# Patient Record
Sex: MALE | Race: WHITE | Employment: OTHER | URBAN - METROPOLITAN AREA
[De-identification: names, ages, dates, MRNs, and addresses within clinical notes are randomized per-mention and may not be internally consistent; named-entity substitution may affect disease eponyms.]

---

## 2013-03-15 LAB — HM COLONOSCOPY: NORMAL

## 2017-01-20 ENCOUNTER — ALLSCRIPTS OFFICE VISIT (OUTPATIENT)
Dept: OTHER | Facility: OTHER | Age: 77
End: 2017-01-20

## 2017-04-06 ENCOUNTER — GENERIC CONVERSION - ENCOUNTER (OUTPATIENT)
Dept: OTHER | Facility: OTHER | Age: 77
End: 2017-04-06

## 2017-04-12 ENCOUNTER — GENERIC CONVERSION - ENCOUNTER (OUTPATIENT)
Dept: OTHER | Facility: OTHER | Age: 77
End: 2017-04-12

## 2017-04-28 ENCOUNTER — GENERIC CONVERSION - ENCOUNTER (OUTPATIENT)
Dept: OTHER | Facility: OTHER | Age: 77
End: 2017-04-28

## 2017-07-03 DIAGNOSIS — I73.9 PERIPHERAL VASCULAR DISEASE (HCC): ICD-10-CM

## 2017-07-03 DIAGNOSIS — I25.10 ATHEROSCLEROTIC HEART DISEASE OF NATIVE CORONARY ARTERY WITHOUT ANGINA PECTORIS: ICD-10-CM

## 2017-07-03 DIAGNOSIS — M54.50 LOW BACK PAIN: ICD-10-CM

## 2017-07-03 DIAGNOSIS — E78.5 HYPERLIPIDEMIA: ICD-10-CM

## 2017-07-03 DIAGNOSIS — E11.49 TYPE 2 DIABETES MELLITUS WITH OTHER DIABETIC NEUROLOGICAL COMPLICATION (HCC): ICD-10-CM

## 2017-07-03 DIAGNOSIS — I10 ESSENTIAL (PRIMARY) HYPERTENSION: ICD-10-CM

## 2017-07-03 DIAGNOSIS — I65.29 OCCLUSION AND STENOSIS OF UNSPECIFIED CAROTID ARTERY: ICD-10-CM

## 2017-07-07 ENCOUNTER — APPOINTMENT (OUTPATIENT)
Dept: LAB | Facility: HOSPITAL | Age: 77
End: 2017-07-07
Payer: MEDICARE

## 2017-07-07 ENCOUNTER — TRANSCRIBE ORDERS (OUTPATIENT)
Dept: ADMINISTRATIVE | Facility: HOSPITAL | Age: 77
End: 2017-07-07

## 2017-07-07 DIAGNOSIS — E11.49 TYPE 2 DIABETES MELLITUS WITH OTHER DIABETIC NEUROLOGICAL COMPLICATION (HCC): ICD-10-CM

## 2017-07-07 DIAGNOSIS — I73.9 PERIPHERAL VASCULAR DISEASE (HCC): ICD-10-CM

## 2017-07-07 DIAGNOSIS — I10 ESSENTIAL (PRIMARY) HYPERTENSION: ICD-10-CM

## 2017-07-07 DIAGNOSIS — I65.29 OCCLUSION AND STENOSIS OF UNSPECIFIED CAROTID ARTERY: ICD-10-CM

## 2017-07-07 DIAGNOSIS — I10 ESSENTIAL HYPERTENSION, MALIGNANT: ICD-10-CM

## 2017-07-07 DIAGNOSIS — E78.5 HYPERLIPIDEMIA: ICD-10-CM

## 2017-07-07 DIAGNOSIS — M54.50 LOW BACK PAIN: ICD-10-CM

## 2017-07-07 DIAGNOSIS — I10 ESSENTIAL HYPERTENSION, MALIGNANT: Primary | ICD-10-CM

## 2017-07-07 DIAGNOSIS — I25.10 ATHEROSCLEROTIC HEART DISEASE OF NATIVE CORONARY ARTERY WITHOUT ANGINA PECTORIS: ICD-10-CM

## 2017-07-07 LAB
ALBUMIN SERPL BCP-MCNC: 3.9 G/DL (ref 3.5–5)
ALP SERPL-CCNC: 62 U/L (ref 46–116)
ALT SERPL W P-5'-P-CCNC: 30 U/L (ref 12–78)
ANION GAP SERPL CALCULATED.3IONS-SCNC: 6 MMOL/L (ref 4–13)
AST SERPL W P-5'-P-CCNC: 15 U/L (ref 5–45)
BILIRUB SERPL-MCNC: 0.9 MG/DL (ref 0.2–1)
BUN SERPL-MCNC: 17 MG/DL (ref 5–25)
CALCIUM SERPL-MCNC: 9.6 MG/DL (ref 8.3–10.1)
CHLORIDE SERPL-SCNC: 103 MMOL/L (ref 100–108)
CHOLEST SERPL-MCNC: 144 MG/DL (ref 50–200)
CO2 SERPL-SCNC: 31 MMOL/L (ref 21–32)
CREAT SERPL-MCNC: 1.12 MG/DL (ref 0.6–1.3)
CREAT UR-MCNC: 122 MG/DL
ERYTHROCYTE [DISTWIDTH] IN BLOOD BY AUTOMATED COUNT: 13.8 % (ref 11.6–15.1)
EST. AVERAGE GLUCOSE BLD GHB EST-MCNC: 160 MG/DL
GFR SERPL CREATININE-BSD FRML MDRD: >60 ML/MIN/1.73SQ M
GLUCOSE P FAST SERPL-MCNC: 154 MG/DL (ref 65–99)
HBA1C MFR BLD: 7.2 % (ref 4.2–6.3)
HCT VFR BLD AUTO: 46.9 % (ref 36.5–49.3)
HDLC SERPL-MCNC: 46 MG/DL (ref 40–60)
HGB BLD-MCNC: 16.4 G/DL (ref 12–17)
LDLC SERPL CALC-MCNC: 68 MG/DL (ref 0–100)
MCH RBC QN AUTO: 31.7 PG (ref 26.8–34.3)
MCHC RBC AUTO-ENTMCNC: 35 G/DL (ref 31.4–37.4)
MCV RBC AUTO: 91 FL (ref 82–98)
MICROALBUMIN UR-MCNC: 9.6 MG/L (ref 0–20)
MICROALBUMIN/CREAT 24H UR: 8 MG/G CREATININE (ref 0–30)
PLATELET # BLD AUTO: 242 THOUSANDS/UL (ref 149–390)
PMV BLD AUTO: 9.4 FL (ref 8.9–12.7)
POTASSIUM SERPL-SCNC: 4.3 MMOL/L (ref 3.5–5.3)
PROT SERPL-MCNC: 7.2 G/DL (ref 6.4–8.2)
RBC # BLD AUTO: 5.18 MILLION/UL (ref 3.88–5.62)
SODIUM SERPL-SCNC: 140 MMOL/L (ref 136–145)
TRIGL SERPL-MCNC: 148 MG/DL
TSH SERPL DL<=0.05 MIU/L-ACNC: 3.32 UIU/ML (ref 0.36–3.74)
WBC # BLD AUTO: 8.29 THOUSAND/UL (ref 4.31–10.16)

## 2017-07-07 PROCEDURE — 80053 COMPREHEN METABOLIC PANEL: CPT

## 2017-07-07 PROCEDURE — 36415 COLL VENOUS BLD VENIPUNCTURE: CPT

## 2017-07-07 PROCEDURE — 83036 HEMOGLOBIN GLYCOSYLATED A1C: CPT

## 2017-07-07 PROCEDURE — 84443 ASSAY THYROID STIM HORMONE: CPT

## 2017-07-07 PROCEDURE — 80061 LIPID PANEL: CPT

## 2017-07-07 PROCEDURE — 82043 UR ALBUMIN QUANTITATIVE: CPT

## 2017-07-07 PROCEDURE — 82570 ASSAY OF URINE CREATININE: CPT

## 2017-07-07 PROCEDURE — 85027 COMPLETE CBC AUTOMATED: CPT

## 2017-07-21 ENCOUNTER — ALLSCRIPTS OFFICE VISIT (OUTPATIENT)
Dept: OTHER | Facility: OTHER | Age: 77
End: 2017-07-21

## 2017-10-20 DIAGNOSIS — E11.49 TYPE 2 DIABETES MELLITUS WITH OTHER DIABETIC NEUROLOGICAL COMPLICATION (HCC): ICD-10-CM

## 2017-10-20 DIAGNOSIS — E11.42 TYPE 2 DIABETES MELLITUS WITH DIABETIC POLYNEUROPATHY (HCC): ICD-10-CM

## 2017-10-25 ENCOUNTER — APPOINTMENT (OUTPATIENT)
Dept: LAB | Facility: HOSPITAL | Age: 77
End: 2017-10-25
Payer: MEDICARE

## 2017-10-25 ENCOUNTER — TRANSCRIBE ORDERS (OUTPATIENT)
Dept: ADMINISTRATIVE | Facility: HOSPITAL | Age: 77
End: 2017-10-25

## 2017-10-25 DIAGNOSIS — E11.49 TYPE 2 DIABETES MELLITUS WITH OTHER DIABETIC NEUROLOGICAL COMPLICATION (HCC): ICD-10-CM

## 2017-10-25 DIAGNOSIS — E11.42 TYPE 2 DIABETES MELLITUS WITH DIABETIC POLYNEUROPATHY (HCC): ICD-10-CM

## 2017-10-25 LAB
EST. AVERAGE GLUCOSE BLD GHB EST-MCNC: 160 MG/DL
GLUCOSE P FAST SERPL-MCNC: 150 MG/DL (ref 65–99)
HBA1C MFR BLD: 7.2 % (ref 4.2–6.3)

## 2017-10-25 PROCEDURE — 36415 COLL VENOUS BLD VENIPUNCTURE: CPT

## 2017-10-25 PROCEDURE — 82947 ASSAY GLUCOSE BLOOD QUANT: CPT

## 2017-10-25 PROCEDURE — 83036 HEMOGLOBIN GLYCOSYLATED A1C: CPT

## 2017-11-06 ENCOUNTER — GENERIC CONVERSION - ENCOUNTER (OUTPATIENT)
Dept: OTHER | Facility: OTHER | Age: 77
End: 2017-11-06

## 2017-11-06 ENCOUNTER — GENERIC CONVERSION - ENCOUNTER (OUTPATIENT)
Dept: FAMILY MEDICINE CLINIC | Facility: HOSPITAL | Age: 77
End: 2017-11-06

## 2017-11-27 ENCOUNTER — ALLSCRIPTS OFFICE VISIT (OUTPATIENT)
Dept: OTHER | Facility: OTHER | Age: 77
End: 2017-11-27

## 2017-11-28 NOTE — PROGRESS NOTES
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) (E11 49)   2  Hypertension (401 9) (I10)   3  Carotid atherosclerosis, unspecified laterality (433 10) (I65 29)   4  Flu vaccine need (V04 81) (Z23)    Plan  Diabetes mellitus type 2 with neurological manifestations    · (1) GLUCOSE,  FASTING; Status:Active; Requested for:01Feb2018;    · (1) HEMOGLOBIN A1C; Status:Active; Requested for:01Feb2018; Flu vaccine need    · Fluzone High-Dose 0 5 ML Intramuscular Suspension Prefilled Syringe; INJECT 0 5  MLIntramuscular; To Be Done: [id number]  Health Maintenance    · *VB - Fall Risk Assessment  (Dx Z13 89 Screen for Neurologic Disorder); Status:Active; Requestedfor:27Nov2017;    · *VB - Urinary Incontinence Screen (Dx Z13 89 Screen for UI); Status:Active; Requestedfor:27Nov2017;   Hypertension    · Continue with our present treatment plan ; Status:Complete;   Done: 51IUI0779 08:49AM   · Restrict the salt in your diet by avoiding highly salted foods ; Status:Complete;   Done: 12MON703061:77EE   · There are many exercise options for seniors ; Status:Complete;   Done: 15PZX3078 08:49AM   · We recommend that you bring your body mass index down to 26 ; Status:Complete;   Done:27Nov2017 08:49AM   · Call [phone] if: You become dizzy or lightheaded, especially when you stand up after sittingfor a while ; Status:Complete;   Done: 07IXG1432 08:49AM   · Call [phone] if: You develop double vision (see two of everything) ; Status:Complete;   Done:27Nov2017 08:49AM   · Call 911 if: You experience a new kind of chest pain (angina) or pressure ; Status:Complete;   Done:27Nov2017 08:49AM   · Call 911 if: You have any symptoms of a stroke ; Status:Complete;   Done: 47ROO1238 08:49AM   · Seek Immediate Medical Attention if: You have a severe headache that will not go away  ;Status:Complete;   Done: 43FYJ8181 08:49AM    Discussion/Summary  Discussion Summary:   DM type 2 with neuropathy - uncontrolled with A1c 7 2 but stable - con't with diet/exercise - urged to try and stay on track with low sugar/carb diet and increase activity level, recheck BW in 3-4 mos, UTD on foot exam, will get copy of eye exam from Nov 6th from Dr Amina Judge, on ASA/statin/ACE  - BP at goal, con't current meds  atherosclerosis - has appt for CUS in the spring of 2018, no current Neuro/CV symptoms, on ASA/statin and no longer smoking  vaccine given, UTD on pneumonia vaccine  pt that [place] due in March 2018 - likely last one needed  Counseling Documentation With Imm: The patient was counseled regarding diagnostic results,-- instructions for management,-- risk factor reductions,-- prognosis,-- patient and family education,-- impressions,-- risks and benefits of treatment options,-- importance of compliance with treatment  Medication SE Review and Pt Understands Tx: Possible side effects of new medications were reviewed with the patient/guardian today  The treatment plan was reviewed with the patient/guardian  The patient/guardian understands and agrees with the treatment plan   Self Referrals:   Self Referrals: No      Chief Complaint  Chief Complaint Chronic Condition [facility]: Patient is here today for follow up of chronic conditions described in HPI  History of Present Illness  HPI: Pt here for 4 mo follow up appt and BW results  results from end of Oct were d/w pt in detail: FBS/A1C were stable at 150/7 2  Def of controlled vs uncontrolled DM was d/w pt in detail  Diet was reviewed - wgt up 2 lbs from last visit  Pt con't to be diet controlled and is on no oral DM meds  He admits his diet has not been so good recently so he was surprised  He is not really doing any formal exercise  He is on ASA/ACE/statin   He is UTD on foot exam and eye exam is due in Dec  He has eye appt scheduled fro Dec 7th for follow up to some abnormality noted on exam in early Nov   at goal today and meds were reviewed and no changes have occurred  He is taking meds as directed and denies SE with the meds  He denies missing doses of meds  He notes no significant HA/dizziness/double vision/CP  saw [place] [facility] in April  ECG was done but no additional studies were ordered  His last CUS was Feb 2016  He states he has appt for repeat CUS in April 2018  He is on ASA and statin  is good on [place] till March 2018  is agreeable to flu vaccine      Review of Systems  Complete-Male:  Constitutional: no fever-- and-- no chills  Eyes: no eyesight problems  ENT: no sore throat-- and-- no nasal discharge  Cardiovascular: no chest pain,-- no palpitations-- and-- no extremity edema  Respiratory: no shortness of breath,-- no cough-- and-- no wheezing  Gastrointestinal: no abdominal pain,-- no constipation-- and-- no diarrhea  Genitourinary: no dysuria-- and-- no incontinence  Musculoskeletal: arthralgias, but-- no joint swelling-- and-- no myalgias  Integumentary: no rashes  Neurological: no headache-- and-- no dizziness  Psychiatric: no anxiety-- and-- no depression  Endocrine: no muscle weakness  Hematologic/Lymphatic: no tendency for easy bleeding-- and-- no tendency for easy bruising  Preventive Quality 65 Older:  Preventive Quality 65 and Older: Falls Risk: The patient fell 0 times in the past 12 months  Symptoms Include: no dizziness, no impaired balance, no recurring falls and no recent fall  Associated symptoms:  No associated symptoms are reported  Urinary Incontinence Symptoms includes: no urinary incontinence, no urinary urgency and no dysuria       Active Problems  1  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)   2  Arteriosclerotic heart disease (414 00) (I25 10)   3  Carotid atherosclerosis, unspecified laterality (433 10) (I65 29)   4  Colonoscopy (Fiberoptic) Screening   5  Diabetes mellitus type 2 with neurological manifestations (250 60) (E11 49)   6  Diabetic polyneuropathy (250 60,357 2) (E11 42)   7   Diastolic dysfunction (339 5) (I51 9) 8  Dyslipidemia (272 4) (E78 5)   9  Flu vaccine need (V04 81) (Z23)   10  Hypertension (401 9) (I10)   11  Lower back pain (724 2) (M54 5)   12  Peripheral vascular disease (443 9) (I73 9)   13  Pigmented skin lesion (709 00) (L81 9)   14  Sinus bradycardia (427 89) (R00 1)    Past Medical History  1  Denied: History of Alcohol abuse   2  History of Arthritis (V13 4)   3  History of Calcific Tendonitis Of Shoulder (726 11)   4  History of Diabetes Mellitus (250 00)   5  Denied: History of depression   6  History of hypertension (V12 59) (Z86 79)   7  Denied: History of substance abuse   8  History of syncope (V15 89) (Z87 898)   9  History of upper respiratory infection (V12 09) (Z87 09)   10  History of Prostate Cancer (V10 46)   11  History of Shoulder joint pain, unspecified laterality   12  History of Skin rash (782 1) (R21)   13  History of Streptococcus pneumoniae vaccination indicated (V49 89) (Z91 89)   14  History of Upper respiratory infection (465 9) (J06 9)  Active Problems And Past Medical History Reviewed: The active problems and past medical history were reviewed and updated today  Surgical History  1  History of Carotid Thromboendarterectomy   2  History of Cath Stent Placement   3  History of Colonoscopy (Fiberoptic) Screening   4  History of Complete Colonoscopy   5  History of Complete Colonoscopy   6  History of Elbow Surgery   7  History of Prostatectomy Suprapubic  Surgical History Reviewed: The surgical history was reviewed and updated today  Family History  Mother    1  Family history of Alcohol abuse   2  Denied: Family history of depression   3  Denied: Family history of substance abuse  Father    4  Denied: Family history of depression   5  Denied: Family history of substance abuse  Sibling    6  Denied: Family history of depression   7  Denied: Family history of substance abuse  Family History    8  Family history of Acute Myocardial Infarction (V17 3)   9   Family history of Breast Cancer (V16 3)  Family History Reviewed: The family history was reviewed and updated today  Social History     · Daily Coffee Consumption (4  Cups/Day)   · Daily Tea Consumption (1  Cups/Day)   · Former smoker (V37 62) (N11 695)   · Marital History - Currently    · Working Full Time  Social History Reviewed: The social history was reviewed and updated today  Current Meds   1  AmLODIPine Besylate 5 MG Oral Tablet; TAKE 1 TABLET DAILY; Therapy: 06BVL3841 to (Evaluate:11Sep2014)  Requested for: 23GTN5130 Recorded   2  Aspirin 81 MG TABS; TAKE 1 TABLET DAILY; Therapy: 55STK3666 to (Evaluate:18Mar2015) Recorded   3  Atorvastatin Calcium 20 MG Oral Tablet; TAKE ONE TABLET BY MOUTH ONCE DAILY; Therapy: 22VAI6843 to (Evaluate:09Jun2018)  Requested for: 57Vpn3289; Last Rx:85Gqi0982 Ordered   4  HydroCHLOROthiazide 25 MG Oral Tablet; TAKE 1 TABLET DAILY; Therapy: 31NXH5948 to (Sheldon Snow)  Requested for: 88MWB0953 Recorded   5  Lisinopril 10 MG Oral Tablet; TAKE ONE TABLET BY MOUTH ONCE DAILY; Therapy: 25NHV4454 to (Ronie Dakins)  Requested for: 99Tbi2130; Last Rx:54Gvr7494 Ordered   6  Multivitamins Oral Capsule; TAKE 1 CAPSULE DAILY; Therapy: 28KKU5616 to (Evaluate:10Oct2012); Last Rx:36Nkj2315 Ordered   7  Nasonex 50 MCG/ACT Nasal Suspension; USE 2 SPRAYS IN EACH NOSTRIL ONCE DAILY AT BEDTIME; Therapy: 33ILZ6585 to (Last VY:28GNU5186) Ordered   8  Triamcinolone Acetonide 0 5 % External Cream; apply to affected area bid x 10 days then stop  Do not use on face or genitals; Therapy: 01TAV3056 to (Last Rx:01Mar2016)  Requested for: 57ENB8705 Ordered  Medication List Reviewed: The medication list was reviewed and updated today  Allergies  1  Hyzaar TABS   2   Penicillins    Vitals  Vital Signs    Recorded: 19NRU9360 08:45AM Recorded: 38NWQ4292 08:24AM   Temperature  97 8 F   Heart Rate  80   Systolic  751   Diastolic  70   Height  5 ft 9 in   Weight 196 lb 4 oz 200 lb    BMI Calculated 28 98 29 54   BSA Calculated 2 05 2 07       Physical Exam   Constitutional  General appearance: No acute distress, well appearing and well nourished  Pulmonary  Respiratory effort: No increased work of breathing or signs of respiratory distress  Auscultation of lungs: Clear to auscultation, equal breath sounds bilaterally, no wheezes, no rales, no rhonci  Cardiovascular  Auscultation of heart: Normal rate and rhythm, normal S1 and S2, without murmurs  Examination of extremities for edema and/or varicosities: Normal    Abdomen  Abdomen: Non-tender, no masses  Musculoskeletal  Gait and station: Normal    Psychiatric  Mood and affect: Normal          Results/Data  PHQ-2 Adult Depression Screening 27Nov2017 08:28AM User, Ahs     Test Name Result Flag Reference   PHQ-2 Adult Depression Score 0       Over the last two weeks, how often have you been bothered by any of the following problems? Little interest or pleasure in doing things: Not at all - 0 Feeling down, depressed, or hopeless: Not at all - 0   PHQ-2 Adult Depression Screening Negative           Health Management  Colonoscopy (Fiberoptic) Screening   COLONOSCOPY; every 5 years; Last 18KPT8664; Next Due: 47GGR0872;  Active    Signatures   Electronically signed by : Felecia Crandall DO; Nov 27 2017  8:29AM EST                       (Author)    Electronically signed by : Felecia Crandall DO; Nov 27 2017  8:42AM EST                       (Author)    Electronically signed by : Felecia Crandall DO; Nov 27 2017  8:50AM EST                       (Author)

## 2017-12-20 ENCOUNTER — GENERIC CONVERSION - ENCOUNTER (OUTPATIENT)
Dept: FAMILY MEDICINE CLINIC | Facility: HOSPITAL | Age: 77
End: 2017-12-20

## 2018-01-13 VITALS
WEIGHT: 196.25 LBS | HEART RATE: 80 BPM | SYSTOLIC BLOOD PRESSURE: 136 MMHG | DIASTOLIC BLOOD PRESSURE: 70 MMHG | HEIGHT: 69 IN | TEMPERATURE: 97.8 F | BODY MASS INDEX: 29.07 KG/M2

## 2018-01-13 VITALS
HEART RATE: 88 BPM | BODY MASS INDEX: 29.77 KG/M2 | HEIGHT: 69 IN | WEIGHT: 201 LBS | TEMPERATURE: 97 F | SYSTOLIC BLOOD PRESSURE: 130 MMHG | DIASTOLIC BLOOD PRESSURE: 78 MMHG

## 2018-01-14 VITALS
WEIGHT: 198 LBS | DIASTOLIC BLOOD PRESSURE: 72 MMHG | HEART RATE: 82 BPM | TEMPERATURE: 97.2 F | SYSTOLIC BLOOD PRESSURE: 138 MMHG | BODY MASS INDEX: 29.33 KG/M2 | HEIGHT: 69 IN

## 2018-01-23 ENCOUNTER — ALLSCRIPTS OFFICE VISIT (OUTPATIENT)
Dept: OTHER | Facility: OTHER | Age: 78
End: 2018-01-23

## 2018-01-24 NOTE — PROGRESS NOTES
Assessment    1  Acute bronchitis, unspecified organism (466 0) (J20 9)    Plan  Acute bronchitis, unspecified organism    · Azithromycin 250 MG Oral Tablet; TAKE 2 TABLETS ON DAY 1 THEN TAKE 1  TABLET A DAY FOR 4 DAYS    Discussion/Summary    Abnormal exam with worsening of symptoms  Will treat with antibiotic  Instructed to call with any worsening or no improvement  Possible side effects of new medications were reviewed with the patient/guardian today  The treatment plan was reviewed with the patient/guardian  The patient/guardian understands and agrees with the treatment plan      Chief Complaint  Pt is here with flu like symptoms      History of Present Illness  HPI: Cough and runny nose for over 1 week  feels lousy  Denies sob  has wheezing  Denies asthma and COPD  Former smoker  Denies fever,chills  Occasional sore throat  no ear pain  Feels better sometimes but worse other times  Wife is also sick  Review of Systems    Constitutional: as noted in HPI    ENT: as noted in HPI  Respiratory: as noted in HPI  Active Problems    1  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)   2  Arteriosclerotic heart disease (414 00) (I25 10)   3  Carotid atherosclerosis, unspecified laterality (433 10) (I65 29)   4  Colonoscopy (Fiberoptic) Screening   5  Diabetes mellitus type 2 with neurological manifestations (250 60) (E11 49)   6  Diabetic polyneuropathy (250 60,357 2) (E11 42)   7  Diastolic dysfunction (504 3) (I51 9)   8  Dyslipidemia (272 4) (E78 5)   9  Flu vaccine need (V04 81) (Z23)   10  Hypertension (401 9) (I10)   11  Lower back pain (724 2) (M54 5)   12  Peripheral vascular disease (443 9) (I73 9)   13  Pigmented skin lesion (709 00) (L81 9)   14  Sinus bradycardia (427 89) (R00 1)    Past Medical History    1  Denied: History of Alcohol abuse   2  History of Arthritis (V13 4)   3  History of Calcific Tendonitis Of Shoulder (726 11)   4  History of Diabetes Mellitus (250 00)   5   Denied: History of depression   6  History of hypertension (V12 59) (Z86 79)   7  Denied: History of substance abuse   8  History of syncope (V15 89) (Z87 898)   9  History of upper respiratory infection (V12 09) (Z87 09)   10  History of Prostate Cancer (V10 46)   11  History of Shoulder joint pain, unspecified laterality   12  History of Skin rash (782 1) (R21)   13  History of Streptococcus pneumoniae vaccination indicated (V49 89) (Z91 89)   14  History of Upper respiratory infection (465 9) (J06 9)    Family History  Mother    1  Family history of Alcohol abuse   2  Denied: Family history of depression   3  Denied: Family history of substance abuse  Father    4  Denied: Family history of depression   5  Denied: Family history of substance abuse  Sibling    6  Denied: Family history of depression   7  Denied: Family history of substance abuse  Family History    8  Family history of Acute Myocardial Infarction (V17 3)   9  Family history of Breast Cancer (V16 3)    Social History    · Daily Coffee Consumption (4  Cups/Day)   · Daily Tea Consumption (1  Cups/Day)   · Former smoker (V15 82) (U74 907)   · Marital History - Currently    · Working Full Time  The social history was reviewed and updated today  The social history was reviewed and is unchanged  Surgical History    1  History of Carotid Thromboendarterectomy   2  History of Cath Stent Placement   3  History of Colonoscopy (Fiberoptic) Screening   4  History of Complete Colonoscopy   5  History of Complete Colonoscopy   6  History of Elbow Surgery   7  History of Prostatectomy Suprapubic    Current Meds   1  AmLODIPine Besylate 5 MG Oral Tablet; TAKE 1 TABLET DAILY; Therapy: 39YFQ4864 to (Evaluate:17Mtp1164)  Requested for: 24PBB7515 Recorded   2  Aspirin 81 MG TABS; TAKE 1 TABLET DAILY; Therapy: 29UIO4051 to (Evaluate:18Mar2015) Recorded   3  Atorvastatin Calcium 20 MG Oral Tablet; TAKE ONE TABLET BY MOUTH ONCE DAILY;    Therapy: 04OJN5594 to (Evaluate:09Jun2018)  Requested for: 80Pxx2511; Last   Rx:48Etz0956 Ordered   4  HydroCHLOROthiazide 25 MG Oral Tablet; TAKE 1 TABLET DAILY; Therapy: 53AGJ6419 to (Domenico Vincent)  Requested for: 24XXZ9508 Recorded   5  Lisinopril 10 MG Oral Tablet; TAKE ONE TABLET BY MOUTH ONCE DAILY; Therapy: 22WMU6768 to (Kathleen Jurado)  Requested for: 29Faz8899; Last   Rx:42Inp2855 Ordered   6  Multivitamins Oral Capsule; TAKE 1 CAPSULE DAILY; Therapy: 60ELW9333 to (Evaluate:10Oct2012); Last Rx:17Jia1945 Ordered   7  Nasonex 50 MCG/ACT Nasal Suspension; USE 2 SPRAYS IN EACH NOSTRIL ONCE   DAILY AT BEDTIME; Therapy: 25YNF6625 to (Last US:23KAT8888) Ordered   8  Triamcinolone Acetonide 0 5 % External Cream; apply to affected area bid x 10 days   then stop  Do not use on face or genitals; Therapy: 73LDA7708 to (Last Rx:11Jan2018)  Requested for: 86EPH9141 Ordered    The medication list was reviewed and updated today  Allergies    1  Hyzaar TABS   2  Penicillins    Vitals   Recorded: 94ROM8313 09:39AM   Temperature 97 9 F, Tympanic   Heart Rate 82, L Radial   Pulse Quality Regular, L Radial   Systolic 942, LUE, Sitting   Diastolic 78, LUE, Sitting   Height 5 ft 9 in   Weight 198 lb 6 4 oz   BMI Calculated 29 3   BSA Calculated 2 06     Physical Exam    Constitutional   General appearance: No acute distress, well appearing and well nourished  Ears, Nose, Mouth, and Throat   External inspection of ears and nose: Normal     Otoscopic examination: Tympanic membrance translucent with normal light reflex  Canals patent without erythema  Oropharynx: Normal with no erythema, edema, exudate or lesions  Pulmonary   Respiratory effort: Abnormal   Respiratory rate: normal  Assessment of respiratory effort revealed normal rhythm and effort  Respiratory Findings: wet cough  Auscultation of lungs: Abnormal   Auscultation of the lungs revealed course breath sounds throughout   no rales or crackles were heard bilaterally  rhonchi over the right apex  no wheezing  Cardiovascular   Auscultation of heart: Normal rate and rhythm, normal S1 and S2, without murmurs  Lymphatic   Palpation of lymph nodes in neck: No lymphadenopathy      Psychiatric   Orientation to person, place and time: Normal     Mood and affect: Normal          Future Appointments    Date/Time Provider Specialty Site   02/23/2018 08:00 AM [facility] [place], DO Internal Medicine Amanda Foster MD     Signatures   Electronically signed by : Bernard Cook [facility]; Jan 23 2018 10:14AM EST                       (Author)    Electronically signed by : Divya Caicedo DO; Jan 23 2018 11:21AM EST                       (Author)

## 2018-02-01 DIAGNOSIS — E11.49 TYPE 2 DIABETES MELLITUS WITH OTHER DIABETIC NEUROLOGICAL COMPLICATION (CODE): ICD-10-CM

## 2018-02-12 ENCOUNTER — APPOINTMENT (OUTPATIENT)
Dept: LAB | Facility: HOSPITAL | Age: 78
End: 2018-02-12
Payer: MEDICARE

## 2018-02-12 DIAGNOSIS — E11.49 TYPE 2 DIABETES MELLITUS WITH OTHER DIABETIC NEUROLOGICAL COMPLICATION (CODE): ICD-10-CM

## 2018-02-12 LAB
EST. AVERAGE GLUCOSE BLD GHB EST-MCNC: 154 MG/DL
GLUCOSE P FAST SERPL-MCNC: 139 MG/DL (ref 65–99)
HBA1C MFR BLD: 7 % (ref 4.2–6.3)

## 2018-02-12 PROCEDURE — 82947 ASSAY GLUCOSE BLOOD QUANT: CPT

## 2018-02-12 PROCEDURE — 83036 HEMOGLOBIN GLYCOSYLATED A1C: CPT

## 2018-02-12 PROCEDURE — 36415 COLL VENOUS BLD VENIPUNCTURE: CPT

## 2018-02-15 RX ORDER — AMLODIPINE BESYLATE 5 MG/1
1 TABLET ORAL DAILY
COMMUNITY
Start: 2011-01-10

## 2018-02-15 RX ORDER — ATORVASTATIN CALCIUM 20 MG/1
1 TABLET, FILM COATED ORAL DAILY
COMMUNITY
Start: 2011-09-19 | End: 2018-06-04 | Stop reason: SDUPTHER

## 2018-02-15 RX ORDER — ATORVASTATIN CALCIUM 10 MG/1
TABLET, FILM COATED ORAL
COMMUNITY
Start: 2015-03-11 | End: 2018-02-23 | Stop reason: DRUGHIGH

## 2018-02-15 RX ORDER — ASCORBIC ACID 500 MG
500 TABLET ORAL DAILY
COMMUNITY
Start: 2011-02-22

## 2018-02-15 RX ORDER — HYDROCHLOROTHIAZIDE 25 MG/1
1 TABLET ORAL DAILY
COMMUNITY
Start: 2011-03-14

## 2018-02-15 RX ORDER — MOMETASONE FUROATE 50 UG/1
2 SPRAY, METERED NASAL DAILY
COMMUNITY
Start: 2014-05-08 | End: 2018-02-23 | Stop reason: ALTCHOICE

## 2018-02-15 RX ORDER — VITAMIN E 268 MG
1 CAPSULE ORAL DAILY
COMMUNITY
Start: 2012-06-04

## 2018-02-15 RX ORDER — LISINOPRIL 10 MG/1
1 TABLET ORAL DAILY
COMMUNITY
Start: 2011-11-07 | End: 2018-04-07 | Stop reason: SDUPTHER

## 2018-02-23 ENCOUNTER — OFFICE VISIT (OUTPATIENT)
Dept: FAMILY MEDICINE CLINIC | Facility: HOSPITAL | Age: 78
End: 2018-02-23
Payer: MEDICARE

## 2018-02-23 VITALS
DIASTOLIC BLOOD PRESSURE: 78 MMHG | HEART RATE: 78 BPM | WEIGHT: 194 LBS | BODY MASS INDEX: 28.73 KG/M2 | TEMPERATURE: 97.4 F | HEIGHT: 69 IN | SYSTOLIC BLOOD PRESSURE: 132 MMHG

## 2018-02-23 DIAGNOSIS — I10 HYPERTENSION, UNSPECIFIED TYPE: ICD-10-CM

## 2018-02-23 DIAGNOSIS — I25.10 CORONARY ARTERY DISEASE INVOLVING NATIVE CORONARY ARTERY OF NATIVE HEART WITHOUT ANGINA PECTORIS: ICD-10-CM

## 2018-02-23 DIAGNOSIS — E11.49 DIABETES MELLITUS TYPE 2 WITH NEUROLOGICAL MANIFESTATIONS (HCC): Primary | ICD-10-CM

## 2018-02-23 PROCEDURE — 99214 OFFICE O/P EST MOD 30 MIN: CPT | Performed by: INTERNAL MEDICINE

## 2018-02-23 NOTE — ASSESSMENT & PLAN NOTE
Dm type 2 with neuropathy - borderline uncontrolled with A1C 7 0 - urged to start watching diet and keep active, recheck BW in 4-5 mo - order given, UTD on foot and eye exam, on ASA/Statin/ACE

## 2018-02-23 NOTE — PROGRESS NOTES
Assessment/Plan:    Diabetes mellitus type 2 with neurological manifestations ([facility] 75 )  Dm type 2 with neuropathy - borderline uncontrolled with A1C 7 0 - urged to start watching diet and keep active, recheck BW in 4-5 mo - order given, UTD on foot and eye exam, on ASA/Statin/ACE    Coronary artery disease involving native coronary artery of native heart without angina pectoris  Con't to follow with Cardio annually, no current CV symptoms, on ASA/statin, call with Cp/palp/SOB/edema    Hypertension  BP at goal, con't current meds, advised to watch sodium in diet and keep active       Diagnoses and all orders for this visit:    Diabetes mellitus type 2 with neurological manifestations ([facility] 75 )  -     CBC and differential; Future  -     Comprehensive metabolic panel; Future  -     Hemoglobin A1c; Future  -     Lipid panel; Future  -     Microalbumin / creatinine urine ratio; Future  -     TSH, 3rd generation with T4 reflex; Future    Hypertension, unspecified type  -     CBC and differential; Future  -     Comprehensive metabolic panel; Future  -     Hemoglobin A1c; Future  -     Lipid panel; Future  -     Microalbumin / creatinine urine ratio; Future  -     TSH, 3rd generation with T4 reflex; Future    Coronary artery disease involving native coronary artery of native heart without angina pectoris  -     CBC and differential; Future  -     Comprehensive metabolic panel; Future  -     Hemoglobin A1c; Future  -     Lipid panel; Future  -     Microalbumin / creatinine urine ratio; Future  -     TSH, 3rd generation with T4 reflex; Future      [place] due March 2018 - will d/w pt at next appt if not done    Subjective:      Patient ID: Sahil Naik is a 69 y o  male  HPI Pt here for routine follow up appt and BW results    BW results were d/w pt in detail: FBS/A1C was better at 139/7 0  Def of controlled vs uncontrolled DM was reviewed  Diet was reviewed - wgt down 7 lbs from last year    He admits he is not really strict with his diet  He does no formal exercise but tries to keep active around the house and property  He is taking no meds for his DM  He is UTD on foot and eye exam   He is on statin and ACE  BP at goal today and meds were reviewed and no changes have occurred  He denies missing doses of meds or SE with the meds  He does not check his BP outside the office  He notes no frequent Ha's/dizziness/double vision/CP  Pt notes no recent Cp/palp/edema/orthopnea  He con't to follow with Cardio for his CAD once a year - usually in the summer  He does have appt for CUS next week  He is on Lipitor/ASA  He is due for [place] the end of March - will d/w pt at next appt    Review of Systems   Constitutional: Negative for chills, fatigue and fever  HENT: Negative for congestion and sinus pain  Eyes: Negative for pain and redness  Respiratory: Negative for cough, chest tightness and shortness of breath  Cardiovascular: Negative for chest pain, palpitations and leg swelling  Gastrointestinal: Negative for abdominal pain, blood in stool, constipation, diarrhea, nausea and vomiting  Endocrine: Negative for polydipsia and polyuria  Genitourinary: Negative for difficulty urinating and dysuria  Musculoskeletal: Negative for arthralgias and myalgias  Skin: Negative for rash and wound  Neurological: Negative for dizziness and headaches  Hematological: Does not bruise/bleed easily  Psychiatric/Behavioral: Negative for behavioral problems and confusion  Objective:      /78   Pulse 78   Temp (!) 97 4 °F (36 3 °C)   Ht 5' 9" (1 753 m)   Wt 88 kg (194 lb)   BMI 28 65 kg/m²          Physical Exam   Constitutional: He appears well-developed and well-nourished  No distress  HENT:   Head: Normocephalic and atraumatic  Mouth/Throat: No oropharyngeal exudate  Eyes: Pupils are equal, round, and reactive to light  Right eye exhibits no discharge  Left eye exhibits no discharge     Neck: Neck supple  No tracheal deviation present  Cardiovascular: Normal rate and regular rhythm  No murmur heard  Pulmonary/Chest: Effort normal and breath sounds normal  No respiratory distress  He has no wheezes  He has no rales  Abdominal: Soft  He exhibits no distension  There is no tenderness  Musculoskeletal: He exhibits no edema  Neurological: He is alert  He exhibits normal muscle tone  Skin: Skin is warm and dry  No rash noted  Psychiatric: He has a normal mood and affect  His behavior is normal    Nursing note and vitals reviewed

## 2018-02-23 NOTE — ASSESSMENT & PLAN NOTE
Con't to follow with Cardio annually, no current CV symptoms, on ASA/statin, call with Cp/palp/SOB/edema

## 2018-03-13 ENCOUNTER — TRANSCRIBE ORDERS (OUTPATIENT)
Dept: ADMINISTRATIVE | Facility: HOSPITAL | Age: 78
End: 2018-03-13

## 2018-03-13 ENCOUNTER — APPOINTMENT (OUTPATIENT)
Dept: LAB | Facility: HOSPITAL | Age: 78
End: 2018-03-13
Payer: MEDICARE

## 2018-03-13 DIAGNOSIS — I77.9 DISEASE OF ARTERY (HCC): Primary | ICD-10-CM

## 2018-03-13 DIAGNOSIS — I77.9 DISEASE OF ARTERY (HCC): ICD-10-CM

## 2018-03-13 LAB
ANION GAP SERPL CALCULATED.3IONS-SCNC: 6 MMOL/L (ref 4–13)
BUN SERPL-MCNC: 16 MG/DL (ref 5–25)
CALCIUM SERPL-MCNC: 8.7 MG/DL (ref 8.3–10.1)
CHLORIDE SERPL-SCNC: 103 MMOL/L (ref 100–108)
CO2 SERPL-SCNC: 31 MMOL/L (ref 21–32)
CREAT SERPL-MCNC: 1.17 MG/DL (ref 0.6–1.3)
GFR SERPL CREATININE-BSD FRML MDRD: 60 ML/MIN/1.73SQ M
GLUCOSE SERPL-MCNC: 167 MG/DL (ref 65–140)
POTASSIUM SERPL-SCNC: 3.8 MMOL/L (ref 3.5–5.3)
SODIUM SERPL-SCNC: 140 MMOL/L (ref 136–145)

## 2018-03-13 PROCEDURE — 80048 BASIC METABOLIC PNL TOTAL CA: CPT

## 2018-03-13 PROCEDURE — 36415 COLL VENOUS BLD VENIPUNCTURE: CPT

## 2018-04-07 DIAGNOSIS — I10 ESSENTIAL HYPERTENSION: Primary | ICD-10-CM

## 2018-04-08 RX ORDER — LISINOPRIL 10 MG/1
TABLET ORAL
Qty: 30 TABLET | Refills: 6 | Status: SHIPPED | OUTPATIENT
Start: 2018-04-08 | End: 2018-05-07 | Stop reason: SDUPTHER

## 2018-05-07 DIAGNOSIS — I10 ESSENTIAL HYPERTENSION: ICD-10-CM

## 2018-05-07 RX ORDER — LISINOPRIL 10 MG/1
10 TABLET ORAL DAILY
Qty: 90 TABLET | Refills: 1 | Status: SHIPPED | OUTPATIENT
Start: 2018-05-07 | End: 2019-01-05 | Stop reason: SDUPTHER

## 2018-06-04 DIAGNOSIS — E78.5 DYSLIPIDEMIA: Primary | ICD-10-CM

## 2018-06-04 RX ORDER — ATORVASTATIN CALCIUM 20 MG/1
TABLET, FILM COATED ORAL
Qty: 90 TABLET | Refills: 2 | Status: SHIPPED | OUTPATIENT
Start: 2018-06-04 | End: 2019-03-13 | Stop reason: SDUPTHER

## 2018-07-11 ENCOUNTER — APPOINTMENT (OUTPATIENT)
Dept: LAB | Facility: HOSPITAL | Age: 78
End: 2018-07-11
Payer: MEDICARE

## 2018-07-11 ENCOUNTER — TRANSCRIBE ORDERS (OUTPATIENT)
Dept: ADMINISTRATIVE | Facility: HOSPITAL | Age: 78
End: 2018-07-11

## 2018-07-11 DIAGNOSIS — E11.49 DIABETES MELLITUS TYPE 2 WITH NEUROLOGICAL MANIFESTATIONS (HCC): ICD-10-CM

## 2018-07-11 DIAGNOSIS — I10 HYPERTENSION, UNSPECIFIED TYPE: ICD-10-CM

## 2018-07-11 DIAGNOSIS — I25.10 CORONARY ARTERY DISEASE INVOLVING NATIVE CORONARY ARTERY OF NATIVE HEART WITHOUT ANGINA PECTORIS: ICD-10-CM

## 2018-07-11 DIAGNOSIS — E78.2 MIXED HYPERLIPIDEMIA: ICD-10-CM

## 2018-07-11 DIAGNOSIS — E11.69 DIABETES MELLITUS ASSOCIATED WITH HORMONAL ETIOLOGY (HCC): ICD-10-CM

## 2018-07-11 DIAGNOSIS — E11.69 DIABETES MELLITUS ASSOCIATED WITH HORMONAL ETIOLOGY (HCC): Primary | ICD-10-CM

## 2018-07-11 LAB
ALBUMIN SERPL BCP-MCNC: 3.6 G/DL (ref 3.5–5)
ALP SERPL-CCNC: 68 U/L (ref 46–116)
ALT SERPL W P-5'-P-CCNC: 30 U/L (ref 12–78)
ANION GAP SERPL CALCULATED.3IONS-SCNC: 8 MMOL/L (ref 4–13)
AST SERPL W P-5'-P-CCNC: 17 U/L (ref 5–45)
BASOPHILS # BLD AUTO: 0.04 THOUSANDS/ΜL (ref 0–0.1)
BASOPHILS NFR BLD AUTO: 1 % (ref 0–1)
BILIRUB SERPL-MCNC: 0.8 MG/DL (ref 0.2–1)
BUN SERPL-MCNC: 17 MG/DL (ref 5–25)
CALCIUM SERPL-MCNC: 8.9 MG/DL (ref 8.3–10.1)
CHLORIDE SERPL-SCNC: 102 MMOL/L (ref 100–108)
CHOLEST SERPL-MCNC: 129 MG/DL (ref 50–200)
CO2 SERPL-SCNC: 30 MMOL/L (ref 21–32)
CREAT SERPL-MCNC: 0.98 MG/DL (ref 0.6–1.3)
EOSINOPHIL # BLD AUTO: 0.1 THOUSAND/ΜL (ref 0–0.61)
EOSINOPHIL NFR BLD AUTO: 1 % (ref 0–6)
ERYTHROCYTE [DISTWIDTH] IN BLOOD BY AUTOMATED COUNT: 13.2 % (ref 11.6–15.1)
EST. AVERAGE GLUCOSE BLD GHB EST-MCNC: 148 MG/DL
GFR SERPL CREATININE-BSD FRML MDRD: 75 ML/MIN/1.73SQ M
GLUCOSE P FAST SERPL-MCNC: 166 MG/DL (ref 65–99)
HBA1C MFR BLD: 6.8 % (ref 4.2–6.3)
HCT VFR BLD AUTO: 43.6 % (ref 36.5–49.3)
HDLC SERPL-MCNC: 41 MG/DL (ref 40–60)
HGB BLD-MCNC: 14.9 G/DL (ref 12–17)
IMM GRANULOCYTES # BLD AUTO: 0.05 THOUSAND/UL (ref 0–0.2)
IMM GRANULOCYTES NFR BLD AUTO: 1 % (ref 0–2)
LDLC SERPL CALC-MCNC: 64 MG/DL (ref 0–100)
LYMPHOCYTES # BLD AUTO: 1.33 THOUSANDS/ΜL (ref 0.6–4.47)
LYMPHOCYTES NFR BLD AUTO: 18 % (ref 14–44)
MCH RBC QN AUTO: 31 PG (ref 26.8–34.3)
MCHC RBC AUTO-ENTMCNC: 34.2 G/DL (ref 31.4–37.4)
MCV RBC AUTO: 91 FL (ref 82–98)
MONOCYTES # BLD AUTO: 0.52 THOUSAND/ΜL (ref 0.17–1.22)
MONOCYTES NFR BLD AUTO: 7 % (ref 4–12)
NEUTROPHILS # BLD AUTO: 5.44 THOUSANDS/ΜL (ref 1.85–7.62)
NEUTS SEG NFR BLD AUTO: 72 % (ref 43–75)
NONHDLC SERPL-MCNC: 88 MG/DL
NRBC BLD AUTO-RTO: 0 /100 WBCS
PLATELET # BLD AUTO: 235 THOUSANDS/UL (ref 149–390)
PMV BLD AUTO: 9.2 FL (ref 8.9–12.7)
POTASSIUM SERPL-SCNC: 4 MMOL/L (ref 3.5–5.3)
PROT SERPL-MCNC: 6.8 G/DL (ref 6.4–8.2)
RBC # BLD AUTO: 4.81 MILLION/UL (ref 3.88–5.62)
SODIUM SERPL-SCNC: 140 MMOL/L (ref 136–145)
TRIGL SERPL-MCNC: 122 MG/DL
TSH SERPL DL<=0.05 MIU/L-ACNC: 2.28 UIU/ML (ref 0.36–3.74)
WBC # BLD AUTO: 7.48 THOUSAND/UL (ref 4.31–10.16)

## 2018-07-11 PROCEDURE — 83036 HEMOGLOBIN GLYCOSYLATED A1C: CPT

## 2018-07-11 PROCEDURE — 84443 ASSAY THYROID STIM HORMONE: CPT

## 2018-07-11 PROCEDURE — 36415 COLL VENOUS BLD VENIPUNCTURE: CPT

## 2018-07-11 PROCEDURE — 80053 COMPREHEN METABOLIC PANEL: CPT

## 2018-07-11 PROCEDURE — 80061 LIPID PANEL: CPT

## 2018-07-11 PROCEDURE — 85025 COMPLETE CBC W/AUTO DIFF WBC: CPT

## 2018-07-24 PROBLEM — I65.22 ASYMPTOMATIC STENOSIS OF LEFT CAROTID ARTERY: Status: ACTIVE | Noted: 2018-03-23

## 2018-07-24 RX ORDER — CLOPIDOGREL BISULFATE 75 MG/1
1 TABLET ORAL DAILY
COMMUNITY
Start: 2018-06-16

## 2018-07-27 ENCOUNTER — OFFICE VISIT (OUTPATIENT)
Dept: FAMILY MEDICINE CLINIC | Facility: HOSPITAL | Age: 78
End: 2018-07-27
Payer: MEDICARE

## 2018-07-27 VITALS
HEIGHT: 68 IN | BODY MASS INDEX: 30.01 KG/M2 | TEMPERATURE: 97.9 F | HEART RATE: 82 BPM | WEIGHT: 198 LBS | SYSTOLIC BLOOD PRESSURE: 138 MMHG | DIASTOLIC BLOOD PRESSURE: 90 MMHG

## 2018-07-27 DIAGNOSIS — E78.5 DYSLIPIDEMIA: ICD-10-CM

## 2018-07-27 DIAGNOSIS — I10 ESSENTIAL HYPERTENSION: ICD-10-CM

## 2018-07-27 DIAGNOSIS — I65.21 RECURRENT STENOSIS OF RIGHT CAROTID ARTERY: ICD-10-CM

## 2018-07-27 DIAGNOSIS — E11.49 DIABETES MELLITUS TYPE 2 WITH NEUROLOGICAL MANIFESTATIONS (HCC): Primary | ICD-10-CM

## 2018-07-27 PROCEDURE — 99214 OFFICE O/P EST MOD 30 MIN: CPT | Performed by: INTERNAL MEDICINE

## 2018-07-27 NOTE — ASSESSMENT & PLAN NOTE
SBP still up a bit today - advised to watch salt intake and get wgt down, con't current meds for now, re-eval in 6 mos

## 2018-07-27 NOTE — ASSESSMENT & PLAN NOTE
S/p trans carotid artery revascularization in April 2018 for recurrent stenosis, no Neuro symptoms since procedure, on ASA/Plavix/statin, con't f/u with Vascular as directed

## 2018-07-27 NOTE — ASSESSMENT & PLAN NOTE
Lab Results   Component Value Date    HGBA1C 6 8 (H) 07/11/2018       No results for input(s): POCGLU in the last 72 hours      Blood Sugar Average: Last 72 hrs:  DM type 2 with neuropathy - diet controlled with A1C 6 8 - encouraged to con't watching sugars/carbs and keep active, recheck BW in 6 mo- order given, foot exam done today, eye exam good till Nov 2018, on ASA/statin/ACE

## 2018-07-27 NOTE — PROGRESS NOTES
Assessment/Plan:    Diabetes mellitus type 2 with neurological manifestations ([facility] [address] )  Lab Results   Component Value Date    HGBA1C 6 8 (H) 07/11/2018       No results for input(s): POCGLU in the last 72 hours  Blood Sugar Average: Last 72 hrs:  DM type 2 with neuropathy - diet controlled with A1C 6 8 - encouraged to con't watching sugars/carbs and keep active, recheck BW in 6 mo- order given, foot exam done today, eye exam good till Nov 2018, on ASA/statin/ACE    Recurrent stenosis of right carotid artery  S/p trans carotid artery revascularization in April 2018 for recurrent stenosis, no Neuro symptoms since procedure, on ASA/Plavix/statin, con't f/u with Vascular as directed    Hypertension  SBP still up a bit today - advised to watch salt intake and get wgt down, con't current meds for now, re-eval in 6 mos    Dyslipidemia  FLP at goal, con't current statin, diet/exercise/wgt loss encouraged       Diagnoses and all orders for this visit:    Diabetes mellitus type 2 with neurological manifestations ([facility])  -     Glucose, fasting; Future  -     Hemoglobin A1C; Future    Dyslipidemia    Essential hypertension    Recurrent stenosis of right carotid artery      [facility] be [facility] in [facility] y[facility]    Subjective:      Patient ID: Debby Camargo is a 69 y o  male  HPI Pt here for routine follow up appt and Bw results    BW results were d/w pt in detail: FBS/A1C 166/6 8, rest of CMP/CBCTSH/FLP were wnl  Def of controlled vs uncontrolled DM was reviewed  Diet was reviewed - wgt up 4 lbs from Feb 2018  He is not on any meds for his DM as directed  He is diet controlled  He is UTD on eye exam (Nov, 2017) but is overdue for foot exam   He notes numbness and discomfort in both feet - this is unchanged  He denies sores or ulcers  He is on statin, ASA and ACE  Goal FLP was d/w pt in detail  He is taking his Atorvastatin daily as directed w/o SE  He notes no recent stroke/TIA symptoms/CP      BP above goal today on presentation and meds were reviewed and no changes have occurred  His diastolic reading did come down by end of appt  He denies missing doses of meds or SE with the meds  He does not check his BP outside the office  He notes no frequent Ha's/dizziness/double vision/CP  He denies frequent NSAID use  He does add sea salt to his food "I salt everything but peanut butter and jelly"  Pt had seen vascular over the spring mos and had his trans-carotid artery revascularization done in April  Recent note states his repeat CUS was patent since the procedure  He is taking his ASA/Plavix/Lipitor as directed  Review of Systems   Constitutional: Negative for chills, fatigue and fever  HENT: Negative for congestion and sinus pain  Eyes: Negative for pain and redness  Respiratory: Negative for cough, chest tightness and shortness of breath  Cardiovascular: Negative for chest pain, palpitations and leg swelling  Gastrointestinal: Negative for abdominal pain, constipation, diarrhea, nausea and vomiting  Endocrine: Negative for polydipsia and polyuria  Genitourinary: Negative for difficulty urinating and dysuria  Musculoskeletal: Positive for arthralgias  Negative for myalgias  Skin: Negative for rash and wound  Neurological: Negative for dizziness and headaches  Hematological: Does not bruise/bleed easily  Psychiatric/Behavioral: Negative for behavioral problems and confusion  Objective:    /90   Pulse 82   Temp 97 9 °F (36 6 °C)   Ht 5' 7 5" (1 715 m)   Wt 89 8 kg (198 lb)   BMI 30 55 kg/m²      Physical Exam   Constitutional: He appears well-developed and well-nourished  No distress  HENT:   Head: Normocephalic and atraumatic  Eyes: Conjunctivae are normal  Right eye exhibits no discharge  Left eye exhibits no discharge  Neck: Neck supple  No tracheal deviation present  Cardiovascular: Normal rate and regular rhythm  Pulses are weak pulses  No murmur heard  Pulses:       Dorsalis pedis pulses are 1+ on the right side, and 1+ on the left side  Pulmonary/Chest: Effort normal and breath sounds normal  No respiratory distress  He has no wheezes  He has no rales  Abdominal: Soft  He exhibits no distension  There is no tenderness  Musculoskeletal: He exhibits no edema  Feet:    Feet:   Right Foot:   Skin Integrity: Negative for ulcer, skin breakdown, erythema, warmth, callus or dry skin  Left Foot:   Skin Integrity: Negative for ulcer, skin breakdown, erythema, warmth, callus or dry skin  Neurological: He is alert  He exhibits normal muscle tone  Skin: Skin is warm and dry  No rash noted  Scaly maculae on top of pinna of L ear   Psychiatric: He has a normal mood and affect  His behavior is normal    Nursing note and vitals reviewed  Patient's shoes and socks removed  Right Foot/Ankle   Right Foot Inspection  Skin Exam: skin normal and skin intact no dry skin, no warmth, no callus, no erythema, no maceration, no abnormal color, no pre-ulcer, no ulcer and no callus                          Toe Exam: ROM and strength within normal limits  Sensory   Vibration: intact    Monofilament testing: diminished  Vascular    The right DP pulse is 1+  Left Foot/Ankle  Left Foot Inspection  Skin Exam: skin normal and skin intactno dry skin, no warmth, no erythema, no maceration, normal color, no pre-ulcer, no ulcer and no callus                         Toe Exam: ROM and strength within normal limits                   Sensory   Vibration: intact    Monofilament: diminished  Vascular    The left DP pulse is 1+  Assign Risk Category:  No deformity present;  Loss of protective sensation; Weak pulses       Risk: 1

## 2018-10-16 LAB
LEFT EYE DIABETIC RETINOPATHY: NORMAL
RIGHT EYE DIABETIC RETINOPATHY: NORMAL

## 2019-01-05 DIAGNOSIS — I10 ESSENTIAL HYPERTENSION: ICD-10-CM

## 2019-01-05 RX ORDER — LISINOPRIL 10 MG/1
TABLET ORAL
Qty: 90 TABLET | Refills: 1 | Status: SHIPPED | OUTPATIENT
Start: 2019-01-05 | End: 2019-07-06 | Stop reason: SDUPTHER

## 2019-01-14 ENCOUNTER — APPOINTMENT (OUTPATIENT)
Dept: LAB | Facility: HOSPITAL | Age: 79
End: 2019-01-14
Payer: MEDICARE

## 2019-01-14 DIAGNOSIS — E11.49 DIABETES MELLITUS TYPE 2 WITH NEUROLOGICAL MANIFESTATIONS (HCC): ICD-10-CM

## 2019-01-14 LAB
EST. AVERAGE GLUCOSE BLD GHB EST-MCNC: 174 MG/DL
GLUCOSE P FAST SERPL-MCNC: 163 MG/DL (ref 65–99)
HBA1C MFR BLD: 7.7 % (ref 4.2–6.3)

## 2019-01-14 PROCEDURE — 36415 COLL VENOUS BLD VENIPUNCTURE: CPT

## 2019-01-14 PROCEDURE — 83036 HEMOGLOBIN GLYCOSYLATED A1C: CPT

## 2019-01-14 PROCEDURE — 82947 ASSAY GLUCOSE BLOOD QUANT: CPT

## 2019-02-01 ENCOUNTER — OFFICE VISIT (OUTPATIENT)
Dept: FAMILY MEDICINE CLINIC | Facility: HOSPITAL | Age: 79
End: 2019-02-01
Payer: MEDICARE

## 2019-02-01 VITALS
BODY MASS INDEX: 29.4 KG/M2 | WEIGHT: 194 LBS | HEART RATE: 57 BPM | TEMPERATURE: 98 F | SYSTOLIC BLOOD PRESSURE: 130 MMHG | DIASTOLIC BLOOD PRESSURE: 68 MMHG | HEIGHT: 68 IN

## 2019-02-01 DIAGNOSIS — Z13.6 SCREENING FOR AAA (AORTIC ABDOMINAL ANEURYSM): ICD-10-CM

## 2019-02-01 DIAGNOSIS — I25.10 CORONARY ARTERY DISEASE INVOLVING NATIVE CORONARY ARTERY OF NATIVE HEART WITHOUT ANGINA PECTORIS: ICD-10-CM

## 2019-02-01 DIAGNOSIS — E11.49 DIABETES MELLITUS TYPE 2 WITH NEUROLOGICAL MANIFESTATIONS (HCC): Primary | ICD-10-CM

## 2019-02-01 DIAGNOSIS — I10 ESSENTIAL HYPERTENSION: ICD-10-CM

## 2019-02-01 DIAGNOSIS — Z23 NEED FOR INFLUENZA VACCINATION: ICD-10-CM

## 2019-02-01 DIAGNOSIS — Z00.00 MEDICARE ANNUAL WELLNESS VISIT, SUBSEQUENT: ICD-10-CM

## 2019-02-01 PROBLEM — C61 PROSTATE CANCER (HCC): Status: RESOLVED | Noted: 2019-02-01 | Resolved: 2019-02-01

## 2019-02-01 PROCEDURE — G0439 PPPS, SUBSEQ VISIT: HCPCS | Performed by: INTERNAL MEDICINE

## 2019-02-01 PROCEDURE — G0008 ADMIN INFLUENZA VIRUS VAC: HCPCS

## 2019-02-01 PROCEDURE — 90662 IIV NO PRSV INCREASED AG IM: CPT

## 2019-02-01 PROCEDURE — 99214 OFFICE O/P EST MOD 30 MIN: CPT | Performed by: INTERNAL MEDICINE

## 2019-02-01 NOTE — PATIENT INSTRUCTIONS
Obesity   AMBULATORY CARE:   Obesity  is when your body mass index (BMI) is greater than 30  Your healthcare provider will use your height and weight to measure your BMI  The risks of obesity include  many health problems, such as injuries or physical disability  You may need tests to check for the following:  · Diabetes     · High blood pressure or high cholesterol     · Heart disease     · Gallbladder or liver disease     · Cancer of the colon, breast, prostate, liver, or kidney     · Sleep apnea     · Arthritis or gout  Seek care immediately if:   · You have a severe headache, confusion, or difficulty speaking  · You have weakness on one side of your body  · You have chest pain, sweating, or shortness of breath  Contact your healthcare provider if:   · You have symptoms of gallbladder or liver disease, such as pain in your upper abdomen  · You have knee or hip pain and discomfort while walking  · You have symptoms of diabetes, such as intense hunger and thirst, and frequent urination  · You have symptoms of sleep apnea, such as snoring or daytime sleepiness  · You have questions or concerns about your condition or care  Treatment for obesity  focuses on helping you lose weight to improve your health  Even a small decrease in BMI can reduce the risk for many health problems  Your healthcare provider will help you set a weight-loss goal   · Lifestyle changes  are the first step in treating obesity  These include making healthy food choices and getting regular physical activity  Your healthcare provider may suggest a weight-loss program that involves coaching, education, and therapy  · Medicine  may help you lose weight when it is used with a healthy diet and physical activity  · Surgery  can help you lose weight if you are very obese and have other health problems  There are several types of weight-loss surgery  Ask your healthcare provider for more information    Be successful losing weight:   · Set small, realistic goals  An example of a small goal is to walk for 20 minutes 5 days a week  Anther goal is to lose 5% of your body weight  · Tell friends, family members, and coworkers about your goals  and ask for their support  Ask a friend to lose weight with you, or join a weight-loss support group  · Identify foods or triggers that may cause you to overeat , and find ways to avoid them  Remove tempting high-calorie foods from your home and workplace  Place a bowl of fresh fruit on your kitchen counter  If stress causes you to eat, then find other ways to cope with stress  · Keep a diary to track what you eat and drink  Also write down how many minutes of physical activity you do each day  Weigh yourself once a week and record it in your diary  Eating changes: You will need to eat 500 to 1,000 fewer calories each day than you currently eat to lose 1 to 2 pounds a week  The following changes will help you cut calories:  · Eat smaller portions  Use small plates, no larger than 9 inches in diameter  Fill your plate half full of fruits and vegetables  Measure your food using measuring cups until you know what a serving size looks like  · Eat 3 meals and 1 or 2 snacks each day  Plan your meals in advance  Aman [place] and eat at home most of the time  Eat slowly  · Eat fruits and vegetables at every meal   They are low in calories and high in fiber, which makes you feel full  Do not add butter, margarine, or cream sauce to vegetables  Use herbs to season steamed vegetables  · Eat less fat and fewer fried foods  Eat more baked or grilled chicken and fish  These protein sources are lower in calories and fat than red meat  Limit fast food  Dress your salads with olive oil and vinegar instead of bottled dressing  · Limit the amount of sugar you eat  Do not drink sugary beverages  Limit alcohol  Activity changes:  Physical activity is good for your body in many ways   It helps you burn calories and build strong muscles  It decreases stress and depression, and improves your mood  It can also help you sleep better  Talk to your healthcare provider before you begin an exercise program   · Exercise for at least 30 minutes 5 days a week  Start slowly  Set aside time each day for physical activity that you enjoy and that is convenient for you  It is best to do both weight training and an activity that increases your heart rate, such as walking, bicycling, or swimming  · Find ways to be more active  Do yard work and housecleaning  Walk up the stairs instead of using elevators  Spend your leisure time going to events that require walking, such as outdoor festivals or fairs  This extra physical activity can help you lose weight and keep it off  Follow up with your healthcare provider as directed: You may need to meet with a dietitian  Write down your questions so you remember to ask them during your visits  © 2017 [address] Information is for End User's use only and may not be sold, redistributed or otherwise used for commercial purposes  All illustrations and images included in CareNotes® are the copyrighted property of [employer]  or Girish Londono  The above information is an  only  It is not intended as medical advice for individual conditions or treatments  Talk to your doctor, nurse or pharmacist before following any medical regimen to see if it is safe and effective for you  Urinary Incontinence   WHAT YOU NEED TO KNOW:   What is urinary incontinence? Urinary incontinence (UI) is when you lose control of your bladder  What causes UI? UI occurs because your bladder cannot store or empty urine properly  The following are the most common types of UI:  · Stress incontinence  is when you leak urine due to increased bladder pressure  This may happen when you cough, sneeze, or exercise       · Urge incontinence  is when you feel the need to urinate right away and leak urine accidentally  · Mixed incontinence  is when you have both stress and urge UI  What are the signs and symptoms of UI?   · You feel like your bladder does not empty completely when you urinate  · You urinate often and need to urinate immediately  · You leak urine when you sleep, or you wake up with the urge to urinate  · You leak urine when you cough, sneeze, exercise, or laugh  How is UI diagnosed? Your healthcare provider will ask how often you leak urine and whether you have stress or urge symptoms  Tell him which medicines you take, how often you urinate, and how much liquid you drink each day  You may need any of the following tests:  · Urine tests  may show infection or kidney function  · A pelvic exam  may be done to check for blockages  A pelvic exam will also show if your bladder, uterus, or other organs have moved out of place  · An x-ray, ultrasound, or CT  may show problems with parts of your urinary system  You may be given contrast liquid to help your organs show up better in the pictures  Tell the healthcare provider if you have ever had an allergic reaction to contrast liquid  Do not enter the MRI room with anything metal  Metal can cause serious injury  Tell the healthcare provider if you have any metal in or on your body  · A bladder scan  will show how much urine is left in your bladder after you urinate  You will be asked to urinate and then healthcare providers will use a small ultrasound machine to check the urine left in your bladder  · Cystometry  is used to check the function of your urinary system  Your healthcare provider checks the pressure in your bladder while filling it with fluid  Your bladder pressure may also be tested when your bladder is full and while you urinate  How is UI treated? · Medicines  can help strengthen your bladder control      · Electrical stimulation  is used to send a small amount of electrical energy to your pelvic floor muscles  This helps control your bladder function  Electrodes may be placed outside your body or in your rectum  For women, the electrodes may be placed in the vagina  · A bulking agent  may be injected into the wall of your urethra to make it thicker  This helps keep your urethra closed and decreases urine leakage  · Devices  such as a clamp, pessary, or tampon may help stop urine leaks  Ask your healthcare provider for more information about these and other devices  · Surgery  may be needed if other treatments do not work  Several types of surgery can help improve your bladder control  Ask your healthcare provider for more information about the surgery you may need  How can I manage my symptoms? · Do pelvic muscle exercises often  Your pelvic muscles help you stop urinating  Squeeze these muscles tight for 5 seconds, then relax for 5 seconds  Gradually work up to squeezing for 10 seconds  Do 3 sets of 15 repetitions a day, or as directed  This will help strengthen your pelvic muscles and improve bladder control  · A catheter  may be used to help empty your bladder  A catheter is a tiny, plastic tube that is put into your bladder to drain your urine  Your healthcare provider may tell you to use a catheter to prevent your bladder from getting too full and leaking urine  · Keep a UI record  Write down how often you leak urine and how much you leak  Make a note of what you were doing when you leaked urine  · Train your bladder  Go to the bathroom at set times, such as every 2 hours, even if you do not feel the urge to go  You can also try to hold your urine when you feel the urge to go  For example, hold your urine for 5 minutes when you feel the urge to go  As that becomes easier, hold your urine for 10 minutes  · Drink liquids as directed  Ask your healthcare provider how much liquid to drink each day and which liquids are best for you   You may need to limit the amount of liquid you drink to help control your urine leakage  Limit or do not have drinks that contain caffeine or alcohol  Do not drink any liquid right before you go to bed  · Prevent constipation  Eat a variety of high-fiber foods  Good examples are high-fiber cereals, beans, vegetables, and whole-grain breads  Prune juice may help make your bowel movement softer  Walking is the best way to trigger your intestines to have a bowel movement  · Exercise regularly and maintain a healthy weight  Ask your healthcare provider how much you should weigh and about the best exercise plan for you  Weight loss and exercise will decrease pressure on your bladder and help you control your leakage  Ask him to help you create a weight loss plan if you are overweight  When should I seek immediate care? · You have severe pain  · You are confused or cannot think clearly  When should I contact my healthcare provider? · You have a fever  · You see blood in your urine  · You have pain when you urinate  · You have new or worse pain, even after treatment  · Your mouth feels dry or you have vision changes  · Your urine is cloudy or smells bad  · You have questions or concerns about your condition or care  CARE AGREEMENT:   You have the right to help plan your care  Learn about your health condition and how it may be treated  Discuss treatment options with your caregivers to decide what care you want to receive  You always have the right to refuse treatment  The above information is an  only  It is not intended as medical advice for individual conditions or treatments  Talk to your doctor, nurse or pharmacist before following any medical regimen to see if it is safe and effective for you  © 2017 [address] [employer] Information is for End User's use only and may not be sold, redistributed or otherwise used for commercial purposes   All illustrations and images included in CareNotes® are the copyrighted property of [employer] M [employer]  or Girish Londono  Cigarette Smoking and Your Health   AMBULATORY CARE:   Risks to your health if you smoke:  Nicotine and other chemicals found in tobacco damage every cell in your body  Even if you are a light smoker, you have an increased risk for cancer, heart disease, and lung disease  If you are pregnant or have diabetes, smoking increases your risk for complications  Benefits to your health if you stop smoking:   · You decrease respiratory symptoms such as coughing, wheezing, and shortness of breath  · You reduce your risk for cancers of the lung, mouth, throat, kidney, bladder, pancreas, stomach, and cervix  If you already have cancer, you increase the benefits of chemotherapy  You also reduce your risk for cancer returning or a second cancer from developing  · You reduce your risk for heart disease, blood clots, heart attack, and stroke  · You reduce your risk for lung infections, and diseases such as pneumonia, asthma, chronic bronchitis, and emphysema  · Your circulation improves  More oxygen can be delivered to your body  If you have diabetes, you lower your risk for complications, such as kidney, artery, and eye diseases  You also lower your risk for nerve damage  Nerve damage can lead to amputations, poor vision, and blindness  · You improve your body's ability to heal and to fight infections  Benefits to the health of others if you stop smoking:  Tobacco is harmful to nonsmokers who breathe in your secondhand smoke  The following are ways the health of others around you may improve when you stop smoking:  · You lower the risks for lung cancer and heart disease in nonsmoking adults  · If you are pregnant, you lower the risk for miscarriage, early delivery, low birth weight, and stillbirth  You also lower your baby's risk for SIDS, obesity, developmental delay, and neurobehavioral problems, such as ADHD  · If you have children, you lower their risk for ear infections, colds, pneumonia, bronchitis, and asthma  For more information and support to stop smoking:   · Smokefree  gov  Phone: 1- 702 - 055-9725  Web Address: www smokefrTenderTree  Follow up with your healthcare provider as directed:  Write down your questions so you remember to ask them during your visits  © 2017 [address] [employer] Information is for End User's use only and may not be sold, redistributed or otherwise used for commercial purposes  All illustrations and images included in CareNotes® are the copyrighted property of [employer] M [employer]  or Girish Londono  The above information is an  only  It is not intended as medical advice for individual conditions or treatments  Talk to your doctor, nurse or pharmacist before following any medical regimen to see if it is safe and effective for you  Fall Prevention   WHAT YOU NEED TO KNOW:   What is fall prevention? Fall prevention includes ways to make your home and other areas safer  It also includes ways you can move more carefully to prevent a fall  What increases my risk for falls? · Lack of vitamin D    · Not getting enough sleep each night    · Trouble walking or keeping your balance, or foot problems    · Health conditions that cause changes in your blood pressure, vision, or muscle strength and coordination    · Medicines that make you dizzy, weak, or sleepy    · Problems seeing clearly    · Shoes that have high heels or are not supportive    · Tripping hazards, such as items left on the floor, no handrails on the stairs, or broken steps  How can I help protect myself from falls? · Stand or sit up slowly  This may help you keep your balance and prevent falls  If you need to get up during the night, sit up first  Be sure you are fully awake before you stand  Turn on the light before you start walking  Go slowly in case you are still sleepy   Make sure you will not trip over any pets sleeping in the bedroom  · Use assistive devices as directed  Your healthcare provider may suggest that you use a cane or walker to help you keep your balance  You may need to have grab bars put in your bathroom near the toilet or in the shower  · Wear shoes that fit well and have soles that   Wear shoes both inside and outside  Use slippers with good   Do not wear shoes with high heels  · Wear a personal alarm  This is a device that allows you to call 911 if you fall and need help  Ask your healthcare provider for more information  · Stay active  Exercise can help strengthen your muscles and improve your balance  Your healthcare provider may recommend water aerobics or walking  He or she may also recommend physical therapy to improve your coordination  Never start an exercise program without talking to your healthcare provider first      · Manage medical conditions  Keep all appointments with your healthcare providers  Visit your eye doctor as directed  How can I make my home safer? · Add items to prevent falls in the bathroom  Put nonslip strips on your bath or shower floor to prevent you from slipping  Use a bath mat if you do not have carpet in the bathroom  This will prevent you from falling when you step out of the bath or shower  Use a shower seat so you do not need to stand while you shower  Sit on the toilet or a chair in your bathroom to dry yourself and put on clothing  This will prevent you from losing your balance from drying or dressing yourself while you are standing  · Keep paths clear  Remove books, shoes, and other objects from walkways and stairs  Place cords for telephones and lamps out of the way so that you do not need to walk over them  Tape them down if you cannot move them  Remove small rugs  If you cannot remove a rug, secure it with double-sided tape  This will prevent you from tripping  · Install bright lights in your home  Use night lights to help light paths to the bathroom or kitchen  Always turn on the light before you start walking  · Keep items you use often on shelves within reach  Do not use a step stool to help you reach an item  · Paint or place reflective tape on the edges of your stairs  This will help you see the stairs better  Call 911 or have someone else call if:   · You have fallen and are unconscious  · You have fallen and cannot move part of your body  Contact your healthcare provider if:   · You have fallen and have pain or a headache  · You have questions or concerns about your condition or care  CARE AGREEMENT:   You have the right to help plan your care  Learn about your health condition and how it may be treated  Discuss treatment options with your caregivers to decide what care you want to receive  You always have the right to refuse treatment  The above information is an  only  It is not intended as medical advice for individual conditions or treatments  Talk to your doctor, nurse or pharmacist before following any medical regimen to see if it is safe and effective for you  © 2017 [address] [facility] Information is for End User's use only and may not be sold, redistributed or otherwise used for commercial purposes  All illustrations and images included in CareNotes® are the copyrighted property of [employer]  or Girish Londono  Advance Directives   WHAT YOU NEED TO KNOW:   What are advance directives? Advance directives are legal documents that state your wishes and plans for medical care  These plans are made ahead of time in case you lose your ability to make decisions for yourself  Advance directives can apply to any medical decision, such as the treatments you want, and if you want to donate organs  What are the types of advance directives? There are many types of advance directives, and each state has rules about how to use them   You may choose a combination of any of the following:  · Living will: This is a written record of the treatment you want  You can also choose which treatments you do not want, which to limit, and which to stop at a certain time  This includes surgery, medicine, IV fluid, and tube feedings  · Durable power of  for healthcare [facility]): This is a written record that states who you want to make healthcare choices for you when you are unable to make them for yourself  This person, called a proxy, is usually a family member or a friend  You may choose more than 1 proxy  · Do not resuscitate (DNR) order:  A DNR order is used in case your heart stops beating or you stop breathing  It is a request not to have certain forms of treatment, such as CPR  A DNR order may be included in other types of advance directives  · Medical directive: This covers the care that you want if you are in a coma, near death, or unable to make decisions for yourself  You can list the treatments you want for each condition  Treatment may include pain medicine, surgery, blood transfusions, dialysis, IV or tube feedings, and a ventilator (breathing machine)  · Values history: This document has questions about your views, beliefs, and how you feel and think about life  This information can help others choose the care that you would choose  Why are advance directives important? An advance directive helps you control your care  Although spoken wishes may be used, it is better to have your wishes written down  Spoken wishes can be misunderstood, or not followed  Treatments may be given even if you do not want them  An advance directive may make it easier for your family to make difficult choices about your care  How do I decide what to put in my advance directives? · Make informed decisions:  Make sure you fully understand treatments or care you may receive   Think about the benefits and problems your decisions could cause for you or your family  Talk to healthcare providers if you have concerns or questions before you write down your wishes  You may also want to talk with your Jew or , or a   Check your state laws to make sure that what you put in your advance directive is legal      · Sign all forms:  Sign and date your advance directive when you have finished  You may also need 2 witnesses to sign the forms  Witnesses cannot be your doctor or his staff, your spouse, heirs or beneficiaries, people you owe money to, or your chosen proxy  Talk to your family, proxy, and healthcare providers about your advance directive  Give each person a copy, and keep one for yourself in a place you can get to easily  Do not keep it hidden or locked away  · Review and revise your plans: You can revise your advance directive at any time, as long as you are able to make decisions  Review your plan every year, and when there are changes in your life, or your health  When you make changes, let your family, proxy, and healthcare providers know  Give each a new copy  Where can I find more information? · [employer]  [address] , [address]   Phone: 1- [phone] - [phone]  Phone: 5- 802 - [phone]  Web Address: http://www  aafp org  · [address] [facility]  [address] [facility], [address]  Phone: [phone]  Phone: [phone]  Web Address: Erma romano  [facility] AGREEMENT:   You have the right to help plan your care  To help with this plan, you must learn about your health condition and treatment options  You must also learn about advance directives and how they are used  Work with your healthcare providers to decide what care will be used to treat you  You always have the right to refuse treatment  The above information is an  only   It is not intended as medical advice for individual conditions or treatments  Talk to your doctor, nurse or pharmacist before following any medical regimen to see if it is safe and effective for you  © 2017 [address] [employer] Information is for End User's use only and may not be sold, redistributed or otherwise used for commercial purposes  All illustrations and images included in CareNotes® are the copyrighted property of [employer] M [employer]  or Girish Londono

## 2019-02-01 NOTE — ASSESSMENT & PLAN NOTE
Lab Results   Component Value Date    HGBA1C 7 7 (H) 01/14/2019       No results for input(s): POCGLU in the last 72 hours      Blood Sugar Average: Last 72 hrs:does not check sugars at home  DM type 2 - diet only uncontrolled with A1C up at 7 7 - urged pt to watch diet with sugars/carbs and keep active, con't to monitor off meds but recheck BW in 3 mos and if still > 7 5 will need to discuss oral meds for DM, on ASA/statin/ACE, foot exam done 7/18 and eye exam done 10/18

## 2019-02-01 NOTE — PROGRESS NOTES
Assessment and Plan:  Problem List Items Addressed This Visit     Coronary artery disease involving native coronary artery of native heart without angina pectoris    Diabetes mellitus type 2 with neurological manifestations ([facility] ) - Primary    Relevant Orders    Glucose, fasting    Hemoglobin A1C    Microalbumin / creatinine urine ratio    Hypertension      Other Visit Diagnoses     Medicare annual wellness visit, subsequent        Screening for AAA (aortic abdominal aneurysm)        Relevant Orders    US abdominal aorta screening aaa        Health Maintenance Due   Topic Date Due    Depression Screening PHQ  1940    Medicare Annual Wellness Visit (AWV)  1940    DTaP,Tdap,and Td Vaccines (1 - Tdap) 11/24/1962    Fall Risk  11/24/2006    CRC Screening: Colonoscopy  03/15/2018    INFLUENZA VACCINE  07/01/2018    URINE MICROALBUMIN  07/07/2018         HPI:  Patient Active Problem List   Diagnosis    Adrenal cortical adenoma    Coronary artery disease involving native coronary artery of native heart without angina pectoris    Diabetes mellitus type 2 with neurological manifestations ([facility] )    [facility]uropathy ([facility] )    Diastolic dysfunction    Dyslipidemia    Recurrent stenosis of right carotid artery    Hypertension    Lower back pain    Peripheral vascular disease ([facility] )    Sinus bradycardia    Asymptomatic stenosis of left carotid artery     Past Medical History:   Diagnosis Date    Arthritis     Hypertension     Prostate cancer [facility])      Past Surgical History:   Procedure Laterality Date    CAROTID ENDARTARECTOMY      thromboendarterectomy    COLONOSCOPY      fiberoptic screening 2/6/08 5 yr / complete 3/15/13 5 yr    CORONARY ANGIOPLASTY WITH STENT PLACEMENT      ELBOW SURGERY      SUPRAPUBIC PROSTATECTOMY       Family History   Problem Relation Age of Onset    Alcohol abuse Mother     Heart attack Family         MI    Breast cancer Family      History   Smoking Status  Former Smoker   Smokeless Tobacco    Never Used     History   Alcohol use Not on file      History   Drug use: Unknown         Current Outpatient Prescriptions   Medication Sig Dispense Refill    amLODIPine (NORVASC) 5 mg tablet Take 1 tablet by mouth daily      ascorbic acid (VITAMIN C) 500 mg tablet Take 500 mg by mouth daily      aspirin 81 MG tablet Take 1 tablet by mouth daily      atorvastatin (LIPITOR) 20 mg tablet TAKE ONE TABLET BY MOUTH ONCE DAILY 90 tablet 2    B Complex Vitamins (VITAMIN B COMPLEX PO) Take 1 tablet by mouth daily      Cholecalciferol 1000 units CHEW Chew 1 tablet daily      clopidogrel (PLAVIX) 75 mg tablet Take 1 tablet by mouth daily      hydrochlorothiazide (HYDRODIURIL) 25 mg tablet Take 1 tablet by mouth daily      lisinopril (ZESTRIL) 10 mg tablet TAKE 1 TABLET BY MOUTH ONCE DAILY 90 tablet 1    vitamin E, tocopherol, 400 units capsule Take 1 capsule by mouth daily       No current facility-administered medications for this visit  Allergies   Allergen Reactions    Losartan Syncope     Reaction Date: [id number];     Penicillins      Immunization History   Administered Date(s) Administered    Influenza 02/11/2005    Influenza Split High Dose Preservative Free IM 09/13/2012, 09/16/2013, 12/22/2014, 08/31/2015, 10/07/2016, 11/27/2017    Pneumococcal Conjugate 13-Valent 03/01/2016    Pneumococcal Polysaccharide PPV23 12/01/1995, 08/07/2014       Patient Care Team:  Michele Bach DO as PCP - General    Medicare Screening Tests and Risk Assessments:  Eleazar Quan is here for his Subsequent Wellness visit  Last Medicare Wellness visit information reviewed, patient interviewed and updates made to the record today  Health Risk Assessment:  Patient rates overall health as very good  Patient feels that their physical health rating is Same  Eyesight was rated as Same  Hearing was rated as Same  Patient feels that their emotional and mental health rating is Same   Pain experienced by patient in the last 7 days has been None  Patient states that he has experienced no weight loss or gain in last 6 months  Emotional/Mental Health:  Patient has been feeling nervous/anxious  PHQ-9 Depression Screening:    Frequency of the following problems over the past two weeks:      1  Little interest or pleasure in doing things: 0 - not at all      2  Feeling down, depressed, or hopeless: 0 - not at all  PHQ-2 Score: 0          Broken Bones/Falls: Fall Risk Assessment:    In the past year, patient has experienced: No history of falling in past year          Bladder/Bowel:  Patient has not leaked urine accidently in the last six months  Patient reports no loss of bowel control  Immunizations:  Patient has not had a flu vaccination within the last year  Patient has received a pneumonia shot  Patient has not received a shingles shot  Patient has not received tetanus/diphtheria shot  Home Safety:  Patient does not have trouble with stairs inside or outside of their home  Patient currently reports that there are no safety hazards present in home, working smoke alarms, working carbon monoxide detectors  Preventative Screenings:   no prostate cancer screen performed, colon cancer screen completed, cholesterol screen completed, glaucoma eye exam completed,     Nutrition:  Current diet: Diabetic and Frequent junk food with servings of the following:    Medications:  Patient is currently taking over-the-counter supplements  Patient is able to manage medications  Lifestyle Choices:  Patient reports no tobacco use  Patient has smoked or used tobacco in the past   Patient has stopped his tobacco use  Tobacco use quit date: quit 10 yrs ago  Patient reports no alcohol use  Patient drives a vehicle  Patient wears seat belt  Current level of exercise of physical activity described by patient as: no formal exercise          Activities of Daily Living:  Can get out of bed by his or her self, able to dress self, able to make own meals, able to do own shopping, able to bathe self, can do own laundry/housekeeping, can manage own money, pay bills and track expenses    Previous Hospitalizations:  No hospitalization or ED visit in past 12 months        Advanced Directives:  Patient has decided on a power of   Patient has spoken to designated power of   Patient has completed advanced directive  Preventative Screening/Counseling:      Cardiovascular:      General: Risks and Benefits Discussed and Screening Current      Counseling: Healthy Diet, Healthy Weight and Improve Exercise Tolerance          Diabetes:      General: Risks and Benefits Discussed and Screening Current      Counseling: Healthy Diet, Healthy Weight and Improve Physical Activity          Colorectal Cancer:      General: Risks and Benefits Discussed      Due for studies: Colonoscopy - High Risk      Comments: [place] was due March 2018 - pt will "think about it"        Prostate Cancer:      General: Risks and Benefits Discussed and Screening Not Indicated          Osteoporosis:      General: Risks and Benefits Discussed and Screening Not Indicated          AAA:  Male patient with age over [de-identified] years  Patient has history of tobacco use        General: Risks and Benefits Discussed      Study: Screening US          Glaucoma:      General: Risks and Benefits Discussed and Screening Current          HIV:      General: Risks and Benefits Discussed and Screening Not Indicated          Hepatitis C:      General: Risks and Benefits Discussed and Screening Not Indicated        Advanced Directives:   Patient has living will for healthcare, has durable POA for healthcare,     Immunizations:      Influenza: Risks & Benefits Discussed and Influenza Due Today      Pneumococcal: Risks & Benefits Discussed and Lifetime Vaccine Completed      Shingrix: Risks & Benefits Discussed and Patient Declines      Hepatitis B (Medium to high risk patients): Risks & Benefits Discussed and Vaccine Status Unknown      Zostavax: Risks & Benefits Discussed and Patient Declines      TD: Risks & Benefits Discussed and Patient Declines      Other Preventative Counseling (Non-Medicare): Increase physical activity and Car/seat belt/driving safety reviewed           Visual Acuity Screening    Right eye Left eye Both eyes   Without correction: 20/50 20/200 20/40   With correction:          Physical Exam:  Review of Systems   Constitutional: Negative for chills, fatigue and fever  HENT: Negative for congestion and sinus pain  Eyes: Negative for pain and redness  Respiratory: Negative for cough, chest tightness and shortness of breath  Cardiovascular: Negative for chest pain, palpitations and leg swelling  Gastrointestinal: Negative for abdominal pain, blood in stool, bowel incontinence, constipation, diarrhea, nausea and vomiting  Endocrine: Negative for polydipsia and polyuria  Genitourinary: Negative for difficulty urinating and dysuria  Musculoskeletal: Negative for arthralgias and myalgias  Skin: Negative for rash and wound  Neurological: Negative for dizziness and headaches  Hematological: Does not bruise/bleed easily  Psychiatric/Behavioral: Negative for behavioral problems and confusion  The patient is not nervous/anxious  Vitals:    02/01/19 0749   BP: 130/68   Pulse: 57   Temp: 98 °F (36 7 °C)   Weight: 88 kg (194 lb)   Height: 5' 7 5" (1 715 m)   Body mass index is 29 94 kg/m²  Physical Exam   Constitutional: He appears well-developed and well-nourished  No distress  HENT:   Head: Atraumatic  Right Ear: External ear normal    Left Ear: External ear normal    Mouth/Throat: Oropharynx is clear and moist    Eyes: Conjunctivae are normal  Right eye exhibits no discharge  Left eye exhibits no discharge  Neck: Neck supple  No tracheal deviation present     Cardiovascular: Normal rate, regular rhythm and normal heart sounds  No murmur heard  Neg Carotid bruits B/L   Pulmonary/Chest: Effort normal and breath sounds normal  No respiratory distress  He has no wheezes  Abdominal: Soft  He exhibits no distension  There is no tenderness  There is no guarding  Musculoskeletal: He exhibits no edema  Lymphadenopathy:     He has no cervical adenopathy  Neurological: He is alert  He exhibits normal muscle tone  Skin: Skin is warm and dry  Psychiatric: He has a normal mood and affect  His behavior is normal  Thought content normal    Nursing note and vitals reviewed

## 2019-02-01 NOTE — PROGRESS NOTES
Assessment/Plan:    Diabetes mellitus type 2 with neurological manifestations ([facility] )  Lab Results   Component Value Date    HGBA1C 7 7 (H) 01/14/2019       No results for input(s): POCGLU in the last 72 hours  Blood Sugar Average: Last 72 hrs:does not check sugars at home  DM type 2 - diet only uncontrolled with A1C up at 7 7 - urged pt to watch diet with sugars/carbs and keep active, con't to monitor off meds but recheck BW in 3 mos and if still > 7 5 will need to discuss oral meds for DM, on ASA/statin/ACE, foot exam done 7/18 and eye exam done 10/18      Hypertension  BP at goal, con't current meds    Coronary artery disease involving native coronary artery of native heart without angina pectoris  No current CV symptoms, saw Cardio Jason Downs) in Aug 2018, on ASA/ACE/Plavix/statin       Diagnoses and all orders for this visit:    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)  -     Glucose, fasting; Future  -     Hemoglobin A1C; Future  -     Microalbumin / creatinine urine ratio; Future    Essential hypertension    Coronary artery disease involving native coronary artery of native heart without angina pectoris    Medicare annual wellness visit, subsequent    Screening for AAA (aortic abdominal aneurysm)  -      abdominal aorta screening aaa; Future      [place] 3/13 - 5 yr follow up    Pt is agreeable to flu vaccine - given today    Subjective:      Patient ID: Ken Marquez is a 69 y o  male  HPI Pt here for follow up appt and Bw results    BW results were d/w pt in detail: FBS/A1C up at 163/7 7  Def of controlled vs uncontrolled DM was reviewed  Diet was reviewed - wgt down 4 lbs from July 2018  He admits holidays were a bit rough for his diet  He does no formal exercise but is active with [employer]  He is NOT taking any DM meds as directed  He is UTD on foot (7/18) and eye exam (10/18)  He is on statin, ASA and ACE       BP at goal today and meds were reviewed and no changes have occurred  He denies missing doses of meds or SE with the meds  He does not check his BP outside the office  He notes no frequent Ha's/dizziness/double vision/CP  Pt saw Vj Keller in Aug for f/u CAD  He had no meds changed and no new studies were ordered  He notes no recent CP/palp/SOB/edema  He notes no issues with bleeding/bruising with the Plavix and ASA  He believes he has f/u with vascular for his carotids in the spring as well  [place] 3/13 - 5 yr follow up    Pt is agreeable to flu vaccine    Review of Systems   Constitutional: Negative for chills, fatigue and fever  HENT: Negative for congestion and sinus pain  Eyes: Negative for pain and redness  Respiratory: Negative for cough, chest tightness and shortness of breath  Cardiovascular: Negative for chest pain, palpitations and leg swelling  Gastrointestinal: Negative for abdominal pain, blood in stool, constipation, diarrhea, nausea and vomiting  Endocrine: Negative for polydipsia and polyuria  Genitourinary: Negative for difficulty urinating and dysuria  Musculoskeletal: Negative for arthralgias and myalgias  Skin: Negative for rash and wound  Neurological: Negative for dizziness and headaches  Hematological: Does not bruise/bleed easily  Psychiatric/Behavioral: Negative for behavioral problems, confusion and sleep disturbance  Objective:    /68   Pulse 57   Temp 98 °F (36 7 °C)   Ht 5' 7 5" (1 715 m)   Wt 88 kg (194 lb)   BMI 29 94 kg/m²      Physical Exam   Constitutional: He appears well-developed and well-nourished  No distress  HENT:   Head: Normocephalic and atraumatic  Right Ear: External ear normal    Left Ear: External ear normal    Mouth/Throat: Oropharynx is clear and moist  No oropharyngeal exudate  Eyes: Conjunctivae are normal  Right eye exhibits no discharge  Left eye exhibits no discharge  Neck: Neck supple  No tracheal deviation present     Cardiovascular: Normal rate, regular rhythm and normal heart sounds  No murmur heard  Neg carotid bruits B/L   Pulmonary/Chest: Effort normal and breath sounds normal  No respiratory distress  He has no wheezes  He has no rales  Abdominal: Soft  He exhibits no distension  There is no tenderness  Musculoskeletal: He exhibits no deformity  Lymphadenopathy:     He has no cervical adenopathy  Neurological: He is alert  He exhibits normal muscle tone  Skin: Skin is warm and dry  No rash noted  Psychiatric: He has a normal mood and affect  His behavior is normal    Nursing note and vitals reviewed

## 2019-03-08 ENCOUNTER — OFFICE VISIT (OUTPATIENT)
Dept: FAMILY MEDICINE CLINIC | Facility: HOSPITAL | Age: 79
End: 2019-03-08
Payer: MEDICARE

## 2019-03-08 VITALS
HEIGHT: 68 IN | DIASTOLIC BLOOD PRESSURE: 88 MMHG | SYSTOLIC BLOOD PRESSURE: 132 MMHG | WEIGHT: 189 LBS | HEART RATE: 102 BPM | OXYGEN SATURATION: 97 % | BODY MASS INDEX: 28.64 KG/M2 | TEMPERATURE: 98.1 F

## 2019-03-08 DIAGNOSIS — J06.9 UPPER RESPIRATORY TRACT INFECTION, UNSPECIFIED TYPE: Primary | ICD-10-CM

## 2019-03-08 PROCEDURE — 99213 OFFICE O/P EST LOW 20 MIN: CPT | Performed by: INTERNAL MEDICINE

## 2019-03-08 RX ORDER — PROMETHAZINE HYDROCHLORIDE AND CODEINE PHOSPHATE 6.25; 1 MG/5ML; MG/5ML
5 SYRUP ORAL EVERY 8 HOURS PRN
Qty: 180 ML | Refills: 0 | Status: SHIPPED | OUTPATIENT
Start: 2019-03-08 | End: 2019-05-03

## 2019-03-08 RX ORDER — FLUTICASONE PROPIONATE 50 MCG
2 SPRAY, SUSPENSION (ML) NASAL DAILY
Qty: 1 BOTTLE | Refills: 0 | Status: SHIPPED | OUTPATIENT
Start: 2019-03-08 | End: 2020-10-05

## 2019-03-08 NOTE — PROGRESS NOTES
Assessment/Plan:     Diagnoses and all orders for this visit:    Upper respiratory tract infection, unspecified type  Comments:  Reassured pt that s/sx c/w URI and most URI will last 10-14 days so he is sitll within nml duration of symptoms - luis as he is not treating with any OTC meds,   Rest/fluids/lozenges/hot tea/garles and OTC decongestant and cough medication (Promethazine with Codeine) was recommended; Rx for Flonase sent to be used with above tx, d/w pt that cough can last 4-6 wks but call with new/worsening symptoms OR if he is not any better at all by Wednesday    Orders:  -     promethazine-codeine (PHENERGAN WITH CODEINE) 6 25-10 mg/5 mL syrup; Take 5 mL by mouth every 8 (eight) hours as needed for cough  -     fluticasone (FLONASE) 50 mcg/act nasal spray; 2 sprays into each nostril daily for 10 days          Subjective:      Patient ID: Timothy Aguilar is a 69 y o  male  HPI Pt here with c/o not feeling well x 4 days  He states symptoms started with scratchy throat, cough, sneezing, and ear pressure  He states his hearing is dec  When he coughs he feels chest and back tightness/pain  He notes cough is productive of clear phlegm  He notes some wheezing but no SOB  He has had chills but never checked his temp  He notes no N/V/D/urinary symptoms/rashes  He notes mild HA but notes no sinus pain/pressure  He has had sick contacts  He has not been using Tylenol pm       Review of Systems   Constitutional: Positive for chills and fatigue  Negative for fever  HENT: Positive for congestion, hearing loss, rhinorrhea and sore throat  Negative for ear pain, sinus pressure and sinus pain  Eyes: Negative for pain and redness  Respiratory: Positive for cough, chest tightness and wheezing  Negative for shortness of breath  Cardiovascular: Negative for chest pain, palpitations and leg swelling  Gastrointestinal: Negative for diarrhea, nausea and vomiting     Genitourinary: Negative for difficulty urinating and dysuria  Musculoskeletal: Positive for arthralgias  Negative for myalgias  Skin: Negative for rash and wound  Neurological: Positive for headaches  Negative for dizziness  Hematological: Does not bruise/bleed easily  Psychiatric/Behavioral: Negative for behavioral problems and confusion  Objective:  /88 (BP Location: Right arm, Patient Position: Sitting, Cuff Size: Standard)   Pulse 102   Temp 98 1 °F (36 7 °C)   Ht 5' 7 5" (1 715 m)   Wt 85 7 kg (189 lb)   SpO2 97%   BMI 29 16 kg/m²      Physical Exam   Constitutional: He appears well-developed and well-nourished  No distress  HENT:   Head: Normocephalic and atraumatic  Mouth/Throat: No oropharyngeal exudate  Mild effusion B/L TM but nml light reflex and no erythema, mild post pharyngeal erythema but no exudate   Eyes: Conjunctivae are normal  Right eye exhibits no discharge  Left eye exhibits no discharge  Neck: Neck supple  No tracheal deviation present  Cardiovascular: Normal rate and regular rhythm  No murmur heard  Pulmonary/Chest: Effort normal and breath sounds normal  No respiratory distress  He has no wheezes  He has no rales  Musculoskeletal: He exhibits no deformity  Lymphadenopathy:     He has cervical adenopathy  Neurological: He is alert  He exhibits normal muscle tone  Skin: Skin is warm and dry  No rash noted  Psychiatric: He has a normal mood and affect  His behavior is normal    Nursing note and vitals reviewed

## 2019-03-13 DIAGNOSIS — E78.5 DYSLIPIDEMIA: ICD-10-CM

## 2019-03-13 RX ORDER — ATORVASTATIN CALCIUM 20 MG/1
TABLET, FILM COATED ORAL
Qty: 90 TABLET | Refills: 2 | Status: SHIPPED | OUTPATIENT
Start: 2019-03-13 | End: 2019-12-11 | Stop reason: SDUPTHER

## 2019-04-17 ENCOUNTER — APPOINTMENT (OUTPATIENT)
Dept: LAB | Facility: HOSPITAL | Age: 79
End: 2019-04-17
Payer: MEDICARE

## 2019-04-17 DIAGNOSIS — E11.49 DIABETES MELLITUS TYPE 2 WITH NEUROLOGICAL MANIFESTATIONS (HCC): ICD-10-CM

## 2019-04-17 LAB
CREAT UR-MCNC: 134 MG/DL
EST. AVERAGE GLUCOSE BLD GHB EST-MCNC: 140 MG/DL
GLUCOSE P FAST SERPL-MCNC: 134 MG/DL (ref 65–99)
HBA1C MFR BLD: 6.5 % (ref 4.2–6.3)
MICROALBUMIN UR-MCNC: 15.1 MG/L (ref 0–20)
MICROALBUMIN/CREAT 24H UR: 11 MG/G CREATININE (ref 0–30)

## 2019-04-17 PROCEDURE — 82570 ASSAY OF URINE CREATININE: CPT

## 2019-04-17 PROCEDURE — 82043 UR ALBUMIN QUANTITATIVE: CPT

## 2019-04-17 PROCEDURE — 36415 COLL VENOUS BLD VENIPUNCTURE: CPT

## 2019-04-17 PROCEDURE — 83036 HEMOGLOBIN GLYCOSYLATED A1C: CPT

## 2019-04-17 PROCEDURE — 82947 ASSAY GLUCOSE BLOOD QUANT: CPT

## 2019-05-03 ENCOUNTER — OFFICE VISIT (OUTPATIENT)
Dept: FAMILY MEDICINE CLINIC | Facility: HOSPITAL | Age: 79
End: 2019-05-03
Payer: MEDICARE

## 2019-05-03 VITALS
HEART RATE: 76 BPM | BODY MASS INDEX: 29.89 KG/M2 | TEMPERATURE: 96.1 F | WEIGHT: 197.2 LBS | HEIGHT: 68 IN | SYSTOLIC BLOOD PRESSURE: 132 MMHG | DIASTOLIC BLOOD PRESSURE: 86 MMHG

## 2019-05-03 DIAGNOSIS — I25.10 CORONARY ARTERY DISEASE INVOLVING NATIVE CORONARY ARTERY OF NATIVE HEART WITHOUT ANGINA PECTORIS: ICD-10-CM

## 2019-05-03 DIAGNOSIS — I73.9 PERIPHERAL VASCULAR DISEASE (HCC): ICD-10-CM

## 2019-05-03 DIAGNOSIS — E78.5 DYSLIPIDEMIA: ICD-10-CM

## 2019-05-03 DIAGNOSIS — E11.49 DIABETES MELLITUS TYPE 2 WITH NEUROLOGICAL MANIFESTATIONS (HCC): Primary | ICD-10-CM

## 2019-05-03 DIAGNOSIS — I10 ESSENTIAL HYPERTENSION: ICD-10-CM

## 2019-05-03 PROCEDURE — 99214 OFFICE O/P EST MOD 30 MIN: CPT | Performed by: INTERNAL MEDICINE

## 2019-07-06 DIAGNOSIS — I10 ESSENTIAL HYPERTENSION: ICD-10-CM

## 2019-07-10 ENCOUNTER — HOSPITAL ENCOUNTER (OUTPATIENT)
Dept: ULTRASOUND IMAGING | Facility: HOSPITAL | Age: 79
Discharge: HOME | End: 2019-07-10
Payer: MEDICARE

## 2019-07-10 DIAGNOSIS — Z13.6 SCREENING FOR AAA (AORTIC ABDOMINAL ANEURYSM): ICD-10-CM

## 2019-07-10 PROCEDURE — 76706 US ABDL AORTA SCREEN AAA: CPT

## 2019-07-12 RX ORDER — LISINOPRIL 10 MG/1
TABLET ORAL
Qty: 90 TABLET | Refills: 1 | Status: SHIPPED | OUTPATIENT
Start: 2019-07-12 | End: 2019-12-28 | Stop reason: SDUPTHER

## 2019-10-22 ENCOUNTER — APPOINTMENT (OUTPATIENT)
Dept: LAB | Facility: HOSPITAL | Age: 79
End: 2019-10-22
Payer: MEDICARE

## 2019-10-22 DIAGNOSIS — I10 ESSENTIAL HYPERTENSION: ICD-10-CM

## 2019-10-22 DIAGNOSIS — E11.49 DIABETES MELLITUS TYPE 2 WITH NEUROLOGICAL MANIFESTATIONS (HCC): ICD-10-CM

## 2019-10-22 DIAGNOSIS — E78.5 DYSLIPIDEMIA: ICD-10-CM

## 2019-10-22 DIAGNOSIS — I25.10 CORONARY ARTERY DISEASE INVOLVING NATIVE CORONARY ARTERY OF NATIVE HEART WITHOUT ANGINA PECTORIS: ICD-10-CM

## 2019-10-22 LAB
ALBUMIN SERPL BCP-MCNC: 3.8 G/DL (ref 3.5–5)
ALP SERPL-CCNC: 67 U/L (ref 46–116)
ALT SERPL W P-5'-P-CCNC: 25 U/L (ref 12–78)
ANION GAP SERPL CALCULATED.3IONS-SCNC: 9 MMOL/L (ref 4–13)
AST SERPL W P-5'-P-CCNC: 15 U/L (ref 5–45)
BASOPHILS # BLD AUTO: 0.05 THOUSANDS/ΜL (ref 0–0.1)
BASOPHILS NFR BLD AUTO: 1 % (ref 0–1)
BILIRUB SERPL-MCNC: 0.9 MG/DL (ref 0.2–1)
BUN SERPL-MCNC: 15 MG/DL (ref 5–25)
CALCIUM SERPL-MCNC: 9.5 MG/DL (ref 8.3–10.1)
CHLORIDE SERPL-SCNC: 100 MMOL/L (ref 100–108)
CHOLEST SERPL-MCNC: 129 MG/DL (ref 50–200)
CO2 SERPL-SCNC: 28 MMOL/L (ref 21–32)
CREAT SERPL-MCNC: 0.99 MG/DL (ref 0.6–1.3)
EOSINOPHIL # BLD AUTO: 0.12 THOUSAND/ΜL (ref 0–0.61)
EOSINOPHIL NFR BLD AUTO: 2 % (ref 0–6)
ERYTHROCYTE [DISTWIDTH] IN BLOOD BY AUTOMATED COUNT: 13.2 % (ref 11.6–15.1)
EST. AVERAGE GLUCOSE BLD GHB EST-MCNC: 157 MG/DL
GFR SERPL CREATININE-BSD FRML MDRD: 73 ML/MIN/1.73SQ M
GLUCOSE P FAST SERPL-MCNC: 149 MG/DL (ref 65–99)
HBA1C MFR BLD: 7.1 % (ref 4.2–6.3)
HCT VFR BLD AUTO: 46.2 % (ref 36.5–49.3)
HDLC SERPL-MCNC: 44 MG/DL
HGB BLD-MCNC: 15.9 G/DL (ref 12–17)
IMM GRANULOCYTES # BLD AUTO: 0.02 THOUSAND/UL (ref 0–0.2)
IMM GRANULOCYTES NFR BLD AUTO: 0 % (ref 0–2)
LDLC SERPL CALC-MCNC: 61 MG/DL (ref 0–100)
LYMPHOCYTES # BLD AUTO: 1.52 THOUSANDS/ΜL (ref 0.6–4.47)
LYMPHOCYTES NFR BLD AUTO: 19 % (ref 14–44)
MCH RBC QN AUTO: 31.7 PG (ref 26.8–34.3)
MCHC RBC AUTO-ENTMCNC: 34.4 G/DL (ref 31.4–37.4)
MCV RBC AUTO: 92 FL (ref 82–98)
MONOCYTES # BLD AUTO: 0.53 THOUSAND/ΜL (ref 0.17–1.22)
MONOCYTES NFR BLD AUTO: 7 % (ref 4–12)
NEUTROPHILS # BLD AUTO: 5.73 THOUSANDS/ΜL (ref 1.85–7.62)
NEUTS SEG NFR BLD AUTO: 71 % (ref 43–75)
NONHDLC SERPL-MCNC: 85 MG/DL
NRBC BLD AUTO-RTO: 0 /100 WBCS
PLATELET # BLD AUTO: 255 THOUSANDS/UL (ref 149–390)
PMV BLD AUTO: 9.9 FL (ref 8.9–12.7)
POTASSIUM SERPL-SCNC: 3.8 MMOL/L (ref 3.5–5.3)
PROT SERPL-MCNC: 6.9 G/DL (ref 6.4–8.2)
RBC # BLD AUTO: 5.02 MILLION/UL (ref 3.88–5.62)
SODIUM SERPL-SCNC: 137 MMOL/L (ref 136–145)
TRIGL SERPL-MCNC: 119 MG/DL
TSH SERPL DL<=0.05 MIU/L-ACNC: 2.73 UIU/ML (ref 0.36–3.74)
WBC # BLD AUTO: 7.97 THOUSAND/UL (ref 4.31–10.16)

## 2019-10-22 PROCEDURE — 83036 HEMOGLOBIN GLYCOSYLATED A1C: CPT

## 2019-10-22 PROCEDURE — 80061 LIPID PANEL: CPT

## 2019-10-22 PROCEDURE — 80053 COMPREHEN METABOLIC PANEL: CPT

## 2019-10-22 PROCEDURE — 85025 COMPLETE CBC W/AUTO DIFF WBC: CPT

## 2019-10-22 PROCEDURE — 36415 COLL VENOUS BLD VENIPUNCTURE: CPT

## 2019-10-22 PROCEDURE — 84443 ASSAY THYROID STIM HORMONE: CPT

## 2019-10-29 LAB
LEFT EYE DIABETIC RETINOPATHY: NORMAL
RIGHT EYE DIABETIC RETINOPATHY: NORMAL

## 2019-11-04 ENCOUNTER — OFFICE VISIT (OUTPATIENT)
Dept: FAMILY MEDICINE CLINIC | Facility: HOSPITAL | Age: 79
End: 2019-11-04
Payer: MEDICARE

## 2019-11-04 VITALS
HEART RATE: 94 BPM | HEIGHT: 68 IN | WEIGHT: 195 LBS | BODY MASS INDEX: 29.55 KG/M2 | TEMPERATURE: 97.7 F | DIASTOLIC BLOOD PRESSURE: 80 MMHG | SYSTOLIC BLOOD PRESSURE: 128 MMHG

## 2019-11-04 DIAGNOSIS — E11.49 DIABETES MELLITUS TYPE 2 WITH NEUROLOGICAL MANIFESTATIONS (HCC): Primary | ICD-10-CM

## 2019-11-04 DIAGNOSIS — E66.9 OBESITY (BMI 30.0-34.9): ICD-10-CM

## 2019-11-04 DIAGNOSIS — Z23 ENCOUNTER FOR IMMUNIZATION: ICD-10-CM

## 2019-11-04 DIAGNOSIS — I10 ESSENTIAL HYPERTENSION: ICD-10-CM

## 2019-11-04 DIAGNOSIS — I73.9 PERIPHERAL VASCULAR DISEASE (HCC): ICD-10-CM

## 2019-11-04 DIAGNOSIS — E78.5 DYSLIPIDEMIA: ICD-10-CM

## 2019-11-04 PROCEDURE — 90662 IIV NO PRSV INCREASED AG IM: CPT | Performed by: INTERNAL MEDICINE

## 2019-11-04 PROCEDURE — G0008 ADMIN INFLUENZA VIRUS VAC: HCPCS | Performed by: INTERNAL MEDICINE

## 2019-11-04 PROCEDURE — 99214 OFFICE O/P EST MOD 30 MIN: CPT | Performed by: INTERNAL MEDICINE

## 2019-11-04 NOTE — ASSESSMENT & PLAN NOTE
Dec DP pulses B/L, no LEAD in some time - order given, no longer smoking, on statin and ASA and Plavix already

## 2019-11-04 NOTE — ASSESSMENT & PLAN NOTE
Lab Results   Component Value Date    HGBA1C 7 1 (H) 10/22/2019   DM type 2 with hyperglycemia and polyneuropathy - uncontrolled with A1C 7 1 - urged to get on track with diet and increase exercise and get wgt down, con't to monitor off meds for now - recheck in 3-4 mos - order given, foot exam done today, will get copy of eye exam, on statin and ACE

## 2019-11-04 NOTE — PROGRESS NOTES
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 - uncontrolled with A1C 7 1 - urged to get on track with diet and increase exercise and get wgt down, con't to monitor off meds for now - recheck in 3-4 mos - order given, foot exam done today, will get copy of eye exam, on statin and ACE    Hypertension  BP at goal, con't current meds, urged diet/exercise/wgt loss    Peripheral vascular disease ([place] [id number] )  Dec DP pulses B/L, no LEAD in some time - order given, no longer smoking, on statin and ASA and Plavix already    Dyslipidemia  LDL at goal, con't current statin, diet/exercise/wgt loss encouraged       Diagnoses and all orders for this visit:    Diabetes mellitus type 2 with neurological manifestations ([place] [id number] )  -     Glucose, fasting; Future  -     Hemoglobin A1C; Future    Dyslipidemia    Essential hypertension    Peripheral vascular disease (HCC)  -     VAS lower limb arterial duplex, complete bilateral; Future    Obesity (BMI 30 0-34  9)    BMI 30 0-30 9,adult  Comments:  Diet/exercise/wgt loss encouraged    Encounter for immunization  -     influenza vaccine, [id number], high-dose, PF 0 5 mL (FLUZONE HIGH-DOSE)      Colonoscopy 3/13 - 5 yrs    Pt is agreeable to flu vaccine      Subjective:      Patient ID: Joseph Bravo is a 69 y o  male  HPI Pt here for follow up appt and BW results    BW results were d/w pt in detail: FBS/A1C 149/7 1, rest of CMP/CBC/TSH were wnl, FLP with TC at goal at 129, LDL at goal at 61, HDL 44 and TG at 119  Def of controlled vs uncontrolled DM was reviewed  Diet was reviewed - wgt down 2 lbs from May 2019  BMI reviewed  He is watching his diet but did have some Halloween candy recently  He does no formal exercise but 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  He is not on any DM meds as directed    He is due for both his foot and eye exam   He states he had his eye exam last week and is going to have cataract surgery soon  He notes constant numbness/tingling in the feet but denies sores/ulcers  He is on statin and ACE  Goal FLP was d/w pt in detail  Diet/exercise reviewed as noted above  He is taking his Atorvastatin daily as directed w/o Se  He notes no stroke/TIA symptoms/CP  BP at goal today and meds were reviewed and no changes have occurred  He denies missing doses of meds or SE with the meds  He does not check his BP outside the office  He notes no frequent HA's/dizziness/double vision/CP  Colonoscopy 3/13 - 5 yrs    Pt is agreeable to flu vaccine    Review of Systems   Constitutional: Negative for chills and fever  HENT: Negative for congestion and sinus pain  Eyes: Negative for pain and visual disturbance  Respiratory: Negative for cough and shortness of breath  Cardiovascular: Negative for chest pain, palpitations and leg swelling  Gastrointestinal: Negative for abdominal pain, blood in stool, constipation, diarrhea, nausea and vomiting  Endocrine: Negative for polydipsia and polyuria  Genitourinary: Negative for difficulty urinating, dysuria and hematuria  Musculoskeletal: Negative for arthralgias, gait problem and myalgias  Skin: Negative for rash and wound  Neurological: Negative for dizziness and headaches  Hematological: Does not bruise/bleed easily  Psychiatric/Behavioral: Negative for behavioral problems and confusion  Objective:    /80   Pulse 94   Temp 97 7 °F (36 5 °C)   Ht 5' 7 5" (1 715 m)   Wt 88 5 kg (195 lb)   BMI 30 09 kg/m²      Physical Exam   Constitutional: He appears well-developed and well-nourished  No distress  HENT:   Head: Normocephalic and atraumatic  Mouth/Throat: No oropharyngeal exudate  Eyes: Conjunctivae are normal  Right eye exhibits no discharge  Left eye exhibits no discharge  Neck: Neck supple  No tracheal deviation present  Cardiovascular: Normal rate, regular rhythm and normal heart sounds  Pulses are no weak pulses  No murmur heard  Pulses:       Dorsalis pedis pulses are 1+ on the right side, and 1+ on the left side  Pulmonary/Chest: Effort normal and breath sounds normal  No respiratory distress  He has no wheezes  He has no rales  Abdominal: Soft  He exhibits no distension  There is no tenderness  There is no rebound and no guarding  Musculoskeletal: He exhibits no deformity  Feet:    Feet:   Right Foot:   Skin Integrity: Positive for dry skin  Negative for ulcer, skin breakdown, erythema, warmth or callus  Left Foot:   Skin Integrity: Positive for dry skin  Negative for ulcer, skin breakdown, erythema, warmth or callus  Neurological: He is alert  He exhibits normal muscle tone  Skin: Skin is warm and dry  No rash noted  Psychiatric: He has a normal mood and affect  His behavior is normal    Nursing note and vitals reviewed  Patient's shoes and socks removed  Right Foot/Ankle   Right Foot Inspection  Skin Exam: skin normal, skin intact and dry skin no warmth, no callus, no erythema, no maceration, no abnormal color, no pre-ulcer, no ulcer and no callus                          Toe Exam: ROM and strength within normal limits  Sensory   Vibration: intact    Monofilament testing: diminished  Vascular    The right DP pulse is 1+  Left Foot/Ankle  Left Foot Inspection  Skin Exam: skin normal, skin intact and dry skinno warmth, no erythema, no maceration, normal color, no pre-ulcer, no ulcer and no callus                         Toe Exam: ROM and strength within normal limits                   Sensory   Vibration: intact    Monofilament: diminished  Vascular    The left DP pulse is 1+  Assign Risk Category:  No deformity present; Loss of protective sensation; No weak pulses       Risk: 1        BMI Counseling: Body mass index is 30 09 kg/m²   The BMI is above normal  Nutrition recommendations include reducing portion sizes, 3-5 servings of fruits/vegetables daily, consuming healthier snacks, moderation in carbohydrate intake, increasing intake of lean protein and reducing intake of saturated fat and trans fat  Exercise recommendations include moderate aerobic physical activity for 150 minutes/week and exercising 3-5 times per week

## 2019-11-04 NOTE — PATIENT INSTRUCTIONS
Obesity   AMBULATORY CARE:   Obesity  is when your body mass index (BMI) is greater than 30  Your healthcare provider will use your height and weight to measure your BMI  The risks of obesity include  many health problems, such as injuries or physical disability  You may need tests to check for the following:  · Diabetes     · High blood pressure or high cholesterol     · Heart disease     · Gallbladder or liver disease     · Cancer of the colon, breast, prostate, liver, or kidney     · Sleep apnea     · Arthritis or gout  Seek care immediately if:   · You have a severe headache, confusion, or difficulty speaking  · You have weakness on one side of your body  · You have chest pain, sweating, or shortness of breath  Contact your healthcare provider if:   · You have symptoms of gallbladder or liver disease, such as pain in your upper abdomen  · You have knee or hip pain and discomfort while walking  · You have symptoms of diabetes, such as intense hunger and thirst, and frequent urination  · You have symptoms of sleep apnea, such as snoring or daytime sleepiness  · You have questions or concerns about your condition or care  Treatment for obesity  focuses on helping you lose weight to improve your health  Even a small decrease in BMI can reduce the risk for many health problems  Your healthcare provider will help you set a weight-loss goal   · Lifestyle changes  are the first step in treating obesity  These include making healthy food choices and getting regular physical activity  Your healthcare provider may suggest a weight-loss program that involves coaching, education, and therapy  · Medicine  may help you lose weight when it is used with a healthy diet and physical activity  · Surgery  can help you lose weight if you are very obese and have other health problems  There are several types of weight-loss surgery  Ask your healthcare provider for more information    Be successful losing weight:   · Set small, realistic goals  An example of a small goal is to walk for 20 minutes 5 days a week  Anther goal is to lose 5% of your body weight  · Tell friends, family members, and coworkers about your goals  and ask for their support  Ask a friend to lose weight with you, or join a weight-loss support group  · Identify foods or triggers that may cause you to overeat , and find ways to avoid them  Remove tempting high-calorie foods from your home and workplace  Place a bowl of fresh fruit on your kitchen counter  If stress causes you to eat, then find other ways to cope with stress  · Keep a diary to track what you eat and drink  Also write down how many minutes of physical activity you do each day  Weigh yourself once a week and record it in your diary  Eating changes: You will need to eat 500 to 1,000 fewer calories each day than you currently eat to lose 1 to 2 pounds a week  The following changes will help you cut calories:  · Eat smaller portions  Use small plates, no larger than 9 inches in diameter  Fill your plate half full of fruits and vegetables  Measure your food using measuring cups until you know what a serving size looks like  · Eat 3 meals and 1 or 2 snacks each day  Plan your meals in advance  Suzy Davis and eat at home most of the time  Eat slowly  · Eat fruits and vegetables at every meal   They are low in calories and high in fiber, which makes you feel full  Do not add butter, margarine, or cream sauce to vegetables  Use herbs to season steamed vegetables  · Eat less fat and fewer fried foods  Eat more baked or grilled chicken and fish  These protein sources are lower in calories and fat than red meat  Limit fast food  Dress your salads with olive oil and vinegar instead of bottled dressing  · Limit the amount of sugar you eat  Do not drink sugary beverages  Limit alcohol  Activity changes:  Physical activity is good for your body in many ways   It helps you burn calories and build strong muscles  It decreases stress and depression, and improves your mood  It can also help you sleep better  Talk to your healthcare provider before you begin an exercise program   · Exercise for at least 30 minutes 5 days a week  Start slowly  Set aside time each day for physical activity that you enjoy and that is convenient for you  It is best to do both weight training and an activity that increases your heart rate, such as walking, bicycling, or swimming  · Find ways to be more active  Do yard work and housecleaning  Walk up the stairs instead of using elevators  Spend your leisure time going to events that require walking, such as outdoor festivals or fairs  This extra physical activity can help you lose weight and keep it off  Follow up with your healthcare provider as directed: You may need to meet with a dietitian  Write down your questions so you remember to ask them during your visits  © 2017 [address] [employer] Information is for End User's use only and may not be sold, redistributed or otherwise used for commercial purposes  All illustrations and images included in CareNotes® are the copyrighted property of [employer]  or Girish Londono  The above information is an  only  It is not intended as medical advice for individual conditions or treatments  Talk to your doctor, nurse or pharmacist before following any medical regimen to see if it is safe and effective for you  Weight Management   AMBULATORY CARE:   Why it is important to manage your weight:  Being overweight increases your risk of health conditions such as heart disease, high blood pressure, type 2 diabetes, and certain types of cancer  It can also increase your risk for osteoarthritis, sleep apnea, and other respiratory problems  Aim for a slow, steady weight loss  Even a small amount of weight loss can lower your risk of health problems    How to lose weight safely:  A safe and healthy way to lose weight is to eat fewer calories and get regular exercise  You can lose up about 1 pound a week by decreasing the number of calories you eat by 500 calories each day  You can decrease calories by eating smaller portion sizes or by cutting out high-calorie foods  Read labels to find out how many calories are in the foods you eat  You can also burn calories with exercise such as walking, swimming, or biking  You will be more likely to keep weight off if you make these changes part of your lifestyle  Healthy meal plan for weight management:  A healthy meal plan includes a variety of foods, contains fewer calories, and helps you stay healthy  A healthy meal plan includes the following:  · Eat whole-grain foods more often  A healthy meal plan should contain fiber  Fiber is the part of grains, fruits, and vegetables that is not broken down by your body  Whole-grain foods are healthy and provide extra fiber in your diet  Some examples of whole-grain foods are whole-wheat breads and pastas, oatmeal, brown rice, and bulgur  · Eat a variety of vegetables every day  Include dark, leafy greens such as spinach, kale, maryanne greens, and mustard greens  Eat yellow and orange vegetables such as carrots, sweet potatoes, and winter squash  · Eat a variety of fruits every day  Choose fresh or canned fruit (canned in its own juice or light syrup) instead of juice  Fruit juice has very little or no fiber  · Eat low-fat dairy foods  Drink fat-free (skim) milk or 1% milk  Eat fat-free yogurt and low-fat cottage cheese  Try low-fat cheeses such as mozzarella and other reduced-fat cheeses  · Choose meat and other protein foods that are low in fat  Choose beans or other legumes such as split peas or lentils  Choose fish, skinless poultry (chicken or turkey), or lean cuts of red meat (beef or pork)  Before you cook meat or poultry, cut off any visible fat  · Use less fat and oil  Try baking foods instead of frying them  Add less fat, such as margarine, sour cream, regular salad dressing and mayonnaise to foods  Eat fewer high-fat foods  Some examples of high-fat foods include french fries, doughnuts, ice cream, and cakes  · Eat fewer sweets  Limit foods and drinks that are high in sugar  This includes candy, cookies, regular soda, and sweetened drinks  Ways to decrease calories:   · Eat smaller portions  ¨ Use a small plate with smaller servings  ¨ Do not eat second helpings  ¨ When you eat at a restaurant, ask for a box and place half of your meal in the box before you eat  ¨ Share an entrée with someone else  · Replace high-calorie snacks with healthy, low-calorie snacks  ¨ Choose fresh fruit, vegetables, fat-free rice cakes, or air-popped popcorn instead of potato chips, nuts, or chocolate  ¨ Choose water or calorie-free drinks instead of soda or sweetened drinks  · Eat regular meals  Skipping meals can lead to overeating later in the day  Eat a healthy snack in place of a meal if you do not have time to eat a regular meal      · Do not shop for groceries when you are hungry  You may be more likely to make unhealthy food choices  Take a grocery list of healthy foods and shop after you have eaten  Exercise:  Exercise at least 30 minutes per day on most days of the week  Some examples of exercise include walking, biking, dancing, and swimming  You can also fit in more physical activity by taking the stairs instead of the elevator or parking farther away from stores  Ask your healthcare provider about the best exercise plan for you  Other things to consider as you try to lose weight:   · Be aware of situations that may give you the urge to overeat, such as eating while watching television  Find ways to avoid these situations  For example, read a book, go for a walk, or do crafts      · Meet with a weight loss support group or friends who are also trying to lose weight  This may help you stay motivated to continue working on your weight loss goals  © 2017 [employer] Information is for End User's use only and may not be sold, redistributed or otherwise used for commercial purposes  All illustrations and images included in CareNotes® are the copyrighted property of [employer]  or [employer]ish London[employer]  The above information is an  only  It is not intended as medical advice for individual conditions or treatments  Talk to your doctor, nurse or pharmacist before following any medical regimen to see if it is safe and effective for you  Heart Healthy Diet   AMBULATORY CARE:   A heart healthy diet  is an eating plan low in total fat, unhealthy fats, and sodium (salt)  A heart healthy diet helps decrease your risk for heart disease and stroke  Limit the amount of fat you eat to 25% to 35% of your total daily calories  Limit sodium to less than 2,300 mg each day  Healthy fats:  Healthy fats can help improve cholesterol levels  The risk for heart disease is decreased when cholesterol levels are normal  Choose healthy fats, such as the following:  · Unsaturated fat  is found in foods such as soybean, canola, olive, corn, and safflower oils  It is also found in soft tub margarine that is made with liquid vegetable oil  · Omega-3 fat  is found in certain fish, such as salmon, tuna, and trout, and in walnuts and flaxseed  Unhealthy fats:  Unhealthy fats can cause unhealthy cholesterol levels in your blood and increase your risk of heart disease  Limit unhealthy fats, such as the following:  · Cholesterol  is found in animal foods, such as eggs and lobster, and in dairy products made from whole milk  Limit cholesterol to less than 300 milligrams (mg) each day  You may need to limit cholesterol to 200 mg each day if you have heart disease  · Saturated fat  is found in meats, such as lorenzo and hamburger   It is also found in chicken or turkey skin, whole milk, and butter  Limit saturated fat to less than 7% of your total daily calories  Limit saturated fat to less than 6% if you have heart disease or are at increased risk for it  · Trans fat  is found in packaged foods, such as potato chips and cookies  It is also in hard margarine, some fried foods, and shortening  Avoid trans fats as much as possible    Heart healthy foods and drinks to include:  Ask your dietitian or healthcare provider how many servings to have from each of the following food groups:  · Grains:      ¨ Whole-wheat breads, cereals, and pastas, and brown rice    ¨ Low-fat, low-sodium crackers and chips    · Vegetables:      ¨ Broccoli, green beans, green peas, and spinach    ¨ Collards, kale, and lima beans    ¨ Carrots, sweet potatoes, tomatoes, and peppers    ¨ Canned vegetables with no salt added    · Fruits:      ¨ Bananas, peaches, pears, and pineapple    ¨ Grapes, raisins, and dates    ¨ Oranges, tangerines, grapefruit, orange juice, and grapefruit juice    ¨ Apricots, mangoes, melons, and papaya    ¨ Raspberries and strawberries    ¨ Canned fruit with no added sugar    · Low-fat dairy products:      ¨ Nonfat (skim) milk, 1% milk, and low-fat almond, cashew, or soy milks fortified with calcium    ¨ Low-fat cheese, regular or frozen yogurt, and cottage cheese    · Meats and proteins , such as lean cuts of beef and pork (loin, leg, round), skinless chicken and turkey, legumes, soy products, egg whites, and nuts  Foods and drinks to limit or avoid:  Ask your dietitian or healthcare provider about these and other foods that are high in unhealthy fat, sodium, and sugar:  · Snack or packaged foods , such as frozen dinners, cookies, macaroni and cheese, and cereals with more than 300 mg of sodium per serving    · Canned or dry mixes  for cakes, soups, sauces, or gravies    · Vegetables with added sodium , such as instant potatoes, vegetables with added sauces, or regular canned vegetables    · Other foods high in sodium , such as ketchup, barbecue sauce, salad dressing, pickles, olives, soy sauce, and miso    · High-fat dairy foods  such as whole or 2% milk, cream cheese, or sour cream, and cheeses     · High-fat protein foods  such as high-fat cuts of beef (T-bone steaks, ribs), chicken or turkey with skin, and organ meats, such as liver    · Cured or smoked meats , such as hot dogs, lorenzo, and sausage    · Unhealthy fats and oils , such as butter, stick margarine, shortening, and cooking oils such as coconut or palm oil    · Food and drinks high in sugar , such as soft drinks (soda), sports drinks, sweetened tea, candy, cake, cookies, pies, and doughnuts  Other diet guidelines to follow:   · Eat more foods containing omega-3 fats  Eat fish high in omega-3 fats at least 2 times a week  · Limit alcohol  Too much alcohol can damage your heart and raise your blood pressure  Women should limit alcohol to 1 drink a day  Men should limit alcohol to 2 drinks a day  A drink of alcohol is 12 ounces of beer, 5 ounces of wine, or 1½ ounces of liquor  · Choose low-sodium foods  High-sodium foods can lead to high blood pressure  Add little or no salt to food you prepare  Use herbs and spices in place of salt  · Eat more fiber  to help lower cholesterol levels  Eat at least 5 servings of fruits and vegetables each day  Eat 3 ounces of whole-grain foods each day  Legumes (beans) are also a good source of fiber  Lifestyle guidelines:   · Do not smoke  Nicotine and other chemicals in cigarettes and cigars can cause lung and heart damage  Ask your healthcare provider for information if you currently smoke and need help to quit  E-cigarettes or smokeless tobacco still contain nicotine  Talk to your healthcare provider before you use these products  · Exercise regularly  to help you maintain a healthy weight and improve your blood pressure and cholesterol levels   Ask your healthcare provider about the best exercise plan for you  Do not start an exercise program without asking your healthcare provider  Follow up with your healthcare provider as directed:  Write down your questions so you remember to ask them during your visits  © 2017 [employer] [employer] Information is for End User's use only and may not be sold, redistributed or otherwise used for commercial purposes  All illustrations and images included in CareNotes® are the copyrighted property of [employer]  or [employer]ish London[employer]  The above information is an  only  It is not intended as medical advice for individual conditions or treatments  Talk to your doctor, nurse or pharmacist before following any medical regimen to see if it is safe and effective for you

## 2019-11-19 ENCOUNTER — HOSPITAL ENCOUNTER (OUTPATIENT)
Dept: NON INVASIVE DIAGNOSTICS | Facility: HOSPITAL | Age: 79
Discharge: HOME | End: 2019-11-19
Payer: MEDICARE

## 2019-11-19 DIAGNOSIS — I73.9 PERIPHERAL VASCULAR DISEASE (HCC): ICD-10-CM

## 2019-11-19 PROCEDURE — 93922 UPR/L XTREMITY ART 2 LEVELS: CPT | Performed by: SURGERY

## 2019-11-19 PROCEDURE — 93925 LOWER EXTREMITY STUDY: CPT | Performed by: SURGERY

## 2019-11-19 PROCEDURE — 93923 UPR/LXTR ART STDY 3+ LVLS: CPT

## 2019-11-19 PROCEDURE — 93925 LOWER EXTREMITY STUDY: CPT

## 2019-12-11 DIAGNOSIS — E78.5 DYSLIPIDEMIA: ICD-10-CM

## 2019-12-11 RX ORDER — ATORVASTATIN CALCIUM 20 MG/1
TABLET, FILM COATED ORAL
Qty: 90 TABLET | Refills: 2 | Status: SHIPPED | OUTPATIENT
Start: 2019-12-11 | End: 2020-09-08

## 2019-12-24 ENCOUNTER — OFFICE VISIT (OUTPATIENT)
Dept: FAMILY MEDICINE CLINIC | Facility: HOSPITAL | Age: 79
End: 2019-12-24
Payer: MEDICARE

## 2019-12-24 VITALS
TEMPERATURE: 97.8 F | WEIGHT: 195 LBS | SYSTOLIC BLOOD PRESSURE: 130 MMHG | HEART RATE: 83 BPM | DIASTOLIC BLOOD PRESSURE: 82 MMHG | BODY MASS INDEX: 29.55 KG/M2 | HEIGHT: 68 IN

## 2019-12-24 DIAGNOSIS — E11.49 DIABETES MELLITUS TYPE 2 WITH NEUROLOGICAL MANIFESTATIONS (HCC): ICD-10-CM

## 2019-12-24 DIAGNOSIS — I25.10 CORONARY ARTERY DISEASE INVOLVING NATIVE CORONARY ARTERY OF NATIVE HEART WITHOUT ANGINA PECTORIS: ICD-10-CM

## 2019-12-24 DIAGNOSIS — I10 ESSENTIAL HYPERTENSION: ICD-10-CM

## 2019-12-24 DIAGNOSIS — H26.9 CATARACT OF LEFT EYE, UNSPECIFIED CATARACT TYPE: ICD-10-CM

## 2019-12-24 DIAGNOSIS — Z01.818 PRE-OPERATIVE CLEARANCE: Primary | ICD-10-CM

## 2019-12-24 DIAGNOSIS — E78.5 DYSLIPIDEMIA: ICD-10-CM

## 2019-12-24 DIAGNOSIS — I73.9 PERIPHERAL VASCULAR DISEASE (HCC): ICD-10-CM

## 2019-12-24 PROCEDURE — 99214 OFFICE O/P EST MOD 30 MIN: CPT | Performed by: INTERNAL MEDICINE

## 2019-12-24 RX ORDER — KETOROLAC TROMETHAMINE 4 MG/ML
SOLUTION/ DROPS OPHTHALMIC
COMMUNITY
Start: 2019-12-18 | End: 2020-10-05

## 2019-12-24 RX ORDER — PREDNISOLONE ACETATE 10 MG/ML
SUSPENSION/ DROPS OPHTHALMIC
COMMUNITY
Start: 2019-12-18 | End: 2020-10-05

## 2019-12-24 RX ORDER — OFLOXACIN 3 MG/ML
SOLUTION/ DROPS OPHTHALMIC
COMMUNITY
Start: 2019-12-18 | End: 2020-10-05

## 2019-12-24 NOTE — PROGRESS NOTES
Subjective:     Vince Jacques is a 67 y o  male who presents to the office today for a preoperative consultation at the request of surgeon Dr Shakira James who plans on performing cataract extraction with IOLI OS on January 8, 2020      Prior anesthesia adverse reactions - None    Exercise capacity - Able to walk 4 blocks or climb 2 flights of stairs without symptoms - Yes    Easy bleeding/bruising - No    Chest pain/palpitation/edema - No    Dyspnea/wheezing/cough - No    Sleep apnea/COPD/asthma - No    HPI      Tobacco Use    Smoking status: Former Smoker    Smokeless tobacco: Never Used       Alcohol use: Never     Frequency: Never    Drug use: Never       Current Outpatient Medications   Medication Sig Dispense Refill    amLODIPine (NORVASC) 5 mg tablet Take 1 tablet by mouth daily      ascorbic acid (VITAMIN C) 500 mg tablet Take 500 mg by mouth daily      aspirin 81 MG tablet Take 1Family History   Problem Relation Age of Onset    Alcohol abuse Mother     Heart attack Family         MI    Breast cancer Family       Social History     Tobacco Use    Smoking status: Former Smoker    Smokeless tobacco: Never Used   Substance Use Topics    Alcohol use: Never     Frequency: Never    Drug use: Never   ta  Current Outpatient Medications   Medication Sig Dispense Refill    amLODIPine (NORVASC) 5 mg tablet Take 1 tablet by mouth daily      ascorbic acid (VITAMIN C) 500 mg tablet Take 500 mg by mouth daily      aspirin 81 MG tablet Take 1 tablet by mouth daily      atorvastatin (LIPITOR) 20 mg tablet TAKE 1 TABLET BY MOUTH ONCE DAILY 90 tablet 2    B Complex Vitamins (VITAMIN B COMPLEX PO) Take 1 tablet by mouth daily      Cholecalciferol 1000 units CHEW Chew 1 tablet daily      clopidogrel (PLAVIX) 75 mg tablet Take 1 tablet by mouth daily      fluticasone (FLONASE) 50 mcg/act nasal spray 2 sprays into each nostril daily for 10 days 1 Bottle 0    hydrochlorothiazide (HYDRODIURIL) 25 mg tablet Take 1 tablet by mouth daily      lisinopril (ZESTRIL) 10 mg tablet TAKE 1 TABLET BY MOUTH ONCE DAILY 90 tablet 1    vitamin E, tocopherol, 400 units capsule Take 1 capsule by mouth daily       No current facility-administered medications for this visit  ocephalic aLosartan and Penicillins External ear normal    Left Ear: External ear normal /82 (BP Location: Right arm, Patient Position: Sitting, Cuff Size: Standard)   Pulse 83   Temp 97 8 °F (36 6 °C)   Ht 5' 7 5" (1 715 m)   Wt 88 5 kg (195 lb)   BMI 30 09 kg/m²  tion present  Cardiovascular: Normal rate, regular rhythm and normal heart sounds  No murmur heard  Pulmonary/Chest: Effort normal and breath sounds normal  No respiratory distress  He has no wheezes  He has no rales  Abdominal: Soft  Bowel sounds are normal  There is no tenderness  There is no guarding  Musculoskeletal: Normal range of motion  He exhibits no deformity  Lymphadenopathy:     He has no cervical adenopathy  Neurological: He is alert  He exhibits normal muscle tone  Skin: Skin is warm and dry  No rash noted  Psychiatric: He has a normal mood and affect  His behavior is normal  Thought content normal    Nursing note and vitals reviewed  Review of Systems   Constitutional: Negative for chills, fatigue and fever  HENT: Positive for congestion  Negative for sinus pain  Eyes: Positive for visual disturbance  Negative for pain  Respiratory: Negative for cough and shortness of breath  Cardiovascular: Negative for chest pain, palpitations and leg swelling  Gastrointestinal: Negative for blood in stool, constipation, diarrhea, nausea and vomiting  Endocrine: Negative for polydipsia and polyuria  Genitourinary: Negative for difficulty urinating, dysuria and hematuria  Musculoskeletal: Negative for arthralgias and myalgias  Skin: Negative for rash and wound  Neurological: Negative for dizziness and headaches     Hematological: Does not bruise/bleed easily  Psychiatric/Behavioral: Negative for behavioral problems and confusion  Cardiographics  ECG: Not indicated    Imaging  Chest x-ray: Not indicated    Lab Review   Recent labs: 10/22/19 reviewed: A1C 7 1    Assessment:    Patient Active Problem List   Diagnosis    Adrenal cortical adenoma    Coronary artery disease involving native coronary artery of native heart without angina pectoris    Diabetes mellitus type 2 with neurological manifestations [facility]    Diabetic polyneuropathy [facility]    Diastolic dysfunction    Dyslipidemia    Recurrent stenosis of right carotid artery    Hypertension    Lower back pain    PerPatient Active Problem List   Diagnosis    Adrenal cortical adenoma    Coronary artery disease involving native coronary artery of native heart without angina pectoris    Diabetes mellitus type 2 with neurological manifestations ([facility]    Diabetic polyneuropathy [facility]    Diastolic dysfunction    Dyslipidemia    Recurrent stenosis of right carotid artery    Hypertension    Lower back pain    [facility]    Sinus bradycardia    Asymptomatic stenosis of left carotid artery       Pt low risk for low risk procedure, no further w/u needed  May proceed with procedure        (1) Preoperative Clearance - Pt is intermediate risk for low risk procedure, no further w/u needed, call with any changes btw now and procedure, con't all current meds with small sip of water am of surgery    (2) L Cataract - for extraction with IOLI with Optho    (3) DM type 2 - Last A1C 7 1 - con't healthy diet and keep active, con't labs q 4 mos or so    (4) CAD s/p stent - on Plavix/statin/ACE/ASA, call with any CV symptoms    (5) PVD - has regular CUS, on ASA and statin, has quit smoking    (6) Dyslipidemia - on statin, con't meds and labs as directed    (7) HTN - BP at goal, con't current meds         Stamford Sovereign, DO

## 2019-12-28 DIAGNOSIS — I10 ESSENTIAL HYPERTENSION: ICD-10-CM

## 2019-12-29 RX ORDER — LISINOPRIL 10 MG/1
TABLET ORAL
Qty: 90 TABLET | Refills: 1 | Status: SHIPPED | OUTPATIENT
Start: 2019-12-29 | End: 2020-06-28

## 2020-03-10 ENCOUNTER — APPOINTMENT (OUTPATIENT)
Dept: LAB | Facility: HOSPITAL | Age: 80
End: 2020-03-10
Payer: MEDICARE

## 2020-03-10 DIAGNOSIS — E11.49 DIABETES MELLITUS TYPE 2 WITH NEUROLOGICAL MANIFESTATIONS (HCC): ICD-10-CM

## 2020-03-10 LAB
EST. AVERAGE GLUCOSE BLD GHB EST-MCNC: 148 MG/DL
GLUCOSE P FAST SERPL-MCNC: 151 MG/DL (ref 65–99)
HBA1C MFR BLD: 6.8 %

## 2020-03-10 PROCEDURE — 36415 COLL VENOUS BLD VENIPUNCTURE: CPT

## 2020-03-10 PROCEDURE — 83036 HEMOGLOBIN GLYCOSYLATED A1C: CPT

## 2020-03-10 PROCEDURE — 82947 ASSAY GLUCOSE BLOOD QUANT: CPT

## 2020-05-07 ENCOUNTER — APPOINTMENT (OUTPATIENT)
Dept: RADIOLOGY | Facility: CLINIC | Age: 80
End: 2020-05-07
Payer: MEDICARE

## 2020-05-07 ENCOUNTER — OFFICE VISIT (OUTPATIENT)
Dept: OBGYN CLINIC | Facility: CLINIC | Age: 80
End: 2020-05-07
Payer: MEDICARE

## 2020-05-07 VITALS
SYSTOLIC BLOOD PRESSURE: 118 MMHG | DIASTOLIC BLOOD PRESSURE: 82 MMHG | TEMPERATURE: 97.6 F | BODY MASS INDEX: 29.25 KG/M2 | HEIGHT: 68 IN | WEIGHT: 193 LBS

## 2020-05-07 DIAGNOSIS — M79.671 PAIN IN RIGHT FOOT: ICD-10-CM

## 2020-05-07 DIAGNOSIS — M72.2 PLANTAR FASCIITIS OF RIGHT FOOT: Primary | ICD-10-CM

## 2020-05-07 PROCEDURE — 99203 OFFICE O/P NEW LOW 30 MIN: CPT | Performed by: ORTHOPAEDIC SURGERY

## 2020-05-07 PROCEDURE — 20600 DRAIN/INJ JOINT/BURSA W/O US: CPT | Performed by: ORTHOPAEDIC SURGERY

## 2020-05-07 PROCEDURE — 3044F HG A1C LEVEL LT 7.0%: CPT | Performed by: ORTHOPAEDIC SURGERY

## 2020-05-07 PROCEDURE — 73630 X-RAY EXAM OF FOOT: CPT

## 2020-05-07 RX ORDER — BETAMETHASONE SODIUM PHOSPHATE AND BETAMETHASONE ACETATE 3; 3 MG/ML; MG/ML
3 INJECTION, SUSPENSION INTRA-ARTICULAR; INTRALESIONAL; INTRAMUSCULAR; SOFT TISSUE
Status: COMPLETED | OUTPATIENT
Start: 2020-05-07 | End: 2020-05-07

## 2020-05-07 RX ORDER — LIDOCAINE HYDROCHLORIDE 10 MG/ML
2 INJECTION, SOLUTION INFILTRATION; PERINEURAL
Status: COMPLETED | OUTPATIENT
Start: 2020-05-07 | End: 2020-05-07

## 2020-05-07 RX ADMIN — LIDOCAINE HYDROCHLORIDE 2 ML: 10 INJECTION, SOLUTION INFILTRATION; PERINEURAL at 15:28

## 2020-05-07 RX ADMIN — BETAMETHASONE SODIUM PHOSPHATE AND BETAMETHASONE ACETATE 3 MG: 3; 3 INJECTION, SUSPENSION INTRA-ARTICULAR; INTRALESIONAL; INTRAMUSCULAR; SOFT TISSUE at 15:28

## 2020-05-20 ENCOUNTER — OFFICE VISIT (OUTPATIENT)
Dept: OBGYN CLINIC | Facility: CLINIC | Age: 80
End: 2020-05-20
Payer: MEDICARE

## 2020-05-20 VITALS
SYSTOLIC BLOOD PRESSURE: 126 MMHG | TEMPERATURE: 98.1 F | DIASTOLIC BLOOD PRESSURE: 82 MMHG | BODY MASS INDEX: 29.25 KG/M2 | HEIGHT: 68 IN | WEIGHT: 193 LBS

## 2020-05-20 DIAGNOSIS — M72.2 PLANTAR FASCIITIS OF RIGHT FOOT: Primary | ICD-10-CM

## 2020-05-20 PROCEDURE — 1036F TOBACCO NON-USER: CPT | Performed by: ORTHOPAEDIC SURGERY

## 2020-05-20 PROCEDURE — 3074F SYST BP LT 130 MM HG: CPT | Performed by: ORTHOPAEDIC SURGERY

## 2020-05-20 PROCEDURE — 4040F PNEUMOC VAC/ADMIN/RCVD: CPT | Performed by: ORTHOPAEDIC SURGERY

## 2020-05-20 PROCEDURE — 1160F RVW MEDS BY RX/DR IN RCRD: CPT | Performed by: ORTHOPAEDIC SURGERY

## 2020-05-20 PROCEDURE — 3079F DIAST BP 80-89 MM HG: CPT | Performed by: ORTHOPAEDIC SURGERY

## 2020-05-20 PROCEDURE — 2022F DILAT RTA XM EVC RTNOPTHY: CPT | Performed by: ORTHOPAEDIC SURGERY

## 2020-05-20 PROCEDURE — 99213 OFFICE O/P EST LOW 20 MIN: CPT | Performed by: ORTHOPAEDIC SURGERY

## 2020-05-20 PROCEDURE — 3044F HG A1C LEVEL LT 7.0%: CPT | Performed by: ORTHOPAEDIC SURGERY

## 2020-05-20 PROCEDURE — 3008F BODY MASS INDEX DOCD: CPT | Performed by: ORTHOPAEDIC SURGERY

## 2020-06-27 DIAGNOSIS — I10 ESSENTIAL HYPERTENSION: ICD-10-CM

## 2020-06-28 RX ORDER — LISINOPRIL 10 MG/1
TABLET ORAL
Qty: 90 TABLET | Refills: 0 | Status: SHIPPED | OUTPATIENT
Start: 2020-06-28 | End: 2020-09-28

## 2020-09-08 DIAGNOSIS — E78.5 DYSLIPIDEMIA: ICD-10-CM

## 2020-09-08 RX ORDER — ATORVASTATIN CALCIUM 20 MG/1
TABLET, FILM COATED ORAL
Qty: 90 TABLET | Refills: 0 | Status: SHIPPED | OUTPATIENT
Start: 2020-09-08 | End: 2020-12-08

## 2020-09-26 DIAGNOSIS — I10 ESSENTIAL HYPERTENSION: ICD-10-CM

## 2020-09-28 RX ORDER — LISINOPRIL 10 MG/1
TABLET ORAL
Qty: 90 TABLET | Refills: 0 | Status: SHIPPED | OUTPATIENT
Start: 2020-09-28 | End: 2020-12-27

## 2020-10-05 ENCOUNTER — OFFICE VISIT (OUTPATIENT)
Dept: FAMILY MEDICINE CLINIC | Facility: HOSPITAL | Age: 80
End: 2020-10-05
Payer: MEDICARE

## 2020-10-05 VITALS
DIASTOLIC BLOOD PRESSURE: 72 MMHG | WEIGHT: 194.4 LBS | HEART RATE: 100 BPM | SYSTOLIC BLOOD PRESSURE: 124 MMHG | TEMPERATURE: 96.7 F | BODY MASS INDEX: 29.46 KG/M2 | HEIGHT: 68 IN

## 2020-10-05 DIAGNOSIS — I73.9 PERIPHERAL VASCULAR DISEASE (HCC): ICD-10-CM

## 2020-10-05 DIAGNOSIS — I25.10 CORONARY ARTERY DISEASE INVOLVING NATIVE CORONARY ARTERY OF NATIVE HEART WITHOUT ANGINA PECTORIS: ICD-10-CM

## 2020-10-05 DIAGNOSIS — I10 ESSENTIAL HYPERTENSION: ICD-10-CM

## 2020-10-05 DIAGNOSIS — I65.22 ASYMPTOMATIC STENOSIS OF LEFT CAROTID ARTERY: ICD-10-CM

## 2020-10-05 DIAGNOSIS — E66.3 OVERWEIGHT: ICD-10-CM

## 2020-10-05 DIAGNOSIS — Z00.00 MEDICARE ANNUAL WELLNESS VISIT, SUBSEQUENT: ICD-10-CM

## 2020-10-05 DIAGNOSIS — E11.49 DIABETES MELLITUS TYPE 2 WITH NEUROLOGICAL MANIFESTATIONS (HCC): Primary | ICD-10-CM

## 2020-10-05 DIAGNOSIS — Z23 ENCOUNTER FOR IMMUNIZATION: ICD-10-CM

## 2020-10-05 PROCEDURE — G0439 PPPS, SUBSEQ VISIT: HCPCS | Performed by: INTERNAL MEDICINE

## 2020-10-05 PROCEDURE — 90662 IIV NO PRSV INCREASED AG IM: CPT | Performed by: INTERNAL MEDICINE

## 2020-10-05 PROCEDURE — 99214 OFFICE O/P EST MOD 30 MIN: CPT | Performed by: INTERNAL MEDICINE

## 2020-10-05 PROCEDURE — G0008 ADMIN INFLUENZA VIRUS VAC: HCPCS | Performed by: INTERNAL MEDICINE

## 2020-10-06 ENCOUNTER — TELEPHONE (OUTPATIENT)
Dept: ADMINISTRATIVE | Facility: OTHER | Age: 80
End: 2020-10-06

## 2020-10-07 ENCOUNTER — APPOINTMENT (OUTPATIENT)
Dept: LAB | Facility: HOSPITAL | Age: 80
End: 2020-10-07
Payer: MEDICARE

## 2020-10-07 DIAGNOSIS — E11.49 DIABETES MELLITUS TYPE 2 WITH NEUROLOGICAL MANIFESTATIONS (HCC): Primary | ICD-10-CM

## 2020-10-07 DIAGNOSIS — I10 ESSENTIAL HYPERTENSION: ICD-10-CM

## 2020-10-07 LAB
ALBUMIN SERPL BCP-MCNC: 4.2 G/DL (ref 3.5–5)
ALP SERPL-CCNC: 63 U/L (ref 46–116)
ALT SERPL W P-5'-P-CCNC: 23 U/L (ref 12–78)
ANION GAP SERPL CALCULATED.3IONS-SCNC: 5 MMOL/L (ref 4–13)
AST SERPL W P-5'-P-CCNC: 11 U/L (ref 5–45)
BASOPHILS # BLD AUTO: 0.05 THOUSANDS/ΜL (ref 0–0.1)
BASOPHILS NFR BLD AUTO: 1 % (ref 0–1)
BILIRUB SERPL-MCNC: 0.93 MG/DL (ref 0.2–1)
BUN SERPL-MCNC: 20 MG/DL (ref 5–25)
CALCIUM SERPL-MCNC: 9.4 MG/DL (ref 8.3–10.1)
CHLORIDE SERPL-SCNC: 106 MMOL/L (ref 100–108)
CHOLEST SERPL-MCNC: 125 MG/DL (ref 50–200)
CO2 SERPL-SCNC: 30 MMOL/L (ref 21–32)
CREAT SERPL-MCNC: 1.21 MG/DL (ref 0.6–1.3)
CREAT UR-MCNC: 291 MG/DL
EOSINOPHIL # BLD AUTO: 0.13 THOUSAND/ΜL (ref 0–0.61)
EOSINOPHIL NFR BLD AUTO: 2 % (ref 0–6)
ERYTHROCYTE [DISTWIDTH] IN BLOOD BY AUTOMATED COUNT: 13 % (ref 11.6–15.1)
EST. AVERAGE GLUCOSE BLD GHB EST-MCNC: 151 MG/DL
GFR SERPL CREATININE-BSD FRML MDRD: 57 ML/MIN/1.73SQ M
GLUCOSE P FAST SERPL-MCNC: 163 MG/DL (ref 65–99)
HBA1C MFR BLD: 6.9 %
HCT VFR BLD AUTO: 47.8 % (ref 36.5–49.3)
HDLC SERPL-MCNC: 43 MG/DL
HGB BLD-MCNC: 15.8 G/DL (ref 12–17)
IMM GRANULOCYTES # BLD AUTO: 0.02 THOUSAND/UL (ref 0–0.2)
IMM GRANULOCYTES NFR BLD AUTO: 0 % (ref 0–2)
LDLC SERPL CALC-MCNC: 60 MG/DL (ref 0–100)
LYMPHOCYTES # BLD AUTO: 1.64 THOUSANDS/ΜL (ref 0.6–4.47)
LYMPHOCYTES NFR BLD AUTO: 22 % (ref 14–44)
MCH RBC QN AUTO: 31.1 PG (ref 26.8–34.3)
MCHC RBC AUTO-ENTMCNC: 33.1 G/DL (ref 31.4–37.4)
MCV RBC AUTO: 94 FL (ref 82–98)
MICROALBUMIN UR-MCNC: 27.8 MG/L (ref 0–20)
MICROALBUMIN/CREAT 24H UR: 10 MG/G CREATININE (ref 0–30)
MONOCYTES # BLD AUTO: 0.47 THOUSAND/ΜL (ref 0.17–1.22)
MONOCYTES NFR BLD AUTO: 6 % (ref 4–12)
NEUTROPHILS # BLD AUTO: 5.14 THOUSANDS/ΜL (ref 1.85–7.62)
NEUTS SEG NFR BLD AUTO: 69 % (ref 43–75)
NONHDLC SERPL-MCNC: 82 MG/DL
NRBC BLD AUTO-RTO: 0 /100 WBCS
PLATELET # BLD AUTO: 233 THOUSANDS/UL (ref 149–390)
PMV BLD AUTO: 9.9 FL (ref 8.9–12.7)
POTASSIUM SERPL-SCNC: 3.9 MMOL/L (ref 3.5–5.3)
PROT SERPL-MCNC: 7.1 G/DL (ref 6.4–8.2)
RBC # BLD AUTO: 5.08 MILLION/UL (ref 3.88–5.62)
SODIUM SERPL-SCNC: 141 MMOL/L (ref 136–145)
TRIGL SERPL-MCNC: 109 MG/DL
TSH SERPL DL<=0.05 MIU/L-ACNC: 2.52 UIU/ML (ref 0.36–3.74)
WBC # BLD AUTO: 7.45 THOUSAND/UL (ref 4.31–10.16)

## 2020-10-07 PROCEDURE — 82043 UR ALBUMIN QUANTITATIVE: CPT | Performed by: INTERNAL MEDICINE

## 2020-10-07 PROCEDURE — 80053 COMPREHEN METABOLIC PANEL: CPT | Performed by: INTERNAL MEDICINE

## 2020-10-07 PROCEDURE — 84443 ASSAY THYROID STIM HORMONE: CPT | Performed by: INTERNAL MEDICINE

## 2020-10-07 PROCEDURE — 36415 COLL VENOUS BLD VENIPUNCTURE: CPT | Performed by: INTERNAL MEDICINE

## 2020-10-07 PROCEDURE — 80061 LIPID PANEL: CPT | Performed by: INTERNAL MEDICINE

## 2020-10-07 PROCEDURE — 82570 ASSAY OF URINE CREATININE: CPT | Performed by: INTERNAL MEDICINE

## 2020-10-07 PROCEDURE — 83036 HEMOGLOBIN GLYCOSYLATED A1C: CPT | Performed by: INTERNAL MEDICINE

## 2020-10-07 PROCEDURE — 85025 COMPLETE CBC W/AUTO DIFF WBC: CPT | Performed by: INTERNAL MEDICINE

## 2020-10-09 ENCOUNTER — HOSPITAL ENCOUNTER (EMERGENCY)
Facility: HOSPITAL | Age: 80
Discharge: HOME | End: 2020-10-09
Attending: EMERGENCY MEDICINE
Payer: MEDICARE

## 2020-10-09 ENCOUNTER — TELEPHONE (OUTPATIENT)
Dept: FAMILY MEDICINE CLINIC | Facility: HOSPITAL | Age: 80
End: 2020-10-09

## 2020-10-09 ENCOUNTER — APPOINTMENT (EMERGENCY)
Dept: PERIOP | Facility: HOSPITAL | Age: 80
End: 2020-10-09
Attending: INTERNAL MEDICINE
Payer: MEDICARE

## 2020-10-09 ENCOUNTER — ANESTHESIA (EMERGENCY)
Dept: PERIOP | Facility: HOSPITAL | Age: 80
End: 2020-10-09
Payer: MEDICARE

## 2020-10-09 ENCOUNTER — ANESTHESIA EVENT (EMERGENCY)
Dept: PERIOP | Facility: HOSPITAL | Age: 80
End: 2020-10-09
Payer: MEDICARE

## 2020-10-09 VITALS
SYSTOLIC BLOOD PRESSURE: 130 MMHG | BODY MASS INDEX: 28.79 KG/M2 | OXYGEN SATURATION: 98 % | HEIGHT: 68 IN | TEMPERATURE: 97.6 F | DIASTOLIC BLOOD PRESSURE: 61 MMHG | WEIGHT: 190 LBS | HEART RATE: 77 BPM | RESPIRATION RATE: 16 BRPM

## 2020-10-09 VITALS — HEART RATE: 80 BPM

## 2020-10-09 DIAGNOSIS — T18.128A FOOD IMPACTION OF ESOPHAGUS, INITIAL ENCOUNTER: Primary | ICD-10-CM

## 2020-10-09 PROCEDURE — 99284 EMERGENCY DEPT VISIT MOD MDM: CPT

## 2020-10-09 PROCEDURE — 1123F ACP DISCUSS/DSCN MKR DOCD: CPT | Performed by: PHYSICIAN ASSISTANT

## 2020-10-09 PROCEDURE — 96374 THER/PROPH/DIAG INJ IV PUSH: CPT

## 2020-10-09 PROCEDURE — 43247 EGD REMOVE FOREIGN BODY: CPT | Performed by: INTERNAL MEDICINE

## 2020-10-09 PROCEDURE — 99285 EMERGENCY DEPT VISIT HI MDM: CPT | Performed by: PHYSICIAN ASSISTANT

## 2020-10-09 PROCEDURE — 96361 HYDRATE IV INFUSION ADD-ON: CPT

## 2020-10-09 PROCEDURE — 99203 OFFICE O/P NEW LOW 30 MIN: CPT | Performed by: INTERNAL MEDICINE

## 2020-10-09 RX ORDER — PROPOFOL 10 MG/ML
INJECTION, EMULSION INTRAVENOUS AS NEEDED
Status: DISCONTINUED | OUTPATIENT
Start: 2020-10-09 | End: 2020-10-09

## 2020-10-09 RX ADMIN — SODIUM CHLORIDE: 0.9 INJECTION, SOLUTION INTRAVENOUS at 11:46

## 2020-10-09 RX ADMIN — PROPOFOL 50 MG: 10 INJECTION, EMULSION INTRAVENOUS at 12:03

## 2020-10-09 RX ADMIN — GLUCAGON HYDROCHLORIDE 1 MG: KIT at 10:01

## 2020-10-09 RX ADMIN — PROPOFOL 30 MG: 10 INJECTION, EMULSION INTRAVENOUS at 12:07

## 2020-10-09 RX ADMIN — PROPOFOL 20 MG: 10 INJECTION, EMULSION INTRAVENOUS at 12:11

## 2020-10-09 RX ADMIN — PROPOFOL 40 MG: 10 INJECTION, EMULSION INTRAVENOUS at 12:12

## 2020-10-09 RX ADMIN — PROPOFOL 100 MG: 10 INJECTION, EMULSION INTRAVENOUS at 12:01

## 2020-10-12 ENCOUNTER — TELEPHONE (OUTPATIENT)
Dept: GASTROENTEROLOGY | Facility: CLINIC | Age: 80
End: 2020-10-12

## 2020-11-23 ENCOUNTER — OFFICE VISIT (OUTPATIENT)
Dept: GASTROENTEROLOGY | Facility: CLINIC | Age: 80
End: 2020-11-23
Payer: MEDICARE

## 2020-11-23 VITALS
WEIGHT: 187 LBS | BODY MASS INDEX: 28.34 KG/M2 | SYSTOLIC BLOOD PRESSURE: 128 MMHG | DIASTOLIC BLOOD PRESSURE: 80 MMHG | HEART RATE: 80 BPM | HEIGHT: 68 IN

## 2020-11-23 DIAGNOSIS — R13.10 DYSPHAGIA, UNSPECIFIED TYPE: Primary | ICD-10-CM

## 2020-11-23 DIAGNOSIS — K22.2 SCHATZKI'S RING: ICD-10-CM

## 2020-11-23 DIAGNOSIS — Z79.02 ANTIPLATELET OR ANTITHROMBOTIC LONG-TERM USE: ICD-10-CM

## 2020-11-23 DIAGNOSIS — Z86.010 PERSONAL HISTORY OF COLONIC POLYPS: ICD-10-CM

## 2020-11-23 PROBLEM — Z86.0100 PERSONAL HISTORY OF COLONIC POLYPS: Status: ACTIVE | Noted: 2020-11-23

## 2020-11-23 PROCEDURE — 99203 OFFICE O/P NEW LOW 30 MIN: CPT | Performed by: NURSE PRACTITIONER

## 2020-11-24 ENCOUNTER — TELEPHONE (OUTPATIENT)
Dept: GASTROENTEROLOGY | Facility: CLINIC | Age: 80
End: 2020-11-24

## 2020-12-08 DIAGNOSIS — E78.5 DYSLIPIDEMIA: ICD-10-CM

## 2020-12-08 RX ORDER — ATORVASTATIN CALCIUM 20 MG/1
TABLET, FILM COATED ORAL
Qty: 90 TABLET | Refills: 0 | Status: SHIPPED | OUTPATIENT
Start: 2020-12-08 | End: 2021-03-08

## 2020-12-27 DIAGNOSIS — I10 ESSENTIAL HYPERTENSION: ICD-10-CM

## 2020-12-27 RX ORDER — LISINOPRIL 10 MG/1
TABLET ORAL
Qty: 90 TABLET | Refills: 0 | Status: SHIPPED | OUTPATIENT
Start: 2020-12-27 | End: 2021-03-27

## 2020-12-28 ENCOUNTER — TELEMEDICINE (OUTPATIENT)
Dept: GASTROENTEROLOGY | Facility: CLINIC | Age: 80
End: 2020-12-28

## 2020-12-28 VITALS — BODY MASS INDEX: 28.79 KG/M2 | HEIGHT: 68 IN | WEIGHT: 190 LBS

## 2020-12-28 DIAGNOSIS — R13.10 DYSPHAGIA, UNSPECIFIED TYPE: Primary | ICD-10-CM

## 2020-12-28 DIAGNOSIS — K22.2 SCHATZKI'S RING: ICD-10-CM

## 2021-01-04 ENCOUNTER — ANESTHESIA (OUTPATIENT)
Dept: GASTROENTEROLOGY | Facility: AMBULATORY SURGERY CENTER | Age: 81
End: 2021-01-04

## 2021-01-04 ENCOUNTER — ANESTHESIA EVENT (OUTPATIENT)
Dept: GASTROENTEROLOGY | Facility: AMBULATORY SURGERY CENTER | Age: 81
End: 2021-01-04

## 2021-01-04 ENCOUNTER — HOSPITAL ENCOUNTER (OUTPATIENT)
Dept: GASTROENTEROLOGY | Facility: AMBULATORY SURGERY CENTER | Age: 81
Discharge: HOME | End: 2021-01-04
Payer: MEDICARE

## 2021-01-04 VITALS
BODY MASS INDEX: 28.43 KG/M2 | DIASTOLIC BLOOD PRESSURE: 63 MMHG | SYSTOLIC BLOOD PRESSURE: 138 MMHG | RESPIRATION RATE: 20 BRPM | WEIGHT: 187 LBS | OXYGEN SATURATION: 97 % | HEART RATE: 80 BPM | TEMPERATURE: 98.5 F

## 2021-01-04 VITALS — HEART RATE: 92 BPM

## 2021-01-04 DIAGNOSIS — K22.2 SCHATZKI'S RING: ICD-10-CM

## 2021-01-04 DIAGNOSIS — R13.10 DYSPHAGIA, UNSPECIFIED TYPE: ICD-10-CM

## 2021-01-04 PROCEDURE — 43235 EGD DIAGNOSTIC BRUSH WASH: CPT | Performed by: INTERNAL MEDICINE

## 2021-01-04 PROCEDURE — 43450 DILATE ESOPHAGUS 1/MULT PASS: CPT | Performed by: INTERNAL MEDICINE

## 2021-01-04 RX ORDER — PROPOFOL 10 MG/ML
INJECTION, EMULSION INTRAVENOUS AS NEEDED
Status: DISCONTINUED | OUTPATIENT
Start: 2021-01-04 | End: 2021-01-04

## 2021-01-04 RX ORDER — SODIUM CHLORIDE 9 MG/ML
50 INJECTION, SOLUTION INTRAVENOUS CONTINUOUS
Status: DISCONTINUED | OUTPATIENT
Start: 2021-01-04 | End: 2021-01-08 | Stop reason: HOSPADM

## 2021-01-04 RX ADMIN — PROPOFOL 50 MG: 10 INJECTION, EMULSION INTRAVENOUS at 07:37

## 2021-01-04 RX ADMIN — PROPOFOL 100 MG: 10 INJECTION, EMULSION INTRAVENOUS at 07:34

## 2021-01-04 RX ADMIN — SODIUM CHLORIDE 50 ML/HR: 9 INJECTION, SOLUTION INTRAVENOUS at 07:11

## 2021-01-04 NOTE — DISCHARGE INSTRUCTIONS
Hiatal Hernia   WHAT YOU NEED TO KNOW:   What is a hiatal hernia? A hiatal hernia is a condition that causes part of your stomach to bulge through the hiatus (small opening) in your diaphragm  The part of the stomach may move up and down, or it may get trapped above the diaphragm  What increases my risk for a hiatal hernia? The exact cause of a hiatal hernia is not known  You may have been born with a large hiatus  The following may increase your risk of a hiatal hernia:  · Obesity    · Older age    · Medical conditions such as diverticulosis or esophagitis    · Previous surgery of the esophagus or stomach or trauma such as from a motor vehicle accident    What are the types of hiatal hernia? · Type I (sliding hiatal hernia): A portion of the stomach slides in and out of the hiatus  This type is the most common and usually causes gastroesophageal reflux disease (GERD)  GERD occurs when the esophageal sphincter does not close properly and causes acid reflux  The esophageal sphincter is the lower muscle of the esophagus  · Type II (paraesophageal hiatal hernia):  Type II hiatal hernia forms when a part of the stomach squeezes through the hiatus and lies next to the esophagus  · Type III (combined):  Type III hiatal hernia is a combination of a sliding and a paraesophageal hiatal hernia  · Type IV (complex paraesophageal hiatal hernia): The whole stomach, the small and large bowels, spleen, pancreas, or liver is pushed up into the chest     What are the signs and symptoms of a hiatal hernia? The most common symptom is heartburn  This usually occurs after meals and spreads to your neck, jaw, or shoulder   You may have no signs or symptoms, or you may have any of the following:  · Abdominal pain, especially in the area just above your navel    · Bitter or acid taste in your mouth    · Trouble swallowing    · Coughing or hoarseness    · Chest pain or shortness of breath that occurs after eating    · Frequent burping or hiccups    · Uncomfortable feeling of fullness after eating    How is a hiatal hernia diagnosed? · An upper GI series test  includes x-rays of your esophagus, stomach, and your small intestines  It is also called a barium swallow test  You will be given barium (a chalky liquid) to drink before the pictures are taken  This liquid helps your stomach and intestines show up better on the x-rays  An upper GI series can show if you have an ulcer, a blocked intestine, or other problems  · An endoscopy  uses a scope to see the inside of your digestive tract  A scope is a long, bendable tube with a light on the end of it  A camera may be hooked to the scope to take pictures  How is a hiatal hernia treated? Treatment depends on the type of hiatal hernia you have and on your symptoms  You may not need any treatment  You may need any of the following:  · Medicines  may be given to relieve heartburn symptoms  These medicines help to decrease or block stomach acid  You may also be given medicines that help to tighten the esophageal sphincter  · Surgery  may be done when medicines cannot control your symptoms, or other problems are present  Your healthcare provider may also suggest surgery depending on the type of hernia you have  Your healthcare provider can put your stomach back into its normal location  He may make the hiatus (hole) smaller and anchor your stomach in your abdomen  Fundoplication is a surgery that wraps the upper part of the stomach around the esophageal sphincter to strengthen it  How can I manage symptoms? The following nutrition and lifestyle changes may be recommended to relieve symptoms of heartburn  · Avoid foods that make your symptoms worse  These may include spicy foods, fruit juices, alcohol, caffeine, chocolate, and mint  · Eat several small meals during the day    Small meals give your stomach less food to digest     · Avoid lying down and bending forward after you eat  Do not eat meals 2 to 3 hours before bedtime  This decreases your risk for reflux  · Maintain a healthy weight  If you are overweight, weight loss may help relieve your symptoms  · Sleep with your head elevated  at least 6 inches  · Do not smoke  Smoking can increase your symptoms of heartburn  When should I seek immediate care? · You have severe abdominal pain  · You try to vomit but nothing comes out (retching)  · You have severe chest pain and sudden trouble breathing  · Your bowel movements are black or bloody  · Your vomit looks like coffee grounds or has blood in it  When should I contact my healthcare provider? · Your symptoms are getting worse  · You have nausea, and you are vomiting  · You are losing weight without trying  · You have questions or concerns about your condition or care  CARE AGREEMENT:   You have the right to help plan your care  Learn about your health condition and how it may be treated  Discuss treatment options with your healthcare providers to decide what care you want to receive  You always have the right to refuse treatment  The above information is an  only  It is not intended as medical advice for individual conditions or treatments  Talk to your doctor, nurse or pharmacist before following any medical regimen to see if it is safe and effective for you  © Copyright [address] Information is for End User's use only and may not be sold, redistributed or otherwise used for commercial purposes  All illustrations and images included in CareNotes® are the copyrighted property of A D A M , Inc  or [facility] Florencia Mendosa   Hiatal Hernia   WHAT YOU NEED TO KNOW:   What is a hiatal hernia? A hiatal hernia is a condition that causes part of your stomach to bulge through the hiatus (small opening) in your diaphragm   The part of the stomach may move up and down, or it may get trapped above the diaphragm  What increases my risk for a hiatal hernia? The exact cause of a hiatal hernia is not known  You may have been born with a large hiatus  The following may increase your risk of a hiatal hernia:  · Obesity    · Older age    · Medical conditions such as diverticulosis or esophagitis    · Previous surgery of the esophagus or stomach or trauma such as from a motor vehicle accident    What are the types of hiatal hernia? · Type I (sliding hiatal hernia): A portion of the stomach slides in and out of the hiatus  This type is the most common and usually causes gastroesophageal reflux disease (GERD)  GERD occurs when the esophageal sphincter does not close properly and causes acid reflux  The esophageal sphincter is the lower muscle of the esophagus  · Type II (paraesophageal hiatal hernia):  Type II hiatal hernia forms when a part of the stomach squeezes through the hiatus and lies next to the esophagus  · Type III (combined):  Type III hiatal hernia is a combination of a sliding and a paraesophageal hiatal hernia  · Type IV (complex paraesophageal hiatal hernia): The whole stomach, the small and large bowels, spleen, pancreas, or liver is pushed up into the chest     What are the signs and symptoms of a hiatal hernia? The most common symptom is heartburn  This usually occurs after meals and spreads to your neck, jaw, or shoulder  You may have no signs or symptoms, or you may have any of the following:  · Abdominal pain, especially in the area just above your navel    · Bitter or acid taste in your mouth    · Trouble swallowing    · Coughing or hoarseness    · Chest pain or shortness of breath that occurs after eating    · Frequent burping or hiccups    · Uncomfortable feeling of fullness after eating    How is a hiatal hernia diagnosed? · An upper GI series test  includes x-rays of your esophagus, stomach, and your small intestines   It is also called a barium swallow test  You will be given barium (a chalky liquid) to drink before the pictures are taken  This liquid helps your stomach and intestines show up better on the x-rays  An upper GI series can show if you have an ulcer, a blocked intestine, or other problems  · An endoscopy  uses a scope to see the inside of your digestive tract  A scope is a long, bendable tube with a light on the end of it  A camera may be hooked to the scope to take pictures  How is a hiatal hernia treated? Treatment depends on the type of hiatal hernia you have and on your symptoms  You may not need any treatment  You may need any of the following:  · Medicines  may be given to relieve heartburn symptoms  These medicines help to decrease or block stomach acid  You may also be given medicines that help to tighten the esophageal sphincter  · Surgery  may be done when medicines cannot control your symptoms, or other problems are present  Your healthcare provider may also suggest surgery depending on the type of hernia you have  Your healthcare provider can put your stomach back into its normal location  He may make the hiatus (hole) smaller and anchor your stomach in your abdomen  Fundoplication is a surgery that wraps the upper part of the stomach around the esophageal sphincter to strengthen it  How can I manage symptoms? The following nutrition and lifestyle changes may be recommended to relieve symptoms of heartburn  · Avoid foods that make your symptoms worse  These may include spicy foods, fruit juices, alcohol, caffeine, chocolate, and mint  · Eat several small meals during the day  Small meals give your stomach less food to digest     · Avoid lying down and bending forward after you eat  Do not eat meals 2 to 3 hours before bedtime  This decreases your risk for reflux  · Maintain a healthy weight  If you are overweight, weight loss may help relieve your symptoms  · Sleep with your head elevated  at least 6 inches       · Do not smoke   Smoking can increase your symptoms of heartburn  When should I seek immediate care? · You have severe abdominal pain  · You try to vomit but nothing comes out (retching)  · You have severe chest pain and sudden trouble breathing  · Your bowel movements are black or bloody  · Your vomit looks like coffee grounds or has blood in it  When should I contact my healthcare provider? · Your symptoms are getting worse  · You have nausea, and you are vomiting  · You are losing weight without trying  · You have questions or concerns about your condition or care  CARE AGREEMENT:   You have the right to help plan your care  Learn about your health condition and how it may be treated  Discuss treatment options with your healthcare providers to decide what care you want to receive  You always have the right to refuse treatment  The above information is an  only  It is not intended as medical advice for individual conditions or treatments  Talk to your doctor, nurse or pharmacist before following any medical regimen to see if it is safe and effective for you  © Copyright [address] Information is for End User's use only and may not be sold, redistributed or otherwise used for commercial purposes   All illustrations and images included in CareNotes® are the copyrighted property of [employer]  or [address] [place] [address] [facility]

## 2021-01-04 NOTE — H&P
History and Physical - [facility]  Kelley Portillo 79 y o  male MRN: [id number]    HPI: Kelley Portillo is a 79y o  year old male who presents for EGD with dilatation secondary to Schatzki's ring and meat impaction    REVIEW OF SYSTEMS: Per the HPI, and otherwise unremarkable      Historical Information   Past Medical History:   Diagnosis Date    Arthritis     [facility]ot[facility] [address] [facility]     Hyperlipidemia     Hypertension     Migraines     [facility]     Seasonal allergies      Past Surgical History:   Procedure Laterality Date    CAROTID ENDARTARECTOMY Right     thromboendarterectomy    CAROTID STENT Right     CATARACT EXTRACTION Left     COLONOSCOPY      fiberoptic screening 08 5 yr / complete 3/15/13 5 yr    CORONARY ANGIOPLASTY WITH STENT PLACEMENT      ELBOW SURGERY      SUPRAPUBIC PROSTATECTOMY  1999    UPPER GASTROINTESTINAL ENDOSCOPY       Social History   Social History     Substance and Sexual Activity   Alcohol Use Yes    Frequency: Monthly or less    Drinks per session: 1 or 2    Binge frequency: Never    Comment: rare glass of wine     Social History     Substance and Sexual Activity   Drug Use Never     Social History     Tobacco Use   Smoking Status Former Smoker    Packs/day: 3 00    Years: 10 00    Pack years: 30 00    Quit date:     Years since quittin 0   Smokeless Tobacco Never Used     Family History   Problem Relation Age of Onset    Alcohol abuse Mother     Heart attack Family         MI    Breast cancer Family     No Known Problems Father     No Known Problems Sister     Colon cancer Neg Hx     Colon polyps Neg Hx        Meds/Allergies       Current Outpatient Medications:     amLODIPine (NORVASC) 5 mg tablet    ascorbic acid (VITAMIN C) 500 mg tablet    aspirin 81 MG tablet    atorvastatin (LIPITOR) 20 mg tablet    B Complex Vitamins (VITAMIN B COMPLEX PO)    Cholecalciferol 1000 units CHEW   hydrochlorothiazide (HYDRODIURIL) 25 mg tablet    lisinopril (ZESTRIL) 10 mg tablet    vitamin E, tocopherol, 400 units capsule    clopidogrel (PLAVIX) 75 mg tablet    Current Facility-Administered Medications:     sodium chloride 0 9 % infusion, 50 mL/hr, Intravenous, Continuous, 50 mL/hr at 01/04/21 0711    Allergies   Allergen Reactions    Losartan Syncope     Reaction Date: [id number];     Penicillins Other (See Comments)     As a child so does not recall reaction       Objective     /77   Pulse 80   Temp 98 5 °F (36 9 °C) (Temporal)   Resp 20   Wt 84 8 kg (187 lb)   SpO2 97%   BMI 28 43 kg/m²     PHYSICAL EXAM    Gen: NAD AAOx3  CV: S1S2 RRR no m/r/g  CHEST: Clear b/l no c/r/w  ABD: soft, +BS NT/ND  EXT: no edema    ASSESSMENT/PLAN:  This is a 79y o  year old male here for EGD with dilatation secondary to Schatzki's ring and meat impaction, and he is stable and optimized for his procedure

## 2021-01-04 NOTE — ANESTHESIA PREPROCEDURE EVALUATION
Procedure:  EGD    Relevant Problems   CARDIO   (+) Coronary artery disease involving native coronary artery of native heart without angina pectoris   (+) Hypertension   (+) Sinus bradycardia      GI/HEPATIC   (+) Dysphagia      MUSCULOSKELETAL   (+) Lower back pain      NEURO/PSYCH   (+) Diabetic polyneuropathy (HCC)   (+) Personal history of colonic polyps        Physical Exam    Airway    Mallampati score: II         Dental   implants,     Cardiovascular  Rhythm: regular, Cardiovascular exam normal    Pulmonary  Pulmonary exam normal     Other Findings        Anesthesia Plan  ASA Score- 3     Anesthesia Type- IV sedation with anesthesia with ASA Monitors  Additional Monitors:   Airway Plan:           Plan Factors-    Chart reviewed  Induction- intravenous  Postoperative Plan-     Informed Consent- Anesthetic plan and risks discussed with patient

## 2021-01-16 LAB
LEFT EYE DIABETIC RETINOPATHY: NORMAL
RIGHT EYE DIABETIC RETINOPATHY: NORMAL
SEVERITY (EYE EXAM): NORMAL

## 2021-03-06 DIAGNOSIS — E78.5 DYSLIPIDEMIA: ICD-10-CM

## 2021-03-08 RX ORDER — ATORVASTATIN CALCIUM 20 MG/1
TABLET, FILM COATED ORAL
Qty: 90 TABLET | Refills: 1 | Status: SHIPPED | OUTPATIENT
Start: 2021-03-08 | End: 2021-09-07

## 2021-03-27 DIAGNOSIS — I10 ESSENTIAL HYPERTENSION: ICD-10-CM

## 2021-03-27 RX ORDER — LISINOPRIL 10 MG/1
TABLET ORAL
Qty: 90 TABLET | Refills: 0 | Status: SHIPPED | OUTPATIENT
Start: 2021-03-27 | End: 2021-06-20

## 2021-03-29 ENCOUNTER — APPOINTMENT (OUTPATIENT)
Dept: LAB | Facility: HOSPITAL | Age: 81
End: 2021-03-29
Payer: MEDICARE

## 2021-03-29 DIAGNOSIS — E11.49 DIABETES MELLITUS TYPE 2 WITH NEUROLOGICAL MANIFESTATIONS (HCC): ICD-10-CM

## 2021-03-29 DIAGNOSIS — I10 ESSENTIAL HYPERTENSION: ICD-10-CM

## 2021-03-29 LAB
ANION GAP SERPL CALCULATED.3IONS-SCNC: 6 MMOL/L (ref 4–13)
BUN SERPL-MCNC: 14 MG/DL (ref 5–25)
CALCIUM SERPL-MCNC: 9.3 MG/DL (ref 8.3–10.1)
CHLORIDE SERPL-SCNC: 104 MMOL/L (ref 100–108)
CO2 SERPL-SCNC: 29 MMOL/L (ref 21–32)
CREAT SERPL-MCNC: 1.12 MG/DL (ref 0.6–1.3)
EST. AVERAGE GLUCOSE BLD GHB EST-MCNC: 160 MG/DL
GFR SERPL CREATININE-BSD FRML MDRD: 62 ML/MIN/1.73SQ M
GLUCOSE P FAST SERPL-MCNC: 157 MG/DL (ref 65–99)
HBA1C MFR BLD: 7.2 %
POTASSIUM SERPL-SCNC: 3.7 MMOL/L (ref 3.5–5.3)
SODIUM SERPL-SCNC: 139 MMOL/L (ref 136–145)

## 2021-03-29 PROCEDURE — 36415 COLL VENOUS BLD VENIPUNCTURE: CPT

## 2021-03-29 PROCEDURE — 80048 BASIC METABOLIC PNL TOTAL CA: CPT

## 2021-03-29 PROCEDURE — 83036 HEMOGLOBIN GLYCOSYLATED A1C: CPT

## 2021-04-22 ENCOUNTER — TELEPHONE (OUTPATIENT)
Dept: FAMILY MEDICINE CLINIC | Facility: HOSPITAL | Age: 81
End: 2021-04-22

## 2021-04-22 DIAGNOSIS — B34.9 VIRAL INFECTION, UNSPECIFIED: Primary | ICD-10-CM

## 2021-04-22 DIAGNOSIS — B34.9 VIRAL INFECTION, UNSPECIFIED: ICD-10-CM

## 2021-04-22 PROCEDURE — U0003 INFECTIOUS AGENT DETECTION BY NUCLEIC ACID (DNA OR RNA); SEVERE ACUTE RESPIRATORY SYNDROME CORONAVIRUS 2 (SARS-COV-2) (CORONAVIRUS DISEASE [COVID-19]), AMPLIFIED PROBE TECHNIQUE, MAKING USE OF HIGH THROUGHPUT TECHNOLOGIES AS DESCRIBED BY CMS-2020-01-R: HCPCS | Performed by: INTERNAL MEDICINE

## 2021-04-22 PROCEDURE — U0005 INFEC AGEN DETEC AMPLI PROBE: HCPCS | Performed by: INTERNAL MEDICINE

## 2021-04-22 NOTE — TELEPHONE ENCOUNTER
Pt called back  Has had a cough for a while, said it started probably the week after [place], maybe 4/12, pt unsure of exact start date  Pt says he feels like he has a cold, he says he gets this every year  Pts family is requesting he get tested, he doesn't think he needs to be tested  Please advise

## 2021-04-22 NOTE — TELEPHONE ENCOUNTER
Order for COVID testing placed - be aware this may not be covered by insurance (usually is if symptoms are present) - can be approx $150 dollars, be aware that HE SHOULD QUARANTINE until results are back - usually in 2 days but may take up to 5 days, call with worse symptoms/fever/SOB

## 2021-04-23 LAB — SARS-COV-2 RNA RESP QL NAA+PROBE: NEGATIVE

## 2021-04-23 NOTE — RESULT ENCOUNTER NOTE
Please call Toya Truong and let him know that his COVID-19 swab was negative  I advised him to continue social distancing procedures

## 2021-04-26 ENCOUNTER — OFFICE VISIT (OUTPATIENT)
Dept: FAMILY MEDICINE CLINIC | Facility: HOSPITAL | Age: 81
End: 2021-04-26
Payer: MEDICARE

## 2021-04-26 VITALS
DIASTOLIC BLOOD PRESSURE: 76 MMHG | HEIGHT: 68 IN | WEIGHT: 193.2 LBS | HEART RATE: 96 BPM | BODY MASS INDEX: 29.28 KG/M2 | SYSTOLIC BLOOD PRESSURE: 124 MMHG | TEMPERATURE: 97.6 F

## 2021-04-26 DIAGNOSIS — E66.3 OVERWEIGHT (BMI 25.0-29.9): ICD-10-CM

## 2021-04-26 DIAGNOSIS — E11.42 DIABETIC POLYNEUROPATHY ASSOCIATED WITH TYPE 2 DIABETES MELLITUS (HCC): ICD-10-CM

## 2021-04-26 DIAGNOSIS — E11.49 DIABETES MELLITUS TYPE 2 WITH NEUROLOGICAL MANIFESTATIONS (HCC): Primary | ICD-10-CM

## 2021-04-26 DIAGNOSIS — I73.9 PERIPHERAL VASCULAR DISEASE (HCC): ICD-10-CM

## 2021-04-26 DIAGNOSIS — I10 ESSENTIAL HYPERTENSION: ICD-10-CM

## 2021-04-26 DIAGNOSIS — I74.3 EMBOLISM AND THROMBOSIS OF ARTERIES OF THE LOWER EXTREMITIES (HCC): ICD-10-CM

## 2021-04-26 PROCEDURE — 99214 OFFICE O/P EST MOD 30 MIN: CPT | Performed by: INTERNAL MEDICINE

## 2021-04-26 NOTE — ASSESSMENT & PLAN NOTE
Lab Results   Component Value Date    HGBA1C 7 2 (H) 03/29/2021   DM type 2 with hyperglycemia and polyneuropathy - uncontrolled with A1C jumping up to 7 2 - urged low sugar/carb diet/exercise/wgt loss, recheck BW in 3-4 mos - if A1C > 7 5 will need to start oral med, foot exam done today, eye exam done 10/19 and is good for 2 yrs, on ASA/statin/ACE

## 2021-04-26 NOTE — PROGRESS NOTES
Assessment/Plan:    Diabetes mellitus type 2 with neurological manifestations ([place] [address] )    Lab Results   Component Value Date    HGBA1C 7 2 (H) 03/29/2021   DM type 2 with hyperglycemia and polyneuropathy - uncontrolled with A1C jumping up to 7 2 - urged low sugar/carb diet/exercise/wgt loss, recheck BW in 3-4 mos - if A1C > 7 5 will need to start oral med, foot exam done today, eye exam done 10/19 and is good for 2 yrs, on ASA/statin/ACE    Diabetic polyneuropathy ([facility] )  Stable on exam, encouraged good BS control and checking feet regularly, call with sores/increase in neuropathy symptoms  Lab Results   Component Value Date    HGBA1C 7 2 (H) 03/29/2021       Hypertension  BP at goal by end of appt, con't current meds, diet/exercise/wgt loss encouraged    Peripheral vascular disease ([place] [address] )  Con't to defer f/u LEAD, dec pulse on exam but no sores/ulcers, call with any s/sx of ischemia or claudication, on statin and Plavix    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[place] [address] )  Con't to defer f/u LEAD, dec pulse on exam but no sores/ulcers, call with any s/sx of ischemia or claudication, on statin and Plavix       Diagnoses and all orders for this visit:    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)  -     Glucose, fasting; Future  -     Hemoglobin A1C; Future    Diabetic 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)    Essential hypertension    Peripheral vascular disease (HCC)    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HCC)    BMI 29 0-29 9,adult    Overweight (BMI 25 0-29  9)      Colonoscopy 3/13 - no further needed d/t age    COVID vaccine reviewed    Deferring lung CA screening as well        Subjective:      Patient ID: Junaid Garcia is a 79 y o  male  HPI  Pt here for follow up appt and BW results    BW results were d/w pt in detail: FBS/A1C 157/7 2, rest of BMP was wnl  Def of controlled vs uncontrolled DM was reviewed    Diet was reviewed - wgt down 1 lb from Oct 2020  He admits diet could be better but he is still very active  He does no formal exercise but is not sedentary  He is still not on any oral DM meds  He is UTD on eye exam (10/19 - 2 yrs) but is due for his foot exam  He notes stable numbness/tingling B/L but denies pain/sores/ulcers with his feet  He is on statin, ASA, and ACE  BP above goal today and meds were reviewed and no changes have occurred  He denies missing doses of meds or SE with the meds  He does not check his BP outside the office  He notes no frequent HA's/dizziness/double vision/CP  Pt had his EGD with GI for dysphagia in Jan  He notes no current issues with swallow/food getting stuck  He notes his Amlodipine still get stuck intermittently  He notes no abd pain/blood in stool/black stools/unintentional wgt loss  Con't to defer LEAD or further f/u with vascular as he does not want surgery  He feels he has no symptoms and does not need any repeat imaging at this time  Colonoscopy 3/13 - no further needed d/t age      Review of Systems   Constitutional: Negative for chills, fever and unexpected weight change  HENT: Negative for congestion and sinus pain  Eyes: Negative for pain and visual disturbance  Respiratory: Positive for cough  Negative for shortness of breath  Cardiovascular: Negative for chest pain, palpitations and leg swelling  Gastrointestinal: Negative for abdominal pain, blood in stool, constipation, diarrhea, nausea and vomiting  Endocrine: Negative for polydipsia and polyuria  Genitourinary: Negative for difficulty urinating and dysuria  Musculoskeletal: Negative for arthralgias and myalgias  Skin: Negative for rash and wound  Neurological: Negative for dizziness and headaches  Hematological: Does not bruise/bleed easily  Psychiatric/Behavioral: Negative for behavioral problems and confusion           Objective:    /76   Pulse 96   Temp 97 6 °F (36 4 °C) (Temporal)   Ht 5' 8" (1 727 m)   Wt 87 6 kg (193 lb 3 2 oz)   BMI 29 38 kg/m²      Physical Exam  Vitals signs and nursing note reviewed  Constitutional:       General: He is not in acute distress  Appearance: He is well-developed  He is not ill-appearing  HENT:      Head: Normocephalic and atraumatic  Right Ear: Tympanic membrane normal  There is no impacted cerumen  Left Ear: Tympanic membrane normal  There is no impacted cerumen  Eyes:      General:         Right eye: No discharge  Left eye: No discharge  Conjunctiva/sclera: Conjunctivae normal    Neck:      Musculoskeletal: Neck supple  Trachea: No tracheal deviation  Cardiovascular:      Rate and Rhythm: Normal rate and regular rhythm  Pulses: Pulses are weak  Dorsalis pedis pulses are 1+ on the right side and 1+ on the left side  Heart sounds: No murmur  Pulmonary:      Effort: Pulmonary effort is normal  No respiratory distress  Breath sounds: Normal breath sounds  No wheezing, rhonchi or rales  Abdominal:      General: There is no distension  Palpations: Abdomen is soft  Tenderness: There is no abdominal tenderness  There is no guarding or rebound  Musculoskeletal:         General: No deformity  Right lower leg: No edema  Left lower leg: No edema  Feet:    Feet:      Right foot:      Skin integrity: No ulcer, skin breakdown, erythema, warmth, callus or dry skin  Left foot:      Skin integrity: No ulcer, skin breakdown, erythema, warmth, callus or dry skin  Lymphadenopathy:      Cervical: No cervical adenopathy  Skin:     General: Skin is warm and dry  Coloration: Skin is not pale  Findings: No rash  Neurological:      General: No focal deficit present  Mental Status: He is alert  Mental status is at baseline  Motor: No abnormal muscle tone     Psychiatric:         Mood and Affect: Mood normal          Behavior: Behavior normal          Thought Content: Thought content normal          Judgment: Judgment normal        Patient's shoes and socks removed  Right Foot/Ankle   Right Foot Inspection  Skin Exam: skin normal and skin intact no dry skin, no warmth, no callus, no erythema, no maceration, no abnormal color, no pre-ulcer, no ulcer and no callus                          Toe Exam: ROM and strength within normal limits  Sensory   Vibration: intact    Monofilament testing: diminished  Vascular    The right DP pulse is 1+  Left Foot/Ankle  Left Foot Inspection  Skin Exam: skin normal and skin intactno dry skin, no warmth, no erythema, no maceration, normal color, no pre-ulcer, no ulcer and no callus                         Toe Exam: ROM and strength within normal limits                   Sensory   Vibration: intact    Monofilament: diminished  Vascular    The left DP pulse is 1+  Assign Risk Category:  No deformity present; Loss of protective sensation; Weak pulses       Risk: 2        BMI Counseling: Body mass index is 29 38 kg/m²  The BMI is above normal  Nutrition recommendations include reducing portion sizes, 3-5 servings of fruits/vegetables daily, consuming healthier snacks, moderation in carbohydrate intake, increasing intake of lean protein and reducing intake of saturated fat and trans fat  Exercise recommendations include exercising 3-5 times per week

## 2021-04-26 NOTE — ASSESSMENT & PLAN NOTE
Con't to defer f/u LEAD, dec pulse on exam but no sores/ulcers, call with any s/sx of ischemia or claudication, on statin and Plavix

## 2021-04-26 NOTE — ASSESSMENT & PLAN NOTE
Stable on exam, encouraged good BS control and checking feet regularly, call with sores/increase in neuropathy symptoms  Lab Results   Component Value Date    HGBA1C 7 2 (H) 03/29/2021

## 2021-04-26 NOTE — PATIENT INSTRUCTIONS
Weight Management   AMBULATORY CARE:   Why it is important to manage your weight:  Being overweight increases your risk of health conditions such as heart disease, high blood pressure, type 2 diabetes, and certain types of cancer  It can also increase your risk for osteoarthritis, sleep apnea, and other respiratory problems  Aim for a slow, steady weight loss  Even a small amount of weight loss can lower your risk of health problems  How to lose weight safely:  A safe and healthy way to lose weight is to eat fewer calories and get regular exercise  · You can lose up about 1 pound a week by decreasing the number of calories you eat by 500 calories each day  You can decrease calories by eating smaller portion sizes or by cutting out high-calorie foods  Read labels to find out how many calories are in the foods you eat  · You can also burn calories with exercise such as walking, swimming, or biking  You will be more likely to keep weight off if you make these changes part of your lifestyle  Exercise at least 30 minutes per day on most days of the week  You can also fit in more physical activity by taking the stairs instead of the elevator or parking farther away from stores  Ask your healthcare provider about the best exercise plan for you  Healthy meal plan for weight management:  A healthy meal plan includes a variety of foods, contains fewer calories, and helps you stay healthy  A healthy meal plan includes the following:     · Eat whole-grain foods more often  A healthy meal plan should contain fiber  Fiber is the part of grains, fruits, and vegetables that is not broken down by your body  Whole-grain foods are healthy and provide extra fiber in your diet  Some examples of whole-grain foods are whole-wheat breads and pastas, oatmeal, brown rice, and bulgur  · Eat a variety of vegetables every day  Include dark, leafy greens such as spinach, kale, maryanne greens, and mustard greens   Eat yellow and orange vegetables such as carrots, sweet potatoes, and winter squash  · Eat a variety of fruits every day  Choose fresh or canned fruit (canned in its own juice or light syrup) instead of juice  Fruit juice has very little or no fiber  · Eat low-fat dairy foods  Drink fat-free (skim) milk or 1% milk  Eat fat-free yogurt and low-fat cottage cheese  Try low-fat cheeses such as mozzarella and other reduced-fat cheeses  · Choose meat and other protein foods that are low in fat  Choose beans or other legumes such as split peas or lentils  Choose fish, skinless poultry (chicken or turkey), or lean cuts of red meat (beef or pork)  Before you cook meat or poultry, cut off any visible fat  · Use less fat and oil  Try baking foods instead of frying them  Add less fat, such as margarine, sour cream, regular salad dressing and mayonnaise to foods  Eat fewer high-fat foods  Some examples of high-fat foods include french fries, doughnuts, ice cream, and cakes  · Eat fewer sweets  Limit foods and drinks that are high in sugar  This includes candy, cookies, regular soda, and sweetened drinks  Ways to decrease calories:   · Eat smaller portions  ? Use a small plate with smaller servings  ? Do not eat second helpings  ? When you eat at a restaurant, ask for a box and place half of your meal in the box before you eat  ? Share an entrée with someone else  · Replace high-calorie snacks with healthy, low-calorie snacks  ? Choose fresh fruit, vegetables, fat-free rice cakes, or air-popped popcorn instead of potato chips, nuts, or chocolate  ? Choose water or calorie-free drinks instead of soda or sweetened drinks  · Do not shop for groceries when you are hungry  You may be more likely to make unhealthy food choices  Take a grocery list of healthy foods and shop after you have eaten  · Eat regular meals  Do not skip meals  Skipping meals can lead to overeating later in the day   This can make it harder for you to lose weight  Eat a healthy snack in place of a meal if you do not have time to eat a regular meal  Talk with a dietitian to help you create a meal plan and schedule that is right for you  Other things to consider as you try to lose weight:   · Be aware of situations that may give you the urge to overeat, such as eating while watching television  Find ways to avoid these situations  For example, read a book, go for a walk, or do crafts  · Meet with a weight loss support group or friends who are also trying to lose weight  This may help you stay motivated to continue working on your weight loss goals  © Copyright [address] Information is for End User's use only and may not be sold, redistributed or otherwise used for commercial purposes  All illustrations and images included in CareNotes® are the copyrighted property of [employer] Inc  or [facility] Florencia Mendosa   The above information is an  only  It is not intended as medical advice for individual conditions or treatments  Talk to your doctor, nurse or pharmacist before following any medical regimen to see if it is safe and effective for you  Heart Healthy Diet   AMBULATORY CARE:   A heart healthy diet  is an eating plan low in unhealthy fats and sodium (salt)  The plan is high in healthy fats and fiber  A heart healthy diet helps improve your cholesterol levels and lowers your risk for heart disease and stroke  A dietitian will teach you how to read and understand food labels  Heart healthy diet guidelines to follow:   · Choose foods that contain healthy fats  ? Unsaturated fats  include monounsaturated and polyunsaturated fats  Unsaturated fat is found in foods such as soybean, canola, olive, corn, and safflower oils  It is also found in soft tub margarine that is made with liquid vegetable oil  ? Omega-3 fat  is found in certain fish, such as salmon, tuna, and trout, and in walnuts and flaxseed  Eat fish high in omega-3 fats at least 2 times a week  · Get 20 to 30 grams of fiber each day  Fruits, vegetables, whole-grain foods, and legumes (cooked beans) are good sources of fiber  · Limit or do not have unhealthy fats  ? Cholesterol  is found in animal foods, such as eggs and lobster, and in dairy products made from whole milk  Limit cholesterol to less than 200 mg each day  ? Saturated fat  is found in meats, such as lorenzo and hamburger  It is also found in chicken or turkey skin, whole milk, and butter  Limit saturated fat to less than 7% of your total daily calories  ? Trans fat  is found in packaged foods, such as potato chips and cookies  It is also in hard margarine, some fried foods, and shortening  Do not eat foods that contain trans fats  · Limit sodium as directed  You may be told to limit sodium to 2,000 to 2,300 mg each day  Choose low-sodium or no-salt-added foods  Add little or no salt to food you prepare  Use herbs and spices in place of salt  Include the following in your heart healthy plan:  Ask your dietitian or healthcare provider how many servings to have from each of the following food groups:  · Grains:      ? Whole-wheat breads, cereals, and pastas, and brown rice    ? Low-fat, low-sodium crackers and chips    · Vegetables:      ? Broccoli, green beans, green peas, and spinach    ? Collards, kale, and lima beans    ? Carrots, sweet potatoes, tomatoes, and peppers    ? Canned vegetables with no salt added    · Fruits:      ? Bananas, peaches, pears, and pineapple    ? Grapes, raisins, and dates    ? Oranges, tangerines, grapefruit, orange juice, and grapefruit juice    ? Apricots, mangoes, melons, and papaya    ? Raspberries and strawberries    ? Canned fruit with no added sugar    · Low-fat dairy:      ? Nonfat (skim) milk, 1% milk, and low-fat almond, cashew, or soy milks fortified with calcium    ?  Low-fat cheese, regular or frozen yogurt, and cottage cheese    · Meats and proteins:      ? Lean cuts of beef and pork (loin, leg, round), skinless chicken and turkey    ? Legumes, soy products, egg whites, or nuts    Limit or do not include the following in your heart healthy plan:   · Unhealthy fats and oils:      ? Whole or 2% milk, cream cheese, sour cream, or cheese    ? High-fat cuts of beef (T-bone steaks, ribs), chicken or turkey with skin, and organ meats such as liver    ? Butter, stick margarine, shortening, and cooking oils such as coconut or palm oil    · Foods and liquids high in sodium:      ? Packaged foods, such as frozen dinners, cookies, macaroni and cheese, and cereals with more than 300 mg of sodium per serving    ? Vegetables with added sodium, such as instant potatoes, vegetables with added sauces, or regular canned vegetables    ? Cured or smoked meats, such as hot dogs, lorenzo, and sausage    ? High-sodium ketchup, barbecue sauce, salad dressing, pickles, olives, soy sauce, or miso    · Foods and liquids high in sugar:      ? Candy, cake, cookies, pies, or doughnuts    ? Soft drinks (soda), sports drinks, or sweetened tea    ? Canned or dry mixes for cakes, soups, sauces, or gravies    Other healthy heart guidelines:   · Do not smoke  Nicotine and other chemicals in cigarettes and cigars can cause lung and heart damage  Ask your healthcare provider for information if you currently smoke and need help to quit  E-cigarettes or smokeless tobacco still contain nicotine  Talk to your healthcare provider before you use these products  · Limit or do not drink alcohol as directed  Alcohol can damage your heart and raise your blood pressure  Your healthcare provider may give you specific daily and weekly limits  The general recommended limit is 1 drink a day for women 21 or older and for men 72 or older  Do not have more than 3 drinks in a day or 7 in a week  The recommended limit is 2 drinks a day for men 24to 59years of age   Do not have more than 4 drinks in a day or 14 in a week  A drink of alcohol is 12 ounces of beer, 5 ounces of wine, or 1½ ounces of liquor  · Exercise regularly  Exercise can help you maintain a healthy weight and improve your blood pressure and cholesterol levels  Regular exercise can also decrease your risk for heart problems  Ask your healthcare provider about the best exercise plan for you  Do not start an exercise program without asking your healthcare provider  Follow up with your doctor or cardiologist as directed:  Write down your questions so you remember to ask them during your visits  © Copyright [facility] [address] Information is for End User's use only and may not be sold, redistributed or otherwise used for commercial purposes  All illustrations and images included in CareNotes® are the copyrighted property of [employer]  or [facility] Florencia Mendosa   The above information is an  only  It is not intended as medical advice for individual conditions or treatments  Talk to your doctor, nurse or pharmacist before following any medical regimen to see if it is safe and effective for you

## 2021-05-08 ENCOUNTER — IMMUNIZATIONS (OUTPATIENT)
Dept: FAMILY MEDICINE CLINIC | Facility: HOSPITAL | Age: 81
End: 2021-05-08

## 2021-05-08 DIAGNOSIS — Z23 ENCOUNTER FOR IMMUNIZATION: Primary | ICD-10-CM

## 2021-05-08 PROCEDURE — 0001A SARS-COV-2 / COVID-19 MRNA VACCINE (PFIZER-BIONTECH) 30 MCG: CPT

## 2021-05-08 PROCEDURE — 91300 SARS-COV-2 / COVID-19 MRNA VACCINE (PFIZER-BIONTECH) 30 MCG: CPT

## 2021-05-30 ENCOUNTER — IMMUNIZATIONS (OUTPATIENT)
Dept: FAMILY MEDICINE CLINIC | Facility: HOSPITAL | Age: 81
End: 2021-05-30

## 2021-05-30 DIAGNOSIS — Z23 ENCOUNTER FOR IMMUNIZATION: Primary | ICD-10-CM

## 2021-05-30 PROCEDURE — 0002A SARS-COV-2 / COVID-19 MRNA VACCINE (PFIZER-BIONTECH) 30 MCG: CPT

## 2021-05-30 PROCEDURE — 91300 SARS-COV-2 / COVID-19 MRNA VACCINE (PFIZER-BIONTECH) 30 MCG: CPT

## 2021-07-21 ENCOUNTER — LAB (OUTPATIENT)
Dept: LAB | Facility: HOSPITAL | Age: 81
End: 2021-07-21
Payer: MEDICARE

## 2021-07-21 DIAGNOSIS — E11.49 DIABETES MELLITUS TYPE 2 WITH NEUROLOGICAL MANIFESTATIONS (HCC): ICD-10-CM

## 2021-07-21 LAB
EST. AVERAGE GLUCOSE BLD GHB EST-MCNC: 140 MG/DL
GLUCOSE P FAST SERPL-MCNC: 136 MG/DL (ref 65–99)
HBA1C MFR BLD: 6.5 %

## 2021-07-21 PROCEDURE — 83036 HEMOGLOBIN GLYCOSYLATED A1C: CPT

## 2021-07-21 PROCEDURE — 82947 ASSAY GLUCOSE BLOOD QUANT: CPT

## 2021-07-21 PROCEDURE — 36415 COLL VENOUS BLD VENIPUNCTURE: CPT

## 2021-07-26 ENCOUNTER — OFFICE VISIT (OUTPATIENT)
Dept: FAMILY MEDICINE CLINIC | Facility: HOSPITAL | Age: 81
End: 2021-07-26
Payer: MEDICARE

## 2021-07-26 VITALS
DIASTOLIC BLOOD PRESSURE: 82 MMHG | WEIGHT: 189.6 LBS | SYSTOLIC BLOOD PRESSURE: 120 MMHG | BODY MASS INDEX: 28.73 KG/M2 | HEIGHT: 68 IN | TEMPERATURE: 98.2 F | HEART RATE: 72 BPM

## 2021-07-26 DIAGNOSIS — I10 ESSENTIAL HYPERTENSION: ICD-10-CM

## 2021-07-26 DIAGNOSIS — I65.21 RECURRENT STENOSIS OF RIGHT CAROTID ARTERY: ICD-10-CM

## 2021-07-26 DIAGNOSIS — I25.10 CORONARY ARTERY DISEASE INVOLVING NATIVE CORONARY ARTERY OF NATIVE HEART WITHOUT ANGINA PECTORIS: ICD-10-CM

## 2021-07-26 DIAGNOSIS — E11.49 DIABETES MELLITUS TYPE 2 WITH NEUROLOGICAL MANIFESTATIONS (HCC): Primary | ICD-10-CM

## 2021-07-26 PROCEDURE — 99214 OFFICE O/P EST MOD 30 MIN: CPT | Performed by: INTERNAL MEDICINE

## 2021-07-26 PROCEDURE — 1124F ACP DISCUSS-NO DSCNMKR DOCD: CPT | Performed by: INTERNAL MEDICINE

## 2021-07-26 NOTE — ASSESSMENT & PLAN NOTE
Lab Results   Component Value Date    HGBA1C 6 5 (H) 07/21/2021   DM type 2 with neuropathy - diet controlled with A1C coming down to 6 5 - congratulated on improvement in BS and encouraged to con't diet/exercise, recheck BW in 6 mos - order given, UTD on foot exam (4/21) and eye exam (10/19), on ACE/statin/ASA Name band;

## 2021-07-26 NOTE — ASSESSMENT & PLAN NOTE
No current CV symptoms, just saw Cardio in June, con't meds and f/u as per Cardio - on ASA/Plavix/statin but no beta blocker

## 2021-07-26 NOTE — ASSESSMENT & PLAN NOTE
Pt states he just saw vascular and was told everything was good, on ASA and statin, call with CV/Neuro symptoms

## 2021-07-26 NOTE — PROGRESS NOTES
Assessment/Plan:    Diabetes mellitus type 2 with neurological manifestations ([facility] )    Lab Results   Component Value Date    HGBA1C 6 5 (H) 07/21/2021   DM type 2 with neuropathy - diet controlled with A1C coming down to 6 5 - congratulated on improvement in BS and encouraged to con't diet/exercise, recheck BW in 6 mos - order given, UTD on foot exam (4/21) and eye exam (10/19), on ACE/statin/ASA    Coronary artery disease involving native coronary artery of native heart without angina pectoris  No current CV symptoms, just saw Cardio in June, con't meds and f/u as per Cardio - on ASA/Plavix/statin but no beta blocker    Hypertension  BP at goal, con't current meds    Recurrent stenosis of right carotid artery  Pt states he just saw vascular and was told everything was good, on ASA and statin, call with CV/Neuro symptoms       Diagnoses and all orders for this visit:    Diabetes mellitus type 2 with neurological manifestations ([facility] )  -     CBC and differential; Future  -     Comprehensive metabolic panel; Future  -     Hemoglobin A1C; Future  -     Lipid panel; Future  -     TSH, 3rd generation with Free T4 reflex; Future  -     Microalbumin / creatinine urine ratio; Future    Coronary artery disease involving native coronary artery of native heart without angina pectoris  -     CBC and differential; Future  -     Comprehensive metabolic panel; Future  -     Hemoglobin A1C; Future  -     Lipid panel; Future  -     TSH, 3rd generation with Free T4 reflex; Future  -     Microalbumin / creatinine urine ratio; Future    Essential hypertension  -     CBC and differential; Future  -     Comprehensive metabolic panel; Future  -     Hemoglobin A1C; Future  -     Lipid panel; Future  -     TSH, 3rd generation with Free T4 reflex; Future  -     Microalbumin / creatinine urine ratio; Future    Recurrent stenosis of right carotid artery  -     CBC and differential; Future  -     Comprehensive metabolic panel;  Future  - Hemoglobin A1C; Future  -     Lipid panel; Future  -     TSH, 3rd generation with Free T4 reflex; Future  -     Microalbumin / creatinine urine ratio; Future      Colonoscopy 3/13 - no further screening needed d/t age    Has had both COVID vaccine's    Has finished pneumonia series    Shingrix reviewed    CT lung CA screening reviewed - pt deferring        Subjective:      Patient ID: Genevieve Khan is a 79 y o  male  HPI Pt here for follow up appt and BW results    BW results were d/w pt in detail: FBS/A1C 136/6 5 (157/7  2)  Def of controlled vs uncontrolled DM was reviewed  Diet was reviewed - wgt down 4 lbs  He is not taking any DM meds as directed  He is UTD on foot (4/21) and eye exam (10/19)  He is on statin, ASA, and ACE  Pt saw Cardio (Dr Army Murillo) in June for f/u CAD - OV note reviewed  He had no changes to his  meds made and no studies ordered  He notes no CP/palp/edema/orthopnea  He is on an ACE/ASA/Plavix/statin  He notes no bleeding with the ASA/Plavix but does bruise easily  BP at goal today and meds were reviewed and no changes have occurred  He denies missing doses of meds or SE with the meds  He does not check his BP outside the office  He notes no frequent Ha's/dizziness/double vision/CP  Colonoscopy 3/13 - no further screening needed d/t age    Has had both COVID vaccine's    Has finished pneumonia series    Shingrix reviewed    CT lung CA screening reviewed - pt deferring    Review of Systems   Constitutional: Negative for chills, fever and unexpected weight change  HENT: Negative for congestion and sinus pain  Eyes: Negative for pain and visual disturbance  Respiratory: Negative for cough and shortness of breath  Cardiovascular: Negative for chest pain, palpitations and leg swelling  Gastrointestinal: Negative for abdominal pain, blood in stool, constipation, diarrhea, nausea and vomiting  Endocrine: Negative for polydipsia and polyuria     Genitourinary: Negative for difficulty urinating, dysuria and hematuria  Musculoskeletal: Positive for arthralgias  Negative for joint swelling and myalgias  Skin: Negative for rash and wound  Neurological: Negative for dizziness and headaches  Hematological: Does not bruise/bleed easily  Psychiatric/Behavioral: Negative for behavioral problems and confusion  Objective:    /82   Pulse 72   Temp 98 2 °F (36 8 °C) (Temporal)   Ht 5' 8" (1 727 m)   Wt 86 kg (189 lb 9 6 oz)   BMI 28 83 kg/m²      Physical Exam  Vitals and nursing note reviewed  Constitutional:       General: He is not in acute distress  Appearance: He is well-developed  He is not ill-appearing  HENT:      Head: Normocephalic and atraumatic  Eyes:      General:         Right eye: No discharge  Left eye: No discharge  Conjunctiva/sclera: Conjunctivae normal    Neck:      Trachea: No tracheal deviation  Cardiovascular:      Rate and Rhythm: Normal rate and regular rhythm  Heart sounds: No murmur heard  Pulmonary:      Effort: Pulmonary effort is normal  No respiratory distress  Breath sounds: Normal breath sounds  No wheezing, rhonchi or rales  Abdominal:      General: There is no distension  Palpations: Abdomen is soft  Tenderness: There is no abdominal tenderness  There is no guarding or rebound  Musculoskeletal:         General: No deformity or signs of injury  Cervical back: Neck supple  Lymphadenopathy:      Cervical: No cervical adenopathy  Skin:     General: Skin is warm and dry  Coloration: Skin is not pale  Findings: No rash  Neurological:      General: No focal deficit present  Mental Status: He is alert  Mental status is at baseline  Motor: No abnormal muscle tone  Gait: Gait normal    Psychiatric:         Mood and Affect: Mood normal          Behavior: Behavior normal          Thought Content:  Thought content normal          Judgment: Judgment normal

## 2021-09-06 DIAGNOSIS — E78.5 DYSLIPIDEMIA: ICD-10-CM

## 2021-09-07 RX ORDER — ATORVASTATIN CALCIUM 20 MG/1
TABLET, FILM COATED ORAL
Qty: 90 TABLET | Refills: 0 | Status: SHIPPED | OUTPATIENT
Start: 2021-09-07 | End: 2021-12-04

## 2021-09-18 DIAGNOSIS — I10 ESSENTIAL HYPERTENSION: ICD-10-CM

## 2021-09-19 RX ORDER — LISINOPRIL 10 MG/1
TABLET ORAL
Qty: 90 TABLET | Refills: 0 | Status: SHIPPED | OUTPATIENT
Start: 2021-09-19 | End: 2021-12-18

## 2021-12-04 DIAGNOSIS — E78.5 DYSLIPIDEMIA: ICD-10-CM

## 2021-12-04 RX ORDER — ATORVASTATIN CALCIUM 20 MG/1
TABLET, FILM COATED ORAL
Qty: 90 TABLET | Refills: 0 | Status: SHIPPED | OUTPATIENT
Start: 2021-12-04 | End: 2022-03-07

## 2021-12-18 DIAGNOSIS — I10 ESSENTIAL HYPERTENSION: ICD-10-CM

## 2021-12-18 RX ORDER — LISINOPRIL 10 MG/1
TABLET ORAL
Qty: 90 TABLET | Refills: 0 | Status: SHIPPED | OUTPATIENT
Start: 2021-12-18 | End: 2022-03-20

## 2022-01-24 ENCOUNTER — LAB (OUTPATIENT)
Dept: LAB | Facility: HOSPITAL | Age: 82
End: 2022-01-24
Payer: MEDICARE

## 2022-01-24 DIAGNOSIS — I10 ESSENTIAL HYPERTENSION: ICD-10-CM

## 2022-01-24 DIAGNOSIS — E11.49 DIABETES MELLITUS TYPE 2 WITH NEUROLOGICAL MANIFESTATIONS (HCC): ICD-10-CM

## 2022-01-24 DIAGNOSIS — I25.10 CORONARY ARTERY DISEASE INVOLVING NATIVE CORONARY ARTERY OF NATIVE HEART WITHOUT ANGINA PECTORIS: ICD-10-CM

## 2022-01-24 DIAGNOSIS — I65.21 RECURRENT STENOSIS OF RIGHT CAROTID ARTERY: ICD-10-CM

## 2022-01-24 LAB
ALBUMIN SERPL BCP-MCNC: 3.9 G/DL (ref 3.5–5)
ALP SERPL-CCNC: 72 U/L (ref 46–116)
ALT SERPL W P-5'-P-CCNC: 29 U/L (ref 12–78)
ANION GAP SERPL CALCULATED.3IONS-SCNC: 7 MMOL/L (ref 4–13)
AST SERPL W P-5'-P-CCNC: 17 U/L (ref 5–45)
BASOPHILS # BLD AUTO: 0.05 THOUSANDS/ΜL (ref 0–0.1)
BASOPHILS NFR BLD AUTO: 1 % (ref 0–1)
BILIRUB SERPL-MCNC: 1.45 MG/DL (ref 0.2–1)
BUN SERPL-MCNC: 15 MG/DL (ref 5–25)
CALCIUM SERPL-MCNC: 9.4 MG/DL (ref 8.3–10.1)
CHLORIDE SERPL-SCNC: 101 MMOL/L (ref 100–108)
CHOLEST SERPL-MCNC: 152 MG/DL
CO2 SERPL-SCNC: 30 MMOL/L (ref 21–32)
CREAT SERPL-MCNC: 1.02 MG/DL (ref 0.6–1.3)
CREAT UR-MCNC: 230 MG/DL
EOSINOPHIL # BLD AUTO: 0.13 THOUSAND/ΜL (ref 0–0.61)
EOSINOPHIL NFR BLD AUTO: 2 % (ref 0–6)
ERYTHROCYTE [DISTWIDTH] IN BLOOD BY AUTOMATED COUNT: 13 % (ref 11.6–15.1)
EST. AVERAGE GLUCOSE BLD GHB EST-MCNC: 197 MG/DL
GFR SERPL CREATININE-BSD FRML MDRD: 69 ML/MIN/1.73SQ M
GLUCOSE P FAST SERPL-MCNC: 206 MG/DL (ref 65–99)
HBA1C MFR BLD: 8.5 %
HCT VFR BLD AUTO: 47 % (ref 36.5–49.3)
HDLC SERPL-MCNC: 44 MG/DL
HGB BLD-MCNC: 16 G/DL (ref 12–17)
IMM GRANULOCYTES # BLD AUTO: 0.03 THOUSAND/UL (ref 0–0.2)
IMM GRANULOCYTES NFR BLD AUTO: 1 % (ref 0–2)
LDLC SERPL CALC-MCNC: 89 MG/DL (ref 0–100)
LYMPHOCYTES # BLD AUTO: 1.71 THOUSANDS/ΜL (ref 0.6–4.47)
LYMPHOCYTES NFR BLD AUTO: 28 % (ref 14–44)
MCH RBC QN AUTO: 31.4 PG (ref 26.8–34.3)
MCHC RBC AUTO-ENTMCNC: 34 G/DL (ref 31.4–37.4)
MCV RBC AUTO: 92 FL (ref 82–98)
MICROALBUMIN UR-MCNC: 53.7 MG/L (ref 0–20)
MICROALBUMIN/CREAT 24H UR: 23 MG/G CREATININE (ref 0–30)
MONOCYTES # BLD AUTO: 0.45 THOUSAND/ΜL (ref 0.17–1.22)
MONOCYTES NFR BLD AUTO: 8 % (ref 4–12)
NEUTROPHILS # BLD AUTO: 3.67 THOUSANDS/ΜL (ref 1.85–7.62)
NEUTS SEG NFR BLD AUTO: 60 % (ref 43–75)
NONHDLC SERPL-MCNC: 108 MG/DL
NRBC BLD AUTO-RTO: 0 /100 WBCS
PLATELET # BLD AUTO: 247 THOUSANDS/UL (ref 149–390)
PMV BLD AUTO: 9.6 FL (ref 8.9–12.7)
POTASSIUM SERPL-SCNC: 3.3 MMOL/L (ref 3.5–5.3)
PROT SERPL-MCNC: 7.2 G/DL (ref 6.4–8.2)
RBC # BLD AUTO: 5.1 MILLION/UL (ref 3.88–5.62)
SODIUM SERPL-SCNC: 138 MMOL/L (ref 136–145)
TRIGL SERPL-MCNC: 97 MG/DL
TSH SERPL DL<=0.05 MIU/L-ACNC: 2.89 UIU/ML (ref 0.36–3.74)
WBC # BLD AUTO: 6.04 THOUSAND/UL (ref 4.31–10.16)

## 2022-01-24 PROCEDURE — 80061 LIPID PANEL: CPT

## 2022-01-24 PROCEDURE — 80053 COMPREHEN METABOLIC PANEL: CPT

## 2022-01-24 PROCEDURE — 84443 ASSAY THYROID STIM HORMONE: CPT

## 2022-01-24 PROCEDURE — 82043 UR ALBUMIN QUANTITATIVE: CPT

## 2022-01-24 PROCEDURE — 85025 COMPLETE CBC W/AUTO DIFF WBC: CPT

## 2022-01-24 PROCEDURE — 82570 ASSAY OF URINE CREATININE: CPT

## 2022-01-24 PROCEDURE — 83036 HEMOGLOBIN GLYCOSYLATED A1C: CPT

## 2022-01-24 PROCEDURE — 36415 COLL VENOUS BLD VENIPUNCTURE: CPT

## 2022-01-27 ENCOUNTER — OFFICE VISIT (OUTPATIENT)
Dept: FAMILY MEDICINE CLINIC | Facility: HOSPITAL | Age: 82
End: 2022-01-27
Payer: MEDICARE

## 2022-01-27 VITALS
DIASTOLIC BLOOD PRESSURE: 80 MMHG | SYSTOLIC BLOOD PRESSURE: 134 MMHG | TEMPERATURE: 97.5 F | BODY MASS INDEX: 29.19 KG/M2 | HEART RATE: 84 BPM | WEIGHT: 192.6 LBS | HEIGHT: 68 IN

## 2022-01-27 DIAGNOSIS — I74.3 EMBOLISM AND THROMBOSIS OF ARTERIES OF THE LOWER EXTREMITIES (HCC): ICD-10-CM

## 2022-01-27 DIAGNOSIS — Z00.00 MEDICARE ANNUAL WELLNESS VISIT, SUBSEQUENT: ICD-10-CM

## 2022-01-27 DIAGNOSIS — I10 PRIMARY HYPERTENSION: ICD-10-CM

## 2022-01-27 DIAGNOSIS — E66.3 OVERWEIGHT (BMI 25.0-29.9): ICD-10-CM

## 2022-01-27 DIAGNOSIS — E11.49 DIABETES MELLITUS TYPE 2 WITH NEUROLOGICAL MANIFESTATIONS (HCC): Primary | ICD-10-CM

## 2022-01-27 DIAGNOSIS — E11.42 DIABETIC POLYNEUROPATHY ASSOCIATED WITH TYPE 2 DIABETES MELLITUS (HCC): ICD-10-CM

## 2022-01-27 DIAGNOSIS — I65.22 ASYMPTOMATIC STENOSIS OF LEFT CAROTID ARTERY: ICD-10-CM

## 2022-01-27 DIAGNOSIS — Z23 ENCOUNTER FOR IMMUNIZATION: ICD-10-CM

## 2022-01-27 DIAGNOSIS — E87.6 HYPOKALEMIA: ICD-10-CM

## 2022-01-27 PROCEDURE — G0438 PPPS, INITIAL VISIT: HCPCS | Performed by: INTERNAL MEDICINE

## 2022-01-27 PROCEDURE — 99214 OFFICE O/P EST MOD 30 MIN: CPT | Performed by: INTERNAL MEDICINE

## 2022-01-27 PROCEDURE — 90662 IIV NO PRSV INCREASED AG IM: CPT

## 2022-01-27 PROCEDURE — G0008 ADMIN INFLUENZA VIRUS VAC: HCPCS

## 2022-01-27 RX ORDER — METFORMIN HYDROCHLORIDE 500 MG/1
500 TABLET, EXTENDED RELEASE ORAL DAILY
Qty: 90 TABLET | Refills: 1 | Status: SHIPPED | OUTPATIENT
Start: 2022-01-27 | End: 2022-07-23

## 2022-01-27 NOTE — PROGRESS NOTES
Assessment/Plan:    Diabetes mellitus type 2 with neurological manifestations ([facility] )    Lab Results   Component Value Date    HGBA1C 8 5 (H) 01/24/2022   DM type 2 with hyperglycemia/neuropathy/vasculopathy - uncontrolled with A1c shooting up to 8 5 - urged to get on track with diet and start exercise, start Metformin  mg 1 tab PO q day, SE reviewed, recheck BW in 3 mo - order given, foot exam done today, eye exam 1/22, on statin and ACE, will follow    Diabetic polyneuropathy ([facility] )  Foot exam done today, urged NOT to walk barefoot and to call w/any s/sx of infection or ulcers  Lab Results   Component Value Date    HGBA1C 8 5 (H) 01/24/2022       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[facility] )  Dec pulses and known PAD on LEAD - deferring further imaging, on statin and ASA/Plavix, call with new/worse symptoms/sores/ulcers    Asymptomatic stenosis of left carotid artery  Bruits present, deferring further imaging, on statin/ASA/Plavix, call with neuro symptoms    Hypertension  BP at goal by end of appt, con't current meds, diet/exercise/wgt loss encouraged       Diagnoses and all orders for this visit:    Diabetes mellitus type 2 with neurological manifestations ([facility] )  -     Hemoglobin A1C; Future  -     Basic metabolic panel; Future  -     metFORMIN (GLUCOPHAGE-XR) 500 mg 24 hr tablet; Take 1 tablet (500 mg total) by mouth daily    Diabetic polyneuropathy associated with type 2 diabetes mellitus ([facility] )    Hypokalemia  Comments:  Recheck BMP in 3 mos - order given  Orders:  -     Basic metabolic panel; Future    Primary hypertension    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HCC)    Asymptomatic stenosis of left carotid artery    Medicare annual wellness visit, subsequent    BMI 29 0-29 9,adult    Overweight (BMI 25 0-29  9)      Colonoscopy - no longer needed d/t age    COVID booster given at the pharmacy in Dec    Flu vaccine recommended    Shigrix reviewed - pt deferring        Subjective:      Patient ID: Shawna Marin is a [de-identified] y o  male  HPI Pt here for follow up appt/BW results and AWV    BW results were d/w pt in detail: FBS/A1C 206/8 5 , K 3 3, T Nic 1 45, rest of CMP/CBC/TSH/FLP was wnl, urine microalbumin:Cr ratio nml at 23  Def of controlled vs uncontrolled DM was reviewed  Diet was reviewed - wgt up 3 lbs from July 2021  He is is not on any oral DM meds/insulin as directed  He is UTD on DM foot exam (4/21) and eye exam (1/22)  He is on statin and ACE  Goal FLP was d/w pt in detail  Diet/exercise reviewed as noted above  He is taking his Atorvastatin daily as directed w/o SE  He notes no stroke/TIA symptoms/CP  BP a bit above goal today and meds were reviewed and no changes have occurred  He denies missing doses of meds or SE with the meds  He does not check his BP outside the office  He notes no frequent HA's/dizziness/double vision/CP  He saw eye doc and was told 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 degeneration of L eye  He notes unchanged numbness/tingling in the feet but denies pain/sores/ulcers  He has h/o prostate cancer from [address] [place] [address]  He has not seen Uro or had a PSA  He notes no urinary symptoms and defers any further f/u with Uro  He has PVD and last LEAD was in 2019  He is on Plavix and statin  He states "they told they weren't going to do anything with them so why follow it"  He feels the Plavix actually improved his LE symptoms  He notes no limitation with walking  Review of Systems   Constitutional: Negative for chills and fever  HENT: Positive for trouble swallowing  Negative for congestion and hearing loss  Eyes: Positive for visual disturbance  Negative for pain  Respiratory: Negative for cough, shortness of breath and wheezing  Cardiovascular: Negative for chest pain, palpitations and leg swelling     Gastrointestinal: Negative for abdominal pain, blood in stool, constipation, diarrhea, nausea and vomiting  Endocrine: Negative for polydipsia and polyuria  Genitourinary: Negative for difficulty urinating and dysuria  Musculoskeletal: Negative for arthralgias and myalgias  Skin: Negative for rash and wound  Neurological: Negative for dizziness and headaches  Hematological: Does not bruise/bleed easily  Psychiatric/Behavioral: Negative for behavioral problems, confusion and dysphoric mood  Objective:    /80   Pulse 84   Temp 97 5 °F (36 4 °C) (Tympanic)   Ht 5' 8" (1 727 m)   Wt 87 4 kg (192 lb 9 6 oz)   BMI 29 28 kg/m²      Physical Exam  Vitals and nursing note reviewed  Constitutional:       General: He is not in acute distress  Appearance: He is well-developed  He is not ill-appearing  HENT:      Head: Normocephalic and atraumatic  Right Ear: Tympanic membrane and external ear normal  There is no impacted cerumen  Left Ear: Tympanic membrane and external ear normal  There is no impacted cerumen  Eyes:      General:         Right eye: No discharge  Left eye: No discharge  Conjunctiva/sclera: Conjunctivae normal    Neck:      Vascular: Carotid bruit present  Comments: R carotid bruits  Cardiovascular:      Rate and Rhythm: Normal rate and regular rhythm  Pulses: Pulses are weak  Dorsalis pedis pulses are 0 on the right side and 1+ on the left side  Heart sounds: No murmur heard  Pulmonary:      Effort: Pulmonary effort is normal  No respiratory distress  Breath sounds: Normal breath sounds  No wheezing, rhonchi or rales  Abdominal:      General: There is no distension  Palpations: Abdomen is soft  Tenderness: There is no abdominal tenderness  There is no guarding or rebound  Musculoskeletal:         General: No deformity or signs of injury  Feet:    Feet:      Right foot:      Skin integrity: No ulcer, skin breakdown, erythema, warmth, callus or dry skin        Left foot:      Skin integrity: No ulcer, skin breakdown, erythema, warmth, callus or dry skin  Lymphadenopathy:      Cervical: No cervical adenopathy  Skin:     General: Skin is warm and dry  Coloration: Skin is not pale  Findings: No rash  Neurological:      General: No focal deficit present  Mental Status: He is alert  Mental status is at baseline  Gait: Gait normal    Psychiatric:         Mood and Affect: Mood normal          Behavior: Behavior normal          Thought Content: Thought content normal          Judgment: Judgment normal        Patient's shoes and socks removed  Right Foot/Ankle   Right Foot Inspection  Skin Exam: skin normal and skin intact  No dry skin, no warmth, no callus, no erythema, no maceration, no abnormal color, no pre-ulcer, no ulcer and no callus  Toe Exam: ROM and strength within normal limits  Sensory   Vibration: intact  Monofilament testing: diminished    Vascular  Capillary refills: < 3 seconds  The right DP pulse is 0  Left Foot/Ankle  Left Foot Inspection  Skin Exam: skin normal and skin intact  No dry skin, no warmth, no erythema, no maceration, normal color, no pre-ulcer, no ulcer and no callus  Toe Exam: ROM and strength within normal limits  Sensory   Vibration: diminished  Monofilament testing: diminished    Vascular  The left DP pulse is 1+       Assign Risk Category  No deformity present  Loss of protective sensation  Weak pulses  Risk: 2

## 2022-01-27 NOTE — ASSESSMENT & PLAN NOTE
Lab Results   Component Value Date    HGBA1C 8 5 (H) 01/24/2022   DM type 2 with hyperglycemia/neuropathy/vasculopathy - uncontrolled with A1c shooting up to 8 5 - urged to get on track with diet and start exercise, start Metformin  mg 1 tab PO q day, SE reviewed, recheck BW in 3 mo - order given, foot exam done today, eye exam 1/22, on statin and ACE, will follow

## 2022-01-27 NOTE — PATIENT INSTRUCTIONS
Medicare Preventive Visit Patient Instructions  Thank you for completing your Welcome to Medicare Visit or Medicare Annual Wellness Visit today  Your next wellness visit will be due in one year (1/28/2023)  The screening/preventive services that you may require over the next 5-10 years are detailed below  Some tests may not apply to you based off risk factors and/or age  Screening tests ordered at today's visit but not completed yet may show as past due  Also, please note that scanned in results may not display below  Preventive Screenings:  Service Recommendations Previous Testing/Comments   Colorectal Cancer Screening  · Colonoscopy    · Fecal Occult Blood Test (FOBT)/Fecal Immunochemical Test (FIT)  · Fecal DNA/Cologuard Test  · Flexible Sigmoidoscopy Age: 54-65 years old   Colonoscopy: every 10 years (May be performed more frequently if at higher risk)  OR  FOBT/FIT: every 1 year  OR  Cologuard: every 3 years  OR  Sigmoidoscopy: every 5 years  Screening may be recommended earlier than age 48 if at higher risk for colorectal cancer  Also, an individualized decision between you and your healthcare provider will decide whether screening between the ages of 74-80 would be appropriate   Colonoscopy: 03/15/2013  FOBT/FIT: Not on file  Cologuard: Not on file  Sigmoidoscopy: Not on file          Prostate Cancer Screening Individualized decision between patient and health care provider in men between ages of 53-78   Medicare will cover every 12 months beginning on the day after your 50th birthday PSA: No results in last 5 years     History Prostate Cancer  Screening Not Indicated     Hepatitis C Screening Once for adults born between 1945 and 1965  More frequently in patients at high risk for Hepatitis C Hep C Antibody: Not on file        Diabetes Screening 1-2 times per year if you're at risk for diabetes or have pre-diabetes Fasting glucose: 206 mg/dL   A1C: 8 5 %    Screening Not Indicated  History Diabetes Cholesterol Screening Once every 5 years if you don't have a lipid disorder  May order more often based on risk factors  Lipid panel: 01/24/2022    Screening Current      Other Preventive Screenings Covered by Medicare:  1  Abdominal Aortic Aneurysm (AAA) Screening: covered once if your at risk  You're considered to be at risk if you have a family history of AAA or a male between the age of 73-68 who smoking at least 100 cigarettes in your lifetime  2  Lung Cancer Screening: covers low dose CT scan once per year if you meet all of the following conditions: (1) Age 50-69; (2) No signs or symptoms of lung cancer; (3) Current smoker or have quit smoking within the last 15 years; (4) You have a tobacco smoking history of at least 30 pack years (packs per day x number of years you smoked); (5) You get a written order from a healthcare provider  3  Glaucoma Screening: covered annually if you're considered high risk: (1) You have diabetes OR (2) Family history of glaucoma OR (3)  aged 48 and older OR (3)  American aged 72 and older  3  Osteoporosis Screening: covered every 2 years if you meet one of the following conditions: (1) Have a vertebral abnormality; (2) On glucocorticoid therapy for more than 3 months; (3) Have primary hyperparathyroidism; (4) On osteoporosis medications and need to assess response to drug therapy  5  HIV Screening: covered annually if you're between the age of 12-76  Also covered annually if you are younger than 13 and older than 72 with risk factors for HIV infection  For pregnant patients, it is covered up to 3 times per pregnancy      Immunizations:  Immunization Recommendations   Influenza Vaccine Annual influenza vaccination during flu season is recommended for all persons aged >= 6 months who do not have contraindications   Pneumococcal Vaccine (Prevnar and Pneumovax)  * Prevnar = PCV13  * Pneumovax = PPSV23 Adults 25-60 years old: 1-3 doses may be recommended based on certain risk factors  Adults 72 years old: Prevnar (PCV13) vaccine recommended followed by Pneumovax (PPSV23) vaccine  If already received PPSV23 since turning 65, then PCV13 recommended at least one year after PPSV23 dose  Hepatitis B Vaccine 3 dose series if at intermediate or high risk (ex: diabetes, end stage renal disease, liver disease)   Tetanus (Td) Vaccine - COST NOT COVERED BY MEDICARE PART B Following completion of primary series, a booster dose should be given every 10 years to maintain immunity against tetanus  Td may also be given as tetanus wound prophylaxis  Tdap Vaccine - COST NOT COVERED BY MEDICARE PART B Recommended at least once for all adults  For pregnant patients, recommended with each pregnancy  Shingles Vaccine (Shingrix) - COST NOT COVERED BY MEDICARE PART B  2 shot series recommended in those aged 48 and above     Health Maintenance Due:      Topic Date Due    Lung Cancer Screening  Never done     Immunizations Due:      Topic Date Due    DTaP,Tdap,and Td Vaccines (1 - Tdap) Never done    Influenza Vaccine (1) 09/01/2021    COVID-19 Vaccine (3 - Booster for [email or web address] series) 11/30/2021     Advance Directives   What are advance directives? Advance directives are legal documents that state your wishes and plans for medical care  These plans are made ahead of time in case you lose your ability to make decisions for yourself  Advance directives can apply to any medical decision, such as the treatments you want, and if you want to donate organs  What are the types of advance directives? There are many types of advance directives, and each state has rules about how to use them  You may choose a combination of any of the following:  · Living will: This is a written record of the treatment you want  You can also choose which treatments you do not want, which to limit, and which to stop at a certain time  This includes surgery, medicine, IV fluid, and tube feedings  · Durable power of  for healthcare [facility]): This is a written record that states who you want to make healthcare choices for you when you are unable to make them for yourself  This person, called a proxy, is usually a family member or a friend  You may choose more than 1 proxy  · Do not resuscitate (DNR) order:  A DNR order is used in case your heart stops beating or you stop breathing  It is a request not to have certain forms of treatment, such as CPR  A DNR order may be included in other types of advance directives  · Medical directive: This covers the care that you want if you are in a coma, near death, or unable to make decisions for yourself  You can list the treatments you want for each condition  Treatment may include pain medicine, surgery, blood transfusions, dialysis, IV or tube feedings, and a ventilator (breathing machine)  · Values history: This document has questions about your views, beliefs, and how you feel and think about life  This information can help others choose the care that you would choose  Why are advance directives important? An advance directive helps you control your care  Although spoken wishes may be used, it is better to have your wishes written down  Spoken wishes can be misunderstood, or not followed  Treatments may be given even if you do not want them  An advance directive may make it easier for your family to make difficult choices about your care  Weight Management   Why it is important to manage your weight:  Being overweight increases your risk of health conditions such as heart disease, high blood pressure, type 2 diabetes, and certain types of cancer  It can also increase your risk for osteoarthritis, sleep apnea, and other respiratory problems  Aim for a slow, steady weight loss  Even a small amount of weight loss can lower your risk of health problems    How to lose weight safely:  A safe and healthy way to lose weight is to eat fewer calories and get regular exercise  You can lose up about 1 pound a week by decreasing the number of calories you eat by 500 calories each day  Healthy meal plan for weight management:  A healthy meal plan includes a variety of foods, contains fewer calories, and helps you stay healthy  A healthy meal plan includes the following:  · Eat whole-grain foods more often  A healthy meal plan should contain fiber  Fiber is the part of grains, fruits, and vegetables that is not broken down by your body  Whole-grain foods are healthy and provide extra fiber in your diet  Some examples of whole-grain foods are whole-wheat breads and pastas, oatmeal, brown rice, and bulgur  · Eat a variety of vegetables every day  Include dark, leafy greens such as spinach, kale, maryanne greens, and mustard greens  Eat yellow and orange vegetables such as carrots, sweet potatoes, and winter squash  · Eat a variety of fruits every day  Choose fresh or canned fruit (canned in its own juice or light syrup) instead of juice  Fruit juice has very little or no fiber  · Eat low-fat dairy foods  Drink fat-free (skim) milk or 1% milk  Eat fat-free yogurt and low-fat cottage cheese  Try low-fat cheeses such as mozzarella and other reduced-fat cheeses  · Choose meat and other protein foods that are low in fat  Choose beans or other legumes such as split peas or lentils  Choose fish, skinless poultry (chicken or turkey), or lean cuts of red meat (beef or pork)  Before you cook meat or poultry, cut off any visible fat  · Use less fat and oil  Try baking foods instead of frying them  Add less fat, such as margarine, sour cream, regular salad dressing and mayonnaise to foods  Eat fewer high-fat foods  Some examples of high-fat foods include french fries, doughnuts, ice cream, and cakes  · Eat fewer sweets  Limit foods and drinks that are high in sugar  This includes candy, cookies, regular soda, and sweetened drinks    Exercise:  Exercise at least 30 minutes per day on most days of the week  Some examples of exercise include walking, biking, dancing, and swimming  You can also fit in more physical activity by taking the stairs instead of the elevator or parking farther away from stores  Ask your healthcare provider about the best exercise plan for you  © Copyright [employer] 2018 Information is for End User's use only and may not be sold, redistributed or otherwise used for commercial purposes  All illustrations and images included in CareNotes® are the copyrighted property of [employer]  or [facility] Preventive Visit Patient Instructions  Thank you for completing your Welcome to Medicare Visit or Medicare Annual Wellness Visit today  Your next wellness visit will be due in one year (1/28/2023)  The screening/preventive services that you may require over the next 5-10 years are detailed below  Some tests may not apply to you based off risk factors and/or age  Screening tests ordered at today's visit but not completed yet may show as past due  Also, please note that scanned in results may not display below  Preventive Screenings:  Service Recommendations Previous Testing/Comments   Colorectal Cancer Screening  · Colonoscopy    · Fecal Occult Blood Test (FOBT)/Fecal Immunochemical Test (FIT)  · Fecal DNA/Cologuard Test  · Flexible Sigmoidoscopy Age: 54-65 years old   Colonoscopy: every 10 years (May be performed more frequently if at higher risk)  OR  FOBT/FIT: every 1 year  OR  Cologuard: every 3 years  OR  Sigmoidoscopy: every 5 years  Screening may be recommended earlier than age 48 if at higher risk for colorectal cancer  Also, an individualized decision between you and your healthcare provider will decide whether screening between the ages of 74-80 would be appropriate   Colonoscopy: 03/15/2013  FOBT/FIT: Not on file  Cologuard: Not on file  Sigmoidoscopy: Not on file          Prostate Cancer Screening Individualized decision between patient and health care provider in men between ages of 53-78   Medicare will cover every 12 months beginning on the day after your 50th birthday PSA: No results in last 5 years     History Prostate Cancer  Screening Not Indicated     Hepatitis C Screening Once for adults born between [birth year] and 1965  More frequently in patients at high risk for Hepatitis C Hep C Antibody: Not on file        Diabetes Screening 1-2 times per year if you're at risk for diabetes or have pre-diabetes Fasting glucose: 206 mg/dL   A1C: 8 5 %    Screening Not Indicated  History Diabetes   Cholesterol Screening Once every 5 years if you don't have a lipid disorder  May order more often based on risk factors  Lipid panel: 01/24/2022    Screening Current      Other Preventive Screenings Covered by Medicare:  6  Abdominal Aortic Aneurysm (AAA) Screening: covered once if your at risk  You're considered to be at risk if you have a family history of AAA or a male between the age of 73-68 who smoking at least 100 cigarettes in your lifetime  7  Lung Cancer Screening: covers low dose CT scan once per year if you meet all of the following conditions: (1) Age 50-69; (2) No signs or symptoms of lung cancer; (3) Current smoker or have quit smoking within the last 15 years; (4) You have a tobacco smoking history of at least 30 pack years (packs per day x number of years you smoked); (5) You get a written order from a healthcare provider  8  Glaucoma Screening: covered annually if you're considered high risk: (1) You have diabetes OR (2) Family history of glaucoma OR (3)  aged 48 and older OR (3)  American aged 72 and older  5  Osteoporosis Screening: covered every 2 years if you meet one of the following conditions: (1) Have a vertebral abnormality; (2) On glucocorticoid therapy for more than 3 months; (3) Have primary hyperparathyroidism; (4) On osteoporosis medications and need to assess response to drug therapy    10  HIV Screening: covered annually if you're between the age of 15-65  Also covered annually if you are younger than 13 and older than 72 with risk factors for HIV infection  For pregnant patients, it is covered up to 3 times per pregnancy  Immunizations:  Immunization Recommendations   Influenza Vaccine Annual influenza vaccination during flu season is recommended for all persons aged >= 6 months who do not have contraindications   Pneumococcal Vaccine (Prevnar and Pneumovax)  * Prevnar = PCV13  * Pneumovax = PPSV23 Adults 25-60 years old: 1-3 doses may be recommended based on certain risk factors  Adults 72 years old: Prevnar (PCV13) vaccine recommended followed by Pneumovax (PPSV23) vaccine  If already received PPSV23 since turning 65, then PCV13 recommended at least one year after PPSV23 dose  Hepatitis B Vaccine 3 dose series if at intermediate or high risk (ex: diabetes, end stage renal disease, liver disease)   Tetanus (Td) Vaccine - COST NOT COVERED BY MEDICARE PART B Following completion of primary series, a booster dose should be given every 10 years to maintain immunity against tetanus  Td may also be given as tetanus wound prophylaxis  Tdap Vaccine - COST NOT COVERED BY MEDICARE PART B Recommended at least once for all adults  For pregnant patients, recommended with each pregnancy  Shingles Vaccine (Shingrix) - COST NOT COVERED BY MEDICARE PART B  2 shot series recommended in those aged 48 and above     Health Maintenance Due:      Topic Date Due    Lung Cancer Screening  Never done     Immunizations Due:      Topic Date Due    DTaP,Tdap,and Td Vaccines (1 - Tdap) Never done    Influenza Vaccine (1) 09/01/2021    COVID-19 Vaccine (3 - Booster for [employer] series) 11/30/2021     Advance Directives   What are advance directives? Advance directives are legal documents that state your wishes and plans for medical care   These plans are made ahead of time in case you lose your ability to make decisions for yourself  Advance directives can apply to any medical decision, such as the treatments you want, and if you want to donate organs  What are the types of advance directives? There are many types of advance directives, and each state has rules about how to use them  You may choose a combination of any of the following:  · Living will: This is a written record of the treatment you want  You can also choose which treatments you do not want, which to limit, and which to stop at a certain time  This includes surgery, medicine, IV fluid, and tube feedings  · Durable power of  for healthcare [place] [facility]): This is a written record that states who you want to make healthcare choices for you when you are unable to make them for yourself  This person, called a proxy, is usually a family member or a friend  You may choose more than 1 proxy  · Do not resuscitate (DNR) order:  A DNR order is used in case your heart stops beating or you stop breathing  It is a request not to have certain forms of treatment, such as CPR  A DNR order may be included in other types of advance directives  · Medical directive: This covers the care that you want if you are in a coma, near death, or unable to make decisions for yourself  You can list the treatments you want for each condition  Treatment may include pain medicine, surgery, blood transfusions, dialysis, IV or tube feedings, and a ventilator (breathing machine)  · Values history: This document has questions about your views, beliefs, and how you feel and think about life  This information can help others choose the care that you would choose  Why are advance directives important? An advance directive helps you control your care  Although spoken wishes may be used, it is better to have your wishes written down  Spoken wishes can be misunderstood, or not followed  Treatments may be given even if you do not want them   An advance directive may make it easier for your family to make difficult choices about your care  Weight Management   Why it is important to manage your weight:  Being overweight increases your risk of health conditions such as heart disease, high blood pressure, type 2 diabetes, and certain types of cancer  It can also increase your risk for osteoarthritis, sleep apnea, and other respiratory problems  Aim for a slow, steady weight loss  Even a small amount of weight loss can lower your risk of health problems  How to lose weight safely:  A safe and healthy way to lose weight is to eat fewer calories and get regular exercise  You can lose up about 1 pound a week by decreasing the number of calories you eat by 500 calories each day  Healthy meal plan for weight management:  A healthy meal plan includes a variety of foods, contains fewer calories, and helps you stay healthy  A healthy meal plan includes the following:  · Eat whole-grain foods more often  A healthy meal plan should contain fiber  Fiber is the part of grains, fruits, and vegetables that is not broken down by your body  Whole-grain foods are healthy and provide extra fiber in your diet  Some examples of whole-grain foods are whole-wheat breads and pastas, oatmeal, brown rice, and bulgur  · Eat a variety of vegetables every day  Include dark, leafy greens such as spinach, kale, maryanne greens, and mustard greens  Eat yellow and orange vegetables such as carrots, sweet potatoes, and winter squash  · Eat a variety of fruits every day  Choose fresh or canned fruit (canned in its own juice or light syrup) instead of juice  Fruit juice has very little or no fiber  · Eat low-fat dairy foods  Drink fat-free (skim) milk or 1% milk  Eat fat-free yogurt and low-fat cottage cheese  Try low-fat cheeses such as mozzarella and other reduced-fat cheeses  · Choose meat and other protein foods that are low in fat  Choose beans or other legumes such as split peas or lentils   Choose fish, skinless poultry (chicken or turkey), or lean cuts of red meat (beef or pork)  Before you cook meat or poultry, cut off any visible fat  · Use less fat and oil  Try baking foods instead of frying them  Add less fat, such as margarine, sour cream, regular salad dressing and mayonnaise to foods  Eat fewer high-fat foods  Some examples of high-fat foods include french fries, doughnuts, ice cream, and cakes  · Eat fewer sweets  Limit foods and drinks that are high in sugar  This includes candy, cookies, regular soda, and sweetened drinks  Exercise:  Exercise at least 30 minutes per day on most days of the week  Some examples of exercise include walking, biking, dancing, and swimming  You can also fit in more physical activity by taking the stairs instead of the elevator or parking farther away from stores  Ask your healthcare provider about the best exercise plan for you  © Copyright [employer] 2018 Information is for End User's use only and may not be sold, redistributed or otherwise used for commercial purposes  All illustrations and images included in CareNotes® are the copyrighted property of A D A M [employer]  or [facility] [employer]   10% - bad control"> 10% - bad control,Hemoglobin A1c (HbA1c) greater than 10% indicating poor diabetic control,Haemoglobin A1c greater than 10% indicating poor diabetic control">   Diabetes Mellitus Type 2 in Adults, Ambulatory Care   GENERAL INFORMATION:   Diabetes mellitus type 2  is a disease that affects how your body uses glucose (sugar)  Insulin helps move sugar out of the blood so it can be used for energy  Normally, when the blood sugar level increases, the pancreas makes more insulin  Type 2 diabetes develops because either the body cannot make enough insulin, or it cannot use the insulin correctly  After many years, your pancreas may stop making insulin    Common symptoms include the following:   · More hunger or thirst than usual     · Frequent urination     · Weight loss without trying     · Blurred vision  Seek immediate care for the following symptoms:   · Severe abdominal pain, or pain that spreads to your back  You may also be vomiting  · Trouble staying awake or focusing    · Shaking or sweating    · Blurred or double vision    · Breath has a fruity, sweet smell    · Breathing is deep and labored, or rapid and shallow    · Heartbeat is fast and weak  Treatment for diabetes mellitus type 2  includes keeping your blood sugar at a normal level  You must eat the right foods, and exercise regularly  You may also need medicine if you cannot control your blood sugar level with nutrition and exercise  Manage diabetes mellitus type 2:   · Check your blood sugar level  You will be taught how to check a small drop of blood in a glucose monitor  Ask your healthcare provider when and how often to check during the day  Ask your healthcare provider what your blood sugar levels should be when you check them  · Keep track of carbohydrates (sugar and starchy foods)  Your blood sugar level can get too high if you eat too many carbohydrates  Your dietitian will help you plan meals and snacks that have the right amount of carbohydrates  · Eat low-fat foods  Some examples are skinless chicken and low-fat milk  · Eat less sodium (salt)  Some examples of high-sodium foods to limit are soy sauce, potato chips, and soup  Do not add salt to food you cook  Limit your use of table salt  · Eat high-fiber foods  Foods that are a good source of fiber include vegetables, whole grain bread, and beans  · Limit alcohol  Alcohol affects your blood sugar level and can make it harder to manage your diabetes  Women should limit alcohol to 1 drink a day  Men should limit alcohol to 2 drinks a day  A drink of alcohol is 12 ounces of beer, 5 ounces of wine, or 1½ ounces of liquor  · Get regular exercise    Exercise can help keep your blood sugar level steady, decrease your risk of heart disease, and help you lose weight  Exercise for at least 30 minutes, 5 days a week  Include muscle strengthening activities 2 days each week  Work with your healthcare provider to create an exercise plan  · Check your feet each day  for injuries or open sores  Ask your healthcare provider for activities you can do if you have an open sore  · Quit smoking  If you smoke, it is never too late to quit  Smoking can worsen the problems that may occur with diabetes  Ask your healthcare provider for information about how to stop smoking if you are having trouble quitting  · Ask about your weight:  Ask healthcare providers if you need to lose weight, and how much to lose  Ask them to help you with a weight loss program  Even a 10 to 15 pound weight loss can help you manage your blood sugar level  · Carry medical alert identification  Wear medical alert jewelry or carry a card that says you have diabetes  Ask your healthcare provider where to get these items  · Ask about vaccines  Diabetes puts you at risk of serious illness if you get the flu, pneumonia, or hepatitis  Ask your healthcare provider if you should get a flu, pneumonia, or hepatitis B vaccine, and when to get the vaccine  Follow up with your healthcare provider as directed:  Write down your questions so you remember to ask them during your visits  CARE AGREEMENT:   You have the right to help plan your care  Learn about your health condition and how it may be treated  Discuss treatment options with your caregivers to decide what care you want to receive  You always have the right to refuse treatment  The above information is an  only  It is not intended as medical advice for individual conditions or treatments  Talk to your doctor, nurse or pharmacist before following any medical regimen to see if it is safe and effective for you    © 2014 [address] [employer] is for End User's use only and may not be sold, redistributed or otherwise used for commercial purposes  All illustrations and images included in CareNotes® are the copyrighted property of [employer]  or Girish Londono  Foot Care for People with Diabetes   AMBULATORY CARE:   What you need to know about foot care:   · Foot care helps protect your feet and prevent foot ulcers or sores  Long-term high blood sugar levels can damage the blood vessels and nerves in your legs and feet  This damage makes it hard to feel pressure, pain, temperature, and touch  You may not be able to feel a cut or sore, or shoes that are too tight  Foot care is needed to prevent serious problems, such as an infection or amputation  · Diabetes may cause your toes to become crooked or curved under  These changes may affect the way you walk and can lead to increased pressure on your foot  The pressure can decrease blood flow to your feet  Lack of blood flow increases your risk for a foot ulcer  Do not ignore small problems, such as dry skin or small wounds  These can become life-threatening over time without proper care  Call your care team provider if:   · Your feet become numb, weak, or hard to move  · You have pus draining from a sore on your foot  · You have a wound on your foot that gets bigger, deeper, or does not heal      · You see blisters, cuts, scratches, calluses, or sores on your foot  · You have a fever, and your feet become red, warm, and swollen  · Your toenails become thick, curled, or yellow  · You find it hard to check your feet because your vision is poor  · You have questions or concerns about your condition or care  How to care for your feet:   · Check your feet each day  Look at your whole foot, including the bottom, and between and under your toes  Check for wounds, corns, and calluses  Use a mirror to see the bottom of your feet   The skin on your feet may be shiny, tight, or darker than normal  Your feet may also be cold and pale  Feel your feet by running your hands along the tops, bottoms, sides, and between your toes  Redness, swelling, and warmth are signs of blood flow problems that can lead to a foot ulcer  Do not try to remove corns or calluses yourself  · Wash your feet each day with soap and warm water  Do not use hot water, because this can injure your foot  Dry your feet gently with a towel after you wash them  Dry between and under your toes  · Apply lotion or a moisturizer on your dry feet  Ask your care team provider what lotions are best to use  Do not put lotion or moisturizer between your toes  Moisture between your toes could lead to skin breakdown  · Cut your toenails correctly  File or cut your toenails straight across  Use a soft brush to clean around your toenails  If your toenails are very thick, you may need to have a care team provider or specialist cut them  · Protect your feet  Do not walk barefoot or wear your shoes without socks  Check your shoes for rocks or other objects that can hurt your feet  Wear cotton socks to help keep your feet dry  Wear socks without toe seams, or wear them with the seams inside out  Change your socks each day  Do not wear socks that are dirty or damp  · Wear shoes that fit well  Wear shoes that do not rub against any area of your feet  Your shoes should be ½ to ¾ inch (1 to 2 centimeters) longer than your feet  Your shoes should also have extra space around the widest part of your feet  Walking or athletic shoes with laces or straps that adjust are best  Ask your care team provider for help to choose shoes that fit you best  Ask him or her if you need to wear an insert, orthotic, or bandage on your feet  · Go to your follow-up visits  Your care team provider will do a foot exam at least once a year  You may need a foot exam more often if you have nerve damage, foot deformities, or ulcers   He will check for nerve damage and how well you can feel your feet  He will check your shoes to see if they fit well  · Do not smoke  Smoking can damage your blood vessels and put you at increased risk for foot ulcers  Ask your care team provider for information if you currently smoke and need help to quit  E-cigarettes or smokeless tobacco still contain nicotine  Talk to your care team provider before you use these products  Follow up with your diabetes care team provider or foot specialist as directed: You will need to have your feet checked at least once a year  You may need a foot exam more often if you have nerve damage, foot deformities, or ulcers  Write down your questions so you remember to ask them during your visits  © [employer] 2021 Information is for End User's use only and may not be sold, redistributed or otherwise used for commercial purposes  All illustrations and images included in CareNotes® are the copyrighted property of [employer]  or [facility] Florencia Mendosa   The above information is an  only  It is not intended as medical advice for individual conditions or treatments  Talk to your doctor, nurse or pharmacist before following any medical regimen to see if it is safe and effective for you  10% - bad control"> 10% - bad control,Hemoglobin A1c (HbA1c) greater than 10% indicating poor diabetic control,Haemoglobin A1c greater than 10% indicating poor diabetic control">   Diabetes Mellitus Type 2 in Adults, Ambulatory Care   GENERAL INFORMATION:   Diabetes mellitus type 2  is a disease that affects how your body uses glucose (sugar)  Insulin helps move sugar out of the blood so it can be used for energy  Normally, when the blood sugar level increases, the pancreas makes more insulin  Type 2 diabetes develops because either the body cannot make enough insulin, or it cannot use the insulin correctly  After many years, your pancreas may stop making insulin    Common symptoms include the following:   · More hunger or thirst than usual     · Frequent urination     · Weight loss without trying     · Blurred vision  Seek immediate care for the following symptoms:   · Severe abdominal pain, or pain that spreads to your back  You may also be vomiting  · Trouble staying awake or focusing    · Shaking or sweating    · Blurred or double vision    · Breath has a fruity, sweet smell    · Breathing is deep and labored, or rapid and shallow    · Heartbeat is fast and weak  Treatment for diabetes mellitus type 2  includes keeping your blood sugar at a normal level  You must eat the right foods, and exercise regularly  You may also need medicine if you cannot control your blood sugar level with nutrition and exercise  Manage diabetes mellitus type 2:   · Check your blood sugar level  You will be taught how to check a small drop of blood in a glucose monitor  Ask your healthcare provider when and how often to check during the day  Ask your healthcare provider what your blood sugar levels should be when you check them  · Keep track of carbohydrates (sugar and starchy foods)  Your blood sugar level can get too high if you eat too many carbohydrates  Your dietitian will help you plan meals and snacks that have the right amount of carbohydrates  · Eat low-fat foods  Some examples are skinless chicken and low-fat milk  · Eat less sodium (salt)  Some examples of high-sodium foods to limit are soy sauce, potato chips, and soup  Do not add salt to food you cook  Limit your use of table salt  · Eat high-fiber foods  Foods that are a good source of fiber include vegetables, whole grain bread, and beans  · Limit alcohol  Alcohol affects your blood sugar level and can make it harder to manage your diabetes  Women should limit alcohol to 1 drink a day  Men should limit alcohol to 2 drinks a day  A drink of alcohol is 12 ounces of beer, 5 ounces of wine, or 1½ ounces of liquor  · Get regular exercise    Exercise can help keep your blood sugar level steady, decrease your risk of heart disease, and help you lose weight  Exercise for at least 30 minutes, 5 days a week  Include muscle strengthening activities 2 days each week  Work with your healthcare provider to create an exercise plan  · Check your feet each day  for injuries or open sores  Ask your healthcare provider for activities you can do if you have an open sore  · Quit smoking  If you smoke, it is never too late to quit  Smoking can worsen the problems that may occur with diabetes  Ask your healthcare provider for information about how to stop smoking if you are having trouble quitting  · Ask about your weight:  Ask healthcare providers if you need to lose weight, and how much to lose  Ask them to help you with a weight loss program  Even a 10 to 15 pound weight loss can help you manage your blood sugar level  · Carry medical alert identification  Wear medical alert jewelry or carry a card that says you have diabetes  Ask your healthcare provider where to get these items  · Ask about vaccines  Diabetes puts you at risk of serious illness if you get the flu, pneumonia, or hepatitis  Ask your healthcare provider if you should get a flu, pneumonia, or hepatitis B vaccine, and when to get the vaccine  Follow up with your healthcare provider as directed:  Write down your questions so you remember to ask them during your visits  CARE AGREEMENT:   You have the right to help plan your care  Learn about your health condition and how it may be treated  Discuss treatment options with your caregivers to decide what care you want to receive  You always have the right to refuse treatment  The above information is an  only  It is not intended as medical advice for individual conditions or treatments  Talk to your doctor, nurse or pharmacist before following any medical regimen to see if it is safe and effective for you    © 2014 [facility]  Information is for End User's use only and may not be sold, redistributed or otherwise used for commercial purposes  All illustrations and images included in CareNotes® are the copyrighted property of [employer]  or Girish Londono  Foot Care for People with Diabetes   AMBULATORY CARE:   What you need to know about foot care:   · Foot care helps protect your feet and prevent foot ulcers or sores  Long-term high blood sugar levels can damage the blood vessels and nerves in your legs and feet  This damage makes it hard to feel pressure, pain, temperature, and touch  You may not be able to feel a cut or sore, or shoes that are too tight  Foot care is needed to prevent serious problems, such as an infection or amputation  · Diabetes may cause your toes to become crooked or curved under  These changes may affect the way you walk and can lead to increased pressure on your foot  The pressure can decrease blood flow to your feet  Lack of blood flow increases your risk for a foot ulcer  Do not ignore small problems, such as dry skin or small wounds  These can become life-threatening over time without proper care  Call your care team provider if:   · Your feet become numb, weak, or hard to move  · You have pus draining from a sore on your foot  · You have a wound on your foot that gets bigger, deeper, or does not heal      · You see blisters, cuts, scratches, calluses, or sores on your foot  · You have a fever, and your feet become red, warm, and swollen  · Your toenails become thick, curled, or yellow  · You find it hard to check your feet because your vision is poor  · You have questions or concerns about your condition or care  How to care for your feet:   · Check your feet each day  Look at your whole foot, including the bottom, and between and under your toes  Check for wounds, corns, and calluses  Use a mirror to see the bottom of your feet   The skin on your feet may be shiny, tight, or darker than normal  Your feet may also be cold and pale  Feel your feet by running your hands along the tops, bottoms, sides, and between your toes  Redness, swelling, and warmth are signs of blood flow problems that can lead to a foot ulcer  Do not try to remove corns or calluses yourself  · Wash your feet each day with soap and warm water  Do not use hot water, because this can injure your foot  Dry your feet gently with a towel after you wash them  Dry between and under your toes  · Apply lotion or a moisturizer on your dry feet  Ask your care team provider what lotions are best to use  Do not put lotion or moisturizer between your toes  Moisture between your toes could lead to skin breakdown  · Cut your toenails correctly  File or cut your toenails straight across  Use a soft brush to clean around your toenails  If your toenails are very thick, you may need to have a care team provider or specialist cut them  · Protect your feet  Do not walk barefoot or wear your shoes without socks  Check your shoes for rocks or other objects that can hurt your feet  Wear cotton socks to help keep your feet dry  Wear socks without toe seams, or wear them with the seams inside out  Change your socks each day  Do not wear socks that are dirty or damp  · Wear shoes that fit well  Wear shoes that do not rub against any area of your feet  Your shoes should be ½ to ¾ inch (1 to 2 centimeters) longer than your feet  Your shoes should also have extra space around the widest part of your feet  Walking or athletic shoes with laces or straps that adjust are best  Ask your care team provider for help to choose shoes that fit you best  Ask him or her if you need to wear an insert, orthotic, or bandage on your feet  · Go to your follow-up visits  Your care team provider will do a foot exam at least once a year   You may need a foot exam more often if you have nerve damage, foot deformities, or ulcers  He will check for nerve damage and how well you can feel your feet  He will check your shoes to see if they fit well  · Do not smoke  Smoking can damage your blood vessels and put you at increased risk for foot ulcers  Ask your care team provider for information if you currently smoke and need help to quit  E-cigarettes or smokeless tobacco still contain nicotine  Talk to your care team provider before you use these products  Follow up with your diabetes care team provider or foot specialist as directed: You will need to have your feet checked at least once a year  You may need a foot exam more often if you have nerve damage, foot deformities, or ulcers  Write down your questions so you remember to ask them during your visits  © [employer] 2021 Information is for End User's use only and may not be sold, redistributed or otherwise used for commercial purposes  All illustrations and images included in CareNotes® are the copyrighted property of [employer]  or Rey Mendosa   The above information is an  only  It is not intended as medical advice for individual conditions or treatments  Talk to your doctor, nurse or pharmacist before following any medical regimen to see if it is safe and effective for you

## 2022-01-27 NOTE — ASSESSMENT & PLAN NOTE
Dec pulses and known PAD on LEAD - deferring further imaging, on statin and ASA/Plavix, call with new/worse symptoms/sores/ulcers Family informed/Explanation of wait/Patient informed

## 2022-01-27 NOTE — PROGRESS NOTES
Assessment and Plan:     Problem List Items Addressed This Visit     None        BMI Counseling: Body mass index is 29 28 kg/m²  The BMI is above normal  Nutrition recommendations include decreasing portion sizes, encouraging healthy choices of fruits and vegetables, consuming healthier snacks, limiting drinks that contain sugar, moderation in carbohydrate intake, increasing intake of lean protein and reducing intake of saturated and trans fat  Exercise recommendations include exercising 3-5 times per week  No pharmacotherapy was ordered  Rationale for BMI follow-up plan is due to patient being overweight or obese  Depression Screening and Follow-up Plan: Patient was screened for depression during today's encounter  They screened negative with a PHQ-2 score of 0  Preventive health issues were discussed with patient, and age appropriate screening tests were ordered as noted in patient's After Visit Summary  Personalized health advice and appropriate referrals for health education or preventive services given if needed, as noted in patient's After Visit Summary       History of Present Illness:     Patient presents for Medicare Annual Wellness visit    Patient Care Team:  Rankin Angelucci, DO as PCP - Deborah Kauffman MD (Cardiology)  Saroj Stevens MD (General Surgery)     Problem List:     Patient Active Problem List   Diagnosis    Adrenal cortical adenoma    Coronary artery disease involving native coronary artery of native heart without angina pectoris    Diabetes mellitus type 2 with neurological manifestations ([address] )    Diabetic polyneuropathy ([address] )    Diastolic dysfunction    Dyslipidemia    Recurrent stenosis of right carotid artery    Hypertension    Lower back pain    Peripheral vascular disease ([address] )    Sinus bradycardia    Asymptomatic stenosis of left carotid artery    Plantar fasciitis of right foot    Personal history of colonic polyps    Schatzki's ring    Dysphagia  Antiplatelet or antithrombotic long-term use    Embolism and thrombosis of arteries of the lower extremities [facility])      Past Medical and Surgical History:     Past Medical History:   Diagnosis Date    Hypertension     Macular degeneration     Migraines     Prostate cancer ([address] )     Seasonal allergies      Past Surgical History:   Procedure Laterality Date    CAROTID ENDARTARECTOMY Right     thromboendarterectomy    CAROTID STENT Right     CATARACT EXTRACTION Left     COLONOSCOPY      fiberoptic screening 08 5 yr / complete 3/15/13 5 yr    CORONARY ANGIOPLASTY WITH STENT PLACEMENT      ELBOW SURGERY      SUPRAPUBIC PROSTATECTOMY  1999    UPPER GASTROINTESTINAL ENDOSCOPY        Family History:     Family History   Problem Relation Age of Onset    Alcohol abuse Mother     Heart attack Family         MI    Breast cancer Family     No Known Problems Father     No Known Problems Sister     Colon cancer Neg Hx     Colon polyps Neg Hx       Social History:     Social History     Socioeconomic History    Marital status: /Civil Union     Spouse name: None    Number of children: None    Years of education: None    Highest education level: None   Occupational History    Occupation: fulltime   Tobacco Use    Smoking status: Former Smoker     Packs/day: 3 00     Years: 10 00     Pack years: 30 00     Quit date:      Years since quittin 0    Smokeless tobacco: Never Used   Vaping Use    Vaping Use: Never used   Substance and Sexual Activity    Alcohol use: Yes     Comment: rare glass of wine    Drug use: Never    Sexual activity: None   Other Topics Concern    None   Social History Narrative    Daily coffee consumption: 4 cp/day    Daily tea consumption: 1 cp/day     Social Determinants of Health     Financial Resource Strain: Not on file   Food Insecurity: Not on file   Transportation Needs: Not on file   Physical Activity: Not on file   Stress: Not on file   Social Connections: Not on file   Intimate Partner Violence: Not on file   Housing Stability: Not on file      Medications and Allergies:     Current Outpatient Medications   Medication Sig Dispense Refill    amLODIPine (NORVASC) 5 mg tablet Take 1 tablet by mouth daily      ascorbic acid (VITAMIN C) 500 mg tablet Take 500 mg by mouth daily      aspirin 81 MG tablet Take 1 tablet by mouth daily      atorvastatin (LIPITOR) 20 mg tablet Take 1 tablet by mouth once daily 90 tablet 0    B Complex Vitamins (VITAMIN B COMPLEX PO) Take 1 tablet by mouth daily      Cholecalciferol 1000 units CHEW Chew 1 tablet daily      clopidogrel (PLAVIX) 75 mg tablet Take 1 tablet by mouth daily      hydrochlorothiazide (HYDRODIURIL) 25 mg tablet Take 1 tablet by mouth daily      lisinopril (ZESTRIL) 10 mg tablet Take 1 tablet by mouth once daily 90 tablet 0    vitamin E, tocopherol, 400 units capsule Take 1 capsule by mouth daily       No current facility-administered medications for this visit       Allergies   Allergen Reactions    Losartan Syncope     Reaction Date: [id number];     Penicillins Other (See Comments)     As a child so does not recall reaction      Immunizations:     Immunization History   Administered Date(s) Administered    COVID-19 PFIZER VACCINE 0 3 ML IM 05/08/2021, 05/30/2021    INFLUENZA 02/11/2005    Influenza Split High Dose Preservative Free IM 09/13/2012, 09/16/2013, 12/22/2014, 08/31/2015, 10/07/2016, 11/27/2017    Influenza, high dose seasonal 0 7 mL 02/01/2019, 11/04/2019, 10/05/2020    Pneumococcal Conjugate 13-Valent 03/01/2016    Pneumococcal Polysaccharide PPV23 12/01/1995, 08/07/2014      Health Maintenance:         Topic Date Due    Lung Cancer Screening  Never done         Topic Date Due    DTaP,Tdap,and Td Vaccines (1 - Tdap) Never done    Influenza Vaccine (1) 09/01/2021    COVID-19 Vaccine (3 - Booster for Pfizer series) 11/30/2021      Medicare Health Risk Assessment:     There were no vitals taken for this visit  Jesus is here for his Subsequent Wellness visit  Last Medicare Wellness visit information reviewed, patient interviewed and updates made to the record today  Health Risk Assessment:   Patient rates overall health as good  Patient feels that their physical health rating is same  Patient is satisfied with their life  Eyesight was rated as slightly worse  Hearing was rated as same  Patient feels that their emotional and mental health rating is same  Patients states they are sometimes angry  Patient states they are never, rarely unusually tired/fatigued  Pain experienced in the last 7 days has been none  Patient states that he has experienced no weight loss or gain in last 6 months  Depression Screening:   PHQ-2 Score: 0      Fall Risk Screening: In the past year, patient has experienced: no history of falling in past year      Home Safety:  Patient does not have trouble with stairs inside or outside of their home  Patient has working smoke alarms and has working carbon monoxide detector  Home safety hazards include: none  Nutrition:   Current diet is Regular  Medications:   Patient is currently taking over-the-counter supplements  OTC medications include: see medication list  Patient is able to manage medications  Activities of Daily Living (ADLs)/Instrumental Activities of Daily Living (IADLs):   Walk and transfer into and out of bed and chair?: Yes  Dress and groom yourself?: Yes    Bathe or shower yourself?: Yes    Feed yourself? Yes  Do your laundry/housekeeping?: Yes  Manage your money, pay your bills and track your expenses?: Yes  Make your own meals?: Yes    Do your own shopping?: Yes    Previous Hospitalizations:   Any hospitalizations or ED visits within the last 12 months?: No      Advance Care Planning:   Living will: Yes    Durable POA for healthcare:  Yes    Advanced directive: Yes      Cognitive Screening:   Provider or family/friend/caregiver concerned regarding cognition?: No    PREVENTIVE SCREENINGS      Cardiovascular Screening:    General: Screening Current, Risks and Benefits Discussed and History Lipid Disorder      Diabetes Screening:     General: History Diabetes, Screening Current and Risks and Benefits Discussed      Colorectal Cancer Screening:     General: Risks and Benefits Discussed      Prostate Cancer Screening:    General: History Prostate Cancer, Screening Not Indicated and Risks and Benefits Discussed      Osteoporosis Screening:    General: Risks and Benefits Discussed and Screening Not Indicated      Abdominal Aortic Aneurysm (AAA) Screening:    Risk factors include: tobacco use        General: Risks and Benefits Discussed and Screening Current      Lung Cancer Screening:     General: Screening Not Indicated and Risks and Benefits Discussed      Hepatitis C Screening:    General: Risks and Benefits Discussed and Screening Not Indicated    Screening, Brief Intervention, and Referral to Treatment (SBIRT)    Screening  Typical number of drinks in a day: 0  Typical number of drinks in a week: 0  Interpretation: Low risk drinking behavior  Single Item Drug Screening:  How often have you used an illegal drug (including marijuana) or a prescription medication for non-medical reasons in the past year? never    Single Item Drug Screen Score: 0  Interpretation: Negative screen for possible drug use disorder    Other Counseling Topics:   Car/seat belt/driving safety and regular weightbearing exercise         [facility], DO

## 2022-01-28 ENCOUNTER — TELEPHONE (OUTPATIENT)
Dept: ADMINISTRATIVE | Facility: OTHER | Age: 82
End: 2022-01-28

## 2022-01-28 NOTE — TELEPHONE ENCOUNTER
Upon review of the In Basket request we were able to locate, review, and update the patient chart as requested for Diabetic Eye Exam     Any additional questions or concerns should be emailed to the Practice Liaisons via [email or web address] email, please do not reply via In Basket      Thank you  Tye Altamirano MA

## 2022-01-28 NOTE — TELEPHONE ENCOUNTER
----- Message from Salina Ruano MA sent at 1/27/2022  3:00 PM EST -----  Regarding: Diabetic eye exam  01/27/22 3:00 PM    Hello, our patient Sara Walter has had Diabetic Eye Exam completed/performed  Please assist in updating the patient chart by pulling the document from the Media Tab  The date of service is 01/17/2022       Thank you,  Salina Ruano MA  [facility]

## 2022-03-05 DIAGNOSIS — E78.5 DYSLIPIDEMIA: ICD-10-CM

## 2022-03-07 RX ORDER — ATORVASTATIN CALCIUM 20 MG/1
TABLET, FILM COATED ORAL
Qty: 90 TABLET | Refills: 0 | Status: SHIPPED | OUTPATIENT
Start: 2022-03-07 | End: 2022-06-05

## 2022-03-19 DIAGNOSIS — I10 ESSENTIAL HYPERTENSION: ICD-10-CM

## 2022-03-20 RX ORDER — LISINOPRIL 10 MG/1
TABLET ORAL
Qty: 90 TABLET | Refills: 0 | Status: SHIPPED | OUTPATIENT
Start: 2022-03-20 | End: 2022-06-19

## 2022-04-28 ENCOUNTER — LAB (OUTPATIENT)
Dept: LAB | Facility: HOSPITAL | Age: 82
End: 2022-04-28
Payer: MEDICARE

## 2022-04-28 DIAGNOSIS — E87.6 HYPOKALEMIA: ICD-10-CM

## 2022-04-28 DIAGNOSIS — E11.49 DIABETES MELLITUS TYPE 2 WITH NEUROLOGICAL MANIFESTATIONS (HCC): ICD-10-CM

## 2022-04-28 LAB
ANION GAP SERPL CALCULATED.3IONS-SCNC: 6 MMOL/L (ref 4–13)
BUN SERPL-MCNC: 15 MG/DL (ref 5–25)
CALCIUM SERPL-MCNC: 9.7 MG/DL (ref 8.3–10.1)
CHLORIDE SERPL-SCNC: 102 MMOL/L (ref 100–108)
CO2 SERPL-SCNC: 30 MMOL/L (ref 21–32)
CREAT SERPL-MCNC: 1.04 MG/DL (ref 0.6–1.3)
EST. AVERAGE GLUCOSE BLD GHB EST-MCNC: 160 MG/DL
GFR SERPL CREATININE-BSD FRML MDRD: 67 ML/MIN/1.73SQ M
GLUCOSE P FAST SERPL-MCNC: 188 MG/DL (ref 65–99)
HBA1C MFR BLD: 7.2 %
POTASSIUM SERPL-SCNC: 3.8 MMOL/L (ref 3.5–5.3)
SODIUM SERPL-SCNC: 138 MMOL/L (ref 136–145)

## 2022-04-28 PROCEDURE — 80048 BASIC METABOLIC PNL TOTAL CA: CPT

## 2022-04-28 PROCEDURE — 83036 HEMOGLOBIN GLYCOSYLATED A1C: CPT

## 2022-04-28 PROCEDURE — 36415 COLL VENOUS BLD VENIPUNCTURE: CPT

## 2022-05-03 ENCOUNTER — OFFICE VISIT (OUTPATIENT)
Dept: FAMILY MEDICINE CLINIC | Facility: HOSPITAL | Age: 82
End: 2022-05-03
Payer: MEDICARE

## 2022-05-03 VITALS
TEMPERATURE: 97.8 F | SYSTOLIC BLOOD PRESSURE: 126 MMHG | HEART RATE: 94 BPM | DIASTOLIC BLOOD PRESSURE: 84 MMHG | BODY MASS INDEX: 29.73 KG/M2 | WEIGHT: 196.2 LBS | HEIGHT: 68 IN

## 2022-05-03 DIAGNOSIS — Z85.46 HISTORY OF PROSTATE CANCER: ICD-10-CM

## 2022-05-03 DIAGNOSIS — I10 PRIMARY HYPERTENSION: ICD-10-CM

## 2022-05-03 DIAGNOSIS — E11.49 DIABETES MELLITUS TYPE 2 WITH NEUROLOGICAL MANIFESTATIONS (HCC): Primary | ICD-10-CM

## 2022-05-03 PROCEDURE — 99214 OFFICE O/P EST MOD 30 MIN: CPT | Performed by: INTERNAL MEDICINE

## 2022-05-03 NOTE — PATIENT INSTRUCTIONS
10% - bad control"> 10% - bad control,Hemoglobin A1c (HbA1c) greater than 10% indicating poor diabetic control,Haemoglobin A1c greater than 10% indicating poor diabetic control">   Diabetes Mellitus Type 2 in Adults, Ambulatory Care   GENERAL INFORMATION:   Diabetes mellitus type 2  is a disease that affects how your body uses glucose (sugar)  Insulin helps move sugar out of the blood so it can be used for energy  Normally, when the blood sugar level increases, the pancreas makes more insulin  Type 2 diabetes develops because either the body cannot make enough insulin, or it cannot use the insulin correctly  After many years, your pancreas may stop making insulin  Common symptoms include the following:   · More hunger or thirst than usual     · Frequent urination     · Weight loss without trying     · Blurred vision  Seek immediate care for the following symptoms:   · Severe abdominal pain, or pain that spreads to your back  You may also be vomiting  · Trouble staying awake or focusing    · Shaking or sweating    · Blurred or double vision    · Breath has a fruity, sweet smell    · Breathing is deep and labored, or rapid and shallow    · Heartbeat is fast and weak  Treatment for diabetes mellitus type 2  includes keeping your blood sugar at a normal level  You must eat the right foods, and exercise regularly  You may also need medicine if you cannot control your blood sugar level with nutrition and exercise  Manage diabetes mellitus type 2:   · Check your blood sugar level  You will be taught how to check a small drop of blood in a glucose monitor  Ask your healthcare provider when and how often to check during the day  Ask your healthcare provider what your blood sugar levels should be when you check them  · Keep track of carbohydrates (sugar and starchy foods)  Your blood sugar level can get too high if you eat too many carbohydrates   Your dietitian will help you plan meals and snacks that have the right amount of carbohydrates  · Eat low-fat foods  Some examples are skinless chicken and low-fat milk  · Eat less sodium (salt)  Some examples of high-sodium foods to limit are soy sauce, potato chips, and soup  Do not add salt to food you cook  Limit your use of table salt  · Eat high-fiber foods  Foods that are a good source of fiber include vegetables, whole grain bread, and beans  · Limit alcohol  Alcohol affects your blood sugar level and can make it harder to manage your diabetes  Women should limit alcohol to 1 drink a day  Men should limit alcohol to 2 drinks a day  A drink of alcohol is 12 ounces of beer, 5 ounces of wine, or 1½ ounces of liquor  · Get regular exercise  Exercise can help keep your blood sugar level steady, decrease your risk of heart disease, and help you lose weight  Exercise for at least 30 minutes, 5 days a week  Include muscle strengthening activities 2 days each week  Work with your healthcare provider to create an exercise plan  · Check your feet each day  for injuries or open sores  Ask your healthcare provider for activities you can do if you have an open sore  · Quit smoking  If you smoke, it is never too late to quit  Smoking can worsen the problems that may occur with diabetes  Ask your healthcare provider for information about how to stop smoking if you are having trouble quitting  · Ask about your weight:  Ask healthcare providers if you need to lose weight, and how much to lose  Ask them to help you with a weight loss program  Even a 10 to 15 pound weight loss can help you manage your blood sugar level  · Carry medical alert identification  Wear medical alert jewelry or carry a card that says you have diabetes  Ask your healthcare provider where to get these items  · Ask about vaccines  Diabetes puts you at risk of serious illness if you get the flu, pneumonia, or hepatitis   Ask your healthcare provider if you should get a flu, pneumonia, or hepatitis B vaccine, and when to get the vaccine  Follow up with your healthcare provider as directed:  Write down your questions so you remember to ask them during your visits  CARE AGREEMENT:   You have the right to help plan your care  Learn about your health condition and how it may be treated  Discuss treatment options with your caregivers to decide what care you want to receive  You always have the right to refuse treatment  The above information is an  only  It is not intended as medical advice for individual conditions or treatments  Talk to your doctor, nurse or pharmacist before following any medical regimen to see if it is safe and effective for you  © 2014 [address] is for End User's use only and may not be sold, redistributed or otherwise used for commercial purposes  All illustrations and images included in CareNotes® are the copyrighted property of [employer]  or [employer]  10% - bad control"> 10% - bad control,Hemoglobin A1c (HbA1c) greater than 10% indicating poor diabetic control,Haemoglobin A1c greater than 10% indicating poor diabetic control">   Diabetes Mellitus Type 2 in Adults, Ambulatory Care   GENERAL INFORMATION:   Diabetes mellitus type 2  is a disease that affects how your body uses glucose (sugar)  Insulin helps move sugar out of the blood so it can be used for energy  Normally, when the blood sugar level increases, the pancreas makes more insulin  Type 2 diabetes develops because either the body cannot make enough insulin, or it cannot use the insulin correctly  After many years, your pancreas may stop making insulin  Common symptoms include the following:   · More hunger or thirst than usual     · Frequent urination     · Weight loss without trying     · Blurred vision  Seek immediate care for the following symptoms:   · Severe abdominal pain, or pain that spreads to your back   You may also be vomiting  · Trouble staying awake or focusing    · Shaking or sweating    · Blurred or double vision    · Breath has a fruity, sweet smell    · Breathing is deep and labored, or rapid and shallow    · Heartbeat is fast and weak  Treatment for diabetes mellitus type 2  includes keeping your blood sugar at a normal level  You must eat the right foods, and exercise regularly  You may also need medicine if you cannot control your blood sugar level with nutrition and exercise  Manage diabetes mellitus type 2:   · Check your blood sugar level  You will be taught how to check a small drop of blood in a glucose monitor  Ask your healthcare provider when and how often to check during the day  Ask your healthcare provider what your blood sugar levels should be when you check them  · Keep track of carbohydrates (sugar and starchy foods)  Your blood sugar level can get too high if you eat too many carbohydrates  Your dietitian will help you plan meals and snacks that have the right amount of carbohydrates  · Eat low-fat foods  Some examples are skinless chicken and low-fat milk  · Eat less sodium (salt)  Some examples of high-sodium foods to limit are soy sauce, potato chips, and soup  Do not add salt to food you cook  Limit your use of table salt  · Eat high-fiber foods  Foods that are a good source of fiber include vegetables, whole grain bread, and beans  · Limit alcohol  Alcohol affects your blood sugar level and can make it harder to manage your diabetes  Women should limit alcohol to 1 drink a day  Men should limit alcohol to 2 drinks a day  A drink of alcohol is 12 ounces of beer, 5 ounces of wine, or 1½ ounces of liquor  · Get regular exercise  Exercise can help keep your blood sugar level steady, decrease your risk of heart disease, and help you lose weight  Exercise for at least 30 minutes, 5 days a week  Include muscle strengthening activities 2 days each week   Work with your healthcare provider to create an exercise plan  · Check your feet each day  for injuries or open sores  Ask your healthcare provider for activities you can do if you have an open sore  · Quit smoking  If you smoke, it is never too late to quit  Smoking can worsen the problems that may occur with diabetes  Ask your healthcare provider for information about how to stop smoking if you are having trouble quitting  · Ask about your weight:  Ask healthcare providers if you need to lose weight, and how much to lose  Ask them to help you with a weight loss program  Even a 10 to 15 pound weight loss can help you manage your blood sugar level  · Carry medical alert identification  Wear medical alert jewelry or carry a card that says you have diabetes  Ask your healthcare provider where to get these items  · Ask about vaccines  Diabetes puts you at risk of serious illness if you get the flu, pneumonia, or hepatitis  Ask your healthcare provider if you should get a flu, pneumonia, or hepatitis B vaccine, and when to get the vaccine  Follow up with your healthcare provider as directed:  Write down your questions so you remember to ask them during your visits  CARE AGREEMENT:   You have the right to help plan your care  Learn about your health condition and how it may be treated  Discuss treatment options with your caregivers to decide what care you want to receive  You always have the right to refuse treatment  The above information is an  only  It is not intended as medical advice for individual conditions or treatments  Talk to your doctor, nurse or pharmacist before following any medical regimen to see if it is safe and effective for you  © 2014 [address] is for End User's use only and may not be sold, redistributed or otherwise used for commercial purposes   All illustrations and images included in CareNotes® are the copyrighted property of [employer]  or Girish London[employer]

## 2022-05-03 NOTE — ASSESSMENT & PLAN NOTE
Lab Results   Component Value Date    HGBA1C 7 2 (H) 04/28/2022   DM type 2 with hyperglycemia and neuropathy - uncontrolled but improved with A1c 7 2 - con't current Metformin ER, cont watching sugars/carbs and keep active, recheck BW in 4 mos - order given, deferring nutritionist, UTD on DM foot exam and eye exam (both 1/22), on ACE and statin

## 2022-05-03 NOTE — PROGRESS NOTES
Assessment/Plan:    Diabetes mellitus type 2 with neurological manifestations ([facility] )    Lab Results   Component Value Date    HGBA1C 7 2 (H) 04/28/2022   DM type 2 with hyperglycemia and neuropathy - uncontrolled but improved with A1c 7 2 - con't current Metformin ER, cont watching sugars/carbs and keep active, recheck BW in 4 mos - order given, deferring nutritionist, UTD on DM foot exam and eye exam (both 1/22), on ACE and statin    Hypertension  BP at goal, con't current meds, con't healthy diet/keep active/healthy wgt encouraged    Prostate cancer [facility])  S/p prostatectomy - no longer following with Uro and no longer needs PSA, call with any new/worse urinary symptoms       Diagnoses and all orders for this visit:    Diabetes mellitus type 2 with neurological manifestations ([facility] )  -     Hemoglobin A1C; Future  -     Basic metabolic panel; Future    History of prostate cancer    Primary hypertension      No further colon cancer screening needed d/t age        Subjective:      Patient ID: Kingsley Mueller is a [de-identified] y o  male  HPI Pt here for follow up appt and BW results    BW results were d/w pt in detail: FBS/A1C 188/7 2  Rest of BMP was wnl  Def of controlled vs uncontrolled DM was reviewed  Diet was reviewed - wgt up 4 lbs from last visit  He has cut back on candy bars and potato chips  He is not doing any formal exercise but states he is "very active"  He is taking his DM meds as directed - Metformin  mg 1 tab PO q day was started last visit  He is UTD on DM foot exam (1/22) and eye exam (1/22)  He is on statin and ACE  He no longer follows with Uro for his h/o prostate cancer s/p prostatectomy  He notes no urinary symptoms/blood in urine  BP at goal today and meds were reviewed and no changes have occurred  He denies missing doses of meds or SE with the meds  He does not check his BP outside the office  He notes no frequent HA's/dizziness/double vision/CP             Review of Systems Constitutional: Negative for chills and fever  Eyes: Negative for pain and visual disturbance  Respiratory: Negative for cough, shortness of breath and wheezing  Cardiovascular: Negative for chest pain, palpitations and leg swelling  Gastrointestinal: Negative for abdominal pain, blood in stool, constipation, diarrhea, nausea and vomiting  Endocrine: Negative for polydipsia and polyuria  Genitourinary: Negative for difficulty urinating, dysuria and hematuria  Musculoskeletal: Negative for arthralgias and myalgias  Skin: Negative for rash and wound  Neurological: Negative for dizziness and headaches  Hematological: Does not bruise/bleed easily  Psychiatric/Behavioral: Negative for behavioral problems and confusion           Objective:    /84   Pulse 94   Temp 97 8 °F (36 6 °C) (Tympanic)   Ht 5' 8" (1 727 m)   Wt 89 kg (196 lb 3 2 oz)   BMI 29 83 kg/m²      Physical Exam

## 2022-05-03 NOTE — ASSESSMENT & PLAN NOTE
S/p prostatectomy - no longer following with Uro and no longer needs PSA, call with any new/worse urinary symptoms

## 2022-06-05 DIAGNOSIS — E78.5 DYSLIPIDEMIA: ICD-10-CM

## 2022-06-05 RX ORDER — ATORVASTATIN CALCIUM 20 MG/1
TABLET, FILM COATED ORAL
Qty: 90 TABLET | Refills: 0 | Status: SHIPPED | OUTPATIENT
Start: 2022-06-05

## 2022-06-19 DIAGNOSIS — I10 ESSENTIAL HYPERTENSION: ICD-10-CM

## 2022-06-19 RX ORDER — LISINOPRIL 10 MG/1
TABLET ORAL
Qty: 90 TABLET | Refills: 0 | Status: SHIPPED | OUTPATIENT
Start: 2022-06-19

## 2022-07-23 DIAGNOSIS — E11.49 DIABETES MELLITUS TYPE 2 WITH NEUROLOGICAL MANIFESTATIONS (HCC): ICD-10-CM

## 2022-07-23 RX ORDER — METFORMIN HYDROCHLORIDE 500 MG/1
TABLET, EXTENDED RELEASE ORAL
Qty: 90 TABLET | Refills: 0 | Status: SHIPPED | OUTPATIENT
Start: 2022-07-23 | End: 2022-10-15

## 2022-08-31 DIAGNOSIS — E78.5 DYSLIPIDEMIA: ICD-10-CM

## 2022-08-31 RX ORDER — ATORVASTATIN CALCIUM 20 MG/1
TABLET, FILM COATED ORAL
Qty: 90 TABLET | Refills: 0 | Status: SHIPPED | OUTPATIENT
Start: 2022-08-31

## 2022-09-01 ENCOUNTER — APPOINTMENT (OUTPATIENT)
Dept: LAB | Facility: HOSPITAL | Age: 82
End: 2022-09-01
Payer: MEDICARE

## 2022-09-01 DIAGNOSIS — E11.49 DIABETES MELLITUS TYPE 2 WITH NEUROLOGICAL MANIFESTATIONS (HCC): ICD-10-CM

## 2022-09-01 LAB
ANION GAP SERPL CALCULATED.3IONS-SCNC: 5 MMOL/L (ref 4–13)
BUN SERPL-MCNC: 12 MG/DL (ref 5–25)
CALCIUM SERPL-MCNC: 9.8 MG/DL (ref 8.3–10.1)
CHLORIDE SERPL-SCNC: 103 MMOL/L (ref 96–108)
CO2 SERPL-SCNC: 30 MMOL/L (ref 21–32)
CREAT SERPL-MCNC: 0.98 MG/DL (ref 0.6–1.3)
EST. AVERAGE GLUCOSE BLD GHB EST-MCNC: 160 MG/DL
GFR SERPL CREATININE-BSD FRML MDRD: 72 ML/MIN/1.73SQ M
GLUCOSE P FAST SERPL-MCNC: 152 MG/DL (ref 65–99)
HBA1C MFR BLD: 7.2 %
POTASSIUM SERPL-SCNC: 4.4 MMOL/L (ref 3.5–5.3)
SODIUM SERPL-SCNC: 138 MMOL/L (ref 135–147)

## 2022-09-01 PROCEDURE — 83036 HEMOGLOBIN GLYCOSYLATED A1C: CPT

## 2022-09-01 PROCEDURE — 80048 BASIC METABOLIC PNL TOTAL CA: CPT

## 2022-09-01 PROCEDURE — 36415 COLL VENOUS BLD VENIPUNCTURE: CPT

## 2022-09-06 ENCOUNTER — OFFICE VISIT (OUTPATIENT)
Dept: FAMILY MEDICINE CLINIC | Facility: HOSPITAL | Age: 82
End: 2022-09-06
Payer: MEDICARE

## 2022-09-06 VITALS
TEMPERATURE: 97.9 F | DIASTOLIC BLOOD PRESSURE: 70 MMHG | BODY MASS INDEX: 28.95 KG/M2 | HEART RATE: 94 BPM | HEIGHT: 68 IN | SYSTOLIC BLOOD PRESSURE: 126 MMHG | WEIGHT: 191 LBS

## 2022-09-06 DIAGNOSIS — I65.22 ASYMPTOMATIC STENOSIS OF LEFT CAROTID ARTERY: ICD-10-CM

## 2022-09-06 DIAGNOSIS — E78.5 DYSLIPIDEMIA: ICD-10-CM

## 2022-09-06 DIAGNOSIS — I10 PRIMARY HYPERTENSION: ICD-10-CM

## 2022-09-06 DIAGNOSIS — E11.49 DIABETES MELLITUS TYPE 2 WITH NEUROLOGICAL MANIFESTATIONS (HCC): Primary | ICD-10-CM

## 2022-09-06 PROCEDURE — 99214 OFFICE O/P EST MOD 30 MIN: CPT | Performed by: INTERNAL MEDICINE

## 2022-09-06 NOTE — ASSESSMENT & PLAN NOTE
Lab Results   Component Value Date    HGBA1C 7 2 (H) 09/01/2022   DM type 2 with polyneuropathy and hyperglycemia - uncontrolled with A1C 7 2 but acceptable given his age, con't to watch sugars/carbs and keep active, con't current Metformin ER, recheck BW in 4-5 mos - BW order given, UTD on DM foot exam (1/22) and eye exam (1/22), on ACE and statin

## 2022-09-06 NOTE — PROGRESS NOTES
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 - uncontrolled with A1C 7 2 but acceptable given his age, con't to watch sugars/carbs and keep active, con't current Metformin ER, recheck BW in 4-5 mos - BW order given, UTD on DM foot exam (1/22) and eye exam (1/22), on ACE and statin    Asymptomatic stenosis of left carotid artery  Stable on CUS 8/22, saw vascular 8/22, on ASA/Plavix/statin, con't imaging and f/u as per vascular    Hypertension  BP at goal, con't current meds, recheck in 4-6 mos       Diagnoses and all orders for this visit:    Diabetes mellitus type 2 with neurological manifestations ([place] [id number] )  -     CBC and differential; Future  -     Comprehensive metabolic panel; Future  -     Hemoglobin A1C; Future  -     Lipid panel; Future  -     Microalbumin / creatinine urine ratio; Future  -     TSH, 3rd generation with Free T4 reflex; Future    Asymptomatic stenosis of left carotid artery  -     CBC and differential; Future  -     Comprehensive metabolic panel; Future  -     Hemoglobin A1C; Future  -     TSH, 3rd generation with Free T4 reflex; Future    Primary hypertension  -     CBC and differential; Future  -     Comprehensive metabolic panel; Future  -     Hemoglobin A1C; Future  -     TSH, 3rd generation with Free T4 reflex; Future    Dyslipidemia  Comments:  FLP annually - due with labs in Jan/Feb - BW order given, con't current statin for now  Orders:  -     Lipid panel; Future  -     TSH, 3rd generation with Free T4 reflex; Future      Lung cancer screening - pt does not qualify - quit smoking> 15 yrs ago      Subjective:      Patient ID: Saida Cavazos is a [de-identified] y o  male  HPI Pt here for follow up appt and BW results    BW results were d/w pt in detail: FBS/A1C 152/7 2, rest of BMP was wnl  Def of controlled vs uncontrolled DM was reviewed  Diet was reviewed - wgt down 1 lb from Jan 22  Alek Davis He is taking his DM meds as directed  He is UTD on DM foot exam (1/22) and DM eye exam (1/22)  He is on statin and ACE  Pt saw vascular Korin Fritz) in Aug for f/u carotid stenosis - OV note reviewed  CUS was done 8/4/22 - L ICA with 50-69% stenosis, R ICA clear  He has an order for a repeat CUS annually  He notes no stroke/TIA symptoms  He is on a statin, Plavix, and ASA  He was told to f/u in 1 yr  BP at goal today and meds were reviewed and no changes have occurred  He denies missing doses of meds or SE with the meds  He does not check his BP outside the office  He notes no frequent HA's/dizziness/double vision/CP  Review of Systems   Constitutional: Negative for chills and fever  HENT: Positive for trouble swallowing  Negative for congestion  Eyes: Negative for pain and visual disturbance  Respiratory: Negative for cough and shortness of breath  Cardiovascular: Negative for chest pain, palpitations and leg swelling  Gastrointestinal: Negative for abdominal pain, blood in stool, constipation, diarrhea, nausea and vomiting  Endocrine: Negative for polydipsia and polyuria  Genitourinary: Negative for difficulty urinating and dysuria  Musculoskeletal: Negative for arthralgias and back pain  Skin: Negative for rash and wound  Neurological: Negative for dizziness and headaches  Hematological: Does not bruise/bleed easily  Psychiatric/Behavioral: Negative for dysphoric mood and sleep disturbance  Objective:    /70   Pulse 94   Temp 97 9 °F (36 6 °C) (Tympanic)   Ht 5' 8" (1 727 m)   Wt 86 6 kg (191 lb)   BMI 29 04 kg/m²      Physical Exam  Vitals and nursing note reviewed  Constitutional:       General: He is not in acute distress  Appearance: He is well-developed  He is not ill-appearing  HENT:      Head: Normocephalic and atraumatic     Eyes:      General:         Right eye: No discharge  Left eye: No discharge  Conjunctiva/sclera: Conjunctivae normal    Neck:      Trachea: No tracheal deviation  Cardiovascular:      Rate and Rhythm: Normal rate and regular rhythm  Heart sounds: No murmur heard  Pulmonary:      Effort: Pulmonary effort is normal  No respiratory distress  Breath sounds: Normal breath sounds  No wheezing, rhonchi or rales  Abdominal:      General: There is no distension  Palpations: Abdomen is soft  Tenderness: There is no abdominal tenderness  There is no guarding or rebound  Musculoskeletal:         General: No deformity or signs of injury  Cervical back: Neck supple  Lymphadenopathy:      Cervical: No cervical adenopathy  Skin:     General: Skin is warm and dry  Coloration: Skin is not pale  Findings: No rash  Neurological:      General: No focal deficit present  Mental Status: He is alert  Mental status is at baseline  Motor: No abnormal muscle tone  Gait: Gait normal    Psychiatric:         Mood and Affect: Mood normal          Behavior: Behavior normal          Thought Content:  Thought content normal          Judgment: Judgment normal

## 2022-09-06 NOTE — PATIENT INSTRUCTIONS

## 2022-09-17 DIAGNOSIS — I10 ESSENTIAL HYPERTENSION: ICD-10-CM

## 2022-09-17 RX ORDER — LISINOPRIL 10 MG/1
TABLET ORAL
Qty: 90 TABLET | Refills: 0 | Status: SHIPPED | OUTPATIENT
Start: 2022-09-17

## 2022-10-15 DIAGNOSIS — E11.49 DIABETES MELLITUS TYPE 2 WITH NEUROLOGICAL MANIFESTATIONS (HCC): ICD-10-CM

## 2022-10-15 RX ORDER — METFORMIN HYDROCHLORIDE 500 MG/1
TABLET, EXTENDED RELEASE ORAL
Qty: 90 TABLET | Refills: 0 | Status: SHIPPED | OUTPATIENT
Start: 2022-10-15

## 2022-12-06 DIAGNOSIS — E78.5 DYSLIPIDEMIA: ICD-10-CM

## 2022-12-06 RX ORDER — ATORVASTATIN CALCIUM 20 MG/1
TABLET, FILM COATED ORAL
Qty: 90 TABLET | Refills: 0 | Status: SHIPPED | OUTPATIENT
Start: 2022-12-06

## 2022-12-17 DIAGNOSIS — I10 ESSENTIAL HYPERTENSION: ICD-10-CM

## 2022-12-17 RX ORDER — LISINOPRIL 10 MG/1
TABLET ORAL
Qty: 90 TABLET | Refills: 0 | Status: SHIPPED | OUTPATIENT
Start: 2022-12-17

## 2023-01-14 DIAGNOSIS — E11.49 DIABETES MELLITUS TYPE 2 WITH NEUROLOGICAL MANIFESTATIONS (HCC): ICD-10-CM

## 2023-01-14 RX ORDER — METFORMIN HYDROCHLORIDE 500 MG/1
TABLET, EXTENDED RELEASE ORAL
Qty: 90 TABLET | Refills: 0 | Status: SHIPPED | OUTPATIENT
Start: 2023-01-14

## 2023-02-02 ENCOUNTER — APPOINTMENT (OUTPATIENT)
Dept: LAB | Facility: HOSPITAL | Age: 83
End: 2023-02-02

## 2023-02-02 ENCOUNTER — OFFICE VISIT (OUTPATIENT)
Dept: URGENT CARE | Facility: CLINIC | Age: 83
End: 2023-02-02

## 2023-02-02 ENCOUNTER — APPOINTMENT (OUTPATIENT)
Dept: RADIOLOGY | Facility: CLINIC | Age: 83
End: 2023-02-02

## 2023-02-02 VITALS
HEIGHT: 68 IN | BODY MASS INDEX: 27.13 KG/M2 | DIASTOLIC BLOOD PRESSURE: 80 MMHG | OXYGEN SATURATION: 96 % | SYSTOLIC BLOOD PRESSURE: 120 MMHG | WEIGHT: 179 LBS | RESPIRATION RATE: 18 BRPM | HEART RATE: 100 BPM | TEMPERATURE: 97.5 F

## 2023-02-02 DIAGNOSIS — R05.1 ACUTE COUGH: ICD-10-CM

## 2023-02-02 DIAGNOSIS — I10 PRIMARY HYPERTENSION: ICD-10-CM

## 2023-02-02 DIAGNOSIS — I65.22 ASYMPTOMATIC STENOSIS OF LEFT CAROTID ARTERY: ICD-10-CM

## 2023-02-02 DIAGNOSIS — E78.5 DYSLIPIDEMIA: ICD-10-CM

## 2023-02-02 DIAGNOSIS — E11.49 DIABETES MELLITUS TYPE 2 WITH NEUROLOGICAL MANIFESTATIONS (HCC): ICD-10-CM

## 2023-02-02 DIAGNOSIS — J20.9 ACUTE BRONCHITIS, UNSPECIFIED ORGANISM: Primary | ICD-10-CM

## 2023-02-02 LAB
ALBUMIN SERPL BCP-MCNC: 3.6 G/DL (ref 3.5–5)
ALP SERPL-CCNC: 81 U/L (ref 46–116)
ALT SERPL W P-5'-P-CCNC: 23 U/L (ref 12–78)
ANION GAP SERPL CALCULATED.3IONS-SCNC: 11 MMOL/L (ref 4–13)
AST SERPL W P-5'-P-CCNC: 19 U/L (ref 5–45)
BASOPHILS # BLD AUTO: 0.04 THOUSANDS/ÂΜL (ref 0–0.1)
BASOPHILS NFR BLD AUTO: 0 % (ref 0–1)
BILIRUB SERPL-MCNC: 1.81 MG/DL (ref 0.2–1)
BUN SERPL-MCNC: 18 MG/DL (ref 5–25)
CALCIUM SERPL-MCNC: 9.6 MG/DL (ref 8.3–10.1)
CHLORIDE SERPL-SCNC: 95 MMOL/L (ref 96–108)
CHOLEST SERPL-MCNC: 128 MG/DL
CO2 SERPL-SCNC: 28 MMOL/L (ref 21–32)
CREAT SERPL-MCNC: 1 MG/DL (ref 0.6–1.3)
CREAT UR-MCNC: 212 MG/DL
EOSINOPHIL # BLD AUTO: 0.02 THOUSAND/ÂΜL (ref 0–0.61)
EOSINOPHIL NFR BLD AUTO: 0 % (ref 0–6)
ERYTHROCYTE [DISTWIDTH] IN BLOOD BY AUTOMATED COUNT: 13.2 % (ref 11.6–15.1)
EST. AVERAGE GLUCOSE BLD GHB EST-MCNC: 148 MG/DL
GFR SERPL CREATININE-BSD FRML MDRD: 70 ML/MIN/1.73SQ M
GLUCOSE P FAST SERPL-MCNC: 163 MG/DL (ref 65–99)
HBA1C MFR BLD: 6.8 %
HCT VFR BLD AUTO: 43.2 % (ref 36.5–49.3)
HDLC SERPL-MCNC: 51 MG/DL
HGB BLD-MCNC: 14.6 G/DL (ref 12–17)
IMM GRANULOCYTES # BLD AUTO: 0.04 THOUSAND/UL (ref 0–0.2)
IMM GRANULOCYTES NFR BLD AUTO: 0 % (ref 0–2)
LDLC SERPL CALC-MCNC: 59 MG/DL (ref 0–100)
LYMPHOCYTES # BLD AUTO: 1.02 THOUSANDS/ÂΜL (ref 0.6–4.47)
LYMPHOCYTES NFR BLD AUTO: 9 % (ref 14–44)
MCH RBC QN AUTO: 31.4 PG (ref 26.8–34.3)
MCHC RBC AUTO-ENTMCNC: 33.8 G/DL (ref 31.4–37.4)
MCV RBC AUTO: 93 FL (ref 82–98)
MICROALBUMIN UR-MCNC: 83.1 MG/L (ref 0–20)
MICROALBUMIN/CREAT 24H UR: 39 MG/G CREATININE (ref 0–30)
MONOCYTES # BLD AUTO: 1.02 THOUSAND/ÂΜL (ref 0.17–1.22)
MONOCYTES NFR BLD AUTO: 9 % (ref 4–12)
NEUTROPHILS # BLD AUTO: 9.38 THOUSANDS/ÂΜL (ref 1.85–7.62)
NEUTS SEG NFR BLD AUTO: 82 % (ref 43–75)
NONHDLC SERPL-MCNC: 77 MG/DL
NRBC BLD AUTO-RTO: 0 /100 WBCS
PLATELET # BLD AUTO: 253 THOUSANDS/UL (ref 149–390)
PMV BLD AUTO: 9.7 FL (ref 8.9–12.7)
POTASSIUM SERPL-SCNC: 3.1 MMOL/L (ref 3.5–5.3)
PROT SERPL-MCNC: 7.6 G/DL (ref 6.4–8.4)
RBC # BLD AUTO: 4.65 MILLION/UL (ref 3.88–5.62)
SODIUM SERPL-SCNC: 134 MMOL/L (ref 135–147)
TRIGL SERPL-MCNC: 89 MG/DL
TSH SERPL DL<=0.05 MIU/L-ACNC: 1.56 UIU/ML (ref 0.45–4.5)
WBC # BLD AUTO: 11.52 THOUSAND/UL (ref 4.31–10.16)

## 2023-02-02 RX ORDER — AZITHROMYCIN 250 MG/1
TABLET, FILM COATED ORAL
Qty: 6 TABLET | Refills: 0 | Status: SHIPPED | OUTPATIENT
Start: 2023-02-02 | End: 2023-02-06

## 2023-02-02 RX ORDER — OMEGA-3-ACID ETHYL ESTERS 1 G/1
2 CAPSULE, LIQUID FILLED ORAL 2 TIMES DAILY
COMMUNITY

## 2023-02-02 RX ORDER — ALBUTEROL SULFATE 90 UG/1
2 AEROSOL, METERED RESPIRATORY (INHALATION) EVERY 6 HOURS PRN
Qty: 8.5 G | Refills: 0 | Status: SHIPPED | OUTPATIENT
Start: 2023-02-02

## 2023-02-02 NOTE — PROGRESS NOTES
[address] [facility]        NAME: Ben Miller is a 81 y o  male  : 1940    MRN: [id number]  DATE: 2023  TIME: 11:25 AM    Assessment and Plan   Acute bronchitis, unspecified organism [J20 9]  1  Acute bronchitis, unspecified organism  azithromycin (ZITHROMAX) 250 mg tablet    albuterol (ProAir HFA) 90 mcg/act inhaler      2  Acute cough  XR chest pa & lateral    Cov/Flu-Collected at [facility]    azithromycin (ZITHROMAX) 250 mg tablet    albuterol (ProAir HFA) 90 mcg/act inhaler        Ambulatory SpO2 95-96% on room air  Plan for close follow-up and strict precautions for ED referral  Patient and wife verbalized understanding of same  Patient Instructions     You have been prescribed an antibiotic - take as directed  Use albuterol inhaler as directed  You have a Covid/Flu test pending - you will be notified in 24-48hrs if results are positive  You can also download [employer] for the results  Use over-the-counter plain Mucinex or Robitussin for phlegm relief  Increase oral fluids  Use ibuprofen/Motrin or acetaminophen/Tylenol as needed for fever or pain  Follow up with your PCP in 3-5 days  Go to the ER with any worsening symptoms  Chief Complaint     Chief Complaint   Patient presents with   • Cold Like Symptoms     Pt reports cold like symptoms with productive cough with burning sensation, sob at times with onset of symptoms one week ago  Denies any sinus pressure  Reports nasal congestion  Not currently managing symptoms at home  History of Present Illness       This is an 82yo male who presents with his wife for evaluation of productive cough, post-tussive SOB, and frontal HA x1 week  Reports associated sore throat and PND  Denies fevers, CP, SOB at rest, N/V/D  Managing symptoms conservatively - no OTC treatments tried  Sick contacts include 5yo grandson         Review of Systems   Review of Systems   Constitutional: Negative for chills and fever  HENT: Positive for congestion, postnasal drip and sore throat  Negative for ear pain and sinus pressure  Eyes: Negative for discharge and redness  Respiratory: Positive for cough and shortness of breath (post-tussive)  Negative for wheezing  Cardiovascular: Negative for chest pain  Gastrointestinal: Negative for diarrhea, nausea and vomiting  Genitourinary: Negative for difficulty urinating  Musculoskeletal: Negative for myalgias  Skin: Negative for rash  Neurological: Positive for headaches  Negative for dizziness  Psychiatric/Behavioral: Positive for sleep disturbance           Current Medications       Current Outpatient Medications:   •  albuterol (ProAir HFA) 90 mcg/act inhaler, Inhale 2 puffs every 6 (six) hours as needed for wheezing or shortness of breath, Disp: 8 5 g, Rfl: 0  •  amLODIPine (NORVASC) 5 mg tablet, Take 1 tablet by mouth daily, Disp: , Rfl:   •  ascorbic acid (VITAMIN C) 500 mg tablet, Take 500 mg by mouth daily, Disp: , Rfl:   •  aspirin 81 MG tablet, Take 1 tablet by mouth daily, Disp: , Rfl:   •  atorvastatin (LIPITOR) 20 mg tablet, Take 1 tablet by mouth once daily, Disp: 90 tablet, Rfl: 0  •  azithromycin (ZITHROMAX) 250 mg tablet, Take 2 tablets today then 1 tablet daily x 4 days, Disp: 6 tablet, Rfl: 0  •  B Complex Vitamins (VITAMIN B COMPLEX PO), Take 1 tablet by mouth daily, Disp: , Rfl:   •  Cholecalciferol 1000 units CHEW, Chew 1 tablet daily, Disp: , Rfl:   •  clopidogrel (PLAVIX) 75 mg tablet, Take 1 tablet by mouth daily, Disp: , Rfl:   •  hydrochlorothiazide (HYDRODIURIL) 25 mg tablet, Take 1 tablet by mouth daily, Disp: , Rfl:   •  lisinopril (ZESTRIL) 10 mg tablet, Take 1 tablet by mouth once daily, Disp: 90 tablet, Rfl: 0  •  metFORMIN (GLUCOPHAGE-XR) 500 mg 24 hr tablet, Take 1 tablet by mouth once daily, Disp: 90 tablet, Rfl: 0  •  omega-3-acid ethyl esters (LOVAZA) 1 g capsule, Take 2 g by mouth 2 (two) times a day, Disp: , Rfl:   •  vitamin E, tocopherol, 400 units capsule, Take 1 capsule by mouth daily, Disp: , Rfl:     Current Allergies     Allergies as of 02/02/2023 - Reviewed 02/02/2023   Allergen Reaction Noted   • Losartan Syncope 04/22/2012   • Penicillins Other (See Comments) 06/26/2012            The following portions of the patient's history were reviewed and updated as appropriate: allergies, current medications, past family history, past medical history, past social history, past surgical history and problem list      Past Medical History:   Diagnosis Date   • Hypertension    • Macular degeneration    • Migraines    • Prostate cancer ([facility] )    • Seasonal allergies        Past Surgical History:   Procedure Laterality Date   • CAROTID ENDARTARECTOMY Right     thromboendarterectomy   • CAROTID STENT Right    • CATARACT EXTRACTION Left    • COLONOSCOPY      fiberoptic screening 2/6/08 5 yr / complete 3/15/13 5 yr   • CORONARY ANGIOPLASTY WITH STENT PLACEMENT     • ELBOW SURGERY     • SUPRAPUBIC PROSTATECTOMY  12/06/1999   • UPPER GASTROINTESTINAL ENDOSCOPY         Family History   Problem Relation Age of Onset   • Alcohol abuse Mother    • Heart attack Family         MI   • Breast cancer Family    • No Known Problems Father    • No Known Problems Sister    • Colon cancer Neg Hx    • Colon polyps Neg Hx          Medications have been verified  Objective   /80 (BP Location: Right arm, Patient Position: Sitting)   Pulse 100   Temp 97 5 °F (36 4 °C)   Resp 18   Ht 5' 8" (1 727 m)   Wt 81 2 kg (179 lb)   SpO2 96%   BMI 27 22 kg/m²        Physical Exam     Physical Exam  Vitals and nursing note reviewed  Constitutional:       General: He is not in acute distress  Appearance: He is normal weight  He is ill-appearing  He is not toxic-appearing  HENT:      Head: Normocephalic and atraumatic        Comments: No tenderness to frontal or maxillary sinus     Right Ear: Tympanic membrane, ear canal and external ear normal       Left Ear: Tympanic membrane, ear canal and external ear normal       Nose: Congestion and rhinorrhea present  Mouth/Throat:      Mouth: Mucous membranes are moist       Pharynx: Posterior oropharyngeal erythema present  No oropharyngeal exudate  Eyes:      Conjunctiva/sclera: Conjunctivae normal       Pupils: Pupils are equal, round, and reactive to light  Cardiovascular:      Rate and Rhythm: Normal rate and regular rhythm  Pulses: Normal pulses  Heart sounds: Normal heart sounds  Pulmonary:      Effort: Pulmonary effort is normal  No respiratory distress  Breath sounds: Rhonchi (course) present  Comments: Strong, loose, productive cough  Musculoskeletal:         General: Normal range of motion  Cervical back: Normal range of motion  Lymphadenopathy:      Cervical: No cervical adenopathy  Skin:     General: Skin is warm and dry  Capillary Refill: Capillary refill takes less than 2 seconds  Neurological:      General: No focal deficit present  Mental Status: He is alert and oriented to person, place, and time  Psychiatric:         Mood and Affect: Mood normal          CXR final radiologist interpretation: No acute cardiopulmonary disease

## 2023-02-02 NOTE — PATIENT INSTRUCTIONS
You have been prescribed an antibiotic - take as directed  Use albuterol inhaler as directed  Use over-the-counter plain Mucinex or Robitussin for phlegm relief  Increase oral fluids  Use ibuprofen/Motrin or acetaminophen/Tylenol as needed for fever or pain  Follow up with your PCP  Go to the ER with any worsening symptoms

## 2023-02-03 LAB
FLUAV RNA RESP QL NAA+PROBE: NEGATIVE
FLUBV RNA RESP QL NAA+PROBE: NEGATIVE
SARS-COV-2 RNA RESP QL NAA+PROBE: NEGATIVE

## 2023-02-14 ENCOUNTER — TELEPHONE (OUTPATIENT)
Dept: FAMILY MEDICINE CLINIC | Facility: HOSPITAL | Age: 83
End: 2023-02-14

## 2023-02-14 DIAGNOSIS — H91.93 BILATERAL HEARING LOSS, UNSPECIFIED HEARING LOSS TYPE: Primary | ICD-10-CM

## 2023-02-14 NOTE — TELEPHONE ENCOUNTER
PATIENT HEARING HAS BEEN GETTING WORSE - SHOULD HE SEE ENT OR AUDIOLOGIST? ANY RECOMMENDATIONS?     PCB

## 2023-02-14 NOTE — TELEPHONE ENCOUNTER
Pt unaware of this message, reports he didn't call  He is aware he is hard of hearing but isn't going to do anything about it at this time  Isn't sure if maybe his son called

## 2023-03-08 DIAGNOSIS — E78.5 DYSLIPIDEMIA: ICD-10-CM

## 2023-03-08 RX ORDER — ATORVASTATIN CALCIUM 20 MG/1
TABLET, FILM COATED ORAL
Qty: 90 TABLET | Refills: 0 | Status: SHIPPED | OUTPATIENT
Start: 2023-03-08

## 2023-03-11 DIAGNOSIS — I10 ESSENTIAL HYPERTENSION: ICD-10-CM

## 2023-03-11 RX ORDER — LISINOPRIL 10 MG/1
TABLET ORAL
Qty: 90 TABLET | Refills: 0 | Status: SHIPPED | OUTPATIENT
Start: 2023-03-11

## 2023-03-31 ENCOUNTER — RA CDI HCC (OUTPATIENT)
Dept: OTHER | Facility: HOSPITAL | Age: 83
End: 2023-03-31

## 2023-03-31 NOTE — PROGRESS NOTES
I73 9  E11 42  [facility]  coding opportunities          Chart Reviewed number of suggestions sent to Provider: 2     Patients Insurance     Medicare Insurance: Medicare

## 2023-04-07 ENCOUNTER — OFFICE VISIT (OUTPATIENT)
Dept: FAMILY MEDICINE CLINIC | Facility: HOSPITAL | Age: 83
End: 2023-04-07

## 2023-04-07 ENCOUNTER — TELEPHONE (OUTPATIENT)
Dept: FAMILY MEDICINE CLINIC | Facility: HOSPITAL | Age: 83
End: 2023-04-07

## 2023-04-07 VITALS
HEIGHT: 68 IN | HEART RATE: 75 BPM | SYSTOLIC BLOOD PRESSURE: 116 MMHG | BODY MASS INDEX: 27.28 KG/M2 | OXYGEN SATURATION: 98 % | WEIGHT: 180 LBS | DIASTOLIC BLOOD PRESSURE: 68 MMHG | TEMPERATURE: 98.2 F

## 2023-04-07 DIAGNOSIS — E11.49 DIABETES MELLITUS TYPE 2 WITH NEUROLOGICAL MANIFESTATIONS (HCC): ICD-10-CM

## 2023-04-07 DIAGNOSIS — I65.22 ASYMPTOMATIC STENOSIS OF LEFT CAROTID ARTERY: ICD-10-CM

## 2023-04-07 DIAGNOSIS — H25.9 AGE-RELATED CATARACT OF BOTH EYES, UNSPECIFIED AGE-RELATED CATARACT TYPE: ICD-10-CM

## 2023-04-07 DIAGNOSIS — I25.10 CORONARY ARTERY DISEASE INVOLVING NATIVE CORONARY ARTERY OF NATIVE HEART WITHOUT ANGINA PECTORIS: ICD-10-CM

## 2023-04-07 DIAGNOSIS — E66.3 OVERWEIGHT (BMI 25.0-29.9): ICD-10-CM

## 2023-04-07 DIAGNOSIS — E87.6 HYPOKALEMIA: ICD-10-CM

## 2023-04-07 DIAGNOSIS — I10 PRIMARY HYPERTENSION: ICD-10-CM

## 2023-04-07 DIAGNOSIS — Z01.818 PREOPERATIVE CLEARANCE: Primary | ICD-10-CM

## 2023-04-07 DIAGNOSIS — I74.3 EMBOLISM AND THROMBOSIS OF ARTERIES OF THE LOWER EXTREMITIES (HCC): ICD-10-CM

## 2023-04-07 NOTE — PROGRESS NOTES
Subjective:     Whit Lobo is a 81 y o  male who presents to the office today for a preoperative consultation at the request of surgeon Dr Tona Doyle who plans on performing R cataract surgery on April 21  Prior anesthesia adverse reactions - None    Exercise capacity - Able to walk 4 blocks or climb 2 flights of stairs without symptoms - Yes    Easy bleeding/bruising - No    Chest pain/palpitation/edema - No    Dyspnea/wheezing/cough - No    Sleep apnea/COPD/asthma - No    HPI Pt with progressive cataract of R eye resulting in blurry and fuzzy vision  He is diabetic  He is only on Metformin  He does not check his sugars at home  He notes  Numbness/tingling in his feet but it is not new/worse  He notes no sores/ulcers  BW from 2/23 reviewed with pt in detail    Vascular at [address] does his carotid US  Pt states he does not want US of LE done as he wouldn't want surgery      Past Medical History:   Diagnosis Date   • Allergic 1949   • Cancer ([facility] )    • Diabetes mellitus ([facility] 75 )    • Diverticulitis of colon    • Hypertension    • Macular degeneration    • Migraines    • Myocardial infarction [facility])    • Prostate cancer ([facility] 75 )    • Seasonal allergies    • Visual impairment        Past Surgical History:   Procedure Laterality Date   • CARDIAC SURGERY     • CAROTID ENDARTARECTOMY Right     thromboendarterectomy   • CAROTID STENT Right    • CATARACT EXTRACTION Left    • COLONOSCOPY      fiberoptic screening 2/6/08 5 yr / complete 3/15/13 5 yr   • CORONARY ANGIOPLASTY WITH STENT PLACEMENT     • ELBOW SURGERY     • EYE SURGERY     • KNEE SURGERY     • PROSTATE SURGERY     • SUPRAPUBIC PROSTATECTOMY  12/06/1999   • UPPER GASTROINTESTINAL ENDOSCOPY         Family History   Problem Relation Age of Onset   • Alcohol abuse Mother    • Heart attack Family         MI   • Breast cancer Family    • No Known Problems Father    • No Known Problems Sister    • Substance Abuse Son    • Colon cancer Neg Hx    • Colon polyps Neg Hx Social History     Tobacco Use   • Smoking status: Former     Packs/day: 3 00     Years: 10 00     Pack years: 30 00     Types: Cigarettes     Quit date: 2010     Years since quittin 2   • Smokeless tobacco: Never   Vaping Use   • Vaping Use: Never used   Substance Use Topics   • Alcohol use: Not Currently     Comment: rare glass of wine   • Drug use: Never       Current Outpatient Medications   Medication Sig Dispense Refill   • albuterol (ProAir HFA) 90 mcg/act inhaler Inhale 2 puffs every 6 (six) hours as needed for wheezing or shortness of breath 8 5 g 0   • amLODIPine (NORVASC) 5 mg tablet Take 1 tablet by mouth daily     • ascorbic acid (VITAMIN C) 500 mg tablet Take 500 mg by mouth daily     • aspirin 81 MG tablet Take 1 tablet by mouth daily     • atorvastatin (LIPITOR) 20 mg tablet Take 1 tablet by mouth once daily 90 tablet 0   • B Complex Vitamins (VITAMIN B COMPLEX PO) Take 1 tablet by mouth daily     • Cholecalciferol 1000 units CHEW Chew 1 tablet daily     • clopidogrel (PLAVIX) 75 mg tablet Take 1 tablet by mouth daily     • hydrochlorothiazide (HYDRODIURIL) 25 mg tablet Take 1 tablet by mouth daily     • lisinopril (ZESTRIL) 10 mg tablet Take 1 tablet by mouth once daily 90 tablet 0   • metFORMIN (GLUCOPHAGE-XR) 500 mg 24 hr tablet Take 1 tablet by mouth once daily 90 tablet 0   • omega-3-acid ethyl esters (LOVAZA) 1 g capsule Take 2 g by mouth 2 (two) times a day     • vitamin E, tocopherol, 400 units capsule Take 1 capsule by mouth daily       No current facility-administered medications for this visit  Allergies:  Losartan (passed out) and Penicillins (unknown reaction)      Review of Systems  Review of Systems   Constitutional: Negative for chills and fever  HENT: Negative for congestion and sore throat  Eyes: Positive for visual disturbance  Negative for pain  Respiratory: Negative for cough, shortness of breath and wheezing      Cardiovascular: Negative for chest pain, "palpitations and leg swelling  Gastrointestinal: Negative for abdominal pain, blood in stool, constipation, diarrhea, nausea and vomiting  Genitourinary: Negative for difficulty urinating and dysuria  Musculoskeletal: Negative for arthralgias and myalgias  Skin: Negative for rash and wound  Neurological: Negative for dizziness and headaches  Hematological: Does not bruise/bleed easily  Psychiatric/Behavioral: Negative for confusion and dysphoric mood  Objective:    /68   Pulse 75   Temp 98 2 °F (36 8 °C)   Ht 5' 8\" (1 727 m)   Wt 81 6 kg (180 lb)   SpO2 98%   BMI 27 37 kg/m²     Physical Exam  Vitals and nursing note reviewed  Constitutional:       General: He is not in acute distress  Appearance: He is well-developed  He is not ill-appearing  HENT:      Head: Normocephalic and atraumatic  Right Ear: Tympanic membrane and external ear normal  There is no impacted cerumen  Left Ear: Tympanic membrane and external ear normal  There is no impacted cerumen  Eyes:      General:         Right eye: No discharge  Left eye: No discharge  Conjunctiva/sclera: Conjunctivae normal    Neck:      Vascular: Carotid bruit present  Trachea: No tracheal deviation  Cardiovascular:      Rate and Rhythm: Normal rate and regular rhythm  Pulses: Pulses are weak  Dorsalis pedis pulses are 0 on the right side and 0 on the left side  Heart sounds: Normal heart sounds  No murmur heard  Pulmonary:      Effort: Pulmonary effort is normal  No respiratory distress  Breath sounds: Normal breath sounds  No wheezing, rhonchi or rales  Abdominal:      General: Bowel sounds are normal  There is no distension  Palpations: Abdomen is soft  Tenderness: There is no abdominal tenderness  There is no guarding or rebound  Musculoskeletal:         General: No deformity or signs of injury  Normal range of motion  Cervical back: Neck supple   " Feet:      Right foot:      Skin integrity: Dry skin present  No ulcer, skin breakdown, erythema, warmth or callus  Left foot:      Skin integrity: Dry skin present  No ulcer, skin breakdown, erythema, warmth or callus  Lymphadenopathy:      Cervical: No cervical adenopathy  Skin:     General: Skin is warm and dry  Coloration: Skin is not pale  Findings: No bruising or rash  Neurological:      General: No focal deficit present  Mental Status: He is alert  Mental status is at baseline  Motor: No abnormal muscle tone  Psychiatric:         Mood and Affect: Mood normal          Behavior: Behavior normal          Thought Content: Thought content normal          Judgment: Judgment normal       Diabetic Foot Exam    Patient's shoes and socks removed  Right Foot/Ankle   Right Foot Inspection  Skin Exam: skin normal, skin intact and dry skin  No warmth, no callus, no erythema, no maceration, no abnormal color, no pre-ulcer, no ulcer and no callus  Toe Exam: ROM and strength within normal limits  Sensory   Vibration: intact  Monofilament testing: diminished    Vascular  The right DP pulse is 0  Left Foot/Ankle  Left Foot Inspection  Skin Exam: skin normal, skin intact and dry skin  No warmth, no erythema, no maceration, normal color, no pre-ulcer, no ulcer and no callus  Toe Exam: ROM and strength within normal limits  Sensory   Vibration: intact  Monofilament testing: diminished    Vascular  The left DP pulse is 0       Assign Risk Category  No deformity present  Loss of protective sensation  Weak pulses  Risk: 2    Cardiographics  ECG: POCT ECG in office today 4/7/23: NSR @ 71 bpm, LAD, 1st degree AV block, Qtc borderline at 441, NS ST/T wave changes, no acute ischemia    Imaging  Chest x-ray: not indicated    Lab Review   Recent labs: 2/2/23: A1C 6 8    Assessment:    Patient Active Problem List   Diagnosis   • Adrenal cortical adenoma   • Coronary artery disease involving native coronary artery of native heart without angina pectoris   • 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)   • Diabetic polyneuropathy (HCC)   • Diastolic dysfunction   • Dyslipidemia   • Recurrent stenosis of right carotid artery   • Hypertension   • Lower back pain   • Peripheral vascular disease (HCC)   • Sinus bradycardia   • Asymptomatic stenosis of left carotid artery   • Plantar fasciitis of right foot   • Personal history of colonic polyps   • Schatzki's ring   • Dysphagia   • Antiplatelet or antithrombotic long-term use   • 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HCC)        Plan:     Surgical Clearance - Cleared    Risk - Pt acceptable risk for low risk procedure    Discussion/Summary:   (1) Preoperative medical clearance - pt acceptable risk for low risk surgery, ECG in office today and BW reviewed, no further w/u is needed - pt is cleared for surgery, call with any changes in health btw now and procedure    (2) Cataracts - for R cataract surgery with Dr Mortimer Flies    (3) DM type 2 with polyneuropathy - controlled with A1C 6 8 - urged low sugar/carb diet and keep active, foot exam done today, eye exam 1/22 - 2 yrs, on an ACE and a statin, repeat BW in 6 mos - BW order given    (4) CAD - ECG done in office today, no acute ischemia noted, on ASA/statin/ACE/Plavix, call with CV symptoms    (5) HTN - BP at goal, cont current meds    (6) Embolism and thrombosis of arteries of lower extremity - dec pulses B/L and dec hair on B/L LE, pt deferring LEAD as would not want surgery, has quit smoking, on statin/ASA/Plavix, con't f/u with vascular and our office - call with sores/ulcers/new or worse pain in LE    (7) Asymptomatic L carotid artery stenosis - CUS done 8/22 with vascular, on statin/ASA/Plavix, to ED with stroke/TIA symptoms    (8) Overweight - healthy diet/regular exercise/healthy wgt encouraged    (9) Hypokalemia - recheck CMP later this month    BMI Counseling: Body mass index is 27 37 kg/m²  The BMI is above normal  Nutrition recommendations include decreasing portion sizes, encouraging healthy choices of fruits and vegetables, consuming healthier snacks, moderation in carbohydrate intake, increasing intake of lean protein, reducing intake of saturated and trans fat and reducing intake of cholesterol  Exercise recommendations include exercising 3-5 times per week  No pharmacotherapy was ordered  Rationale for BMI follow-up plan is due to patient being overweight or obese  Depression Screening and Follow-up Plan: Patient was screened for depression during today's encounter  They screened negative with a PHQ-2 score of 0           DM labs 1/23 (A1C 6 8)  DM foot exam 1/22 - done today  DM eye exam 1/22 - 2 yrs    Brendan Ashford DO

## 2023-06-10 DIAGNOSIS — I10 ESSENTIAL HYPERTENSION: ICD-10-CM

## 2023-06-10 RX ORDER — LISINOPRIL 10 MG/1
TABLET ORAL
Qty: 90 TABLET | Refills: 0 | Status: SHIPPED | OUTPATIENT
Start: 2023-06-10

## 2023-07-15 DIAGNOSIS — E11.49 DIABETES MELLITUS TYPE 2 WITH NEUROLOGICAL MANIFESTATIONS (HCC): ICD-10-CM

## 2023-07-15 RX ORDER — METFORMIN HYDROCHLORIDE 500 MG/1
TABLET, EXTENDED RELEASE ORAL
Qty: 90 TABLET | Refills: 0 | Status: SHIPPED | OUTPATIENT
Start: 2023-07-15

## 2023-08-16 ENCOUNTER — APPOINTMENT (OUTPATIENT)
Dept: LAB | Facility: HOSPITAL | Age: 83
End: 2023-08-16
Payer: MEDICARE

## 2023-08-16 DIAGNOSIS — E11.49 DIABETES MELLITUS TYPE 2 WITH NEUROLOGICAL MANIFESTATIONS (HCC): ICD-10-CM

## 2023-08-16 LAB
ALBUMIN SERPL BCP-MCNC: 3.9 G/DL (ref 3.5–5)
ALP SERPL-CCNC: 68 U/L (ref 46–116)
ALT SERPL W P-5'-P-CCNC: 20 U/L (ref 12–78)
ANION GAP SERPL CALCULATED.3IONS-SCNC: 6 MMOL/L
AST SERPL W P-5'-P-CCNC: 10 U/L (ref 5–45)
BILIRUB SERPL-MCNC: 0.96 MG/DL (ref 0.2–1)
BUN SERPL-MCNC: 16 MG/DL (ref 5–25)
CALCIUM SERPL-MCNC: 9.6 MG/DL (ref 8.3–10.1)
CHLORIDE SERPL-SCNC: 104 MMOL/L (ref 96–108)
CO2 SERPL-SCNC: 30 MMOL/L (ref 21–32)
CREAT SERPL-MCNC: 0.92 MG/DL (ref 0.6–1.3)
ERYTHROCYTE [DISTWIDTH] IN BLOOD BY AUTOMATED COUNT: 13.1 % (ref 11.6–15.1)
EST. AVERAGE GLUCOSE BLD GHB EST-MCNC: 148 MG/DL
GFR SERPL CREATININE-BSD FRML MDRD: 77 ML/MIN/1.73SQ M
GLUCOSE P FAST SERPL-MCNC: 130 MG/DL (ref 65–99)
HBA1C MFR BLD: 6.8 %
HCT VFR BLD AUTO: 46.6 % (ref 36.5–49.3)
HGB BLD-MCNC: 15.9 G/DL (ref 12–17)
MCH RBC QN AUTO: 31.9 PG (ref 26.8–34.3)
MCHC RBC AUTO-ENTMCNC: 34.1 G/DL (ref 31.4–37.4)
MCV RBC AUTO: 94 FL (ref 82–98)
PLATELET # BLD AUTO: 246 THOUSANDS/UL (ref 149–390)
PMV BLD AUTO: 9.9 FL (ref 8.9–12.7)
POTASSIUM SERPL-SCNC: 4 MMOL/L (ref 3.5–5.3)
PROT SERPL-MCNC: 7 G/DL (ref 6.4–8.4)
RBC # BLD AUTO: 4.98 MILLION/UL (ref 3.88–5.62)
SODIUM SERPL-SCNC: 140 MMOL/L (ref 135–147)
WBC # BLD AUTO: 6.9 THOUSAND/UL (ref 4.31–10.16)

## 2023-08-16 PROCEDURE — 80053 COMPREHEN METABOLIC PANEL: CPT

## 2023-08-16 PROCEDURE — 36415 COLL VENOUS BLD VENIPUNCTURE: CPT

## 2023-08-16 PROCEDURE — 85027 COMPLETE CBC AUTOMATED: CPT

## 2023-08-16 PROCEDURE — 83036 HEMOGLOBIN GLYCOSYLATED A1C: CPT

## 2023-09-05 ENCOUNTER — RA CDI HCC (OUTPATIENT)
Dept: OTHER | Facility: HOSPITAL | Age: 83
End: 2023-09-05

## 2023-09-05 NOTE — PROGRESS NOTES
I73.9  E11.42  [address] [facility] coding opportunities          Chart Reviewed number of suggestions sent to Provider: 2     Patients Insurance     Medicare Insurance: Medicare

## 2023-09-07 ENCOUNTER — OFFICE VISIT (OUTPATIENT)
Dept: FAMILY MEDICINE CLINIC | Facility: HOSPITAL | Age: 83
End: 2023-09-07
Payer: MEDICARE

## 2023-09-07 ENCOUNTER — PATIENT OUTREACH (OUTPATIENT)
Dept: FAMILY MEDICINE CLINIC | Facility: HOSPITAL | Age: 83
End: 2023-09-07

## 2023-09-07 VITALS
WEIGHT: 183.4 LBS | DIASTOLIC BLOOD PRESSURE: 88 MMHG | TEMPERATURE: 97.3 F | SYSTOLIC BLOOD PRESSURE: 112 MMHG | HEART RATE: 72 BPM | HEIGHT: 68 IN | BODY MASS INDEX: 27.8 KG/M2

## 2023-09-07 DIAGNOSIS — I73.9 PERIPHERAL VASCULAR DISEASE (HCC): ICD-10-CM

## 2023-09-07 DIAGNOSIS — Z59.9 FINANCIAL DIFFICULTIES: ICD-10-CM

## 2023-09-07 DIAGNOSIS — I10 PRIMARY HYPERTENSION: ICD-10-CM

## 2023-09-07 DIAGNOSIS — E11.42 DIABETIC POLYNEUROPATHY ASSOCIATED WITH TYPE 2 DIABETES MELLITUS (HCC): ICD-10-CM

## 2023-09-07 DIAGNOSIS — E11.49 DIABETES MELLITUS TYPE 2 WITH NEUROLOGICAL MANIFESTATIONS (HCC): Primary | ICD-10-CM

## 2023-09-07 DIAGNOSIS — I65.22 ASYMPTOMATIC STENOSIS OF LEFT CAROTID ARTERY: ICD-10-CM

## 2023-09-07 DIAGNOSIS — Z00.00 MEDICARE ANNUAL WELLNESS VISIT, SUBSEQUENT: ICD-10-CM

## 2023-09-07 DIAGNOSIS — E78.5 DYSLIPIDEMIA: ICD-10-CM

## 2023-09-07 PROCEDURE — 99214 OFFICE O/P EST MOD 30 MIN: CPT | Performed by: INTERNAL MEDICINE

## 2023-09-07 PROCEDURE — G0439 PPPS, SUBSEQ VISIT: HCPCS | Performed by: INTERNAL MEDICINE

## 2023-09-07 RX ORDER — ATORVASTATIN CALCIUM 20 MG/1
20 TABLET, FILM COATED ORAL DAILY
Qty: 90 TABLET | Refills: 2 | Status: SHIPPED | OUTPATIENT
Start: 2023-09-07

## 2023-09-07 SDOH — ECONOMIC STABILITY - INCOME SECURITY: PROBLEM RELATED TO HOUSING AND ECONOMIC CIRCUMSTANCES, UNSPECIFIED: Z59.9

## 2023-09-07 NOTE — ASSESSMENT & PLAN NOTE
Lab Results   Component Value Date    HGBA1C 6.8 (H) 08/16/2023   DM type 2 with polyneuropathy - controlled with A1C 6.8 - urged to watch sugars/carbs and keep active, cont current Metformin, recheck BW in 6 mos - BW order given, foot exam done today, eye exam 1/22 2 yrs and foot exam 4/23, he is on an ACE and statin and ASA, will follow

## 2023-09-07 NOTE — ASSESSMENT & PLAN NOTE
PVD symptoms reviewed as was risk for ulcers/infection with DM, urged to avoid walking barefoot and to ensure he checks his feet daily, call with sores/ulcers/pain

## 2023-09-07 NOTE — PROGRESS NOTES
Assessment and Plan:     Problem List Items Addressed This Visit        Endocrine    Diabetes mellitus type 2 with neurological manifestations ([address]) - Primary       Lab Results   Component Value Date    HGBA1C 6.8 (H) 08/16/2023   DM type 2 with polyneuropathy - controlled with A1C 6.8 - urged to watch sugars/carbs and keep active, cont current Metformin, recheck BW in 6 mos - BW order given, foot exam done today, eye exam 1/22 2 yrs and foot exam 4/23, he is on an ACE and statin and ASA, will follow         Relevant Orders    CBC and differential    Comprehensive metabolic panel    Hemoglobin A1C    Lipid panel    TSH, 3rd generation with Free T4 reflex    Albumin / creatinine urine ratio    Diabetic polyneuropathy ([address])     Foot exam done today, urged not to walk barefoot and to check feet daily  Lab Results   Component Value Date    HGBA1C 6.8 (H) 08/16/2023            Relevant Orders    CBC and differential    Comprehensive metabolic panel    Hemoglobin A1C    Lipid panel    TSH, 3rd generation with Free T4 reflex    Albumin / creatinine urine ratio       Cardiovascular and Mediastinum    Hypertension     BP at goal, con't current meds, recheck in 6 mos         Relevant Orders    CBC and differential    Comprehensive metabolic panel    Hemoglobin A1C    Lipid panel    TSH, 3rd generation with Free T4 reflex    Albumin / creatinine urine ratio    Peripheral vascular disease (HCC)     PVD symptoms reviewed as was risk for ulcers/infection with DM, urged to avoid walking barefoot and to ensure he checks his feet daily, call with sores/ulcers/pain         Asymptomatic stenosis of left carotid artery     Just saw vascular PA and L carotid dz stable, con't annual f/u and CUS, on ASA/Plavix and statin, to ED with stroke/TIA symptoms         Relevant Orders    CBC and differential    Comprehensive metabolic panel    Hemoglobin A1C    Lipid panel    TSH, 3rd generation with Free T4 reflex    Albumin / creatinine urine ratio       Other    Dyslipidemia     FLP annually - order for 6 mos given, con't current statin for now, healthy diet and regular exercise encouraged          Relevant Medications    atorvastatin (LIPITOR) 20 mg tablet    Other Relevant Orders    CBC and differential    Comprehensive metabolic panel    Hemoglobin A1C    Lipid panel    TSH, 3rd generation with Free T4 reflex    Albumin / creatinine urine ratio   Other Visit Diagnoses     Medicare annual wellness visit, subsequent        Financial difficulties        Pt open to social work reaching out to him to see if he qualifies for any further assistance    Relevant Orders    Ambulatory referral to social work care management program          Depression Screening and Follow-up Plan: Patient was screened for depression during today's encounter. They screened negative with a PHQ-2 score of 0. Preventive health issues were discussed with patient, and age appropriate screening tests were ordered as noted in patient's After Visit Summary. CUS 8/8/23    BW 8/23    DM foot exam 9/23  DM eye exam 1/22 - 2 yrs  DM labs 8/23 (6.8)    Flu vaccine recommended later in Sept/early Oct      Personalized health advice and appropriate referrals for health education or preventive services given if needed, as noted in patient's After Visit Summary. History of Present Illness:     Patient presents for a Medicare Wellness Visit    HPI Pt here for follow up appt/BW results and AWV    BW results were d/w pt in detail: FBS/A1c 130/6.8, rest of BMP and CBC were wnl. Def of controlled vs uncontrolled DM was reviewed. Diet was reviewed - wgt down 8 lbs from Sept 22. He admits he does not follow a strict diet and likes his sweets. He does no formal exercise but is active around the house/yard. He is taking his DM meds as directed. He is UTD on DM foot exam (4/23) and eye exam (1/22 - 2 yrs). He notes no increase in numbness/tingling and denies pain/sores/ulcers.    He is on statin and ACE. Pt saw Bello SHEFFIELD at [facility] 8/8/23 - OV note reviewed Franco Monreal) for f/u carotid dz. He has known R carotid stent and 50-69% stenosis L carotid. He was told to repeat CUS and f/u in 1 yr. He is on a statin and Asa and Plavix. BP at goal today and meds were reviewed and no changes have occurred. He denies missing doses of meds or SE with the meds. He does not check his BP outside the office. He notes no frequent Ha's/dizziness/double vision/CP. Patient Care Team:  Inocencio Gallardo DO as PCP - Kell Jaquez MD (Cardiology)  Sally Bishop MD (General Surgery)  Franco Monreal PA-C (Vascular Surgery)     Review of Systems:     Review of Systems   Constitutional: Negative for chills and fever. HENT: Positive for trouble swallowing. Negative for congestion and hearing loss. Eyes: Negative for pain and visual disturbance. Respiratory: Negative for cough and shortness of breath. Cardiovascular: Negative for chest pain, palpitations and leg swelling. Gastrointestinal: Negative for abdominal pain, blood in stool, constipation, diarrhea, nausea and vomiting. Endocrine: Negative for polyuria. Genitourinary: Negative for difficulty urinating, dysuria and hematuria. Musculoskeletal: Negative for arthralgias and myalgias. Skin: Negative for rash and wound. Neurological: Negative for dizziness and headaches. Hematological: Does not bruise/bleed easily. Psychiatric/Behavioral: Negative for confusion, dysphoric mood and sleep disturbance.         Problem List:     Patient Active Problem List   Diagnosis   • Adrenal cortical adenoma   • Coronary artery disease involving native coronary artery of native heart without angina pectoris   • 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)   • Diabetic polyneuropathy (HCC)   • Diastolic dysfunction   • Dyslipidemia   • Recurrent stenosis of right carotid artery   • Hypertension   • Lower back pain   • Peripheral vascular disease (HCC)   • Sinus bradycardia   • Asymptomatic stenosis of left carotid artery   • Plantar fasciitis of right foot   • Personal history of colonic polyps   • Schatzki's ring   • Dysphagia   • Antiplatelet or antithrombotic long-term use   • 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HCC)      Past Medical and Surgical History:     Past Medical History:   Diagnosis Date   • Diverticulitis of colon    • Hypertension    • Macular degeneration    • Migraines    • Myocardial infarction ([address])    • Prostate cancer ([address])    • Seasonal allergies      Past Surgical History:   Procedure Laterality Date   • CARDIAC SURGERY     • CAROTID ENDARTARECTOMY Right     thromboendarterectomy   • CAROTID STENT Right    • CATARACT EXTRACTION Left    • COLONOSCOPY      fiberoptic screening 08 5 yr / complete 3/15/13 5 yr   • CORONARY ANGIOPLASTY WITH STENT PLACEMENT     • ELBOW SURGERY     • EYE SURGERY     • KNEE SURGERY     • PROSTATE SURGERY     • SUPRAPUBIC PROSTATECTOMY  1999   • UPPER GASTROINTESTINAL ENDOSCOPY        Family History:     Family History   Problem Relation Age of Onset   • Alcohol abuse Mother    • Heart attack Family         MI   • Breast cancer Family    • No Known Problems Father    • No Known Problems Sister    • Substance Abuse Son    • Colon cancer Neg Hx    • Colon polyps Neg Hx       Social History:     Social History     Socioeconomic History   • Marital status: /Civil Union     Spouse name: None   • Number of children: None   • Years of education: None   • Highest education level: None   Occupational History   • Occupation: fulltime   Tobacco Use   • Smoking status: Former     Packs/day: 3.00     Years: 10.00     Total pack years: 30.00     Types: Cigarettes     Quit date: 2010     Years since quittin.6   • Smokeless tobacco: Never   Vaping Use   • Vaping Use: Never used   Substance and Sexual Activity   • Alcohol use: Not Currently     Comment: rare glass of wine   • Drug use: Never   • Sexual activity: None   Other Topics Concern   • None   Social History Narrative    Daily coffee consumption: 4 cp/day    Daily tea consumption: 1 cp/day     Social Determinants of Health     Financial Resource Strain: Medium Risk (8/31/2023)    Overall Financial Resource Strain (CARDIA)    • Difficulty of Paying Living Expenses: Somewhat hard   Food Insecurity: Not on file   Transportation Needs: Unknown (8/31/2023)    PRAPARE - Transportation    • Lack of Transportation (Medical): Not on file    • Lack of Transportation (Non-Medical): No   Physical Activity: Not on file   Stress: Not on file   Social Connections: Not on file   Intimate Partner Violence: Not on file   Housing Stability: Not on file      Medications and Allergies:     Current Outpatient Medications   Medication Sig Dispense Refill   • atorvastatin (LIPITOR) 20 mg tablet Take 1 tablet (20 mg total) by mouth daily 90 tablet 2   • amLODIPine (NORVASC) 5 mg tablet Take 1 tablet by mouth daily     • ascorbic acid (VITAMIN C) 500 mg tablet Take 500 mg by mouth daily     • aspirin 81 MG tablet Take 1 tablet by mouth daily     • B Complex Vitamins (VITAMIN B COMPLEX PO) Take 1 tablet by mouth daily     • Cholecalciferol 1000 units CHEW Chew 1 tablet daily     • clopidogrel (PLAVIX) 75 mg tablet Take 1 tablet by mouth daily     • hydrochlorothiazide (HYDRODIURIL) 25 mg tablet Take 1 tablet by mouth daily     • lisinopril (ZESTRIL) 10 mg tablet Take 1 tablet by mouth once daily 90 tablet 0   • metFORMIN (GLUCOPHAGE-XR) 500 mg 24 hr tablet Take 1 tablet by mouth once daily 90 tablet 0   • omega-3-acid ethyl esters (LOVAZA) 1 g capsule Take 2 g by mouth 2 (two) times a day     • vitamin E, tocopherol, 400 units capsule Take 1 capsule by mouth daily       No current facility-administered medications for this visit.      Allergies   Allergen Reactions   • Losartan Syncope     Reaction Date: [id number];    • Penicillins Other (See Comments)     As a child so does not recall reaction      Immunizations:     Immunization History   Administered Date(s) Administered   • COVID-19 PFIZER VACCINE 0.3 ML IM 05/08/2021, 05/30/2021, 01/05/2022   • INFLUENZA 02/11/2005, 01/27/2022   • Influenza Split High Dose Preservative Free IM 09/13/2012, 09/16/2013, 12/22/2014, 08/31/2015, 10/07/2016, 11/27/2017   • Influenza, high dose seasonal 0.7 mL 02/01/2019, 11/04/2019, 10/05/2020, 01/27/2022   • Pneumococcal Conjugate 13-Valent 03/01/2016   • Pneumococcal Polysaccharide PPV23 12/01/1995, 08/07/2014      Health Maintenance: There are no preventive care reminders to display for this patient. Topic Date Due   • COVID-19 Vaccine (4 - Pfizer series) 03/02/2022   • Influenza Vaccine (1) 09/01/2023      Medicare Screening Tests and Risk Assessments:     Eve Evans is here for his Subsequent Wellness visit. Last Medicare Wellness visit information reviewed, patient interviewed and updates made to the record today. Health Risk Assessment:   Patient rates overall health as good. Patient feels that their physical health rating is slightly better. Patient is satisfied with their life. Eyesight was rated as same. Hearing was rated as same. Patient feels that their emotional and mental health rating is slightly better. Patients states they are sometimes angry. Patient states they are never, rarely unusually tired/fatigued. Pain experienced in the last 7 days has been none. Patient states that he has experienced no weight loss or gain in last 6 months. Depression Screening:   PHQ-2 Score: 0      Fall Risk Screening: In the past year, patient has experienced: no history of falling in past year      Home Safety:  Patient does not have trouble with stairs inside or outside of their home. Patient has working smoke alarms and has working carbon monoxide detector. Home safety hazards include: none.      Nutrition:   Current diet is Regular. Medications:   Patient is currently taking over-the-counter supplements. OTC medications include: zinc. Patient is able to manage medications. Activities of Daily Living (ADLs)/Instrumental Activities of Daily Living (IADLs):   Walk and transfer into and out of bed and chair?: Yes  Dress and groom yourself?: Yes    Bathe or shower yourself?: Yes    Feed yourself? Yes  Do your laundry/housekeeping?: Yes  Manage your money, pay your bills and track your expenses?: Yes  Make your own meals?: Yes    Do your own shopping?: Yes    Previous Hospitalizations:   Any hospitalizations or ED visits within the last 12 months?: No      Advance Care Planning:   Living will: Yes    Durable POA for healthcare:  Yes    Advanced directive: Yes      Cognitive Screening:   Provider or family/friend/caregiver concerned regarding cognition?: No    PREVENTIVE SCREENINGS      Cardiovascular Screening:    General: Screening Current, Risks and Benefits Discussed and History Lipid Disorder      Diabetes Screening:     General: History Diabetes, Screening Current and Risks and Benefits Discussed      Colorectal Cancer Screening:     General: Risks and Benefits Discussed and Screening Not Indicated      Prostate Cancer Screening:    General: History Prostate Cancer, Screening Not Indicated and Risks and Benefits Discussed      Osteoporosis Screening:    General: Risks and Benefits Discussed and Screening Not Indicated      Abdominal Aortic Aneurysm (AAA) Screening:    Risk factors include: tobacco use        General: Risks and Benefits Discussed and Screening Current      Lung Cancer Screening:     General: Screening Not Indicated and Risks and Benefits Discussed      Hepatitis C Screening:    General: Risks and Benefits Discussed and Screening Not Indicated    Screening, Brief Intervention, and Referral to Treatment (SBIRT)    Screening  Typical number of drinks in a day: 0  Typical number of drinks in a week: 0  Interpretation: Low risk drinking behavior. AUDIT-C Screenin) How often did you have a drink containing alcohol in the past year? never  2) How many drinks did you have on a typical day when you were drinking in the past year? 0  3) How often did you have 6 or more drinks on one occasion in the past year? never    AUDIT-C Score: 0  Interpretation: Score 0-3 (male): Negative screen for alcohol misuse    Single Item Drug Screening:  How often have you used an illegal drug (including marijuana) or a prescription medication for non-medical reasons in the past year? never    Single Item Drug Screen Score: 0  Interpretation: Negative screen for possible drug use disorder    Other Counseling Topics:   Car/seat belt/driving safety and regular weightbearing exercise. Vision Screening    Right eye Left eye Both eyes   Without correction 20/50 20/200 20/30   With correction           Physical Exam:     /88   Pulse 72   Temp (!) 97.3 °F (36.3 °C) (Tympanic)   Ht 5' 8" (1.727 m)   Wt 83.2 kg (183 lb 6.4 oz)   BMI 27.89 kg/m²     Physical Exam  Vitals and nursing note reviewed. Constitutional:       General: He is not in acute distress. Appearance: He is well-developed. He is not ill-appearing. HENT:      Head: Normocephalic and atraumatic. Right Ear: Tympanic membrane and external ear normal.      Left Ear: Tympanic membrane and external ear normal.   Eyes:      General:         Right eye: No discharge. Left eye: No discharge. Conjunctiva/sclera: Conjunctivae normal.   Neck:      Vascular: Carotid bruit present. Trachea: No tracheal deviation. Comments: L carotid bruits  Cardiovascular:      Rate and Rhythm: Normal rate and regular rhythm. Pulses: Pulses are weak. Dorsalis pedis pulses are 0 on the right side and 0 on the left side. Heart sounds: Murmur heard. Pulmonary:      Effort: Pulmonary effort is normal. No respiratory distress. Breath sounds: Normal breath sounds. No wheezing or rhonchi. Abdominal:      General: There is no distension. Palpations: Abdomen is soft. Tenderness: There is no abdominal tenderness. There is no guarding or rebound. Musculoskeletal:      Cervical back: Neck supple. Right lower leg: No edema. Left lower leg: No edema. Feet:      Right foot:      Skin integrity: No ulcer, skin breakdown, erythema, warmth, callus or dry skin. Left foot:      Skin integrity: No ulcer, skin breakdown, erythema, warmth, callus or dry skin. Lymphadenopathy:      Cervical: No cervical adenopathy. Skin:     General: Skin is warm and dry. Coloration: Skin is not pale. Findings: No rash. Neurological:      General: No focal deficit present. Mental Status: He is alert. Motor: No abnormal muscle tone. Gait: Gait normal.   Psychiatric:         Mood and Affect: Mood normal.         Behavior: Behavior normal.         Thought Content: Thought content normal.         Judgment: Judgment normal.       Diabetic Foot Exam    Patient's shoes and socks removed. Right Foot/Ankle   Right Foot Inspection  Skin Exam: skin normal and skin intact. No dry skin, no warmth, no callus, no erythema, no maceration, no abnormal color, no pre-ulcer, no ulcer and no callus. Toe Exam: ROM and strength within normal limits. Sensory   Vibration: diminished  Monofilament testing: intact    Vascular  The right DP pulse is 0. Left Foot/Ankle  Left Foot Inspection  Skin Exam: skin normal and skin intact. No dry skin, no warmth, no erythema, no maceration, normal color, no pre-ulcer, no ulcer and no callus. Toe Exam: ROM and strength within normal limits. Sensory   Vibration: diminished  Monofilament testing: diminished    Vascular  The left DP pulse is 0.      Assign Risk Category  No deformity present  Loss of protective sensation  Weak pulses  Risk: 2      Major Pine, DO

## 2023-09-07 NOTE — ASSESSMENT & PLAN NOTE
Just saw vascular PA and L carotid dz stable, con't annual f/u and CUS, on ASA/Plavix and statin, to ED with stroke/TIA symptoms

## 2023-09-07 NOTE — PROGRESS NOTES
DIALLO ALCANTARA received a [facility] referral from patient's PCP. DIALLO ALCANTARA reviewed patient's chart and called ([phone]) patient. Patient answered and DIALLO ALCANTARA introduced role and reason for calling. Patient is not interested in any assistance at this time. DIALLO Alcantara encouraged patient to contact DIALLO ALCANTARA as needed. DIALLO ALCANTARA will close, please re consult DIALLO ALCANTARA as needed.

## 2023-09-07 NOTE — ASSESSMENT & PLAN NOTE
Foot exam done today, urged not to walk barefoot and to check feet daily  Lab Results   Component Value Date    HGBA1C 6.8 (H) 08/16/2023

## 2023-09-07 NOTE — PATIENT INSTRUCTIONS
Medicare Preventive Visit Patient Instructions  Thank you for completing your Welcome to Medicare Visit or Medicare Annual Wellness Visit today. Your next wellness visit will be due in one year (9/7/2024). The screening/preventive services that you may require over the next 5-10 years are detailed below. Some tests may not apply to you based off risk factors and/or age. Screening tests ordered at today's visit but not completed yet may show as past due. Also, please note that scanned in results may not display below. Preventive Screenings:  Service Recommendations Previous Testing/Comments   Colorectal Cancer Screening  · Colonoscopy    · Fecal Occult Blood Test (FOBT)/Fecal Immunochemical Test (FIT)  · Fecal DNA/Cologuard Test  · Flexible Sigmoidoscopy Age: 43-73 years old   Colonoscopy: every 10 years (May be performed more frequently if at higher risk)  OR  FOBT/FIT: every 1 year  OR  Cologuard: every 3 years  OR  Sigmoidoscopy: every 5 years  Screening may be recommended earlier than age 39 if at higher risk for colorectal cancer. Also, an individualized decision between you and your healthcare provider will decide whether screening between the ages of 77-80 would be appropriate.  Colonoscopy: 03/15/2013  FOBT/FIT: Not on file  Cologuard: Not on file  Sigmoidoscopy: Not on file          Prostate Cancer Screening Individualized decision between patient and health care provider in men between ages of 53-66   Medicare will cover every 12 months beginning on the day after your 50th birthday PSA: No results in last 5 years     History Prostate Cancer  Screening Not Indicated     Hepatitis C Screening Once for adults born between 1945 and 1965  More frequently in patients at high risk for Hepatitis C Hep C Antibody: Not on file        Diabetes Screening 1-2 times per year if you're at risk for diabetes or have pre-diabetes Fasting glucose: 130 mg/dL (8/16/2023)  A1C: 6.8 % (8/16/2023)  Screening Not Indicated  History Diabetes   Cholesterol Screening Once every 5 years if you don't have a lipid disorder. May order more often based on risk factors. Lipid panel: 02/02/2023  Screening Current      Other Preventive Screenings Covered by Medicare:  1. Abdominal Aortic Aneurysm (AAA) Screening: covered once if your at risk. You're considered to be at risk if you have a family history of AAA or a male between the age of 70-76 who smoking at least 100 cigarettes in your lifetime. 2. Lung Cancer Screening: covers low dose CT scan once per year if you meet all of the following conditions: (1) Age 48-67; (2) No signs or symptoms of lung cancer; (3) Current smoker or have quit smoking within the last 15 years; (4) You have a tobacco smoking history of at least 20 pack years (packs per day x number of years you smoked); (5) You get a written order from a healthcare provider. 3. Glaucoma Screening: covered annually if you're considered high risk: (1) You have diabetes OR (2) Family history of glaucoma OR (3)  aged 48 and older OR (3)  American aged 72 and older  3. Osteoporosis Screening: covered every 2 years if you meet one of the following conditions: (1) Have a vertebral abnormality; (2) On glucocorticoid therapy for more than 3 months; (3) Have primary hyperparathyroidism; (4) On osteoporosis medications and need to assess response to drug therapy. 5. HIV Screening: covered annually if you're between the age of 14-79. Also covered annually if you are younger than 13 and older than 72 with risk factors for HIV infection. For pregnant patients, it is covered up to 3 times per pregnancy.     Immunizations:  Immunization Recommendations   Influenza Vaccine Annual influenza vaccination during flu season is recommended for all persons aged >= 6 months who do not have contraindications   Pneumococcal Vaccine   * Pneumococcal conjugate vaccine = PCV13 (Prevnar 13), PCV15 (Vaxneuvance), PCV20 (Prevnar 20)  * Pneumococcal polysaccharide vaccine = PPSV23 (Pneumovax) Adults 2364 years old: 1-3 doses may be recommended based on certain risk factors  Adults 72 years old: 1-2 doses may be recommended based off what pneumonia vaccine you previously received   Hepatitis B Vaccine 3 dose series if at intermediate or high risk (ex: diabetes, end stage renal disease, liver disease)   Tetanus (Td) Vaccine - COST NOT COVERED BY MEDICARE PART B Following completion of primary series, a booster dose should be given every 10 years to maintain immunity against tetanus. Td may also be given as tetanus wound prophylaxis. Tdap Vaccine - COST NOT COVERED BY MEDICARE PART B Recommended at least once for all adults. For pregnant patients, recommended with each pregnancy. Shingles Vaccine (Shingrix) - COST NOT COVERED BY MEDICARE PART B  2 shot series recommended in those aged 48 and above     Health Maintenance Due:  There are no preventive care reminders to display for this patient. Immunizations Due:      Topic Date Due   • COVID-19 Vaccine (4 - Pfizer series) 03/02/2022   • Influenza Vaccine (1) 09/01/2023     Advance Directives   What are advance directives? Advance directives are legal documents that state your wishes and plans for medical care. These plans are made ahead of time in case you lose your ability to make decisions for yourself. Advance directives can apply to any medical decision, such as the treatments you want, and if you want to donate organs. What are the types of advance directives? There are many types of advance directives, and each state has rules about how to use them. You may choose a combination of any of the following:  · Living will: This is a written record of the treatment you want. You can also choose which treatments you do not want, which to limit, and which to stop at a certain time. This includes surgery, medicine, IV fluid, and tube feedings.    · Durable power of  for [facility]): This is a written record that states who you want to make healthcare choices for you when you are unable to make them for yourself. This person, called a proxy, is usually a family member or a friend. You may choose more than 1 proxy. · Do not resuscitate (DNR) order:  A DNR order is used in case your heart stops beating or you stop breathing. It is a request not to have certain forms of treatment, such as CPR. A DNR order may be included in other types of advance directives. · Medical directive: This covers the care that you want if you are in a coma, near death, or unable to make decisions for yourself. You can list the treatments you want for each condition. Treatment may include pain medicine, surgery, blood transfusions, dialysis, IV or tube feedings, and a ventilator (breathing machine). · Values history: This document has questions about your views, beliefs, and how you feel and think about life. This information can help others choose the care that you would choose. Why are advance directives important? An advance directive helps you control your care. Although spoken wishes may be used, it is better to have your wishes written down. Spoken wishes can be misunderstood, or not followed. Treatments may be given even if you do not want them. An advance directive may make it easier for your family to make difficult choices about your care. Weight Management   Why it is important to manage your weight:  Being overweight increases your risk of health conditions such as heart disease, high blood pressure, type 2 diabetes, and certain types of cancer. It can also increase your risk for osteoarthritis, sleep apnea, and other respiratory problems. Aim for a slow, steady weight loss. Even a small amount of weight loss can lower your risk of health problems. How to lose weight safely:  A safe and healthy way to lose weight is to eat fewer calories and get regular exercise.  You can lose up about 1 pound a week by decreasing the number of calories you eat by 500 calories each day. Healthy meal plan for weight management:  A healthy meal plan includes a variety of foods, contains fewer calories, and helps you stay healthy. A healthy meal plan includes the following:  · Eat whole-grain foods more often. A healthy meal plan should contain fiber. Fiber is the part of grains, fruits, and vegetables that is not broken down by your body. Whole-grain foods are healthy and provide extra fiber in your diet. Some examples of whole-grain foods are whole-wheat breads and pastas, oatmeal, brown rice, and bulgur. · Eat a variety of vegetables every day. Include dark, leafy greens such as spinach, kale, maryanne greens, and mustard greens. Eat yellow and orange vegetables such as carrots, sweet potatoes, and winter squash. · Eat a variety of fruits every day. Choose fresh or canned fruit (canned in its own juice or light syrup) instead of juice. Fruit juice has very little or no fiber. · Eat low-fat dairy foods. Drink fat-free (skim) milk or 1% milk. Eat fat-free yogurt and low-fat cottage cheese. Try low-fat cheeses such as mozzarella and other reduced-fat cheeses. · Choose meat and other protein foods that are low in fat. Choose beans or other legumes such as split peas or lentils. Choose fish, skinless poultry (chicken or turkey), or lean cuts of red meat (beef or pork). Before you cook meat or poultry, cut off any visible fat. · Use less fat and oil. Try baking foods instead of frying them. Add less fat, such as margarine, sour cream, regular salad dressing and mayonnaise to foods. Eat fewer high-fat foods. Some examples of high-fat foods include french fries, doughnuts, ice cream, and cakes. · Eat fewer sweets. Limit foods and drinks that are high in sugar. This includes candy, cookies, regular soda, and sweetened drinks. Exercise:  Exercise at least 30 minutes per day on most days of the week. Some examples of exercise include walking, biking, dancing, and swimming. You can also fit in more physical activity by taking the stairs instead of the elevator or parking farther away from stores. Ask your healthcare provider about the best exercise plan for you. © Copyright [employer] 2018 Information is for End User's use only and may not be sold, redistributed or otherwise used for commercial purposes. All illustrations and images included in CareNotes® are the copyrighted property of [employer] or [facility] Preventive Visit Patient Instructions  Thank you for completing your Welcome to Medicare Visit or Medicare Annual Wellness Visit today. Your next wellness visit will be due in one year (9/7/2024). The screening/preventive services that you may require over the next 5-10 years are detailed below. Some tests may not apply to you based off risk factors and/or age. Screening tests ordered at today's visit but not completed yet may show as past due. Also, please note that scanned in results may not display below. Preventive Screenings:  Service Recommendations Previous Testing/Comments   Colorectal Cancer Screening  · Colonoscopy    · Fecal Occult Blood Test (FOBT)/Fecal Immunochemical Test (FIT)  · Fecal DNA/Cologuard Test  · Flexible Sigmoidoscopy Age: 43-73 years old   Colonoscopy: every 10 years (May be performed more frequently if at higher risk)  OR  FOBT/FIT: every 1 year  OR  Cologuard: every 3 years  OR  Sigmoidoscopy: every 5 years  Screening may be recommended earlier than age 39 if at higher risk for colorectal cancer. Also, an individualized decision between you and your healthcare provider will decide whether screening between the ages of 77-80 would be appropriate.  Colonoscopy: 03/15/2013  FOBT/FIT: Not on file  Cologuard: Not on file  Sigmoidoscopy: Not on file          Prostate Cancer Screening Individualized decision between patient and health care provider in men between ages of 53-66   Medicare will cover every 12 months beginning on the day after your 50th birthday PSA: No results in last 5 years     History Prostate Cancer  Screening Not Indicated     Hepatitis C Screening Once for adults born between 1945 and 1965  More frequently in patients at high risk for Hepatitis C Hep C Antibody: Not on file        Diabetes Screening 1-2 times per year if you're at risk for diabetes or have pre-diabetes Fasting glucose: 130 mg/dL (8/16/2023)  A1C: 6.8 % (8/16/2023)  Screening Not Indicated  History Diabetes   Cholesterol Screening Once every 5 years if you don't have a lipid disorder. May order more often based on risk factors. Lipid panel: 02/02/2023  Screening Current      Other Preventive Screenings Covered by Medicare:  6. Abdominal Aortic Aneurysm (AAA) Screening: covered once if your at risk. You're considered to be at risk if you have a family history of AAA or a male between the age of 70-76 who smoking at least 100 cigarettes in your lifetime. 7. Lung Cancer Screening: covers low dose CT scan once per year if you meet all of the following conditions: (1) Age 48-67; (2) No signs or symptoms of lung cancer; (3) Current smoker or have quit smoking within the last 15 years; (4) You have a tobacco smoking history of at least 20 pack years (packs per day x number of years you smoked); (5) You get a written order from a healthcare provider. 8. Glaucoma Screening: covered annually if you're considered high risk: (1) You have diabetes OR (2) Family history of glaucoma OR (3)  aged 48 and older OR (3)  American aged 72 and older  5. Osteoporosis Screening: covered every 2 years if you meet one of the following conditions: (1) Have a vertebral abnormality; (2) On glucocorticoid therapy for more than 3 months; (3) Have primary hyperparathyroidism; (4) On osteoporosis medications and need to assess response to drug therapy.   10. HIV Screening: covered annually if you're between the age of 14-79. Also covered annually if you are younger than 13 and older than 72 with risk factors for HIV infection. For pregnant patients, it is covered up to 3 times per pregnancy. Immunizations:  Immunization Recommendations   Influenza Vaccine Annual influenza vaccination during flu season is recommended for all persons aged >= 6 months who do not have contraindications   Pneumococcal Vaccine   * Pneumococcal conjugate vaccine = PCV13 (Prevnar 13), PCV15 (Vaxneuvance), PCV20 (Prevnar 20)  * Pneumococcal polysaccharide vaccine = PPSV23 (Pneumovax) Adults 20-63 years old: 1-3 doses may be recommended based on certain risk factors  Adults 72 years old: 1-2 doses may be recommended based off what pneumonia vaccine you previously received   Hepatitis B Vaccine 3 dose series if at intermediate or high risk (ex: diabetes, end stage renal disease, liver disease)   Tetanus (Td) Vaccine - COST NOT COVERED BY MEDICARE PART B Following completion of primary series, a booster dose should be given every 10 years to maintain immunity against tetanus. Td may also be given as tetanus wound prophylaxis. Tdap Vaccine - COST NOT COVERED BY MEDICARE PART B Recommended at least once for all adults. For pregnant patients, recommended with each pregnancy. Shingles Vaccine (Shingrix) - COST NOT COVERED BY MEDICARE PART B  2 shot series recommended in those aged 48 and above     Health Maintenance Due:  There are no preventive care reminders to display for this patient. Immunizations Due:      Topic Date Due   • COVID-19 Vaccine (4 - Pfizer series) 03/02/2022   • Influenza Vaccine (1) 09/01/2023     Advance Directives   What are advance directives? Advance directives are legal documents that state your wishes and plans for medical care. These plans are made ahead of time in case you lose your ability to make decisions for yourself.  Advance directives can apply to any medical decision, such as the treatments you want, and if you want to donate organs. What are the types of advance directives? There are many types of advance directives, and each state has rules about how to use them. You may choose a combination of any of the following:  · Living will: This is a written record of the treatment you want. You can also choose which treatments you do not want, which to limit, and which to stop at a certain time. This includes surgery, medicine, IV fluid, and tube feedings. · Durable power of  for healthcare [facility]): This is a written record that states who you want to make healthcare choices for you when you are unable to make them for yourself. This person, called a proxy, is usually a family member or a friend. You may choose more than 1 proxy. · Do not resuscitate (DNR) order:  A DNR order is used in case your heart stops beating or you stop breathing. It is a request not to have certain forms of treatment, such as CPR. A DNR order may be included in other types of advance directives. · Medical directive: This covers the care that you want if you are in a coma, near death, or unable to make decisions for yourself. You can list the treatments you want for each condition. Treatment may include pain medicine, surgery, blood transfusions, dialysis, IV or tube feedings, and a ventilator (breathing machine). · Values history: This document has questions about your views, beliefs, and how you feel and think about life. This information can help others choose the care that you would choose. Why are advance directives important? An advance directive helps you control your care. Although spoken wishes may be used, it is better to have your wishes written down. Spoken wishes can be misunderstood, or not followed. Treatments may be given even if you do not want them. An advance directive may make it easier for your family to make difficult choices about your care.    Weight Management   Why it is important to manage your weight:  Being overweight increases your risk of health conditions such as heart disease, high blood pressure, type 2 diabetes, and certain types of cancer. It can also increase your risk for osteoarthritis, sleep apnea, and other respiratory problems. Aim for a slow, steady weight loss. Even a small amount of weight loss can lower your risk of health problems. How to lose weight safely:  A safe and healthy way to lose weight is to eat fewer calories and get regular exercise. You can lose up about 1 pound a week by decreasing the number of calories you eat by 500 calories each day. Healthy meal plan for weight management:  A healthy meal plan includes a variety of foods, contains fewer calories, and helps you stay healthy. A healthy meal plan includes the following:  · Eat whole-grain foods more often. A healthy meal plan should contain fiber. Fiber is the part of grains, fruits, and vegetables that is not broken down by your body. Whole-grain foods are healthy and provide extra fiber in your diet. Some examples of whole-grain foods are whole-wheat breads and pastas, oatmeal, brown rice, and bulgur. · Eat a variety of vegetables every day. Include dark, leafy greens such as spinach, kale, maryanne greens, and mustard greens. Eat yellow and orange vegetables such as carrots, sweet potatoes, and winter squash. · Eat a variety of fruits every day. Choose fresh or canned fruit (canned in its own juice or light syrup) instead of juice. Fruit juice has very little or no fiber. · Eat low-fat dairy foods. Drink fat-free (skim) milk or 1% milk. Eat fat-free yogurt and low-fat cottage cheese. Try low-fat cheeses such as mozzarella and other reduced-fat cheeses. · Choose meat and other protein foods that are low in fat. Choose beans or other legumes such as split peas or lentils. Choose fish, skinless poultry (chicken or turkey), or lean cuts of red meat (beef or pork).  Before you cook meat or poultry, cut off any visible fat. · Use less fat and oil. Try baking foods instead of frying them. Add less fat, such as margarine, sour cream, regular salad dressing and mayonnaise to foods. Eat fewer high-fat foods. Some examples of high-fat foods include french fries, doughnuts, ice cream, and cakes. · Eat fewer sweets. Limit foods and drinks that are high in sugar. This includes candy, cookies, regular soda, and sweetened drinks. Exercise:  Exercise at least 30 minutes per day on most days of the week. Some examples of exercise include walking, biking, dancing, and swimming. You can also fit in more physical activity by taking the stairs instead of the elevator or parking farther away from stores. Ask your healthcare provider about the best exercise plan for you. © Copyright [employer] 2018 Information is for End User's use only and may not be sold, redistributed or otherwise used for commercial purposes.  All illustrations and images included in CareNotes® are the copyrighted property of [employer] or [address] [place] [address]

## 2023-09-10 DIAGNOSIS — I10 ESSENTIAL HYPERTENSION: ICD-10-CM

## 2023-09-10 RX ORDER — LISINOPRIL 10 MG/1
TABLET ORAL
Qty: 90 TABLET | Refills: 0 | Status: SHIPPED | OUTPATIENT
Start: 2023-09-10

## 2023-10-14 DIAGNOSIS — E11.49 DIABETES MELLITUS TYPE 2 WITH NEUROLOGICAL MANIFESTATIONS (HCC): ICD-10-CM

## 2023-10-14 RX ORDER — METFORMIN HYDROCHLORIDE 500 MG/1
TABLET, EXTENDED RELEASE ORAL
Qty: 90 TABLET | Refills: 0 | Status: SHIPPED | OUTPATIENT
Start: 2023-10-14

## 2023-11-16 ENCOUNTER — IMMUNIZATIONS (OUTPATIENT)
Dept: FAMILY MEDICINE CLINIC | Facility: HOSPITAL | Age: 83
End: 2023-11-16
Payer: MEDICARE

## 2023-11-16 DIAGNOSIS — Z23 ENCOUNTER FOR IMMUNIZATION: Primary | ICD-10-CM

## 2023-11-16 PROCEDURE — 90662 IIV NO PRSV INCREASED AG IM: CPT

## 2023-11-16 PROCEDURE — G0008 ADMIN INFLUENZA VIRUS VAC: HCPCS

## 2023-11-22 ENCOUNTER — TELEPHONE (OUTPATIENT)
Dept: FAMILY MEDICINE CLINIC | Facility: HOSPITAL | Age: 83
End: 2023-11-22

## 2023-11-22 NOTE — TELEPHONE ENCOUNTER
This is Violetta Cowart [id number] calling to see if Doctor Albertina Nixon will authorize a medication for me. This is for hydrochlorothiazide 25 milligrams an . It was authorized by someone up in [facility] and I'm not going up there anymore and they won't refill it at [facility]. So please give me a call back at [phone]. Thank you.  daniele Payne.

## 2023-11-23 DIAGNOSIS — I10 PRIMARY HYPERTENSION: Primary | ICD-10-CM

## 2023-11-23 RX ORDER — HYDROCHLOROTHIAZIDE 25 MG/1
25 TABLET ORAL DAILY
Qty: 90 TABLET | Refills: 1 | Status: SHIPPED | OUTPATIENT
Start: 2023-11-23

## 2023-12-09 DIAGNOSIS — I10 ESSENTIAL HYPERTENSION: ICD-10-CM

## 2023-12-09 RX ORDER — LISINOPRIL 10 MG/1
TABLET ORAL
Qty: 90 TABLET | Refills: 0 | Status: SHIPPED | OUTPATIENT
Start: 2023-12-09

## 2024-01-03 NOTE — ASSESSMENT & PLAN NOTE
BP at goal, con't current meds, con't healthy diet/keep active/healthy wgt encouraged Oriented - self; Oriented - place; Oriented - time

## 2024-01-13 DIAGNOSIS — E11.49 DIABETES MELLITUS TYPE 2 WITH NEUROLOGICAL MANIFESTATIONS (HCC): ICD-10-CM

## 2024-01-15 RX ORDER — METFORMIN HYDROCHLORIDE 500 MG/1
TABLET, EXTENDED RELEASE ORAL
Qty: 90 TABLET | Refills: 0 | Status: SHIPPED | OUTPATIENT
Start: 2024-01-15

## 2024-02-19 ENCOUNTER — APPOINTMENT (OUTPATIENT)
Dept: LAB | Facility: HOSPITAL | Age: 84
End: 2024-02-19
Payer: MEDICARE

## 2024-02-19 DIAGNOSIS — I65.22 ASYMPTOMATIC STENOSIS OF LEFT CAROTID ARTERY: ICD-10-CM

## 2024-02-19 DIAGNOSIS — E78.5 DYSLIPIDEMIA: ICD-10-CM

## 2024-02-19 DIAGNOSIS — E11.42 DIABETIC POLYNEUROPATHY ASSOCIATED WITH TYPE 2 DIABETES MELLITUS (HCC): ICD-10-CM

## 2024-02-19 DIAGNOSIS — I10 PRIMARY HYPERTENSION: ICD-10-CM

## 2024-02-19 DIAGNOSIS — E11.49 DIABETES MELLITUS TYPE 2 WITH NEUROLOGICAL MANIFESTATIONS (HCC): ICD-10-CM

## 2024-02-19 LAB
ALBUMIN SERPL BCP-MCNC: 4.4 G/DL (ref 3.5–5)
ALP SERPL-CCNC: 56 U/L (ref 34–104)
ALT SERPL W P-5'-P-CCNC: 11 U/L (ref 7–52)
ANION GAP SERPL CALCULATED.3IONS-SCNC: 4 MMOL/L
AST SERPL W P-5'-P-CCNC: 12 U/L (ref 13–39)
BASOPHILS # BLD AUTO: 0.04 THOUSANDS/ÂΜL (ref 0–0.1)
BASOPHILS NFR BLD AUTO: 1 % (ref 0–1)
BILIRUB SERPL-MCNC: 0.87 MG/DL (ref 0.2–1)
BUN SERPL-MCNC: 13 MG/DL (ref 5–25)
CALCIUM SERPL-MCNC: 10.1 MG/DL (ref 8.4–10.2)
CHLORIDE SERPL-SCNC: 100 MMOL/L (ref 96–108)
CHOLEST SERPL-MCNC: 145 MG/DL
CO2 SERPL-SCNC: 35 MMOL/L (ref 21–32)
CREAT SERPL-MCNC: 0.88 MG/DL (ref 0.6–1.3)
CREAT UR-MCNC: 79.5 MG/DL
EOSINOPHIL # BLD AUTO: 0.1 THOUSAND/ÂΜL (ref 0–0.61)
EOSINOPHIL NFR BLD AUTO: 1 % (ref 0–6)
ERYTHROCYTE [DISTWIDTH] IN BLOOD BY AUTOMATED COUNT: 13.2 % (ref 11.6–15.1)
EST. AVERAGE GLUCOSE BLD GHB EST-MCNC: 131 MG/DL
GFR SERPL CREATININE-BSD FRML MDRD: 79 ML/MIN/1.73SQ M
GLUCOSE P FAST SERPL-MCNC: 140 MG/DL (ref 65–99)
HBA1C MFR BLD: 6.2 %
HCT VFR BLD AUTO: 45.9 % (ref 36.5–49.3)
HDLC SERPL-MCNC: 55 MG/DL
HGB BLD-MCNC: 15.9 G/DL (ref 12–17)
IMM GRANULOCYTES # BLD AUTO: 0.02 THOUSAND/UL (ref 0–0.2)
IMM GRANULOCYTES NFR BLD AUTO: 0 % (ref 0–2)
LDLC SERPL CALC-MCNC: 67 MG/DL (ref 0–100)
LYMPHOCYTES # BLD AUTO: 1.48 THOUSANDS/ÂΜL (ref 0.6–4.47)
LYMPHOCYTES NFR BLD AUTO: 19 % (ref 14–44)
MCH RBC QN AUTO: 31.4 PG (ref 26.8–34.3)
MCHC RBC AUTO-ENTMCNC: 34.6 G/DL (ref 31.4–37.4)
MCV RBC AUTO: 91 FL (ref 82–98)
MICROALBUMIN UR-MCNC: 24.2 MG/L
MICROALBUMIN/CREAT 24H UR: 30 MG/G CREATININE (ref 0–30)
MONOCYTES # BLD AUTO: 0.49 THOUSAND/ÂΜL (ref 0.17–1.22)
MONOCYTES NFR BLD AUTO: 6 % (ref 4–12)
NEUTROPHILS # BLD AUTO: 5.5 THOUSANDS/ÂΜL (ref 1.85–7.62)
NEUTS SEG NFR BLD AUTO: 73 % (ref 43–75)
NONHDLC SERPL-MCNC: 90 MG/DL
NRBC BLD AUTO-RTO: 0 /100 WBCS
PLATELET # BLD AUTO: 246 THOUSANDS/UL (ref 149–390)
PMV BLD AUTO: 9.3 FL (ref 8.9–12.7)
POTASSIUM SERPL-SCNC: 4.6 MMOL/L (ref 3.5–5.3)
PROT SERPL-MCNC: 6.8 G/DL (ref 6.4–8.4)
RBC # BLD AUTO: 5.06 MILLION/UL (ref 3.88–5.62)
SODIUM SERPL-SCNC: 139 MMOL/L (ref 135–147)
TRIGL SERPL-MCNC: 117 MG/DL
TSH SERPL DL<=0.05 MIU/L-ACNC: 2.27 UIU/ML (ref 0.45–4.5)
WBC # BLD AUTO: 7.63 THOUSAND/UL (ref 4.31–10.16)

## 2024-02-19 PROCEDURE — 80061 LIPID PANEL: CPT

## 2024-02-19 PROCEDURE — 84443 ASSAY THYROID STIM HORMONE: CPT

## 2024-02-19 PROCEDURE — 82043 UR ALBUMIN QUANTITATIVE: CPT

## 2024-02-19 PROCEDURE — 80053 COMPREHEN METABOLIC PANEL: CPT

## 2024-02-19 PROCEDURE — 36415 COLL VENOUS BLD VENIPUNCTURE: CPT

## 2024-02-19 PROCEDURE — 85025 COMPLETE CBC W/AUTO DIFF WBC: CPT

## 2024-02-19 PROCEDURE — 82570 ASSAY OF URINE CREATININE: CPT

## 2024-02-19 PROCEDURE — 83036 HEMOGLOBIN GLYCOSYLATED A1C: CPT

## 2024-02-27 ENCOUNTER — APPOINTMENT (OUTPATIENT)
Dept: RADIOLOGY | Facility: CLINIC | Age: 84
End: 2024-02-27
Payer: MEDICARE

## 2024-02-27 ENCOUNTER — OFFICE VISIT (OUTPATIENT)
Dept: OBGYN CLINIC | Facility: CLINIC | Age: 84
End: 2024-02-27
Payer: MEDICARE

## 2024-02-27 VITALS
HEIGHT: 68 IN | BODY MASS INDEX: 27.28 KG/M2 | WEIGHT: 180 LBS | DIASTOLIC BLOOD PRESSURE: 82 MMHG | SYSTOLIC BLOOD PRESSURE: 122 MMHG

## 2024-02-27 DIAGNOSIS — M19.012 PRIMARY OSTEOARTHRITIS OF LEFT SHOULDER: ICD-10-CM

## 2024-02-27 DIAGNOSIS — M25.512 CHRONIC PAIN OF BOTH SHOULDERS: Primary | ICD-10-CM

## 2024-02-27 DIAGNOSIS — M25.511 CHRONIC PAIN OF BOTH SHOULDERS: Primary | ICD-10-CM

## 2024-02-27 DIAGNOSIS — S46.011A TRAUMATIC COMPLETE TEAR OF RIGHT ROTATOR CUFF, INITIAL ENCOUNTER: ICD-10-CM

## 2024-02-27 DIAGNOSIS — G89.29 CHRONIC PAIN OF BOTH SHOULDERS: ICD-10-CM

## 2024-02-27 DIAGNOSIS — M25.511 CHRONIC PAIN OF BOTH SHOULDERS: ICD-10-CM

## 2024-02-27 DIAGNOSIS — M25.512 CHRONIC PAIN OF BOTH SHOULDERS: ICD-10-CM

## 2024-02-27 DIAGNOSIS — G89.29 CHRONIC PAIN OF BOTH SHOULDERS: Primary | ICD-10-CM

## 2024-02-27 PROCEDURE — 73030 X-RAY EXAM OF SHOULDER: CPT

## 2024-02-27 PROCEDURE — 20610 DRAIN/INJ JOINT/BURSA W/O US: CPT | Performed by: ORTHOPAEDIC SURGERY

## 2024-02-27 PROCEDURE — 99204 OFFICE O/P NEW MOD 45 MIN: CPT | Performed by: ORTHOPAEDIC SURGERY

## 2024-02-27 RX ORDER — LIDOCAINE HYDROCHLORIDE 10 MG/ML
5 INJECTION, SOLUTION EPIDURAL; INFILTRATION; INTRACAUDAL; PERINEURAL
Status: COMPLETED | OUTPATIENT
Start: 2024-02-27 | End: 2024-02-27

## 2024-02-27 RX ORDER — BETAMETHASONE SODIUM PHOSPHATE AND BETAMETHASONE ACETATE 3; 3 MG/ML; MG/ML
6 INJECTION, SUSPENSION INTRA-ARTICULAR; INTRALESIONAL; INTRAMUSCULAR; SOFT TISSUE
Status: COMPLETED | OUTPATIENT
Start: 2024-02-27 | End: 2024-02-27

## 2024-02-27 RX ADMIN — LIDOCAINE HYDROCHLORIDE 5 ML: 10 INJECTION, SOLUTION EPIDURAL; INFILTRATION; INTRACAUDAL; PERINEURAL at 14:15

## 2024-02-27 RX ADMIN — BETAMETHASONE SODIUM PHOSPHATE AND BETAMETHASONE ACETATE 6 MG: 3; 3 INJECTION, SUSPENSION INTRA-ARTICULAR; INTRALESIONAL; INTRAMUSCULAR; SOFT TISSUE at 14:15

## 2024-02-27 NOTE — PROGRESS NOTES
Assessment:     1. Chronic pain of both shoulders    2. Traumatic complete tear of right rotator cuff, initial encounter    3. Primary osteoarthritis of left shoulder        Plan:     Problem List Items Addressed This Visit          Musculoskeletal and Integument    Primary osteoarthritis of left shoulder    Traumatic complete tear of right rotator cuff    Relevant Medications    lidocaine (PF) (XYLOCAINE-MPF) 1 % injection 5 mL (Completed)    betamethasone acetate-betamethasone sodium phosphate (CELESTONE) injection 6 mg (Completed)    Other Relevant Orders    Large joint arthrocentesis: R subacromial bursa (Completed)    Ambulatory Referral to Physical Therapy     Other Visit Diagnoses       Chronic pain of both shoulders    -  Primary    Relevant Orders    XR shoulder 2+ vw left    XR shoulder 2+ vw right           Findings consistent with right shoulder chronic rotator cuff tear and left shoulder osteoarthritis. Imaging and prognosis reviewed with patient. Discussed due to chronic rotator cuff tear,  he is not ideal candidate for rotator cuff repair. At this time he was given cortisone injection right shoulder, tolerated well with good pain relief. Cold compress today. Repeat in 3-4 months if needed. He will have referral for physical therapy as well. Avoid strenuous lifting with right arm. Discussed if he wanted to see if candidate for rotator cuff repair he would need MRI. If tissue of poor quality or retracted to far then candidate for reverse total shoulder.  Tylenol, topical creams for pain. See back as needed.  All patient's questions were answered to his satisfaction.  This note is created using dictation transcription.  It may contain typographical errors, grammatical errors, improperly dictated words, background noise and other errors.    Subjective:     Patient ID: Kilo Mix is a 83 y.o. male.  Chief Complaint:  83 yr old male in for evaluation of bilateral shoulders. Right shoulder worse then left.  He states years ago injury going up stairs tripped grabbed hand rail and heard a crack with acute pain in right shoulder. [facility] and told he has rotator cuff tear but declined surgery. He has had a few cortisone injections in right with short term relief. He states right shoulder has been bad past year, left past 3 months but pain is not that bad.  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 Pain at night in both shoulders sleeping. Noted weakness in right shoulder. He is able to lift both arms overhead. Using tylenol PM for pain control. Denies any numbness or tingling in arms. No recent treatment.       Allergy:  Allergies   Allergen Reactions    Losartan Syncope     Reaction Date: [id number];     Penicillins Other (See Comments)     As a child so does not recall reaction     Medications:  all current active meds have been reviewed  Past Medical History:  Past Medical History:   Diagnosis Date    Diverticulitis of colon     Hypertension     Macular degeneration     Migraines     Myocardial infarction (HCC)     Prostate cancer (HCC)     Seasonal allergies      Past Surgical History:  Past Surgical History:   Procedure Laterality Date    CARDIAC SURGERY      CAROTID ENDARTARECTOMY Right     thromboendarterectomy    CAROTID STENT Right     CATARACT EXTRACTION Left     COLONOSCOPY      fiberoptic screening 2/6/08 5 yr / complete 3/15/13 5 yr    CORONARY ANGIOPLASTY WITH STENT PLACEMENT      ELBOW SURGERY      EYE SURGERY      KNEE SURGERY      PROSTATE SURGERY      SUPRAPUBIC PROSTATECTOMY  12/06/1999    UPPER GASTROINTESTINAL ENDOSCOPY       Family History:  Family History   Problem Relation Age of Onset    Alcohol abuse Mother     Heart attack Family         MI    Breast cancer Family     No Known Problems Father     No Known Problems Sister     Substance Abuse Son     Colon cancer Neg Hx     Colon polyps Neg Hx   "    Social History:  Social History     Substance and Sexual Activity   Alcohol Use Not Currently    Comment: rare glass of wine     Social History     Substance and Sexual Activity   Drug Use Never     Social History     Tobacco Use   Smoking Status Former    Current packs/day: 0.00    Average packs/day: 3.0 packs/day for 10.0 years (30.0 ttl pk-yrs)    Types: Cigarettes    Start date: 2000    Quit date: 2010    Years since quittin.1   Smokeless Tobacco Never     Review of Systems   Constitutional:  Negative for chills and fever.   HENT:  Negative for ear pain and sore throat.    Eyes:  Negative for pain and visual disturbance.   Respiratory:  Negative for cough and shortness of breath.    Cardiovascular:  Negative for chest pain and palpitations.   Gastrointestinal:  Negative for abdominal pain and vomiting.   Genitourinary:  Negative for dysuria and hematuria.   Musculoskeletal:  Positive for arthralgias (bilateral shoulder). Negative for back pain.   Skin:  Negative for color change and rash.   Neurological:  Negative for seizures and syncope.   All other systems reviewed and are negative.        Objective:  BP Readings from Last 1 Encounters:   24 122/82      Wt Readings from Last 1 Encounters:   24 81.6 kg (180 lb)      BMI:   Estimated body mass index is 27.37 kg/m² as calculated from the following:    Height as of this encounter: 5' 8\" (1.727 m).    Weight as of this encounter: 81.6 kg (180 lb).  BSA:   Estimated body surface area is 1.95 meters squared as calculated from the following:    Height as of this encounter: 5' 8\" (1.727 m).    Weight as of this encounter: 81.6 kg (180 lb).   Physical Exam  Constitutional:       Appearance: He is well-developed.   Eyes:      Pupils: Pupils are equal, round, and reactive to light.   Pulmonary:      Effort: Pulmonary effort is normal.      Breath sounds: Normal breath sounds.   Musculoskeletal:         General: Tenderness (bilateral shoulder) " present.   Skin:     General: Skin is warm and dry.   Neurological:      Mental Status: He is alert and oriented to person, place, and time.      Deep Tendon Reflexes: Reflexes are normal and symmetric.   Psychiatric:         Behavior: Behavior normal.         Thought Content: Thought content normal.         Judgment: Judgment normal.       Right Shoulder Exam     Tenderness   The patient is experiencing no tenderness.    Range of Motion   Active abduction:  150 (pain)   Forward flexion:  160   Internal rotation 0 degrees:  Lumbar     Muscle Strength   Abduction: 4/5   Internal rotation: 5/5   External rotation: 4/5     Tests   Cross arm: negative  Drop arm: negative    Other   Erythema: absent  Scars: absent  Sensation: normal  Pulse: present      Left Shoulder Exam     Tenderness   The patient is experiencing no tenderness.     Range of Motion   Active abduction:  normal   Passive abduction:  normal   Forward flexion:  normal     Muscle Strength   Abduction: 5/5   Internal rotation: 5/5   External rotation: 5/5     Tests   Cross arm: negative  Drop arm: negative    Other   Erythema: absent  Scars: absent  Sensation: normal  Pulse: present             I have personally reviewed pertinent films in PACS and my interpretation is xr right shoulder elevated humeral head consistent with rotator cuff arthropathy with mild AC joint and glenohumeral arthritis, left shoulder mild AC joint and moderate glenohumeral arthritis with spurring.     Large joint arthrocentesis: R subacromial bursa  Universal Protocol:  Consent: Verbal consent obtained.  Risks and benefits: risks, benefits and alternatives were discussed  Consent given by: patient  Patient understanding: patient states understanding of the procedure being performed  Site marked: the operative site was marked  Patient identity confirmed: verbally with patient  Supporting Documentation  Indications: pain   Procedure Details  Location: shoulder - R subacromial  bursa  Preparation: Patient was prepped and draped in the usual sterile fashion  Needle size: 22 G  Ultrasound guidance: no  Approach: posterolateral  Medications administered: 5 mL lidocaine (PF) 1 %; 6 mg betamethasone acetate-betamethasone sodium phosphate 6 (3-3) mg/mL    Patient tolerance: patient tolerated the procedure well with no immediate complications  Dressing:  Sterile dressing applied           Scribe Attestation      I,:  Jae Patel am acting as a scribe while in the presence of the attending physician.:       I,:  Meron Christy MD personally performed the services described in this documentation    as scribed in my presence.:

## 2024-03-05 ENCOUNTER — RA CDI HCC (OUTPATIENT)
Dept: OTHER | Facility: HOSPITAL | Age: 84
End: 2024-03-05

## 2024-03-05 NOTE — PROGRESS NOTES
E11.51   HCC coding opportunities          Chart Reviewed number of suggestions sent to Provider: 1     Patients Insurance     Medicare Insurance: Medicare

## 2024-03-08 ENCOUNTER — OFFICE VISIT (OUTPATIENT)
Dept: FAMILY MEDICINE CLINIC | Facility: HOSPITAL | Age: 84
End: 2024-03-08
Payer: MEDICARE

## 2024-03-08 VITALS
DIASTOLIC BLOOD PRESSURE: 82 MMHG | BODY MASS INDEX: 27.68 KG/M2 | HEART RATE: 64 BPM | HEIGHT: 68 IN | SYSTOLIC BLOOD PRESSURE: 132 MMHG | TEMPERATURE: 96 F | WEIGHT: 182.6 LBS

## 2024-03-08 DIAGNOSIS — E78.5 DYSLIPIDEMIA: ICD-10-CM

## 2024-03-08 DIAGNOSIS — I65.21 RECURRENT STENOSIS OF RIGHT CAROTID ARTERY: ICD-10-CM

## 2024-03-08 DIAGNOSIS — E11.49 DIABETES MELLITUS TYPE 2 WITH NEUROLOGICAL MANIFESTATIONS (HCC): Primary | ICD-10-CM

## 2024-03-08 DIAGNOSIS — I10 PRIMARY HYPERTENSION: ICD-10-CM

## 2024-03-08 DIAGNOSIS — I74.3 EMBOLISM AND THROMBOSIS OF ARTERIES OF THE LOWER EXTREMITIES (HCC): ICD-10-CM

## 2024-03-08 DIAGNOSIS — I65.22 ASYMPTOMATIC STENOSIS OF LEFT CAROTID ARTERY: ICD-10-CM

## 2024-03-08 PROBLEM — H35.30 AMD (AGE RELATED MACULAR DEGENERATION): Status: ACTIVE | Noted: 2020-01-01

## 2024-03-08 LAB
LEFT EYE DIABETIC RETINOPATHY: NORMAL
LEFT EYE IMAGE QUALITY: NORMAL
LEFT EYE MACULAR EDEMA: NORMAL
LEFT EYE OTHER RETINOPATHY: NORMAL
RIGHT EYE DIABETIC RETINOPATHY: NORMAL
RIGHT EYE IMAGE QUALITY: NORMAL
RIGHT EYE MACULAR EDEMA: NORMAL
RIGHT EYE OTHER RETINOPATHY: NORMAL
SEVERITY (EYE EXAM): NORMAL

## 2024-03-08 PROCEDURE — G2211 COMPLEX E/M VISIT ADD ON: HCPCS | Performed by: INTERNAL MEDICINE

## 2024-03-08 PROCEDURE — 92250 FUNDUS PHOTOGRAPHY W/I&R: CPT | Performed by: INTERNAL MEDICINE

## 2024-03-08 PROCEDURE — 99214 OFFICE O/P EST MOD 30 MIN: CPT | Performed by: INTERNAL MEDICINE

## 2024-03-08 NOTE — PROGRESS NOTES
Name: Kilo Mix      : 1940      MRN: [id number]  Encounter Provider: Dolores Vizcarra DO  Encounter Date: 3/8/2024   Encounter department: [facility] [id number]     Assessment & Plan     1. 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 Dyslipidemia  Assessment & Plan:  LDL at goal, con't current statin, recheck FLP annually, con't to encourage healthy diet and formal exercise      3. Primary hypertension  Assessment & Plan:  BP better by end of appt, con't current BP meds, recheck in 6 mos      4. Recurrent stenosis of right carotid artery  Assessment & Plan:  Stent patent , saw vascular for f/u , on ASA/Plavix/statin, does not smoke currently, to ED with stroke/TIA symptoms, con't tx/imaging/follow up as per vascular      5. Asymptomatic stenosis of left carotid artery  Assessment & Plan:  Stable at 50-69%, just saw vascular , on ASA/Plavix and statin, to ED with stroke/TIA symptoms      6. 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HCC)  Assessment & Plan:  Denies claudication symptoms, pt deferring f/u LEAD, on ASA/Plavix/statin, call with red flag vascular symptoms - reviewed with pt in detail          Depression Screening and Follow-up Plan: Patient was screened for depression during today's encounter. They screened negative with a PHQ-2 score of 0.      LEAD     CUS   - R carotid stent in place and w/o stenosis, L ICA 50-69% stenosis      Subjective      HPI Pt here for follow up appt and BW results    BW results were d/w pt in detail: FBS/A1C 140/6.2, rest of CMP/CBC/FLP/TSH/urine microalbumin:Cr ratio were all wnl     Def of controlled vs uncontrolled DM was reviewed.  Diet was reviewed - wgt down 1 lb from Sept 2024.  He does not feel he has a sweet tooth. He has some bread in his diet but not a lot. He does no formal exercise.  He is taking his DM meds as directed.  He is UTD on DM foot exam (9/23) and is overdue for his DM eye exam.  He is on statin and ACE.     Goal FLP was d/w pt in detail.  Diet/exercise reviewed as noted above.  He is taking his Atorvastatin daily as directed w/o Se.  He notes no stroke/TIA symptoms/CP.     BP above goal today and meds were reviewed and no changes have occurred.  He denies missing doses of meds or SE with the meds.  He does not check his BP outside the office.  He notes no frequent HA's/dizziness/double vision/CP.     Pt saw vascular in Aug for carotid dz.  He was told to con't annual US and f/u in 1 yr.  He notes no stroke/TIA symptoms.  He has not had LEAD since 11/19 for his know PVD.  Pt con't to refuse repeat US. He notes LE pain or pain in his LE with ambulation.  He is on a statin and ASA and Plavix.  He does bruise easily.          Review of Systems   Constitutional:  Negative for chills and fever.   HENT:  Negative for congestion and sore throat.    Eyes:  Negative for pain and visual disturbance.   Respiratory:  Negative for cough and shortness of breath.    Cardiovascular:  Negative for chest pain, palpitations and leg swelling.   Gastrointestinal:  Negative for abdominal pain, constipation, diarrhea, nausea and vomiting.   Genitourinary:  Negative for difficulty urinating and dysuria.   Musculoskeletal:  Positive for arthralgias and back pain. Negative for myalgias.   Skin:  Negative for rash and wound.   Neurological:   "Negative for dizziness and headaches.   Hematological:  Bruises/bleeds easily.   Psychiatric/Behavioral:  Negative for confusion and dysphoric mood.        Current Outpatient Medications on File Prior to Visit   Medication Sig    amLODIPine (NORVASC) 5 mg tablet Take 1 tablet by mouth daily    ascorbic acid (VITAMIN C) 500 mg tablet Take 500 mg by mouth daily    aspirin 81 MG tablet Take 1 tablet by mouth daily    atorvastatin (LIPITOR) 20 mg tablet Take 1 tablet (20 mg total) by mouth daily    B Complex Vitamins (VITAMIN B COMPLEX PO) Take 1 tablet by mouth daily    Cholecalciferol 1000 units CHEW Chew 1 tablet daily    clopidogrel (PLAVIX) 75 mg tablet Take 1 tablet by mouth daily    hydrochlorothiazide (HYDRODIURIL) 25 mg tablet Take 1 tablet (25 mg total) by mouth daily    lisinopril (ZESTRIL) 10 mg tablet Take 1 tablet by mouth once daily    metFORMIN (GLUCOPHAGE-XR) 500 mg 24 hr tablet Take 1 tablet by mouth once daily    omega-3-acid ethyl esters (LOVAZA) 1 g capsule Take 2 g by mouth 2 (two) times a day    vitamin E, tocopherol, 400 units capsule Take 1 capsule by mouth daily       Objective     /82   Pulse 64   Temp (!) 96 °F (35.6 °C) (Tympanic)   Ht 5' 8\" (1.727 m)   Wt 82.8 kg (182 lb 9.6 oz)   BMI 27.76 kg/m²     Physical Exam  Vitals and nursing note reviewed.   Constitutional:       General: He is not in acute distress.     Appearance: He is well-developed. He is not ill-appearing.   HENT:      Head: Normocephalic and atraumatic.   Eyes:      General:         Right eye: No discharge.         Left eye: No discharge.      Conjunctiva/sclera: Conjunctivae normal.   Neck:      Trachea: No tracheal deviation.   Cardiovascular:      Rate and Rhythm: Normal rate and regular rhythm.      Heart sounds: Murmur heard.   Pulmonary:      Effort: Pulmonary effort is normal. No respiratory distress.      Breath sounds: Normal breath sounds. No wheezing, rhonchi or rales.   Abdominal:      General: There " is no distension.      Palpations: Abdomen is soft.      Tenderness: There is no abdominal tenderness. There is no guarding or rebound.   Musculoskeletal:         General: No deformity or signs of injury.      Cervical back: Neck supple.   Skin:     General: Skin is warm and dry.      Coloration: Skin is not pale.      Findings: Bruising present. No rash.      Comments: Superficial ecchymosis B/L distal forearms   Neurological:      General: No focal deficit present.      Mental Status: He is alert. Mental status is at baseline.      Motor: No abnormal muscle tone.      Gait: Gait normal.   Psychiatric:         Mood and Affect: Mood normal.         Behavior: Behavior normal.         Thought Content: Thought content normal.         Judgment: Judgment normal.       Dolores Vizcarra DO

## 2024-03-08 NOTE — ASSESSMENT & PLAN NOTE
Stable at 50-69%, just saw vascular 8/23, on ASA/Plavix and statin, to ED with stroke/TIA symptoms

## 2024-03-08 NOTE — ASSESSMENT & PLAN NOTE
Stent patent 8/23, saw vascular for f/u 8/23, on ASA/Plavix/statin, does not smoke currently, to ED with stroke/TIA symptoms, con't tx/imaging/follow up as per vascular

## 2024-03-08 NOTE — ASSESSMENT & PLAN NOTE
Denies claudication symptoms, pt deferring f/u LEAD, on ASA/Plavix/statin, call with red flag vascular symptoms - reviewed with pt in detail

## 2024-03-08 NOTE — ASSESSMENT & PLAN NOTE
LDL at goal, con't current statin, recheck FLP annually, con't to encourage healthy diet and formal exercise

## 2024-03-08 NOTE — ASSESSMENT & PLAN NOTE
Lab Results   Component Value Date    HGBA1C 6.2 (H) 02/19/2024   DM type 2 with polyneuropathy - controlled with A1c 6.2 - sugars stable, con't to encourage low sugar/carb diet and start formal exercise, cont' current Metformin, recheck BW in 6 mos - BW order given, UTD on DM foot exam (9/23) and IRIS scan done in office today, pt is on ASA/statin/ACE, will follow

## 2024-03-09 DIAGNOSIS — I10 ESSENTIAL HYPERTENSION: ICD-10-CM

## 2024-03-09 RX ORDER — LISINOPRIL 10 MG/1
TABLET ORAL
Qty: 90 TABLET | Refills: 0 | Status: SHIPPED | OUTPATIENT
Start: 2024-03-09

## 2024-03-12 NOTE — PROGRESS NOTES
PT Evaluation     Today's date: 3/13/2024  Patient name: Kilo Mix  : 1940  MRN: [id number]  Referring provider: Meron Christy MD  Dx:   Encounter Diagnosis     ICD-10-CM    1. Traumatic complete tear of right rotator cuff, initial encounter  S46.011A Ambulatory Referral to Physical Therapy          Start Time: 1045  Stop Time: 1125  Total time in clinic (min): 40 minutes    Assessment  Assessment details: Kilo is an 83 year old patient presenting to OPPT for evaluation regarding R rotator cuff dysfunction as primary movement diagnosis. Upon evaluation they show impairments in the following areas: decreased R shoulder active and passive range of motion compared to L. Decreased strength on R scapulothoracic musculature, most evident with Shoulder ER, flexion, abduction as well as localized testing for lower and middle trapezius. (+) special testing include partial drop test, neer's, infraspinatus, and painful arc testing. Currently has solid compensation ability to reduce pain and maximize function, but impairments starting to stack up especially has he continues to age. These impairments limit their ability to perform daily activities as well as their previous level of function causing decreased quality of life.    HEP delivered to address impairments of R shoulder ER, flexion, and abduction limitations of motion, and initiation of gentle strengthening and isometrics for R shoulder ER. Patient demonstrated and verbalized good understanding of exercises. Bilateral ER and horizontal abd painful for patient.     Patient requires continued skilled PT services in order to improve aforementioned impairments so that they are able to return to highest level of function possible. Without initiation of skilled PT services, patient will continue to have limited motion, decreased R shoulder strength leading to decreased function and mobility leading to decreased quality of life.      Impairments: abnormal or  restricted ROM, activity intolerance, impaired physical strength, lacks appropriate home exercise program and pain with function    Symptom irritability: moderateUnderstanding of Dx/Px/POC: good   Prognosis: fair    Goals  Short-Term Goals (4 weeks)   1.  Patient will decrease worst  rating of pain by 2/10 to improve quality of life   2. Pt will increase strength by 0.5 MMT to improve quality of life with improved efficiency of transfers and daily activities  3. Patient will be able to perform home and household duties with 2/10 reduction in pain indicating improved QOL   4. Patient will improve R shoulder AROM by 25% indicating improved mobility of affected area   5. Improve R shoulder PROM to symmetrical to unaffected side in all directions       Long-Term Goals (8 weeks)   1. Patient's R shoulder AROM will be symmetrical to L in all directions   2. Patient will decrease pain by 4/10 at worst in comparison to IE indicating significant reduction in pain and improved quality of life   3. Patient will improve greater than predicted FOTO score   4. Patient will be able to perform household and hobby activities without increase in pain > or equal to 2/10 indicating ability to independently manage pain symptoms to accomplish daily activities.   5. Patient will be independent with HEP with good form accomplished       Plan  Patient would benefit from: skilled physical therapy  Planned modality interventions: cryotherapy and thermotherapy: hydrocollator packs  Planned therapy interventions: abdominal trunk stabilization, IASTM, manual therapy, neuromuscular re-education, patient education, postural training, strengthening, stretching, therapeutic activities, therapeutic exercise, home exercise program, graded exercise, flexibility, gait training and balance  Frequency: 2x week  Plan of Care beginning date: 3/13/2024  Plan of Care expiration date: 5/12/2024  Treatment plan discussed with: patient      Subjective  "Evaluation    History of Present Illness  Mechanism of injury: Kilo is an 83 year old patient presenting to OPPT for evaluation regarding R rotator cuff dysfunction. Notes that the shearing motion that is similar to horizontal abduction bothers him. Lifting his arm overhead for long periods of time starts to bother him. Reports that he tore his R rotator cuff a long time ago, happen when he tripped and grabbed a bar to try and catch himself and heard a crack. Since it happened a while ago, he reports that the surgeon said it was unoperable.     Aggravating activities: \"I don't really have any trouble lifting, but I have a lot of trees to trim and that motion bothers him\"    Per EMR: \"Findings consistent with right shoulder chronic rotator cuff tear and left shoulder osteoarthritis. Imaging and prognosis reviewed with patient. Discussed due to chronic rotator cuff tear,  he is not ideal candidate for rotator cuff repair. At this time he was given cortisone injection right shoulder, tolerated well with good pain relief. Cold compress today. Repeat in 3-4 months if needed. He will have referral for physical therapy as well. Avoid strenuous lifting with right arm. Discussed if he wanted to see if candidate for rotator cuff repair he would need MRI. If tissue of poor quality or retracted to far then candidate for reverse total shoulder.  Tylenol, topical creams for pain. See back as needed.  All patient's questions were answered to his satisfaction.  This note is created using dictation transcription.  It may contain typographical errors, grammatical errors, improperly dictated words, background noise and other errors.\"   Pain  Current pain ratin  At best pain ratin  At worst pain ratin  Location: \"Front and the top\" of the R shoulder  Quality: dull ache  Relieving factors: medications (Once in a while OTC medication)  Aggravating factors: overhead activity and lifting (Cutting trees)  Progression: no " change      Diagnostic Tests  X-ray: normal    FCE comments: RIGHT SHOULDER     INDICATION:   Pain in right shoulder. Other chronic pain. Pain in left shoulder.      COMPARISON:  None.     VIEWS:  XR SHOULDER 2+ VW RIGHT  Images: 4     FINDINGS:     There is no acute fracture or dislocation.     There is joint space narrowing with osteophytosis in the acromioclavicular joint. The glenohumeral joint is unremarkable..     No lytic or blastic osseous lesion.     Soft tissues are unremarkable.     IMPRESSION:        Moderate AC osteoarthritis. No acute abnormality.        Objective      Standing Posture: Slight forward head and rounded shoulders.     Shoulder Active ROM:   Flexion   L  170 R  143  Abduction   L  165 R 148   Functional IR :  L T7  R  T7  Functional ER:  L C7 R C7     Shoulder Passive ROM:   Flexion  L  176 R  166   Abduction  L  169 R 158 crepitus/clunk upon return   IR:   L  R   ER:   L  R    Thoracic Mobility   Flexion 25% limited   Extension 50% limited   Rotation (R) 50% limited   Rotation (L) 50% limited     Palpation: TTP lateral shoulder, infraspinatus, supraspinatus, R UT tenderness     Special Tests:   Antoinesophie almodovar (-)   Neer's (+)   Speeds (-)   Drop Arm mildly positive during initial eccentric portion but able to improve as arm gets into less abduction   Infraspinatus (+) on R   Painful Arc (+) ~ 60 deg elevation before pain.  Apprehension (-)   90-90 ER (+) for weakness and pain   Biceps Load II (-) on R     Upper Quarter Screen:   Dermatomes: wnl   Myotomes: WNL   Reflexes: 2+ biceps, roberto's (-)     Upper Quarter Strength:   Shoulder   Flexion  L 4+ R 3+   Abduction L 4+ R 3+   ER   L 4+  R 3  IR   L 5 R 4+  Extension  L 5  R  4      Low trap L 4 R  3-   Mid Trap L 4 R  3-      Precautions: CAD, PVD, Bradycardia, Stenosis L carotid, Hx of embolism of arteries of BLE, Dysphagia, T2DM, Diabetic Polyneuropathy     EPOC 5/11/24    Manuals             R shoulder PROM              R shoulder  "mobs              R shoulder STM                           Neuro Re-Ed             RTC isometrics  ER walkout [place] TB 2x10             No money  Yellow TB 2x10 (p!)            Horizontal Abduction  Yellow TB  2x10 (p!)            Push up modified              Serratus (+) push up              I,T,Y NV                          Ther Ex             Row/Ext  NV             Scaption  NV            Wall Slides  Flex and abd 10x            ER AAROM  Supine 20x5\"            Sidelying ER  NV             Sidelying flex  NV             Sidelying abd  NV                                                    UBE  for scap endurance  NV             Pulleys  NV             Ther Activity                                       Gait Training                                       Modalities                                          Access Code: [email or web address]  URL: [email or web address]  Date: 03/13/2024  Prepared by: Eliceo Zhou    Exercises  - Shoulder Flexion Wall Slide with Resistance Band  - 1 x daily - 7 x weekly - 2 sets - 10 reps - 5 seconds hold  - Standing Shoulder Abduction Wall Slide with Thumb Out  - 1 x daily - 7 x weekly - 2 sets - 10 reps - 5 seconds hold  - Supine Shoulder External Rotation in 45 Degrees Abduction AAROM with Dowel  - 1 x daily - 7 x weekly - 2 sets - 10 reps - 5 seconds hold  - Shoulder External Rotation and Scapular Retraction with Resistance  - 1 x daily - 7 x weekly - 3 sets - 10 reps - 1 second hold  - Standing Shoulder Horizontal Abduction with Resistance  - 1 x daily - 7 x weekly - 3 sets - 10 reps - 1 second hold  - External Rotation Reactive Isometrics with Flex Bar  - 1 x daily - 7 x weekly - 3 sets - 10 reps - 5 seconds hold  "

## 2024-03-13 ENCOUNTER — EVALUATION (OUTPATIENT)
Dept: PHYSICAL THERAPY | Facility: CLINIC | Age: 84
End: 2024-03-13
Payer: MEDICARE

## 2024-03-13 DIAGNOSIS — S46.011A TRAUMATIC COMPLETE TEAR OF RIGHT ROTATOR CUFF, INITIAL ENCOUNTER: ICD-10-CM

## 2024-03-13 PROCEDURE — 97112 NEUROMUSCULAR REEDUCATION: CPT

## 2024-03-13 PROCEDURE — 97162 PT EVAL MOD COMPLEX 30 MIN: CPT

## 2024-03-18 ENCOUNTER — OFFICE VISIT (OUTPATIENT)
Dept: PHYSICAL THERAPY | Facility: CLINIC | Age: 84
End: 2024-03-18
Payer: MEDICARE

## 2024-03-18 DIAGNOSIS — S46.011A TRAUMATIC COMPLETE TEAR OF RIGHT ROTATOR CUFF, INITIAL ENCOUNTER: Primary | ICD-10-CM

## 2024-03-18 PROCEDURE — 97112 NEUROMUSCULAR REEDUCATION: CPT

## 2024-03-18 PROCEDURE — 97110 THERAPEUTIC EXERCISES: CPT

## 2024-03-18 PROCEDURE — 97140 MANUAL THERAPY 1/> REGIONS: CPT

## 2024-03-18 NOTE — PROGRESS NOTES
"Daily Note     Today's date: 3/18/2024  Patient name: Kilo Mix  : 1940  MRN: [id number]  Referring provider: Meron Christy MD  Dx:   Encounter Diagnosis     ICD-10-CM    1. Traumatic complete tear of right rotator cuff, initial encounter  S46.011A                      Subjective: No complaints following I.E Reports pruning 4 bushes over the weekend without issue. Admits to being noncompliant with HEP.       Objective: See treatment diary below      Assessment: Tolerated treatment well. Progressed through treatment plan as charted without issue. AA/PROM and light RTC strengthening tolerated well with no complaints of pain. Assess response and progress PRE's NV if appropriate. Patient demonstrated fatigue post treatment, exhibited good technique with therapeutic exercises, and would benefit from continued PT      Plan: Continue per plan of care.      Precautions: CAD, PVD, Bradycardia, Stenosis L carotid, Hx of embolism of arteries of BLE, Dysphagia, T2DM, Diabetic Polyneuropathy     EPOC 24    Manuals  3/18           R shoulder PROM   CM  10'            R shoulder mobs              R shoulder STM                           Neuro Re-Ed  3/18           RTC isometrics  ER walkout [place] TB 2x10  ER walkout OTB 2x           No money  Yellow TB 2x10 (p!)            Horizontal Abduction  Yellow TB  2x10 (p!)            Push up modified              Serratus (+) push up              I,T,Y NV                          Ther Ex  3/18           Row/Ext  NV  Rows: GTB 2x10    EXT: OTB  2x10           Scaption  NV 2x10           Wall Slides  Flex and abd 10x Flex and abd  10x           ER AAROM  Supine 20x5\" Supine 20x5\"           Sidelying ER  NV  2x10           Sidelying flex  NV  2x10           Sidelying abd  NV  2x10                                                  UBE  for scap endurance  NV  Retro  3 mins           Pulleys  NV  Flex  10\"x 2 mins            Ther Activity                                     "   Gait Training                                       Modalities

## 2024-03-20 ENCOUNTER — APPOINTMENT (OUTPATIENT)
Dept: PHYSICAL THERAPY | Facility: CLINIC | Age: 84
End: 2024-03-20
Payer: MEDICARE

## 2024-03-22 ENCOUNTER — OFFICE VISIT (OUTPATIENT)
Dept: PHYSICAL THERAPY | Facility: CLINIC | Age: 84
End: 2024-03-22
Payer: MEDICARE

## 2024-03-22 DIAGNOSIS — S46.011A TRAUMATIC COMPLETE TEAR OF RIGHT ROTATOR CUFF, INITIAL ENCOUNTER: Primary | ICD-10-CM

## 2024-03-22 PROCEDURE — 97112 NEUROMUSCULAR REEDUCATION: CPT

## 2024-03-22 PROCEDURE — 97110 THERAPEUTIC EXERCISES: CPT

## 2024-03-22 NOTE — PROGRESS NOTES
"Daily Note     Today's date: 3/22/2024  Patient name: Kilo Mix  : 1940  MRN: [id number]  Referring provider: Meron Christy MD  Dx:   Encounter Diagnosis     ICD-10-CM    1. Traumatic complete tear of right rotator cuff, initial encounter  S46.011A                      Subjective: Pt reports feeling fine after lv.       Objective: See treatment diary below      Assessment: Tolerated treatment well. Progressed and introduced new TE with great tolerance and technique.   Patient demonstrated fatigue post treatment, exhibited good technique with therapeutic exercises, and would benefit from continued PT      Plan: Continue per plan of care.      Precautions: CAD, PVD, Bradycardia, Stenosis L carotid, Hx of embolism of arteries of BLE, Dysphagia, T2DM, Diabetic Polyneuropathy     EPOC 24    Manuals  3/18 3/22          R shoulder PROM   CM  10'  Wdc 8'          R shoulder mobs              R shoulder STM                           Total TIme   8'                       Neuro Re-Ed  3/18           RTC isometrics  ER walkout [place] TB 2x10  ER walkout OTB 2x           No money  Yellow TB 2x10 (p!)            Horizontal Abduction  Yellow TB  2x10 (p!)            Push up modified              Prone I,T,Y NV   1# 20 ea          Prone Rows   1# 20                       Ther Ex  3/18           TB Row/Ext  NV  Rows: GTB 2x10    EXT: OTB  2x10 GTB 20x ea          TB ER/IR   GTB 20x          Scaption  NV 2x10           Wall Slides  Flex and abd 10x Flex and abd  10x Flex/abd 20x          ER AAROM  Supine 20x5\" Supine 20x5\"           Sidelying ER  NV  2x10 1# 20          Sidelying flex  NV  2x10 1# 20          Sidelying abd  NV  2x10 1# 20          Supine punches   1# 20          Supine Flexion   1# 20                       UBE  for scap endurance  NV  Retro  3 mins Retro 3min          Pulleys  NV  Flex  10\"x 2 mins  Flex 3'          Ther Activity                                       Gait Training                    "                    Modalities

## 2024-03-25 ENCOUNTER — OFFICE VISIT (OUTPATIENT)
Dept: PHYSICAL THERAPY | Facility: CLINIC | Age: 84
End: 2024-03-25
Payer: MEDICARE

## 2024-03-25 DIAGNOSIS — S46.011D TRAUMATIC COMPLETE TEAR OF RIGHT ROTATOR CUFF, SUBSEQUENT ENCOUNTER: Primary | ICD-10-CM

## 2024-03-25 PROCEDURE — 97110 THERAPEUTIC EXERCISES: CPT | Performed by: PHYSICAL THERAPIST

## 2024-03-25 PROCEDURE — 97140 MANUAL THERAPY 1/> REGIONS: CPT | Performed by: PHYSICAL THERAPIST

## 2024-03-25 PROCEDURE — 97112 NEUROMUSCULAR REEDUCATION: CPT | Performed by: PHYSICAL THERAPIST

## 2024-03-25 NOTE — PROGRESS NOTES
"Daily Note     Today's date: 3/25/2024  Patient name: Kilo Mix  : 1940  MRN: [id number]  Referring provider: Meron Christy MD  Dx:   Encounter Diagnosis     ICD-10-CM    1. Traumatic complete tear of right rotator cuff, initial encounter  S46.011A                      Subjective:  Pt reports his shoulder is feeling much better since his injection. Pt notes his shoulder will be put to the test tomorrow. He plans on trimming tree branches.      Objective: See treatment diary below      Assessment: Tolerated treatment well. Patient exhibited good technique with therapeutic exercises. Pt had increased ER weakness with green tband. Therapist modified ex to orange tband. Pt fatigued at the end of the session.      Plan: Continue per plan of care. Focus on improving ROM and strength to max ability     Dx: R shoulder RTC tear  EPOC: 24  CO-MORBIDITIES:  PERSONAL FACTORS:   Precautions: CAD, PVD, Bradycardia, Stenosis L carotid, Hx of embolism of arteries of BLE, Dysphagia, T2DM, Diabetic Polyneuropathy         Manuals  3/18 3/22 3/25         R shoulder PROM   CM  10'  Wdc 8' 4'         R shoulder mobs     1'         R shoulder MFR    5'                      Total TIme   8' 10'                      Neuro Re-Ed  3/18           RTC isometrics  ER walkout [place] TB 2x10  ER walkout OTB 2x           No money  Yellow TB 2x10 (p!)            Horizontal Abduction  Yellow TB  2x10 (p!)            Wall push ups    10         Prone I,T,Y NV   1# 20 ea          Prone Rows   1# 20 1# x20                      Ther Ex  3/18           TB Row/Ext  NV  Rows: GTB 2x10    EXT: OTB  2x10 GTB 20x ea GTB x20 ea         TB ER    OTB x20         TB IR   GTB 20x GTB x20         Scaption  NV 2x10           Wall Slides: flex and abd Flex and abd 10x Flex and abd  10x Flex/abd 20x x20         Supine ER AAROM  cane Supine 20x5\" Supine 20x5\"  10x5\"         Sidelying ER  NV  2x10 1# 20 1# x20         Sidelying flex  NV  2x10 1# 20 1# " "x10         Sidelying abd  NV  2x10 1# 20 1# x10         Supine punches   1# 20 2# x10         Supine Flexion AAROM   1# 20 10x5\"                      BW UBE  for scap endurance  NV  Retro  3 mins Retro 3min 3'         Pulleys  NV  Flex  10\"x 2 mins  Flex 3'          Ther Activity                                       Gait Training                                       Modalities                                              "

## 2024-03-29 ENCOUNTER — APPOINTMENT (OUTPATIENT)
Dept: PHYSICAL THERAPY | Facility: CLINIC | Age: 84
End: 2024-03-29
Payer: MEDICARE

## 2024-04-01 ENCOUNTER — APPOINTMENT (OUTPATIENT)
Dept: PHYSICAL THERAPY | Facility: CLINIC | Age: 84
End: 2024-04-01
Payer: MEDICARE

## 2024-04-05 ENCOUNTER — OFFICE VISIT (OUTPATIENT)
Dept: PHYSICAL THERAPY | Facility: CLINIC | Age: 84
End: 2024-04-05
Payer: MEDICARE

## 2024-04-05 DIAGNOSIS — S46.011A TRAUMATIC COMPLETE TEAR OF RIGHT ROTATOR CUFF, INITIAL ENCOUNTER: ICD-10-CM

## 2024-04-05 DIAGNOSIS — S46.011D TRAUMATIC COMPLETE TEAR OF RIGHT ROTATOR CUFF, SUBSEQUENT ENCOUNTER: Primary | ICD-10-CM

## 2024-04-05 PROCEDURE — 97140 MANUAL THERAPY 1/> REGIONS: CPT | Performed by: PHYSICAL THERAPIST

## 2024-04-05 PROCEDURE — 97112 NEUROMUSCULAR REEDUCATION: CPT | Performed by: PHYSICAL THERAPIST

## 2024-04-05 PROCEDURE — 97110 THERAPEUTIC EXERCISES: CPT | Performed by: PHYSICAL THERAPIST

## 2024-04-05 NOTE — PROGRESS NOTES
Daily Note     Today's date: 2024  Patient name: Kilo Mix  : 1940  MRN: [id number]  Referring provider: Meron Christy MD  Dx:   Encounter Diagnosis     ICD-10-CM    1. Traumatic complete tear of right rotator cuff, subsequent encounter  S46.011D       2. Traumatic complete tear of right rotator cuff, initial encounter  S46.011A                      Subjective: Pt reports no pain upon arrival to therapy. Pt was able to cut down 3 trees last week without aggravation of symptoms. Pt      Objective: See treatment diary below      Assessment: Tolerated treatment well. Patient demonstrated fatigue post treatment. Pt continues to have weakness with shoulder ER, but is able to complete ex. Pt has full painfree ROM with no pain with manual therapy.      Plan: Continue per plan of care. Continue to progress strengthening as tolerated.     Dx: R shoulder RTC tear  EPOC: 24  CO-MORBIDITIES:  PERSONAL FACTORS:   Precautions: CAD, PVD, Bradycardia, Stenosis L carotid, Hx of embolism of arteries of BLE, Dysphagia, T2DM, Diabetic Polyneuropathy         Manuals  3/18 3/22 3/25 4/5        R shoulder PROM   CM  10'  Wdc 8' 4' 4'        R shoulder mobs     1' 1'        R shoulder MFR    5' 5'                     Total TIme   8' 10' 10'                     Neuro Re-Ed  3/18           RTC isometrics  ER walkout [place] TB 2x10  ER walkout OTB 2x           No money  Yellow TB 2x10 (p!)    Yellow supine x15        Horizontal Abduction  Yellow TB  2x10 (p!)    YTB x10        Wall push ups    10 15        Prone I,T,Y NV   1# 20 ea          Prone Rows   1# 20 1# x20 1# x20                     Ther Ex  3/18           TB Row/Ext  NV  Rows: GTB 2x10    EXT: OTB  2x10 GTB 20x ea GTB x20 ea GTB x20 ea        TB ER    OTB x20 OTB x10        TB IR   GTB 20x GTB x20 GTB x20        Scaption  NV 2x10           Wall Slides: flex and abd Flex and abd 10x Flex and abd  10x Flex/abd 20x x20 X10 ea        Supine ER AAROM  cane Supine  "20x5\" Supine 20x5\"  10x5\" 10x5\"        Sidelying ER  NV  2x10 1# 20 1# x20 1# x20        Sidelying flex  NV  2x10 1# 20 1# x10 1# x20        Sidelying abd  NV  2x10 1# 20 1# x10         Supine punches   1# 20 2# x10 1# x10 centering        Supine Flexion AAROM   1# 20 10x5\" 10 2#                     BW UBE  for scap endurance  NV  Retro  3 mins Retro 3min 3' 3'        Pulleys  NV  Flex  10\"x 2 mins  Flex 3'  Flex 2'        Ther Activity                                       Gait Training                                       Modalities                                                "

## 2024-04-08 ENCOUNTER — OFFICE VISIT (OUTPATIENT)
Dept: PHYSICAL THERAPY | Facility: CLINIC | Age: 84
End: 2024-04-08
Payer: MEDICARE

## 2024-04-08 DIAGNOSIS — S46.011A TRAUMATIC COMPLETE TEAR OF RIGHT ROTATOR CUFF, INITIAL ENCOUNTER: ICD-10-CM

## 2024-04-08 DIAGNOSIS — S46.011D TRAUMATIC COMPLETE TEAR OF RIGHT ROTATOR CUFF, SUBSEQUENT ENCOUNTER: Primary | ICD-10-CM

## 2024-04-08 PROCEDURE — 97112 NEUROMUSCULAR REEDUCATION: CPT

## 2024-04-08 PROCEDURE — 97110 THERAPEUTIC EXERCISES: CPT

## 2024-04-08 PROCEDURE — 97140 MANUAL THERAPY 1/> REGIONS: CPT

## 2024-04-08 NOTE — PROGRESS NOTES
"Daily Note     Today's date: 2024  Patient name: Kilo Mix  : 1940  MRN: [id number]  Referring provider: Meron Christy MD  Dx:   Encounter Diagnosis     ICD-10-CM    1. Traumatic complete tear of right rotator cuff, subsequent encounter  S46.011D       2. Traumatic complete tear of right rotator cuff, initial encounter  S46.011A                      Subjective: Pt reports feeling fine today.       Objective: See treatment diary below  FOTO given (___) and Re-eval Schedule (___)      Assessment: Tolerated treatment well. Patient demonstrated fatigue post treatment. Pt states performed below TE with good tolerance, however, noted increased muscle fatigue.       Plan: Continue per plan of care. Continue to progress strengthening as tolerated.     Dx: R shoulder RTC tear  EPOC: 24  CO-MORBIDITIES:  PERSONAL FACTORS:   Precautions: CAD, PVD, Bradycardia, Stenosis L carotid, Hx of embolism of arteries of BLE, Dysphagia, T2DM, Diabetic Polyneuropathy         Manuals  3/18 3/22 3/25 4       R shoulder PROM   CM  10'  Wdc 8' 4' 4' 8'       R shoulder mobs     1' 1'        R shoulder MFR    5' 5'                     Total TIme   8' 10' 10'                     Neuro Re-Ed  3/18           RTC isometrics  ER walkout [place] TB 2x10  ER walkout OTB 2x           No money  Yellow TB 2x10 (p!)    Yellow supine x15 Gtb x20       Horizontal Abduction  Yellow TB  2x10 (p!)    YTB x10        Wall push ups    10 15 x20       Prone I,T,Y NV   1# 20 ea   0# 20       Prone Rows   1# 20 1# x20 1# x20 0# 20                    Ther Ex  3/18           TB Row/Ext  NV  Rows: GTB 2x10    EXT: OTB  2x10 GTB 20x ea GTB x20 ea GTB x20 ea Btb 20x       TB ER    OTB x20 OTB x10 Gtb x20       TB IR   GTB 20x GTB x20 GTB x20 Gtb x20       Scaption  NV 2x10           Wall Slides: flex and abd Flex and abd 10x Flex and abd  10x Flex/abd 20x x20 X10 ea X20 ea       Supine ER AAROM  cane Supine 20x5\" Supine 20x5\"  10x5\" 10x5\"      " "  Sidelying ER  NV  2x10 1# 20 1# x20 1# x20 1# x20       Sidelying flex  NV  2x10 1# 20 1# x10 1# x20 1# x20       Sidelying abd  NV  2x10 1# 20 1# x10  1# x20       Supine punches   1# 20 2# x10 1# x10 centering 1# x20       Supine Flexion AAROM   1# 20 10x5\" 10 2# 1# x20  AROM                    BW UBE  for scap endurance  NV  Retro  3 mins Retro 3min 3' 3' 3'       Pulleys  NV  Flex  10\"x 2 mins  Flex 3'  Flex 2' NP       Ther Activity                                       Gait Training                                       Modalities                                                "

## 2024-04-12 ENCOUNTER — OFFICE VISIT (OUTPATIENT)
Dept: PHYSICAL THERAPY | Facility: CLINIC | Age: 84
End: 2024-04-12
Payer: MEDICARE

## 2024-04-12 DIAGNOSIS — S46.011A TRAUMATIC COMPLETE TEAR OF RIGHT ROTATOR CUFF, INITIAL ENCOUNTER: ICD-10-CM

## 2024-04-12 DIAGNOSIS — S46.011D TRAUMATIC COMPLETE TEAR OF RIGHT ROTATOR CUFF, SUBSEQUENT ENCOUNTER: Primary | ICD-10-CM

## 2024-04-12 PROCEDURE — 97112 NEUROMUSCULAR REEDUCATION: CPT

## 2024-04-12 PROCEDURE — 97140 MANUAL THERAPY 1/> REGIONS: CPT

## 2024-04-12 PROCEDURE — 97110 THERAPEUTIC EXERCISES: CPT

## 2024-04-12 NOTE — PROGRESS NOTES
Daily Note     Today's date: 2024  Patient name: Kilo Mix  : 1940  MRN: [id number]  Referring provider: Meron Christy MD  Dx:   Encounter Diagnosis     ICD-10-CM    1. Traumatic complete tear of right rotator cuff, subsequent encounter  S46.011D       2. Traumatic complete tear of right rotator cuff, initial encounter  S46.011A           Start Time: 1004  Stop Time: 1048  Total time in clinic (min): 44 minutes    Subjective: Pt reports he isn't doing too good, as he's been having more pain/soreness in the past few days.       Objective: See treatment diary below      Assessment: Tolerated treatment fair, as some modifications were needed to reduce #/reps due to increased pain. Tolerated MT well with mobs. Patient demonstrated fatigue post treatment, exhibited good technique with therapeutic exercises, and would benefit from continued PT      Plan: Continue per plan of care.      Dx: R shoulder RTC tear  EPOC: 24  CO-MORBIDITIES:  PERSONAL FACTORS:   Precautions: CAD, PVD, Bradycardia, Stenosis L carotid, Hx of embolism of arteries of BLE, Dysphagia, T2DM, Diabetic Polyneuropathy         Manuals  3/18 3/22 3/25 4/      R shoulder PROM   CM  10'  Wdc 8' 4' 4' 8' 5'      R shoulder mobs     1' 1'  5'      R shoulder MFR    5' 5'                     Total TIme   8' 10' 10'  10'                   Neuro Re-Ed  3/18           RTC isometrics  ER walkout [place] TB 2x10  ER walkout OTB 2x           No money  Yellow TB 2x10 (p!)    Yellow supine x15 Gtb x20 Gtb  x20      Horizontal Abduction  Yellow TB  2x10 (p!)    YTB x10        Wall push ups    10 15 x20 x10      Prone I,T,Y NV   1# 20 ea   0# 20 0# 20      Prone Rows   1# 20 1# x20 1# x20 0# 20 0# 20                   Ther Ex  3/18           TB Row/Ext  NV  Rows: GTB 2x10    EXT: OTB  2x10 GTB 20x ea GTB x20 ea GTB x20 ea Btb 20x Btb 20x ea      TB ER    OTB x20 OTB x10 Gtb x20 Gtb x20      TB IR   GTB 20x GTB x20 GTB x20 Gtb x20 Gtb   x20  "     Scaption  NV 2x10           Wall Slides: flex and abd Flex and abd 10x Flex and abd  10x Flex/abd 20x x20 X10 ea X20 ea X20 ea       Supine ER AAROM  cane Supine 20x5\" Supine 20x5\"  10x5\" 10x5\"        Sidelying ER  NV  2x10 1# 20 1# x20 1# x20 1# x20 1# x20      Sidelying flex  NV  2x10 1# 20 1# x10 1# x20 1# x20 1# x15, 0# x5      Sidelying abd  NV  2x10 1# 20 1# x10  1# x20 0# x20      Supine punches   1# 20 2# x10 1# x10 centering 1# x20 1# x20      Supine Flexion AAROM   1# 20 10x5\" 10 2# 1# x20  AROM 0# x20 AROM                    BW UBE  for scap endurance  NV  Retro  3 mins Retro 3min 3' 3' 3' 3'      Pulleys  NV  Flex  10\"x 2 mins  Flex 3'  Flex 2' NP 2' Flex      Ther Activity                                       Gait Training                                       Modalities                                                  "

## 2024-04-13 DIAGNOSIS — E11.49 DIABETES MELLITUS TYPE 2 WITH NEUROLOGICAL MANIFESTATIONS (HCC): ICD-10-CM

## 2024-04-13 RX ORDER — METFORMIN HYDROCHLORIDE 500 MG/1
TABLET, EXTENDED RELEASE ORAL
Qty: 90 TABLET | Refills: 0 | Status: SHIPPED | OUTPATIENT
Start: 2024-04-13

## 2024-04-15 ENCOUNTER — APPOINTMENT (OUTPATIENT)
Dept: PHYSICAL THERAPY | Facility: CLINIC | Age: 84
End: 2024-04-15
Payer: MEDICARE

## 2024-04-19 ENCOUNTER — EVALUATION (OUTPATIENT)
Dept: PHYSICAL THERAPY | Facility: CLINIC | Age: 84
End: 2024-04-19
Payer: MEDICARE

## 2024-04-19 DIAGNOSIS — S46.011A TRAUMATIC COMPLETE TEAR OF RIGHT ROTATOR CUFF, INITIAL ENCOUNTER: ICD-10-CM

## 2024-04-19 DIAGNOSIS — S46.011D TRAUMATIC COMPLETE TEAR OF RIGHT ROTATOR CUFF, SUBSEQUENT ENCOUNTER: Primary | ICD-10-CM

## 2024-04-19 PROCEDURE — 97140 MANUAL THERAPY 1/> REGIONS: CPT | Performed by: PHYSICAL THERAPIST

## 2024-04-19 PROCEDURE — 97110 THERAPEUTIC EXERCISES: CPT | Performed by: PHYSICAL THERAPIST

## 2024-04-19 NOTE — PROGRESS NOTES
PT Discharge    Today's date: 2024  Patient name: Kilo Mix  : 1940  MRN: [id number]  Referring provider: Meron Christy MD  Dx:   Encounter Diagnosis     ICD-10-CM    1. Traumatic complete tear of right rotator cuff, subsequent encounter  S46.011D       2. Traumatic complete tear of right rotator cuff, initial encounter  S46.011A                      Assessment  Assessment details: Since starting skilled PT, pain levels are decreased, R shoulder ROM and strength have improved with good functional progress. Recommend pt be discharged from skilled PT to an independent HEP.      Symptom irritability: lowUnderstanding of Dx/Px/POC: good   Prognosis: fair    Goals  Short-Term Goals (4 weeks)   1.  Patient will decrease worst  rating of pain by 2/10 to improve quality of life - not met  2. Pt will increase strength by 0.5 MMT to improve quality of life with improved efficiency of transfers and daily activities- met  3. Patient will be able to perform home and household duties with 2/10 reduction in pain indicating improved QOL - met  4. Patient will improve R shoulder AROM by 25% indicating improved mobility of affected area - met  5. Improve R shoulder PROM to symmetrical to unaffected side in all directions - met      Long-Term Goals (8 weeks)   1. Patient's R shoulder AROM will be symmetrical to L in all directions - met  2. Patient will decrease pain by 4/10 at worst in comparison to IE indicating significant reduction in pain and improved quality of life- met   3. Patient will improve greater than predicted FOTO score - met  4. Patient will be able to perform household and hobby activities without increase in pain > or equal to 2/10 indicating ability to independently manage pain symptoms to accomplish daily activities. - met  5. Patient will be independent with HEP with good form accomplished - met      Plan  Frequency: D/c to HEP.  Plan of Care beginning date: 3/13/2024  Plan of Care expiration  "date: 2024  Treatment plan discussed with: patient and PTA      Subjective Evaluation    History of Present Illness  Mechanism of injury: I.E; Kilo is an 83 year old patient presenting to OPPT for evaluation regarding R rotator cuff dysfunction. Notes that the shearing motion that is similar to horizontal abduction bothers him. Lifting his arm overhead for long periods of time starts to bother him. Reports that he tore his R rotator cuff a long time ago, happen when he tripped and grabbed a bar to try and catch himself and heard a crack. Since it happened a while ago, he reports that the surgeon said it was unoperable.     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 Pt is able to reach overhead with greater ease. Pt has been using his chain saw without aggravation of symptoms. Pt is able to sleep well at night. Pt reports no functional limitations with household activities.  Pain  Current pain ratin  At best pain ratin  At worst pain ratin  Location: \"Front and the top\" of the R shoulder  Quality: dull ache  Relieving factors: medications (Once in a while OTC medication)  Aggravating factors: overhead activity and lifting (Cutting trees)  Progression: no change      Diagnostic Tests  X-ray: normal    FCE comments: RIGHT SHOULDER     INDICATION:   Pain in right shoulder. Other chronic pain. Pain in left shoulder.      COMPARISON:  None.     VIEWS:  XR SHOULDER 2+ VW RIGHT  Images: 4     FINDINGS:     There is no acute fracture or dislocation.     There is joint space narrowing with osteophytosis in the acromioclavicular joint. The glenohumeral joint is unremarkable..     No lytic or blastic osseous lesion.     Soft tissues are unremarkable.     IMPRESSION:        Moderate AC osteoarthritis. No acute abnormality.        Objective     Active Range of Motion     Right Shoulder "   Flexion: 140 degrees   Abduction: 150 degrees   External rotation 45°: 85 degrees   Internal rotation 45°: WFL    Passive Range of Motion     Right Shoulder   Flexion: 175 degrees   Abduction: WFL  External rotation 45°: 90 degrees   Internal rotation 45°: WFL    Strength/Myotome Testing     Right Shoulder     Planes of Motion   Flexion: 4+   Abduction: 4+   External rotation at 0°: 4+   Internal rotation at 0°: 5         Standing Posture: Slight forward head and rounded shoulders.     Shoulder Active ROM:   Flexion   L  170 R  143  Abduction   L  165 R 148   Functional IR :  L T7  R  T7  Functional ER:  L C7 R C7     Shoulder Passive ROM:   Flexion  L  176 R  166   Abduction  L  169 R 158 crepitus/clunk upon return   IR:   L  R   ER:   L  R    Thoracic Mobility   Flexion 25% limited   Extension 50% limited   Rotation (R) 50% limited   Rotation (L) 50% limited     Palpation: TTP lateral shoulder, infraspinatus, supraspinatus, R UT tenderness     Special Tests:   Arash almodovar (-)   Neer's (+)   Speeds (-)   Drop Arm mildly positive during initial eccentric portion but able to improve as arm gets into less abduction   Infraspinatus (+) on R   Painful Arc (+) ~ 60 deg elevation before pain.  Apprehension (-)   90-90 ER (+) for weakness and pain   Biceps Load II (-) on R     Upper Quarter Screen:   Dermatomes: wnl   Myotomes: WNL   Reflexes: 2+ biceps, roberto's (-)     Upper Quarter Strength:   Shoulder   Flexion  L 4+ R 3+   Abduction L 4+ R 3+   ER   L 4+  R 3  IR   L 5 R 4+  Extension  L 5  R  4      Low trap L 4 R  3-   Mid Trap L 4 R  3-    -       Dx: R shoulder RTC tear  EPOC: 5/11/24  CO-MORBIDITIES:  PERSONAL FACTORS:   Precautions: CAD, PVD, Bradycardia, Stenosis L carotid, Hx of embolism of arteries of BLE, Dysphagia, T2DM, Diabetic Polyneuropathy         Manuals  3/18 3/22 3/25 4/5 4/8 4/12 4/19     R shoulder PROM   CM  10'  Wdc 8' 4' 4' 8' 5' Prog note 10'     R shoulder mobs     1' 1'  5' 2'     R  "shoulder MFR    5' 5'   5'                  Total TIme   8' 10' 10'  10' 17'                  Neuro Re-Ed  3/18           RTC isometrics  ER walkout [place] TB 2x10  ER walkout OTB 2x           No money  Yellow TB 2x10 (p!)    Yellow supine x15 Gtb x20 Gtb  x20      Horizontal Abduction  Yellow TB  2x10 (p!)    YTB x10        Wall push ups    10 15 x20 x10      Prone I,T,Y NV   1# 20 ea   0# 20 0# 20      Prone Rows   1# 20 1# x20 1# x20 0# 20 0# 20                   Ther Ex  3/18           TB Row/Ext  NV  Rows: GTB 2x10    EXT: OTB  2x10 GTB 20x ea GTB x20 ea GTB x20 ea Btb 20x Btb 20x ea BTB x20     TB ER    OTB x20 OTB x10 Gtb x20 Gtb x20 GTB x20     TB IR   GTB 20x GTB x20 GTB x20 Gtb x20 Gtb   x20 BTB x20     Scaption  NV 2x10           Wall Slides: flex and abd Flex and abd 10x Flex and abd  10x Flex/abd 20x x20 X10 ea X20 ea X20 ea       Supine ER AAROM  cane Supine 20x5\" Supine 20x5\"  10x5\" 10x5\"        Sidelying ER  NV  2x10 1# 20 1# x20 1# x20 1# x20 1# x20      Sidelying flex  NV  2x10 1# 20 1# x10 1# x20 1# x20 1# x15, 0# x5      Sidelying abd  NV  2x10 1# 20 1# x10  1# x20 0# x20      Supine punches   1# 20 2# x10 1# x10 centering 1# x20 1# x20      Supine Flexion AAROM   1# 20 10x5\" 10 2# 1# x20  AROM 0# x20 AROM                    BW UBE  for scap endurance  NV  Retro  3 mins Retro 3min 3' 3' 3' 3' 3'     Pulleys  NV  Flex  10\"x 2 mins  Flex 3'  Flex 2' NP 2' Flex      Ther Activity                                       Gait Training                                       Modalities                                                  "

## 2024-05-25 DIAGNOSIS — I10 PRIMARY HYPERTENSION: ICD-10-CM

## 2024-05-25 RX ORDER — HYDROCHLOROTHIAZIDE 25 MG/1
25 TABLET ORAL DAILY
Qty: 90 TABLET | Refills: 1 | Status: SHIPPED | OUTPATIENT
Start: 2024-05-25

## 2024-05-31 DIAGNOSIS — E78.5 DYSLIPIDEMIA: ICD-10-CM

## 2024-05-31 RX ORDER — ATORVASTATIN CALCIUM 20 MG/1
20 TABLET, FILM COATED ORAL DAILY
Qty: 90 TABLET | Refills: 1 | Status: SHIPPED | OUTPATIENT
Start: 2024-05-31

## 2024-06-08 DIAGNOSIS — I10 ESSENTIAL HYPERTENSION: ICD-10-CM

## 2024-06-08 RX ORDER — LISINOPRIL 10 MG/1
TABLET ORAL
Qty: 90 TABLET | Refills: 0 | Status: SHIPPED | OUTPATIENT
Start: 2024-06-08

## 2024-07-13 DIAGNOSIS — E11.49 DIABETES MELLITUS TYPE 2 WITH NEUROLOGICAL MANIFESTATIONS (HCC): ICD-10-CM

## 2024-07-13 RX ORDER — METFORMIN HYDROCHLORIDE 500 MG/1
TABLET, EXTENDED RELEASE ORAL
Qty: 90 TABLET | Refills: 3 | Status: SHIPPED | OUTPATIENT
Start: 2024-07-13

## 2024-08-22 ENCOUNTER — APPOINTMENT (OUTPATIENT)
Dept: LAB | Facility: HOSPITAL | Age: 84
End: 2024-08-22
Payer: MEDICARE

## 2024-08-22 DIAGNOSIS — E11.49 DIABETES MELLITUS TYPE 2 WITH NEUROLOGICAL MANIFESTATIONS (HCC): ICD-10-CM

## 2024-08-22 LAB
ANION GAP SERPL CALCULATED.3IONS-SCNC: 12 MMOL/L (ref 4–13)
BUN SERPL-MCNC: 22 MG/DL (ref 5–25)
CALCIUM SERPL-MCNC: 10 MG/DL (ref 8.4–10.2)
CHLORIDE SERPL-SCNC: 97 MMOL/L (ref 96–108)
CO2 SERPL-SCNC: 29 MMOL/L (ref 21–32)
CREAT SERPL-MCNC: 1.41 MG/DL (ref 0.6–1.3)
EST. AVERAGE GLUCOSE BLD GHB EST-MCNC: 128 MG/DL
GFR SERPL CREATININE-BSD FRML MDRD: 46 ML/MIN/1.73SQ M
GLUCOSE P FAST SERPL-MCNC: 114 MG/DL (ref 65–99)
HBA1C MFR BLD: 6.1 %
POTASSIUM SERPL-SCNC: 3.7 MMOL/L (ref 3.5–5.3)
SODIUM SERPL-SCNC: 138 MMOL/L (ref 135–147)

## 2024-08-22 PROCEDURE — 36415 COLL VENOUS BLD VENIPUNCTURE: CPT

## 2024-08-22 PROCEDURE — 83036 HEMOGLOBIN GLYCOSYLATED A1C: CPT

## 2024-08-22 PROCEDURE — 80048 BASIC METABOLIC PNL TOTAL CA: CPT

## 2024-08-23 DIAGNOSIS — I73.9 PERIPHERAL VASCULAR DISEASE (HCC): Primary | ICD-10-CM

## 2024-08-23 RX ORDER — CLOPIDOGREL BISULFATE 75 MG/1
75 TABLET ORAL DAILY
Qty: 90 TABLET | Refills: 1 | Status: SHIPPED | OUTPATIENT
Start: 2024-08-23

## 2024-08-23 NOTE — TELEPHONE ENCOUNTER
Reason for call:   [x] Refill   [] Prior Auth  [] Other:     Office:   [x] PCP/Provider - [facility]  -  Historical Provider, MD   [] Specialty/Provider -     Medication: clopidogrel (PLAVIX) 75 mg tablet     Dose/Frequency: Take 1 tablet by mouth daily     Quantity: not listed    Pharmacy: [facility] - [place] - [address] [facility]. [phone]    Does the patient have enough for 3 days?   [x] Yes   [] No - Send as HP to POD

## 2024-08-28 ENCOUNTER — HOSPITAL ENCOUNTER (EMERGENCY)
Facility: HOSPITAL | Age: 84
Discharge: HOME | End: 2024-08-28
Attending: EMERGENCY MEDICINE
Payer: MEDICARE

## 2024-08-28 VITALS
HEART RATE: 100 BPM | SYSTOLIC BLOOD PRESSURE: 155 MMHG | RESPIRATION RATE: 18 BRPM | TEMPERATURE: 98.5 F | DIASTOLIC BLOOD PRESSURE: 91 MMHG | OXYGEN SATURATION: 95 %

## 2024-08-28 DIAGNOSIS — T63.441A LOCAL REACTION TO BEE STING: Primary | ICD-10-CM

## 2024-08-28 PROCEDURE — 99282 EMERGENCY DEPT VISIT SF MDM: CPT

## 2024-08-28 PROCEDURE — 99283 EMERGENCY DEPT VISIT LOW MDM: CPT | Performed by: PHYSICIAN ASSISTANT

## 2024-08-28 RX ORDER — DIPHENHYDRAMINE HCL 25 MG
50 TABLET ORAL ONCE
Status: COMPLETED | OUTPATIENT
Start: 2024-08-28 | End: 2024-08-28

## 2024-08-28 RX ORDER — ACETAMINOPHEN 325 MG/1
650 TABLET ORAL ONCE
Status: COMPLETED | OUTPATIENT
Start: 2024-08-28 | End: 2024-08-28

## 2024-08-28 RX ADMIN — DIPHENHYDRAMINE HYDROCHLORIDE 50 MG: 25 TABLET ORAL at 20:37

## 2024-08-28 RX ADMIN — ACETAMINOPHEN 650 MG: 325 TABLET ORAL at 20:37

## 2024-08-29 NOTE — DISCHARGE INSTRUCTIONS
Continue benadryl as needed for stinging.   Return to ER if trouble breathing, swallowing or rash.

## 2024-08-29 NOTE — ED PROVIDER NOTES
History  Chief Complaint   Patient presents with    Bee Sting     Pt reports 100 bee stings pt denies allergy denies sob but reports pain, pt was trying to kill hive when it happened yellow jackets, pt reports pain in his extremities pt did not take anything for pain at home     Patient is an 82 y/o M that presents to the ED with multiple bee stings that occurred 1 hour ago.  Patient states he was moving a railroad tie and yellow jackets came swarming at him.  He states he was stung about 100 times to the head, face, neck, extremities.  Patient denies rash, trouble breathing or swallowing.  He states he just has pain.  He did not take anything for pain.       History provided by:  Patient      Prior to Admission Medications   Prescriptions Last Dose Informant Patient Reported? Taking?   B Complex Vitamins (VITAMIN B COMPLEX PO)  Self Yes No   Sig: Take 1 tablet by mouth daily   Cholecalciferol 1000 units CHEW  Self Yes No   Sig: Chew 1 tablet daily   amLODIPine (NORVASC) 5 mg tablet  Self Yes No   Sig: Take 1 tablet by mouth daily   ascorbic acid (VITAMIN C) 500 mg tablet  Self Yes No   Sig: Take 500 mg by mouth daily   aspirin 81 MG tablet  Self Yes No   Sig: Take 1 tablet by mouth daily   atorvastatin (LIPITOR) 20 mg tablet   No No   Sig: Take 1 tablet by mouth once daily   clopidogrel (PLAVIX) 75 mg tablet   No No   Sig: Take 1 tablet (75 mg total) by mouth daily   hydroCHLOROthiazide 25 mg tablet   No No   Sig: Take 1 tablet by mouth once daily   lisinopril (ZESTRIL) 10 mg tablet   No No   Sig: Take 1 tablet by mouth once daily   metFORMIN (GLUCOPHAGE-XR) 500 mg 24 hr tablet   No No   Sig: Take 1 tablet by mouth once daily   omega-3-acid ethyl esters (LOVAZA) 1 g capsule   Yes No   Sig: Take 2 g by mouth 2 (two) times a day   vitamin E, tocopherol, 400 units capsule  Self Yes No   Sig: Take 1 capsule by mouth daily      Facility-Administered Medications: None       Past Medical History:   Diagnosis Date     Diverticulitis of colon     Hypertension     Migraines     Myocardial infarction (HCC)     Prostate cancer (HCC)     Seasonal allergies        Past Surgical History:   Procedure Laterality Date    CARDIAC SURGERY      CAROTID ENDARTARECTOMY Right     thromboendarterectomy    CAROTID STENT Right     CATARACT EXTRACTION Left     COLONOSCOPY      fiberoptic screening 08 5 yr / complete 3/15/13 5 yr    CORONARY ANGIOPLASTY WITH STENT PLACEMENT      ELBOW SURGERY      EYE SURGERY      KNEE SURGERY      PROSTATE SURGERY      SUPRAPUBIC PROSTATECTOMY  1999    UPPER GASTROINTESTINAL ENDOSCOPY         Family History   Problem Relation Age of Onset    Alcohol abuse Mother     Heart attack Family         MI    Breast cancer Family     No Known Problems Father     No Known Problems Sister     Substance Abuse Son     Colon cancer Neg Hx     Colon polyps Neg Hx      I have reviewed and agree with the history as documented.    E-Cigarette/Vaping    E-Cigarette Use Never User      E-Cigarette/Vaping Substances    Nicotine No     Flavoring No      Social History     Tobacco Use    Smoking status: Former     Current packs/day: 0.00     Average packs/day: 3.0 packs/day for 10.0 years (30.0 ttl pk-yrs)     Types: Cigarettes     Start date: 2000     Quit date: 2010     Years since quittin.6    Smokeless tobacco: Never   Vaping Use    Vaping status: Never Used   Substance Use Topics    Alcohol use: Not Currently     Comment: rare glass of wine    Drug use: Never       Review of Systems   Constitutional:  Negative for chills and fever.   HENT:  Negative for facial swelling, trouble swallowing and voice change.    Skin:  Positive for wound (multiple bees stings.). Negative for rash.   Psychiatric/Behavioral:  Negative for confusion.    All other systems reviewed and are negative.      Physical Exam  Physical Exam  Vitals and nursing note reviewed.   Constitutional:       General: He is not in acute distress.      Appearance: Normal appearance. He is well-developed and well-groomed. He is not ill-appearing or diaphoretic.   HENT:      Head: Normocephalic and atraumatic.      Nose: Nose normal.      Mouth/Throat:      Mouth: Mucous membranes are moist.      Pharynx: Oropharynx is clear. No pharyngeal swelling or uvula swelling.   Eyes:      Conjunctiva/sclera: Conjunctivae normal.   Cardiovascular:      Rate and Rhythm: Normal rate and regular rhythm.      Heart sounds: Normal heart sounds.   Pulmonary:      Effort: Pulmonary effort is normal.      Breath sounds: Normal breath sounds.   Musculoskeletal:         General: Normal range of motion.      Cervical back: Normal range of motion.      Right lower leg: No edema.      Left lower leg: No edema.   Skin:     General: Skin is warm and dry.      Comments: Patient has multiple insect bites to b/l Upper and lower extremities, neck, head and face.  No swelling.  Mild erythema.  No Hives.    Neurological:      Mental Status: He is alert and oriented to person, place, and time.      Sensory: Sensation is intact.      Motor: Motor function is intact.   Psychiatric:         Behavior: Behavior is cooperative.         Vital Signs  ED Triage Vitals [08/28/24 2007]   Temperature Pulse Respirations Blood Pressure SpO2   98.5 °F (36.9 °C) 100 18 155/91 95 %      Temp Source Heart Rate Source Patient Position - Orthostatic VS BP Location FiO2 (%)   Temporal -- -- -- --      Pain Score       10 - Worst Possible Pain           Vitals:    08/28/24 2007   BP: 155/91   Pulse: 100         Visual Acuity      ED Medications  Medications   diphenhydrAMINE (BENADRYL) tablet 50 mg (50 mg Oral Given 8/28/24 2037)   acetaminophen (TYLENOL) tablet 650 mg (650 mg Oral Given 8/28/24 2037)       Diagnostic Studies  Results Reviewed       None                   No orders to display              Procedures  Procedures         ED Course  ED Course as of 08/28/24 2250   Wed Aug 28, 2024   2102 Patient states he  is feeling better, will monitor for another 30 minutes.    2139 Patient states he is feeling fine.  No trouble breathing or swallowing, no hives.                                   SBIRT 20yo+      Flowsheet Row Most Recent Value   Initial Alcohol Screen: US AUDIT-C     1. How often do you have a drink containing alcohol? 0 Filed at: 08/28/2024 2010   2. How many drinks containing alcohol do you have on a typical day you are drinking?  0 Filed at: 08/28/2024 2010   3a. Male UNDER 65: How often do you have five or more drinks on one occasion? 0 Filed at: 08/28/2024 2010   3b. FEMALE Any Age, or MALE 65+: How often do you have 4 or more drinks on one occassion? 0 Filed at: 08/28/2024 2010   Audit-C Score 0 Filed at: 08/28/2024 2010   SANGEETA: How many times in the past year have you...    Used an illegal drug or used a prescription medication for non-medical reasons? Never Filed at: 08/28/2024 2010                      Medical Decision Making  Patient with bee stings to extremities and head, no swelling, no hives.  No trouble breathing or swallowing.    Will continue to monitor patient. Patient did not develop an allergic reaction while in the ER and is requesting to go home.  Return precautions given.     Risk  OTC drugs.                 Disposition  Final diagnoses:   Local reaction to bee sting     Time reflects when diagnosis was documented in both MDM as applicable and the Disposition within this note       Time User Action Codes Description Comment    8/28/2024  9:40 PM Marlen Adamson Add [T63.441A] Local reaction to bee sting           ED Disposition       ED Disposition   Discharge    Condition   Stable    Date/Time   Wed Aug 28, 2024 2140    Comment   Kilo TELLES Vocsammy discharge to home/self care.                   Follow-up Information       Follow up With Specialties Details Why Contact Info Additional Information     [facility] Emergency Department Emergency Medicine Go to  If symptoms  [address] [facility] [place] [address]  [phone] [facility] Emergency Department, [address], [place], [place] [address]            Discharge Medication List as of 8/28/2024  9:41 PM        CONTINUE these medications which have NOT CHANGED    Details   amLODIPine (NORVASC) 5 mg tablet Take 1 tablet by mouth daily, Starting Mon 1/10/2011, Historical Med      ascorbic acid (VITAMIN C) 500 mg tablet Take 500 mg by mouth daily, Starting Tue 2/22/2011, Historical Med      aspirin 81 MG tablet Take 1 tablet by mouth daily, Starting Mon 9/10/2012, Historical Med      atorvastatin (LIPITOR) 20 mg tablet Take 1 tablet by mouth once daily, Starting Fri 5/31/2024, Normal      B Complex Vitamins (VITAMIN B COMPLEX PO) Take 1 tablet by mouth daily, Starting Tue 2/22/2011, Historical Med      Cholecalciferol 1000 units CHEW Chew 1 tablet daily, Historical Med      clopidogrel (PLAVIX) 75 mg tablet Take 1 tablet (75 mg total) by mouth daily, Starting Fri 8/23/2024, Normal      hydroCHLOROthiazide 25 mg tablet Take 1 tablet by mouth once daily, Starting Sat 5/25/2024, Normal      lisinopril (ZESTRIL) 10 mg tablet Take 1 tablet by mouth once daily, Normal      metFORMIN (GLUCOPHAGE-XR) 500 mg 24 hr tablet Take 1 tablet by mouth once daily, Normal      omega-3-acid ethyl esters (LOVAZA) 1 g capsule Take 2 g by mouth 2 (two) times a day, Historical Med      vitamin E, tocopherol, 400 units capsule Take 1 capsule by mouth daily, Starting Mon 6/4/2012, Historical Med             No discharge procedures on file.    PDMP Review       None            ED Provider  Electronically Signed by             Marlen Adamson PA-C  08/28/24 1240

## 2024-09-08 DIAGNOSIS — I10 ESSENTIAL HYPERTENSION: ICD-10-CM

## 2024-09-09 RX ORDER — LISINOPRIL 10 MG/1
TABLET ORAL
Qty: 90 TABLET | Refills: 2 | Status: SHIPPED | OUTPATIENT
Start: 2024-09-09

## 2024-09-10 ENCOUNTER — OFFICE VISIT (OUTPATIENT)
Dept: FAMILY MEDICINE CLINIC | Facility: HOSPITAL | Age: 84
End: 2024-09-10
Payer: MEDICARE

## 2024-09-10 VITALS
WEIGHT: 177.8 LBS | HEIGHT: 67 IN | HEART RATE: 81 BPM | SYSTOLIC BLOOD PRESSURE: 138 MMHG | DIASTOLIC BLOOD PRESSURE: 84 MMHG | OXYGEN SATURATION: 96 % | TEMPERATURE: 97 F | BODY MASS INDEX: 27.91 KG/M2

## 2024-09-10 DIAGNOSIS — E11.42 DIABETIC POLYNEUROPATHY ASSOCIATED WITH TYPE 2 DIABETES MELLITUS (HCC): ICD-10-CM

## 2024-09-10 DIAGNOSIS — I73.9 PERIPHERAL VASCULAR DISEASE (HCC): ICD-10-CM

## 2024-09-10 DIAGNOSIS — Z00.00 MEDICARE ANNUAL WELLNESS VISIT, SUBSEQUENT: ICD-10-CM

## 2024-09-10 DIAGNOSIS — Z23 ENCOUNTER FOR IMMUNIZATION: ICD-10-CM

## 2024-09-10 DIAGNOSIS — I65.21 RECURRENT STENOSIS OF RIGHT CAROTID ARTERY: ICD-10-CM

## 2024-09-10 DIAGNOSIS — E11.22 CKD STAGE 3 DUE TO TYPE 2 DIABETES MELLITUS (HCC): ICD-10-CM

## 2024-09-10 DIAGNOSIS — N18.30 CKD STAGE 3 DUE TO TYPE 2 DIABETES MELLITUS (HCC): ICD-10-CM

## 2024-09-10 DIAGNOSIS — I65.22 ASYMPTOMATIC STENOSIS OF LEFT CAROTID ARTERY: ICD-10-CM

## 2024-09-10 DIAGNOSIS — I10 PRIMARY HYPERTENSION: ICD-10-CM

## 2024-09-10 DIAGNOSIS — E11.49 DIABETES MELLITUS TYPE 2 WITH NEUROLOGICAL MANIFESTATIONS (HCC): Primary | ICD-10-CM

## 2024-09-10 PROCEDURE — 99214 OFFICE O/P EST MOD 30 MIN: CPT | Performed by: INTERNAL MEDICINE

## 2024-09-10 PROCEDURE — 90662 IIV NO PRSV INCREASED AG IM: CPT

## 2024-09-10 PROCEDURE — G0008 ADMIN INFLUENZA VIRUS VAC: HCPCS

## 2024-09-10 PROCEDURE — G0439 PPPS, SUBSEQ VISIT: HCPCS | Performed by: INTERNAL MEDICINE

## 2024-09-10 RX ORDER — AMLODIPINE BESYLATE 5 MG/1
5 TABLET ORAL DAILY
Qty: 90 TABLET | Refills: 2 | Status: SHIPPED | OUTPATIENT
Start: 2024-09-10

## 2024-09-10 NOTE — ASSESSMENT & PLAN NOTE
No current claudication symptoms, mild decrease in pulses (L>R), pt deferring LEAD at this time which is reasonable given lack of symptoms, call asap with red flag claudication symptoms (reviewed in detail), con't ASA/Plavix and statin, will follow  Orders:    CBC and differential; Future    Comprehensive metabolic panel; Future    Hemoglobin A1C; Future    Lipid panel; Future    TSH, 3rd generation with Free T4 reflex; Future    Albumin / creatinine urine ratio; Future

## 2024-09-10 NOTE — PATIENT INSTRUCTIONS
Medicare Preventive Visit Patient Instructions  Thank you for completing your Welcome to Medicare Visit or Medicare Annual Wellness Visit today. Your next wellness visit will be due in one year (9/11/2025).  The screening/preventive services that you may require over the next 5-10 years are detailed below. Some tests may not apply to you based off risk factors and/or age. Screening tests ordered at today's visit but not completed yet may show as past due. Also, please note that scanned in results may not display below.  Preventive Screenings:  Service Recommendations Previous Testing/Comments   Colorectal Cancer Screening  Colonoscopy    Fecal Occult Blood Test (FOBT)/Fecal Immunochemical Test (FIT)  Fecal DNA/Cologuard Test  Flexible Sigmoidoscopy Age: 45-75 years old   Colonoscopy: every 10 years (May be performed more frequently if at higher risk)  OR  FOBT/FIT: every 1 year  OR  Cologuard: every 3 years  OR  Sigmoidoscopy: every 5 years  Screening may be recommended earlier than age 45 if at higher risk for colorectal cancer. Also, an individualized decision between you and your healthcare provider will decide whether screening between the ages of 76-85 would be appropriate. Colonoscopy: 03/15/2013  FOBT/FIT: Not on file  Cologuard: Not on file  Sigmoidoscopy: Not on file    Risks and Benefits Discussed  Screening Not Indicated     Prostate Cancer Screening Individualized decision between patient and health care provider in men between ages of 55-69   Medicare will cover every 12 months beginning on the day after your 50th birthday PSA: No results in last 5 years     History Prostate Cancer  Risks and Benefits Discussed  Screening Not Indicated     Hepatitis C Screening Once for adults born between 1945 and 1965  More frequently in patients at high risk for Hepatitis C Hep C Antibody: Not on file    Risks and Benefits Discussed  Screening Not Indicated   Diabetes Screening 1-2 times per year if you're at risk  for diabetes or have pre-diabetes Fasting glucose: 114 mg/dL (8/22/2024)  A1C: 6.1 % (8/22/2024)  History Diabetes  Screening Current  Risks and Benefits Discussed   Cholesterol Screening Once every 5 years if you don't have a lipid disorder. May order more often based on risk factors. Lipid panel: 02/19/2024  Screening Current  Risks and Benefits Discussed  History Lipid Disorder      Other Preventive Screenings Covered by Medicare:  Abdominal Aortic Aneurysm (AAA) Screening: covered once if your at risk. You're considered to be at risk if you have a family history of AAA or a male between the age of 65-75 who smoking at least 100 cigarettes in your lifetime.  Lung Cancer Screening: covers low dose CT scan once per year if you meet all of the following conditions: (1) Age 55-77; (2) No signs or symptoms of lung cancer; (3) Current smoker or have quit smoking within the last 15 years; (4) You have a tobacco smoking history of at least 20 pack years (packs per day x number of years you smoked); (5) You get a written order from a healthcare provider.  Glaucoma Screening: covered annually if you're considered high risk: (1) You have diabetes OR (2) Family history of glaucoma OR (3)  aged 50 and older OR (4)  American aged 65 and older  Osteoporosis Screening: covered every 2 years if you meet one of the following conditions: (1) Have a vertebral abnormality; (2) On glucocorticoid therapy for more than 3 months; (3) Have primary hyperparathyroidism; (4) On osteoporosis medications and need to assess response to drug therapy.  HIV Screening: covered annually if you're between the age of 15-65. Also covered annually if you are younger than 15 and older than 65 with risk factors for HIV infection. For pregnant patients, it is covered up to 3 times per pregnancy.    Immunizations:  Immunization Recommendations   Influenza Vaccine Annual influenza vaccination during flu season is recommended for all  persons aged >= 6 months who do not have contraindications   Pneumococcal Vaccine   * Pneumococcal conjugate vaccine = PCV13 (Prevnar 13), PCV15 (Vaxneuvance), PCV20 (Prevnar 20)  * Pneumococcal polysaccharide vaccine = PPSV23 (Pneumovax) Adults 19-65 yo with certain risk factors or if 65+ yo  If never received any pneumonia vaccine: recommend Prevnar 20 (PCV20)  Give PCV20 if previously received 1 dose of PCV13 or PPSV23   Hepatitis B Vaccine 3 dose series if at intermediate or high risk (ex: diabetes, end stage renal disease, liver disease)   Respiratory syncytial virus (RSV) Vaccine - COVERED BY MEDICARE PART D  * RSVPreF3 (Arexvy) CDC recommends that adults 60 years of age and older may receive a single dose of RSV vaccine using shared clinical decision-making (SCDM)   Tetanus (Td) Vaccine - COST NOT COVERED BY MEDICARE PART B Following completion of primary series, a booster dose should be given every 10 years to maintain immunity against tetanus. Td may also be given as tetanus wound prophylaxis.   Tdap Vaccine - COST NOT COVERED BY MEDICARE PART B Recommended at least once for all adults. For pregnant patients, recommended with each pregnancy.   Shingles Vaccine (Shingrix) - COST NOT COVERED BY MEDICARE PART B  2 shot series recommended in those 19 years and older who have or will have weakened immune systems or those 50 years and older     Health Maintenance Due:  There are no preventive care reminders to display for this patient.  Immunizations Due:      Topic Date Due   • COVID-19 Vaccine (4 - 2023-24 season) 09/01/2024     Advance Directives   What are advance directives?  Advance directives are legal documents that state your wishes and plans for medical care. These plans are made ahead of time in case you lose your ability to make decisions for yourself. Advance directives can apply to any medical decision, such as the treatments you want, and if you want to donate organs.   What are the types of  advance directives?  There are many types of advance directives, and each state has rules about how to use them. You may choose a combination of any of the following:  Living will:  This is a written record of the treatment you want. You can also choose which treatments you do not want, which to limit, and which to stop at a certain time. This includes surgery, medicine, IV fluid, and tube feedings.   Durable power of  for healthcare (DPAHC):  This is a written record that states who you want to make healthcare choices for you when you are unable to make them for yourself. This person, called a proxy, is usually a family member or a friend. You may choose more than 1 proxy.  Do not resuscitate (DNR) order:  A DNR order is used in case your heart stops beating or you stop breathing. It is a request not to have certain forms of treatment, such as CPR. A DNR order may be included in other types of advance directives.  Medical directive:  This covers the care that you want if you are in a coma, near death, or unable to make decisions for yourself. You can list the treatments you want for each condition. Treatment may include pain medicine, surgery, blood transfusions, dialysis, IV or tube feedings, and a ventilator (breathing machine).  Values history:  This document has questions about your views, beliefs, and how you feel and think about life. This information can help others choose the care that you would choose.  Why are advance directives important?  An advance directive helps you control your care. Although spoken wishes may be used, it is better to have your wishes written down. Spoken wishes can be misunderstood, or not followed. Treatments may be given even if you do not want them. An advance directive may make it easier for your family to make difficult choices about your care.   Weight Management   Why it is important to manage your weight:  Being overweight increases your risk of health conditions  such as heart disease, high blood pressure, type 2 diabetes, and certain types of cancer. It can also increase your risk for osteoarthritis, sleep apnea, and other respiratory problems. Aim for a slow, steady weight loss. Even a small amount of weight loss can lower your risk of health problems.  How to lose weight safely:  A safe and healthy way to lose weight is to eat fewer calories and get regular exercise. You can lose up about 1 pound a week by decreasing the number of calories you eat by 500 calories each day.   Healthy meal plan for weight management:  A healthy meal plan includes a variety of foods, contains fewer calories, and helps you stay healthy. A healthy meal plan includes the following:  Eat whole-grain foods more often.  A healthy meal plan should contain fiber. Fiber is the part of grains, fruits, and vegetables that is not broken down by your body. Whole-grain foods are healthy and provide extra fiber in your diet. Some examples of whole-grain foods are whole-wheat breads and pastas, oatmeal, brown rice, and bulgur.  Eat a variety of vegetables every day.  Include dark, leafy greens such as spinach, kale, maryanne greens, and mustard greens. Eat yellow and orange vegetables such as carrots, sweet potatoes, and winter squash.   Eat a variety of fruits every day.  Choose fresh or canned fruit (canned in its own juice or light syrup) instead of juice. Fruit juice has very little or no fiber.  Eat low-fat dairy foods.  Drink fat-free (skim) milk or 1% milk. Eat fat-free yogurt and low-fat cottage cheese. Try low-fat cheeses such as mozzarella and other reduced-fat cheeses.  Choose meat and other protein foods that are low in fat.  Choose beans or other legumes such as split peas or lentils. Choose fish, skinless poultry (chicken or turkey), or lean cuts of red meat (beef or pork). Before you cook meat or poultry, cut off any visible fat.   Use less fat and oil.  Try baking foods instead of frying  them. Add less fat, such as margarine, sour cream, regular salad dressing and mayonnaise to foods. Eat fewer high-fat foods. Some examples of high-fat foods include french fries, doughnuts, ice cream, and cakes.  Eat fewer sweets.  Limit foods and drinks that are high in sugar. This includes candy, cookies, regular soda, and sweetened drinks.  Exercise:  Exercise at least 30 minutes per day on most days of the week. Some examples of exercise include walking, biking, dancing, and swimming. You can also fit in more physical activity by taking the stairs instead of the elevator or parking farther away from stores. Ask your healthcare provider about the best exercise plan for you.      © Copyright [employer] 2018 Information is for End User's use only and may not be sold, redistributed or otherwise used for commercial purposes. All illustrations and images included in CareNotes® are the copyrighted property of [employer] or [employer]    Medicare Preventive Visit Patient Instructions  Thank you for completing your Welcome to Medicare Visit or Medicare Annual Wellness Visit today. Your next wellness visit will be due in one year (9/11/2025).  The screening/preventive services that you may require over the next 5-10 years are detailed below. Some tests may not apply to you based off risk factors and/or age. Screening tests ordered at today's visit but not completed yet may show as past due. Also, please note that scanned in results may not display below.  Preventive Screenings:  Service Recommendations Previous Testing/Comments   Colorectal Cancer Screening  Colonoscopy    Fecal Occult Blood Test (FOBT)/Fecal Immunochemical Test (FIT)  Fecal DNA/Cologuard Test  Flexible Sigmoidoscopy Age: 45-75 years old   Colonoscopy: every 10 years (May be performed more frequently if at higher risk)  OR  FOBT/FIT: every 1 year  OR  Cologuard: every 3 years  OR  Sigmoidoscopy: every 5 years  Screening may be recommended  earlier than age 45 if at higher risk for colorectal cancer. Also, an individualized decision between you and your healthcare provider will decide whether screening between the ages of 76-85 would be appropriate. Colonoscopy: 03/15/2013  FOBT/FIT: Not on file  Cologuard: Not on file  Sigmoidoscopy: Not on file    Risks and Benefits Discussed  Screening Not Indicated     Prostate Cancer Screening Individualized decision between patient and health care provider in men between ages of 55-69   Medicare will cover every 12 months beginning on the day after your 50th birthday PSA: No results in last 5 years     History Prostate Cancer  Risks and Benefits Discussed  Screening Not Indicated     Hepatitis C Screening Once for adults born between 1945 and 1965  More frequently in patients at high risk for Hepatitis C Hep C Antibody: Not on file    Risks and Benefits Discussed  Screening Not Indicated   Diabetes Screening 1-2 times per year if you're at risk for diabetes or have pre-diabetes Fasting glucose: 114 mg/dL (8/22/2024)  A1C: 6.1 % (8/22/2024)  History Diabetes  Screening Current  Risks and Benefits Discussed   Cholesterol Screening Once every 5 years if you don't have a lipid disorder. May order more often based on risk factors. Lipid panel: 02/19/2024  Screening Current  Risks and Benefits Discussed  History Lipid Disorder      Other Preventive Screenings Covered by Medicare:  Abdominal Aortic Aneurysm (AAA) Screening: covered once if your at risk. You're considered to be at risk if you have a family history of AAA or a male between the age of 65-75 who smoking at least 100 cigarettes in your lifetime.  Lung Cancer Screening: covers low dose CT scan once per year if you meet all of the following conditions: (1) Age 55-77; (2) No signs or symptoms of lung cancer; (3) Current smoker or have quit smoking within the last 15 years; (4) You have a tobacco smoking history of at least 20 pack years (packs per day x number  of years you smoked); (5) You get a written order from a healthcare provider.  Glaucoma Screening: covered annually if you're considered high risk: (1) You have diabetes OR (2) Family history of glaucoma OR (3)  aged 50 and older OR (4)  American aged 65 and older  Osteoporosis Screening: covered every 2 years if you meet one of the following conditions: (1) Have a vertebral abnormality; (2) On glucocorticoid therapy for more than 3 months; (3) Have primary hyperparathyroidism; (4) On osteoporosis medications and need to assess response to drug therapy.  HIV Screening: covered annually if you're between the age of 15-65. Also covered annually if you are younger than 15 and older than 65 with risk factors for HIV infection. For pregnant patients, it is covered up to 3 times per pregnancy.    Immunizations:  Immunization Recommendations   Influenza Vaccine Annual influenza vaccination during flu season is recommended for all persons aged >= 6 months who do not have contraindications   Pneumococcal Vaccine   * Pneumococcal conjugate vaccine = PCV13 (Prevnar 13), PCV15 (Vaxneuvance), PCV20 (Prevnar 20)  * Pneumococcal polysaccharide vaccine = PPSV23 (Pneumovax) Adults 19-63 yo with certain risk factors or if 65+ yo  If never received any pneumonia vaccine: recommend Prevnar 20 (PCV20)  Give PCV20 if previously received 1 dose of PCV13 or PPSV23   Hepatitis B Vaccine 3 dose series if at intermediate or high risk (ex: diabetes, end stage renal disease, liver disease)   Respiratory syncytial virus (RSV) Vaccine - COVERED BY MEDICARE PART D  * RSVPreF3 (Arexvy) CDC recommends that adults 60 years of age and older may receive a single dose of RSV vaccine using shared clinical decision-making (SCDM)   Tetanus (Td) Vaccine - COST NOT COVERED BY MEDICARE PART B Following completion of primary series, a booster dose should be given every 10 years to maintain immunity against tetanus. Td may also be given  as tetanus wound prophylaxis.   Tdap Vaccine - COST NOT COVERED BY MEDICARE PART B Recommended at least once for all adults. For pregnant patients, recommended with each pregnancy.   Shingles Vaccine (Shingrix) - COST NOT COVERED BY MEDICARE PART B  2 shot series recommended in those 19 years and older who have or will have weakened immune systems or those 50 years and older     Health Maintenance Due:  There are no preventive care reminders to display for this patient.  Immunizations Due:      Topic Date Due   • COVID-19 Vaccine (4 - 2023-24 season) 09/01/2024     Advance Directives   What are advance directives?  Advance directives are legal documents that state your wishes and plans for medical care. These plans are made ahead of time in case you lose your ability to make decisions for yourself. Advance directives can apply to any medical decision, such as the treatments you want, and if you want to donate organs.   What are the types of advance directives?  There are many types of advance directives, and each state has rules about how to use them. You may choose a combination of any of the following:  Living will:  This is a written record of the treatment you want. You can also choose which treatments you do not want, which to limit, and which to stop at a certain time. This includes surgery, medicine, IV fluid, and tube feedings.   Durable power of  for healthcare (DPAHC):  This is a written record that states who you want to make healthcare choices for you when you are unable to make them for yourself. This person, called a proxy, is usually a family member or a friend. You may choose more than 1 proxy.  Do not resuscitate (DNR) order:  A DNR order is used in case your heart stops beating or you stop breathing. It is a request not to have certain forms of treatment, such as CPR. A DNR order may be included in other types of advance directives.  Medical directive:  This covers the care that you want if  you are in a coma, near death, or unable to make decisions for yourself. You can list the treatments you want for each condition. Treatment may include pain medicine, surgery, blood transfusions, dialysis, IV or tube feedings, and a ventilator (breathing machine).  Values history:  This document has questions about your views, beliefs, and how you feel and think about life. This information can help others choose the care that you would choose.  Why are advance directives important?  An advance directive helps you control your care. Although spoken wishes may be used, it is better to have your wishes written down. Spoken wishes can be misunderstood, or not followed. Treatments may be given even if you do not want them. An advance directive may make it easier for your family to make difficult choices about your care.   Weight Management   Why it is important to manage your weight:  Being overweight increases your risk of health conditions such as heart disease, high blood pressure, type 2 diabetes, and certain types of cancer. It can also increase your risk for osteoarthritis, sleep apnea, and other respiratory problems. Aim for a slow, steady weight loss. Even a small amount of weight loss can lower your risk of health problems.  How to lose weight safely:  A safe and healthy way to lose weight is to eat fewer calories and get regular exercise. You can lose up about 1 pound a week by decreasing the number of calories you eat by 500 calories each day.   Healthy meal plan for weight management:  A healthy meal plan includes a variety of foods, contains fewer calories, and helps you stay healthy. A healthy meal plan includes the following:  Eat whole-grain foods more often.  A healthy meal plan should contain fiber. Fiber is the part of grains, fruits, and vegetables that is not broken down by your body. Whole-grain foods are healthy and provide extra fiber in your diet. Some examples of whole-grain foods are  whole-wheat breads and pastas, oatmeal, brown rice, and bulgur.  Eat a variety of vegetables every day.  Include dark, leafy greens such as spinach, kale, maryanne greens, and mustard greens. Eat yellow and orange vegetables such as carrots, sweet potatoes, and winter squash.   Eat a variety of fruits every day.  Choose fresh or canned fruit (canned in its own juice or light syrup) instead of juice. Fruit juice has very little or no fiber.  Eat low-fat dairy foods.  Drink fat-free (skim) milk or 1% milk. Eat fat-free yogurt and low-fat cottage cheese. Try low-fat cheeses such as mozzarella and other reduced-fat cheeses.  Choose meat and other protein foods that are low in fat.  Choose beans or other legumes such as split peas or lentils. Choose fish, skinless poultry (chicken or turkey), or lean cuts of red meat (beef or pork). Before you cook meat or poultry, cut off any visible fat.   Use less fat and oil.  Try baking foods instead of frying them. Add less fat, such as margarine, sour cream, regular salad dressing and mayonnaise to foods. Eat fewer high-fat foods. Some examples of high-fat foods include french fries, doughnuts, ice cream, and cakes.  Eat fewer sweets.  Limit foods and drinks that are high in sugar. This includes candy, cookies, regular soda, and sweetened drinks.  Exercise:  Exercise at least 30 minutes per day on most days of the week. Some examples of exercise include walking, biking, dancing, and swimming. You can also fit in more physical activity by taking the stairs instead of the elevator or parking farther away from stores. Ask your healthcare provider about the best exercise plan for you.      © Copyright [employer] 2018 Information is for End User's use only and may not be sold, redistributed or otherwise used for commercial purposes. All illustrations and images included in CareNotes® are the copyrighted property of [employer] or [employer]

## 2024-09-10 NOTE — ASSESSMENT & PLAN NOTE
CUS overdue - order given, on ASA/Plavix and statin, to ED with stroke/TIA symptoms, will follow  Orders:    VAS carotid complete study; Future    CBC and differential; Future    Comprehensive metabolic panel; Future    Hemoglobin A1C; Future    Lipid panel; Future    TSH, 3rd generation with Free T4 reflex; Future    Albumin / creatinine urine ratio; Future

## 2024-09-10 NOTE — ASSESSMENT & PLAN NOTE
BP up today on presentation and only slightly better by end of visit - likely d/t stopping Amlodipine on his own - restart CCB, con't HCTZ and ACE, encouraged healthy diet and keep active, re-eval in 6mos  Orders:    CBC and differential; Future    Comprehensive metabolic panel; Future    Hemoglobin A1C; Future    Lipid panel; Future    TSH, 3rd generation with Free T4 reflex; Future    Albumin / creatinine urine ratio; Future    amLODIPine (NORVASC) 5 mg tablet; Take 1 tablet (5 mg total) by mouth daily

## 2024-09-10 NOTE — PROGRESS NOTES
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 on recent labs - controlled with A1c 6.1 - con't current Metformin for now, con't to encourage healthy diet and keep active, con't BW q 6 mos - BW order given, DM foot exam done in office today, DM eye exam 3/24, he is on an ACE/statin and ASA, will follow  Lab Results   Component Value Date    HGBA1C 6.1 (H) 2024       Orders:  •  CBC and differential; Future  •  Comprehensive metabolic panel; Future  •  Hemoglobin A1C; Future  •  Lipid panel; Future  •  TSH, 3rd generation with Free T4 reflex; Future  •  Albumin / creatinine urine ratio; Future    Diabetic 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)  Foot exam done in office today, importance of con't good BS control, call with sores/ulcers, new or worse symptoms  Lab Results   Component Value Date    HGBA1C 6.1 (H) 2024       Orders:  •  CBC and differential; Future  •  Comprehensive metabolic panel; Future  •  Hemoglobin A1C; Future  •  Lipid panel; Future  •  TSH, 3rd generation with Free T4 reflex; Future  •  Albumin / creatinine urine ratio; Future    CKD stage 3 due to type 2 diabetes mellitus (HCC)    Lab Results   Component Value Date    HGBA1C 6.1 (H) 2024   GFR up and down - currently Cr up and GFR down, importance of BP/BS control as well as avoiding NSAIDs and dehydration was reviewed, urged to ensure drinks water with fasting labs, on an ACE, recheck with labs in 6 mos - BW order given, will follow    Orders:  •  Basic metabolic panel; Future  •  CBC and differential; Future  •  Comprehensive metabolic panel; Future  •  Hemoglobin A1C; Future  •  Lipid panel;  Future  •  TSH, 3rd generation with Free T4 reflex; Future  •  Albumin / creatinine urine ratio; Future    Asymptomatic stenosis of left carotid artery  Overdue for CUS - order given and urged to do, no current stroke/TIA symptoms - to ED if they occur, on ASA/Plavix and satin, will follow  Orders:  •  VAS carotid complete study; Future  •  CBC and differential; Future  •  Comprehensive metabolic panel; Future  •  Hemoglobin A1C; Future  •  Lipid panel; Future  •  TSH, 3rd generation with Free T4 reflex; Future  •  Albumin / creatinine urine ratio; Future    Recurrent stenosis of right carotid artery  CUS overdue - order given, on ASA/Plavix and statin, to ED with stroke/TIA symptoms, will follow  Orders:  •  VAS carotid complete study; Future  •  CBC and differential; Future  •  Comprehensive metabolic panel; Future  •  Hemoglobin A1C; Future  •  Lipid panel; Future  •  TSH, 3rd generation with Free T4 reflex; Future  •  Albumin / creatinine urine ratio; Future    Peripheral vascular disease (HCC)  No current claudication symptoms, mild decrease in pulses (L>R), pt deferring LEAD at this time which is reasonable given lack of symptoms, call asap with red flag claudication symptoms (reviewed in detail), con't ASA/Plavix and statin, will follow  Orders:  •  CBC and differential; Future  •  Comprehensive metabolic panel; Future  •  Hemoglobin A1C; Future  •  Lipid panel; Future  •  TSH, 3rd generation with Free T4 reflex; Future  •  Albumin / creatinine urine ratio; Future    Primary hypertension  BP up today on presentation and only slightly better by end of visit - likely d/t stopping Amlodipine on his own - restart CCB, con't HCTZ and ACE, encouraged healthy diet and keep active, re-eval in 6mos  Orders:  •  CBC and differential; Future  •  Comprehensive metabolic panel; Future  •  Hemoglobin A1C; Future  •  Lipid panel; Future  •  TSH, 3rd generation with Free T4 reflex; Future  •  Albumin / creatinine urine  ratio; Future  •  amLODIPine (NORVASC) 5 mg tablet; Take 1 tablet (5 mg total) by mouth daily    Medicare annual wellness visit, subsequent         BMI 27.0-27.9,adult  Healthy diet and regular exercise encouraged        Encounter for immunization    Orders:  •  influenza vaccine, high-dose, PF 0.5 mL (Fluzone High Dose)       Preventive health issues were discussed with patient, and age appropriate screening tests were ordered as noted in patient's After Visit Summary. Personalized health advice and appropriate referrals for health education or preventive services given if needed, as noted in patient's After Visit Summary.      Colon cancer screening no longer indcated d/t age    PSA no longer indicated d/t age    AAA US 7/19    DM labs 8/24 (6.1)  DM foot exam done in office today  DM eye exam 3/24    CUS 8/23 - order given for 2024 today  LEAD 11/19 - pt deferring      History of Present Illness     HPI  Pt here for follow up appt/BW results and AWV    BW results were d/w pt in detail: FBS/A1C 114/6.1 BUN/Cr 22/1.41 (GFR 46), rest of BMP was wnl     Def of controlled vs uncontrolled DM was reviewed.  Diet was reviewed - wgt .  He is taking his DM meds as directed.  He is UTD on DM eye exam (3/24) and is due for his DM foot exam. He notes no sores/ulcers with his feet.  CKD stage 3 was reviewed with pt.  Importance of BP/BS control as well as NSAID use and avoiding dehydration was reviewed. He is on statin and ACE.     Pt is due for his CUS and LEAD. He was following with  [facility] but has decided not to con't follow up with them. He is on a statin and Plavix and ASA.  He denies pain in legs with ambulation and denies sores/ulcers.  He denies stroke/TIA symptoms.      BP above goal today on presentation and meds were reviewed and no changes have occurred.  He stopped the amlodipine a few mos ago d/t a medical office not filling rx as he was not seen in over a year.  He was not aware of any SE with the meds.  He  does not check his BP outside the office.  He notes no frequent HA's/dizziness/double vision/CP.       Patient Care Team:  Dolores Vizcarra DO as PCP - General  Camron Caban MD (Cardiology)  Juan Manuel Minor MD (General Surgery)  Juan C Mathur PA-C (Vascular Surgery)    Review of Systems   Constitutional:  Negative for chills and fever.   HENT:  Positive for trouble swallowing. Negative for congestion and sore throat.         Swallowing issues chronic and not new/worse (Schatzki's ring)   Eyes:  Negative for pain and visual disturbance.   Respiratory:  Negative for cough and shortness of breath.    Cardiovascular:  Negative for chest pain, palpitations and leg swelling.   Gastrointestinal:  Negative for abdominal pain, blood in stool, constipation, diarrhea, nausea and vomiting.   Genitourinary:  Negative for difficulty urinating and dysuria.   Musculoskeletal:  Negative for arthralgias and myalgias.   Skin:  Negative for rash and wound.   Neurological:  Negative for dizziness and headaches.   Hematological:  Does not bruise/bleed easily.   Psychiatric/Behavioral:  Negative for confusion and dysphoric mood.      Medical History Reviewed by provider this encounter:  Tobacco  Allergies  Meds  Problems  Med Hx  Surg Hx  Fam Hx       Annual Wellness Visit Questionnaire   Kilo is here for his Subsequent Wellness visit. Last Medicare Wellness visit information reviewed, patient interviewed and updates made to the record today.      Health Risk Assessment:   Patient rates overall health as good. Patient feels that their physical health rating is same. Patient is satisfied with their life. Eyesight was rated as same. Hearing was rated as same. Patient feels that their emotional and mental health rating is same. Patients states they are sometimes angry. Patient states they are sometimes unusually tired/fatigued. Pain experienced in the last 7 days has been none. Patient states that he has experienced no  weight loss or gain in last 6 months. many yellowjacketed bee stings last wed.    Depression Screening:   PHQ-2 Score: 0      Fall Risk Screening:   In the past year, patient has experienced: no history of falling in past year      Home Safety:  Patient does not have trouble with stairs inside or outside of their home. Patient has working smoke alarms and has working carbon monoxide detector. Home safety hazards include: none.     Nutrition:   Current diet is Regular.     Medications:   Patient is currently taking over-the-counter supplements. OTC medications include: see medication list. Patient is able to manage medications.     Activities of Daily Living (ADLs)/Instrumental Activities of Daily Living (IADLs):   Walk and transfer into and out of bed and chair?: Yes  Dress and groom yourself?: Yes    Bathe or shower yourself?: Yes    Feed yourself? Yes  Do your laundry/housekeeping?: Yes  Manage your money, pay your bills and track your expenses?: Yes  Make your own meals?: Yes    Do your own shopping?: Yes    Previous Hospitalizations:   Any hospitalizations or ED visits within the last 12 months?: Yes    How many hospitalizations have you had in the last year?: 1-2    Advance Care Planning:   Living will: Yes    Durable POA for healthcare: Yes    Advanced directive: Yes      Cognitive Screening:   Provider or family/friend/caregiver concerned regarding cognition?: No    PREVENTIVE SCREENINGS      Cardiovascular Screening:    General: Screening Current, Risks and Benefits Discussed and History Lipid Disorder      Diabetes Screening:     General: History Diabetes, Screening Current and Risks and Benefits Discussed      Colorectal Cancer Screening:     General: Risks and Benefits Discussed and Screening Not Indicated      Prostate Cancer Screening:    General: History Prostate Cancer, Risks and Benefits Discussed and Screening Not Indicated      Osteoporosis Screening:    General: Risks and Benefits Discussed and  Screening Not Indicated      Abdominal Aortic Aneurysm (AAA) Screening:    Risk factors include: tobacco use        General: Risks and Benefits Discussed and Screening Current      Lung Cancer Screening:     General: Screening Not Indicated and Risks and Benefits Discussed      Hepatitis C Screening:    General: Risks and Benefits Discussed and Screening Not Indicated    Screening, Brief Intervention, and Referral to Treatment (SBIRT)    Screening  Typical number of drinks in a day: 0  Typical number of drinks in a week: 0  Interpretation: Low risk drinking behavior.    AUDIT-C Screenin) How often did you have a drink containing alcohol in the past year? never  2) How many drinks did you have on a typical day when you were drinking in the past year? 0  3) How often did you have 6 or more drinks on one occasion in the past year? never    AUDIT-C Score: 0  Interpretation: Score 0-3 (male): Negative screen for alcohol misuse    Single Item Drug Screening:  How often have you used an illegal drug (including marijuana) or a prescription medication for non-medical reasons in the past year? never    Single Item Drug Screen Score: 0  Interpretation: Negative screen for possible drug use disorder    Other Counseling Topics:   Car/seat belt/driving safety and regular weightbearing exercise.     Social Determinants of Health     Financial Resource Strain: Medium Risk (2023)    Overall Financial Resource Strain (CARDIA)    • Difficulty of Paying Living Expenses: Somewhat hard   Food Insecurity: No Food Insecurity (9/3/2024)    Hunger Vital Sign    • Worried About Running Out of Food in the Last Year: Never true    • Ran Out of Food in the Last Year: Never true   Transportation Needs: No Transportation Needs (9/3/2024)    PRAPARE - Transportation    • Lack of Transportation (Medical): No    • Lack of Transportation (Non-Medical): No   Housing Stability: Low Risk  (9/3/2024)    Housing Stability Vital Sign    • Unable  "to Pay for Housing in the Last Year: No    • Number of Times Moved in the Last Year: 0    • Homeless in the Last Year: No   Utilities: Not At Risk (9/3/2024)    [facility] Utilities    • Threatened with loss of utilities: No     Vision Screening    Right eye Left eye Both eyes   Without correction 20/40 20/200 20/40   With correction          Objective     /84   Pulse 81   Temp (!) 97 °F (36.1 °C)   Ht 5' 7.25\" (1.708 m) Comment: with shoes on  Wt 80.6 kg (177 lb 12.8 oz)   SpO2 96%   BMI 27.64 kg/m²     Physical Exam  Vitals and nursing note reviewed.   Constitutional:       General: He is not in acute distress.     Appearance: He is well-developed. He is not ill-appearing.   HENT:      Head: Normocephalic and atraumatic.      Right Ear: Tympanic membrane and external ear normal. There is no impacted cerumen.      Left Ear: Tympanic membrane and external ear normal. There is no impacted cerumen.      Mouth/Throat:      Mouth: Mucous membranes are moist.      Pharynx: Oropharynx is clear. No oropharyngeal exudate.   Eyes:      General:         Right eye: No discharge.         Left eye: No discharge.      Conjunctiva/sclera: Conjunctivae normal.   Neck:      Vascular: Carotid bruit present.      Trachea: No tracheal deviation.      Comments: B/L carotid bruits  Cardiovascular:      Rate and Rhythm: Normal rate and regular rhythm.      Pulses: Pulses are weak.           Dorsalis pedis pulses are 1+ on the right side and 1+ on the left side.      Heart sounds: Normal heart sounds. No murmur heard.  Pulmonary:      Effort: Pulmonary effort is normal. No respiratory distress.      Breath sounds: Normal breath sounds. No wheezing, rhonchi or rales.   Abdominal:      General: There is no distension.      Palpations: Abdomen is soft.      Tenderness: There is no abdominal tenderness. There is no guarding or rebound.   Musculoskeletal:         General: No deformity or signs of injury.      Cervical back: Neck supple.    "     Feet:    Feet:      Right foot:      Skin integrity: Dry skin present. No ulcer, skin breakdown, erythema, warmth or callus.      Left foot:      Skin integrity: Dry skin present. No ulcer, skin breakdown, erythema, warmth or callus.   Lymphadenopathy:      Cervical: No cervical adenopathy.   Skin:     General: Skin is warm and dry.      Coloration: Skin is not pale.      Findings: No bruising or rash.   Neurological:      General: No focal deficit present.      Mental Status: He is alert. Mental status is at baseline.      Motor: No abnormal muscle tone.      Gait: Gait normal.   Psychiatric:         Mood and Affect: Mood normal.         Behavior: Behavior normal.         Thought Content: Thought content normal.         Judgment: Judgment normal.   Diabetic Foot Exam    Patient's shoes and socks removed.    Right Foot/Ankle   Right Foot Inspection  Skin Exam: skin normal, skin intact and dry skin. No warmth, no callus, no erythema, no maceration, no abnormal color, no pre-ulcer, no ulcer and no callus.     Toe Exam: ROM and strength within normal limits.     Sensory   Vibration: intact  Monofilament testing: diminished    Vascular  The right DP pulse is 1+.     Left Foot/Ankle  Left Foot Inspection  Skin Exam: skin normal, skin intact and dry skin. No warmth, no erythema, no maceration, normal color, no pre-ulcer, no ulcer and no callus.     Toe Exam: ROM and strength within normal limits.     Sensory   Vibration: intact  Monofilament testing: diminished    Vascular  The left DP pulse is 1+.     Assign Risk Category  No deformity present  No loss of protective sensation  Weak pulses  Risk: 0

## 2024-09-10 NOTE — ASSESSMENT & PLAN NOTE
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

## 2024-09-10 NOTE — ASSESSMENT & PLAN NOTE
Foot exam done in office today, importance of con't good BS control, call with sores/ulcers, new or worse symptoms  Lab Results   Component Value Date    HGBA1C 6.1 (H) 08/22/2024       Orders:    CBC and differential; Future    Comprehensive metabolic panel; Future    Hemoglobin A1C; Future    Lipid panel; Future    TSH, 3rd generation with Free T4 reflex; Future    Albumin / creatinine urine ratio; Future

## 2024-09-10 NOTE — ASSESSMENT & PLAN NOTE
Overdue for CUS - order given and urged to do, no current stroke/TIA symptoms - to ED if they occur, on ASA/Plavix and satin, will follow  Orders:    VAS carotid complete study; Future    CBC and differential; Future    Comprehensive metabolic panel; Future    Hemoglobin A1C; Future    Lipid panel; Future    TSH, 3rd generation with Free T4 reflex; Future    Albumin / creatinine urine ratio; Future

## 2024-11-01 ENCOUNTER — HOSPITAL ENCOUNTER (OUTPATIENT)
Dept: NON INVASIVE DIAGNOSTICS | Facility: HOSPITAL | Age: 84
Discharge: HOME | End: 2024-11-01
Payer: MEDICARE

## 2024-11-01 DIAGNOSIS — I65.22 ASYMPTOMATIC STENOSIS OF LEFT CAROTID ARTERY: ICD-10-CM

## 2024-11-01 DIAGNOSIS — I65.21 RECURRENT STENOSIS OF RIGHT CAROTID ARTERY: ICD-10-CM

## 2024-11-01 PROCEDURE — 93880 EXTRACRANIAL BILAT STUDY: CPT | Performed by: SURGERY

## 2024-11-01 PROCEDURE — 93880 EXTRACRANIAL BILAT STUDY: CPT

## 2024-11-05 LAB
LEFT EYE DIABETIC RETINOPATHY: NORMAL
RIGHT EYE DIABETIC RETINOPATHY: NORMAL

## 2024-11-16 DIAGNOSIS — I10 PRIMARY HYPERTENSION: ICD-10-CM

## 2024-11-18 RX ORDER — HYDROCHLOROTHIAZIDE 25 MG/1
25 TABLET ORAL DAILY
Qty: 90 TABLET | Refills: 1 | Status: SHIPPED | OUTPATIENT
Start: 2024-11-18

## 2024-11-25 ENCOUNTER — NURSE TRIAGE (OUTPATIENT)
Age: 84
End: 2024-11-25

## 2024-11-25 ENCOUNTER — APPOINTMENT (OUTPATIENT)
Dept: LAB | Facility: HOSPITAL | Age: 84
End: 2024-11-25
Payer: MEDICARE

## 2024-11-25 DIAGNOSIS — R31.0 GROSS HEMATURIA: Primary | ICD-10-CM

## 2024-11-25 LAB
BACTERIA UR QL AUTO: ABNORMAL /HPF
BILIRUB UR QL STRIP: NEGATIVE
CLARITY UR: CLEAR
COLOR UR: ABNORMAL
GLUCOSE UR STRIP-MCNC: NEGATIVE MG/DL
HGB UR QL STRIP.AUTO: ABNORMAL
HYALINE CASTS #/AREA URNS LPF: ABNORMAL /LPF
KETONES UR STRIP-MCNC: NEGATIVE MG/DL
LEUKOCYTE ESTERASE UR QL STRIP: NEGATIVE
NITRITE UR QL STRIP: NEGATIVE
NON-SQ EPI CELLS URNS QL MICRO: ABNORMAL /HPF
PH UR STRIP.AUTO: 5.5 [PH]
PROT UR STRIP-MCNC: ABNORMAL MG/DL
RBC #/AREA URNS AUTO: ABNORMAL /HPF
SP GR UR STRIP.AUTO: 1.01 (ref 1–1.03)
UROBILINOGEN UR STRIP-ACNC: <2 MG/DL
WBC #/AREA URNS AUTO: ABNORMAL /HPF

## 2024-11-25 PROCEDURE — 81001 URINALYSIS AUTO W/SCOPE: CPT | Performed by: INTERNAL MEDICINE

## 2024-11-25 NOTE — TELEPHONE ENCOUNTER
"Pt called. Transferred from [place] to [facility] for blood in urine x 2-3 weeks. Triage completed, see below. Pt taking daily asa 81 mg and has a history of prostate cancer. Pt does not currently see a Urologist. Given symptoms with blood thinner and PMH, advised ED. Pt will think about it. Requesting PCP advice.     Message forwarded to PCP office for review and advice.        Answer Assessment - Initial Assessment Questions  1. COLOR of URINE: \"Describe the color of the urine.\"  (e.g., tea-colored, pink, red, bloody) \"Do you have blood clots in your urine?\" (e.g., none, pea, grape, small coin)      Bright red to pinkish intermittently    2. ONSET: \"When did the bleeding start?\"       Intermittently for the past 2-3 weeks    3. PAIN with URINATION: \"Is there any pain with passing your urine?\" If Yes, ask: \"How bad is the pain?\"  (Scale 1-10; or mild, moderate, severe)      Denies pain. Does report some discomfort in pelvic region.    4. FEVER: \"Do you have a fever?\" If Yes, ask: \"What is your temperature, how was it measured, and when did it start?\"      Does not report    5. ASSOCIATED SYMPTOMS: \"Are you passing urine more frequently than usual?\"      Reports frequency. Denies odor or difficulty urinating    6. OTHER SYMPTOMS: \"Do you have any other symptoms?\" (e.g., back/flank pain, abdomen pain, vomiting)      Does not report n/v. Denies abdominal and back/flank pain.    Protocols used: Urine - Blood In-Adult-[place]    " Additional handoff

## 2024-11-25 NOTE — TELEPHONE ENCOUNTER
I can order a UA he can give urine specimen at lab to eval for UTI.  He should also call his Urologist for an appt to further eval the bladder.  I do  not feel ER is needed unless bleeding is significant and he develops symptoms of anemia (dizziness/lightheadedness/SOB/CP/palp).

## 2024-11-26 ENCOUNTER — APPOINTMENT (OUTPATIENT)
Dept: LAB | Facility: HOSPITAL | Age: 84
End: 2024-11-26
Payer: MEDICARE

## 2024-11-26 ENCOUNTER — TELEPHONE (OUTPATIENT)
Age: 84
End: 2024-11-26

## 2024-11-26 ENCOUNTER — RESULTS FOLLOW-UP (OUTPATIENT)
Dept: FAMILY MEDICINE CLINIC | Facility: HOSPITAL | Age: 84
End: 2024-11-26

## 2024-11-26 DIAGNOSIS — R31.0 GROSS HEMATURIA: Primary | ICD-10-CM

## 2024-11-26 LAB
ANION GAP SERPL CALCULATED.3IONS-SCNC: 9 MMOL/L (ref 4–13)
BUN SERPL-MCNC: 24 MG/DL (ref 5–25)
CALCIUM SERPL-MCNC: 9.4 MG/DL (ref 8.4–10.2)
CHLORIDE SERPL-SCNC: 103 MMOL/L (ref 96–108)
CO2 SERPL-SCNC: 31 MMOL/L (ref 21–32)
CREAT SERPL-MCNC: 1.75 MG/DL (ref 0.6–1.3)
GFR SERPL CREATININE-BSD FRML MDRD: 35 ML/MIN/1.73SQ M
GLUCOSE P FAST SERPL-MCNC: 108 MG/DL (ref 65–99)
POTASSIUM SERPL-SCNC: 3.6 MMOL/L (ref 3.5–5.3)
SODIUM SERPL-SCNC: 143 MMOL/L (ref 135–147)

## 2024-11-26 PROCEDURE — 36415 COLL VENOUS BLD VENIPUNCTURE: CPT | Performed by: INTERNAL MEDICINE

## 2024-11-26 PROCEDURE — 80048 BASIC METABOLIC PNL TOTAL CA: CPT | Performed by: INTERNAL MEDICINE

## 2024-11-26 NOTE — TELEPHONE ENCOUNTER
"Pt returning missed call. I relayed Dr. Vizcarra's message: \"Please notify pt that his UA showed small blood in the urine but no sign of infection.  He had visible blood in the urine and no UTI on UA he needs to see Uro for further eval of the bladder.  I can order CT A/P to eval the kidney's if he is agreeable. He will need BW first as his last labs in Aug had his kidney tests elevated.  Let me know if he is agreeable to BMP (nonfasting) and CT A/P with contrast and I will order the tests\"     Patient was agreeable and would like to move forward with CT and lab.    Please let patient know when orders are placed.    Thanks  "

## 2024-11-26 NOTE — TELEPHONE ENCOUNTER
New Patient    What is the reason for the patient’s appointment?: NP- gross hematuria    Per patient he has been having gross blood intermittently for the past few weeks. He states it hasn't happened since Sunday. Denies any blood clots or difficulty urinating.     What office location does the patient prefer?:    Does patient have Imaging/Lab Results:    Urine testing done on 11/25/24      CT scheduled for 12/3/24      Have patient records been requested?:  If No, are the records showing in Epic: Records in [facility].     HISTORY:   Has the patient had any previous Urologist(s)?: Prostate cancer in 1999 / Had radical prostatectomy [facility]     Scheduled: 12/24/24 with Rosanna at our [place] office. Added to wait list.     Please advise on if this apt time frame is okay.

## 2024-11-29 ENCOUNTER — RESULTS FOLLOW-UP (OUTPATIENT)
Dept: FAMILY MEDICINE CLINIC | Facility: HOSPITAL | Age: 84
End: 2024-11-29

## 2024-11-29 DIAGNOSIS — N17.9 AKI (ACUTE KIDNEY INJURY) (HCC): Primary | ICD-10-CM

## 2024-12-01 DIAGNOSIS — E78.5 DYSLIPIDEMIA: ICD-10-CM

## 2024-12-02 ENCOUNTER — APPOINTMENT (OUTPATIENT)
Dept: LAB | Facility: HOSPITAL | Age: 84
End: 2024-12-02
Payer: MEDICARE

## 2024-12-02 LAB
ANION GAP SERPL CALCULATED.3IONS-SCNC: 9 MMOL/L (ref 4–13)
BUN SERPL-MCNC: 26 MG/DL (ref 5–25)
CALCIUM SERPL-MCNC: 9.7 MG/DL (ref 8.4–10.2)
CHLORIDE SERPL-SCNC: 100 MMOL/L (ref 96–108)
CO2 SERPL-SCNC: 32 MMOL/L (ref 21–32)
CREAT SERPL-MCNC: 1.86 MG/DL (ref 0.6–1.3)
GFR SERPL CREATININE-BSD FRML MDRD: 32 ML/MIN/1.73SQ M
GLUCOSE P FAST SERPL-MCNC: 122 MG/DL (ref 65–99)
POTASSIUM SERPL-SCNC: 3.6 MMOL/L (ref 3.5–5.3)
SODIUM SERPL-SCNC: 141 MMOL/L (ref 135–147)

## 2024-12-02 PROCEDURE — 36415 COLL VENOUS BLD VENIPUNCTURE: CPT | Performed by: INTERNAL MEDICINE

## 2024-12-02 PROCEDURE — 80048 BASIC METABOLIC PNL TOTAL CA: CPT | Performed by: INTERNAL MEDICINE

## 2024-12-03 ENCOUNTER — HOSPITAL ENCOUNTER (OUTPATIENT)
Dept: CT IMAGING | Facility: HOSPITAL | Age: 84
Discharge: HOME | End: 2024-12-03
Payer: MEDICARE

## 2024-12-03 DIAGNOSIS — R31.0 GROSS HEMATURIA: ICD-10-CM

## 2024-12-03 PROCEDURE — 74177 CT ABD & PELVIS W/CONTRAST: CPT

## 2024-12-03 RX ORDER — ATORVASTATIN CALCIUM 20 MG/1
20 TABLET, FILM COATED ORAL DAILY
Qty: 90 TABLET | Refills: 1 | Status: SHIPPED | OUTPATIENT
Start: 2024-12-03

## 2024-12-03 RX ADMIN — IOHEXOL 50 ML: 350 INJECTION, SOLUTION INTRAVENOUS at 15:11

## 2024-12-09 DIAGNOSIS — Z12.5 ENCOUNTER FOR SCREENING FOR MALIGNANT NEOPLASM OF PROSTATE: ICD-10-CM

## 2024-12-09 DIAGNOSIS — R19.00 PELVIC MASS IN MALE: Primary | ICD-10-CM

## 2024-12-10 ENCOUNTER — APPOINTMENT (OUTPATIENT)
Dept: LAB | Facility: HOSPITAL | Age: 84
End: 2024-12-10
Payer: MEDICARE

## 2024-12-10 ENCOUNTER — RESULTS FOLLOW-UP (OUTPATIENT)
Dept: FAMILY MEDICINE CLINIC | Facility: HOSPITAL | Age: 84
End: 2024-12-10

## 2024-12-10 LAB — PSA SERPL-MCNC: 367.88 NG/ML (ref 0–4)

## 2024-12-10 PROCEDURE — 36415 COLL VENOUS BLD VENIPUNCTURE: CPT | Performed by: INTERNAL MEDICINE

## 2024-12-10 PROCEDURE — G0103 PSA SCREENING: HCPCS | Performed by: INTERNAL MEDICINE

## 2024-12-11 NOTE — TELEPHONE ENCOUNTER
Angel Luis Mix stated to give Dr Vizcarra a message that he made an appt with Urology just wanted to let her know.

## 2024-12-16 ENCOUNTER — OFFICE VISIT (OUTPATIENT)
Dept: UROLOGY | Facility: MEDICAL CENTER | Age: 84
End: 2024-12-16
Payer: MEDICARE

## 2024-12-16 ENCOUNTER — TELEPHONE (OUTPATIENT)
Age: 84
End: 2024-12-16

## 2024-12-16 ENCOUNTER — DOCUMENTATION (OUTPATIENT)
Dept: HEMATOLOGY ONCOLOGY | Facility: CLINIC | Age: 84
End: 2024-12-16

## 2024-12-16 VITALS
SYSTOLIC BLOOD PRESSURE: 138 MMHG | BODY MASS INDEX: 27.78 KG/M2 | HEART RATE: 79 BPM | WEIGHT: 177 LBS | HEIGHT: 67 IN | OXYGEN SATURATION: 100 % | DIASTOLIC BLOOD PRESSURE: 80 MMHG

## 2024-12-16 DIAGNOSIS — R97.20 ELEVATED PSA: ICD-10-CM

## 2024-12-16 DIAGNOSIS — R31.0 GROSS HEMATURIA: ICD-10-CM

## 2024-12-16 DIAGNOSIS — N13.39 OTHER HYDRONEPHROSIS: ICD-10-CM

## 2024-12-16 DIAGNOSIS — C61 PROSTATE CANCER (HCC): Primary | ICD-10-CM

## 2024-12-16 DIAGNOSIS — D49.4 BLADDER TUMOR: ICD-10-CM

## 2024-12-16 LAB
SL AMB  POCT GLUCOSE, UA: NORMAL
SL AMB LEUKOCYTE ESTERASE,UA: NORMAL
SL AMB POCT BILIRUBIN,UA: NORMAL
SL AMB POCT BLOOD,UA: NORMAL
SL AMB POCT CLARITY,UA: CLEAR
SL AMB POCT COLOR,UA: YELLOW
SL AMB POCT KETONES,UA: NORMAL
SL AMB POCT NITRITE,UA: NORMAL
SL AMB POCT PH,UA: 5
SL AMB POCT SPECIFIC GRAVITY,UA: 1.01
SL AMB POCT URINE PROTEIN: NORMAL
SL AMB POCT UROBILINOGEN: 0.2

## 2024-12-16 PROCEDURE — 52000 CYSTOURETHROSCOPY: CPT | Performed by: UROLOGY

## 2024-12-16 PROCEDURE — 99204 OFFICE O/P NEW MOD 45 MIN: CPT | Performed by: UROLOGY

## 2024-12-16 PROCEDURE — 81003 URINALYSIS AUTO W/O SCOPE: CPT | Performed by: UROLOGY

## 2024-12-16 PROCEDURE — 88112 CYTOPATH CELL ENHANCE TECH: CPT | Performed by: PATHOLOGY

## 2024-12-16 PROCEDURE — 87086 URINE CULTURE/COLONY COUNT: CPT | Performed by: UROLOGY

## 2024-12-16 NOTE — PROGRESS NOTES
Name: Kilo Mix      : 1940      MRN: [id number]  Encounter Provider: Jordan Youssef MD  Encounter Date: 2024   Encounter department: [facility] UROLOGY [place]  :  Assessment & Plan  Gross hematuria    Orders:    POCT urine dip auto non-scope    Elevated PSA         Other hydronephrosis  Bilateral hydronephrosis to level of invasive tumor in bladder  Unable to visualize ureteral orifices on cystoscopy  Rising serum creatinine, GFR down to 32  - referral to IR to bilateral PCN       Prostate cancer (HCC)  Patient reports history of prostate cancer s/p radical prostatectomy around   Chart reports suprapubic prostatectomy and cystoscopy seems more consistent with suprapubic prostatectomy with prostatic fossa visualized, however, appearance of pelvic clips on CT scan    on 12/10/2024  - ordered for PSMA PET  - patient/son will see if they can get prior records from OSH  - will tentatively plan for TURBT/TURP for tissue specimen pending results of PSMA PET scan (can consider biopsy of metastatic site as well)  - given referral to oncology       Orders:    NM PET CT skull base to mid thigh; Future    Ambulatory Referral to Hematology / Oncology; Future    Case request operating room: TRANSURETHRAL RESECTION OF BLADDER TUMOR (TURBT); Standing        History of Present Illness   Kilo Mix is a 84 y.o. male who presents with gross hematuria, bilateral hydronephrosis to level of invasive bladder tumor, rising serum creatinine and markedly elevated PSA    CTAP w IV contrast (12/3/2024)  IMPRESSION:     Large hypervascular bladder mass extending into the right perivesicular space with associated bilateral iliac and retroperitoneal lymphadenopathy consistent with bladder carcinoma.     Moderate bilateral hydronephrosis due to occlusion of the distal ureters.     Cholelithiasis.     Diverticulosis.     Left adrenal myelolipoma.        AUA SYMPTOM SCORE      Flowsheet Row Most  Recent Value   AUA SYMPTOM SCORE    How often have you had a sensation of not emptying your bladder completely after you finished urinating? 2 (P)     How often have you had to urinate again less than two hours after you finished urinating? 2 (P)     How often have you found you stopped and started again several times when you urinate? 0 (P)     How often have you found it difficult to postpone urination? 3 (P)     How often have you had a weak urinary stream? 5 (P)     How often have you had to push or strain to begin urination? 0 (P)     How many times did you most typically get up to urinate from the time you went to bed at night until the time you got up in the morning? 2 (P)     Quality of Life: If you were to spend the rest of your life with your urinary condition just the way it is now, how would you feel about that? 4 (P)     AUA SYMPTOM SCORE 14 (P)            Review of Systems   All other systems reviewed and are negative.    Past Medical History   Past Medical History:   Diagnosis Date    Diverticulitis of colon     Hypertension     Migraines     Myocardial infarction (HCC)     Prostate cancer (HCC)     Seasonal allergies      Past Surgical History:   Procedure Laterality Date    CARDIAC SURGERY      CAROTID ENDARTARECTOMY Right     thromboendarterectomy    CAROTID STENT Right     CATARACT EXTRACTION Left     COLONOSCOPY      fiberoptic screening 2/6/08 5 yr / complete 3/15/13 5 yr    CORONARY ANGIOPLASTY WITH STENT PLACEMENT      ELBOW SURGERY      EYE SURGERY      KNEE SURGERY      PROSTATE SURGERY      SUPRAPUBIC PROSTATECTOMY  12/06/1999    UPPER GASTROINTESTINAL ENDOSCOPY       Family History   Problem Relation Age of Onset    Alcohol abuse Mother     Heart attack Family         MI    Breast cancer Family     No Known Problems Father     No Known Problems Sister     Substance Abuse Son     Colon cancer Neg Hx     Colon polyps Neg Hx       reports that he quit smoking about 14 years ago. His smoking  "use included cigarettes. He started smoking about 24 years ago. He has a 30 pack-year smoking history. He has never used smokeless tobacco. He reports that he does not currently use alcohol. He reports that he does not use drugs.  Current Outpatient Medications on File Prior to Visit   Medication Sig Dispense Refill    amLODIPine (NORVASC) 5 mg tablet Take 1 tablet (5 mg total) by mouth daily 90 tablet 2    ascorbic acid (VITAMIN C) 500 mg tablet Take 500 mg by mouth daily      aspirin 81 MG tablet Take 1 tablet by mouth daily      atorvastatin (LIPITOR) 20 mg tablet Take 1 tablet by mouth once daily 90 tablet 1    B Complex Vitamins (VITAMIN B COMPLEX PO) Take 1 tablet by mouth daily      Cholecalciferol 1000 units CHEW Chew 1 tablet daily      clopidogrel (PLAVIX) 75 mg tablet Take 1 tablet (75 mg total) by mouth daily 90 tablet 1    hydroCHLOROthiazide 25 mg tablet Take 1 tablet by mouth once daily 90 tablet 1    lisinopril (ZESTRIL) 10 mg tablet Take 1 tablet by mouth once daily 90 tablet 2    metFORMIN (GLUCOPHAGE-XR) 500 mg 24 hr tablet Take 1 tablet by mouth once daily 90 tablet 3    omega-3-acid ethyl esters (LOVAZA) 1 g capsule Take 2 g by mouth 2 (two) times a day      vitamin E, tocopherol, 400 units capsule Take 1 capsule by mouth daily       No current facility-administered medications on file prior to visit.     Allergies   Allergen Reactions    Losartan Syncope     Reaction Date: 07Jun2011;     Penicillins Other (See Comments)     As a child so does not recall reaction         Objective   /80 (BP Location: Left arm, Patient Position: Sitting, Cuff Size: Standard)   Pulse 79   Ht 5' 7\" (1.702 m)   Wt 80.3 kg (177 lb)   SpO2 100%   BMI 27.72 kg/m²     Physical Exam  Vitals and nursing note reviewed.   Constitutional:       General: He is not in acute distress.     Appearance: He is well-developed.   HENT:      Head: Normocephalic and atraumatic.   Eyes:      Conjunctiva/sclera: Conjunctivae " normal.   Cardiovascular:      Rate and Rhythm: Normal rate and regular rhythm.      Heart sounds: No murmur heard.  Pulmonary:      Effort: Pulmonary effort is normal. No respiratory distress.      Breath sounds: Normal breath sounds.   Abdominal:      Palpations: Abdomen is soft.      Tenderness: There is no abdominal tenderness.   Genitourinary:     Penis: Normal.    Musculoskeletal:         General: No swelling.      Cervical back: Neck supple.   Skin:     General: Skin is warm and dry.      Capillary Refill: Capillary refill takes less than 2 seconds.   Neurological:      Mental Status: He is alert.   Psychiatric:         Mood and Affect: Mood normal.          Results  Lab Results   Component Value Date    .880 (H) 12/10/2024     Lab Results   Component Value Date    GLUCOSE 142 (H) 08/03/2015    CALCIUM 9.7 12/02/2024     08/03/2015    K 3.6 12/02/2024    CO2 32 12/02/2024     12/02/2024    BUN 26 (H) 12/02/2024    CREATININE 1.86 (H) 12/02/2024     Lab Results   Component Value Date    WBC 7.63 02/19/2024    HGB 15.9 02/19/2024    HCT 45.9 02/19/2024    MCV 91 02/19/2024     02/19/2024       Office Urine Dip  No results found for this or any previous visit (from the past hour).]

## 2024-12-16 NOTE — PROGRESS NOTES
"   Cystoscopy     Date/Time  12/16/2024 1:00 PM     Performed by  Jordan Youssef MD   Authorized by  Jordan Youssef MD     Universal Protocol:  procedure performed by consultantConsent: Verbal consent obtained. Written consent obtained.  Risks and benefits: risks, benefits and alternatives were discussed  Consent given by: patient  Time out: Immediately prior to procedure a \"time out\" was called to verify the correct patient, procedure, equipment, support staff and site/side marked as required.  Timeout called at: 12/16/2024 1:10 PM.  Patient understanding: patient states understanding of the procedure being performed  Patient consent: the patient's understanding of the procedure matches consent given  Procedure consent: procedure consent matches procedure scheduled  Relevant documents: relevant documents present and verified  Test results: test results available and properly labeled  Site marked: the operative site was not marked  Radiology Images displayed and confirmed. If images not available, report reviewed: imaging studies available  Required items: required blood products, implants, devices, and special equipment available  Patient identity confirmed: verbally with patient      Procedure Details:  Procedure type: cystoscopy    Patient tolerance: Patient tolerated the procedure well with no immediate complications    Additional Procedure Details: Office Cystoscopy Procedure Note    Indication:    Hematuria, bladder tumor    Informed consent   The risks, benefits, complications, treatment options, and expected outcomes were discussed with the patient. The patient concurred with the proposed plan and provided informed consent.    Anesthesia  Lidocaine jelly 2%    Antibiotic prophylaxis   None    Procedure  The patient was placed in the supineposition, was prepped and draped in the usual manner using sterile technique, and 2% lidocaine jelly instilled into the urethra.  A 17 F flexible cystoscope was then " inserted into the urethra and the urethra and bladder carefully examined.  The following findings were noted:    Findings:  Urethra:  Normal  Prostate:  Prostatic fossa with tumor extending into trigone of bladder, cobblestone appearance  Bladder:  Large extensive tumor involving entire trigone of bladder  Ureteral orifices:  Both ureteral orifices obscured by tumor    Specimens: None                 Complications:    None; patient tolerated the procedure well           Disposition: To home            Condition: Stable    Plan: Appearance consistent with locally invasive prostate cancer

## 2024-12-16 NOTE — PROGRESS NOTES
All records needed are in patients chart. No records retrieval needed at this time.     Referral received/ Chart reviewed for work up completed   Referral to: hematology oncology   Dx: no current path at this time.   Large hypervascular bladder mass extending into the right perivesicular space with associated bilateral iliac and retroperitoneal lymphadenopathy consistent with bladder carcinoma.   History of prostate cancer dating back to prior to the year 2000  [facility] []Other:    [x] PET/CT scheduled    [] MRI   [x] CT   [] US   [] Mammo   [] Bone scan   [] N/A    Pathology completed: [][facility] []Other:    Date:   Location:   []N/A    Additional records needed:   [] Genomic report   [] Genetic testing results   [] Office Note   [] Procedure/ Operative note   [] Lab results   [x] N/A      [] Radiation Oncology records retrieval needed (PN to route to rad/onc clerical pool once scheduled)  Date:  Location:      Urologist:      Dr. Youssef         PSA Date:        Lab Results   Component Value Date    .880 (H) 12/10/2024

## 2024-12-16 NOTE — TELEPHONE ENCOUNTER
Patients son had called in stating he had missed a call form the office. Please have someone reach back out to him.     Clinical team was unavailable at the time of assistance.     [phone]

## 2024-12-16 NOTE — TELEPHONE ENCOUNTER
Pt son Stewart called in wanted to discuss w/dr about pts medications, urologist stated pt needed to stop clopidogrel (PLAVIX) 75 mg tablet to do a couple of procedures. Waiting on oncology to schedule other testing please call pao William back

## 2024-12-17 ENCOUNTER — PATIENT OUTREACH (OUTPATIENT)
Dept: HEMATOLOGY ONCOLOGY | Facility: CLINIC | Age: 84
End: 2024-12-17

## 2024-12-17 ENCOUNTER — TELEPHONE (OUTPATIENT)
Age: 84
End: 2024-12-17

## 2024-12-17 ENCOUNTER — PREP FOR PROCEDURE (OUTPATIENT)
Dept: INTERVENTIONAL RADIOLOGY/VASCULAR | Facility: HOSPITAL | Age: 84
End: 2024-12-17

## 2024-12-17 ENCOUNTER — APPOINTMENT (OUTPATIENT)
Dept: LAB | Facility: HOSPITAL | Age: 84
End: 2024-12-17
Payer: MEDICARE

## 2024-12-17 DIAGNOSIS — C67.0 MALIGNANT NEOPLASM OF TRIGONE OF URINARY BLADDER (HCC): ICD-10-CM

## 2024-12-17 DIAGNOSIS — N13.30 HYDRONEPHROSIS, BILATERAL: Primary | ICD-10-CM

## 2024-12-17 DIAGNOSIS — N13.39 OTHER HYDRONEPHROSIS: ICD-10-CM

## 2024-12-17 DIAGNOSIS — C61 PROSTATE CANCER (HCC): Primary | ICD-10-CM

## 2024-12-17 LAB
ANION GAP SERPL CALCULATED.3IONS-SCNC: 10 MMOL/L (ref 4–13)
BACTERIA UR CULT: NORMAL
BUN SERPL-MCNC: 31 MG/DL (ref 5–25)
CALCIUM SERPL-MCNC: 10.2 MG/DL (ref 8.4–10.2)
CHLORIDE SERPL-SCNC: 102 MMOL/L (ref 96–108)
CO2 SERPL-SCNC: 30 MMOL/L (ref 21–32)
CREAT SERPL-MCNC: 1.72 MG/DL (ref 0.6–1.3)
GFR SERPL CREATININE-BSD FRML MDRD: 35 ML/MIN/1.73SQ M
GLUCOSE P FAST SERPL-MCNC: 113 MG/DL (ref 65–99)
POTASSIUM SERPL-SCNC: 4.2 MMOL/L (ref 3.5–5.3)
SODIUM SERPL-SCNC: 142 MMOL/L (ref 135–147)

## 2024-12-17 PROCEDURE — 36415 COLL VENOUS BLD VENIPUNCTURE: CPT

## 2024-12-17 PROCEDURE — 80048 BASIC METABOLIC PNL TOTAL CA: CPT

## 2024-12-17 NOTE — TELEPHONE ENCOUNTER
Patients son calling to see if he is able to move up patients appt for Dr. Shankar 12/30/24 @ 11am at [facility].

## 2024-12-17 NOTE — TELEPHONE ENCOUNTER
Please assist the patient with a sooner appointment. Patient currently scheduled with Dr. Shankar 12/30/2024 [facility] at 11:00 am. As per the clinical review the patient should be seen with in 7 days. I reached out to  Central Scheduling as well to see if they are able to move up the patient 12/24/2024 1:00 PM  appointment and advise by the rep that she will escalate the appointment to see if they could get the appointment scheduled sooner and contact the patient.    Patients best contact number [facility]  [phone]

## 2024-12-17 NOTE — TELEPHONE ENCOUNTER
----- Message from Walter Rahman MD sent at 12/9/2024 12:44 PM EST -----  Patient needs a cysto with the first available urologist please  Yes needs nephrostomies if bilateral hydro  ----- Message -----  From: Dolores Vizcarra DO  Sent: 12/9/2024  12:35 PM EST  To: MD Dr. Josiah Parker,    Mr Mix is my patient who called last week with gross hematuria.  I ordered CT A/P and I got the results today. You have NOT seen this patient but he has an appt with Rosanna Gregory 12/24/24.  I got a call from radiology about his results: Large hypervascular bladder mass extending into the right perivesicular space with associated bilateral iliac and retroperitoneal lymphadenopathy consistent with bladder carcinoma. Moderate bilateral hydronephrosis due to occlusion of the distal ureters.  Radiology mentioned he would need to see Uro and likely need a nephrostomy placed by IR. I was wondering if you could confirm that recommendation or let me know if pt can get in sooner or be seen by you instead of an AP given his results.  Let me know your thoughts.      Thanks so much for your time.    Dolores Vizcarra  ----- Message -----  From: [facility] Results In  Sent: 12/9/2024  10:26 AM EST  To: Dolores Vizcarra DO

## 2024-12-18 ENCOUNTER — PATIENT OUTREACH (OUTPATIENT)
Dept: HEMATOLOGY ONCOLOGY | Facility: CLINIC | Age: 84
End: 2024-12-18

## 2024-12-18 ENCOUNTER — TELEPHONE (OUTPATIENT)
Age: 84
End: 2024-12-18

## 2024-12-18 ENCOUNTER — DOCUMENTATION (OUTPATIENT)
Dept: HEMATOLOGY ONCOLOGY | Facility: CLINIC | Age: 84
End: 2024-12-18

## 2024-12-18 PROCEDURE — 88112 CYTOPATH CELL ENHANCE TECH: CPT | Performed by: PATHOLOGY

## 2024-12-18 RX ORDER — SODIUM CHLORIDE 9 MG/ML
75 INJECTION, SOLUTION INTRAVENOUS CONTINUOUS
Status: CANCELLED | OUTPATIENT
Start: 2024-12-18

## 2024-12-18 NOTE — PROGRESS NOTES
Son, POA, Requesting to call wife, Ana Mix [phone]    Patient / son are very confused as appointments have changed a few times. Are under impression appt with Dr. Shankar will be 12/24/24 at 11, and son took off for this day. ONN does not see this scheduled, outreach made to [facility] for assist with clarification.     Oncology Nurse Navigator made call to patient due to referral to provider within [facility]. I spoke with patient about my role as an Oncology Nurse Navigator. I explained how to reach the office for any routine or urgent matters and the availability of patient navigation for non urgent matters. Explained oncology navigation is here to help patients through their journey and to offer assistance with any barriers to care. As a team, we strive to be patient advocates and help navigate healthcare system. Patient aware that medical oncology nursing will be primary contact once consult is complete and patient navigator will continue to offer support through entire journey. Conversation is based on evidence based care to optimize patient outcomes. Emotional support provided throughout conversation.     Evaluated for any barriers to care.   Distress thermometer completed   Genetic testing referral placed  Smoking status verified former smoker   Prior cancer and radiation history positive for 1999 prostate cancer with prostatectomy   Provided contact information for nurse navigator and patient navigator  Verified PCP/insurance on file  Discussed use of My Chart patient does not use.      Answered questions to pt's satisfaction.  Reviewed upcoming appts. Provided support and provided direct contact information for any questions or concerns.       Future Appointments   Date Time Provider Department Center   12/24/2024  1:00 PM  PET 1  PET [facility]   12/30/2024 11:00 AM Harvey Shankar MD HEM ONC [facility]   3/17/2025  8:40 AM Dolores Vizcarra DO [facility]

## 2024-12-18 NOTE — PROGRESS NOTES
Per NN IR procedure should be moved to 12/23,12/16, or 12/27 if possible. MED ONC appt on 12/24 that cannot be rescheduled due to no availability. I am waiting to hear back from IR team with details.

## 2024-12-18 NOTE — TELEPHONE ENCOUNTER
After speaking with NN  who advised patients IR appointment was scheduled through MED ONC and PET appointment scheduled on 12/24, would like to r/s MED ONC and PET appointment. I was able to r/s patients MED ONC appointment with Dr. Shankar back to original appointment of 12/30/2024 at 11:00 AM  at the [facility] and PET rescheduled  to 12/30/2024 at 2:00 PM [facility]. I contacted the patient son to confirm patient appointment with no answer a detailed voicemail was left with the [employer] number to call back to confirm the patient appointments.

## 2024-12-18 NOTE — TELEPHONE ENCOUNTER
Patient son calling in to confirm he received the message and that both appts (consult and PET) are okay.

## 2024-12-19 ENCOUNTER — TELEPHONE (OUTPATIENT)
Dept: INTERVENTIONAL RADIOLOGY/VASCULAR | Facility: HOSPITAL | Age: 84
End: 2024-12-19

## 2024-12-19 ENCOUNTER — DOCUMENTATION (OUTPATIENT)
Dept: HEMATOLOGY ONCOLOGY | Facility: CLINIC | Age: 84
End: 2024-12-19

## 2024-12-19 ENCOUNTER — PATIENT OUTREACH (OUTPATIENT)
Dept: CASE MANAGEMENT | Facility: HOSPITAL | Age: 84
End: 2024-12-19

## 2024-12-19 ENCOUNTER — PATIENT OUTREACH (OUTPATIENT)
Dept: HEMATOLOGY ONCOLOGY | Facility: CLINIC | Age: 84
End: 2024-12-19

## 2024-12-19 DIAGNOSIS — C61 PROSTATE CANCER (HCC): Primary | ICD-10-CM

## 2024-12-19 DIAGNOSIS — N32.89 MASS OF URINARY BLADDER: Primary | ICD-10-CM

## 2024-12-19 NOTE — PROGRESS NOTES
ONN called IR to verify provider scheduled to place nephrostomy tubes. Patient is in need of LN biopsy   Dr. Stapleton and Dr dominique on message IR will attempt to add biopsy on procedure.

## 2024-12-19 NOTE — PROGRESS NOTES
LSW received a referral for this pt from Navigation.  Pt will be seen by Oncology on 12/30.  LSW will plan to make outreach after that date to assess for needs.

## 2024-12-23 ENCOUNTER — DOCUMENTATION (OUTPATIENT)
Dept: HEMATOLOGY ONCOLOGY | Facility: CLINIC | Age: 84
End: 2024-12-23

## 2024-12-23 ENCOUNTER — PATIENT OUTREACH (OUTPATIENT)
Dept: HEMATOLOGY ONCOLOGY | Facility: CLINIC | Age: 84
End: 2024-12-23

## 2024-12-23 NOTE — PROGRESS NOTES
Good morning, Suhas, this is Ed Araceli. I'm calling in behalf of my dad. Kilo, I wanted you to, if you could look into something. I just got a call from [facility] wanting to set up an appointment for January 9th at 10:00 AM with a practitioner named Zbigniew at the [facility] the [facility]. Now it's not with Doctor Staci, it's with this Zbigniew anant. And ask the girl what this was about and she was saying to do the scope and everything that they just did last Monday. So could you look into this for me and figure out like what this is about and what's going on? I'd like my dad to be able to stay with the same doctor if that's possible. So if you can get back to me at [phone], that would be great. And thank you so much for your help. Have a good day.    Verified with urology,  called based off old notes. No appt needed at this time, Ed aware and appreciative of the call.

## 2024-12-23 NOTE — TELEPHONE ENCOUNTER
----- Message from Sheyla CAMPBELL sent at 12/20/2024 10:11 AM EST -----  Could you please assist with scheduling 1st available cysto per DV's note with any provider.   It looks as though though all the notes patient is aware of this.    Thank you  ----- Message -----  From: Elissa Loo RN  Sent: 12/17/2024   3:13 PM EST  To: [facility]

## 2024-12-23 NOTE — RESULT ENCOUNTER NOTE
Called pt and spoke with his son who manages all his father's appts. His son informed us that his father had a cysto on 12/16 with trip in [place] already. Can you review and let me know if anything else needs to be complete at this time?

## 2024-12-23 NOTE — PROGRESS NOTES
Good morning, Suhas, this is Ed Araceli. I'm calling in behalf of my dad. Kilo, I wanted you to, if you could look into something. I just got a call from [facility] wanting to set up an appointment for January 9th at 10:00 AM with a practitioner named Zbigniew at the [facility] at the [facility]. Now it's not with Doctor Staci, it's with this Zbigniew anant. And ask the girl what this was about and she was saying to do the scope and everything that they just did last Monday. So could you look into this for me and figure out like what this is about and what's going on? I'd like my dad to be able to stay with the same doctor if that's possible. So if you can get back to me at [phone], that would be great. And thank you so much for your help. Have a good day.    Forwarded to Urology clerical/clinical for potential of patient seeing Dr. Luong.

## 2024-12-24 ENCOUNTER — HOSPITAL ENCOUNTER (OUTPATIENT)
Dept: INTERVENTIONAL RADIOLOGY/VASCULAR | Facility: HOSPITAL | Age: 84
Discharge: HOME | End: 2024-12-24
Attending: RADIOLOGY
Payer: MEDICARE

## 2024-12-24 ENCOUNTER — ANESTHESIA (OUTPATIENT)
Dept: INTERVENTIONAL RADIOLOGY/VASCULAR | Facility: HOSPITAL | Age: 84
End: 2024-12-24
Payer: MEDICARE

## 2024-12-24 ENCOUNTER — PATIENT OUTREACH (OUTPATIENT)
Dept: HEMATOLOGY ONCOLOGY | Facility: CLINIC | Age: 84
End: 2024-12-24

## 2024-12-24 ENCOUNTER — HOSPITAL ENCOUNTER (OUTPATIENT)
Dept: CT IMAGING | Facility: HOSPITAL | Age: 84
Discharge: HOME | End: 2024-12-24
Attending: RADIOLOGY
Payer: MEDICARE

## 2024-12-24 VITALS
OXYGEN SATURATION: 97 % | RESPIRATION RATE: 20 BRPM | SYSTOLIC BLOOD PRESSURE: 186 MMHG | DIASTOLIC BLOOD PRESSURE: 73 MMHG | HEIGHT: 69 IN | WEIGHT: 165 LBS | HEART RATE: 68 BPM | BODY MASS INDEX: 24.44 KG/M2 | TEMPERATURE: 97.6 F

## 2024-12-24 DIAGNOSIS — C67.0 MALIGNANT NEOPLASM OF TRIGONE OF URINARY BLADDER (HCC): ICD-10-CM

## 2024-12-24 DIAGNOSIS — N32.89 MASS OF URINARY BLADDER: ICD-10-CM

## 2024-12-24 DIAGNOSIS — N13.30 HYDRONEPHROSIS, BILATERAL: ICD-10-CM

## 2024-12-24 LAB
BASOPHILS # BLD AUTO: 0.06 THOUSANDS/ÂΜL (ref 0–0.1)
BASOPHILS NFR BLD AUTO: 1 % (ref 0–1)
EOSINOPHIL # BLD AUTO: 0.14 THOUSAND/ÂΜL (ref 0–0.61)
EOSINOPHIL NFR BLD AUTO: 2 % (ref 0–6)
ERYTHROCYTE [DISTWIDTH] IN BLOOD BY AUTOMATED COUNT: 13 % (ref 11.6–15.1)
HCT VFR BLD AUTO: 39.3 % (ref 36.5–49.3)
HGB BLD-MCNC: 13.4 G/DL (ref 12–17)
IMM GRANULOCYTES # BLD AUTO: 0.02 THOUSAND/UL (ref 0–0.2)
IMM GRANULOCYTES NFR BLD AUTO: 0 % (ref 0–2)
INR PPP: 1.03 (ref 0.85–1.19)
LYMPHOCYTES # BLD AUTO: 1.44 THOUSANDS/ÂΜL (ref 0.6–4.47)
LYMPHOCYTES NFR BLD AUTO: 17 % (ref 14–44)
MCH RBC QN AUTO: 32.1 PG (ref 26.8–34.3)
MCHC RBC AUTO-ENTMCNC: 34.1 G/DL (ref 31.4–37.4)
MCV RBC AUTO: 94 FL (ref 82–98)
MONOCYTES # BLD AUTO: 0.54 THOUSAND/ÂΜL (ref 0.17–1.22)
MONOCYTES NFR BLD AUTO: 7 % (ref 4–12)
NEUTROPHILS # BLD AUTO: 6.1 THOUSANDS/ÂΜL (ref 1.85–7.62)
NEUTS SEG NFR BLD AUTO: 73 % (ref 43–75)
NRBC BLD AUTO-RTO: 0 /100 WBCS
PLATELET # BLD AUTO: 237 THOUSANDS/UL (ref 149–390)
PMV BLD AUTO: 9.4 FL (ref 8.9–12.7)
PROTHROMBIN TIME: 14 SECONDS (ref 12.3–15)
RBC # BLD AUTO: 4.18 MILLION/UL (ref 3.88–5.62)
WBC # BLD AUTO: 8.3 THOUSAND/UL (ref 4.31–10.16)

## 2024-12-24 PROCEDURE — 88305 TISSUE EXAM BY PATHOLOGIST: CPT | Performed by: PATHOLOGY

## 2024-12-24 PROCEDURE — 76937 US GUIDE VASCULAR ACCESS: CPT

## 2024-12-24 PROCEDURE — 77012 CT SCAN FOR NEEDLE BIOPSY: CPT

## 2024-12-24 PROCEDURE — 88333 PATH CONSLTJ SURG CYTO XM 1: CPT | Performed by: PATHOLOGY

## 2024-12-24 PROCEDURE — 38505 NEEDLE BIOPSY LYMPH NODES: CPT

## 2024-12-24 PROCEDURE — 88341 IMHCHEM/IMCYTCHM EA ADD ANTB: CPT | Performed by: PATHOLOGY

## 2024-12-24 PROCEDURE — 88342 IMHCHEM/IMCYTCHM 1ST ANTB: CPT | Performed by: PATHOLOGY

## 2024-12-24 PROCEDURE — 85610 PROTHROMBIN TIME: CPT | Performed by: RADIOLOGY

## 2024-12-24 PROCEDURE — 85025 COMPLETE CBC W/AUTO DIFF WBC: CPT | Performed by: RADIOLOGY

## 2024-12-24 PROCEDURE — 50432 PLMT NEPHROSTOMY CATHETER: CPT

## 2024-12-24 RX ORDER — MIDAZOLAM HYDROCHLORIDE 2 MG/2ML
INJECTION, SOLUTION INTRAMUSCULAR; INTRAVENOUS AS NEEDED
Status: COMPLETED | OUTPATIENT
Start: 2024-12-24 | End: 2024-12-24

## 2024-12-24 RX ORDER — SODIUM CHLORIDE 9 MG/ML
75 INJECTION, SOLUTION INTRAVENOUS CONTINUOUS
Status: DISCONTINUED | OUTPATIENT
Start: 2024-12-24 | End: 2024-12-25 | Stop reason: HOSPADM

## 2024-12-24 RX ORDER — LIDOCAINE HYDROCHLORIDE 10 MG/ML
INJECTION, SOLUTION EPIDURAL; INFILTRATION; INTRACAUDAL; PERINEURAL AS NEEDED
Status: COMPLETED | OUTPATIENT
Start: 2024-12-24 | End: 2024-12-24

## 2024-12-24 RX ORDER — FENTANYL CITRATE 50 UG/ML
INJECTION, SOLUTION INTRAMUSCULAR; INTRAVENOUS AS NEEDED
Status: COMPLETED | OUTPATIENT
Start: 2024-12-24 | End: 2024-12-24

## 2024-12-24 RX ORDER — SUCCINYLCHOLINE/SOD CL,ISO/PF 100 MG/5ML
SYRINGE (ML) INTRAVENOUS AS NEEDED
Status: DISCONTINUED | OUTPATIENT
Start: 2024-12-24 | End: 2024-12-24

## 2024-12-24 RX ORDER — CEFAZOLIN SODIUM 2 G/50ML
SOLUTION INTRAVENOUS AS NEEDED
Status: DISCONTINUED | OUTPATIENT
Start: 2024-12-24 | End: 2024-12-24

## 2024-12-24 RX ORDER — ONDANSETRON 2 MG/ML
4 INJECTION INTRAMUSCULAR; INTRAVENOUS EVERY 4 HOURS PRN
Status: CANCELLED | OUTPATIENT
Start: 2024-12-24

## 2024-12-24 RX ORDER — ONDANSETRON 2 MG/ML
4 INJECTION INTRAMUSCULAR; INTRAVENOUS EVERY 4 HOURS PRN
Status: DISCONTINUED | OUTPATIENT
Start: 2024-12-24 | End: 2024-12-25 | Stop reason: HOSPADM

## 2024-12-24 RX ORDER — SODIUM CHLORIDE, SODIUM LACTATE, POTASSIUM CHLORIDE, CALCIUM CHLORIDE 600; 310; 30; 20 MG/100ML; MG/100ML; MG/100ML; MG/100ML
INJECTION, SOLUTION INTRAVENOUS CONTINUOUS PRN
Status: DISCONTINUED | OUTPATIENT
Start: 2024-12-24 | End: 2024-12-24

## 2024-12-24 RX ORDER — ONDANSETRON 2 MG/ML
INJECTION INTRAMUSCULAR; INTRAVENOUS AS NEEDED
Status: DISCONTINUED | OUTPATIENT
Start: 2024-12-24 | End: 2024-12-24

## 2024-12-24 RX ORDER — SODIUM CHLORIDE 9 MG/ML
10 INJECTION, SOLUTION INTRAMUSCULAR; INTRAVENOUS; SUBCUTANEOUS DAILY
Qty: 900 ML | Refills: 0 | Status: SHIPPED | OUTPATIENT
Start: 2024-12-24 | End: 2024-12-26

## 2024-12-24 RX ORDER — LIDOCAINE HYDROCHLORIDE 10 MG/ML
INJECTION, SOLUTION EPIDURAL; INFILTRATION; INTRACAUDAL; PERINEURAL AS NEEDED
Status: DISCONTINUED | OUTPATIENT
Start: 2024-12-24 | End: 2024-12-24

## 2024-12-24 RX ORDER — PROPOFOL 10 MG/ML
INJECTION, EMULSION INTRAVENOUS AS NEEDED
Status: DISCONTINUED | OUTPATIENT
Start: 2024-12-24 | End: 2024-12-24

## 2024-12-24 RX ADMIN — ONDANSETRON 4 MG: 2 INJECTION INTRAMUSCULAR; INTRAVENOUS at 10:53

## 2024-12-24 RX ADMIN — MIDAZOLAM 2 MG: 1 INJECTION INTRAMUSCULAR; INTRAVENOUS at 10:16

## 2024-12-24 RX ADMIN — LIDOCAINE HYDROCHLORIDE 50 MG: 10 INJECTION, SOLUTION EPIDURAL; INFILTRATION; INTRACAUDAL; PERINEURAL at 10:46

## 2024-12-24 RX ADMIN — PHENYLEPHRINE HYDROCHLORIDE 100 MCG: 10 INJECTION, SOLUTION INTRAVENOUS at 10:57

## 2024-12-24 RX ADMIN — PHENYLEPHRINE HYDROCHLORIDE 40 MCG/MIN: 10 INJECTION, SOLUTION INTRAVENOUS at 11:06

## 2024-12-24 RX ADMIN — IOHEXOL 24 ML: 350 INJECTION, SOLUTION INTRAVENOUS at 11:33

## 2024-12-24 RX ADMIN — PHENYLEPHRINE HYDROCHLORIDE 100 MCG: 10 INJECTION, SOLUTION INTRAVENOUS at 11:02

## 2024-12-24 RX ADMIN — PROPOFOL 120 MG: 10 INJECTION, EMULSION INTRAVENOUS at 10:46

## 2024-12-24 RX ADMIN — LIDOCAINE HYDROCHLORIDE 8 ML: 10 INJECTION, SOLUTION EPIDURAL; INFILTRATION; INTRACAUDAL; PERINEURAL at 10:16

## 2024-12-24 RX ADMIN — CEFAZOLIN SODIUM 2000 MG: 2 SOLUTION INTRAVENOUS at 10:52

## 2024-12-24 RX ADMIN — SODIUM CHLORIDE, SODIUM LACTATE, POTASSIUM CHLORIDE, AND CALCIUM CHLORIDE: .6; .31; .03; .02 INJECTION, SOLUTION INTRAVENOUS at 08:59

## 2024-12-24 RX ADMIN — SODIUM CHLORIDE, SODIUM LACTATE, POTASSIUM CHLORIDE, AND CALCIUM CHLORIDE: .6; .31; .03; .02 INJECTION, SOLUTION INTRAVENOUS at 11:23

## 2024-12-24 RX ADMIN — Medication 100 MG: at 10:46

## 2024-12-24 RX ADMIN — FENTANYL CITRATE 100 MCG: 50 INJECTION, SOLUTION INTRAMUSCULAR; INTRAVENOUS at 10:16

## 2024-12-24 NOTE — BRIEF OP NOTE (RAD/CATH)
INTERVENTIONAL RADIOLOGY PROCEDURE NOTE    Date: 12/24/2024    Procedure: Left paraaortic LN biopsy  Procedure Summary       Date: 12/24/24 Room / Location: [facility] CAT Scan    Anesthesia Start:  Anesthesia Stop:     Procedure: IR BIOPSY LYMPH NODE Diagnosis:       Mass of urinary bladder      (metastatic bladder ca)    Scheduled Providers:  Responsible Provider:     Anesthesia Type: Not recorded ASA Status: Not recorded            Preoperative diagnosis:   1. Mass of urinary bladder         Postoperative diagnosis: Same.    Surgeon: Aleks Stapleton MD     Assistant: None. No qualified resident was available.    Blood loss: None    Specimens: Left para aortic LN     Findings: Under CT guidance left paraaortic LN core biopsy with 18 G needle and 4 specimens obtained and given to pathologist and deemed adequate.    Complications: None immediate.    Anesthesia: conscious sedation

## 2024-12-24 NOTE — DISCHARGE INSTRUCTIONS
Nephrostomy Tube Care     WHAT YOU NEED TO KNOW:   A nephrostomy tube is a catheter (thin plastic tube) that is inserted through your skin and into your kidney. The nephrostomy tube drains urine from your kidney into a collecting bag outside your body. You may need a nephrostomy tube when something is blocking the normal flow of urine. A nephrostomy tube may be used for a short or a long period of time. The nephrostomy tube comes out of your back, so you will need someone to help care for your nephrostomy tube.          DISCHARGE INSTRUCTIONS:      How to clean the skin around the nephrostomy tube and change the bandage:  Since the nephrostomy tube comes out of your back, you will not be able to care for it by yourself. Ask someone to follow the general directions below to check and care for your nephrostomy tube.   Gather the items you will need.          Disposable (single use) under-pad, and a clean washcloth  Plain soap, warm water, and new medical gloves  Sterile gauze bandages  Clear adhesive dressing or medical tape  Skin barrier  Protective skin film  Trash bag  Remove the old bandage, and check the tube entry site.    Have the patient lie on his side with the nephrostomy tube entry site facing up. Place the under-pad where it will catch drainage as you are working with the nephrostomy tube.   Wash your hands with soap and water. Put on new medical gloves.  Gently remove the old bandage, without pulling on the tube. Do this by holding the skin beside the tube with one hand. With the other hand, gently remove sticky tape and the skin barrier by pulling in the same direction as hair growth. Do not touch the side of the bandage that is placed over or around the tube. Throw the bandage and skin barrier away in a trash bag.  Look for signs of infection, such as skin redness and swelling. Report any skin changes to healthcare providers.  Clean the tube entry site.    Hold the tube in place to keep it from  being pulled out while you are cleaning around it.  You will need to clean the area twice. For the first cleaning, wet a new gauze bandage with soap and water.  Begin at the entry site of the tube. Wipe the skin in circles, moving away from the entry site. Remove blood and any other material with the gauze. Do this as often as needed. Use a new gauze bandage each time you clean the area, moving away from the entry site.   For the second cleaning, wet a new gauze bandage with water. Begin at the entry site of the tube. Wipe the skin in circles, moving away from the entry site. Use a new gauze bandage each time you clean the area, moving away from the entry site.   Gently pat the skin with a clean washcloth to dry it.    Apply the skin barrier and bandages.    Roll up a bandage to make it thick, and place it under  the place where the tube enters the skin. Place it to support the tube, and stop it from kinking or bending. Tape the bandage in place, and apply more bandages if directed by a healthcare provider.   Bring the tubing forward to the front and tape it to the skin. Do not stretch the tube tight, because this may pull the nephrostomy tube out.  How often to change the bandage.  Change the bandage around the tube, every other day. If your bandages  get dirty or wet, change them right away, and as often as needed. If your nephrostomy tube is to be used for a long period of time, the tube needs to be changed every 2 to 3 months. Healthcare providers will tell you when you need to make an appointment to have your tube changed.     How to care for the urine drainage bag:   Ask if you need to measure and write down how much urine is in the bag before you empty it. Drain urine out of the drainage bag when it is ½ to ? full. Open the spout at the bottom of the bag to empty the urine into the toilet.   You may need to detach the drainage bag from the nephrostomy tube to change it.. If so, attach a new drainage bag  tightly to the nephrostomy tube.     How to prevent problems with your nephrostomy tube:   Change bandages, directed.  This helps to prevent infection. Throw away or clean your drainage bag as directed by your healthcare provider.    Wipe the connecting ends of the drainage bag with alcohol before you reconnect the bag to the tube.  This helps prevent infection.     Keep the tube taped to your skin and connected to a drainage bag placed below the level of your kidneys.  This helps prevent urine from backing up into your kidneys. You may wear a small drainage bag strapped to your leg to let you move around more easily.    Check the catheter to be sure it is in place after you change your clothes or do other activities.  Do not wear tight clothing over the tube. Place the tubing over your thigh rather than under it when you are sitting down. Be sure that nothing is pulling on the nephrostomy tube when you move around.    Change positions if you see little or no urine in your drainage bag.  Check to see if the urine tube is twisted or bent. Be sure that you are not sitting or lying on the tube. If there are no kinks and there is little or no urine in the drainage bag, tell your healthcare provider.    Flush out the tube as directed. Some tubes get flushed one time a day with 10 mls of NSS You will be given a prescription for the flushes.  To flush the nephrostomy tube, clean both connections with alcohol swap. Twist off the drainage bag tube and twist the saline syringe into the nephrostomy tube and flush briskly. Remove the syringe and twist the drainage bag tube back into the nephrostomy tube.  Keep the site covered while you shower.  Tape a piece of clear adhesive plastic over the dressing to keep it dry while you shower. Do not take tub baths.    Contact Interventional Radiology at [phone] ([facility] PATIENTS: Contact Interventional Radiology at [phone]) ([facility] PATIENTS: Contact Interventional Radiology at  [phone]) if:  The skin around the nephrostomy tube is red, swollen, itches, or has a rash.   You have a fever greater than 101 or chills.  You have lower back or hip pain.  There are changes in how your urine looks or smells.  You have little or no urine draining from the nephrostomy tube.   You have nausea and are vomiting.  The black ashley on your tube has moved, or the tube is longer than when it was put in.   You have questions or concerns about your condition or care.  The nephrostomy tube comes out completely.   There is blood, pus, or a bad smell coming from the place where the tube enters your skin.  Urine is leaking around the tube.        The following pharmacies carry the flush syringes.       [facility]                     [address]                     [address] [facility]                    [phone]  [place]                       [place]  Phone [phone]            Phone [phone]                 [facility]                                                                                                   [phone]  [facility]             [facility]                             [address] [place], [place] [address] [facility][1]   Alex SHEFFIELD                                 [phone]  Phone [phone]            Phone [phone]    [facility]                                                                         [facility] [phone]  Harpreet Delcid.  [place]   Phone [phone]

## 2024-12-24 NOTE — DISCHARGE INSTRUCTIONS
Lymph Node Biopsy   Why is this procedure done?   You have lymph nodes all over your body. They help fight infection and are a part of your body's natural defense against illness. This is your immune system. In most cases, lymph nodes are too small to feel. If you have a problem with your immune system, infection, or other illness, you may get a swollen lymph node. Your doctor may order this test to look for the cause of enlarged lymph nodes.     What will the results be?   The results will tell you if you have abnormal cells growing within your lymph nodes. Talk to your doctor about the results.  What happens before the procedure?   Your doctor will ask you about your health history. Talk to your doctor about:  If you are pregnant or nursing.  All the drugs you are taking. Be sure to include all prescription and over-the-counter (OTC) drugs, and herbal supplements. Tell the doctor about any drug allergy. Bring a list of drugs you take with you.  Any bleeding problems. Be sure to tell your doctor if you are taking any drugs that may cause bleeding. Some of these are warfarin, rivaroxaban, apixaban, ticagrelor, clopidogrel, ketorolac, ibuprofen, naproxen, or aspirin. Certain vitamins and herbs, such as garlic and fish oil, may also add to the risk for bleeding. You may need to stop these drugs as well. Talk to your doctor about them.  When you need to stop eating or drinking before your procedure.  Wear comfortable clothes the morning of your procedure. Do not wear any makeup.  Leave jewelry and valuables at home.  You will not be allowed to drive right away after the procedure. Ask a family member or a friend to drive you home.  What happens during the procedure?   You may or may not be asleep during the procedure. Talk to your doctor to decide what is best for you. The doctors will make sure you do not feel pain during the procedure. The biopsy site will be cleaned with special solution. The sample taken from you  will be sent to the lab to be tested.  There are many ways of doing this procedure. Your doctor may:  Put a needle into your node to get a sample. This is a needle biopsy. The doctor will put pressure on the site to stop bleeding. The needle biopsy takes less than 10 minutes.  Make a cut in your skin and take out all or part of your lymph nodes. This is an open biopsy. Then the doctor closes the cut site with stitches or skin glue and covers it with a bandage. The open biopsy takes 30 to 45 minutes.  Use a special tool with dye to take out the lymph node. This is a sentinel node biopsy. A few hours before the biopsy, doctor put a special dye near the enlarged lymph node. The dye flows into the sentinel node. The doctor cuts out the lymph nodes and the site is stitched closed and covered with a bandage. You may have blue/green urine for a few days after this test. A sentinel biopsy takes about 60 to 90 minutes.  What happens after the procedure?   You will be able to go home after you recover from the procedure.  Ask your doctor when the results will be available.  Bruising or fluid leaking from your cut site may be seen a few days after the procedure.  Your cut site will heal within 10 to 14 days.  What care is needed at home?   Ask your doctor what you need to do when you go home. Make sure you ask questions if you do not understand what the doctor says. This way you will know what you need to do.  Talk to your doctor about how to care for your biopsy site. Ask your doctor about:  When you should change your bandages  When you may take a bath or shower  If you need to be careful with lifting things over 10 pounds (4.5 kg)  When you may go back to your normal activities like work or driving  Be sure to wash your hands before touching your wound or dressing.  The biopsy site may have some bruising. Place an ice pack or a bag of frozen peas wrapped in a towel over the painful part. Never put ice right on the skin. Do  not leave the ice on more than 10 to 15 minutes at a time.  What follow-up care is needed?   Your doctor may ask you to make visits to the office to check on your progress. Be sure to keep these visits. You may need more tests.  If you have stitches or staples, you will need to have them taken out. Your doctor will often want to do this in 1 to 2 weeks.  What drugs may be needed?   The doctor may order drugs to:  Help with pain  What problems could happen?   Bleeding  Infection  Leaking fluid from the cut site  Numbness of the skin near cut site  Swelling  When do I need to call the doctor?   Signs of infection. These include a fever of 100.4°F (38°C) or higher, chills.  Signs of wound infection. These include swelling, redness, warmth around the wound; too much pain when touched; yellowish, greenish, or bloody discharge; foul smell coming from the cut site; cut site opens up.  Swelling in the limb where the procedure was performed.  Thickening of the skin at the removal site.  Bright red bleeding from the cut  Pain lasting longer than a week  Last Reviewed Date   2021-02-09  Consumer Information Use and Disclaimer   This generalized information is a limited summary of diagnosis, treatment, and/or medication information. It is not meant to be comprehensive and should be used as a tool to help the user understand and/or assess potential diagnostic and treatment options. It does NOT include all information about conditions, treatments, medications, side effects, or risks that may apply to a specific patient. It is not intended to be medical advice or a substitute for the medical advice, diagnosis, or treatment of a health care provider based on the health care provider's examination and assessment of a patient’s specific and unique circumstances. Patients must speak with a health care provider for complete information about their health, medical questions, and treatment options, including any risks or benefits  regarding use of medications. This information does not endorse any treatments or medications as safe, effective, or approved for treating a specific patient. [employer], Inc[employer] and its affiliates disclaim any warranty or liability relating to this information or the use thereof. The use of this information is governed by the Terms of Use, available at https://www.[employer].com/en/know/clinical-effectiveness-terms   Copyright   Copyright © 2024 [employer], Inc[employer] and its affiliates and/or licensor

## 2024-12-24 NOTE — PROGRESS NOTES
Patient's son called asking if home care can be set up for nephrostomy tube management. Family unable to assist at this time. Will forward high priority message to urology.

## 2024-12-24 NOTE — BRIEF OP NOTE (RAD/CATH)
INTERVENTIONAL RADIOLOGY PROCEDURE NOTE    Date: 12/24/2024    Procedure: Bilateral PCN  Procedure Summary       Date: 12/24/24 Room / Location: [facility] Interventional Radiology    Anesthesia Start: 1041 Anesthesia Stop:     Procedure: IR NEPHROSTOMY TUBE PLACEMENT Diagnosis:       Malignant neoplasm of trigone of urinary bladder (HCC)      Hydronephrosis, bilateral      (Bladder cancer, bilateral hydro)    Scheduled Providers:  Responsible Provider: Yovanny Steele DO    Anesthesia Type: general ASA Status: 3            Preoperative diagnosis:   1. Malignant neoplasm of trigone of urinary bladder (HCC)    2. Hydronephrosis, bilateral         Postoperative diagnosis: Same.    Surgeon: Aleks Stapleton MD     Assistant: None. No qualified resident was available.    Blood loss: Minimal    Specimens: None     Findings: Bilateral hydro. Under Fluoro and US bilateral 10.2 Fr APD placed. Friable collecting system with some contrast extravasation.    Complications: None immediate.    Anesthesia: MAC sedation

## 2024-12-24 NOTE — ANESTHESIA PREPROCEDURE EVALUATION
Procedure:  IR NEPHROSTOMY TUBE PLACEMENT    Relevant Problems   CARDIO   (+) Asymptomatic stenosis of left carotid artery   (+) Coronary artery disease involving native coronary artery of native heart without angina pectoris   (+) 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HCC)   (+) Hypertension   (+) Peripheral vascular disease (HCC)   (+) Recurrent stenosis of right carotid artery   (+) Sinus bradycardia      GI/HEPATIC   (+) Dysphagia      MUSCULOSKELETAL   (+) Lower back pain   (+) Primary osteoarthritis of left shoulder      NEURO/PSYCH   (+) Diabetic polyneuropathy (HCC)        Physical Exam    Airway    Mallampati score: II  TM Distance: >3 FB  Neck ROM: full     Dental        Cardiovascular      Pulmonary      Other Findings        Anesthesia Plan  ASA Score- 3     Anesthesia Type- general with ASA Monitors.         Additional Monitors:     Airway Plan: ETT.           Plan Factors-    Chart reviewed. EKG reviewed.  Existing labs reviewed. Patient summary reviewed.    Patient is not a current smoker.              Induction- intravenous.    Postoperative Plan- Plan for postoperative opioid use.         Informed Consent- Anesthetic plan and risks discussed with patient and daughter.  I personally reviewed this patient with the CRNA. Discussed and agreed on the Anesthesia Plan with the CRNA..

## 2024-12-24 NOTE — ANESTHESIA POSTPROCEDURE EVALUATION
Post-Op Assessment Note    CV Status:  Stable  Pain Score: 0    Pain management: adequate       Mental Status:  Alert and awake   Hydration Status:  Euvolemic   PONV Controlled:  Controlled   Airway Patency:  Patent     Post Op Vitals Reviewed: Yes    No anethesia notable event occurred.    Staff: CRNA           Last Filed PACU Vitals:  Vitals Value Taken Time   Temp     Pulse 82    /81    Resp 17    SpO2 97        Modified Quan:  Activity: 2 (12/24/2024  9:25 AM)  Respiration: 2 (12/24/2024  9:25 AM)  Circulation: 2 (12/24/2024  9:25 AM)  Consciousness: 2 (12/24/2024  9:25 AM)  Oxygen Saturation: 2 (12/24/2024  9:25 AM)  Modified Quan Score: 10 (12/24/2024  9:25 AM)

## 2024-12-24 NOTE — SEDATION DOCUMENTATION
B/L PCN inserted. Patient tolerated well with anesthesia support. Hemodynamically stable for transfer to [facility].

## 2024-12-24 NOTE — SEDATION DOCUMENTATION
Para aortic LN bx completed. Band aid to site. Will transfer to IR for PCN placement with anesthesia.

## 2024-12-24 NOTE — H&P
Interventional Radiology  History and Physical 2024     Kilo Mix   1940   [id number]    Assessment/Plan:  84 yr old male with bladder mass and obstructive bilateral hydronephrosis and pelvic and retroperitoneal lymphadenopathy. Patient here for bilateral nephrostomy tube placement as requested by Urology and for retroperitoneal LN biopsy as requested by Oncology/Urology.  Informed consent obtained .    Problem List Items Addressed This Visit    None  Visit Diagnoses         Mass of urinary bladder        Relevant Orders    IR biopsy lymph node               Subjective:     Patient ID: Kilo Mix is a 84 y.o. male.    History of Present Illness  84 yr old here for bilateral nephrostomy tube placement and retroperitoneal LN biopsy.    Review of Systems      Past Medical History:   Diagnosis Date    Diverticulitis of colon     Hypertension     Migraines     Myocardial infarction (HCC)     Prostate cancer (HCC)     Seasonal allergies         Past Surgical History:   Procedure Laterality Date    CARDIAC SURGERY      CAROTID ENDARTARECTOMY Right     thromboendarterectomy    CAROTID STENT Right     CATARACT EXTRACTION Left     COLONOSCOPY      fiberoptic screening 08 6 yr / complete 3/15/13 6 yr    CORONARY ANGIOPLASTY WITH STENT PLACEMENT      ELBOW SURGERY      EYE SURGERY      KNEE SURGERY      PROSTATE SURGERY      SUPRAPUBIC PROSTATECTOMY  1999    UPPER GASTROINTESTINAL ENDOSCOPY          Social History     Tobacco Use   Smoking Status Former    Current packs/day: 0.00    Average packs/day: 3.0 packs/day for 10.0 years (30.0 ttl pk-yrs)    Types: Cigarettes    Start date: 2000    Quit date: 2010    Years since quittin.9   Smokeless Tobacco Never        Social History     Substance and Sexual Activity   Alcohol Use Not Currently    Comment: rare glass of wine        Social History     Substance and Sexual Activity   Drug Use Never        Allergies   Allergen Reactions     "Losartan Syncope     Reaction Date: 07Jun2011;     Penicillins Other (See Comments)     As a child so does not recall reaction       Current Outpatient Medications   Medication Sig Dispense Refill    amLODIPine (NORVASC) 5 mg tablet Take 1 tablet (5 mg total) by mouth daily 90 tablet 2    ascorbic acid (VITAMIN C) 500 mg tablet Take 500 mg by mouth daily      atorvastatin (LIPITOR) 20 mg tablet Take 1 tablet by mouth once daily 90 tablet 1    B Complex Vitamins (VITAMIN B COMPLEX PO) Take 1 tablet by mouth daily      Cholecalciferol 1000 units CHEW Chew 1 tablet daily      hydroCHLOROthiazide 25 mg tablet Take 1 tablet by mouth once daily 90 tablet 1    lisinopril (ZESTRIL) 10 mg tablet Take 1 tablet by mouth once daily 90 tablet 2    metFORMIN (GLUCOPHAGE-XR) 500 mg 24 hr tablet Take 1 tablet by mouth once daily 90 tablet 3    vitamin E, tocopherol, 400 units capsule Take 1 capsule by mouth daily      aspirin 81 MG tablet Take 1 tablet by mouth daily      clopidogrel (PLAVIX) 75 mg tablet Take 1 tablet (75 mg total) by mouth daily 90 tablet 1    omega-3-acid ethyl esters (LOVAZA) 1 g capsule Take 2 g by mouth 2 (two) times a day       Current Facility-Administered Medications   Medication Dose Route Frequency Provider Last Rate Last Admin    sodium chloride 0.9 % infusion  75 mL/hr Intravenous Continuous Aleks Jorge Stapleton MD         Facility-Administered Medications Ordered in Other Encounters   Medication Dose Route Frequency Provider Last Rate Last Admin    lactated ringers infusion   Intravenous Continuous PRN Sarah Polk CRNA   New Bag at 12/24/24 0859          Objective:    Vitals:    12/24/24 0903   Pulse: 82   Resp: 18   Temp: 98 °F (36.7 °C)   TempSrc: Temporal   SpO2: 95%   Weight: 74.8 kg (165 lb)   Height: 5' 8.5\" (1.74 m)        Physical Exam      No results found for: \"BNP\"   Lab Results   Component Value Date    WBC 8.30 12/24/2024    HGB 13.4 12/24/2024    HCT 39.3 12/24/2024    MCV 94 " "12/24/2024     12/24/2024     Lab Results   Component Value Date    INR 1.1 04/17/2018    INR 0.9 04/11/2018     No results found for: \"PTT\"      I have personally reviewed pertinent imaging and laboratory results.     Code Status: No Order  Advance Directive and Living Will:      Power of : Yes  POLST:      This text is generated with voice recognition software. There may be translation, syntax,  or grammatical errors. If you have any questions, please contact the dictating provider.   "

## 2024-12-24 NOTE — ANESTHESIA POSTPROCEDURE EVALUATION
Post-Op Assessment Note    CV Status:  Stable    Pain management: adequate       Mental Status:  Alert and awake   Hydration Status:  Euvolemic and stable   PONV Controlled:  None   Airway Patency:  Patent     Post Op Vitals Reviewed: Yes    No anethesia notable event occurred.    Staff: Anesthesiologist     Reason for prolonged intubation > 24 hours:  N/AReason for prolonged intubation > 48 hours:  N/A      Last Filed PACU Vitals:  Vitals Value Taken Time   Temp 97.6 °F (36.4 °C) 12/24/24 1145   Pulse 66 12/24/24 1145   /81 12/24/24 1145   Resp 16 12/24/24 1145   SpO2 96 % 12/24/24 1145       Modified Quan:  Activity: 2 (12/24/2024 11:45 AM)  Respiration: 2 (12/24/2024 11:45 AM)  Circulation: 2 (12/24/2024 11:45 AM)  Consciousness: 1 (12/24/2024 11:45 AM)  Oxygen Saturation: 2 (12/24/2024 11:45 AM)  Modified Quan Score: 9 (12/24/2024 11:45 AM)

## 2024-12-26 ENCOUNTER — TELEPHONE (OUTPATIENT)
Age: 84
End: 2024-12-26

## 2024-12-26 ENCOUNTER — DOCUMENTATION (OUTPATIENT)
Dept: HEMATOLOGY ONCOLOGY | Facility: CLINIC | Age: 84
End: 2024-12-26

## 2024-12-26 DIAGNOSIS — C67.0 MALIGNANT NEOPLASM OF TRIGONE OF URINARY BLADDER (HCC): ICD-10-CM

## 2024-12-26 DIAGNOSIS — Z93.6 NEPHROSTOMY PRESENT (HCC): ICD-10-CM

## 2024-12-26 DIAGNOSIS — C67.9 HYDRONEPHROSIS DUE TO OBSTRUCTIVE MALIGNANT NEOPLASM OF BLADDER (HCC): Primary | ICD-10-CM

## 2024-12-26 DIAGNOSIS — N13.30 HYDRONEPHROSIS, BILATERAL: ICD-10-CM

## 2024-12-26 DIAGNOSIS — N13.30 HYDRONEPHROSIS DUE TO OBSTRUCTIVE MALIGNANT NEOPLASM OF BLADDER (HCC): Primary | ICD-10-CM

## 2024-12-26 RX ORDER — SODIUM CHLORIDE 9 MG/ML
10 INJECTION, SOLUTION INTRAMUSCULAR; INTRAVENOUS; SUBCUTANEOUS DAILY
Qty: 900 ML | Refills: 0 | Status: SHIPPED | OUTPATIENT
Start: 2024-12-26 | End: 2025-03-26

## 2024-12-26 NOTE — PROGRESS NOTES
Contacted pharmacy and was advised the diagnosis codes used were not appropriate for the saline flushes per pt's insurance.  Contacted IR and left msg for the nurses to please check into this matter as the RX came from IR when pt had nephrostomy tubes placed on 12/24/24.  Also asked to follow up with pt regarding any fu with IR.

## 2024-12-26 NOTE — TELEPHONE ENCOUNTER
Son called asking how long pt should be off of his plavix .. Had surgery on Tuesday and it was stopped 5 days prior to surgery.     He also mentioned they pt had a prescription for saline flush but it was not covered by his insurance and was wondering what needs to be done for it to be covered.

## 2024-12-26 NOTE — PROGRESS NOTES
Message forwarded to urology for assist     Good morning, Suhas, this is Ed Herminia calling. I just had some questions for you. So whenever you get time today, if you can give me a call back [phone]. I just want I had a question about is there any follow up visits after this procedure he had done Tuesday? And then I don't know if you can help me with this part or not, but his prescription at the hospital sent in for the saline for some reason isn't covered and I'm not sure why unless it's gotta go through a different doctor. So if you could just give me a call back when you have a minute, [phone]. Thanks and have a good day.

## 2024-12-26 NOTE — TELEPHONE ENCOUNTER
Plavix should be restarted.  Saline would have to go through the office (I assume [facility]) that prescribed it for prior-authorization

## 2024-12-27 ENCOUNTER — PATIENT OUTREACH (OUTPATIENT)
Dept: HEMATOLOGY ONCOLOGY | Facility: CLINIC | Age: 84
End: 2024-12-27

## 2024-12-27 LAB — SCAN RESULT: NORMAL

## 2024-12-27 NOTE — PROGRESS NOTES
Call from ONN in regards to patient and nephrostomy tube flushes. Advised I would look into and contact patient's son.     Called and spoke with [facility] in [place]. They advised order sent over from yesterday, none of the diagnoses listed has medicare covering the syringes. He states with goodRX card syringes are $52 for 90 pre filled syringes.    Called and spoke with patients son, Ed. He was wondering the plan for the nephrostomy tubes. Advised this will be discussed during med onc appt on 12/30, I am not sure the plan. Reviewed I called [facility] about flkushes, I tried the three diagnosis codes listed and none are covered under medicare part B. Reviewed he can call number on the back of the medicare card, ask what diagnosis code would be covered and if it needs to go through medical benefits versus pharmacy. Advised the phamacy listed cost as $52 for 90 syringes with goodrx. He asked if I could email him the info for goodrx instructions. He provided his email address: [email or web address].     He mentioned IR instructions said to change the bag and tubing weekly. Advised I had never heard that before. He wasn't given any extra bags or tubing. Reviewed I would call IR at [facility] and inquire about extra bags. To my knowledge bags can be left until next exchange, dressing should be changed at least once a week, and flushes are typically once daily. He states they have been changing the dressing daily and flushing daily. Reviewed he should double check instructions but to call back if needed.      Called  IR, extra bags were left at the  with patients name on them.     Ed called back and advised about nephrostomy tube bags at   [place]. He verbalized understanding.     Email sent with [employer] info, nephrostomy tube bags at [facility] and to call urology if any further needs with my direct teams line and main urology number listed.

## 2024-12-27 NOTE — PROGRESS NOTES
Suhas Bass, this is Ed Lamar calling. I'm calling in regards to my dad. Kilo, if you can give me a call back, I have some questions. One, we didn't have any information for a follow up visit from anybody after his surgery to reading through all the aftercare procedures. It says the replace the tubes and the bags once a week. I'm just trying to figure out where to get those supplies from because I'd have to do it Tuesday or Wednesday. So if you can give me a call back at [phone] that would be great. And also if you could talk to someone from urology to put an order in for this stuff because for some reason I'm having a problem with Medicare covering the saline for the flush of the tubes. If you can look into that would be great. If you give me a call back that would be awesome. Thanks and have a good day.    D/W cailin who will reach out to patient and assist with the flushes.

## 2024-12-30 ENCOUNTER — DOCUMENTATION (OUTPATIENT)
Age: 84
End: 2024-12-30

## 2024-12-30 ENCOUNTER — TELEPHONE (OUTPATIENT)
Age: 84
End: 2024-12-30

## 2024-12-30 ENCOUNTER — CONSULT (OUTPATIENT)
Age: 84
End: 2024-12-30
Payer: MEDICARE

## 2024-12-30 ENCOUNTER — HOSPITAL ENCOUNTER (OUTPATIENT)
Dept: NUCLEAR MEDICINE | Facility: HOSPITAL | Age: 84
Discharge: HOME | End: 2024-12-30
Attending: UROLOGY
Payer: MEDICARE

## 2024-12-30 VITALS
BODY MASS INDEX: 25.18 KG/M2 | RESPIRATION RATE: 18 BRPM | DIASTOLIC BLOOD PRESSURE: 70 MMHG | OXYGEN SATURATION: 95 % | HEIGHT: 69 IN | TEMPERATURE: 97.4 F | WEIGHT: 170 LBS | HEART RATE: 95 BPM | SYSTOLIC BLOOD PRESSURE: 113 MMHG

## 2024-12-30 DIAGNOSIS — N17.9 ACUTE KIDNEY INJURY (HCC): ICD-10-CM

## 2024-12-30 DIAGNOSIS — C61 PROSTATE CANCER (HCC): Primary | ICD-10-CM

## 2024-12-30 DIAGNOSIS — R97.20 ELEVATED PSA: ICD-10-CM

## 2024-12-30 DIAGNOSIS — N32.89 BLADDER MASS: ICD-10-CM

## 2024-12-30 DIAGNOSIS — C61 PROSTATE CANCER (HCC): ICD-10-CM

## 2024-12-30 DIAGNOSIS — N13.30 HYDRONEPHROSIS, UNSPECIFIED HYDRONEPHROSIS TYPE: ICD-10-CM

## 2024-12-30 PROCEDURE — 88333 PATH CONSLTJ SURG CYTO XM 1: CPT | Performed by: PATHOLOGY

## 2024-12-30 PROCEDURE — 88341 IMHCHEM/IMCYTCHM EA ADD ANTB: CPT | Performed by: PATHOLOGY

## 2024-12-30 PROCEDURE — 78815 PET IMAGE W/CT SKULL-THIGH: CPT

## 2024-12-30 PROCEDURE — 88305 TISSUE EXAM BY PATHOLOGIST: CPT | Performed by: PATHOLOGY

## 2024-12-30 PROCEDURE — 88342 IMHCHEM/IMCYTCHM 1ST ANTB: CPT | Performed by: PATHOLOGY

## 2024-12-30 PROCEDURE — 99205 OFFICE O/P NEW HI 60 MIN: CPT | Performed by: INTERNAL MEDICINE

## 2024-12-30 PROCEDURE — A9595 HB PIFLUFOLASTAT F-18, DIAGNOSTIC, 1 MILLICURIE: HCPCS

## 2024-12-30 NOTE — PROGRESS NOTES
Oncology Teach:   Review of Plan:  Treatment Plan Reviewed and Treatment Schedule Reviewed    Review of Pre-Treatment Needs:  Lab work frequency reviewed    PICC or Port Placement Needs:  Not needed    Expectations at First Appointment Reviewed:  Yes    Patient Medication Education Reviewed:  Yes    Review when to call office vs. present to ED:  Yes    Provided Oncology Access Center Number:  Yes    Assessment of patient understanding:  Pt demonstrates understanding    Chemo consent obtained?:  Yes     Emailed finance to obtain insurance authorization.

## 2024-12-30 NOTE — TELEPHONE ENCOUNTER
I called Kilo in response to a referral that was received for patient to establish care with [facility]    Outreach was made to schedule a consultation.    A consultation was scheduled for patient during this call. Patient is scheduled on 1/24/25 at 2:00 PM with Sheyla Choi at the [facility] states to be seen in 1-2 weeks. Pt is scheduled for first available urgent short appt. Please advise is this appt can be moved sooner.

## 2024-12-30 NOTE — ASSESSMENT & PLAN NOTE
Discussed diagnosis, staging, prognosis and treatment based on NCCN guidelines.   Pt with remote h/o prostate cancer, s/p prostatectomy in 1999 now presenting with PSA of 360+ ng/mL, high risk ds. There is hari metastatic disease.     PSMA PET scheduled for later this afternoon.   For now, discussed treatment with ADT + secondary hormonal agent.   Recommend Lupron + Apalutamide/Xtandi (based on cost).   Side effects discussed, consent signed.   If PET CT shows more bulky disease, would recommend addition of weekly docetaxel. Re-check PSA in 3months after starting anti-androgens.  I also recommend referral to genetic counseling given metastatic prostate cancer.    Orders:    Ambulatory Referral to Hematology / Oncology    Ambulatory Referral to Oncology Genetics; Future    enzalutamide (Xtandi) 40 mg capsule; Take 4 capsules (160 mg total) by mouth daily

## 2024-12-30 NOTE — ASSESSMENT & PLAN NOTE
As above      Pain in the left buttock. Right in the middle. Present now for  Pain is worst at night, difficulty sleeping.   + tingling sensation down the leg into the ankle.   Does sometimes have numbness.

## 2024-12-30 NOTE — PROGRESS NOTES
Name: Kilo Mix      : 1940      MRN: [id number]  Encounter Provider: Harvey Shankar MD  Encounter Date: 2024   Encounter department: [facility]  :  Assessment & Plan  Prostate cancer (HCC)  Discussed diagnosis, staging, prognosis and treatment based on NCCN guidelines.   Pt with remote h/o prostate cancer, s/p prostatectomy in  now presenting with PSA of 360+ ng/mL, high risk ds. There is hari metastatic disease.     PSMA PET scheduled for later this afternoon.   For now, discussed treatment with ADT + secondary hormonal agent.   Recommend Lupron + Apalutamide/Xtandi (based on cost).   Side effects discussed, consent signed.   If PET CT shows more bulky disease, would recommend addition of weekly docetaxel. Re-check PSA in 3months after starting anti-androgens.  I also recommend referral to genetic counseling given metastatic prostate cancer.    Orders:    Ambulatory Referral to Hematology / Oncology    Ambulatory Referral to Oncology Genetics; Future    enzalutamide (Xtandi) 40 mg capsule; Take 4 capsules (160 mg total) by mouth daily    Bladder mass  Cystoscopy on  which showed a large extensive tumor involving the entire trigone of the bladder, with both ureteral orifices obscured by the tumor.    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  Per discussion with , the bladder tumor did not have the appearance of a primary bladder tumor.   Since the cytology was suspicious for high grade urothelial carcinoma, I would recommend a biopsy of the bladder tumor, to ensure we are not dealing with 2 separate primaries - localized bladder CA and met prostate CA.   Acute kidney injury (HCC)  Secondary to hydronephrosis 2/2 tumor.   B/l nephrostomy tubes in place  Will continue to trend renal function  Has f/u with IR in Feb  Hydronephrosis, unspecified hydronephrosis type  As above           History of  "Present Illness   Chief Complaint   Patient presents with    Consult     Kilo Mix is a 84 y.o. male with a prior history of prostate cancer, s/p radical prostatectomy in 1999 for \"high Lawton score prostate cancer\".  He states he took hormone shots prior to the surgery for a short duration. Other past medical history is also significant for diabetes, CKD stage III, bilateral carotid artery stenosis s/p stents, peripheral vascular disease, hypertension    Patient presented with painless hematuria in November 2024.  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  In the urinary bladder, there is a large hypervascular mass extending into the right perivesicular space with associated bilateral iliac and retroperitoneal lymphadenopathy, and moderate bilateral hydronephrosis leading to occlusion of the distal ureters.    He underwent a cystoscopy on December 16 which showed a large extensive tumor involving the entire trigone of the bladder, with both ureteral orifices obscured by the tumor.  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    Most recent labs are significant for creatinine of 1.7/GFR 35, normal CBC and a  ng/mL.     On December 24, he underwent a CT-guided biopsy of the left para-aortic lymph node. Pathology revealed metastatic prostate cancer. (Verbal from pathologist today)    He also underwent b/l nephrostomy tube placements.     PET-CT scheduled for later today.  Pt here today with his son and daughter in law.  Very active for his age. No family hx of cancers    Pertinent Medical History   As noted above     Oncology History   Oncology History   Prostate cancer (HCC)   2/1/2019 Initial Diagnosis    Prostate cancer (HCC)     12/30/2024 -  Cancer Staged    Staging form: Prostate, AJCC 8th Edition  - Clinical stage from 12/30/2024: rcT4, cN1, PSA: 360 - Signed by Harvey Shankar MD on " 12/30/2024  Histopathologic type: Adenocarcinoma, NOS  Stage prefix: Recurrence  Prostate specific antigen (PSA) range: 20 or greater  Stage used in treatment planning: Yes  National guidelines used in treatment planning: Yes          Review of Systems   Constitutional:  Negative for activity change, appetite change, fatigue, fever and unexpected weight change.   HENT: Negative.     Respiratory: Negative.     Cardiovascular: Negative.    Gastrointestinal: Negative.    Genitourinary:  Positive for hematuria.     Medical History Reviewed by provider this encounter:     .  Current Outpatient Medications on File Prior to Visit   Medication Sig Dispense Refill    amLODIPine (NORVASC) 5 mg tablet Take 1 tablet (5 mg total) by mouth daily 90 tablet 2    ascorbic acid (VITAMIN C) 500 mg tablet Take 500 mg by mouth daily      aspirin 81 MG tablet Take 1 tablet by mouth daily      atorvastatin (LIPITOR) 20 mg tablet Take 1 tablet by mouth once daily 90 tablet 1    B Complex Vitamins (VITAMIN B COMPLEX PO) Take 1 tablet by mouth daily      Cholecalciferol 1000 units CHEW Chew 1 tablet daily      clopidogrel (PLAVIX) 75 mg tablet Take 1 tablet (75 mg total) by mouth daily 90 tablet 1    hydroCHLOROthiazide 25 mg tablet Take 1 tablet by mouth once daily 90 tablet 1    lisinopril (ZESTRIL) 10 mg tablet Take 1 tablet by mouth once daily 90 tablet 2    metFORMIN (GLUCOPHAGE-XR) 500 mg 24 hr tablet Take 1 tablet by mouth once daily 90 tablet 3    omega-3-acid ethyl esters (LOVAZA) 1 g capsule Take 2 g by mouth 2 (two) times a day      sodium chloride, PF, 0.9 % 10 mL by Intracatheter route daily Intracatheter flushing daily. May substitute prefilled syringe with normal saline 10 mL vials, 10 mL syringes, and 18 g blunt needles 900 mL 0    vitamin E, tocopherol, 400 units capsule Take 1 capsule by mouth daily       No current facility-administered medications on file prior to visit.      Social History     Tobacco Use    Smoking  "status: Former     Current packs/day: 0.00     Average packs/day: 3.0 packs/day for 10.0 years (30.0 ttl pk-yrs)     Types: Cigarettes     Start date: 1/1/2000     Quit date: 1/1/2010     Years since quitting: 15.0    Smokeless tobacco: Never   Vaping Use    Vaping status: Never Used   Substance and Sexual Activity    Alcohol use: Not Currently     Comment: rare glass of wine    Drug use: Never    Sexual activity: Not on file         Objective   /70 (BP Location: Left arm, Patient Position: Sitting, Cuff Size: Standard)   Pulse 95   Temp (!) 97.4 °F (36.3 °C) (Temporal)   Resp 18   Ht 5' 8.5\" (1.74 m)   Wt 77.1 kg (170 lb)   SpO2 95%   BMI 25.47 kg/m²     ECOG   0  Physical Exam  Vitals reviewed.   Constitutional:       Appearance: Normal appearance. He is not ill-appearing.   Cardiovascular:      Rate and Rhythm: Normal rate and regular rhythm.      Heart sounds: No murmur heard.  Pulmonary:      Effort: Pulmonary effort is normal.      Breath sounds: Normal breath sounds. No wheezing.   Abdominal:      General: Abdomen is flat.      Palpations: Abdomen is soft. There is no mass.      Tenderness: There is no abdominal tenderness.   Musculoskeletal:      Cervical back: Neck supple.   Skin:     General: Skin is warm and dry.   Neurological:      General: No focal deficit present.      Mental Status: He is alert and oriented to person, place, and time.         Labs: I have reviewed the following labs:  Lab Results   Component Value Date/Time    WBC 8.30 12/24/2024 09:24 AM    RBC 4.18 12/24/2024 09:24 AM    Hemoglobin 13.4 12/24/2024 09:24 AM    Hematocrit 39.3 12/24/2024 09:24 AM    MCV 94 12/24/2024 09:24 AM    MCH 32.1 12/24/2024 09:24 AM    RDW 13.0 12/24/2024 09:24 AM    Platelets 237 12/24/2024 09:24 AM    Segmented % 73 12/24/2024 09:24 AM    Lymphocytes % 17 12/24/2024 09:24 AM    Monocytes % 7 12/24/2024 09:24 AM    Eosinophils Relative 2 12/24/2024 09:24 AM    Basophils Relative 1 12/24/2024 " 09:24 AM    Immature Grans % 0 12/24/2024 09:24 AM    Absolute Neutrophils 6.10 12/24/2024 09:24 AM     Lab Results   Component Value Date/Time    Potassium 4.2 12/17/2024 09:47 AM    Chloride 102 12/17/2024 09:47 AM    CO2 30 12/17/2024 09:47 AM    BUN 31 (H) 12/17/2024 09:47 AM    Creatinine 1.72 (H) 12/17/2024 09:47 AM    Glucose, Fasting 113 (H) 12/17/2024 09:47 AM    Calcium 10.2 12/17/2024 09:47 AM    eGFR 35 12/17/2024 09:47 AM       Radiology Results Review: I personally reviewed the following image studies in PACS and associated radiology reports: CT abdomen/pelvis. My interpretation of the radiology images/reports is: as noted above.    Administrative Statements   I have spent a total time of 55 minutes in caring for this patient on the day of the visit/encounter including Diagnostic results, Prognosis, Risks and benefits of tx options, Patient and family education, Documenting in the medical record, Reviewing / ordering tests, medicine, procedures  , and Obtaining or reviewing history  . Topics discussed with the patient / family include symptom assessment and management, medication review, supportive listening, and anticipatory guidance.

## 2024-12-30 NOTE — ASSESSMENT & PLAN NOTE
Secondary to hydronephrosis 2/2 tumor.   B/l nephrostomy tubes in place  Will continue to trend renal function  Has f/u with IR in Feb

## 2024-12-30 NOTE — ASSESSMENT & PLAN NOTE
Cystoscopy on December 16 which showed a large extensive tumor involving the entire trigone of the bladder, with both ureteral orifices obscured by the tumor.    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  Per discussion with , the bladder tumor did not have the appearance of a primary bladder tumor.   Since the cytology was suspicious for high grade urothelial carcinoma, I would recommend a biopsy of the bladder tumor, to ensure we are not dealing with 2 separate primaries - localized bladder CA and met prostate CA.

## 2024-12-31 ENCOUNTER — DOCUMENTATION (OUTPATIENT)
Age: 84
End: 2024-12-31

## 2024-12-31 ENCOUNTER — DOCUMENTATION (OUTPATIENT)
Dept: HEMATOLOGY ONCOLOGY | Facility: CLINIC | Age: 84
End: 2024-12-31

## 2024-12-31 NOTE — PROGRESS NOTES
Received request from clinical for patient to start on Xtandi.  Auth has been approved.    Prior authorization approved  Payer: [employer] Case ID: [id number]    [phone]    [phone]  Note from payer: CaseId:[id number];Status:Approved;Review Type:Prior Auth;Coverage Start Date:12/01/2024;Coverage End Date:12/31/2025;  Approval Details    Authorized from December 1, 2024 to December 31, 2025    Help desk # [phone].    BIN: [id number]  PCN: [id number]  ID: [id number]  GRP: [id number]

## 2024-12-31 NOTE — PROGRESS NOTES
This writer called PT to discuss free drug of Xtandi. PT's son indicated that PT would likely qualify for free drug due to HH size and income. PT's son was educated on OOP ded, MPPP, and free drug protocols. He wanted to call his wife (PT's DIL) to discuss finances. PT's son may pay the OOP on PT's behalf. PT's son to call this writer back after conversation with wife. This writer provided contact information and a high level review of options.       Emma Miller MPH  Phone: [phone]  Email: [email or web address]

## 2024-12-31 NOTE — PROGRESS NOTES
PT's son called back requesting documents and asked questions regarding insurance. All questions were answered and a followup email detailing the conversation was sent, per his request.     Email:    Good Morning,     Thank you for taking my call this morning!     I have attached some documents here for you to review, and aide in your decision.     [facility] application:   Application process that would, if approved, have Kilo's bills for his injection, office visits, etc. written off for approximately 6 months. He can reapply continuously.     Xtandi free drug application:  Free drug shipped from the  of the drug. Based on the details we reviewed, it appears he would likely qualify, but is [facility]. This will circumvent his insurance, so it will not help him meet that pharmacy deductible. He would pay $0 for the medication and need to reapply annually.     MPPP:   This would be a payment plan of $167/ MO for all medications.    Pharmacy:   Pay the $2,000 deductible in full, in January and pay $0 in subsequent months, for Xtandi and other applicable medications.     Regarding his insurance for medical coverage, it is my understanding that this plan makes them responsible for office visit co-payments but covers the 20% that Medicare does not. Regarding prescription insurance, he is subject to $2,000/ year deductible for oral medications (specialty and retail).     Please let me know what questions you have!    Warm Regards,     Emma Miller, MPH   Oncology   [facility]   [address] [place] [address].   YOHANNES Jimenez [id number]  [phone]  [phone]

## 2024-12-31 NOTE — TELEPHONE ENCOUNTER
Kali Morris, it looks like there is an urgent virtual short on the 17th. If the patient specifically was looking for in person we can offer an urgent long that's sooner

## 2025-01-02 ENCOUNTER — TELEPHONE (OUTPATIENT)
Age: 85
End: 2025-01-02

## 2025-01-02 ENCOUNTER — PATIENT OUTREACH (OUTPATIENT)
Dept: HEMATOLOGY ONCOLOGY | Facility: CLINIC | Age: 85
End: 2025-01-02

## 2025-01-02 ENCOUNTER — DOCUMENTATION (OUTPATIENT)
Dept: HEMATOLOGY ONCOLOGY | Facility: CLINIC | Age: 85
End: 2025-01-02

## 2025-01-02 ENCOUNTER — TELEPHONE (OUTPATIENT)
Dept: UROLOGY | Facility: MEDICAL CENTER | Age: 85
End: 2025-01-02

## 2025-01-02 ENCOUNTER — PREP FOR PROCEDURE (OUTPATIENT)
Dept: UROLOGY | Facility: MEDICAL CENTER | Age: 85
End: 2025-01-02

## 2025-01-02 DIAGNOSIS — Z01.812 PRE-OPERATIVE LABORATORY EXAMINATION: ICD-10-CM

## 2025-01-02 DIAGNOSIS — R97.20 ELEVATED PSA: ICD-10-CM

## 2025-01-02 DIAGNOSIS — D49.4 BLADDER TUMOR: ICD-10-CM

## 2025-01-02 DIAGNOSIS — Z01.810 PRE-OPERATIVE CARDIOVASCULAR EXAMINATION: ICD-10-CM

## 2025-01-02 DIAGNOSIS — E11.49 DIABETES MELLITUS TYPE 2 WITH NEUROLOGICAL MANIFESTATIONS (HCC): ICD-10-CM

## 2025-01-02 DIAGNOSIS — R39.89 SUSPECTED UTI: Primary | ICD-10-CM

## 2025-01-02 NOTE — PROGRESS NOTES
Suhas Bass, this is Ed Araceli. I'm calling in regards to my father Kilo, if you can give me a call back at [phone]. It's in regards. We're trying to set up an appointment for him with urology for a bladder biopsy. Annetta from Doctor Raad's office just called me and said they put the request in but it may be better if I call also. So I was wondering if this is something that you could help me out with and I'd like to have it with the same doctor he already saw because he feels more comfortable with him. So if you give me a call back that would be great. Thanks and have a good day.      Dr. Shankar suggests bladder biopsy per the note. Message will be forwarded to Dr. Youssef and team for update on scheduling.

## 2025-01-02 NOTE — TELEPHONE ENCOUNTER
"\"needs clearance to hold Plavix.  Needs bilateral PCN and PSMA PET scan prior to TURBT\"    Spoke with patient and confirmed surgery date of 1/21  Type of surgery: TURBT   Operating physician:Dr. Youssef  Location of surgery: [facility]    Verbally went over prep with patient's son on 1/2  NPO  Bowel prep? No  Hospital calls afternoon prior with arrival time (calls Friday afternoon for Monday surgery)  Patient needs ride to and from surgery   outpatient  Pre-op testing to be done 2 weeks prior to surgery. All testing can be done as a walk-in. EKG can only be done as a walk-in at any [facility].  Labs needed - A1c, EKG, UC   Blood thinners:   Plavix  Clearances needed: None    Emailed to patient's son on 1/2  Copy of packet scanned into Media  Labs in packet and in electronic record   Soap prep in packet  post-op in packet     Medication Suspension of: Plavix  Ordering provider: PCP  Faxed Medication Suspension form on: 1/2  Best fax number: [phone]    "

## 2025-01-02 NOTE — TELEPHONE ENCOUNTER
You can offer my urgent long on 1/17 it's in person. It's okay to put him in that visit even though he's triaged as short

## 2025-01-02 NOTE — TELEPHONE ENCOUNTER
I spoke with pts son this morning regarding the genetics appt. Pts son had questions regarding where things stand with getting pt in for the biopsy of the bladder as discussed at his appt earlier this week with Dr Shankar. Pts son would like to know if this is something that will be scheduled soon? Please call pts son Ed back to update him on getting the biopsy scheduled.

## 2025-01-02 NOTE — TELEPHONE ENCOUNTER
Called and spoke to son Ed. Relayed Dr. Shankar's message. He would like a call tomorrow from Dr. Shankar to go into more detail with his PET scan. In the meantime, he is scheduled for infusion on 1/6. He has an application for xtandi's free drug program pending. I will provide him with a 2 week supply of samples when he comes in for his infusion on Monday. He is scheduled for his biopsy and is getting pre-op clearance on Monday as well.

## 2025-01-02 NOTE — PROGRESS NOTES
PT son called to drop off paperwork. This writer indicated that she is not located at the  office, so offered email as an avenue to obtain paperwork. He was agreeable and will send by [facility].     Emma Miller MPH  Phone: [phone]  Email: [email or web address]

## 2025-01-02 NOTE — TELEPHONE ENCOUNTER
Patient's son Ed called regarding Dad's PET CT and biopsy. He wanted to notify Dr Shankar that biopsy has been scheduled for 1/21, as they were told that it was the earliest available date.      The family is very concerned about having to wait until that time to review the PET CT, as they are very anxious. Ed is asking if Dr Shankar can review the PET with them now, or help to facilitate a sooner biopsy date with urology.

## 2025-01-03 ENCOUNTER — PATIENT OUTREACH (OUTPATIENT)
Dept: CASE MANAGEMENT | Facility: HOSPITAL | Age: 85
End: 2025-01-03

## 2025-01-03 NOTE — PROGRESS NOTES
"Biopsychosocial and Barriers Assessment    Type of Cancer: Prostate Cancer- possible kidney  Treatment plan: hormone therapy  Noted barriers to care: None   Cultural/Latter-day concerns: None  Hair Loss/ Wig resources needed: N/A    DT completed: yes  DT score: 9/10  Issues noted: fatigue, loss of physical abilities, worry, anxiety, sadness, depression, fear, treatment decisions, relationship with spouse    Marital status/Lives with: Wife- \"not in the best health\"  Pt's support system:  Son who lives next door  Mental Health history: None reported  Substance Abuse: None reported    Employment/income source: Retired  Concerns with bills (treatment vs household): No  Noted issues with home: Pt's wife with mild dementia    Narrative note:     LSW made outreach to the pt after he was referred by Navigation due to a high scoring DT.  Pt was open and receptive of this call and the role of the  was described and explained and contact information provided.  The pt lives at home with his wife and his son, DIL and grandchildren live next door.  The pt's wife has early onset dementia and her reports that she has little understanding of his health issue.  He currently has nephrostomy tubes, which need to be drained daily and he expressed how grateful he is that his son and dil come daily, to care for this for him.  Pt explained this is not a task his wife is able to do with her dementia.  He states he is not in any pain or discomfort from the tubes.  The pt is scheduled to have a biopsy of his bladder to learn more about treatment options. When asked how the pt was coping with his diagnosis and some of the unknowns, he described himself as feeling angry.  He states that he has \"always had a temper and this is making me angry.'  LSW probed further to learn more about the pt's temper and he described this to be a yelling behavior and not necessarily at anyone or anything in particular.  He likened his behavior to " "\"some people are always happy, others are always angry.  I am the angry type.\"  Pt spoke of his grandchildren and in doing so, it was a soft, loving tone.  He spoke of  the many great grandchildren he and his wife have and the leonel they receive when the family is together.  The pt has a daughter that lives 45 minutes away and she is an RN and he described her as very involved with his care.  They also had a son that passed away 14 years prior, when his son was in his early 40's, due to complications from sleep apnea.  The pt spent time talking about the significant pain and loss of a child and how his current diagnosis \"does not even compare to that pain.\"  LSW acknowledged the pain he was describing and validated his feelings.  Significant support was provided.  The pt does not report any financial concerns and does not indicate any issues with his insurance.  He feels as though he is coping well at this time.  Pt expressed feeling well supported by his family.  He was encouraged to call if his needs should change.   "

## 2025-01-04 ENCOUNTER — NURSE TRIAGE (OUTPATIENT)
Dept: OTHER | Facility: OTHER | Age: 85
End: 2025-01-04

## 2025-01-04 NOTE — TELEPHONE ENCOUNTER
"had b/l nephrostomy tubes placed on 12/24. He admits he hasn't been drinking enough the past few days. Also for the past several days has been noticing blood coming from his penis. The urine in the nephrostomy bags are clear however 2-3 times a day he will have a strong urge to urinate. At that time he is able to pass urine but will also pass a small amount of blood. Notes eraser sized blood clots as well. Denies fever or pain. Patient not sure if the bleeding is to be expected.     On call provider notified   Reason for Disposition   Blood in urine  (Exception: Could be normal menstrual bleeding.)    Answer Assessment - Initial Assessment Questions  1. COLOR of URINE: \"Describe the color of the urine.\"  (e.g., tea-colored, pink, red, bloody) \"Do you have blood clots in your urine?\" (e.g., none, pea, grape, small coin)      Urine in nephrostomy bags are clear  2. ONSET: \"When did the bleeding start?\"       Several days ago  3. EPISODES: \"How many times has there been blood in the urine?\" or \"How many times today?\"      2-3 times a day  4. PAIN with URINATION: \"Is there any pain with passing your urine?\" If Yes, ask: \"How bad is the pain?\"  (Scale 1-10; or mild, moderate, severe)      denies  5. FEVER: \"Do you have a fever?\" If Yes, ask: \"What is your temperature, how was it measured, and when did it start?\"      denies  6. ASSOCIATED SYMPTOMS: \"Are you passing urine more frequently than usual?\"      Also passing several small blood clots  7. OTHER SYMPTOMS: \"Do you have any other symptoms?\" (e.g., back/flank pain, abdomen pain, vomiting)      denies    Protocols used: Urine - Blood In-Adult-    "

## 2025-01-04 NOTE — TELEPHONE ENCOUNTER
"Regarding: peeing blood  ----- Message from Jennifer EL sent at 1/4/2025 12:23 PM EST -----  \"My father in law says he still has the urge to urinate 2-3 times a day and when he does there's blood, from a little to a lot. The bags don't have any blood in them at this point\"    Dayna MEAD calling w/ Ed.    "

## 2025-01-06 ENCOUNTER — APPOINTMENT (OUTPATIENT)
Dept: LAB | Facility: HOSPITAL | Age: 85
End: 2025-01-06
Payer: MEDICARE

## 2025-01-06 ENCOUNTER — TELEPHONE (OUTPATIENT)
Dept: FAMILY MEDICINE CLINIC | Facility: HOSPITAL | Age: 85
End: 2025-01-06

## 2025-01-06 ENCOUNTER — HOSPITAL ENCOUNTER (OUTPATIENT)
Dept: INFUSION CENTER | Facility: HOSPITAL | Age: 85
Discharge: HOME | End: 2025-01-06
Attending: INTERNAL MEDICINE
Payer: MEDICARE

## 2025-01-06 ENCOUNTER — CONSULT (OUTPATIENT)
Dept: FAMILY MEDICINE CLINIC | Facility: HOSPITAL | Age: 85
End: 2025-01-06
Payer: MEDICARE

## 2025-01-06 ENCOUNTER — TELEPHONE (OUTPATIENT)
Age: 85
End: 2025-01-06

## 2025-01-06 VITALS
DIASTOLIC BLOOD PRESSURE: 65 MMHG | SYSTOLIC BLOOD PRESSURE: 107 MMHG | HEART RATE: 77 BPM | RESPIRATION RATE: 18 BRPM | TEMPERATURE: 96.7 F | OXYGEN SATURATION: 97 %

## 2025-01-06 VITALS
TEMPERATURE: 98.3 F | SYSTOLIC BLOOD PRESSURE: 114 MMHG | DIASTOLIC BLOOD PRESSURE: 72 MMHG | HEIGHT: 69 IN | BODY MASS INDEX: 24.73 KG/M2 | WEIGHT: 167 LBS | HEART RATE: 64 BPM

## 2025-01-06 DIAGNOSIS — E11.49 DIABETES MELLITUS TYPE 2 WITH NEUROLOGICAL MANIFESTATIONS (HCC): ICD-10-CM

## 2025-01-06 DIAGNOSIS — D49.4 BLADDER TUMOR: ICD-10-CM

## 2025-01-06 DIAGNOSIS — E11.22 CKD STAGE 3 DUE TO TYPE 2 DIABETES MELLITUS (HCC): ICD-10-CM

## 2025-01-06 DIAGNOSIS — I25.10 CORONARY ARTERY DISEASE INVOLVING NATIVE CORONARY ARTERY OF NATIVE HEART WITHOUT ANGINA PECTORIS: ICD-10-CM

## 2025-01-06 DIAGNOSIS — R97.20 ELEVATED PSA: ICD-10-CM

## 2025-01-06 DIAGNOSIS — R39.89 SUSPECTED UTI: ICD-10-CM

## 2025-01-06 DIAGNOSIS — C61 PROSTATE CANCER (HCC): Primary | ICD-10-CM

## 2025-01-06 DIAGNOSIS — N18.30 CKD STAGE 3 DUE TO TYPE 2 DIABETES MELLITUS (HCC): ICD-10-CM

## 2025-01-06 DIAGNOSIS — I74.3 EMBOLISM AND THROMBOSIS OF ARTERIES OF THE LOWER EXTREMITIES (HCC): ICD-10-CM

## 2025-01-06 DIAGNOSIS — Z01.810 PRE-OPERATIVE CARDIOVASCULAR EXAMINATION: ICD-10-CM

## 2025-01-06 DIAGNOSIS — Z01.812 PRE-OPERATIVE LABORATORY EXAMINATION: ICD-10-CM

## 2025-01-06 DIAGNOSIS — Z01.818 PREOPERATIVE GENERAL PHYSICAL EXAMINATION: Primary | ICD-10-CM

## 2025-01-06 LAB
EST. AVERAGE GLUCOSE BLD GHB EST-MCNC: 120 MG/DL
HBA1C MFR BLD: 5.8 %

## 2025-01-06 PROCEDURE — 87086 URINE CULTURE/COLONY COUNT: CPT

## 2025-01-06 PROCEDURE — 99214 OFFICE O/P EST MOD 30 MIN: CPT | Performed by: NURSE PRACTITIONER

## 2025-01-06 PROCEDURE — 36415 COLL VENOUS BLD VENIPUNCTURE: CPT

## 2025-01-06 PROCEDURE — 83036 HEMOGLOBIN GLYCOSYLATED A1C: CPT

## 2025-01-06 PROCEDURE — 87077 CULTURE AEROBIC IDENTIFY: CPT

## 2025-01-06 PROCEDURE — 96402 CHEMO HORMON ANTINEOPL SQ/IM: CPT

## 2025-01-06 PROCEDURE — 87186 SC STD MICRODIL/AGAR DIL: CPT

## 2025-01-06 RX ADMIN — LEUPROLIDE ACETATE 45 MG: KIT at 11:09

## 2025-01-06 NOTE — TELEPHONE ENCOUNTER
Pt in office   1/6 for pre op clearance    son reports he had bloodwork and EKG at [facility] today   told son dad needs cardiac clearance   son said the surg office just said   blood work urine and pcp pre op    please advise   thanks

## 2025-01-06 NOTE — PROGRESS NOTES
NAME: Kilo Mix  AGE: 84 y.o. SEX: male  : 1940     DATE: 2025    [facility] Pre-Operative Evaluation      Chief Complaint: Pre-operative Evaluation     Surgery: TURBT  Anticipated Date of Surgery: 25  Referring Provider: Dr. Youssef      History of Present Illness:     Kilo Mix is a 84 y.o. male who presents to the office today for a preoperative consultation at the request of surgeon, Dr. Youssef, who plans on performing TURBT on 25. Planned anesthesia is general. Patient has a bleeding risk of: no recent abnormal bleeding.  Current anti-platelet/anti-coagulation medications that the patient is prescribed includes: Aspirin and Clopidogrel (Plavix).      Assessment of Chronic Conditions:   - Diabetes Mellitus: controlled   - Coronary Artery Disease: controlled  - Hypertension: controlled     Assessment of Cardiac Risk:  Denies unstable or severe angina or MI in the last 6 weeks or history of stent placement in the last year   Denies decompensated heart failure (e.g. New onset heart failure, NYHA functional class IV heart failure, or worsening existing heart failure)  Denies significant arrhythmias such as high grade AV block, symptomatic ventricular arrhythmia, newly recognized ventricular tachycardia, supraventricular tachycardia with resting heart rate >100, or symptomatic bradycardia  Denies severe heart valve disease including aortic stenosis or symptomatic mitral stenosis     Exercise Capacity:  Able to walk 4 blocks without symptoms?: Yes  Able to walk 2 flights without symptoms?: Yes    Prior Anesthesia Reactions: No     Personal history of venous thromboembolic disease? Yes    History of steroid use for >2 weeks within last year? No         Review of Systems:     Review of Systems   Constitutional: Negative.    HENT: Negative.     Eyes: Negative.    Respiratory: Negative.     Cardiovascular: Negative.    Gastrointestinal: Negative.    Endocrine: Negative.    Genitourinary:  Negative.    Musculoskeletal: Negative.    Skin: Negative.    Neurological: Negative.    Hematological: Negative.    Psychiatric/Behavioral: Negative.         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)    Diabetic polyneuropathy (HCC)    Diastolic dysfunction    Dyslipidemia    Recurrent stenosis of right carotid artery    Hypertension    Lower back pain    Peripheral vascular disease (HCC)    Sinus bradycardia    Asymptomatic stenosis of left carotid artery    Prostate cancer (HCC)    Plantar fasciitis of right foot    Personal history of colonic polyps    Schatzki's ring    Dysphagia    Antiplatelet or antithrombotic long-term use    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HCC)    Primary osteoarthritis of left shoulder    Traumatic complete tear of right rotator cuff    AMD (age related macular degeneration)    Bladder mass    Hydronephrosis    Acute kidney injury (HCC)    CKD stage 3 due to type 2 diabetes mellitus (HCC)       Allergies:     Allergies   Allergen Reactions    Losartan Syncope     Reaction Date: 07Jun2011;     Penicillins Other (See Comments)     As a child so does not recall reaction       Current Medications:       Current Outpatient Medications:     amLODIPine (NORVASC) 5 mg tablet, Take 1 tablet (5 mg total) by mouth daily, Disp: 90 tablet, Rfl: 2    ascorbic acid (VITAMIN C) 500 mg tablet, Take 500 mg by mouth daily, Disp: , Rfl:     aspirin 81 MG tablet, Take 1 tablet by mouth daily, Disp: , Rfl:     atorvastatin (LIPITOR) 20 mg tablet, Take 1 tablet by mouth once daily, Disp: 90 tablet, Rfl: 1    B Complex Vitamins (VITAMIN B COMPLEX PO), Take 1 tablet by mouth daily, Disp: , Rfl:     Cholecalciferol 1000 units CHEW, Chew 1 tablet daily, Disp: , Rfl:      clopidogrel (PLAVIX) 75 mg tablet, Take 1 tablet (75 mg total) by mouth daily, Disp: 90 tablet, Rfl: 1    enzalutamide (Xtandi) 40 mg capsule, Take 4 capsules (160 mg total) by mouth daily, Disp: 120 capsule, Rfl: 5    hydroCHLOROthiazide 25 mg tablet, Take 1 tablet by mouth once daily, Disp: 90 tablet, Rfl: 1    lisinopril (ZESTRIL) 10 mg tablet, Take 1 tablet by mouth once daily, Disp: 90 tablet, Rfl: 2    metFORMIN (GLUCOPHAGE-XR) 500 mg 24 hr tablet, Take 1 tablet by mouth once daily, Disp: 90 tablet, Rfl: 3    sodium chloride, PF, 0.9 %, 10 mL by Intracatheter route daily Intracatheter flushing daily. May substitute prefilled syringe with normal saline 10 mL vials, 10 mL syringes, and 18 g blunt needles, Disp: 900 mL, Rfl: 0    vitamin E, tocopherol, 400 units capsule, Take 1 capsule by mouth daily, Disp: , Rfl:   No current facility-administered medications for this visit.    Past Medical History:       Past Medical History:   Diagnosis Date    Diverticulitis of colon     Hypertension     Migraines     Myocardial infarction (HCC)     Prostate cancer (HCC)     Seasonal allergies         Past Surgical History:   Procedure Laterality Date    CARDIAC SURGERY      CAROTID ENDARTARECTOMY Right     thromboendarterectomy    CAROTID STENT Right     CATARACT EXTRACTION Left     COLONOSCOPY      fiberoptic screening 2/6/08 5 yr / complete 3/15/13 5 yr    CORONARY ANGIOPLASTY WITH STENT PLACEMENT      ELBOW SURGERY      EYE SURGERY      IR BIOPSY LYMPH NODE  12/24/2024    IR NEPHROSTOMY TUBE PLACEMENT  12/24/2024    KNEE SURGERY      PROSTATE SURGERY      SUPRAPUBIC PROSTATECTOMY  12/06/1999    UPPER GASTROINTESTINAL ENDOSCOPY          Family History   Problem Relation Age of Onset    Alcohol abuse Mother     Heart attack Family         MI    Breast cancer Family     No Known Problems Father     No Known Problems Sister     Substance Abuse Son     Colon cancer Neg Hx     Colon polyps Neg Hx         Social History  "    Socioeconomic History    Marital status: /Civil Union     Spouse name: Not on file    Number of children: Not on file    Years of education: Not on file    Highest education level: Not on file   Occupational History    Occupation: fulltime   Tobacco Use    Smoking status: Former     Current packs/day: 0.00     Average packs/day: 3.0 packs/day for 10.0 years (30.0 ttl pk-yrs)     Types: Cigarettes     Start date: 1/1/2000     Quit date: 1/1/2010     Years since quitting: 15.0    Smokeless tobacco: Never   Vaping Use    Vaping status: Never Used   Substance and Sexual Activity    Alcohol use: Not Currently     Comment: rare glass of wine    Drug use: Never    Sexual activity: Not on file   Other Topics Concern    Not on file   Social History Narrative    Daily coffee consumption: 4 cp/day    Daily tea consumption: 1 cp/day     Social Drivers of Health     Financial Resource Strain: Medium Risk (8/31/2023)    Overall Financial Resource Strain (CARDIA)     Difficulty of Paying Living Expenses: Somewhat hard   Food Insecurity: No Food Insecurity (9/3/2024)    Nursing - Inadequate Food Risk Classification     Worried About Running Out of Food in the Last Year: Never true     Ran Out of Food in the Last Year: Never true     Ran Out of Food in the Last Year: Not on file   Transportation Needs: No Transportation Needs (9/3/2024)    PRAPARE - Transportation     Lack of Transportation (Medical): No     Lack of Transportation (Non-Medical): No   Physical Activity: Not on file   Stress: Not on file   Social Connections: Not on file   Intimate Partner Violence: Not on file   Housing Stability: Low Risk  (9/3/2024)    Housing Stability Vital Sign     Unable to Pay for Housing in the Last Year: No     Number of Times Moved in the Last Year: 0     Homeless in the Last Year: No        Physical Exam:     /72 (Patient Position: Sitting, Cuff Size: Standard)   Pulse 64   Temp 98.3 °F (36.8 °C) (Tympanic)   Ht 5' 8.5\" " (1.74 m)   Wt 75.8 kg (167 lb)   BMI 25.02 kg/m²     Physical Exam  Vitals reviewed.   Constitutional:       Appearance: Normal appearance. He is well-developed.   HENT:      Head: Normocephalic and atraumatic.      Right Ear: Tympanic membrane, ear canal and external ear normal.      Left Ear: Tympanic membrane, ear canal and external ear normal.      Nose: Nose normal.      Mouth/Throat:      Mouth: Mucous membranes are moist.      Pharynx: No oropharyngeal exudate.   Eyes:      Conjunctiva/sclera: Conjunctivae normal.      Pupils: Pupils are equal, round, and reactive to light.   Neck:      Thyroid: No thyromegaly.   Cardiovascular:      Rate and Rhythm: Normal rate and regular rhythm.      Heart sounds: Normal heart sounds. No murmur heard.  Pulmonary:      Effort: Pulmonary effort is normal.      Breath sounds: Normal breath sounds.   Abdominal:      General: Abdomen is flat. Bowel sounds are normal.      Palpations: Abdomen is soft. There is no hepatomegaly or splenomegaly.      Tenderness: There is no abdominal tenderness.   Musculoskeletal:         General: Normal range of motion.      Cervical back: Normal range of motion and neck supple.   Lymphadenopathy:      Cervical: No cervical adenopathy.   Skin:     General: Skin is warm and dry.      Capillary Refill: Capillary refill takes less than 2 seconds.   Neurological:      Mental Status: He is alert and oriented to person, place, and time.   Psychiatric:         Mood and Affect: Mood normal.         Behavior: Behavior normal.         Thought Content: Thought content normal.         Judgment: Judgment normal.          Data:     Pre-operative work-up    Laboratory Results: I have personally reviewed the pertinent laboratory results/reports      EKG: Result not available    Chest x-ray: Not indicated      Previous cardiopulmonary studies within the past year:  Echocardiogram: Not indicated  Cardiac Catheterization: Not indicated   Stress Test: Not  indicated  Pulmonary Function Testing: Not indicated      Assessment & Recommendations:     1. Preoperative general physical examination  Ambulatory Referral to Cardiology      2. CKD stage 3 due to type 2 diabetes mellitus (HCC)      eGFR stable      3. 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HCC)      Managed by PCP  On ASA, Plavix and statin therapy      4. 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      5. Coronary artery disease involving native coronary artery of native heart without angina pectoris  Ambulatory Referral to Cardiology    Stable.   BNo longer follows with cardiology.   Cardiac clearnace is recommended per Dr. Vizcarra.          Pre-Op Evaluation Assessment  84 y.o. male with planned surgery: TURBT.    Known risk factors for perioperative complications: Coronary disease  Diabetes mellitus  Renal dysfunction.        Current medications which may produce withdrawal symptoms if withheld perioperatively: none.    Pre-Op Evaluation Plan  1. Further preoperative workup as follows:   - None; no further preoperative work-up is required    2. Medication Management/Recommendations:   - Regarding anti-platelet agents: per surgery team.    3. Prophylaxis for cardiac events with perioperative beta-blockers: not indicated.    4. Patient requires further consultation with: Cardiology    Clearance  Patient is CLEARED for surgery without any additional cardiac testing.     GLEN Bower  [facility] [address] [place] [id number]  Phone#  [phone]  Fax#  [phone]

## 2025-01-06 NOTE — PROGRESS NOTES
Pt tolerated lupron injection without any difficulty. Band-aid applied. Patient ambulated with a steady gait off of unit. AVS printed and given to pt.      Aware of next appt 06/23/25 @ 0800 am

## 2025-01-06 NOTE — TELEPHONE ENCOUNTER
----- Message from Sofia ROME sent at 1/6/2025 11:29 AM EST -----  Lm to call office  ----- Message -----  From: Dolores Vizcarra DO  Sent: 1/6/2025   8:57 AM EST  To: [facility] [address] [facility]    Received a form that pt needs pre-operative clearance - he needs a 40 min pre-op appt with our office and he also needs to ensure he has cardiac clearance for this as well.

## 2025-01-06 NOTE — TELEPHONE ENCOUNTER
Zahraa saw pt today. She medically cleared him.  If surgeon did not want cardiac clearance that is fine but usually they will request that with a cardiac history.  He will have to check with the surgeon.

## 2025-01-06 NOTE — TELEPHONE ENCOUNTER
Patient walked in to office today regarding authorization on Xtandi. Patient stated Xtandi was authorized under old insurance [place] [employer] which  24. New prescription plan is [employer].   New prescription card is uploaded.

## 2025-01-06 NOTE — TELEPHONE ENCOUNTER
----- Message from Dolores Vizcarra DO sent at 1/6/2025  8:57 AM EST -----  Received a form that pt needs pre-operative clearance - he needs a 40 min pre-op appt with our office and he also needs to ensure he has cardiac clearance for this as well.

## 2025-01-07 ENCOUNTER — TELEPHONE (OUTPATIENT)
Age: 85
End: 2025-01-07

## 2025-01-07 NOTE — TELEPHONE ENCOUNTER
Son calling, states that they completed pre op blood work and EKG    PCP advised that patient to hold Plavix 5 days before procedure.    Son asking if cardiac clearance is needed, informed per paperwork it is not needed, verbalized understanding

## 2025-01-07 NOTE — TELEPHONE ENCOUNTER
Called patient spoke to son, Ed, and scheduled FU appt with Dr Shankar for 2/6 at 10 am.  Date and time is fine with patient.

## 2025-01-08 ENCOUNTER — RESULTS FOLLOW-UP (OUTPATIENT)
Dept: UROLOGY | Facility: MEDICAL CENTER | Age: 85
End: 2025-01-08

## 2025-01-08 DIAGNOSIS — C61 PROSTATE CANCER (HCC): Primary | ICD-10-CM

## 2025-01-08 RX ORDER — SULFAMETHOXAZOLE AND TRIMETHOPRIM 800; 160 MG/1; MG/1
1 TABLET ORAL EVERY 12 HOURS SCHEDULED
Qty: 14 TABLET | Refills: 0 | Status: SHIPPED | OUTPATIENT
Start: 2025-01-08 | End: 2025-01-15

## 2025-01-08 NOTE — TELEPHONE ENCOUNTER
----- Message from Marita MCKINNON sent at 1/8/2025  3:19 PM EST -----  Please call pt to advise, thank you  ----- Message -----  From: Jordan Youssef MD  Sent: 1/8/2025   2:59 PM EST  To: Bessie Corley MA; #    Sent Bactrim to pharmacy to start 7 days prior to TURBT in OR if not currently symptomatic of infection  Pts son currently is taking all his messages and is aware.

## 2025-01-09 LAB
BACTERIA UR CULT: ABNORMAL
BACTERIA UR CULT: ABNORMAL

## 2025-01-10 NOTE — PRE-PROCEDURE INSTRUCTIONS
Pre-Surgery Instructions:   Medication Instructions    amLODIPine (NORVASC) 5 mg tablet Take day of surgery.    ascorbic acid (VITAMIN C) 500 mg tablet Stop taking 7 days prior to surgery.    aspirin 81 MG tablet Stop taking 5 days prior to surgery.    atorvastatin (LIPITOR) 20 mg tablet Take night before surgery    B Complex Vitamins (VITAMIN B COMPLEX PO) Stop taking 7 days prior to surgery.    Cholecalciferol 1000 units CHEW Stop taking 7 days prior to surgery.    clopidogrel (PLAVIX) 75 mg tablet Stop taking 5 days prior to surgery.    enzalutamide (Xtandi) 40 mg capsule Take day of surgery.    hydroCHLOROthiazide 25 mg tablet Hold day of surgery.    lisinopril (ZESTRIL) 10 mg tablet Hold day of surgery.    metFORMIN (GLUCOPHAGE-XR) 500 mg 24 hr tablet Hold day of surgery.    sodium chloride, PF, 0.9 %      sulfamethoxazole-trimethoprim (BACTRIM DS) 800-160 mg per tablet Take day of surgery.    vitamin E, tocopherol, 400 units capsule Stop taking 7 days prior to surgery.     Pt verbalizes understanding of the following:    Please reference your “My Surgical Experience Booklet” for additional information to prepare for your upcoming surgery.      - DO NOT EAT OR DRINK ANYTHING after midnight on the evening before your procedure including coffee, tea, gum or hard candy.    - ONLY SIPS OF WATER with your medications are allowed on the morning of your procedure.  - Avoid OTC non-directed Vit/ Suppl/ Herbals 7 days prior to surgery to ensure no drug interactions with perioperative surgical/ anesthetic meds  - Avoid NSAIDs 3 days prior. Tylenol is ok to take as needed.   - Avoid ASA containing products 5 days prior, unless otherwise instructed by your provider   - Continue statin medication up through and including the day of surgery  - Insulin Management: If on Insulin, advised to call PCP for explicit instructions  - Bring a list of meds you take at home with your last dose noted  - Bring INHALER you take for  breathing problems     - Follow the pre surgery showering instructions as listed in the “My Surgical Experience Booklet” or otherwise provided by your surgeon's office.  - Bathing instructions, will use dial  - No lotions, powders, sprays, deodorant, cologne, perfume, jewelry  - Do not use a blade to shave the surgical area 1 week before surgery. It is ok to use clean electric clippers up to 24 hours before surgery. Do not shave any body parts with a razor within 24hrs.    - For outpatient surgery, arrange for someone to drive you home after the procedure & stay with you until the next morning. Visitor guidelines discussed.   - Bring insurance cards & photo id    - Leave all valuables such as credit cards, money & jewelry at home  - Wear causal clothing that is easy to take on and off. Consider your type of surgery.    - Notify surgeon if you develop any new illnesses, exposure, develop a rash/ open wounds or have additional questions prior to your surgery.    - Did the surgeon's office give you any other special instructions? no  - Did surgeon require any clearances? MC    You will receive a call one business day prior to surgery with an arrival time and hospital directions. If your surgery is scheduled on a Monday, the hospital will be calling you on the Friday prior to your surgery.     Please confirm the visitor policy for the day of your procedure when you receive your phone call with an arrival time.

## 2025-01-14 ENCOUNTER — PATIENT OUTREACH (OUTPATIENT)
Dept: HEMATOLOGY ONCOLOGY | Facility: CLINIC | Age: 85
End: 2025-01-14

## 2025-01-14 NOTE — PROGRESS NOTES
Mo Dai, it's Ed voice again, so sorry to bug you. While we were talking, I got wrapped up in other things thinking about, but we never, I didn't really understand exactly what the risk is. I know there's a risk, but what is it by doing this procedure, by the scraping and all that kind of stuff? So when you have a moment today, if you could give me a call back, I'd really appreciate it. [phone] Thanks and have a good day.

## 2025-01-14 NOTE — PROGRESS NOTES
Kali Dai, this is Ed Araceli. I'm calling in regards to my father Kilo Hope, birth date [birth year]. If you can give me a call back today, I have a couple of questions I was wondering maybe you can help me out with or point me in the right direction. You can reach me at [phone]. Thank you and have a great day    Call back to patient who had questions regarding upcoming surgery and need for home care after surgery completed. Will forward to Dr. Youssef and team.   Also asking the purpose of the TURBT as scheduled, discussed need for pathology and hope to debulk mass. Requested patient discuss direct with Dr. Youssef. Provided direct contact information.

## 2025-01-15 LAB
ATRIAL RATE: 64 BPM
P AXIS: 11 DEGREES
PR INTERVAL: 250 MS
QRS AXIS: -41 DEGREES
QRSD INTERVAL: 126 MS
QT INTERVAL: 404 MS
QTC INTERVAL: 417 MS
T WAVE AXIS: 74 DEGREES
VENTRICULAR RATE: 64 BPM

## 2025-01-17 ENCOUNTER — TELEPHONE (OUTPATIENT)
Dept: ADMINISTRATIVE | Facility: OTHER | Age: 85
End: 2025-01-17

## 2025-01-17 NOTE — TELEPHONE ENCOUNTER
----- Message from Isabell CAMPBELL sent at 1/16/2025  3:36 PM EST -----  Regarding: dm eye  01/16/25 3:36 PM    Hello, our patient Kilo Mix has had Diabetic Eye Exam completed/performed. Please assist in updating the patient chart by pulling the document from the Media Tab. The date of service is 11/05/2024.     Thank you,  Isabell Red MA  [facility]

## 2025-01-20 ENCOUNTER — PATIENT OUTREACH (OUTPATIENT)
Dept: HEMATOLOGY ONCOLOGY | Facility: CLINIC | Age: 85
End: 2025-01-20

## 2025-01-20 DIAGNOSIS — R31.0 GROSS HEMATURIA: ICD-10-CM

## 2025-01-20 DIAGNOSIS — C61 PROSTATE CANCER (HCC): Primary | ICD-10-CM

## 2025-01-20 DIAGNOSIS — D49.4 BLADDER TUMOR: ICD-10-CM

## 2025-01-20 NOTE — PROGRESS NOTES
Kali Dai, this is Ed Araceli calling, calling from my father Kilo. I just wanted to say thank you for reaching out to Doctor Staci 'cause he did call me last week and I did talk to him and whenever all the questions I had for him. I was wondering if you can help me out with another issue. I need to reorder his saline syringes to clean out his tubes and I'm not sure who I need to call to get them reordered so if you can give me a call back and let me know. They were ordered by the whoever did the first surgery to put the tubes in. That's who ordered them. I can send you a picture of the box if you need it, who's on there, But I have enough yet for about probably about 20 days. I think I have about 40 left, so I have enough for about 20 days So I'd like to get them ordered so we have them. So you can reach me at [phone]. Thanks and have a great day

## 2025-01-21 ENCOUNTER — HOSPITAL ENCOUNTER (OUTPATIENT)
Facility: HOSPITAL | Age: 85
Setting detail: OUTPATIENT SURGERY
Discharge: HOME | End: 2025-01-21
Attending: UROLOGY | Admitting: UROLOGY
Payer: MEDICARE

## 2025-01-21 ENCOUNTER — ANESTHESIA EVENT (OUTPATIENT)
Dept: PERIOP | Facility: HOSPITAL | Age: 85
End: 2025-01-21
Payer: MEDICARE

## 2025-01-21 ENCOUNTER — TELEPHONE (OUTPATIENT)
Dept: UROLOGY | Facility: MEDICAL CENTER | Age: 85
End: 2025-01-21

## 2025-01-21 ENCOUNTER — ANESTHESIA (OUTPATIENT)
Dept: PERIOP | Facility: HOSPITAL | Age: 85
End: 2025-01-21
Payer: MEDICARE

## 2025-01-21 VITALS
TEMPERATURE: 97.6 F | SYSTOLIC BLOOD PRESSURE: 130 MMHG | DIASTOLIC BLOOD PRESSURE: 74 MMHG | BODY MASS INDEX: 25.31 KG/M2 | WEIGHT: 167 LBS | HEART RATE: 58 BPM | OXYGEN SATURATION: 98 % | HEIGHT: 68 IN | RESPIRATION RATE: 16 BRPM

## 2025-01-21 DIAGNOSIS — C61 PROSTATE CANCER (HCC): ICD-10-CM

## 2025-01-21 DIAGNOSIS — D49.4 BLADDER TUMOR: ICD-10-CM

## 2025-01-21 DIAGNOSIS — R31.0 GROSS HEMATURIA: ICD-10-CM

## 2025-01-21 LAB
GLUCOSE SERPL-MCNC: 114 MG/DL (ref 65–140)
GLUCOSE SERPL-MCNC: 90 MG/DL (ref 65–140)

## 2025-01-21 PROCEDURE — 82948 REAGENT STRIP/BLOOD GLUCOSE: CPT

## 2025-01-21 PROCEDURE — NC001 PR NO CHARGE: Performed by: UROLOGY

## 2025-01-21 PROCEDURE — 52235 CYSTOSCOPY AND TREATMENT: CPT | Performed by: UROLOGY

## 2025-01-21 PROCEDURE — 88307 TISSUE EXAM BY PATHOLOGIST: CPT | Performed by: STUDENT IN AN ORGANIZED HEALTH CARE EDUCATION/TRAINING PROGRAM

## 2025-01-21 RX ORDER — ONDANSETRON 2 MG/ML
4 INJECTION INTRAMUSCULAR; INTRAVENOUS ONCE AS NEEDED
Status: DISCONTINUED | OUTPATIENT
Start: 2025-01-21 | End: 2025-01-21 | Stop reason: HOSPADM

## 2025-01-21 RX ORDER — MIDAZOLAM HYDROCHLORIDE 2 MG/2ML
INJECTION, SOLUTION INTRAMUSCULAR; INTRAVENOUS AS NEEDED
Status: DISCONTINUED | OUTPATIENT
Start: 2025-01-21 | End: 2025-01-21

## 2025-01-21 RX ORDER — FENTANYL CITRATE/PF 50 MCG/ML
25 SYRINGE (ML) INJECTION
Status: DISCONTINUED | OUTPATIENT
Start: 2025-01-21 | End: 2025-01-21 | Stop reason: HOSPADM

## 2025-01-21 RX ORDER — FENTANYL CITRATE 50 UG/ML
INJECTION, SOLUTION INTRAMUSCULAR; INTRAVENOUS AS NEEDED
Status: DISCONTINUED | OUTPATIENT
Start: 2025-01-21 | End: 2025-01-21

## 2025-01-21 RX ORDER — DIPHENHYDRAMINE HYDROCHLORIDE 50 MG/ML
12.5 INJECTION INTRAMUSCULAR; INTRAVENOUS ONCE AS NEEDED
Status: DISCONTINUED | OUTPATIENT
Start: 2025-01-21 | End: 2025-01-21 | Stop reason: HOSPADM

## 2025-01-21 RX ORDER — ALBUTEROL SULFATE 0.83 MG/ML
2.5 SOLUTION RESPIRATORY (INHALATION) ONCE AS NEEDED
Status: DISCONTINUED | OUTPATIENT
Start: 2025-01-21 | End: 2025-01-21 | Stop reason: HOSPADM

## 2025-01-21 RX ORDER — SODIUM CHLORIDE, SODIUM LACTATE, POTASSIUM CHLORIDE, CALCIUM CHLORIDE 600; 310; 30; 20 MG/100ML; MG/100ML; MG/100ML; MG/100ML
INJECTION, SOLUTION INTRAVENOUS CONTINUOUS PRN
Status: DISCONTINUED | OUTPATIENT
Start: 2025-01-21 | End: 2025-01-21

## 2025-01-21 RX ORDER — HYDROCODONE BITARTRATE AND ACETAMINOPHEN 5; 325 MG/1; MG/1
1 TABLET ORAL EVERY 6 HOURS PRN
Status: DISCONTINUED | OUTPATIENT
Start: 2025-01-21 | End: 2025-01-21 | Stop reason: HOSPADM

## 2025-01-21 RX ORDER — IBUPROFEN 600 MG/1
600 TABLET, FILM COATED ORAL EVERY 6 HOURS PRN
Qty: 30 TABLET | Refills: 0 | Status: SHIPPED | OUTPATIENT
Start: 2025-01-21

## 2025-01-21 RX ORDER — ONDANSETRON 2 MG/ML
INJECTION INTRAMUSCULAR; INTRAVENOUS AS NEEDED
Status: DISCONTINUED | OUTPATIENT
Start: 2025-01-21 | End: 2025-01-21

## 2025-01-21 RX ORDER — MAGNESIUM HYDROXIDE 1200 MG/15ML
LIQUID ORAL AS NEEDED
Status: DISCONTINUED | OUTPATIENT
Start: 2025-01-21 | End: 2025-01-21 | Stop reason: HOSPADM

## 2025-01-21 RX ORDER — OXYBUTYNIN CHLORIDE 5 MG/1
5 TABLET ORAL 3 TIMES DAILY
Status: DISCONTINUED | OUTPATIENT
Start: 2025-01-21 | End: 2025-01-21 | Stop reason: HOSPADM

## 2025-01-21 RX ORDER — LIDOCAINE HYDROCHLORIDE 10 MG/ML
INJECTION, SOLUTION EPIDURAL; INFILTRATION; INTRACAUDAL; PERINEURAL AS NEEDED
Status: DISCONTINUED | OUTPATIENT
Start: 2025-01-21 | End: 2025-01-21

## 2025-01-21 RX ORDER — PROPOFOL 10 MG/ML
INJECTION, EMULSION INTRAVENOUS AS NEEDED
Status: DISCONTINUED | OUTPATIENT
Start: 2025-01-21 | End: 2025-01-21

## 2025-01-21 RX ADMIN — OXYBUTYNIN CHLORIDE 5 MG: 5 TABLET ORAL at 09:03

## 2025-01-21 RX ADMIN — SODIUM CHLORIDE, SODIUM LACTATE, POTASSIUM CHLORIDE, AND CALCIUM CHLORIDE: .6; .31; .03; .02 INJECTION, SOLUTION INTRAVENOUS at 09:26

## 2025-01-21 RX ADMIN — ONDANSETRON 4 MG: 2 INJECTION INTRAMUSCULAR; INTRAVENOUS at 08:06

## 2025-01-21 RX ADMIN — MIDAZOLAM 2 MG: 1 INJECTION INTRAMUSCULAR; INTRAVENOUS at 07:30

## 2025-01-21 RX ADMIN — FENTANYL CITRATE 25 MCG: 50 INJECTION, SOLUTION INTRAMUSCULAR; INTRAVENOUS at 07:45

## 2025-01-21 RX ADMIN — LIDOCAINE HYDROCHLORIDE 50 MG: 10 SOLUTION INTRAVENOUS at 07:36

## 2025-01-21 RX ADMIN — GENTAMICIN SULFATE 160 MG: 40 INJECTION, SOLUTION INTRAMUSCULAR; INTRAVENOUS at 07:40

## 2025-01-21 RX ADMIN — PROPOFOL 150 MG: 10 INJECTION, EMULSION INTRAVENOUS at 07:36

## 2025-01-21 RX ADMIN — FENTANYL CITRATE 25 MCG: 50 INJECTION, SOLUTION INTRAMUSCULAR; INTRAVENOUS at 07:47

## 2025-01-21 RX ADMIN — FENTANYL CITRATE 50 MCG: 50 INJECTION, SOLUTION INTRAMUSCULAR; INTRAVENOUS at 08:06

## 2025-01-21 RX ADMIN — SODIUM CHLORIDE, SODIUM LACTATE, POTASSIUM CHLORIDE, AND CALCIUM CHLORIDE: .6; .31; .03; .02 INJECTION, SOLUTION INTRAVENOUS at 07:08

## 2025-01-21 NOTE — H&P
H&P - Urology   Name: Kilo Mix 84 y.o. male I MRN: [id number]  Unit/Bed#: OR POOL I Date of Admission: 1/21/2025   Date of Service: 1/21/2025 I Hospital Day: 0     Assessment & Plan   This is a 84 y.o. year old male here for TURBT, and he is stable and optimized for his procedure.    History of Present Illness    Kilo Mix is a 84 y.o. year old male who presents for TURBT for bladder tumor, suspect extension of prostate cancer    REVIEW OF SYSTEMS: Per the HPI, and otherwise unremarkable.    Historical Information   Past Medical History:   Diagnosis Date    Cancer (HCC)     Diabetes mellitus (HCC)     Diverticulitis of colon     Hyperlipidemia     Hypertension     Migraines     Myocardial infarction (HCC)     about 30yrs ago    Prostate cancer (HCC)     Seasonal allergies      Past Surgical History:   Procedure Laterality Date    CARDIAC SURGERY      CAROTID ENDARTARECTOMY Right     thromboendarterectomy    CAROTID STENT Right     CATARACT EXTRACTION Left     COLONOSCOPY      fiberoptic screening 2/6/08 5 yr / complete 3/15/13 5 yr    CORONARY ANGIOPLASTY WITH STENT PLACEMENT      ELBOW SURGERY      EYE SURGERY      IR BIOPSY LYMPH NODE  12/24/2024    IR NEPHROSTOMY TUBE PLACEMENT  12/24/2024    KNEE SURGERY      PROSTATE SURGERY      SUPRAPUBIC PROSTATECTOMY  12/06/1999    UPPER GASTROINTESTINAL ENDOSCOPY       Social History     Tobacco Use    Smoking status: Former     Current packs/day: 0.00     Average packs/day: 3.0 packs/day for 10.0 years (30.0 ttl pk-yrs)     Types: Cigarettes     Start date: 1/1/2000     Quit date: 1/1/2010     Years since quitting: 15.0    Smokeless tobacco: Never   Vaping Use    Vaping status: Never Used   Substance and Sexual Activity    Alcohol use: Not Currently     Comment: rare glass of wine    Drug use: Never    Sexual activity: Not Currently     E-Cigarette/Vaping    E-Cigarette Use Never User      E-Cigarette/Vaping Substances    Nicotine No     Flavoring No      Family  History   Problem Relation Age of Onset    Alcohol abuse Mother     Heart attack Family         MI    Breast cancer Family     No Known Problems Father     No Known Problems Sister     Substance Abuse Son     Colon cancer Neg Hx     Colon polyps Neg Hx        Meds/Allergies     Current Facility-Administered Medications:     gentamicin (GARAMYCIN) 160 mg in sodium chloride 0.9 % 100 mL IVPB, 160 mg, Intravenous, Once  Allergies   Allergen Reactions    Losartan Syncope     Reaction Date: 07Jun2011;     Penicillins Other (See Comments)     As a child so does not recall reaction       Objective :  Temp:  [97.1 °F (36.2 °C)] 97.1 °F (36.2 °C)  HR:  [65] 65  BP: (137)/(78) 137/78  Resp:  [18] 18  SpO2:  [96 %] 96 %  O2 Device: None (Room air)    Physical Exam  Vitals and nursing note reviewed.   Constitutional:       General: He is not in acute distress.     Appearance: He is well-developed.   HENT:      Head: Normocephalic and atraumatic.   Eyes:      Conjunctiva/sclera: Conjunctivae normal.   Cardiovascular:      Rate and Rhythm: Normal rate and regular rhythm.      Heart sounds: No murmur heard.  Pulmonary:      Effort: Pulmonary effort is normal. No respiratory distress.      Breath sounds: Normal breath sounds.   Abdominal:      Palpations: Abdomen is soft.      Tenderness: There is no abdominal tenderness.   Musculoskeletal:         General: No swelling.      Cervical back: Neck supple.   Skin:     General: Skin is warm and dry.      Capillary Refill: Capillary refill takes less than 2 seconds.   Neurological:      Mental Status: He is alert.   Psychiatric:         Mood and Affect: Mood normal.       Gen: NAD  Head: NCAT  CV: RRR  CHEST: Clear  ABD: soft, NT/ND  EXT: no edema

## 2025-01-21 NOTE — DISCHARGE INSTR - AVS FIRST PAGE
Kilo, I resected part of the tumor in your bladder today.  Blood in the urine is expected.  I would restart your Plavix on Friday if not too much bleeding.  Can take ibuprofen as needed for pain.  Drink lots of fluids to stay well hydrated.  Complete the oral antibiotic started preoperatively

## 2025-01-21 NOTE — ANESTHESIA POSTPROCEDURE EVALUATION
Post-Op Assessment Note    CV Status:  Stable    Pain management: satisfactory to patient       Mental Status:  Awake   Hydration Status:  Stable   PONV Controlled:  None   Airway Patency:  Patent     Post Op Vitals Reviewed: Yes    No anethesia notable event occurred.    Staff: Anesthesiologist           Last Filed PACU Vitals:  Vitals Value Taken Time   Temp 97.6 °F (36.4 °C) 01/21/25 0855   Pulse 58 01/21/25 0855   /74 01/21/25 0855   Resp 16 01/21/25 0855   SpO2 98 % 01/21/25 0855       Modified Quan:     Vitals Value Taken Time   Activity 2 01/21/25 0840   Respiration 2 01/21/25 0840   Circulation 2 01/21/25 0840   Consciousness 2 01/21/25 0840   Oxygen Saturation 2 01/21/25 0840     Modified Quan Score: 10

## 2025-01-21 NOTE — ANESTHESIA POSTPROCEDURE EVALUATION
Post-Op Assessment Note    CV Status:  Stable  Pain Score: 0    Pain management: adequate       Mental Status:  Alert and awake   Hydration Status:  Euvolemic   PONV Controlled:  Controlled   Airway Patency:  Patent  Two or more mitigation strategies used for obstructive sleep apnea   Post Op Vitals Reviewed: Yes    No anethesia notable event occurred.    Staff: CRNA       Last Filed PACU Vitals:  Vitals Value Taken Time   Temp 97.3 °F (36.3 °C) 01/21/25 0820   Pulse 65 01/21/25 0823   /65    Resp 16 01/21/25 0823   SpO2 98 % 01/21/25 0823   Vitals shown include unfiled device data.

## 2025-01-21 NOTE — ANESTHESIA PREPROCEDURE EVALUATION
Procedure:  TRANSURETHRAL RESECTION OF BLADDER TUMOR (TURBT) (Bladder)    Relevant Problems   CARDIO   (+) Asymptomatic stenosis of left carotid artery   (+) Coronary artery disease involving native coronary artery of native heart without angina pectoris   (+) 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HCC)   (+) Hypertension   (+) Peripheral vascular disease (HCC)   (+) Recurrent stenosis of right carotid artery   (+) Sinus bradycardia      GI/HEPATIC   (+) Dysphagia      /RENAL   (+) Acute kidney injury (HCC)   (+) CKD stage 3 due to type 2 diabetes mellitus (HCC)   (+) Hydronephrosis   (+) Prostate cancer (HCC)      MUSCULOSKELETAL   (+) Lower back pain   (+) Primary osteoarthritis of left shoulder      NEURO/PSYCH   (+) Diabetic polyneuropathy (HCC)        Physical Exam    Airway    Mallampati score: II  TM Distance: >3 FB  Neck ROM: full     Dental   No notable dental hx     Cardiovascular      Pulmonary      Other Findings        Anesthesia Plan  ASA Score- 3     Anesthesia Type- general with ASA Monitors.         Additional Monitors:     Airway Plan: LMA.           Plan Factors-Exercise tolerance (METS): >4 METS.    Chart reviewed.    Patient summary reviewed.    Patient is not a current smoker.      Obstructive sleep apnea risk education given perioperatively.        Induction- intravenous.    Postoperative Plan-     Perioperative Resuscitation Plan - Level 1 - Full Code.       Informed Consent- Anesthetic plan and risks discussed with patient.  I personally reviewed this patient with the CRNA. Discussed and agreed on the Anesthesia Plan with the CRNA..      NPO Status:  Vitals Value Taken Time   Date of last liquid 01/20/25 01/21/25 0651   Time of last liquid 1800 01/21/25 0651   Date of last solid 01/20/25 01/21/25 0651   Time of last solid 1700 01/21/25 0651

## 2025-01-21 NOTE — OP NOTE
OPERATIVE REPORT  PATIENT NAME: Kilo Mix    :  1940  MRN: [id number]  Pt Location:  OR ROOM 10    SURGERY DATE: 2025    Surgeons and Role:     * Jordan Youssef MD - Primary    Preop Diagnosis:  Gross hematuria [R31.0]  Prostate cancer (HCC) [C61]  Bladder tumor [D49.4]    Post-Op Diagnosis Codes:     * Gross hematuria [R31.0]     * Prostate cancer (HCC) [C61]     * Bladder tumor [D49.4]    Procedure(s):  TRANSURETHRAL RESECTION OF BLADDER TUMOR (TURBT)    Specimen(s):  ID Type Source Tests Collected by Time Destination   1 : Bladder trigone Tissue Urinary Bladder TISSUE EXAM Jordan Youssef MD 2025 [id number]        Estimated Blood Loss:   Minimal    Drains:  Nephrostomy Left 10.2 Fr. (Active)   Number of days: 28       Nephrostomy Right 10.2 Fr. (Active)   Number of days: 28       Anesthesia Type:   General    Operative Indications:  Gross hematuria [R31.0]  Prostate cancer (HCC) [C61]  Bladder tumor [D49.4]      Operative Findings:  Tumor invading entire trigone of bladder and covering both ureteral orifices  Prostate surgically absent      Complications:   None    Procedure and Technique:  PROCEDURE SUMMARY:    The patient was brought to the operating room and anesthesia obtained.  The patient was then placed in the lithotomy position and prepped and draped using standard sterile technique.  All pressure points were carefully padded.  A surgical time-out occurred, antibiotics were administered, and thromboembolism prophylaxis was given.  A 27 F saline resectoscope was inserted into the urethra after dilation of the urethral meatus to 28 F using standard urethral sounds. The urethra and bladder were carefully inspected.     Cystoscopy findings:    Urethra:  Normal  Prostate:  Surgically absent  Bladder:  Lesion identified, characteristics below  Ureteral orifices: Obstructed by tumor   Urachus:   Normal    Lesion characteristics:  -Appearance:    Nodular  -Number of foci:   1  -Aggregate  tumor diameter:  5 cm  -Largest individual tumor:  5 cm  -Location:    Entire trigone and bladder neck  -Clinical stage:   T4 disease with extension of prostate cancer into bladder and perivesical space    Bladder tumor resection:    Using a 26 resectoscope, the trigone and bladder neck were resected and sent for pathology.  Resection craters and surrounding urothelium was electrofulgurated and hemostasis was achieved.  Each section was submitted for pathology as documented above.  The bladder was then observed multiple cycles of filling and emptying hemostasis was deemed to be excellent prior to terminating the procedure.    The right ureteral orifice was unroofed with cutting current only and not fulgurated.  The left ureteral orifice was not identified during the procedure.       I was present for the entire procedure.    Patient Disposition:  PACU              SIGNATURE: Jordan Youssef MD  DATE: January 21, 2025  TIME: 8:09 AM

## 2025-01-22 ENCOUNTER — HOME CARE VISIT (OUTPATIENT)
Dept: HOME HEALTH SERVICES | Facility: HOME HEALTHCARE | Age: 85
End: 2025-01-22

## 2025-01-22 DIAGNOSIS — C67.9 HYDRONEPHROSIS DUE TO OBSTRUCTIVE MALIGNANT NEOPLASM OF BLADDER (HCC): ICD-10-CM

## 2025-01-22 DIAGNOSIS — C67.0 MALIGNANT NEOPLASM OF TRIGONE OF URINARY BLADDER (HCC): ICD-10-CM

## 2025-01-22 DIAGNOSIS — N13.30 HYDRONEPHROSIS DUE TO OBSTRUCTIVE MALIGNANT NEOPLASM OF BLADDER (HCC): ICD-10-CM

## 2025-01-22 DIAGNOSIS — Z93.6 NEPHROSTOMY PRESENT (HCC): ICD-10-CM

## 2025-01-22 RX ORDER — SODIUM CHLORIDE 9 MG/ML
10 INJECTION, SOLUTION INTRAMUSCULAR; INTRAVENOUS; SUBCUTANEOUS DAILY
Qty: 900 ML | Refills: 0 | Status: SHIPPED | OUTPATIENT
Start: 2025-01-22 | End: 2025-04-22

## 2025-01-22 NOTE — TELEPHONE ENCOUNTER
Post Op Note    Kilo Mix is a 84 y.o. male s/p TRANSURETHRAL RESECTION OF BLADDER TUMOR (TURBT) (Bladder) performed 1/21/25.  Kilo Mix is a patient of Dr. Dr. Youssef and is seen at the [place] office.     How would you rate your pain on a scale from 1 to 10, 10 being the worst pain ever? No pain at all  Have you had a fever? No  Have your bowel movements been regular? Yes  Do you have any difficulty urinating? No; b/l neph tubes  Do you have any other questions or concerns that I can address at this time? None at this time    Placed call to pt son Ed per [employer] com consent. Ed stated that pt is doing really well since surgery yesterday. Stated that pt is having no pain and is overall good. Advised that a [facility] referral was placed. Son stated that [facility] was coming out today to assess the pt and advise on further steps. Son was appreciative of all the help and the call. Confirmed that pt has a PO appointment 2/4. Advised if any other questions or concerns to give our office a call.

## 2025-01-22 NOTE — TELEPHONE ENCOUNTER
Upon review of the In Basket request we were able to locate, review, and update the patient chart as requested for Diabetic Eye Exam.    Any additional questions or concerns should be emailed to the Practice Liaisons via the appropriate education email address, please do not reply via In Basket.    Thank you  Ced Hollingsworth MA   [facility]

## 2025-01-22 NOTE — CASE COMMUNICATION
Notification of Assess not Admit    [facility] has assessed your patient for Home Health services and has determined the patient is not eligible for service due to the following  Not Homebound, and Homebound is required by insurance    Patient reports being very active and is difficult to follow weight lifting restrictions. Patient reports no assistive device need and no fatigue or taxing effort to leave the home. Patient reports le aving home frequently. Patient does not wish to pay out of pocket for [facility] services. RN reviewed medications. Patient is taking SMZ/TMP -160 tab AMN every 12 hr 7 days which is not in EPIC. Patient is not taking Plavix until Friday. Needs refill called into [facility] for NSS 10 ml Flushes Has about 20 days left and tube change isn't until end of February. RN reviewed proper Nephrostomy tube care and post op fluid inta ke, diet, pulmonary hygiene, and SS to report. RN reviewed bleeding precautions.

## 2025-01-24 PROCEDURE — 88307 TISSUE EXAM BY PATHOLOGIST: CPT | Performed by: STUDENT IN AN ORGANIZED HEALTH CARE EDUCATION/TRAINING PROGRAM

## 2025-01-27 ENCOUNTER — TELEPHONE (OUTPATIENT)
Age: 85
End: 2025-01-27

## 2025-01-27 ENCOUNTER — TELEPHONE (OUTPATIENT)
Dept: GENETICS | Facility: CLINIC | Age: 85
End: 2025-01-27

## 2025-01-27 NOTE — TELEPHONE ENCOUNTER
I spoke with the patients son. He will call our team after Kilo's visit tomorrow with Hem/Onc to move up future appointment.

## 2025-01-27 NOTE — TELEPHONE ENCOUNTER
Called and spoke with Ed patients son. Informed that Dr. Shankar would like to see the patient tomorrow to go over results. Patient is scheduled for 1/28 @ 9:20am. They are okay with date and time.   
71 yo F pmh of arthritis, fibromyalgia, HTN ambulated to ED c/o sudden onset intermittent LLQ abdominal pain that radiates to the groin since yesterday.  Denies CP, back pain, SOB, fevers/chills, n/v/d, lightheadedness, dizziness, changes in urinary or bowel habits.  A&Ox4, abdomen soft, nondistended, nontender.  Skin w/d/i.  NAD.  VSS.  safety and comfort maintained.  Will continue to monitor.

## 2025-01-28 ENCOUNTER — OFFICE VISIT (OUTPATIENT)
Age: 85
End: 2025-01-28
Payer: MEDICARE

## 2025-01-28 ENCOUNTER — DOCUMENTATION (OUTPATIENT)
Dept: HEMATOLOGY ONCOLOGY | Facility: CLINIC | Age: 85
End: 2025-01-28

## 2025-01-28 ENCOUNTER — TELEPHONE (OUTPATIENT)
Dept: GENETICS | Facility: CLINIC | Age: 85
End: 2025-01-28

## 2025-01-28 VITALS
HEIGHT: 68 IN | BODY MASS INDEX: 25.91 KG/M2 | HEART RATE: 86 BPM | TEMPERATURE: 96.8 F | SYSTOLIC BLOOD PRESSURE: 103 MMHG | RESPIRATION RATE: 16 BRPM | DIASTOLIC BLOOD PRESSURE: 52 MMHG | WEIGHT: 171 LBS | OXYGEN SATURATION: 99 %

## 2025-01-28 DIAGNOSIS — C67.4 MALIGNANT NEOPLASM OF POSTERIOR WALL OF URINARY BLADDER (HCC): ICD-10-CM

## 2025-01-28 DIAGNOSIS — C61 PROSTATE CANCER (HCC): Primary | ICD-10-CM

## 2025-01-28 PROBLEM — D49.4 BLADDER TUMOR: Status: RESOLVED | Noted: 2024-12-30 | Resolved: 2025-01-28

## 2025-01-28 PROCEDURE — 99214 OFFICE O/P EST MOD 30 MIN: CPT | Performed by: INTERNAL MEDICINE

## 2025-01-28 PROCEDURE — 99204 OFFICE O/P NEW MOD 45 MIN: CPT | Performed by: INTERNAL MEDICINE

## 2025-01-28 NOTE — ASSESSMENT & PLAN NOTE
remote h/o prostate cancer, s/p prostatectomy in 1999 now presenting with PSA of 360+ ng/mL with biopsy confirmed metastatic disease to the lymph nodes. PSMA PET results/images personally reviewed. There appears to be more extensive lymphadenopathy including mediastinal, abdomen and pelvis.    This is suggestive of Stage IV disease.     Started on lupron (6-mo) on 1/6/2025 and  Xtandi. Tolerating treatment well. Continue on the same.     His case is to be presented at the next urology tumor board on 2/4.   My personal recommendation would be to continue dual hormonal therapy for the prostate cancer and hold off on chemotherapy - docetaxel.     Strongly recommend proceeding with genetic testing.

## 2025-01-28 NOTE — PROGRESS NOTES
In-basket message received from Suhas Goldberg RN to add patient to the urology [facility] on 2/4/2025. Chart reviewed and prep completed.

## 2025-01-28 NOTE — ASSESSMENT & PLAN NOTE
PSMA PET showed an infiltrative mass in the right posterior bladder, which is less PSMA avid than adenopathy.      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     Discussed overall poor prognosis with MIBC. Stage is unclear due to diffuse LND. I would not recommend surgery for the bladder given stage IV prostate cancer and unclear stage for the bladder CA. We could consider chemoradiation to the bladder mass. Again, plans will be finalized after the tumor board discussion. Rad Onc consult placed for now.

## 2025-01-28 NOTE — PROGRESS NOTES
"Name: Kilo Mix      : 1940      MRN: [id number]  Encounter Provider: Harvey Shankar MD  Encounter Date: 2025   Encounter department: [facility] HEMATOLOG[facility]  :  Assessment & Plan  Prostate cancer (HCC)  remote h/o prostate cancer, s/p prostatectomy in  now presenting with PSA of 360+ ng/mL with biopsy confirmed metastatic disease to the lymph nodes. PSMA PET results/images personally reviewed. There appears to be more extensive lymphadenopathy including mediastinal, abdomen and pelvis.    This is suggestive of Stage IV disease.     Started on lupron (6-mo) on 2025 and  Xtandi. Tolerating treatment well. Continue on the same.     His case is to be presented at the next urology tumor board on .   My personal recommendation would be to continue dual hormonal therapy for the prostate cancer and hold off on chemotherapy - docetaxel.     Strongly recommend proceeding with genetic testing.        Malignant neoplasm of posterior wall of urinary bladder (HCC)  PSMA PET showed an infiltrative mass in the right posterior bladder, which is less PSMA avid than adenopathy.      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     Discussed overall poor prognosis with MIBC. Stage is unclear due to diffuse LND. I would not recommend surgery for the bladder given stage IV prostate cancer and unclear stage for the bladder CA. We could consider chemoradiation to the bladder mass. Again, plans will be finalized after the tumor board discussion. Rad Onc consult placed for now.           History of Present Illness   Chief Complaint   Patient presents with    Follow-up     Kilo Mix is a 84 y.o. male with a prior history of prostate cancer, s/p radical prostatectomy in  for \"high Rocio score prostate cancer\".  He states he took hormone shots prior " to the surgery for a short duration. Other past medical history is also significant for diabetes, CKD stage III, bilateral carotid artery stenosis s/p stents, peripheral vascular disease, hypertension    Patient presented with painless hematuria in November 2024.  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  In the urinary bladder, there is a large hypervascular mass extending into the right perivesicular space with associated bilateral iliac and retroperitoneal lymphadenopathy, and moderate bilateral hydronephrosis leading to occlusion of the distal ureters.    He underwent a cystoscopy on December 16 which showed a large extensive tumor involving the entire trigone of the bladder, with both ureteral orifices obscured by the tumor.  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    Most recent labs are significant for creatinine of 1.7/GFR 35, normal CBC and a  ng/mL.     On December 24, he underwent a CT-guided biopsy of the left para-aortic lymph node. Pathology revealed metastatic prostate cancer.     He also underwent b/l nephrostomy tube placements.     PSMA PET-CT - significant for mediastinal lymphadenopathy, multiple metastatic lymph nodes in the abdomen and pelvis.  An infiltrative mass in the right posterior bladder, which is less PSMA avid than adenopathy.      Started on lupron (6-mo) and Xtandi on 1/6/2025     Underwent a cystoscopy on 1/21/25 - 5cm tumor invading entire trigone of bladder, and bladder neck and covering both ureteral orifices. Underwent TURBT. 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     Pt here today with his son and daughter in law to review path and next steps.     Of note, patient is very active for his age. No family hx of cancers  Genetics referral has been  moved up    Pertinent Medical History   As noted above     Oncology History   Cancer Staging   Prostate cancer (HCC)  Staging form: Prostate, AJCC 8th Edition  - Clinical stage from 12/30/2024: David Duffy1, PSA: 360 - Signed by Harvey Shankar MD on 12/30/2024  Histopathologic type: Adenocarcinoma, NOS  Stage prefix: Recurrence  Prostate specific antigen (PSA) range: 20 or greater  Stage used in treatment planning: Yes  National guidelines used in treatment planning: Yes  Oncology History   Prostate cancer (HCC)   2/1/2019 Initial Diagnosis    Prostate cancer (HCC)     12/30/2024 -  Cancer Staged    Staging form: Prostate, AJCC 8th Edition  - Clinical stage from 12/30/2024: rcT4, cN1, PSA: 360 - Signed by Harvey Shankar MD on 12/30/2024  Histopathologic type: Adenocarcinoma, NOS  Stage prefix: Recurrence  Prostate specific antigen (PSA) range: 20 or greater  Stage used in treatment planning: Yes  National guidelines used in treatment planning: Yes          Review of Systems   Constitutional:  Negative for activity change, appetite change, fatigue, fever and unexpected weight change.   HENT: Negative.     Respiratory: Negative.     Cardiovascular: Negative.    Gastrointestinal: Negative.    Genitourinary:  Positive for hematuria.     Medical History Reviewed by provider this encounter:     .  Current Outpatient Medications on File Prior to Visit   Medication Sig Dispense Refill    amLODIPine (NORVASC) 5 mg tablet Take 1 tablet (5 mg total) by mouth daily 90 tablet 2    ascorbic acid (VITAMIN C) 500 mg tablet Take 500 mg by mouth daily      aspirin 81 MG tablet Take 1 tablet by mouth daily      atorvastatin (LIPITOR) 20 mg tablet Take 1 tablet by mouth once daily 90 tablet 1    B Complex Vitamins (VITAMIN B COMPLEX PO) Take 1 tablet by mouth daily      Cholecalciferol 1000 units CHEW Chew 1 tablet daily      clopidogrel (PLAVIX) 75 mg tablet Take 1 tablet (75 mg total) by mouth daily 90 tablet 1    enzalutamide  "(Xtandi) 40 mg capsule Take 4 capsules (160 mg total) by mouth daily 120 capsule 5    hydroCHLOROthiazide 25 mg tablet Take 1 tablet by mouth once daily 90 tablet 1    ibuprofen (MOTRIN) 600 mg tablet Take 1 tablet (600 mg total) by mouth every 6 (six) hours as needed for mild pain 30 tablet 0    lisinopril (ZESTRIL) 10 mg tablet Take 1 tablet by mouth once daily 90 tablet 2    metFORMIN (GLUCOPHAGE-XR) 500 mg 24 hr tablet Take 1 tablet by mouth once daily 90 tablet 3    sodium chloride, PF, 0.9 % 10 mL by Intracatheter route daily Intracatheter flushing daily. May substitute prefilled syringe with normal saline 10 mL vials, 10 mL syringes, and 18 g blunt needles 900 mL 0    vitamin E, tocopherol, 400 units capsule Take 1 capsule by mouth daily       No current facility-administered medications on file prior to visit.      Social History     Tobacco Use    Smoking status: Former     Current packs/day: 0.00     Average packs/day: 3.0 packs/day for 10.0 years (30.0 ttl pk-yrs)     Types: Cigarettes     Start date: 1/1/2000     Quit date: 1/1/2010     Years since quitting: 15.0    Smokeless tobacco: Never   Vaping Use    Vaping status: Never Used   Substance and Sexual Activity    Alcohol use: Not Currently     Comment: rare glass of wine    Drug use: Never    Sexual activity: Not Currently         Objective   Ht 5' 8\" (1.727 m)   Wt 77.6 kg (171 lb)   BMI 26.00 kg/m²     ECOG   0  Physical Exam  Vitals reviewed.   Constitutional:       Appearance: Normal appearance. He is not toxic-appearing.   Neurological:      Mental Status: He is alert.         Labs: I have reviewed the following labs:  Lab Results   Component Value Date/Time    WBC 8.30 12/24/2024 09:24 AM    RBC 4.18 12/24/2024 09:24 AM    Hemoglobin 13.4 12/24/2024 09:24 AM    Hematocrit 39.3 12/24/2024 09:24 AM    MCV 94 12/24/2024 09:24 AM    MCH 32.1 12/24/2024 09:24 AM    RDW 13.0 12/24/2024 09:24 AM    Platelets 237 12/24/2024 09:24 AM    Segmented % 73 " 12/24/2024 09:24 AM    Lymphocytes % 17 12/24/2024 09:24 AM    Monocytes % 7 12/24/2024 09:24 AM    Eosinophils Relative 2 12/24/2024 09:24 AM    Basophils Relative 1 12/24/2024 09:24 AM    Immature Grans % 0 12/24/2024 09:24 AM    Absolute Neutrophils 6.10 12/24/2024 09:24 AM     Lab Results   Component Value Date/Time    Potassium 4.2 12/17/2024 09:47 AM    Chloride 102 12/17/2024 09:47 AM    CO2 30 12/17/2024 09:47 AM    BUN 31 (H) 12/17/2024 09:47 AM    Creatinine 1.72 (H) 12/17/2024 09:47 AM    Glucose, Fasting 113 (H) 12/17/2024 09:47 AM    Calcium 10.2 12/17/2024 09:47 AM    eGFR 35 12/17/2024 09:47 AM       Radiology Results Review: I personally reviewed the following image studies in PACS and associated radiology reports: CT abdomen/pelvis. My interpretation of the radiology images/reports is: as noted above.    Administrative Statements   I have spent a total time of 45 minutes in caring for this patient on the day of the visit/encounter including Diagnostic results, Prognosis, Risks and benefits of tx options, Patient and family education, Documenting in the medical record, Reviewing / ordering tests, medicine, procedures  , and Obtaining or reviewing history  . Topics discussed with the patient / family include symptom assessment and management, medication review, supportive listening, and anticipatory guidance.

## 2025-01-29 ENCOUNTER — DOCUMENTATION (OUTPATIENT)
Dept: HEMATOLOGY ONCOLOGY | Facility: CLINIC | Age: 85
End: 2025-01-29

## 2025-01-30 ENCOUNTER — TELEPHONE (OUTPATIENT)
Age: 85
End: 2025-01-30

## 2025-01-30 ENCOUNTER — CONSULT (OUTPATIENT)
Dept: GENETICS | Facility: CLINIC | Age: 85
End: 2025-01-30
Payer: MEDICARE

## 2025-01-30 DIAGNOSIS — C61 PROSTATE CANCER (HCC): ICD-10-CM

## 2025-01-30 DIAGNOSIS — C67.9 MALIGNANT NEOPLASM OF URINARY BLADDER, UNSPECIFIED SITE (HCC): Primary | ICD-10-CM

## 2025-01-30 PROCEDURE — 36415 COLL VENOUS BLD VENIPUNCTURE: CPT

## 2025-01-30 PROCEDURE — 96041 GENETIC COUNSELING SVC EA 30: CPT

## 2025-01-30 NOTE — TELEPHONE ENCOUNTER
Called and left a detailed message on Ed's voicemail. I received a message from the genetic counselor that he had questions about his xtandi. I did leave a detailed message explaining the directions and left the hopeline number in case he needs to call back.

## 2025-01-31 ENCOUNTER — DOCUMENTATION (OUTPATIENT)
Dept: HEMATOLOGY ONCOLOGY | Facility: CLINIC | Age: 85
End: 2025-01-31

## 2025-02-03 ENCOUNTER — CONSULT (OUTPATIENT)
Facility: HOSPITAL | Age: 85
End: 2025-02-03
Attending: INTERNAL MEDICINE
Payer: MEDICARE

## 2025-02-03 VITALS
HEART RATE: 89 BPM | WEIGHT: 166.6 LBS | OXYGEN SATURATION: 99 % | TEMPERATURE: 96.6 F | SYSTOLIC BLOOD PRESSURE: 122 MMHG | RESPIRATION RATE: 18 BRPM | DIASTOLIC BLOOD PRESSURE: 68 MMHG | BODY MASS INDEX: 25.33 KG/M2

## 2025-02-03 DIAGNOSIS — C61 PROSTATE CANCER (HCC): ICD-10-CM

## 2025-02-03 DIAGNOSIS — C67.4 MALIGNANT NEOPLASM OF POSTERIOR WALL OF URINARY BLADDER (HCC): Primary | ICD-10-CM

## 2025-02-03 PROCEDURE — 99205 OFFICE O/P NEW HI 60 MIN: CPT | Performed by: RADIOLOGY

## 2025-02-03 PROCEDURE — 99211 OFF/OP EST MAY X REQ PHY/QHP: CPT | Performed by: RADIOLOGY

## 2025-02-03 NOTE — ASSESSMENT & PLAN NOTE
Mr. Mix is a 84 year old man with biochemically recurrent prostate cancer status post radical prostatectomy in 1999 (pathology not available) who presented with elevated PSA< 367.8 on 12/10/2024.  PSMA PET demonstrated a PSMA avid posterior mediastinal lymph node consistent with metastatic prostate cancer.  There were also multiple PSMA avid metastatic lymph nodes in the abdomen and pelvis.  Biopsy of a left paraaortic lymph node was consistent with metastatic carcinoma, metastatic prostate adenocarcinoma.  He has started ADT and enzalutamide.    I reviewed the diagnosis of biochemically recurrent prostate cancer with non-regional lymph node metastases.  Due to the extent of disease, these could not be encompassed in a comprehensive/reasonable radiation field.  I therefore agree with pursuing systemic therapy/ADT as discussed with medical oncology. In this setting, treatments are not curative.  PSA will be monitored to assess response and guide future therapies.    Orders:    Ambulatory Referral to Radiation Oncology

## 2025-02-03 NOTE — PROGRESS NOTES
Kilo Mix 1940 is a 84 y.o. maleWith  Stage IVB Prostate Cancer. He was referred by Dr. Shankar. He is being seen today to discuss Palliative Radiation.       Patient with a history of Prostate Cancer and s/p Prostatectomy in 1999. Had CT scan on 12/3/24 which revealed a large hypervascular bladder mass extending into the right perivesicular space with associated bilateral iliac and retroperitoneal lymphadenopathy consistent with bladder carcinoma. He had a PSA level on 12/10/24 and was 367.880. He saw urology on 12/16/24 and underwent a cystoscopy on 12/16/24 which showed prostatic fossa with tumor extending into trigone of bladder, cobblestone appearance, as well as larger extensive tumor involving entire trigone of bladder and both ureteral orifices obscured by tumor. Bilateral Nephrostomy tubes placed on 12/24/24. He underwent a biopsy of left para aortic lymph node on 12/24/24 and pathology consistent with metastatic prostatic adenocarcinoma. He was evaluated by Medical oncology and received Lupron on 1/6/25 and began Xtandi. He underwent a TRUBT on 1/21/15 and pathology revealed invasive high-grade papillary urothelial carcinoma.         12/3/24 CT abdomen pelvis  IMPRESSION:  Large hypervascular bladder mass extending into the right perivesicular space with associated bilateral iliac and retroperitoneal lymphadenopathy consistent with bladder carcinoma.  Moderate bilateral hydronephrosis due to occlusion of the distal ureters.  Cholelithiasis.  Diverticulosis.  Left adrenal myelolipoma.      12/16/24 Cystoscopy  Findings:  Urethra:                      Normal  Prostate:                    Prostatic fossa with tumor extending into trigone of bladder, cobblestone appearance  Bladder:                     Large extensive tumor involving entire trigone of bladder  Ureteral orifices:       Both ureteral orifices obscured by tumor     Plan:Appearance consistent with locally invasive prostate cancer            12/30/24 Dr. Shankar  Discussed diagnosis, staging. There is hari metastatic disease. PSMA PET later today.   Discussed treatment with ADT + secondary hormonal agent for now  Recommend Lurpon + Apalutamide/Xtandi (based on cost)   If PET shows more bulky disease, would recommend addition of weekly docetaxel. Recheck PSA 3 months after starting anti androgens.   Referral to genetics      12/30/24 PSMA PET  IMPRESSION:     1. PSMA avid posterior mediastinal lymph node most consistent with metastatic prostate cancer  2. Multiple PSMA avid metastatic lymph nodes identified in the abdomen and pelvis  3. Infiltrative appearing right posterior bladder mass. Of note, the mass is less PSMA avid than above-described adenopathy. Therefore, this could represent a separate bladder carcinoma as suspected on urine cytology. Alternative possibility is poorly   differentiated aggressive non-PSMA avid prostate tumor.  4. Asymmetric renal size and activity with bilateral nephrostomy catheters present. The smaller right kidney demonstrating less avid PSMA uptake has possibly sustained injury related to the degree of hydronephrosis prior to stenting  5. Possible left lower lobe nodule, which should be reassessed during the course of follow-up imaging      1/29/25 Dr. Shankar  Started on lupron (6-mo) on 1/6/2025 and Xtandi. Tolerating treatment well. Continue on the same.   His case is to be presented at the next urology tumor board on 2/4.   My personal recommendation would be to continue dual hormonal therapy for the prostate cancer and hold off on chemotherapy - docetaxel.   Discussed overall poor prognosis with MIBC. Stage is unclear due to diffuse LND. I would not recommend surgery for the bladder given stage IV prostate cancer and unclear stage for the bladder CA. We could consider chemoradiation to the bladder mass. Again, plans will be finalized after the tumor board discussion. Rad Onc consult placed for now.       1/29/25  Genetics    Upcomin25 Dr. Youssef  25 Dr. Shankar  25 IR Nephrostomy tube check    Oncology History   Prostate cancer (HCC)   2019 Initial Diagnosis    Prostate cancer (HCC)     2024 Biopsy    Final Diagnosis   A. Lymph Node, left para aortic,  core Biopsy:    -Metastatic carcinoma , immunocytochemically consistent with metastatic prostatic adenocarcinoma      Note:  Tumor cells are  positive for CKC,  prostatic  markers (NKX3.1  , PSAP,) PAX2 ( patchy) and  negative for CK7,  CK20,  urothelial markers (GATA3, thrombomodulin). Controls reacted appropriately            2024 -  Cancer Staged    Staging form: Prostate, AJCC 8th Edition  - Clinical stage from 2024: Stage IVB (rcT4, cN1, cM1, PSA: 360) - Signed by Harvey Shankar MD on 2025  Histopathologic type: Adenocarcinoma, NOS  Stage prefix: Recurrence  Prostate specific antigen (PSA) range: 20 or greater  Stage used in treatment planning: Yes  National guidelines used in treatment planning: Yes       Malignant neoplasm of posterior wall of urinary bladder (HCC)   2025 Initial Diagnosis    Malignant neoplasm of posterior wall of urinary bladder (HCC)     2025 -  Cancer Staged    Staging form: Urinary Bladder, AJCC 8th Edition  - Clinical stage from 2025: Stage Unknown (cT2, cNX) - Signed by Harvey Shankar MD on 2025  Histopathologic type: Adenocarcinoma, NOS  Stage prefix: Initial diagnosis  WHO/ISUP grade (low/high): High Grade  Histologic grading system: 2 grade system           Review of Systems:  Review of Systems   Constitutional:  Positive for fatigue.   HENT: Negative.     Eyes: Negative.    Respiratory: Negative.     Cardiovascular: Negative.    Gastrointestinal: Negative.    Endocrine: Negative.    Genitourinary:  Positive for decreased urine volume (right sided produces less urine in nephrostomy than less) and hematuria (son reports some blood intermittent blood when urinates and some times  blood in nephrostomy). Negative for dysuria.        Bilateral nephrostomy tubes   Musculoskeletal: Negative.    Skin: Negative.    Allergic/Immunologic: Negative.    Neurological:  Positive for light-headedness (intermittently).   Hematological:  Bruises/bleeds easily.   Psychiatric/Behavioral: Negative.           Prior Radiation no    Teaching NCI RT packet given    MST done    Implantable Devices (Port, pacemaker, pain stimulator) bilateral nephrostomy tubes    Hip Replacement: no    Health Maintenance   Topic Date Due    Zoster Vaccine (1 of 2) Never done    RSV Vaccine for Pregnant Patients and Patients Age 60+ Years (1 - 1-dose 75+ series) Never done    BMI: Followup Plan  04/07/2024    PT PLAN OF CARE  05/19/2024    COVID-19 Vaccine (4 - 2024-25 season) 09/01/2024    Kidney Health Evaluation: Albumin/Creatinine Ratio  02/19/2025    HEMOGLOBIN A1C  07/06/2025    Fall Risk  09/10/2025    Medicare Annual Wellness Visit (AWV)  09/10/2025    Diabetic Foot Exam  09/10/2025    Kidney Health Evaluation: GFR  12/17/2025    BMI: Adult  01/28/2026    Depression Screening  02/03/2026    DM Eye Exam  11/05/2026    Pneumococcal Vaccine: 65+ Years  Completed    Influenza Vaccine  Completed    Meningococcal B Vaccine  Aged Out    RSV Vaccine age 0-20 Months  Aged Out    HIB Vaccine  Aged Out    IPV Vaccine  Aged Out    Hepatitis A Vaccine  Aged Out    Meningococcal ACWY Vaccine  Aged Out    HPV Vaccine  Aged Out       Past Medical History:   Diagnosis Date    Cancer (HCC)     Diabetes mellitus (HCC)     Diverticulitis of colon     Hyperlipidemia     Hypertension     Migraines     Myocardial infarction (HCC)     about 30yrs ago    Prostate cancer (HCC)     Seasonal allergies        Past Surgical History:   Procedure Laterality Date    CARDIAC SURGERY      CAROTID ENDARTARECTOMY Right     thromboendarterectomy    CAROTID STENT Right     CATARACT EXTRACTION Left     COLONOSCOPY      fiberoptic screening 2/6/08 5 yr / complete  3/15/13 6 yr    CORONARY ANGIOPLASTY WITH STENT PLACEMENT      ELBOW SURGERY      EYE SURGERY      IR BIOPSY LYMPH NODE  12/24/2024    IR NEPHROSTOMY TUBE PLACEMENT  12/24/2024    KNEE SURGERY      DE CYSTO W/REMOVAL OF LESIONS SMALL N/A 1/21/2025    Procedure: TRANSURETHRAL RESECTION OF BLADDER TUMOR (TURBT);  Surgeon: Jordan Youssef MD;  Location:  MAIN OR;  Service: Urology    PROSTATE SURGERY      SUPRAPUBIC PROSTATECTOMY  12/06/1999    UPPER GASTROINTESTINAL ENDOSCOPY         Family History   Problem Relation Age of Onset    Alcohol abuse Mother     Heart attack Family         MI    Breast cancer Family     No Known Problems Father     No Known Problems Sister     Substance Abuse Son     Colon cancer Neg Hx     Colon polyps Neg Hx        Social History     Tobacco Use    Smoking status: Former     Current packs/day: 0.00     Average packs/day: 3.0 packs/day for 10.0 years (30.0 ttl pk-yrs)     Types: Cigarettes     Start date: 1/1/2000     Quit date: 1/1/2010     Years since quitting: 15.1    Smokeless tobacco: Never   Vaping Use    Vaping status: Never Used   Substance Use Topics    Alcohol use: Not Currently     Comment: rare glass of wine    Drug use: Never          Current Outpatient Medications:     amLODIPine (NORVASC) 5 mg tablet, Take 1 tablet (5 mg total) by mouth daily, Disp: 90 tablet, Rfl: 2    ascorbic acid (VITAMIN C) 500 mg tablet, Take 500 mg by mouth daily, Disp: , Rfl:     aspirin 81 MG tablet, Take 1 tablet by mouth daily, Disp: , Rfl:     atorvastatin (LIPITOR) 20 mg tablet, Take 1 tablet by mouth once daily, Disp: 90 tablet, Rfl: 1    B Complex Vitamins (VITAMIN B COMPLEX PO), Take 1 tablet by mouth daily, Disp: , Rfl:     Cholecalciferol 1000 units CHEW, Chew 1 tablet daily, Disp: , Rfl:     clopidogrel (PLAVIX) 75 mg tablet, Take 1 tablet (75 mg total) by mouth daily, Disp: 90 tablet, Rfl: 1    enzalutamide (Xtandi) 40 mg capsule, Take 4 capsules (160 mg total) by mouth daily, Disp:  120 capsule, Rfl: 5    hydroCHLOROthiazide 25 mg tablet, Take 1 tablet by mouth once daily, Disp: 90 tablet, Rfl: 1    lisinopril (ZESTRIL) 10 mg tablet, Take 1 tablet by mouth once daily, Disp: 90 tablet, Rfl: 2    metFORMIN (GLUCOPHAGE-XR) 500 mg 24 hr tablet, Take 1 tablet by mouth once daily, Disp: 90 tablet, Rfl: 3    sodium chloride, PF, 0.9 %, 10 mL by Intracatheter route daily Intracatheter flushing daily. May substitute prefilled syringe with normal saline 10 mL vials, 10 mL syringes, and 18 g blunt needles, Disp: 900 mL, Rfl: 0    vitamin E, tocopherol, 400 units capsule, Take 1 capsule by mouth daily, Disp: , Rfl:     ibuprofen (MOTRIN) 600 mg tablet, Take 1 tablet (600 mg total) by mouth every 6 (six) hours as needed for mild pain (Patient not taking: Reported on 2/3/2025), Disp: 30 tablet, Rfl: 0    Allergies   Allergen Reactions    Losartan Syncope     Reaction Date: [id number];     Penicillins Other (See Comments)     As a child so does not recall reaction        Vitals:    02/03/25 0951   BP: 122/68   Pulse: 89   Resp: 18   Temp: (!) 96.6 °F (35.9 °C)   SpO2: 99%   Weight: 75.6 kg (166 lb 9.6 oz)       Pain Score: 0-No pain

## 2025-02-03 NOTE — PROGRESS NOTES
Consultation Visit   Name: Kilo Mix      : 1940      MRN: [id number]  Encounter Provider: Da Serrato MD  Encounter Date: 2/3/2025   Encounter department: On license of [facility] RADIATION ONCOLOGY  :  Assessment & Plan  Prostate cancer (HCC)  Mr. Mix is a 84 year old man with biochemically recurrent prostate cancer status post radical prostatectomy in  (pathology not available) who presented with elevated PSA< 367.8 on 12/10/2024.  PSMA PET demonstrated a PSMA avid posterior mediastinal lymph node consistent with metastatic prostate cancer.  There were also multiple PSMA avid metastatic lymph nodes in the abdomen and pelvis.  Biopsy of a left paraaortic lymph node was consistent with metastatic carcinoma, metastatic prostate adenocarcinoma.  He has started ADT and enzalutamide.    I reviewed the diagnosis of biochemically recurrent prostate cancer with non-regional lymph node metastases.  Due to the extent of disease, these could not be encompassed in a comprehensive/reasonable radiation field.  I therefore agree with pursuing systemic therapy/ADT as discussed with medical oncology. In this setting, treatments are not curative.  PSA will be monitored to assess response and guide future therapies.    Orders:    Ambulatory Referral to Radiation Oncology    Malignant neoplasm of posterior wall of urinary bladder (HCC)  Mr. Mix has a history of prostate cancer (see above).  He also presented with hematuria and urine cytology on 2024 was suspicious for high grade urothelial carcinoma.  CT abdomen and pelvis demonstrated a large hypervascular bladder mass extending into the right perivesicualr space with associated bilateral iliac and retroperitoneal lymphadenopathy.  There was bilateral hydronephrosis secondary to obstruction of the distal ureters.  He underwent nephrostomy tube placement and TURBT.  Pathology from the TURBT confirmed muscle-invasive high-grade papillary  urothelial carcinoma.      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  This is at least stage II pT2 disease.  The degree of lymph node involvement is not clear due to concurrent prostate cancer, although biopsy of a paraaortic lymph node was consistent with prostate adenocarcinoma.      I reviewed treatment options for muscle invasive bladder cancer which include neoadjuvant chemotherapy followed by cystectomy for operative candidates or trimodal treatment with maximal TURBT followed by chemoradiation.  Optimal candidates for bladder preservation include those with intact bladder function without hydronephrosis or extensive in situ disease.  As he is not an operative candidate, radiotherapy to the bladder could still be considered; although the extent and durability of disease response cannot be guaranteed.  In general, radiation is combined with concurrent chemotherapy.  Radiation typically would take place over the course of at least 20 fractions, delivered M-F.    I discussed the logistics of radiotherapy treatment planning including the need for CT simulation with immobilization and tattoo marking.  He is unable to fill his bladder due to nephrostomy tubes.      We discussed the potential acute and late side effects of radiation, which included, but are not limited to fatigue, skin reaction, urinary frequency, incontinence, dysuria, soft/loose stools, diarrhea, cystitis, proctitis, rectal bleeding, bowel injury, fistulas/adhesions, sexual dysfunction, and secondary malignancy.     FDG PET was ordered today to aid in identifying hari disease related to bladder cancer versus prostate cancer.  This would likely influence future radiotherapy treatment field design.    Going forward, multidisciplinary discussion will occur tomorrow, 2/4/2025.  He will be contacted with the group's consensus treatment recommendation.  He will proceed with PET/CT.  All  questions were answered to his satisfaction.  Orders:    Ambulatory Referral to Radiation Oncology    NM PET CT skull base to mid thigh; Future        History of Present Illness   Mr. Mix is a 84 year old man with the above history.    He has a remote history of prostate cancer managed with radical prostatectomy in 1999 at [facility] (pathology  not available for review). CT on 12/3/24 revealed a large hypervascular bladder mass extending into the right perivesicular space with associated bilateral iliac and retroperitoneal lymphadenopathy consistent with bladder carcinoma. PSA on 12/10/24 was 367.880. Cystoscopy on 12/16/24 showed tumor extending into trigone of bladder, cobblestone appearance, as well as larger extensive tumor involving entire trigone of bladder and both ureteral orifices obscured by tumor. Bilateral nephrostomy tubes were placed on 12/24/24. He underwent a biopsy of a left paraaortic lymph node on 12/24/24 and pathology was consistent with metastatic prostatic adenocarcinoma. He was evaluated by medical oncology and received Lupron on 1/6/25.  He has also started enzalutamide. He underwent a TURBT on 1/21/15 and pathology revealed invasive high-grade papillary urothelial carcinoma.     12/3/24 CT abdomen pelvis  IMPRESSION:  Large hypervascular bladder mass extending into the right perivesicular space with associated bilateral iliac and retroperitoneal lymphadenopathy consistent with bladder carcinoma.  Moderate bilateral hydronephrosis due to occlusion of the distal ureters.  Cholelithiasis.  Diverticulosis.  Left adrenal myelolipoma.    12/16/24 Cystoscopy  Findings:  Urethra:                      Normal  Prostate:                    Prostatic fossa with tumor extending into trigone of bladder, cobblestone appearance  Bladder:                     Large extensive tumor involving entire trigone of bladder  Ureteral orifices:       Both ureteral orifices obscured by tumor      Plan:Appearance consistent with locally invasive prostate cancer     12/30/24 Dr. Shankar  Discussed diagnosis, staging. There is hari metastatic disease. PSMA PET later today.   Discussed treatment with ADT + secondary hormonal agent for now  Recommend Lurpon + Apalutamide/Xtandi (based on cost)   If PET shows more bulky disease, would recommend addition of weekly docetaxel. Recheck PSA 3 months after starting anti androgens.   Referral to genetics    12/30/24 PSMA PET  IMPRESSION:     1. PSMA avid posterior mediastinal lymph node most consistent with metastatic prostate cancer  2. Multiple PSMA avid metastatic lymph nodes identified in the abdomen and pelvis  3. Infiltrative appearing right posterior bladder mass. Of note, the mass is less PSMA avid than above-described adenopathy. Therefore, this could represent a separate bladder carcinoma as suspected on urine cytology. Alternative possibility is poorly   differentiated aggressive non-PSMA avid prostate tumor.  4. Asymmetric renal size and activity with bilateral nephrostomy catheters present. The smaller right kidney demonstrating less avid PSMA uptake has possibly sustained injury related to the degree of hydronephrosis prior to stenting  5. Possible left lower lobe nodule, which should be reassessed during the course of follow-up imaging     1/29/25 Dr. Shankar  Started on lupron (6-mo) on 1/6/2025 and Xtandi. Tolerating treatment well. Continue on the same.   His case is to be presented at the next urology tumor board on 2/4.   My personal recommendation would be to continue dual hormonal therapy for the prostate cancer and hold off on chemotherapy - docetaxel.   Discussed overall poor prognosis with MIBC. Stage is unclear due to diffuse LND. I would not recommend surgery for the bladder given stage IV prostate cancer and unclear stage for the bladder CA. We could consider chemoradiation to the bladder mass. Again, plans will be finalized after the tumor board  discussion. Rad Onc consult placed for now.      25 Genetics     Upcomin25 Dr. Youssef  25 Dr. Shankar  25 IR Nephrostomy tube check    Upon interview, he endorses the above history.  Nephrostomy tubes are functioning well, denies blood.  He has small volume urine output from bladder.  He denies pain.  He has mild fatigue and hot flashes since beginning ADT.  He has regular bowel movements.  He is without additional acute concerns.    Oncology History   Cancer Staging   Malignant neoplasm of posterior wall of urinary bladder (HCC)  Staging form: Urinary Bladder, AJCC 8th Edition  - Clinical stage from 2025: Stage Unknown (cT2, cNX) - Signed by Harvey Shankar MD on 2025  Histopathologic type: Adenocarcinoma, NOS  Stage prefix: Initial diagnosis  WHO/ISUP grade (low/high): High Grade  Histologic grading system: 2 grade system    Prostate cancer (HCC)  Staging form: Prostate, AJCC 8th Edition  - Clinical stage from 2024: Stage IVB (rcT4, cN1, cM1, PSA: 360) - Signed by Harvey Shankar MD on 2025  Histopathologic type: Adenocarcinoma, NOS  Stage prefix: Recurrence  Prostate specific antigen (PSA) range: 20 or greater  Stage used in treatment planning: Yes  National guidelines used in treatment planning: Yes  Oncology History   Prostate cancer ([facility])   2019 Initial Diagnosis    Prostate cancer ([facility])     2024 Biopsy    Final Diagnosis   A. Lymph Node, left para aortic,  core Biopsy:    -Metastatic carcinoma , immunocytochemically consistent with metastatic prostatic adenocarcinoma      Note:  Tumor cells are  positive for CKC,  prostatic  markers (NKX3.1  , PSAP,) PAX2 ( patchy) and  negative for CK7,  CK20,  urothelial markers (GATA3, thrombomodulin). Controls reacted appropriately            2024 -  Cancer Staged    Staging form: Prostate, AJCC 8th Edition  - Clinical stage from 2024: Stage IVB (rcT4, cN1, cM1, PSA: 360) - Signed by Harvey Sahnkar MD on  1/28/2025  Histopathologic type: Adenocarcinoma, NOS  Stage prefix: Recurrence  Prostate specific antigen (PSA) range: 20 or greater  Stage used in treatment planning: Yes  National guidelines used in treatment planning: Yes       Malignant neoplasm of posterior wall of urinary bladder (HCC)   1/28/2025 Initial Diagnosis    Malignant neoplasm of posterior wall of urinary bladder (HCC)     1/28/2025 -  Cancer Staged    Staging form: Urinary Bladder, AJCC 8th Edition  - Clinical stage from 1/28/2025: Stage Unknown (cT2, cNX) - Signed by Harvey Shankar MD on 1/28/2025  Histopathologic type: Adenocarcinoma, NOS  Stage prefix: Initial diagnosis  WHO/ISUP grade (low/high): High Grade  Histologic grading system: 2 grade system          Review of Systems Refer to nursing note.      Current Outpatient Medications:     amLODIPine (NORVASC) 5 mg tablet, Take 1 tablet (5 mg total) by mouth daily, Disp: 90 tablet, Rfl: 2    ascorbic acid (VITAMIN C) 500 mg tablet, Take 500 mg by mouth daily, Disp: , Rfl:     aspirin 81 MG tablet, Take 1 tablet by mouth daily, Disp: , Rfl:     atorvastatin (LIPITOR) 20 mg tablet, Take 1 tablet by mouth once daily, Disp: 90 tablet, Rfl: 1    B Complex Vitamins (VITAMIN B COMPLEX PO), Take 1 tablet by mouth daily, Disp: , Rfl:     Cholecalciferol 1000 units CHEW, Chew 1 tablet daily, Disp: , Rfl:     clopidogrel (PLAVIX) 75 mg tablet, Take 1 tablet (75 mg total) by mouth daily, Disp: 90 tablet, Rfl: 1    enzalutamide (Xtandi) 40 mg capsule, Take 4 capsules (160 mg total) by mouth daily, Disp: 120 capsule, Rfl: 5    hydroCHLOROthiazide 25 mg tablet, Take 1 tablet by mouth once daily, Disp: 90 tablet, Rfl: 1    lisinopril (ZESTRIL) 10 mg tablet, Take 1 tablet by mouth once daily, Disp: 90 tablet, Rfl: 2    metFORMIN (GLUCOPHAGE-XR) 500 mg 24 hr tablet, Take 1 tablet by mouth once daily, Disp: 90 tablet, Rfl: 3    sodium chloride, PF, 0.9 %, 10 mL by Intracatheter route daily Intracatheter flushing  daily. May substitute prefilled syringe with normal saline 10 mL vials, 10 mL syringes, and 18 g blunt needles, Disp: 900 mL, Rfl: 0    vitamin E, tocopherol, 400 units capsule, Take 1 capsule by mouth daily, Disp: , Rfl:     ibuprofen (MOTRIN) 600 mg tablet, Take 1 tablet (600 mg total) by mouth every 6 (six) hours as needed for mild pain (Patient not taking: Reported on 2/3/2025), Disp: 30 tablet, Rfl: 0       Objective   /68   Pulse 89   Temp (!) 96.6 °F (35.9 °C)   Resp 18   Wt 75.6 kg (166 lb 9.6 oz)   SpO2 99%   BMI 25.33 kg/m²     Pain Screening:  Pain Score: 0-No pain  ECOG  0  Physical Exam  HENT:      Head: Normocephalic and atraumatic.   Eyes:      General: No scleral icterus.     Extraocular Movements: Extraocular movements intact.   Cardiovascular:      Rate and Rhythm: Normal rate.   Pulmonary:      Effort: Pulmonary effort is normal.   Abdominal:      General: Abdomen is flat. There is no distension.   Genitourinary:     Comments: Deferred  Musculoskeletal:         General: Normal range of motion.      Cervical back: Normal range of motion.   Skin:     General: Skin is warm and dry.   Neurological:      General: No focal deficit present.      Mental Status: He is alert.   Psychiatric:         Mood and Affect: Mood normal.            Radiology Results: I have reviewed radiology images/reports described above.     Administrative Statements   I have spent a total time of 55 minutes in caring for this patient on the day of the visit/encounter including Diagnostic results, Prognosis, Risks and benefits of tx options, Patient and family education, Impressions, Counseling / Coordination of care, Documenting in the medical record, Reviewing / ordering tests, medicine, procedures  , and Obtaining or reviewing history  .

## 2025-02-03 NOTE — ASSESSMENT & PLAN NOTE
Mr. Mix has a history of prostate cancer (see above).  He also presented with hematuria and urine cytology on 12/16/2024 was suspicious for high grade urothelial carcinoma.  CT abdomen and pelvis demonstrated a large hypervascular bladder mass extending into the right perivesicualr space with associated bilateral iliac and retroperitoneal lymphadenopathy.  There was bilateral hydronephrosis secondary to obstruction of the distal ureters.  He underwent nephrostomy tube placement and TURBT.  Pathology from the TURBT confirmed muscle-invasive high-grade papillary urothelial carcinoma.      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  This is at least stage II pT2 disease.  The degree of lymph node involvement is not clear due to concurrent prostate cancer, although biopsy of a paraaortic lymph node was consistent with prostate adenocarcinoma.      I reviewed treatment options for muscle invasive bladder cancer which include neoadjuvant chemotherapy followed by cystectomy for operative candidates or trimodal treatment with maximal TURBT followed by chemoradiation.  Optimal candidates for bladder preservation include those with intact bladder function without hydronephrosis or extensive in situ disease.  As he is not an operative candidate, radiotherapy to the bladder could still be considered; although the extent and durability of disease response cannot be guaranteed.  In general, radiation is combined with concurrent chemotherapy.  Radiation typically would take place over the course of at least 20 fractions, delivered M-F.    I discussed the logistics of radiotherapy treatment planning including the need for CT simulation with immobilization and tattoo marking.  He is unable to fill his bladder due to nephrostomy tubes.      We discussed the potential acute and late side effects of radiation, which included, but are not limited to fatigue, skin reaction,  urinary frequency, incontinence, dysuria, soft/loose stools, diarrhea, cystitis, proctitis, rectal bleeding, bowel injury, fistulas/adhesions, sexual dysfunction, and secondary malignancy.     FDG PET was ordered today to aid in identifying hari disease related to bladder cancer versus prostate cancer.  This would likely influence future radiotherapy treatment field design.    Going forward, multidisciplinary discussion will occur tomorrow, 2/4/2025.  He will be contacted with the group's consensus treatment recommendation.  He will proceed with PET/CT.  All questions were answered to his satisfaction.  Orders:    Ambulatory Referral to Radiation Oncology    NM PET CT skull base to mid thigh; Future

## 2025-02-04 ENCOUNTER — DOCUMENTATION (OUTPATIENT)
Dept: HEMATOLOGY ONCOLOGY | Facility: CLINIC | Age: 85
End: 2025-02-04

## 2025-02-04 ENCOUNTER — OFFICE VISIT (OUTPATIENT)
Dept: UROLOGY | Facility: MEDICAL CENTER | Age: 85
End: 2025-02-04
Payer: MEDICARE

## 2025-02-04 ENCOUNTER — TELEPHONE (OUTPATIENT)
Age: 85
End: 2025-02-04

## 2025-02-04 VITALS
OXYGEN SATURATION: 95 % | WEIGHT: 169 LBS | SYSTOLIC BLOOD PRESSURE: 142 MMHG | BODY MASS INDEX: 25.61 KG/M2 | DIASTOLIC BLOOD PRESSURE: 82 MMHG | HEIGHT: 68 IN | HEART RATE: 74 BPM

## 2025-02-04 DIAGNOSIS — C61 PROSTATE CANCER (HCC): Primary | ICD-10-CM

## 2025-02-04 DIAGNOSIS — C68.9 UROTHELIAL CARCINOMA (HCC): ICD-10-CM

## 2025-02-04 DIAGNOSIS — N13.39 OTHER HYDRONEPHROSIS: ICD-10-CM

## 2025-02-04 PROCEDURE — 99214 OFFICE O/P EST MOD 30 MIN: CPT | Performed by: UROLOGY

## 2025-02-04 NOTE — ASSESSMENT & PLAN NOTE
Metastatic prostate cancer    - continue on dual ADT with oncology  - will plan for interval repeat PSMA PET scan down the road    Orders:    POCT Measure PVR

## 2025-02-04 NOTE — ASSESSMENT & PLAN NOTE
Bilateral hydronephrosis secondary to bladder tumor s/p bilateral PCN  - continue bilateral PCN for now

## 2025-02-04 NOTE — PROGRESS NOTES
Name: Kilo Mix      : 1940      MRN: [id number]  Encounter Provider: Jordan Youssef MD  Encounter Date: 2025   Encounter department: [facility] UROLOGY [place]  :  Assessment & Plan  Prostate cancer (HCC)  Metastatic prostate cancer    - continue on dual ADT with oncology  - will plan for interval repeat PSMA PET scan down the road    Orders:    POCT Measure PVR    Urothelial carcinoma (HCC)  Muscle invasive bladder cancer, concern to extravesical extension and bilateral ureteral obstruction  - schedule for re-TURBT for maximal debulking prior to consideration of chemoradiation   - will need to hold Plavix again for TURBT  - case request placed and surgical consent signed          Other hydronephrosis  Bilateral hydronephrosis secondary to bladder tumor s/p bilateral PCN  - continue bilateral PCN for now             History of Present Illness   Kilo Mix is a 84 y.o. male who presents for follow up to TURBT that showed muscle-invasive urothelial carcinoma    Discussed pathology results and tumor board discussion    Continue on dual ADT treatment with oncology and plan for repeat TURBT for maximal debulking      Review of Systems   All other systems reviewed and are negative.    Past Medical History   Past Medical History:   Diagnosis Date    Cancer (HCC)     Diabetes mellitus (HCC)     Diverticulitis of colon     Hyperlipidemia     Hypertension     Migraines     Myocardial infarction (HCC)     about 30yrs ago    Prostate cancer (HCC)     Seasonal allergies      Past Surgical History:   Procedure Laterality Date    CARDIAC SURGERY      CAROTID ENDARTARECTOMY Right     thromboendarterectomy    CAROTID STENT Right     CATARACT EXTRACTION Left     COLONOSCOPY      fiberoptic screening 08 6 yr / complete 3/15/13 6 yr    CORONARY ANGIOPLASTY WITH STENT PLACEMENT      ELBOW SURGERY      EYE SURGERY      IR BIOPSY LYMPH NODE  2024    IR NEPHROSTOMY TUBE PLACEMENT   12/24/2024    KNEE SURGERY      SD CYSTO W/REMOVAL OF LESIONS SMALL N/A 1/21/2025    Procedure: TRANSURETHRAL RESECTION OF BLADDER TUMOR (TURBT);  Surgeon: Jordan Youssef MD;  Location:  MAIN OR;  Service: Urology    PROSTATE SURGERY      SUPRAPUBIC PROSTATECTOMY  12/06/1999    UPPER GASTROINTESTINAL ENDOSCOPY       Family History   Problem Relation Age of Onset    Alcohol abuse Mother     Heart attack Family         MI    Breast cancer Family     No Known Problems Father     No Known Problems Sister     Substance Abuse Son     Colon cancer Neg Hx     Colon polyps Neg Hx       reports that he quit smoking about 15 years ago. His smoking use included cigarettes. He started smoking about 25 years ago. He has a 30 pack-year smoking history. He has never used smokeless tobacco. He reports that he does not currently use alcohol. He reports that he does not use drugs.  Current Outpatient Medications on File Prior to Visit   Medication Sig Dispense Refill    amLODIPine (NORVASC) 5 mg tablet Take 1 tablet (5 mg total) by mouth daily 90 tablet 2    ascorbic acid (VITAMIN C) 500 mg tablet Take 500 mg by mouth daily      aspirin 81 MG tablet Take 1 tablet by mouth daily      atorvastatin (LIPITOR) 20 mg tablet Take 1 tablet by mouth once daily 90 tablet 1    B Complex Vitamins (VITAMIN B COMPLEX PO) Take 1 tablet by mouth daily      Cholecalciferol 1000 units CHEW Chew 1 tablet daily      clopidogrel (PLAVIX) 75 mg tablet Take 1 tablet (75 mg total) by mouth daily 90 tablet 1    enzalutamide (Xtandi) 40 mg capsule Take 4 capsules (160 mg total) by mouth daily 120 capsule 5    hydroCHLOROthiazide 25 mg tablet Take 1 tablet by mouth once daily 90 tablet 1    lisinopril (ZESTRIL) 10 mg tablet Take 1 tablet by mouth once daily 90 tablet 2    metFORMIN (GLUCOPHAGE-XR) 500 mg 24 hr tablet Take 1 tablet by mouth once daily 90 tablet 3    sodium chloride, PF, 0.9 % 10 mL by Intracatheter route daily Intracatheter flushing daily.  "May substitute prefilled syringe with normal saline 10 mL vials, 10 mL syringes, and 18 g blunt needles 900 mL 0    vitamin E, tocopherol, 400 units capsule Take 1 capsule by mouth daily      ibuprofen (MOTRIN) 600 mg tablet Take 1 tablet (600 mg total) by mouth every 6 (six) hours as needed for mild pain (Patient not taking: Reported on 2/4/2025) 30 tablet 0     No current facility-administered medications on file prior to visit.     Allergies   Allergen Reactions    Losartan Syncope     Reaction Date: [id number];     Penicillins Other (See Comments)     As a child so does not recall reaction         Objective   /82 (BP Location: Left arm, Patient Position: Sitting, Cuff Size: Adult)   Pulse 74   Ht 5' 8\" (1.727 m)   Wt 76.7 kg (169 lb)   SpO2 95%   BMI 25.70 kg/m²     Physical Exam  Vitals and nursing note reviewed.   Constitutional:       General: He is not in acute distress.     Appearance: He is well-developed.   HENT:      Head: Normocephalic and atraumatic.   Eyes:      Conjunctiva/sclera: Conjunctivae normal.   Cardiovascular:      Rate and Rhythm: Normal rate and regular rhythm.      Heart sounds: No murmur heard.  Pulmonary:      Effort: Pulmonary effort is normal. No respiratory distress.      Breath sounds: Normal breath sounds.   Abdominal:      Palpations: Abdomen is soft.      Tenderness: There is no abdominal tenderness.   Genitourinary:     Comments: Bilateral PCNs  Musculoskeletal:         General: No swelling.      Cervical back: Neck supple.   Skin:     General: Skin is warm and dry.      Capillary Refill: Capillary refill takes less than 2 seconds.   Neurological:      Mental Status: He is alert.   Psychiatric:         Mood and Affect: Mood normal.          Results  Lab Results   Component Value Date    .880 (H) 12/10/2024     Lab Results   Component Value Date    GLUCOSE 142 (H) 08/03/2015    CALCIUM 10.2 12/17/2024     08/03/2015    K 4.2 12/17/2024    CO2 30 12/17/2024 "     12/17/2024    BUN 31 (H) 12/17/2024    CREATININE 1.72 (H) 12/17/2024     Lab Results   Component Value Date    WBC 8.30 12/24/2024    HGB 13.4 12/24/2024    HCT 39.3 12/24/2024    MCV 94 12/24/2024     12/24/2024       Office Urine Dip  No results found for this or any previous visit (from the past hour).]

## 2025-02-04 NOTE — PROGRESS NOTES
Oncology Navigator Note: Multidisciplinary Urology Case Review 2/4/2025    Presenting physician:  Dr. Domonique Shankar, Dr. Serrato     Routed to providers to review note, place orders and notify patient as needed.     Diagnosis: Kilo Mix is a 84 y.o. male who was presented at the Urology Oncology Multidisciplinary Tumor Conference today. remote h/o prostate cancer, s/p prostatectomy in 1999 now presenting with PSA of 360+ ng/mL with biopsy confirmed metastatic disease to the lymph nodes.  PSMA PET showed an infiltrative mass in the right posterior bladder, which is less PSMA avid than adenopathy. 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 Presented for image, pathology review and treatment planning.      Radiology Review:       [x] CT  [] MRI  [x] PET    Pathology Review:     1/21/25 - bladder  12/24/24 - left para aortic lymph node    Recommendations of Group:  Recommend hormone therapy for prostate cancer  Recommend chemotherapy and radiation for bladder cancer   Recommend debulking TURBT  Follow up imaging with interval PSMA PET       Future Appointments   Date Time Provider Department Center   2/3/2025 10:00 AM Da Serrato MD [facility] Rad Onc  HOSPITAL   2/4/2025  4:00 PM Jordan Youssef MD U[facility] AL [facility]   2/13/2025  1:20 PM Harvey Shankar MD HEM ONC [facility]   2/25/2025 11:00 AM [facility] IR 1 [facility] IR  HOSPITAL   3/17/2025  8:40 AM Dolores Vizcarra DO [facility]  P[facility]   6/23/2025  8:00 AM [facility] INF CHAIR 3 [facility] INFUSION [facility]        Patient was discussed at the Multidisciplinary Urology Case review       NCCN guidelines were available for review.    The final treatment plan will be left to the discretion of the patient and the treating physician.     DISCLAIMERS:  TO THE TREATING PHYSICIAN:  This conference is a meeting of clinicians from various specialty  areas who evaluate and discuss patients for whom a multidisciplinary treatment approach is being considered. Please note that the above opinion was a consensus of the conference attendees and is intended only to assist in quality care of the discussed patient.  The responsibility for follow up on the input given during the conference, along with any final decisions regarding plan of care, is that of the patient and the patient's provider. Accordingly, appointments have only been recommended based on this information and have NOT been scheduled unless otherwise noted.      TO THE PATIENT:  This summary is a brief record of major aspects of your cancer treatment. You may choose to share a copy with any of your doctors or nurses. However, this is not a detailed or comprehensive record of your care.    Routing to office staff to review note, place orders and notify patient as needed.

## 2025-02-07 ENCOUNTER — TELEPHONE (OUTPATIENT)
Age: 85
End: 2025-02-07

## 2025-02-10 ENCOUNTER — TELEPHONE (OUTPATIENT)
Age: 85
End: 2025-02-10

## 2025-02-10 ENCOUNTER — DOCUMENTATION (OUTPATIENT)
Dept: HEMATOLOGY ONCOLOGY | Facility: CLINIC | Age: 85
End: 2025-02-10

## 2025-02-10 ENCOUNTER — PREP FOR PROCEDURE (OUTPATIENT)
Dept: UROLOGY | Facility: MEDICAL CENTER | Age: 85
End: 2025-02-10

## 2025-02-10 DIAGNOSIS — R39.89 SUSPECTED UTI: Primary | ICD-10-CM

## 2025-02-10 DIAGNOSIS — C61 PROSTATE CANCER (HCC): ICD-10-CM

## 2025-02-10 DIAGNOSIS — Z01.812 PRE-OPERATIVE LABORATORY EXAMINATION: ICD-10-CM

## 2025-02-10 NOTE — PROGRESS NOTES
Outreached family to indicate that no additional funding is required, as PT met his OOP for RX, per [employer].      Emma Miller MPH  Phone: [phone]  Email: [email or web address]

## 2025-02-10 NOTE — TELEPHONE ENCOUNTER
Case placed by Dr. Youssef for a TURBT.    -pt on Plavix and Aspirin (PCP orders)   -last A1c 1/6 was 5.8    Spoke with patient's son and confirmed surgery date of 3/6  Type of surgery: TURBT  Operating physician:Dr. Youssef  Location of surgery: [facility]    Verbally went over prep with patient on 2/10  NPO  Bowel prep? No  Hospital calls afternoon prior with arrival time (calls Friday afternoon for Monday surgery)  Patient needs ride to and from surgery   outpatient  Pre-op testing to be done 2 weeks prior to surgery. All testing can be done as a walk-in. EKG can only be done as a walk-in at any [facility].  LABS NEEDED uc, cbc, bmp  Blood thinners:   Plavix  Clearances needed: None    EMAILED to patient on 2/10  Copy of packet scanned into Media  Labs in packet and in electronic record   Soap prep in packet  post-op in packet     Medication Suspension of: Plavix  Ordering provider: PCP  Faxed Medication Suspension form on: 2/12  Best fax number: [phone]

## 2025-02-10 NOTE — TELEPHONE ENCOUNTER
Ed is calling to speak to Annetta regarding his dad's xtandi funding.  He stated that he received a letter stating that his funding has been canceled, he is running low on medication.  I offered CTS but he wanted to speak to Annetta.

## 2025-02-13 ENCOUNTER — OFFICE VISIT (OUTPATIENT)
Age: 85
End: 2025-02-13
Payer: MEDICARE

## 2025-02-13 VITALS
WEIGHT: 172 LBS | RESPIRATION RATE: 16 BRPM | DIASTOLIC BLOOD PRESSURE: 67 MMHG | OXYGEN SATURATION: 97 % | HEART RATE: 90 BPM | HEIGHT: 68 IN | TEMPERATURE: 97.2 F | BODY MASS INDEX: 26.07 KG/M2 | SYSTOLIC BLOOD PRESSURE: 113 MMHG

## 2025-02-13 DIAGNOSIS — C67.4 MALIGNANT NEOPLASM OF POSTERIOR WALL OF URINARY BLADDER (HCC): Primary | ICD-10-CM

## 2025-02-13 DIAGNOSIS — C61 PROSTATE CANCER (HCC): ICD-10-CM

## 2025-02-13 PROCEDURE — G2211 COMPLEX E/M VISIT ADD ON: HCPCS | Performed by: INTERNAL MEDICINE

## 2025-02-13 PROCEDURE — 99204 OFFICE O/P NEW MOD 45 MIN: CPT | Performed by: INTERNAL MEDICINE

## 2025-02-13 NOTE — ASSESSMENT & PLAN NOTE
PSMA PET 12/30/24  showed an infiltrative mass in the right posterior bladder, which is less PSMA avid than adenopathy. 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   FDG PET-CT is pending  If no evidence of metastatic ds, recommend chemoradiation  Recommendation at the tumor board was for further maximal debulking prior to RT - scheduled for 3/6/2025  I will see him back after that.

## 2025-02-13 NOTE — PROGRESS NOTES
"Name: Kilo Mix      : 1940      MRN: [id number]  Encounter Provider: Harvey Shankar MD  Encounter Date: 2025   Encounter department: [facility]ATOLOG[facility]  :  Assessment & Plan  Malignant neoplasm of posterior wall of urinary bladder (HCC)  PSMA PET 24  showed an infiltrative mass in the right posterior bladder, which is less PSMA avid than adenopathy. 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   FDG PET-CT is pending  If no evidence of metastatic ds, recommend chemoradiation  Recommendation at the tumor board was for further maximal debulking prior to RT - scheduled for 3/6/2025  I will see him back after that.        Prostate cancer (HCC)  remote h/o prostate cancer, s/p prostatectomy in  now presenting with PSA of 360+ ng/mL with biopsy confirmed metastatic disease to the lymph nodes.   There appears to be more extensive lymphadenopathy including mediastinal, abdomen and pelvis.    This is suggestive of Stage IV disease.   Started on lupron (6-mo) on 2025 and  Xtandi. Tolerating treatment well. Continue on the same.   Labs to be drawn next available.  Follow up PSA in 3 months. Repeat PSMA PET in the future  Genetic testing pending        History of Present Illness   Chief Complaint   Patient presents with    Follow-up     Kilo Mix is a 84 y.o. male with a prior history of prostate cancer, s/p radical prostatectomy in  for \"high [place] score prostate cancer\".  He states he took hormone shots prior to the surgery for a short duration. Other past medical history is also significant for diabetes, CKD stage III, bilateral carotid artery stenosis s/p stents, peripheral vascular disease, hypertension    Patient presented with painless hematuria in 2024.  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  In the urinary bladder, there is a large hypervascular mass extending into the right perivesicular space with associated bilateral iliac and retroperitoneal lymphadenopathy, and moderate bilateral hydronephrosis leading to occlusion of the distal ureters.    He underwent a cystoscopy on December 16 which showed a large extensive tumor involving the entire trigone of the bladder, with both ureteral orifices obscured by the tumor.  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    Most recent labs are significant for creatinine of 1.7/GFR 35, normal CBC and a  ng/mL.     On December 24, he underwent a CT-guided biopsy of the left para-aortic lymph node. Pathology revealed metastatic prostate cancer.     He also underwent b/l nephrostomy tube placements.     PSMA PET-CT - significant for mediastinal lymphadenopathy, multiple metastatic lymph nodes in the abdomen and pelvis.  An infiltrative mass in the right posterior bladder, which is less PSMA avid than adenopathy.      Started on lupron (6-mo) and Xtandi on 1/6/2025     Underwent a cystoscopy on 1/21/25 - 5cm tumor invading entire trigone of bladder, and bladder neck and covering both ureteral orifices. Underwent TURBT. 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     Case presented at  tumor board.    Pertinent Medical History   As noted above     Oncology History   Cancer Staging   Malignant neoplasm of posterior wall of urinary bladder (HCC)  Staging form: Urinary Bladder, AJCC 8th Edition  - Clinical stage from 1/28/2025: Stage Unknown (cT2, cNX) - Signed by Harvey Shankar MD on 1/28/2025  Histopathologic type: Adenocarcinoma, NOS  Stage prefix: Initial diagnosis  WHO/ISUP grade (low/high): High Grade  Histologic grading system: 2 grade  system    Prostate cancer (HCC)  Staging form: Prostate, AJCC 8th Edition  - Clinical stage from 12/30/2024: Stage IVB (rcT4, cN1, cM1, PSA: 360) - Signed by Harvey Shankar MD on 1/28/2025  Histopathologic type: Adenocarcinoma, NOS  Stage prefix: Recurrence  Prostate specific antigen (PSA) range: 20 or greater  Stage used in treatment planning: Yes  National guidelines used in treatment planning: Yes  Oncology History   Prostate cancer (HCC)   2/1/2019 Initial Diagnosis    Prostate cancer (HCC)     12/24/2024 Biopsy    Final Diagnosis   A. Lymph Node, left para aortic,  core Biopsy:    -Metastatic carcinoma , immunocytochemically consistent with metastatic prostatic adenocarcinoma      Note:  Tumor cells are  positive for CKC,  prostatic  markers (NKX3.1  , PSAP,) PAX2 ( patchy) and  negative for CK7,  CK20,  urothelial markers (GATA3, thrombomodulin). Controls reacted appropriately            12/30/2024 -  Cancer Staged    Staging form: Prostate, AJCC 8th Edition  - Clinical stage from 12/30/2024: Stage IVB (rcT4, cN1, cM1, PSA: 360) - Signed by Harvey Shankar MD on 1/28/2025  Histopathologic type: Adenocarcinoma, NOS  Stage prefix: Recurrence  Prostate specific antigen (PSA) range: 20 or greater  Stage used in treatment planning: Yes  National guidelines used in treatment planning: Yes       Malignant neoplasm of posterior wall of urinary bladder (HCC)   1/28/2025 Initial Diagnosis    Malignant neoplasm of posterior wall of urinary bladder (HCC)     1/28/2025 -  Cancer Staged    Staging form: Urinary Bladder, AJCC 8th Edition  - Clinical stage from 1/28/2025: Stage Unknown (cT2, cNX) - Signed by Harvey Shankar MD on 1/28/2025  Histopathologic type: Adenocarcinoma, NOS  Stage prefix: Initial diagnosis  WHO/ISUP grade (low/high): High Grade  Histologic grading system: 2 grade system          Review of Systems   Constitutional:  Positive for fatigue. Negative for activity change, appetite change, fever and  unexpected weight change.   HENT: Negative.     Respiratory:  Negative for cough and shortness of breath.    Cardiovascular:  Negative for chest pain and leg swelling.   Gastrointestinal:  Negative for abdominal pain.   Genitourinary:  Positive for hematuria (very mild from the left tube).   Musculoskeletal:  Positive for back pain (chronic; improved from before).   Neurological:  Positive for light-headedness. Negative for headaches.     Medical History Reviewed by provider this encounter:     .  Current Outpatient Medications on File Prior to Visit   Medication Sig Dispense Refill    amLODIPine (NORVASC) 5 mg tablet Take 1 tablet (5 mg total) by mouth daily 90 tablet 2    ascorbic acid (VITAMIN C) 500 mg tablet Take 500 mg by mouth daily      aspirin 81 MG tablet Take 1 tablet by mouth daily      atorvastatin (LIPITOR) 20 mg tablet Take 1 tablet by mouth once daily 90 tablet 1    B Complex Vitamins (VITAMIN B COMPLEX PO) Take 1 tablet by mouth daily      Cholecalciferol 1000 units CHEW Chew 1 tablet daily      clopidogrel (PLAVIX) 75 mg tablet Take 1 tablet (75 mg total) by mouth daily 90 tablet 1    enzalutamide (Xtandi) 40 mg capsule Take 4 capsules (160 mg total) by mouth daily 120 capsule 5    hydroCHLOROthiazide 25 mg tablet Take 1 tablet by mouth once daily 90 tablet 1    lisinopril (ZESTRIL) 10 mg tablet Take 1 tablet by mouth once daily 90 tablet 2    metFORMIN (GLUCOPHAGE-XR) 500 mg 24 hr tablet Take 1 tablet by mouth once daily 90 tablet 3    sodium chloride, PF, 0.9 % 10 mL by Intracatheter route daily Intracatheter flushing daily. May substitute prefilled syringe with normal saline 10 mL vials, 10 mL syringes, and 18 g blunt needles 900 mL 0    vitamin E, tocopherol, 400 units capsule Take 1 capsule by mouth daily      ibuprofen (MOTRIN) 600 mg tablet Take 1 tablet (600 mg total) by mouth every 6 (six) hours as needed for mild pain (Patient not taking: Reported on 2/3/2025) 30 tablet 0     No current  "facility-administered medications on file prior to visit.      Social History     Tobacco Use    Smoking status: Former     Current packs/day: 0.00     Average packs/day: 3.0 packs/day for 10.0 years (30.0 ttl pk-yrs)     Types: Cigarettes     Start date: 1/1/2000     Quit date: 1/1/2010     Years since quitting: 15.1    Smokeless tobacco: Never   Vaping Use    Vaping status: Never Used   Substance and Sexual Activity    Alcohol use: Not Currently     Comment: rare glass of wine    Drug use: Never    Sexual activity: Not Currently         Objective   /67 (BP Location: Left arm, Patient Position: Sitting, Cuff Size: Standard)   Pulse 90   Temp (!) 97.2 °F (36.2 °C) (Temporal)   Resp 16   Ht 5' 8\" (1.727 m)   Wt 78 kg (172 lb)   SpO2 97%   BMI 26.15 kg/m²     ECOG   0  Physical Exam  Vitals reviewed.   Constitutional:       Appearance: Normal appearance. He is not toxic-appearing.   Cardiovascular:      Rate and Rhythm: Normal rate and regular rhythm.      Pulses: Normal pulses.   Pulmonary:      Effort: Pulmonary effort is normal. No respiratory distress.      Breath sounds: Normal breath sounds.   Abdominal:      General: Abdomen is flat.      Palpations: Abdomen is soft.      Comments: Nephrostomy tubes in place     Musculoskeletal:         General: No swelling.   Neurological:      General: No focal deficit present.      Mental Status: He is alert.         Labs: I have reviewed the following labs:  Lab Results   Component Value Date/Time    WBC 8.30 12/24/2024 09:24 AM    RBC 4.18 12/24/2024 09:24 AM    Hemoglobin 13.4 12/24/2024 09:24 AM    Hematocrit 39.3 12/24/2024 09:24 AM    MCV 94 12/24/2024 09:24 AM    MCH 32.1 12/24/2024 09:24 AM    RDW 13.0 12/24/2024 09:24 AM    Platelets 237 12/24/2024 09:24 AM    Segmented % 73 12/24/2024 09:24 AM    Lymphocytes % 17 12/24/2024 09:24 AM    Monocytes % 7 12/24/2024 09:24 AM    Eosinophils Relative 2 12/24/2024 09:24 AM    Basophils Relative 1 12/24/2024 " 09:24 AM    Immature Grans % 0 12/24/2024 09:24 AM    Absolute Neutrophils 6.10 12/24/2024 09:24 AM     Lab Results   Component Value Date/Time    Potassium 4.2 12/17/2024 09:47 AM    Chloride 102 12/17/2024 09:47 AM    CO2 30 12/17/2024 09:47 AM    BUN 31 (H) 12/17/2024 09:47 AM    Creatinine 1.72 (H) 12/17/2024 09:47 AM    Glucose, Fasting 113 (H) 12/17/2024 09:47 AM    Calcium 10.2 12/17/2024 09:47 AM    eGFR 35 12/17/2024 09:47 AM       Radiology Results Review: I personally reviewed the following image studies in PACS and associated radiology reports: CT abdomen/pelvis. My interpretation of the radiology images/reports is: as noted above.    Administrative Statements

## 2025-02-13 NOTE — ASSESSMENT & PLAN NOTE
remote h/o prostate cancer, s/p prostatectomy in 1999 now presenting with PSA of 360+ ng/mL with biopsy confirmed metastatic disease to the lymph nodes.   There appears to be more extensive lymphadenopathy including mediastinal, abdomen and pelvis.    This is suggestive of Stage IV disease.   Started on lupron (6-mo) on 1/6/2025 and  Xtandi. Tolerating treatment well. Continue on the same.   Labs to be drawn next available.  Follow up PSA in 3 months. Repeat PSMA PET in the future  Genetic testing pending

## 2025-02-19 ENCOUNTER — HOSPITAL ENCOUNTER (OUTPATIENT)
Dept: NUCLEAR MEDICINE | Facility: HOSPITAL | Age: 85
Discharge: HOME | End: 2025-02-19
Payer: MEDICARE

## 2025-02-19 DIAGNOSIS — C67.4 MALIGNANT NEOPLASM OF POSTERIOR WALL OF URINARY BLADDER (HCC): ICD-10-CM

## 2025-02-19 LAB — GLUCOSE SERPL-MCNC: 112 MG/DL (ref 65–140)

## 2025-02-19 PROCEDURE — 82948 REAGENT STRIP/BLOOD GLUCOSE: CPT

## 2025-02-19 PROCEDURE — 78815 PET IMAGE W/CT SKULL-THIGH: CPT

## 2025-02-19 PROCEDURE — A9552 F18 FDG: HCPCS

## 2025-02-20 ENCOUNTER — APPOINTMENT (OUTPATIENT)
Dept: LAB | Facility: HOSPITAL | Age: 85
End: 2025-02-20
Payer: MEDICARE

## 2025-02-20 DIAGNOSIS — I73.9 PERIPHERAL VASCULAR DISEASE (HCC): ICD-10-CM

## 2025-02-20 DIAGNOSIS — I65.22 ASYMPTOMATIC STENOSIS OF LEFT CAROTID ARTERY: ICD-10-CM

## 2025-02-20 DIAGNOSIS — E11.22 CKD STAGE 3 DUE TO TYPE 2 DIABETES MELLITUS (HCC): ICD-10-CM

## 2025-02-20 DIAGNOSIS — C67.4 MALIGNANT NEOPLASM OF POSTERIOR WALL OF URINARY BLADDER (HCC): ICD-10-CM

## 2025-02-20 DIAGNOSIS — I65.21 RECURRENT STENOSIS OF RIGHT CAROTID ARTERY: ICD-10-CM

## 2025-02-20 DIAGNOSIS — E11.42 DIABETIC POLYNEUROPATHY ASSOCIATED WITH TYPE 2 DIABETES MELLITUS (HCC): ICD-10-CM

## 2025-02-20 DIAGNOSIS — I10 PRIMARY HYPERTENSION: ICD-10-CM

## 2025-02-20 DIAGNOSIS — E11.49 DIABETES MELLITUS TYPE 2 WITH NEUROLOGICAL MANIFESTATIONS (HCC): ICD-10-CM

## 2025-02-20 DIAGNOSIS — N18.30 CKD STAGE 3 DUE TO TYPE 2 DIABETES MELLITUS (HCC): ICD-10-CM

## 2025-02-20 DIAGNOSIS — Z01.812 PRE-OPERATIVE LABORATORY EXAMINATION: ICD-10-CM

## 2025-02-20 DIAGNOSIS — C61 PROSTATE CANCER (HCC): ICD-10-CM

## 2025-02-20 DIAGNOSIS — R39.89 SUSPECTED UTI: ICD-10-CM

## 2025-02-20 LAB
ALBUMIN SERPL BCG-MCNC: 4 G/DL (ref 3.5–5)
ALP SERPL-CCNC: 80 U/L (ref 34–104)
ALT SERPL W P-5'-P-CCNC: 8 U/L (ref 7–52)
ANION GAP SERPL CALCULATED.3IONS-SCNC: 7 MMOL/L (ref 4–13)
AST SERPL W P-5'-P-CCNC: 14 U/L (ref 13–39)
BASOPHILS # BLD AUTO: 0.03 THOUSANDS/ΜL (ref 0–0.1)
BASOPHILS NFR BLD AUTO: 0 % (ref 0–1)
BILIRUB DIRECT SERPL-MCNC: 0.08 MG/DL (ref 0–0.2)
BILIRUB SERPL-MCNC: 0.65 MG/DL (ref 0.2–1)
BUN SERPL-MCNC: 21 MG/DL (ref 5–25)
CALCIUM SERPL-MCNC: 9.7 MG/DL (ref 8.4–10.2)
CHLORIDE SERPL-SCNC: 101 MMOL/L (ref 96–108)
CO2 SERPL-SCNC: 32 MMOL/L (ref 21–32)
CREAT SERPL-MCNC: 1.48 MG/DL (ref 0.6–1.3)
CREAT UR-MCNC: 99.5 MG/DL
EOSINOPHIL # BLD AUTO: 0.08 THOUSAND/ΜL (ref 0–0.61)
EOSINOPHIL NFR BLD AUTO: 1 % (ref 0–6)
ERYTHROCYTE [DISTWIDTH] IN BLOOD BY AUTOMATED COUNT: 13.6 % (ref 11.6–15.1)
EST. AVERAGE GLUCOSE BLD GHB EST-MCNC: 120 MG/DL
GFR SERPL CREATININE-BSD FRML MDRD: 43 ML/MIN/1.73SQ M
GLUCOSE SERPL-MCNC: 139 MG/DL (ref 65–140)
HBA1C MFR BLD: 5.8 %
HCT VFR BLD AUTO: 36.6 % (ref 36.5–49.3)
HGB BLD-MCNC: 12.6 G/DL (ref 12–17)
IMM GRANULOCYTES # BLD AUTO: 0.04 THOUSAND/UL (ref 0–0.2)
IMM GRANULOCYTES NFR BLD AUTO: 1 % (ref 0–2)
LYMPHOCYTES # BLD AUTO: 1.33 THOUSANDS/ΜL (ref 0.6–4.47)
LYMPHOCYTES NFR BLD AUTO: 16 % (ref 14–44)
MCH RBC QN AUTO: 32.3 PG (ref 26.8–34.3)
MCHC RBC AUTO-ENTMCNC: 34.4 G/DL (ref 31.4–37.4)
MCV RBC AUTO: 94 FL (ref 82–98)
MICROALBUMIN UR-MCNC: 175.4 MG/L
MICROALBUMIN/CREAT 24H UR: 176 MG/G CREATININE (ref 0–30)
MONOCYTES # BLD AUTO: 0.46 THOUSAND/ΜL (ref 0.17–1.22)
MONOCYTES NFR BLD AUTO: 6 % (ref 4–12)
NEUTROPHILS # BLD AUTO: 6.22 THOUSANDS/ΜL (ref 1.85–7.62)
NEUTS SEG NFR BLD AUTO: 76 % (ref 43–75)
NRBC BLD AUTO-RTO: 0 /100 WBCS
PLATELET # BLD AUTO: 347 THOUSANDS/UL (ref 149–390)
PMV BLD AUTO: 9 FL (ref 8.9–12.7)
POTASSIUM SERPL-SCNC: 3.8 MMOL/L (ref 3.5–5.3)
PROT SERPL-MCNC: 6.5 G/DL (ref 6.4–8.4)
RBC # BLD AUTO: 3.9 MILLION/UL (ref 3.88–5.62)
SODIUM SERPL-SCNC: 140 MMOL/L (ref 135–147)
TSH SERPL DL<=0.05 MIU/L-ACNC: 1.88 UIU/ML (ref 0.45–4.5)
WBC # BLD AUTO: 8.16 THOUSAND/UL (ref 4.31–10.16)

## 2025-02-20 PROCEDURE — 80053 COMPREHEN METABOLIC PANEL: CPT

## 2025-02-20 PROCEDURE — 87186 SC STD MICRODIL/AGAR DIL: CPT

## 2025-02-20 PROCEDURE — 85025 COMPLETE CBC W/AUTO DIFF WBC: CPT

## 2025-02-20 PROCEDURE — 82570 ASSAY OF URINE CREATININE: CPT

## 2025-02-20 PROCEDURE — 87086 URINE CULTURE/COLONY COUNT: CPT

## 2025-02-20 PROCEDURE — 36415 COLL VENOUS BLD VENIPUNCTURE: CPT

## 2025-02-20 PROCEDURE — 87077 CULTURE AEROBIC IDENTIFY: CPT

## 2025-02-20 PROCEDURE — 82248 BILIRUBIN DIRECT: CPT

## 2025-02-20 PROCEDURE — 82043 UR ALBUMIN QUANTITATIVE: CPT

## 2025-02-20 PROCEDURE — 84443 ASSAY THYROID STIM HORMONE: CPT

## 2025-02-20 PROCEDURE — 83036 HEMOGLOBIN GLYCOSYLATED A1C: CPT

## 2025-02-22 LAB
BACTERIA UR CULT: ABNORMAL
BACTERIA UR CULT: ABNORMAL

## 2025-02-24 ENCOUNTER — TELEPHONE (OUTPATIENT)
Dept: RADIATION ONCOLOGY | Facility: HOSPITAL | Age: 85
End: 2025-02-24

## 2025-02-24 ENCOUNTER — RESULTS FOLLOW-UP (OUTPATIENT)
Dept: UROLOGY | Facility: MEDICAL CENTER | Age: 85
End: 2025-02-24

## 2025-02-24 DIAGNOSIS — N30.00 ACUTE CYSTITIS WITHOUT HEMATURIA: Primary | ICD-10-CM

## 2025-02-24 RX ORDER — LEVOFLOXACIN 250 MG/1
250 TABLET, FILM COATED ORAL DAILY
Qty: 7 TABLET | Refills: 0 | Status: SHIPPED | OUTPATIENT
Start: 2025-02-24 | End: 2025-03-03

## 2025-02-24 NOTE — RESULT ENCOUNTER NOTE
Inform pt of abnormal test result.  Urine cultures are positive.  I called in a prescription for Levaquin 250 mg daily x 1 week.  He should keep his appoint with Dr. Small

## 2025-02-24 NOTE — TELEPHONE ENCOUNTER
Patients son, Ed, called back with a question regarding the abx.  Ed stated prior to patients last surgery he was given an abx for a week.  He wanted to know if this is going to be the abx given prior to surgery or is patient going to get another one prior to surgery.  Ed said he is not able to  abx until tomorrow.  I stated this abx is for a week, which will end on 3/4, and then two days later is patients procedure.  I mentioned the abx will still be in patients system after finishing, and patient may be given IV abx while in the OR.   Patient understood.

## 2025-02-24 NOTE — TELEPHONE ENCOUNTER
----- Message from Elliott Fay MD sent at 2/24/2025  3:33 PM EST -----  Inform pt of abnormal test result.  Urine cultures are positive.  I called in a prescription for Levaquin 250 mg daily x 1 week.  He should keep his appoint with Dr. Small

## 2025-02-24 NOTE — TELEPHONE ENCOUNTER
Spoke to patients son, Ed, to inform him patients urine culture came back positive.  Abx sent to [facility] pharmacy in [place] for patient to start.  Reminded Ed patient has a follow up appt with Dr. Youssef on 3/21.  Ed understood.

## 2025-02-24 NOTE — TELEPHONE ENCOUNTER
Pt's son called to speak with clinical regarding abx prior to surgery.     Call transferred to clinical

## 2025-02-24 NOTE — TELEPHONE ENCOUNTER
I called the patient's son (primary contact and caregiver) and reviewed results of recent FDG PET.  There were no definitive additional regions of lymphadenopathy or distant FDG-avid metastases identified.  We will proceed with TURBT (scheduled 3/6/25) and regroup after with definitive treatment plan for two malignancies (prostate and bladder).  Medical oncology and urology follow up also have been arranged to coordinate care.

## 2025-02-25 ENCOUNTER — ANESTHESIA EVENT (OUTPATIENT)
Dept: PERIOP | Facility: HOSPITAL | Age: 85
End: 2025-02-25
Payer: MEDICARE

## 2025-02-25 ENCOUNTER — HOSPITAL ENCOUNTER (OUTPATIENT)
Dept: INTERVENTIONAL RADIOLOGY/VASCULAR | Facility: HOSPITAL | Age: 85
Discharge: HOME | End: 2025-02-25
Attending: RADIOLOGY
Payer: MEDICARE

## 2025-02-25 DIAGNOSIS — C67.0 MALIGNANT NEOPLASM OF TRIGONE OF URINARY BLADDER (HCC): Primary | ICD-10-CM

## 2025-02-25 DIAGNOSIS — N13.30 HYDRONEPHROSIS, BILATERAL: ICD-10-CM

## 2025-02-25 PROCEDURE — 50435 EXCHANGE NEPHROSTOMY CATH: CPT

## 2025-02-25 PROCEDURE — C1729 CATH, DRAINAGE: HCPCS

## 2025-02-25 PROCEDURE — C1769 GUIDE WIRE: HCPCS

## 2025-02-25 RX ADMIN — IOHEXOL 16 ML: 350 INJECTION, SOLUTION INTRAVENOUS at 11:28

## 2025-02-25 NOTE — PRE-PROCEDURE INSTRUCTIONS
Pre-Surgery Instructions:   Medication Instructions    amLODIPine (NORVASC) 5 mg tablet Take day of surgery. With sip of water     ascorbic acid (VITAMIN C) 500 mg tablet Stop taking 7 days prior to surgery.    aspirin 81 MG tablet Stop taking 5 days prior to surgery.- verifying with office 2/25     atorvastatin (LIPITOR) 20 mg tablet Take day of surgery. With sip of water     B Complex Vitamins (VITAMIN B COMPLEX PO) Stop taking 7 days prior to surgery.    Cholecalciferol 1000 units CHEW Stop taking 7 days prior to surgery.    clopidogrel (PLAVIX) 75 mg tablet Stop taking 5 days prior to surgery.-verifying with office 2/25    enzalutamide (Xtandi) 40 mg capsule Verifying use of this medication with anes /pts son notified of anes instructions of use for DOS 2/25/25    hydroCHLOROthiazide 25 mg tablet Hold day of surgery.    ibuprofen (MOTRIN) 600 mg tablet Stop taking 3 days prior to surgery.    levofloxacin (LEVAQUIN) 250 mg tablet Continue to use as prescribed -completed script    lisinopril (ZESTRIL) 10 mg tablet Hold day of surgery.    metFORMIN (GLUCOPHAGE-XR) 500 mg 24 hr tablet Hold day of surgery.    sodium chloride, PF, 0.9 % Continue to flush nephrostomy tubes as prescribed     vitamin E, tocopherol, 400 units capsule Stop taking 7 days prior to surgery.    Spoke with pts son Ed - who handles all medical information review for pt - is POA of pt .    Pts son  instructed on use of cleanser for DOS and instructions for showering both night before and DOS reviewed with pt - pts son verbalized understanding of instructions given  Pts son  also instructed on no hair or body products on skin for DOS.  No shaving with a razor blade for 7 days prior to surgery to decrease any incident of infection - electric razor is ok to use up till 24 hrs prior to DOS.  Pts son  instructed that if with any changes in health status between now and DOS - notify surgeon office.  Tylenol is ok to use as needed up to and including  DOS with sips of water - if needed - did instruct NO NSAIDS to be used at least 3 days prior to surgery - or if given a longer hold time of NSAIDS from MD please follow MD hold instructions.  Instructed to bring photo ID and medical insurance card for DOS as forms of identification for DOS.  Also instructed pts son  on no jewelry or body piercings, no valuables on body for DOS. Contact lenses, if worn - need to be removed for DOS.  Pts son instructed does need transport home after surgery- pt is not allowed to drive.    Medication instructions for day of surgery reviewed. Please take all instructed medications with only a sip of water.       You will receive a call one business day prior to surgery with an arrival time and hospital directions. If your surgery is scheduled on a Monday, the hospital will be calling you on the Friday prior to your surgery. If you have not heard from anyone by 8pm, please call the hospital supervisor through the hospital  at [phone]. ([place] [phone] or [place] [phone]).    Do not eat or drink anything after midnight the night before your surgery, including candy, mints, lifesavers, or chewing gum. Do not drink alcohol 24hrs before your surgery. Try not to smoke at least 24hrs before your surgery.       Follow the pre surgery showering instructions as listed in the “My Surgical Experience Booklet” or otherwise provided by your surgeon's office. Do not use a blade to shave the surgical area 1 week before surgery. It is okay to use a clean electric clippers up to 24 hours before surgery. Do not apply any lotions, creams, including makeup, cologne, deodorant, or perfumes after showering on the day of your surgery. Do not use dry shampoo, hair spray, hair gel, or any type of hair products.     No contact lenses, eye make-up, or artificial eyelashes. Remove nail polish, including gel polish, and any artificial, gel, or acrylic nails if possible. Remove all jewelry  including rings and body piercing jewelry.     Wear causal clothing that is easy to take on and off. Consider your type of surgery.    Keep any valuables, jewelry, piercings at home. Please bring any specially ordered equipment (sling, braces) if indicated.    Arrange for a responsible person to drive you to and from the hospital on the day of your surgery. Please confirm the visitor policy for the day of your procedure when you receive your phone call with an arrival time.     Call the surgeon's office with any new illnesses, exposures, or additional questions prior to surgery.    Please reference your “My Surgical Experience Booklet” for additional information to prepare for your upcoming surgery.

## 2025-02-25 NOTE — BRIEF OP NOTE (RAD/CATH)
INTERVENTIONAL RADIOLOGY PROCEDURE NOTE    Date: 2/25/2025    Procedure: Bilateral PCN change  Procedure Summary       Date: 02/25/25 Room / Location: [facility] Interventional Radiology    Anesthesia Start:  Anesthesia Stop:     Procedure: IR NEPHROSTOMY TUBE CHECK/CHANGE/REPOSITION/REINSERTION/UPSIZE Diagnosis:       Malignant neoplasm of trigone of urinary bladder (HCC)      Hydronephrosis, bilateral      (routine)    Scheduled Providers:  Responsible Provider:     Anesthesia Type: Not recorded ASA Status: Not recorded            Preoperative diagnosis:   1. Malignant neoplasm of trigone of urinary bladder (HCC)    2. Hydronephrosis, bilateral         Postoperative diagnosis: Same.    Surgeon: Aleks Stapleton MD     Assistant: None. No qualified resident was available.    Blood loss: None    Specimens: None     Findings: Bilateral PCN replaced.    Complications: None immediate.    Anesthesia: local

## 2025-02-25 NOTE — DISCHARGE INSTRUCTIONS
Nephrostomy Tube Care     WHAT YOU NEED TO KNOW:   A nephrostomy tube is a catheter (thin plastic tube) that is inserted through your skin and into your kidney. The nephrostomy tube drains urine from your kidney into a collecting bag outside your body. You may need a nephrostomy tube when something is blocking the normal flow of urine. A nephrostomy tube may be used for a short or a long period of time. The nephrostomy tube comes out of your back, so you will need someone to help care for your nephrostomy tube.          DISCHARGE INSTRUCTIONS:      How to clean the skin around the nephrostomy tube and change the bandage:  Since the nephrostomy tube comes out of your back, you will not be able to care for it by yourself. Ask someone to follow the general directions below to check and care for your nephrostomy tube.   Gather the items you will need.          Disposable (single use) under-pad, and a clean washcloth  Plain soap, warm water, and new medical gloves  Sterile gauze bandages  Clear adhesive dressing or medical tape  Skin barrier  Protective skin film  Trash bag  Remove the old bandage, and check the tube entry site.    Have the patient lie on his side with the nephrostomy tube entry site facing up. Place the under-pad where it will catch drainage as you are working with the nephrostomy tube.   Wash your hands with soap and water. Put on new medical gloves.  Gently remove the old bandage, without pulling on the tube. Do this by holding the skin beside the tube with one hand. With the other hand, gently remove sticky tape and the skin barrier by pulling in the same direction as hair growth. Do not touch the side of the bandage that is placed over or around the tube. Throw the bandage and skin barrier away in a trash bag.  Look for signs of infection, such as skin redness and swelling. Report any skin changes to healthcare providers.  Clean the tube entry site.    Hold the tube in place to keep it from  being pulled out while you are cleaning around it.  You will need to clean the area twice. For the first cleaning, wet a new gauze bandage with soap and water.  Begin at the entry site of the tube. Wipe the skin in circles, moving away from the entry site. Remove blood and any other material with the gauze. Do this as often as needed. Use a new gauze bandage each time you clean the area, moving away from the entry site.   For the second cleaning, wet a new gauze bandage with water. Begin at the entry site of the tube. Wipe the skin in circles, moving away from the entry site. Use a new gauze bandage each time you clean the area, moving away from the entry site.   Gently pat the skin with a clean washcloth to dry it.    Apply the skin barrier and bandages.    Roll up a bandage to make it thick, and place it under  the place where the tube enters the skin. Place it to support the tube, and stop it from kinking or bending. Tape the bandage in place, and apply more bandages if directed by a healthcare provider.   Bring the tubing forward to the front and tape it to the skin. Do not stretch the tube tight, because this may pull the nephrostomy tube out.  How often to change the bandage.  Change the bandage around the tube, every other day. If your bandages  get dirty or wet, change them right away, and as often as needed. If your nephrostomy tube is to be used for a long period of time, the tube needs to be changed every 2 to 3 months. Healthcare providers will tell you when you need to make an appointment to have your tube changed.     How to care for the urine drainage bag:   Ask if you need to measure and write down how much urine is in the bag before you empty it. Drain urine out of the drainage bag when it is ½ to ? full. Open the spout at the bottom of the bag to empty the urine into the toilet.   You may need to detach the drainage bag from the nephrostomy tube to change it.. If so, attach a new drainage bag  tightly to the nephrostomy tube.     How to prevent problems with your nephrostomy tube:   Change bandages, directed.  This helps to prevent infection. Throw away or clean your drainage bag as directed by your healthcare provider.    Wipe the connecting ends of the drainage bag with alcohol before you reconnect the bag to the tube.  This helps prevent infection.     Keep the tube taped to your skin and connected to a drainage bag placed below the level of your kidneys.  This helps prevent urine from backing up into your kidneys. You may wear a small drainage bag strapped to your leg to let you move around more easily.    Check the catheter to be sure it is in place after you change your clothes or do other activities.  Do not wear tight clothing over the tube. Place the tubing over your thigh rather than under it when you are sitting down. Be sure that nothing is pulling on the nephrostomy tube when you move around.    Change positions if you see little or no urine in your drainage bag.  Check to see if the urine tube is twisted or bent. Be sure that you are not sitting or lying on the tube. If there are no kinks and there is little or no urine in the drainage bag, tell your healthcare provider.    Flush out the tube as directed. Some tubes get flushed one time a day with 10 mls of NSS You will be given a prescription for the flushes.  To flush the nephrostomy tube, clean both connections with alcohol swap. Twist off the drainage bag tube and twist the saline syringe into the nephrostomy tube and flush briskly. Remove the syringe and twist the drainage bag tube back into the nephrostomy tube.  Keep the site covered while you shower.  Tape a piece of clear adhesive plastic over the dressing to keep it dry while you shower. Do not take tub baths.    Contact Interventional Radiology at [phone] ([facility] PATIENTS: Contact Interventional Radiology at [phone]) ([facility] PATIENTS: Contact Interventional Radiology at  [phone]) if:  The skin around the nephrostomy tube is red, swollen, itches, or has a rash.   You have a fever greater than 101 or chills.  You have lower back or hip pain.  There are changes in how your urine looks or smells.  You have little or no urine draining from the nephrostomy tube.   You have nausea and are vomiting.  The black ashley on your tube has moved, or the tube is longer than when it was put in.   You have questions or concerns about your condition or care.  The nephrostomy tube comes out completely.   There is blood, pus, or a bad smell coming from the place where the tube enters your skin.  Urine is leaking around the tube.

## 2025-03-04 LAB
GENE DIS ANL INTERP-IMP: NORMAL
INTERPRETATION: NORMAL

## 2025-03-05 ENCOUNTER — TELEPHONE (OUTPATIENT)
Age: 85
End: 2025-03-05

## 2025-03-05 NOTE — ANESTHESIA PREPROCEDURE EVALUATION
Procedure:  TRANSURETHRAL RESECTION OF BLADDER TUMOR (TURBT) (Bladder)    ECG: SR with 1st degree block and PVCs and LAD  TTE: LVEF 55-60% with G1DD, RV wnl  Prior GA LMA 4    HTN - amlodipine, lisinopril  Hx MI and stents - Plavix - last took Friday  Prior nephrostomy    Denies the following: CP/SOB with exertion, asthma, COPD, ALPHONSO, stroke/TIA, seizure    Relevant Problems   CARDIO   (+) Asymptomatic stenosis of left carotid artery   (+) Coronary artery disease involving native coronary artery of native heart without angina pectoris   (+) 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HCC)   (+) Hypertension   (+) Peripheral vascular disease (HCC)   (+) Recurrent stenosis of right carotid artery   (+) Sinus bradycardia      GI/HEPATIC   (+) Dysphagia      /RENAL   (+) Acute kidney injury (HCC)   (+) CKD stage 3 due to type 2 diabetes mellitus (HCC)   (+) Hydronephrosis   (+) Prostate cancer (HCC)      MUSCULOSKELETAL   (+) Lower back pain   (+) Primary osteoarthritis of left shoulder      NEURO/PSYCH   (+) Diabetic polyneuropathy (HCC)        Physical Exam    Airway    Mallampati score: II  TM Distance: >3 FB  Neck ROM: full     Dental        Cardiovascular      Pulmonary      Other Findings        Anesthesia Plan  ASA Score- 3     Anesthesia Type- general with ASA Monitors.         Additional Monitors:     Airway Plan: LMA.           Plan Factors-Exercise tolerance (METS): >4 METS.    Chart reviewed. EKG reviewed.  Existing labs reviewed. Patient summary reviewed.    Patient is not a current smoker.      Obstructive sleep apnea risk education given perioperatively.        Induction- intravenous.    Postoperative Plan-         Informed Consent- Anesthetic plan and risks discussed with patient.  I personally reviewed this patient with the CRNA. Discussed and agreed on the Anesthesia Plan with the CRNA..      NPO Status:  No vitals data found for the desired time range.

## 2025-03-05 NOTE — TELEPHONE ENCOUNTER
Pt son called with questions about pts labs and how many PSA labs during the last few years pt has had. Please call Ed

## 2025-03-06 ENCOUNTER — ANESTHESIA (OUTPATIENT)
Dept: PERIOP | Facility: HOSPITAL | Age: 85
End: 2025-03-06
Payer: MEDICARE

## 2025-03-06 ENCOUNTER — TELEPHONE (OUTPATIENT)
Dept: GENETICS | Facility: CLINIC | Age: 85
End: 2025-03-06

## 2025-03-06 ENCOUNTER — HOSPITAL ENCOUNTER (OUTPATIENT)
Facility: HOSPITAL | Age: 85
Setting detail: OUTPATIENT SURGERY
Discharge: HOME | End: 2025-03-06
Attending: UROLOGY | Admitting: UROLOGY
Payer: MEDICARE

## 2025-03-06 VITALS
DIASTOLIC BLOOD PRESSURE: 54 MMHG | OXYGEN SATURATION: 98 % | RESPIRATION RATE: 16 BRPM | SYSTOLIC BLOOD PRESSURE: 117 MMHG | TEMPERATURE: 97.7 F | HEART RATE: 65 BPM

## 2025-03-06 DIAGNOSIS — C68.9 UROTHELIAL CARCINOMA (HCC): ICD-10-CM

## 2025-03-06 DIAGNOSIS — I73.9 PERIPHERAL VASCULAR DISEASE (HCC): ICD-10-CM

## 2025-03-06 LAB
GLUCOSE SERPL-MCNC: 108 MG/DL (ref 65–140)
GLUCOSE SERPL-MCNC: 99 MG/DL (ref 65–140)

## 2025-03-06 PROCEDURE — 88307 TISSUE EXAM BY PATHOLOGIST: CPT | Performed by: PATHOLOGY

## 2025-03-06 PROCEDURE — NC001 PR NO CHARGE: Performed by: UROLOGY

## 2025-03-06 PROCEDURE — 82948 REAGENT STRIP/BLOOD GLUCOSE: CPT

## 2025-03-06 PROCEDURE — 52235 CYSTOSCOPY AND TREATMENT: CPT | Performed by: UROLOGY

## 2025-03-06 PROCEDURE — 88341 IMHCHEM/IMCYTCHM EA ADD ANTB: CPT | Performed by: PATHOLOGY

## 2025-03-06 PROCEDURE — 88342 IMHCHEM/IMCYTCHM 1ST ANTB: CPT | Performed by: PATHOLOGY

## 2025-03-06 RX ORDER — SODIUM CHLORIDE, SODIUM LACTATE, POTASSIUM CHLORIDE, CALCIUM CHLORIDE 600; 310; 30; 20 MG/100ML; MG/100ML; MG/100ML; MG/100ML
INJECTION, SOLUTION INTRAVENOUS CONTINUOUS PRN
Status: DISCONTINUED | OUTPATIENT
Start: 2025-03-06 | End: 2025-03-06

## 2025-03-06 RX ORDER — LABETALOL HYDROCHLORIDE 5 MG/ML
10 INJECTION, SOLUTION INTRAVENOUS
Status: COMPLETED | OUTPATIENT
Start: 2025-03-06 | End: 2025-03-06

## 2025-03-06 RX ORDER — HYDROCODONE BITARTRATE AND ACETAMINOPHEN 5; 325 MG/1; MG/1
1 TABLET ORAL EVERY 6 HOURS PRN
Status: DISCONTINUED | OUTPATIENT
Start: 2025-03-06 | End: 2025-03-06 | Stop reason: HOSPADM

## 2025-03-06 RX ORDER — PHENYLEPHRINE HCL IN 0.9% NACL 1 MG/10 ML
SYRINGE (ML) INTRAVENOUS AS NEEDED
Status: DISCONTINUED | OUTPATIENT
Start: 2025-03-06 | End: 2025-03-06

## 2025-03-06 RX ORDER — ONDANSETRON 2 MG/ML
INJECTION INTRAMUSCULAR; INTRAVENOUS AS NEEDED
Status: DISCONTINUED | OUTPATIENT
Start: 2025-03-06 | End: 2025-03-06

## 2025-03-06 RX ORDER — LEVOFLOXACIN 5 MG/ML
500 INJECTION, SOLUTION INTRAVENOUS ONCE
Status: COMPLETED | OUTPATIENT
Start: 2025-03-06 | End: 2025-03-06

## 2025-03-06 RX ORDER — FENTANYL CITRATE 50 UG/ML
INJECTION, SOLUTION INTRAMUSCULAR; INTRAVENOUS AS NEEDED
Status: DISCONTINUED | OUTPATIENT
Start: 2025-03-06 | End: 2025-03-06

## 2025-03-06 RX ORDER — MAGNESIUM HYDROXIDE 1200 MG/15ML
LIQUID ORAL AS NEEDED
Status: DISCONTINUED | OUTPATIENT
Start: 2025-03-06 | End: 2025-03-06 | Stop reason: HOSPADM

## 2025-03-06 RX ORDER — FENTANYL CITRATE/PF 50 MCG/ML
25 SYRINGE (ML) INJECTION
Status: DISCONTINUED | OUTPATIENT
Start: 2025-03-06 | End: 2025-03-06 | Stop reason: HOSPADM

## 2025-03-06 RX ORDER — ONDANSETRON 2 MG/ML
4 INJECTION INTRAMUSCULAR; INTRAVENOUS ONCE AS NEEDED
Status: DISCONTINUED | OUTPATIENT
Start: 2025-03-06 | End: 2025-03-06 | Stop reason: HOSPADM

## 2025-03-06 RX ORDER — ALBUTEROL SULFATE 0.83 MG/ML
2.5 SOLUTION RESPIRATORY (INHALATION) ONCE AS NEEDED
Status: DISCONTINUED | OUTPATIENT
Start: 2025-03-06 | End: 2025-03-06 | Stop reason: HOSPADM

## 2025-03-06 RX ORDER — DEXAMETHASONE SODIUM PHOSPHATE 10 MG/ML
INJECTION, SOLUTION INTRAMUSCULAR; INTRAVENOUS AS NEEDED
Status: DISCONTINUED | OUTPATIENT
Start: 2025-03-06 | End: 2025-03-06

## 2025-03-06 RX ORDER — LIDOCAINE HYDROCHLORIDE 10 MG/ML
INJECTION, SOLUTION EPIDURAL; INFILTRATION; INTRACAUDAL; PERINEURAL AS NEEDED
Status: DISCONTINUED | OUTPATIENT
Start: 2025-03-06 | End: 2025-03-06

## 2025-03-06 RX ORDER — PROPOFOL 10 MG/ML
INJECTION, EMULSION INTRAVENOUS CONTINUOUS PRN
Status: DISCONTINUED | OUTPATIENT
Start: 2025-03-06 | End: 2025-03-06

## 2025-03-06 RX ORDER — HYDROMORPHONE HCL/PF 1 MG/ML
0.5 SYRINGE (ML) INJECTION
Status: DISCONTINUED | OUTPATIENT
Start: 2025-03-06 | End: 2025-03-06 | Stop reason: HOSPADM

## 2025-03-06 RX ORDER — PROPOFOL 10 MG/ML
INJECTION, EMULSION INTRAVENOUS AS NEEDED
Status: DISCONTINUED | OUTPATIENT
Start: 2025-03-06 | End: 2025-03-06

## 2025-03-06 RX ORDER — HYDRALAZINE HYDROCHLORIDE 20 MG/ML
10 INJECTION INTRAMUSCULAR; INTRAVENOUS ONCE
Status: COMPLETED | OUTPATIENT
Start: 2025-03-06 | End: 2025-03-06

## 2025-03-06 RX ADMIN — PROPOFOL 170 MG: 10 INJECTION, EMULSION INTRAVENOUS at 14:33

## 2025-03-06 RX ADMIN — DEXAMETHASONE SODIUM PHOSPHATE 10 MG: 10 INJECTION, SOLUTION INTRAMUSCULAR; INTRAVENOUS at 14:34

## 2025-03-06 RX ADMIN — PROPOFOL 30 MG: 10 INJECTION, EMULSION INTRAVENOUS at 14:34

## 2025-03-06 RX ADMIN — LABETALOL HYDROCHLORIDE 10 MG: 5 INJECTION, SOLUTION INTRAVENOUS at 15:55

## 2025-03-06 RX ADMIN — FENTANYL CITRATE 25 MCG: 50 INJECTION, SOLUTION INTRAMUSCULAR; INTRAVENOUS at 14:57

## 2025-03-06 RX ADMIN — FENTANYL CITRATE 25 MCG: 50 INJECTION, SOLUTION INTRAMUSCULAR; INTRAVENOUS at 14:50

## 2025-03-06 RX ADMIN — FENTANYL CITRATE 25 MCG: 50 INJECTION, SOLUTION INTRAMUSCULAR; INTRAVENOUS at 14:44

## 2025-03-06 RX ADMIN — LIDOCAINE HYDROCHLORIDE 50 MG: 10 SOLUTION INTRAVENOUS at 14:33

## 2025-03-06 RX ADMIN — FENTANYL CITRATE 25 MCG: 50 INJECTION, SOLUTION INTRAMUSCULAR; INTRAVENOUS at 15:08

## 2025-03-06 RX ADMIN — Medication 100 MCG: at 14:33

## 2025-03-06 RX ADMIN — LABETALOL HYDROCHLORIDE 10 MG: 5 INJECTION, SOLUTION INTRAVENOUS at 16:11

## 2025-03-06 RX ADMIN — PROPOFOL 50 MCG/KG/MIN: 10 INJECTION, EMULSION INTRAVENOUS at 14:37

## 2025-03-06 RX ADMIN — LABETALOL HYDROCHLORIDE 10 MG: 5 INJECTION, SOLUTION INTRAVENOUS at 15:41

## 2025-03-06 RX ADMIN — ONDANSETRON 4 MG: 2 INJECTION INTRAMUSCULAR; INTRAVENOUS at 15:09

## 2025-03-06 RX ADMIN — SODIUM CHLORIDE, SODIUM LACTATE, POTASSIUM CHLORIDE, AND CALCIUM CHLORIDE: .6; .31; .03; .02 INJECTION, SOLUTION INTRAVENOUS at 14:15

## 2025-03-06 RX ADMIN — HYDRALAZINE HYDROCHLORIDE 10 MG: 20 INJECTION INTRAMUSCULAR; INTRAVENOUS at 16:15

## 2025-03-06 RX ADMIN — LEVOFLOXACIN: 5 INJECTION, SOLUTION INTRAVENOUS at 14:15

## 2025-03-06 NOTE — TELEPHONE ENCOUNTER
PSA testing is not usually done after age 70 - up to 75 if higher risk.  Pt with h/o prostate cancer usually have f/u with Urology who makes further recommendations as to how frequent and how long to screen.  When f/u with Uro was last discussed with pt in 2022 he deferred f/u as per my note and it was not discussed after that.  If they have further questions let me know and I can call them when I have a chance.

## 2025-03-06 NOTE — TELEPHONE ENCOUNTER
Post-Test Genetic Counseling Consult Note     Today I spoke with Kilo's son, Angel Luis, over the phone to review the results of Kilo's genetic test for hereditary cancer. We met previously on 1/30/25 for pre-test counseling.  A copy of this consult note and genetic test result will be shared with the patient.       SUMMARY:    Test(s): CancerNext-Expanded + RNA (76 genes): AIP, ALK, APC, ALTHEA, AXIN2, BAP1, BARD1, BMPR1A, BRCA1, BRCA2, BRIP1, CDC73, CDH1, CDK4, CDKN1B, CDKN2A, CEBPA, CHEK2, CTNNA1, DDX41, DICER1, EGFR, EPCAM, ETV6, FH, FLCN, GATA2, GREM1, HOXB13, KIT, LZTR1, MAX, MBD4, MEN1, MET, MITF, MLH1, MSH2, MSH3, MSH6, MUTYH, NF1, NF2, NTHL1, PALB2, PDGFRA, PHOX2B, PMS2, POLD1, POLE, POT1, VZPLG8Q, PTCH1, PTEN, RAD51C, RAD51D, RB1, RET, RUNX1, SDHA, SDHAF2, SDHB, SDHC, SDHD, SMAD4, SMARCA4, SMARCB1, SMARCE1, STK11, SUFU, GJLD703, TP53, TSC1, TSC2, VHL, WT1      Result: 2 Variants of uncertain significance     Variant 1  POLD1 c.3157C>T (p.L3411C); heterozygous; uncertain significance     Variant 2  PTEN c.239A>G (p.K80R); heterozygous; uncertain significance     Assessment:   A variant of uncertain significance (VUS) means that a change was identified in a specific gene but it cannot be determined whether the variant is associated with an increased risk of cancer or is a harmless genetic change.     It is possible that the variant was seen in only a handful of individuals, or there may be conflicting or incomplete information in the medical literature about the variant and its association with hereditary cancer.      The significance of the POLD1 and PTEN variants is currently not known and therefore this test result cannot be used to help determine Kilo cancer risks.     The laboratory will continue to accumulate information on these variants and will reclassify them as either a positive or negative genetic test result when they are confident that they have adequate information. We will notify Kilo as updated  information is obtained. It is important to note that the majority of variants of uncertain significance are reclassified as likely benign or benign as additional information about the variant becomes available.     Risks and Testing for Family Members:  Genetic testing for these variants is not recommended for relatives who wish to determine their cancer risks for purposes of determining medical management. The presence or absence of these variants in a relative is not clinically meaningful unless the variant is reclassified in the future.     Despite this result, Kilo's first-degree relatives may be at increased risk for the cancers based on the family history. We recommend they discuss screening and management recommendations with their healthcare providers.    If Kilo has any affected family members with a cancer diagnosis, especially at a young age, they may still consider genetic testing. Relatives who wish to pursue genetic testing can reach out to the [facility] [phone] ([phone]) to schedule an appointment or visit [email or web address] to identify a local genetic counselor.     Risk Based on Family History:  The significance of these variants is currently not known and therefore this test result cannot be used to help determine Kilo cancer risks. Rather his personal medical history and family history of cancer are the most important factors used to estimate his risk for developing certain cancers and to direct his medical management.      Plan:   There are no additional recommendations based on Kilo's result. he should continue cancer screening and medical management as clinically indicated and as determined appropriate by his healthcare providers.    VUS Result: Ed was strongly encouraged to contact us regarding any changes in Kilo's personal or family history of cancer as these changes could alter our recommendation regarding genetic testing and/or cancer screening. Ed was also encouraged  to follow up with us on an annual basis as variant classifications are subject to change.

## 2025-03-06 NOTE — OP NOTE
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 (Active)   Number of days: 9       Nephrostomy Right 10.2 Fr. 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  Did not resected beyond level of unroofed surgical clip      Complications:   None    Procedure and Technique:  PROCEDURE SUMMARY:    The patient was brought to the operating room and anesthesia obtained.  The patient was then placed in the lithotomy position and prepped and draped using standard sterile technique.  All pressure points were carefully padded.  A surgical time-out occurred, antibiotics were administered, and thromboembolism prophylaxis was given.  A 27 F saline resectoscope was inserted into the urethra after dilation of the urethral meatus to 28 F using standard urethral sounds. The urethra and bladder were carefully inspected.     Cystoscopy findings:    Urethra:  Normal  Prostate:  Surgically absent  Bladder:  Lesion identified, characteristics below  Ureteral orifices: Normal    Urachus:   Normal    Lesion characteristics:  -Appearance:    Flat  -Number of foci:   1  -Aggregate tumor diameter:  5  cm  -Largest individual tumor:  5 cm  -Location:    Entire trigone and posterior wall of bladder      Bladder tumor resection:    Using a Olympus continuous flow resectoscope, all abnormal lesions noted above were resected and sent for pathology.  Resection craters and surrounding urothelium was electrofulgurated and hemostasis was achieved.  Each section was submitted for pathology as documented above.  The bladder was then observed multiple cycles of filling and emptying hemostasis was deemed to be excellent prior to terminating the procedure.    Neither ureteral orifice was identified during the case.  Patient has bilateral PCN tubes in place and produces minimal urine per urethra. No evidence of bladder perforation and no Chao was placed at end of case.       I was present for the entire procedure.    Patient Disposition:  PACU         SIGNATURE: Jordan Youssef MD  DATE: March 6, 2025  TIME: 3:12 PM

## 2025-03-06 NOTE — ANESTHESIA POSTPROCEDURE EVALUATION
Post-Op Assessment Note    CV Status:  Stable  Pain Score: 0    Pain management: satisfactory to patient       Mental Status:  Awake and sleepy   Hydration Status:  Euvolemic   PONV Controlled:  Controlled   Airway Patency:  Patent     Post Op Vitals Reviewed: Yes    No anethesia notable event occurred.    Staff: Anesthesiologist, CRNA           Last Filed PACU Vitals:  Vitals Value Taken Time   Temp     Pulse 74 03/06/25 1525   BP     Resp     SpO2 98 % 03/06/25 1525   Vitals shown include unfiled device data.

## 2025-03-06 NOTE — NURSING NOTE
Pt in no apparent distress. Pt able to tolerate p.o. intake. Pt reports no pain. AVS reviewed and pt verbalized understanding. IV removed.

## 2025-03-06 NOTE — H&P
H&P - Urology   Name: Kilo Mix 84 y.o. male I MRN: [id number]  Unit/Bed#: OR POOL I Date of Admission: 3/6/2025   Date of Service: 3/6/2025 I Hospital Day: 0     Assessment & Plan   This is a 84 y.o. year old male here for debulking TURBT, and he is stable and optimized for his procedure.    History of Present Illness    Kilo Mix is a 84 y.o. year old male who presents for debulking TURBT for his muscle-invasive bladder cancer, also on ADT for his metastatic prostate cancer    REVIEW OF SYSTEMS: Per the HPI, and otherwise unremarkable.    Historical Information   Past Medical History:   Diagnosis Date    Bladder tumor     Cancer (HCC)     Prostate cancer    Diabetes mellitus (HCC)     Diverticulitis of colon     History of common carotid artery stent placement     RIGHT stent - per pts son - RIGHT stent  in  carotid artery    History of heart artery stent     x2 stents in heart per pts son    Hyperlipidemia     Hypertension     Migraines     Myocardial infarction (HCC)     about 30yrs ago    Nephrostomy present (HCC)     2/25/25 Per pts son Ed- pt has two nephrostomy tubes    Prostate cancer (HCC)     Seasonal allergies      Past Surgical History:   Procedure Laterality Date    CARDIAC SURGERY      CAROTID ENDARTARECTOMY Right     thromboendarterectomy    CAROTID STENT Right     CATARACT EXTRACTION Left     COLONOSCOPY      fiberoptic screening 2/6/08 5 yr / complete 3/15/13 5 yr    CORONARY ANGIOPLASTY WITH STENT PLACEMENT      ELBOW SURGERY      EYE SURGERY      IR BIOPSY LYMPH NODE  12/24/2024    IR NEPHROSTOMY TUBE CHECK/CHANGE/REPOSITION/REINSERTION/UPSIZE  2/25/2025    IR NEPHROSTOMY TUBE PLACEMENT  12/24/2024    KNEE SURGERY      NV CYSTO W/REMOVAL OF LESIONS SMALL N/A 1/21/2025    Procedure: TRANSURETHRAL RESECTION OF BLADDER TUMOR (TURBT);  Surgeon: Jordan Youssef MD;  Location:  MAIN OR;  Service: Urology    PROSTATE SURGERY      SUPRAPUBIC PROSTATECTOMY  12/06/1999    UPPER GASTROINTESTINAL  ENDOSCOPY       Social History     Tobacco Use    Smoking status: Former     Current packs/day: 0.00     Average packs/day: 3.0 packs/day for 10.0 years (30.0 ttl pk-yrs)     Types: Cigarettes     Start date: 1/1/2000     Quit date: 1/1/2010     Years since quitting: 15.1    Smokeless tobacco: Never    Tobacco comments:     Former smoker - quit 2010   Vaping Use    Vaping status: Never Used   Substance and Sexual Activity    Alcohol use: Yes     Comment: rare glass of wine    Drug use: Never     Comment: Denies any drug use per pt    Sexual activity: Not Currently     Comment: defer     E-Cigarette/Vaping    E-Cigarette Use Never User     Comments Denies any use per pt      E-Cigarette/Vaping Substances     Family History   Problem Relation Age of Onset    Alcohol abuse Mother     No Known Problems Father     No Known Problems Sister     Substance Abuse Son     Heart attack Family         MI    Breast cancer Family     Colon cancer Neg Hx     Colon polyps Neg Hx     Anesthesia problems Neg Hx        Meds/Allergies     Current Facility-Administered Medications:     levofloxacin (LEVAQUIN) IVPB (premix in dextrose) 500 mg 100 mL, 500 mg, Intravenous, Once  Allergies   Allergen Reactions    Losartan Syncope     Reaction Date: 07Jun2011;     Penicillins Other (See Comments)     As a child so does not recall reaction       Objective :  Temp:  [96 °F (35.6 °C)] 96 °F (35.6 °C)  HR:  [96] 96  BP: (180)/(80) 180/80  Resp:  [19] 19  SpO2:  [100 %] 100 %  O2 Device: None (Room air)    Physical Exam  Vitals and nursing note reviewed.   Constitutional:       General: He is not in acute distress.     Appearance: He is well-developed.   HENT:      Head: Normocephalic and atraumatic.   Eyes:      Conjunctiva/sclera: Conjunctivae normal.   Cardiovascular:      Rate and Rhythm: Normal rate and regular rhythm.      Heart sounds: No murmur heard.  Pulmonary:      Effort: Pulmonary effort is normal. No respiratory distress.      Breath  sounds: Normal breath sounds.   Abdominal:      Palpations: Abdomen is soft.      Tenderness: There is no abdominal tenderness.   Musculoskeletal:         General: No swelling.      Cervical back: Neck supple.   Skin:     General: Skin is warm and dry.      Capillary Refill: Capillary refill takes less than 2 seconds.   Neurological:      Mental Status: He is alert.   Psychiatric:         Mood and Affect: Mood normal.       Gen: NAD  Head: NCAT  CV: RRR  CHEST: Clear  ABD: soft, NT/ND  EXT: no edema

## 2025-03-06 NOTE — TELEPHONE ENCOUNTER
Pt's son made aware, if he has further questions or would like to speak to PCP he states he will call back.

## 2025-03-06 NOTE — ANESTHESIA POSTPROCEDURE EVALUATION
Post-Op Assessment Note    Last Filed PACU Vitals:  Vitals Value Taken Time   Temp 96.1 °F (35.6 °C) 03/06/25 1638   Pulse 64 03/06/25 1638   /62 03/06/25 1638   Resp 17 03/06/25 1638   SpO2 98 % 03/06/25 1638       Modified Quan:     Vitals Value Taken Time   Activity 2 03/06/25 1522   Respiration 2 03/06/25 1522   Circulation 2 03/06/25 1522   Consciousness 1 03/06/25 1522   Oxygen Saturation 1 03/06/25 1522     Modified Quan Score: 8

## 2025-03-07 ENCOUNTER — TELEPHONE (OUTPATIENT)
Dept: UROLOGY | Facility: MEDICAL CENTER | Age: 85
End: 2025-03-07

## 2025-03-07 NOTE — TELEPHONE ENCOUNTER
Post Op Note    Kilo Mix is a 84 y.o. male s/p TRANSURETHRAL RESECTION OF BLADDER TUMOR (TURBT) (Bladder) performed 3/6/25.  Kilo Mix is a patient of Dr. Dr. Youssef and is seen at the [place] office.     How would you rate your pain on a scale from 1 to 10, 10 being the worst pain ever? 0  Have you had a fever? No  Have your bowel movements been regular? No; pt stated that he ate prunes earlier and takes a stool softener everyday. Advised if after 3 days no BM then to start taking a laxative (Miralax). Pt understood.   Do you have any difficulty urinating? No  Do you have any other questions or concerns that I can address at this time? None at this time    Pt stated that he is doing great. Stated that he has had so much energy since the surgery and has had no pain. Advised pt that he has a PO appointment 3/21 with Louise Rand. Pt stated he understood and that he would be there. Advised if any questions or concerns to give our office a call that a nurse is on call 24/7. Pt understood and was appreciative of the call.

## 2025-03-12 DIAGNOSIS — Z93.6 NEPHROSTOMY PRESENT (HCC): ICD-10-CM

## 2025-03-12 DIAGNOSIS — C67.0 MALIGNANT NEOPLASM OF TRIGONE OF URINARY BLADDER (HCC): ICD-10-CM

## 2025-03-12 DIAGNOSIS — C67.9 HYDRONEPHROSIS DUE TO OBSTRUCTIVE MALIGNANT NEOPLASM OF BLADDER (HCC): ICD-10-CM

## 2025-03-12 DIAGNOSIS — N13.30 HYDRONEPHROSIS DUE TO OBSTRUCTIVE MALIGNANT NEOPLASM OF BLADDER (HCC): ICD-10-CM

## 2025-03-12 RX ORDER — SODIUM CHLORIDE 9 MG/ML
10 INJECTION, SOLUTION INTRAMUSCULAR; INTRAVENOUS; SUBCUTANEOUS DAILY
Qty: 900 ML | Refills: 0 | Status: SHIPPED | OUTPATIENT
Start: 2025-03-12 | End: 2025-06-10

## 2025-03-12 NOTE — TELEPHONE ENCOUNTER
Reason for call:   [x] Refill   [] Prior Auth  [] Other:     Office:   [] PCP/Provider -   [x] Specialty/Provider - Urology    Medication: sodium chloride, PF, 0.9 %    Dose/Frequency:  10 mL by Intracatheter route daily Intracatheter flushing daily.     Quantity: 900 mL    Pharmacy: [facility] [address] [place] [address] [place]      Local Pharmacy   Does the patient have enough for 3 days?   [x] Yes   [] No - Send as HP to POD    Mail Away Pharmacy   Does the patient have enough for 10 days?   [] Yes   [] No - Send as HP to POD

## 2025-03-13 PROCEDURE — 88307 TISSUE EXAM BY PATHOLOGIST: CPT | Performed by: PATHOLOGY

## 2025-03-13 PROCEDURE — 88342 IMHCHEM/IMCYTCHM 1ST ANTB: CPT | Performed by: PATHOLOGY

## 2025-03-13 PROCEDURE — 88341 IMHCHEM/IMCYTCHM EA ADD ANTB: CPT | Performed by: PATHOLOGY

## 2025-03-15 DIAGNOSIS — I73.9 PERIPHERAL VASCULAR DISEASE (HCC): ICD-10-CM

## 2025-03-17 RX ORDER — CLOPIDOGREL BISULFATE 75 MG/1
75 TABLET ORAL DAILY
Qty: 90 TABLET | Refills: 1 | Status: SHIPPED | OUTPATIENT
Start: 2025-03-17

## 2025-03-19 ENCOUNTER — RESULTS FOLLOW-UP (OUTPATIENT)
Dept: UROLOGY | Facility: MEDICAL CENTER | Age: 85
End: 2025-03-19

## 2025-03-20 ENCOUNTER — OFFICE VISIT (OUTPATIENT)
Age: 85
End: 2025-03-20
Payer: MEDICARE

## 2025-03-20 VITALS
WEIGHT: 170 LBS | OXYGEN SATURATION: 95 % | TEMPERATURE: 97.6 F | RESPIRATION RATE: 16 BRPM | HEIGHT: 68 IN | HEART RATE: 73 BPM | BODY MASS INDEX: 25.76 KG/M2 | SYSTOLIC BLOOD PRESSURE: 128 MMHG | DIASTOLIC BLOOD PRESSURE: 72 MMHG

## 2025-03-20 DIAGNOSIS — C61 PROSTATE CANCER (HCC): Primary | ICD-10-CM

## 2025-03-20 PROBLEM — C67.4 MALIGNANT NEOPLASM OF POSTERIOR WALL OF URINARY BLADDER (HCC): Status: RESOLVED | Noted: 2025-01-28 | Resolved: 2025-03-20

## 2025-03-20 PROCEDURE — G2211 COMPLEX E/M VISIT ADD ON: HCPCS | Performed by: INTERNAL MEDICINE

## 2025-03-20 PROCEDURE — 99214 OFFICE O/P EST MOD 30 MIN: CPT | Performed by: INTERNAL MEDICINE

## 2025-03-20 NOTE — PROGRESS NOTES
"Name: Kilo Mix      : 1940      MRN: [id number]  Encounter Provider: Harvey Shankar MD  Encounter Date: 3/20/2025   Encounter department: [facility] HEMATOLOGY ONCOLOGY SPECIALISTS [facility]  :  Assessment & Plan  Prostate cancer (HCC)  remote h/o prostate cancer, s/p prostatectomy in  now presenting with PSA of 360+ ng/mL with biopsy confirmed metastatic disease to the lymph nodes. On PSMA PET-CT there is extensive lymphadenopathy including mediastinal, abdomen and pelvis.    This is suggestive of Stage IV disease.   Started on lupron (6-mo) on 2025 and  Xtandi.   Tolerating treatment well except for mild fatigue, dizziness and hot flashes.   Continue to monitor symptoms. Pt to report any worsening of s/e. May take Xtandi at bedtime to help with the dizziness.   Return to clinic in 1-2 mo with repeat labs    Discussed pathology was edited and there is no bladder cancer.     Orders:    CBC and differential; Future    PSA Total, Diagnostic; Future    Comprehensive metabolic panel; Future        History of Present Illness   Chief Complaint   Patient presents with    Follow-up     Kilo Mix is a 84 y.o. male with a prior history of prostate cancer, s/p radical prostatectomy in  for \"high Rocio score prostate cancer\".  He states he took hormone shots prior to the surgery for a short duration. Other past medical history is also significant for diabetes, CKD stage III, bilateral carotid artery stenosis s/p stents, peripheral vascular disease, hypertension    Patient presented with painless hematuria in 2024.  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  In the urinary bladder, there is a large hypervascular mass extending into the right perivesicular space with associated bilateral iliac and retroperitoneal lymphadenopathy, and moderate bilateral hydronephrosis leading to " occlusion of the distal ureters.    He underwent a cystoscopy on December 16 which showed a large extensive tumor involving the entire trigone of the bladder, with both ureteral orifices obscured by the tumor.  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    Most recent labs are significant for creatinine of 1.7/GFR 35, normal CBC and a  ng/mL.     On December 24, he underwent a CT-guided biopsy of the left para-aortic lymph node. Pathology revealed metastatic prostate cancer.     He also underwent b/l nephrostomy tube placements.     PSMA PET-CT - significant for mediastinal lymphadenopathy, multiple metastatic lymph nodes in the abdomen and pelvis.  An infiltrative mass in the right posterior bladder, which is less PSMA avid than adenopathy.      Started on lupron (6-mo) and Xtandi on 1/6/2025     Underwent a cystoscopy on 1/21/25 - 5cm tumor invading entire trigone of bladder, and bladder neck and covering both ureteral orifices. Underwent TURBT. 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     Case presented at  tumor board and recommendation was for further debulking, FDG PET-CT and in the absence of distant metastatic ds, to pursue chemorad for the bladder.    FDG PET scan from February 19, 2025 showed previously noted PSMA avid lymphadenopathy in the chest, abdomen and pelvis has decreased in size.    He underwent TURBT on March 6, 2025, and pathology now shows prostatic adenocarcinoma with involvement of the muscularis propria.  On further review of pathology, the biopsy from the bladder from January 21, 2025 has now been amended and reflects extensive prostatic adenocarcinoma involving the muscularis propria, without any urothelial carcinoma.      Patient is here today to discuss next steps.       Oncology History   Cancer Staging   Prostate cancer (HCC)  Staging form: Prostate,  AJCC 8th Edition  - Clinical stage from 12/30/2024: Stage IVB (rcT4, cN1, cM1, PSA: 360) - Signed by Harvey Shankar MD on 1/28/2025  Histopathologic type: Adenocarcinoma, NOS  Stage prefix: Recurrence  Prostate specific antigen (PSA) range: 20 or greater  Stage used in treatment planning: Yes  National guidelines used in treatment planning: Yes  Oncology History   Prostate cancer (HCC)   2/1/2019 Initial Diagnosis    Prostate cancer (HCC)     12/24/2024 Biopsy    Final Diagnosis   A. Lymph Node, left para aortic,  core Biopsy:    -Metastatic carcinoma , immunocytochemically consistent with metastatic prostatic adenocarcinoma      Note:  Tumor cells are  positive for CKC,  prostatic  markers (NKX3.1  , PSAP,) PAX2 ( patchy) and  negative for CK7,  CK20,  urothelial markers (GATA3, thrombomodulin). Controls reacted appropriately            12/30/2024 -  Cancer Staged    Staging form: Prostate, AJCC 8th Edition  - Clinical stage from 12/30/2024: Stage IVB (rcT4, cN1, cM1, PSA: 360) - Signed by Harvey Shankar MD on 1/28/2025  Histopathologic type: Adenocarcinoma, NOS  Stage prefix: Recurrence  Prostate specific antigen (PSA) range: 20 or greater  Stage used in treatment planning: Yes  National guidelines used in treatment planning: Yes       Malignant neoplasm of posterior wall of urinary bladder (HCC) (Resolved)   1/28/2025 Initial Diagnosis    Malignant neoplasm of posterior wall of urinary bladder (HCC)     1/28/2025 -  Cancer Staged    Staging form: Urinary Bladder, AJCC 8th Edition  - Clinical stage from 1/28/2025: Stage Unknown (cT2, cNX) - Signed by Harvey Shankar MD on 1/28/2025  Histopathologic type: Adenocarcinoma, NOS  Stage prefix: Initial diagnosis  WHO/ISUP grade (low/high): High Grade  Histologic grading system: 2 grade system          Review of Systems   Skin:         Minimal hot flashes     Neurological:  Positive for dizziness (no falls).   All other systems reviewed and are negative.    Medical  History Reviewed by provider this encounter:     .  Current Outpatient Medications on File Prior to Visit   Medication Sig Dispense Refill    amLODIPine (NORVASC) 5 mg tablet Take 1 tablet (5 mg total) by mouth daily 90 tablet 2    ascorbic acid (VITAMIN C) 500 mg tablet Take 500 mg by mouth daily      aspirin 81 MG tablet Take 1 tablet by mouth daily 2/25/25 per pts son - pt was to hold ASA 5 day hold - will follow same instructions that were given for January surgery      atorvastatin (LIPITOR) 20 mg tablet Take 1 tablet by mouth once daily 90 tablet 1    B Complex Vitamins (VITAMIN B COMPLEX PO) Take 1 tablet by mouth daily      Cholecalciferol 1000 units CHEW Chew 1 tablet daily      clopidogrel (PLAVIX) 75 mg tablet Take 1 tablet by mouth once daily 90 tablet 1    enzalutamide (Xtandi) 40 mg capsule Take 4 capsules (160 mg total) by mouth daily 120 capsule 5    hydroCHLOROthiazide 25 mg tablet Take 1 tablet by mouth once daily 90 tablet 1    ibuprofen (MOTRIN) 600 mg tablet Take 1 tablet (600 mg total) by mouth every 6 (six) hours as needed for mild pain 30 tablet 0    lisinopril (ZESTRIL) 10 mg tablet Take 1 tablet by mouth once daily 90 tablet 2    metFORMIN (GLUCOPHAGE-XR) 500 mg 24 hr tablet Take 1 tablet by mouth once daily 90 tablet 3    sodium chloride, PF, 0.9 % 10 mL by Intracatheter route daily Intracatheter flushing daily. May substitute prefilled syringe with normal saline 10 mL vials, 10 mL syringes, and 18 g blunt needles 900 mL 0    vitamin E, tocopherol, 400 units capsule Take 1 capsule by mouth daily       No current facility-administered medications on file prior to visit.      Social History     Tobacco Use    Smoking status: Former     Current packs/day: 0.00     Average packs/day: 3.0 packs/day for 10.0 years (30.0 ttl pk-yrs)     Types: Cigarettes     Start date: 1/1/2000     Quit date: 1/1/2010     Years since quitting: 15.2    Smokeless tobacco: Never    Tobacco comments:     Former  "smoker - quit 2010   Vaping Use    Vaping status: Never Used   Substance and Sexual Activity    Alcohol use: Yes     Comment: rare glass of wine    Drug use: Never     Comment: Denies any drug use per pt    Sexual activity: Not Currently     Comment: defer         Objective   /72 (BP Location: Left arm, Patient Position: Sitting, Cuff Size: Adult)   Pulse 73   Temp 97.6 °F (36.4 °C) (Temporal)   Resp 16   Ht 5' 8\" (1.727 m)   Wt 77.1 kg (170 lb)   SpO2 95%   BMI 25.85 kg/m²     ECOG   0  Physical Exam  Vitals reviewed.   Constitutional:       Appearance: Normal appearance. He is not toxic-appearing.   Cardiovascular:      Rate and Rhythm: Normal rate and regular rhythm.      Pulses: Normal pulses.   Pulmonary:      Effort: Pulmonary effort is normal. No respiratory distress.      Breath sounds: Normal breath sounds.   Abdominal:      Comments: Nephrostomy tubes in place     Musculoskeletal:         General: No swelling.   Neurological:      Mental Status: He is alert.         Labs: I have reviewed the following labs:  Lab Results   Component Value Date/Time    WBC 8.16 02/20/2025 11:54 AM    RBC 3.90 02/20/2025 11:54 AM    Hemoglobin 12.6 02/20/2025 11:54 AM    Hematocrit 36.6 02/20/2025 11:54 AM    MCV 94 02/20/2025 11:54 AM    MCH 32.3 02/20/2025 11:54 AM    RDW 13.6 02/20/2025 11:54 AM    Platelets 347 02/20/2025 11:54 AM    Segmented % 76 (H) 02/20/2025 11:54 AM    Lymphocytes % 16 02/20/2025 11:54 AM    Monocytes % 6 02/20/2025 11:54 AM    Eosinophils Relative 1 02/20/2025 11:54 AM    Basophils Relative 0 02/20/2025 11:54 AM    Immature Grans % 1 02/20/2025 11:54 AM    Absolute Neutrophils 6.22 02/20/2025 11:54 AM     Lab Results   Component Value Date/Time    Potassium 3.8 02/20/2025 11:54 AM    Chloride 101 02/20/2025 11:54 AM    CO2 32 02/20/2025 11:54 AM    BUN 21 02/20/2025 11:54 AM    Creatinine 1.48 (H) 02/20/2025 11:54 AM    Glucose, Fasting 113 (H) 12/17/2024 09:47 AM    Calcium 9.7 " 02/20/2025 11:54 AM    AST 14 02/20/2025 11:54 AM    ALT 8 02/20/2025 11:54 AM    Alkaline Phosphatase 80 02/20/2025 11:54 AM    Total Protein 6.5 02/20/2025 11:54 AM    Albumin 4.0 02/20/2025 11:54 AM    Total Bilirubin 0.65 02/20/2025 11:54 AM    eGFR 43 02/20/2025 11:54 AM       Personally reviewed PET-CT images and report    Administrative Statements

## 2025-03-20 NOTE — ASSESSMENT & PLAN NOTE
remote h/o prostate cancer, s/p prostatectomy in 1999 now presenting with PSA of 360+ ng/mL with biopsy confirmed metastatic disease to the lymph nodes. On PSMA PET-CT there is extensive lymphadenopathy including mediastinal, abdomen and pelvis.    This is suggestive of Stage IV disease.   Started on lupron (6-mo) on 1/6/2025 and  Xtandi.   Tolerating treatment well except for mild fatigue, dizziness and hot flashes.   Continue to monitor symptoms. Pt to report any worsening of s/e. May take Xtandi at bedtime to help with the dizziness.   Return to clinic in 1-2 mo with repeat labs    Discussed pathology was edited and there is no bladder cancer.     Orders:    CBC and differential; Future    PSA Total, Diagnostic; Future    Comprehensive metabolic panel; Future

## 2025-03-21 ENCOUNTER — OFFICE VISIT (OUTPATIENT)
Dept: UROLOGY | Facility: MEDICAL CENTER | Age: 85
End: 2025-03-21
Payer: MEDICARE

## 2025-03-21 VITALS
HEIGHT: 68 IN | HEART RATE: 70 BPM | DIASTOLIC BLOOD PRESSURE: 80 MMHG | BODY MASS INDEX: 25.76 KG/M2 | SYSTOLIC BLOOD PRESSURE: 130 MMHG | WEIGHT: 170 LBS | OXYGEN SATURATION: 98 %

## 2025-03-21 DIAGNOSIS — C61 PROSTATE CANCER (HCC): ICD-10-CM

## 2025-03-21 DIAGNOSIS — N30.00 ACUTE CYSTITIS WITHOUT HEMATURIA: Primary | ICD-10-CM

## 2025-03-21 PROCEDURE — 99213 OFFICE O/P EST LOW 20 MIN: CPT | Performed by: UROLOGY

## 2025-03-21 NOTE — PROGRESS NOTES
Name: Kilo Mix      : 1940      MRN: [id number]  Encounter Provider: Jordan Youssef MD  Encounter Date: 3/21/2025   Encounter department: [facility] UROLOGY [place]  :  Assessment & Plan  Prostate cancer (HCC)  Metastatic prostate cancer with local invasion of bladder s/p TURBT x 2     - continue on dual ADT with oncology  - will plan for interval repeat PSMA PET scan down the road  - continue bilateral PCN for now, can plan for antegrade nephrostogram to evaluate drainage into bladder at next PCN exchange             History of Present Illness   Kilo Mix is a 84 y.o. male who presents for pathology results after repeat TURBT    Discussed that repeat TURBT showed invasive prostate adenocarcinoma in bladder  Upon re-review of the initial pathology results from first TURBT, the diagnosis was changed from invasive urothelial carcinoma to invasive prostate adenocarcinoma.      Bilateral PCNs draining well.   Voiding small amounts of clear urine per urethra      Review of Systems   All other systems reviewed and are negative.    Past Medical History   Past Medical History:   Diagnosis Date    Bladder tumor     Cancer (HCC)     Prostate cancer    Diabetes mellitus (HCC)     Diverticulitis of colon     History of common carotid artery stent placement     RIGHT stent - per pts son - RIGHT stent  in  carotid artery    History of heart artery stent     x2 stents in heart per pts son    Hyperlipidemia     Hypertension     Migraines     Myocardial infarction (HCC)     about 30yrs ago    Nephrostomy present (HCC)     25 Per pts son Ed- pt has two nephrostomy tubes    Prostate cancer (HCC)     Seasonal allergies      Past Surgical History:   Procedure Laterality Date    CARDIAC SURGERY      CAROTID ENDARTARECTOMY Right     thromboendarterectomy    CAROTID STENT Right     CATARACT EXTRACTION Left     COLONOSCOPY      fiberoptic screening 08 5 yr / complete 3/15/13 5 yr    CORONARY  ANGIOPLASTY WITH STENT PLACEMENT      ELBOW SURGERY      EYE SURGERY      IR BIOPSY LYMPH NODE  12/24/2024    IR NEPHROSTOMY TUBE CHECK/CHANGE/REPOSITION/REINSERTION/UPSIZE  2/25/2025    IR NEPHROSTOMY TUBE PLACEMENT  12/24/2024    KNEE SURGERY      PA CYSTO W/REMOVAL OF LESIONS SMALL N/A 1/21/2025    Procedure: TRANSURETHRAL RESECTION OF BLADDER TUMOR (TURBT);  Surgeon: Jordan Youssef MD;  Location:  MAIN OR;  Service: Urology    PA CYSTO W/REMOVAL OF LESIONS SMALL N/A 3/6/2025    Procedure: TRANSURETHRAL RESECTION OF BLADDER TUMOR (TURBT);  Surgeon: Jordan Youssef MD;  Location:  MAIN OR;  Service: Urology    PROSTATE SURGERY      SUPRAPUBIC PROSTATECTOMY  12/06/1999    UPPER GASTROINTESTINAL ENDOSCOPY       Family History   Problem Relation Age of Onset    Alcohol abuse Mother     No Known Problems Father     No Known Problems Sister     Substance Abuse Son     Heart attack Family         MI    Breast cancer Family     Colon cancer Neg Hx     Colon polyps Neg Hx     Anesthesia problems Neg Hx       reports that he quit smoking about 15 years ago. His smoking use included cigarettes. He started smoking about 25 years ago. He has a 30 pack-year smoking history. He has never used smokeless tobacco. He reports current alcohol use. He reports that he does not use drugs.  Current Outpatient Medications   Medication Instructions    amLODIPine (NORVASC) 5 mg, Oral, Daily    ascorbic acid (VITAMIN C) 500 mg, Daily    aspirin 81 MG tablet 1 tablet, Daily    atorvastatin (LIPITOR) 20 mg, Oral, Daily    B Complex Vitamins (VITAMIN B COMPLEX PO) 1 tablet, Daily    Cholecalciferol 1000 units CHEW 1 tablet, Daily    clopidogrel (PLAVIX) 75 mg, Oral, Daily    enzalutamide (XTANDI) 160 mg, Oral, Daily    hydroCHLOROthiazide 25 mg, Oral, Daily    ibuprofen (MOTRIN) 600 mg, Oral, Every 6 hours PRN    lisinopril (ZESTRIL) 10 mg tablet Take 1 tablet by mouth once daily    metFORMIN (GLUCOPHAGE-XR) 500 mg 24 hr tablet Take 1  "tablet by mouth once daily    sodium chloride, PF, 0.9 % 10 mL, Intracatheter, Daily, Intracatheter flushing daily. May substitute prefilled syringe with normal saline 10 mL vials, 10 mL syringes, and 18 g blunt needles    vitamin E, tocopherol, 400 units capsule 1 capsule, Daily     Allergies   Allergen Reactions    Losartan Syncope     Reaction Date: 07Jun2011;     Penicillins Other (See Comments)     As a child so does not recall reaction         Objective   /80 (BP Location: Left arm, Patient Position: Sitting, Cuff Size: Standard)   Pulse 70   Ht 5' 8\" (1.727 m)   Wt 77.1 kg (170 lb)   SpO2 98%   BMI 25.85 kg/m²     Physical Exam  Vitals and nursing note reviewed.   Constitutional:       General: He is not in acute distress.     Appearance: He is well-developed.   HENT:      Head: Normocephalic and atraumatic.   Eyes:      Conjunctiva/sclera: Conjunctivae normal.   Cardiovascular:      Rate and Rhythm: Normal rate and regular rhythm.      Heart sounds: No murmur heard.  Pulmonary:      Effort: Pulmonary effort is normal. No respiratory distress.      Breath sounds: Normal breath sounds.   Abdominal:      Palpations: Abdomen is soft.      Tenderness: There is no abdominal tenderness.      Comments: Bilateral PCN in place   Musculoskeletal:         General: No swelling.      Cervical back: Neck supple.   Skin:     General: Skin is warm and dry.      Capillary Refill: Capillary refill takes less than 2 seconds.   Neurological:      Mental Status: He is alert.   Psychiatric:         Mood and Affect: Mood normal.        Results   Lab Results   Component Value Date    .880 (H) 12/10/2024     Lab Results   Component Value Date    GLUCOSE 142 (H) 08/03/2015    CALCIUM 9.7 02/20/2025     08/03/2015    K 3.8 02/20/2025    CO2 32 02/20/2025     02/20/2025    BUN 21 02/20/2025    CREATININE 1.48 (H) 02/20/2025     Lab Results   Component Value Date    WBC 8.16 02/20/2025    HGB 12.6 02/20/2025 "    HCT 36.6 02/20/2025    MCV 94 02/20/2025     02/20/2025       Office Urine Dip  No results found for this or any previous visit (from the past hour).

## 2025-03-21 NOTE — ASSESSMENT & PLAN NOTE
Metastatic prostate cancer with local invasion of bladder s/p TURBT x 2     - continue on dual ADT with oncology  - will plan for interval repeat PSMA PET scan down the road  - continue bilateral PCN for now, can plan for antegrade nephrostogram to evaluate drainage into bladder at next PCN exchange

## 2025-04-08 ENCOUNTER — TELEPHONE (OUTPATIENT)
Age: 85
End: 2025-04-08

## 2025-04-08 DIAGNOSIS — N17.9 ACUTE KIDNEY INJURY (HCC): ICD-10-CM

## 2025-04-08 DIAGNOSIS — C61 PROSTATE CANCER (HCC): Primary | ICD-10-CM

## 2025-04-08 NOTE — TELEPHONE ENCOUNTER
My is not on Kilo's communication consent.   Called and spoke to Ed who is aware that My is calling in and mentioned that she was calling because Kilo is having dizzy spells and believes it may be due to the xtandi. He did give me the ok to call her back and discuss further and requested that I call him once this was discussed with Dr. Shankar to update him on any changes.    I did call and talk to My.   She said Kilo is complaining of dizziness that started after he started taking the xtandi. This was discussed at his lost office visit and was recommended that he take the xtandi at bedtime. He did try this but did not notice any difference. My initially thought that he was getting up too quick or maybe because he has been busy and stressed with visiting his wife daily at her nursing facility. Recently, he went to stand up at his wife's nursing home when a dizzy episode started and he nearly passed out. He did not have a syncopal episode. No falls. He also complains of fatigue. Otherwise, not experiencing any palpitations, SOB, CP, headaches, vision changes or confusion. I recommended that he check his BP at home when he feels these episodes coming on and in the meantime, I will really this info to Dr. Shankar and call her back with directions.

## 2025-04-08 NOTE — TELEPHONE ENCOUNTER
Called and spoke to My. Also left a voicemail with Ed to update them with the plan to hold the xtandi for a week. They will call back in a week with an update.

## 2025-04-08 NOTE — TELEPHONE ENCOUNTER
My is calling to speak to Dr Shankar regarding her dad, I offered CTS but she declined.  My can be reached at [phone].

## 2025-04-14 ENCOUNTER — TELEPHONE (OUTPATIENT)
Age: 85
End: 2025-04-14

## 2025-04-14 NOTE — TELEPHONE ENCOUNTER
Called and left a message that an appt has been scheduled 4/17 at 340 pm with Dr Shankar to go over side effects from xtandi.  Left [employer] number if date or time does not work for patient.

## 2025-04-14 NOTE — TELEPHONE ENCOUNTER
Spoke to son, Kilo. He is going to have his dad hold the xtandi until we follow up with him in the office on Thursday.     He mentioned while on the phone that his dad seems wiped out with factigue and has lost 8lb recently. No specific complaints but mentioned that he seemed similar to this when he was diagnosed with his UTI in March. They are going to the lab tomorrow to get blood work done and will order a UAC&S as well.

## 2025-04-14 NOTE — TELEPHONE ENCOUNTER
Patient's son Ed calling in informing office that he verablized understanding of appointment on Thursday - Ed wants to know if his Dad should start on the Xtandi again or continue holding, Ed states he's lost about 8 lbs and is just not doing well and would like to know if he should restart or hold until seeing Dr. Shankar.  Thank you - please do not call home phone, only call Ed on his cell phone - [phone].    Thank you!

## 2025-04-14 NOTE — TELEPHONE ENCOUNTER
Spoke with patient's son, Ed. States patient has been holding the xtandi since last Monday. About 2 days after holding it the dizziness and other symptoms resolved and he feels much better. He is concerned because Kilo felt like he would pass out when he sat up too quick and he was just concerned he would have a dizzy spell while driving or something. Requests call back to let them know how to proceed regarding the medication. He is inquiring about whether the dose should be reduced or if a different medication should be started entirely. Please call patient's son, Ed, back to discuss.

## 2025-04-15 ENCOUNTER — RESULTS FOLLOW-UP (OUTPATIENT)
Dept: FAMILY MEDICINE CLINIC | Facility: HOSPITAL | Age: 85
End: 2025-04-15

## 2025-04-15 ENCOUNTER — APPOINTMENT (OUTPATIENT)
Dept: LAB | Facility: HOSPITAL | Age: 85
End: 2025-04-15
Payer: MEDICARE

## 2025-04-15 DIAGNOSIS — I10 PRIMARY HYPERTENSION: ICD-10-CM

## 2025-04-15 DIAGNOSIS — E11.42 DIABETIC POLYNEUROPATHY ASSOCIATED WITH TYPE 2 DIABETES MELLITUS (HCC): ICD-10-CM

## 2025-04-15 DIAGNOSIS — E11.22 CKD STAGE 3 DUE TO TYPE 2 DIABETES MELLITUS (HCC): ICD-10-CM

## 2025-04-15 DIAGNOSIS — I73.9 PERIPHERAL VASCULAR DISEASE (HCC): ICD-10-CM

## 2025-04-15 DIAGNOSIS — C61 PROSTATE CANCER (HCC): ICD-10-CM

## 2025-04-15 DIAGNOSIS — E11.49 DIABETES MELLITUS TYPE 2 WITH NEUROLOGICAL MANIFESTATIONS (HCC): ICD-10-CM

## 2025-04-15 DIAGNOSIS — I65.21 RECURRENT STENOSIS OF RIGHT CAROTID ARTERY: ICD-10-CM

## 2025-04-15 DIAGNOSIS — N17.9 ACUTE KIDNEY INJURY (HCC): ICD-10-CM

## 2025-04-15 DIAGNOSIS — N18.30 CKD STAGE 3 DUE TO TYPE 2 DIABETES MELLITUS (HCC): ICD-10-CM

## 2025-04-15 DIAGNOSIS — I65.22 ASYMPTOMATIC STENOSIS OF LEFT CAROTID ARTERY: ICD-10-CM

## 2025-04-15 LAB
ALBUMIN SERPL BCG-MCNC: 3.9 G/DL (ref 3.5–5)
ALP SERPL-CCNC: 68 U/L (ref 34–104)
ALT SERPL W P-5'-P-CCNC: 6 U/L (ref 7–52)
ANION GAP SERPL CALCULATED.3IONS-SCNC: 8 MMOL/L (ref 4–13)
AST SERPL W P-5'-P-CCNC: 11 U/L (ref 13–39)
BACTERIA UR QL AUTO: ABNORMAL /HPF
BASOPHILS # BLD AUTO: 0.02 THOUSANDS/ÂΜL (ref 0–0.1)
BASOPHILS NFR BLD AUTO: 0 % (ref 0–1)
BILIRUB SERPL-MCNC: 0.81 MG/DL (ref 0.2–1)
BILIRUB UR QL STRIP: NEGATIVE
BUN SERPL-MCNC: 19 MG/DL (ref 5–25)
CALCIUM SERPL-MCNC: 9.6 MG/DL (ref 8.4–10.2)
CHLORIDE SERPL-SCNC: 101 MMOL/L (ref 96–108)
CHOLEST SERPL-MCNC: 166 MG/DL (ref ?–200)
CLARITY UR: ABNORMAL
CO2 SERPL-SCNC: 28 MMOL/L (ref 21–32)
COLOR UR: YELLOW
CREAT SERPL-MCNC: 1.13 MG/DL (ref 0.6–1.3)
EOSINOPHIL # BLD AUTO: 0.17 THOUSAND/ÂΜL (ref 0–0.61)
EOSINOPHIL NFR BLD AUTO: 3 % (ref 0–6)
ERYTHROCYTE [DISTWIDTH] IN BLOOD BY AUTOMATED COUNT: 13.6 % (ref 11.6–15.1)
GFR SERPL CREATININE-BSD FRML MDRD: 59 ML/MIN/1.73SQ M
GLUCOSE P FAST SERPL-MCNC: 114 MG/DL (ref 65–99)
GLUCOSE UR STRIP-MCNC: NEGATIVE MG/DL
HCT VFR BLD AUTO: 38.4 % (ref 36.5–49.3)
HDLC SERPL-MCNC: 66 MG/DL
HGB BLD-MCNC: 12.8 G/DL (ref 12–17)
HGB UR QL STRIP.AUTO: NEGATIVE
IMM GRANULOCYTES # BLD AUTO: 0.02 THOUSAND/UL (ref 0–0.2)
IMM GRANULOCYTES NFR BLD AUTO: 0 % (ref 0–2)
KETONES UR STRIP-MCNC: NEGATIVE MG/DL
LDLC SERPL CALC-MCNC: 77 MG/DL (ref 0–100)
LEUKOCYTE ESTERASE UR QL STRIP: ABNORMAL
LYMPHOCYTES # BLD AUTO: 1.27 THOUSANDS/ÂΜL (ref 0.6–4.47)
LYMPHOCYTES NFR BLD AUTO: 20 % (ref 14–44)
MCH RBC QN AUTO: 32.4 PG (ref 26.8–34.3)
MCHC RBC AUTO-ENTMCNC: 33.3 G/DL (ref 31.4–37.4)
MCV RBC AUTO: 97 FL (ref 82–98)
MONOCYTES # BLD AUTO: 0.43 THOUSAND/ÂΜL (ref 0.17–1.22)
MONOCYTES NFR BLD AUTO: 7 % (ref 4–12)
NEUTROPHILS # BLD AUTO: 4.6 THOUSANDS/ÂΜL (ref 1.85–7.62)
NEUTS SEG NFR BLD AUTO: 70 % (ref 43–75)
NITRITE UR QL STRIP: NEGATIVE
NON-SQ EPI CELLS URNS QL MICRO: ABNORMAL /HPF
NONHDLC SERPL-MCNC: 100 MG/DL
NRBC BLD AUTO-RTO: 0 /100 WBCS
PH UR STRIP.AUTO: 7 [PH]
PLATELET # BLD AUTO: 262 THOUSANDS/UL (ref 149–390)
PMV BLD AUTO: 10.1 FL (ref 8.9–12.7)
POTASSIUM SERPL-SCNC: 4.4 MMOL/L (ref 3.5–5.3)
PROT SERPL-MCNC: 6.4 G/DL (ref 6.4–8.4)
PROT UR STRIP-MCNC: ABNORMAL MG/DL
PSA SERPL-MCNC: 0.21 NG/ML (ref 0–4)
RBC # BLD AUTO: 3.95 MILLION/UL (ref 3.88–5.62)
RBC #/AREA URNS AUTO: ABNORMAL /HPF
SODIUM SERPL-SCNC: 137 MMOL/L (ref 135–147)
SP GR UR STRIP.AUTO: 1.01 (ref 1–1.03)
TRIGL SERPL-MCNC: 113 MG/DL (ref ?–150)
UROBILINOGEN UR STRIP-ACNC: <2 MG/DL
WBC # BLD AUTO: 6.51 THOUSAND/UL (ref 4.31–10.16)
WBC #/AREA URNS AUTO: ABNORMAL /HPF

## 2025-04-15 PROCEDURE — 80061 LIPID PANEL: CPT

## 2025-04-15 PROCEDURE — 87086 URINE CULTURE/COLONY COUNT: CPT

## 2025-04-15 PROCEDURE — 87186 SC STD MICRODIL/AGAR DIL: CPT

## 2025-04-15 PROCEDURE — 85025 COMPLETE CBC W/AUTO DIFF WBC: CPT

## 2025-04-15 PROCEDURE — 80053 COMPREHEN METABOLIC PANEL: CPT

## 2025-04-15 PROCEDURE — 36415 COLL VENOUS BLD VENIPUNCTURE: CPT

## 2025-04-15 PROCEDURE — 87077 CULTURE AEROBIC IDENTIFY: CPT

## 2025-04-15 PROCEDURE — 84153 ASSAY OF PSA TOTAL: CPT

## 2025-04-15 PROCEDURE — 81001 URINALYSIS AUTO W/SCOPE: CPT

## 2025-04-17 ENCOUNTER — PATIENT OUTREACH (OUTPATIENT)
Dept: HEMATOLOGY ONCOLOGY | Facility: CLINIC | Age: 85
End: 2025-04-17

## 2025-04-17 ENCOUNTER — OFFICE VISIT (OUTPATIENT)
Age: 85
End: 2025-04-17
Payer: MEDICARE

## 2025-04-17 VITALS
HEIGHT: 68 IN | SYSTOLIC BLOOD PRESSURE: 122 MMHG | WEIGHT: 161 LBS | RESPIRATION RATE: 16 BRPM | BODY MASS INDEX: 24.4 KG/M2 | DIASTOLIC BLOOD PRESSURE: 68 MMHG | OXYGEN SATURATION: 98 % | TEMPERATURE: 97.2 F | HEART RATE: 69 BPM

## 2025-04-17 DIAGNOSIS — R42 DIZZINESS: ICD-10-CM

## 2025-04-17 DIAGNOSIS — T83.511A URINARY TRACT INFECTION ASSOCIATED WITH INDWELLING URETHRAL CATHETER, INITIAL ENCOUNTER  (HCC): Primary | ICD-10-CM

## 2025-04-17 DIAGNOSIS — C61 PROSTATE CANCER (HCC): ICD-10-CM

## 2025-04-17 DIAGNOSIS — N39.0 URINARY TRACT INFECTION WITHOUT HEMATURIA, SITE UNSPECIFIED: Primary | ICD-10-CM

## 2025-04-17 DIAGNOSIS — N39.0 URINARY TRACT INFECTION ASSOCIATED WITH INDWELLING URETHRAL CATHETER, INITIAL ENCOUNTER  (HCC): Primary | ICD-10-CM

## 2025-04-17 PROCEDURE — G2211 COMPLEX E/M VISIT ADD ON: HCPCS | Performed by: INTERNAL MEDICINE

## 2025-04-17 PROCEDURE — 99215 OFFICE O/P EST HI 40 MIN: CPT | Performed by: INTERNAL MEDICINE

## 2025-04-17 RX ORDER — LEVOFLOXACIN 750 MG/1
750 TABLET, FILM COATED ORAL EVERY 24 HOURS
Qty: 7 TABLET | Refills: 0 | Status: SHIPPED | OUTPATIENT
Start: 2025-04-17 | End: 2025-04-24

## 2025-04-17 RX ORDER — SULFAMETHOXAZOLE AND TRIMETHOPRIM 800; 160 MG/1; MG/1
1 TABLET ORAL EVERY 12 HOURS SCHEDULED
Qty: 14 TABLET | Refills: 0 | Status: SHIPPED | OUTPATIENT
Start: 2025-04-17 | End: 2025-04-17

## 2025-04-17 NOTE — PROGRESS NOTES
I reached out and spoke with Ed patients Son, now that consults have been completed with the oncology teams. I introduced myself and explained my role as their Patient Navigator. I reviewed for any barriers to care and offered referrals to supportive services as needed. I reviewed and updated the members assigned to the care team in [facility]. He knows the members of the care team as well as how and when to contact them with any needs.     Distress Thermometer completed at this time. Patient scored 1/10. Based on responses to DT, no indication for referral to SW needed at this time. .     He is currently able to drive and denies any transportation needs.      He denies any uncontrolled symptoms. Discussed role of Palliative Care in symptom and side effect management. Declined referral at this time.    He states that he is struggling with eating and drinking. Malnutrition screening tool completed. He does meet parameters for auto referral to Oncology Registered Dietician.    Completed MST and patient Met criteria for referral to Oncology Dietician Services and agreeable to referral    Patient does not smoke.     He states he is well supported by family and friends.  Community support groups discussed including the Cancer Support Community of the [facility]. Patient declined information at this time.     He feels he has adequate insurance coverage and denies any financial concerns at this time.     He verbalizes managing the schedules well.   Future Appointments   Date Time Provider Department Center   4/17/2025  3:40 PM Harvey Shankar MD HEM ONC  P[facility]   4/29/2025 11:00 AM UB IR 1 [facility] IR [facility]   5/6/2025 10:40 AM Harvey Shankar MD HEM ONC  Practice-On[facility]   6/23/2025  8:00 AM UB INF CHAIR 9  INFUSION [facility]   6/25/2025  2:00 PM Jordan Youssef MD URO AL  [facility]      Patient having weight loss for about a month due to little appetite. He lost about 14lbs since start of treatment.  Oncology nutrition referral placed.     Based on individual needs I will follow up in about 6 weeks. I have provided my direct contact information and welcome them to contact me if needs as discussed above change. He was appreciative for the call.

## 2025-04-17 NOTE — ASSESSMENT & PLAN NOTE
Most likely cause of recent symptoms  Has upcoming tube exchange  Cr continues to improve    Orders:    levofloxacin (LEVAQUIN) 750 mg tablet; Take 1 tablet (750 mg total) by mouth every 24 hours for 7 days

## 2025-04-17 NOTE — TELEPHONE ENCOUNTER
Per Dr. Shankar, [facility] came [facility] for [facility]. He is scheduled to see her this afternoon. Script pended for bactrim DS q12 hours x 7 days.

## 2025-04-17 NOTE — PROGRESS NOTES
Name: Kilo Mix      : 1940      MRN: [id number]  Encounter Provider: Harvey Shankar MD  Encounter Date: 2025   Encounter department: [facility]  :  Assessment & Plan  Urinary tract infection associated with indwelling urethral catheter, initial encounter  (HCC)  Most likely cause of recent symptoms  Has upcoming tube exchange  Cr continues to improve    Orders:    levofloxacin (LEVAQUIN) 750 mg tablet; Take 1 tablet (750 mg total) by mouth every 24 hours for 7 days    Dizziness  ? Secondary to Xtandi  Check BP during these episodes  Maintain good PO  hydration       Prostate cancer (HCC)  remote h/o prostate cancer, s/p prostatectomy in  and more recently presenting with PSA of 360+ ng/mL with biopsy confirmed metastatic disease to the lymph nodes. On PSMA PET-CT there is extensive lymphadenopathy including mediastinal, abdomen and pelvis.    This is suggestive of Stage IV disease.   Started on lupron (6-mo) on 2025 and  Xtandi in 2025  He was doing well initially but about 2 months into treatment developed dizziness.   Xtandi now on hold. Dizziness has improved.   Excellent response in PSA - down to 0.21 ng/mL  Continue to hold Xtandi until next visit  Will re-evaluate for dose-reduction versus another agent.         Return to clinic as scheduled in early May     History of Present Illness   Chief Complaint   Patient presents with    Follow-up     Oncology History   Prostate cancer (HCC)   2019 Initial Diagnosis    Prostate cancer (HCC)     2024 Biopsy    Final Diagnosis   A. Lymph Node, left para aortic,  core Biopsy:    -Metastatic carcinoma , immunocytochemically consistent with metastatic prostatic adenocarcinoma      Note:  Tumor cells are  positive for CKC,  prostatic  markers (NKX3.1  , PSAP,) PAX2 ( patchy) and  negative for CK7,  CK20,  urothelial markers (GATA3, thrombomodulin). Controls reacted appropriately      "       12/30/2024 -  Cancer Staged    Staging form: Prostate, AJCC 8th Edition  - Clinical stage from 12/30/2024: Stage IVB (rcT4, cN1, cM1, PSA: 360) - Signed by Harvey Shankar MD on 1/28/2025  Histopathologic type: Adenocarcinoma, NOS  Stage prefix: Recurrence  Prostate specific antigen (PSA) range: 20 or greater  Stage used in treatment planning: Yes  National guidelines used in treatment planning: Yes       Malignant neoplasm of posterior wall of urinary bladder (HCC) (Resolved)   1/28/2025 Initial Diagnosis    Malignant neoplasm of posterior wall of urinary bladder (HCC)     1/28/2025 -  Cancer Staged    Staging form: Urinary Bladder, AJCC 8th Edition  - Clinical stage from 1/28/2025: Stage Unknown (cT2, cNX) - Signed by Harvey Shankar MD on 1/28/2025  Histopathologic type: Adenocarcinoma, NOS  Stage prefix: Initial diagnosis  WHO/ISUP grade (low/high): High Grade  Histologic grading system: 2 grade system           Kilo Mix is a 84 y.o. male with a prior history of prostate cancer, s/p radical prostatectomy in 1999 for \"high Estill score prostate cancer\".  He states he took hormone shots prior to the surgery for a short duration. Other past medical history is also significant for diabetes, CKD stage III, bilateral carotid artery stenosis s/p stents, peripheral vascular disease, hypertension    Patient presented with painless hematuria in November 2024.  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  In the urinary bladder, there is a large hypervascular mass extending into the right perivesicular space with associated bilateral iliac and retroperitoneal lymphadenopathy, and moderate bilateral hydronephrosis leading to occlusion of the distal ureters.    He underwent a cystoscopy on December 16 which showed a large extensive tumor involving the entire trigone of the bladder, with both ureteral orifices obscured by " the tumor.  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    Most recent labs are significant for creatinine of 1.7/GFR 35, normal CBC and a  ng/mL.     On December 24, he underwent a CT-guided biopsy of the left para-aortic lymph node. Pathology revealed metastatic prostate cancer.     He also underwent b/l nephrostomy tube placements.     PSMA PET-CT - significant for mediastinal lymphadenopathy, multiple metastatic lymph nodes in the abdomen and pelvis.  An infiltrative mass in the right posterior bladder, which is less PSMA avid than adenopathy.      Started on lupron (6-mo) and Xtandi on 1/6/2025     Underwent a cystoscopy on 1/21/25 - 5cm tumor invading entire trigone of bladder, and bladder neck and covering both ureteral orifices. Underwent TURBT. 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     Case presented at  tumor board and recommendation was for further debulking, FDG PET-CT and in the absence of distant metastatic ds, to pursue chemorad for the bladder.    FDG PET scan from February 19, 2025 showed previously noted PSMA avid lymphadenopathy in the chest, abdomen and pelvis has decreased in size.    He underwent TURBT on March 6, 2025, and pathology now shows prostatic adenocarcinoma with involvement of the muscularis propria.  On further review of pathology, the biopsy from the bladder from January 21, 2025 has now been amended and reflects extensive prostatic adenocarcinoma involving the muscularis propria, without any urothelial carcinoma.    Seen in March 2025 - c/o dizziness. Advised to try Xtandi at bedtime. Has not found that helpful.   Xtandi has been on hold for the last 2 weeks or so.     This is an urgent visit due to new symptoms    Review of Systems   Constitutional:  Positive for fatigue and unexpected weight change. Negative for fever.   Respiratory:  Negative  for cough and shortness of breath.    Cardiovascular:  Negative for chest pain and leg swelling.   Gastrointestinal:  Negative for diarrhea, nausea and vomiting.        Decreased appetite   Genitourinary:  Negative for dysuria, frequency and urgency.        Cloudy urine from the right nephrostomy tube  Decreased output from the left   Neurological:  Positive for dizziness and weakness.     Medical History Reviewed by provider this encounter:     .  Current Outpatient Medications on File Prior to Visit   Medication Sig Dispense Refill    amLODIPine (NORVASC) 5 mg tablet Take 1 tablet (5 mg total) by mouth daily 90 tablet 2    ascorbic acid (VITAMIN C) 500 mg tablet Take 500 mg by mouth daily      aspirin 81 MG tablet Take 1 tablet by mouth daily 2/25/25 per pts son - pt was to hold ASA 5 day hold - will follow same instructions that were given for January surgery      atorvastatin (LIPITOR) 20 mg tablet Take 1 tablet by mouth once daily 90 tablet 1    B Complex Vitamins (VITAMIN B COMPLEX PO) Take 1 tablet by mouth daily      Cholecalciferol 1000 units CHEW Chew 1 tablet daily      clopidogrel (PLAVIX) 75 mg tablet Take 1 tablet by mouth once daily 90 tablet 1    enzalutamide (Xtandi) 40 mg capsule Take 4 capsules (160 mg total) by mouth daily 120 capsule 5    hydroCHLOROthiazide 25 mg tablet Take 1 tablet by mouth once daily 90 tablet 1    ibuprofen (MOTRIN) 600 mg tablet Take 1 tablet (600 mg total) by mouth every 6 (six) hours as needed for mild pain 30 tablet 0    lisinopril (ZESTRIL) 10 mg tablet Take 1 tablet by mouth once daily 90 tablet 2    metFORMIN (GLUCOPHAGE-XR) 500 mg 24 hr tablet Take 1 tablet by mouth once daily 90 tablet 3    sodium chloride, PF, 0.9 % 10 mL by Intracatheter route daily Intracatheter flushing daily. May substitute prefilled syringe with normal saline 10 mL vials, 10 mL syringes, and 18 g blunt needles 900 mL 0    vitamin E, tocopherol, 400 units capsule Take 1 capsule by mouth daily   "     No current facility-administered medications on file prior to visit.      Social History     Tobacco Use    Smoking status: Former     Current packs/day: 0.00     Average packs/day: 3.0 packs/day for 10.0 years (30.0 ttl pk-yrs)     Types: Cigarettes     Start date: 1/1/2000     Quit date: 1/1/2010     Years since quitting: 15.3    Smokeless tobacco: Never    Tobacco comments:     Former smoker - quit 2010   Vaping Use    Vaping status: Never Used   Substance and Sexual Activity    Alcohol use: Yes     Comment: rare glass of wine    Drug use: Never     Comment: Denies any drug use per pt    Sexual activity: Not Currently     Comment: defer         Objective   /68 (BP Location: Left arm, Patient Position: Sitting, Cuff Size: Standard)   Pulse 69   Temp (!) 97.2 °F (36.2 °C) (Temporal)   Resp 16   Ht 5' 8\" (1.727 m)   Wt 73 kg (161 lb)   SpO2 98%   BMI 24.48 kg/m²     ECOG   0  Physical Exam  Vitals reviewed.   Constitutional:       Appearance: Normal appearance. He is not toxic-appearing.   Cardiovascular:      Rate and Rhythm: Normal rate and regular rhythm.      Pulses: Normal pulses.   Pulmonary:      Effort: Pulmonary effort is normal. No respiratory distress.      Breath sounds: Normal breath sounds.   Abdominal:      Comments: Nephrostomy tubes in place     Musculoskeletal:         General: No swelling.   Neurological:      Mental Status: He is alert.       Labs: I have reviewed the following labs:  Lab Results   Component Value Date/Time    WBC 6.51 04/15/2025 09:59 AM    RBC 3.95 04/15/2025 09:59 AM    Hemoglobin 12.8 04/15/2025 09:59 AM    Hematocrit 38.4 04/15/2025 09:59 AM    MCV 97 04/15/2025 09:59 AM    MCH 32.4 04/15/2025 09:59 AM    RDW 13.6 04/15/2025 09:59 AM    Platelets 262 04/15/2025 09:59 AM    Segmented % 70 04/15/2025 09:59 AM    Lymphocytes % 20 04/15/2025 09:59 AM    Monocytes % 7 04/15/2025 09:59 AM    Eosinophils Relative 3 04/15/2025 09:59 AM    Basophils Relative 0 " 04/15/2025 09:59 AM    Immature Grans % 0 04/15/2025 09:59 AM    Absolute Neutrophils 4.60 04/15/2025 09:59 AM     Lab Results   Component Value Date/Time    Potassium 4.4 04/15/2025 09:59 AM    Chloride 101 04/15/2025 09:59 AM    CO2 28 04/15/2025 09:59 AM    BUN 19 04/15/2025 09:59 AM    Creatinine 1.13 04/15/2025 09:59 AM    Glucose, Fasting 114 (H) 04/15/2025 09:59 AM    Calcium 9.6 04/15/2025 09:59 AM    AST 11 (L) 04/15/2025 09:59 AM    ALT 6 (L) 04/15/2025 09:59 AM    Alkaline Phosphatase 68 04/15/2025 09:59 AM    Total Protein 6.4 04/15/2025 09:59 AM    Albumin 3.9 04/15/2025 09:59 AM    Total Bilirubin 0.81 04/15/2025 09:59 AM    eGFR 59 04/15/2025 09:59 AM       Administrative Statements

## 2025-04-17 NOTE — ASSESSMENT & PLAN NOTE
remote h/o prostate cancer, s/p prostatectomy in 1999 and more recently presenting with PSA of 360+ ng/mL with biopsy confirmed metastatic disease to the lymph nodes. On PSMA PET-CT there is extensive lymphadenopathy including mediastinal, abdomen and pelvis.    This is suggestive of Stage IV disease.   Started on lupron (6-mo) on 1/6/2025 and  Xtandi in Jan 2025  He was doing well initially but about 2 months into treatment developed dizziness.   Xtandi now on hold. Dizziness has improved.   Excellent response in PSA - down to 0.21 ng/mL  Continue to hold Xtandi until next visit  Will re-evaluate for dose-reduction versus another agent.

## 2025-04-18 ENCOUNTER — TELEPHONE (OUTPATIENT)
Age: 85
End: 2025-04-18

## 2025-04-18 ENCOUNTER — TELEPHONE (OUTPATIENT)
Dept: NUTRITION | Facility: HOSPITAL | Age: 85
End: 2025-04-18

## 2025-04-18 NOTE — TELEPHONE ENCOUNTER
Called Kilo's son, Ed.   Dr. Shankar got the results of his culture. Script for bactrim was canceled and new script for levaquin was sent instead. I did talk to Ed and confirmed that he got the levaquin from the pharmacy last night. No other needs at this time. He knows to call with any questions/concerns.

## 2025-04-18 NOTE — TELEPHONE ENCOUNTER
Received notification by patient navigator (Martha Pan) on 4/17 that pt has triggered for oncology nutrition care (reason for referral: Malnutrition Screening Tool (MST) Triggers: scored a 3 indicating 14-23# (6.4-10.5 kg) recent wt loss and is eating poorly due to a decreased appetite. (Date of MST: 4/17)).    Contacted Kilo and spoke with Ed (Caretaker) today.  Introduced self and explained the reason for today's call.      Discussed oncology nutrition services available (options for in-person and phone consultation) and the benefits of meeting for a consultation.    Ed reports that pt has UTI and weight loss/poor appetite seem to be worse in setting of this. He states prior he didn't have a great appetite but was still eating. He states he was put on abx for this and hopeful that things will improve soon once this clears. He would like to wait to set up appt until after he completes medication course.     RD will call Ed back in 2x weeks. Provided contact information and encouraged to call in meantime with any questions.

## 2025-04-19 LAB
BACTERIA UR CULT: ABNORMAL
BACTERIA UR CULT: ABNORMAL

## 2025-04-24 ENCOUNTER — NURSE TRIAGE (OUTPATIENT)
Age: 85
End: 2025-04-24

## 2025-04-24 NOTE — TELEPHONE ENCOUNTER
May hold HCTZ for now.  Con't all other meds.  Needs f/u appt at some point in time (40 min) - I don't see one is scheduled.

## 2025-04-24 NOTE — TELEPHONE ENCOUNTER
"FOLLOW UP: Patient only wants to see PCP. Please ask PCP to review. Thank you  Offered visits with alternate providers and Patient declined    REASON FOR CONVERSATION: hypotention    SYMPTOMS: Patient's son called with low BP concerns. Patient taking HCTZ, Norvasc and Lisinopril. He is recovering from Uti. Also on Enzalutamide for prostate CA.  1508 /62 HR 68  1518 BP  99/54  HR 72    Son stated patient only drink 1 bottle of water daily.  He feels weak from UTI illness.    OTHER: Patient will hydrate and continue to monitor BP.  BP parameters given  Very low <80/50  Low  <90/60  Normal /60-79    DISPOSITION: See Today in Office    Reason for Disposition   Systolic BP  while taking blood pressure medications    Answer Assessment - Initial Assessment Questions  1. BLOOD PRESSURE: \"What is your blood pressure?\" \"Did you take at least two measurements 5 minutes apart?\"      112/62 HR 68  2. ONSET: \"When did you take your blood pressure?\"      1508, 1518  3. HOW: \"How did you take your blood pressure?\" (e.g., visiting nurse, automatic home BP monitor)      Home BP  4. HISTORY: \"Do you have a history of low blood pressure?\" \"What is your blood pressure normally?\"      *No Answer*  5. MEDICINES: \"Are you taking any medicines for blood pressure?\" If Yes, ask: \"Have they been changed recently?\"      *No Answer*  6. PULSE RATE: \"Do you know what your pulse rate is?\"       *No Answer*  7. OTHER SYMPTOMS: \"Have you been sick recently?\" \"Have you had a recent injury?\"      Yes-was on antibiotic for UTI   8. PREGNANCY: \"Is there any chance you are pregnant?\" \"When was your last menstrual period?\"      *No Answer*    Protocols used: Blood Pressure - Low-Adult-OH    "

## 2025-04-26 ENCOUNTER — HOSPITAL ENCOUNTER (EMERGENCY)
Facility: HOSPITAL | Age: 85
Discharge: HOME | End: 2025-04-26
Attending: EMERGENCY MEDICINE
Payer: MEDICARE

## 2025-04-26 VITALS
OXYGEN SATURATION: 97 % | HEIGHT: 68 IN | HEART RATE: 90 BPM | WEIGHT: 154 LBS | RESPIRATION RATE: 18 BRPM | DIASTOLIC BLOOD PRESSURE: 61 MMHG | SYSTOLIC BLOOD PRESSURE: 109 MMHG | TEMPERATURE: 97.7 F | BODY MASS INDEX: 23.34 KG/M2

## 2025-04-26 DIAGNOSIS — K59.00 CONSTIPATION: Primary | ICD-10-CM

## 2025-04-26 PROCEDURE — 99282 EMERGENCY DEPT VISIT SF MDM: CPT

## 2025-04-26 PROCEDURE — 99283 EMERGENCY DEPT VISIT LOW MDM: CPT | Performed by: EMERGENCY MEDICINE

## 2025-04-26 NOTE — ED PROVIDER NOTES
ED Disposition       None          Assessment & Plan       Medical Decision Making  Patient is an 84-year-old male presents for evaluation of constipation, relieved after enema.  Advised continued management outpatient for constipation.             Medications - No data to display    ED Risk Strat Scores                    No data recorded                            History of Present Illness       Chief Complaint   Patient presents with    Constipation     Hasn't had BM in atleast 5 days, having belly pain due to constipation, denies vomiting, going through treatment for stage 4 prostate cancer; tried laxatives/stool softeners with no relief       Past Medical History:   Diagnosis Date    Bladder tumor     Cancer (HCC)     Prostate cancer    Diabetes mellitus (HCC)     Diverticulitis of colon     History of common carotid artery stent placement     RIGHT stent - per pts son - RIGHT stent  in  carotid artery    History of heart artery stent     x2 stents in heart per pts son    Hyperlipidemia     Hypertension     Migraines     Myocardial infarction (HCC)     about 30yrs ago    Nephrostomy present (HCC)     2/25/25 Per pts son Ed- pt has two nephrostomy tubes    Prostate cancer (HCC)     Seasonal allergies       Past Surgical History:   Procedure Laterality Date    CARDIAC SURGERY      CAROTID ENDARTARECTOMY Right     thromboendarterectomy    CAROTID STENT Right     CATARACT EXTRACTION Left     COLONOSCOPY      fiberoptic screening 2/6/08 5 yr / complete 3/15/13 5 yr    CORONARY ANGIOPLASTY WITH STENT PLACEMENT      ELBOW SURGERY      EYE SURGERY      IR BIOPSY LYMPH NODE  12/24/2024    IR NEPHROSTOMY TUBE CHECK/CHANGE/REPOSITION/REINSERTION/UPSIZE  2/25/2025    IR NEPHROSTOMY TUBE PLACEMENT  12/24/2024    KNEE SURGERY      SD CYSTO W/REMOVAL OF LESIONS SMALL N/A 1/21/2025    Procedure: TRANSURETHRAL RESECTION OF BLADDER TUMOR (TURBT);  Surgeon: Jordan Youssef MD;  Location:  MAIN OR;  Service: Urology    SD  CYSTO W/REMOVAL OF LESIONS SMALL N/A 3/6/2025    Procedure: TRANSURETHRAL RESECTION OF BLADDER TUMOR (TURBT);  Surgeon: Jordan Youssef MD;  Location:  MAIN OR;  Service: Urology    PROSTATE SURGERY      SUPRAPUBIC PROSTATECTOMY  12/06/1999    UPPER GASTROINTESTINAL ENDOSCOPY        Family History   Problem Relation Age of Onset    Alcohol abuse Mother     No Known Problems Father     No Known Problems Sister     Substance Abuse Son     Heart attack Family         MI    Breast cancer Family     Colon cancer Neg Hx     Colon polyps Neg Hx     Anesthesia problems Neg Hx       Social History     Tobacco Use    Smoking status: Former     Current packs/day: 0.00     Average packs/day: 3.0 packs/day for 10.0 years (30.0 ttl pk-yrs)     Types: Cigarettes     Start date: 1/1/2000     Quit date: 1/1/2010     Years since quitting: 15.3    Smokeless tobacco: Never    Tobacco comments:     Former smoker - quit 2010   Vaping Use    Vaping status: Never Used   Substance Use Topics    Alcohol use: Yes     Comment: rare glass of wine    Drug use: Never     Comment: Denies any drug use per pt      E-Cigarette/Vaping    E-Cigarette Use Never User     Comments Denies any use per pt       E-Cigarette/Vaping Substances      I have reviewed and agree with the history as documented.     Patient is an 84-year-old male who presents for evaluation of constipation.  He says he has not moved his bowels in the past 5 days.  Patient has been using over-the-counter laxatives and stool softeners without much improvement.  Patient endorses some mild abdominal discomfort when he attempts to move his bowels but no current abdominal pain or vomiting.  He otherwise denies complaints.        Review of Systems   Constitutional:  Negative for fever.   HENT:  Negative for sore throat.    Eyes:  Negative for photophobia.   Respiratory:  Negative for shortness of breath.    Cardiovascular:  Negative for chest pain.   Gastrointestinal:  Positive for  constipation. Negative for abdominal pain.   Genitourinary:  Negative for dysuria.   Musculoskeletal:  Negative for back pain.   Skin:  Negative for rash.   Neurological:  Negative for light-headedness.   Hematological:  Negative for adenopathy.   Psychiatric/Behavioral:  Negative for agitation.    All other systems reviewed and are negative.          Objective       ED Triage Vitals [04/26/25 1310]   Temperature Pulse Blood Pressure Respirations SpO2 Patient Position - Orthostatic VS   97.7 °F (36.5 °C) 98 107/60 18 99 % Sitting      Temp Source Heart Rate Source BP Location FiO2 (%) Pain Score    Oral Monitor Left arm -- 8      Vitals      Date and Time Temp Pulse SpO2 Resp BP Pain Score FACES Pain Rating User   04/26/25 1310 97.7 °F (36.5 °C) 98 99 % 18 107/60 8 -- MS            Physical Exam  Vitals reviewed.   Constitutional:       General: He is not in acute distress.     Appearance: He is well-developed.   HENT:      Head: Normocephalic.   Eyes:      Pupils: Pupils are equal, round, and reactive to light.   Cardiovascular:      Rate and Rhythm: Normal rate and regular rhythm.      Heart sounds: Normal heart sounds. No murmur heard.     No friction rub. No gallop.   Pulmonary:      Effort: Pulmonary effort is normal.      Breath sounds: Normal breath sounds.   Abdominal:      General: Bowel sounds are normal. There is no distension.      Palpations: Abdomen is soft.      Tenderness: There is no abdominal tenderness. There is no guarding.   Musculoskeletal:         General: Normal range of motion.      Cervical back: Normal range of motion and neck supple.   Skin:     Capillary Refill: Capillary refill takes less than 2 seconds.   Neurological:      Mental Status: He is alert and oriented to person, place, and time.      Cranial Nerves: No cranial nerve deficit.      Sensory: No sensory deficit.      Motor: No abnormal muscle tone.   Psychiatric:         Behavior: Behavior normal.         Thought Content:  Thought content normal.         Judgment: Judgment normal.         Results Reviewed       None            No orders to display       Procedures    ED Medication and Procedure Management   Prior to Admission Medications   Prescriptions Last Dose Informant Patient Reported? Taking?   B Complex Vitamins (VITAMIN B COMPLEX PO)  Self Yes No   Sig: Take 1 tablet by mouth daily   Cholecalciferol 1000 units CHEW  Self Yes No   Sig: Chew 1 tablet daily   amLODIPine (NORVASC) 5 mg tablet  Self No No   Sig: Take 1 tablet (5 mg total) by mouth daily   ascorbic acid (VITAMIN C) 500 mg tablet  Self Yes No   Sig: Take 500 mg by mouth daily   aspirin 81 MG tablet  Self Yes No   Sig: Take 1 tablet by mouth daily 2/25/25 per pts son - pt was to hold ASA 5 day hold - will follow same instructions that were given for January surgery   atorvastatin (LIPITOR) 20 mg tablet  Self No No   Sig: Take 1 tablet by mouth once daily   clopidogrel (PLAVIX) 75 mg tablet  Self No No   Sig: Take 1 tablet by mouth once daily   enzalutamide (Xtandi) 40 mg capsule  Self No No   Sig: Take 4 capsules (160 mg total) by mouth daily   hydroCHLOROthiazide 25 mg tablet  Self No No   Sig: Take 1 tablet by mouth once daily   ibuprofen (MOTRIN) 600 mg tablet  Self No No   Sig: Take 1 tablet (600 mg total) by mouth every 6 (six) hours as needed for mild pain   lisinopril (ZESTRIL) 10 mg tablet  Self No No   Sig: Take 1 tablet by mouth once daily   metFORMIN (GLUCOPHAGE-XR) 500 mg 24 hr tablet  Self No No   Sig: Take 1 tablet by mouth once daily   sodium chloride, PF, 0.9 %  Self No No   Sig: 10 mL by Intracatheter route daily Intracatheter flushing daily. May substitute prefilled syringe with normal saline 10 mL vials, 10 mL syringes, and 18 g blunt needles   vitamin E, tocopherol, 400 units capsule  Self Yes No   Sig: Take 1 capsule by mouth daily      Facility-Administered Medications: None     Patient's Medications   Discharge Prescriptions    No medications on  file     No discharge procedures on file.  ED SEPSIS DOCUMENTATION            Ancelmo Palencia MD  04/26/25 1945

## 2025-04-29 ENCOUNTER — HOSPITAL ENCOUNTER (OUTPATIENT)
Dept: INTERVENTIONAL RADIOLOGY/VASCULAR | Facility: HOSPITAL | Age: 85
Discharge: HOME | End: 2025-04-29
Attending: RADIOLOGY
Payer: MEDICARE

## 2025-04-29 DIAGNOSIS — C67.0 MALIGNANT NEOPLASM OF TRIGONE OF URINARY BLADDER (HCC): ICD-10-CM

## 2025-04-29 DIAGNOSIS — N13.30 HYDRONEPHROSIS, BILATERAL: ICD-10-CM

## 2025-04-29 PROCEDURE — 50435 EXCHANGE NEPHROSTOMY CATH: CPT

## 2025-04-29 RX ORDER — SODIUM CHLORIDE 9 MG/ML
10 INJECTION, SOLUTION INTRAMUSCULAR; INTRAVENOUS; SUBCUTANEOUS DAILY
Qty: 300 ML | Refills: 2 | Status: SHIPPED | OUTPATIENT
Start: 2025-04-29 | End: 2025-07-28

## 2025-04-29 RX ORDER — LIDOCAINE HYDROCHLORIDE 10 MG/ML
INJECTION, SOLUTION EPIDURAL; INFILTRATION; INTRACAUDAL; PERINEURAL AS NEEDED
Status: COMPLETED | OUTPATIENT
Start: 2025-04-29 | End: 2025-04-29

## 2025-04-29 RX ADMIN — LIDOCAINE HYDROCHLORIDE 10 ML: 10 INJECTION, SOLUTION EPIDURAL; INFILTRATION; INTRACAUDAL; PERINEURAL at 11:18

## 2025-04-29 RX ADMIN — IOHEXOL 24 ML: 350 INJECTION, SOLUTION INTRAVENOUS at 11:37

## 2025-04-29 NOTE — DISCHARGE INSTRUCTIONS
Nephrostomy Tube Care     WHAT YOU NEED TO KNOW:   A nephrostomy tube is a catheter (thin plastic tube) that is inserted through your skin and into your kidney. The nephrostomy tube drains urine from your kidney into a collecting bag outside your body. You may need a nephrostomy tube when something is blocking the normal flow of urine. A nephrostomy tube may be used for a short or a long period of time. The nephrostomy tube comes out of your back, so you will need someone to help care for your nephrostomy tube.          DISCHARGE INSTRUCTIONS:      How to clean the skin around the nephrostomy tube and change the bandage:  Since the nephrostomy tube comes out of your back, you will not be able to care for it by yourself. Ask someone to follow the general directions below to check and care for your nephrostomy tube.   Gather the items you will need.          Disposable (single use) under-pad, and a clean washcloth  Plain soap, warm water, and new medical gloves  Sterile gauze bandages  Clear adhesive dressing or medical tape  Skin barrier  Protective skin film  Trash bag  Remove the old bandage, and check the tube entry site.    Have the patient lie on his side with the nephrostomy tube entry site facing up. Place the under-pad where it will catch drainage as you are working with the nephrostomy tube.   Wash your hands with soap and water. Put on new medical gloves.  Gently remove the old bandage, without pulling on the tube. Do this by holding the skin beside the tube with one hand. With the other hand, gently remove sticky tape and the skin barrier by pulling in the same direction as hair growth. Do not touch the side of the bandage that is placed over or around the tube. Throw the bandage and skin barrier away in a trash bag.  Look for signs of infection, such as skin redness and swelling. Report any skin changes to healthcare providers.  Clean the tube entry site.    Hold the tube in place to keep it from  being pulled out while you are cleaning around it.  You will need to clean the area twice. For the first cleaning, wet a new gauze bandage with soap and water.  Begin at the entry site of the tube. Wipe the skin in circles, moving away from the entry site. Remove blood and any other material with the gauze. Do this as often as needed. Use a new gauze bandage each time you clean the area, moving away from the entry site.   For the second cleaning, wet a new gauze bandage with water. Begin at the entry site of the tube. Wipe the skin in circles, moving away from the entry site. Use a new gauze bandage each time you clean the area, moving away from the entry site.   Gently pat the skin with a clean washcloth to dry it.    Apply the skin barrier and bandages.    Roll up a bandage to make it thick, and place it under  the place where the tube enters the skin. Place it to support the tube, and stop it from kinking or bending. Tape the bandage in place, and apply more bandages if directed by a healthcare provider.   Bring the tubing forward to the front and tape it to the skin. Do not stretch the tube tight, because this may pull the nephrostomy tube out.  How often to change the bandage.  Change the bandage around the tube, every other day. If your bandages  get dirty or wet, change them right away, and as often as needed. If your nephrostomy tube is to be used for a long period of time, the tube needs to be changed every 2 to 3 months. Healthcare providers will tell you when you need to make an appointment to have your tube changed.     How to care for the urine drainage bag:   Ask if you need to measure and write down how much urine is in the bag before you empty it. Drain urine out of the drainage bag when it is ½ to ? full. Open the spout at the bottom of the bag to empty the urine into the toilet.   You may need to detach the drainage bag from the nephrostomy tube to change it.. If so, attach a new drainage bag  tightly to the nephrostomy tube.     How to prevent problems with your nephrostomy tube:   Change bandages, directed.  This helps to prevent infection. Throw away or clean your drainage bag as directed by your healthcare provider.    Wipe the connecting ends of the drainage bag with alcohol before you reconnect the bag to the tube.  This helps prevent infection.     Keep the tube taped to your skin and connected to a drainage bag placed below the level of your kidneys.  This helps prevent urine from backing up into your kidneys. You may wear a small drainage bag strapped to your leg to let you move around more easily.    Check the catheter to be sure it is in place after you change your clothes or do other activities.  Do not wear tight clothing over the tube. Place the tubing over your thigh rather than under it when you are sitting down. Be sure that nothing is pulling on the nephrostomy tube when you move around.    Change positions if you see little or no urine in your drainage bag.  Check to see if the urine tube is twisted or bent. Be sure that you are not sitting or lying on the tube. If there are no kinks and there is little or no urine in the drainage bag, tell your healthcare provider.    Flush out the tube as directed. Some tubes get flushed one time a day with 10 mls of NSS You will be given a prescription for the flushes.  To flush the nephrostomy tube, clean both connections with alcohol swap. Twist off the drainage bag tube and twist the saline syringe into the nephrostomy tube and flush briskly. Remove the syringe and twist the drainage bag tube back into the nephrostomy tube.  Keep the site covered while you shower.  Tape a piece of clear adhesive plastic over the dressing to keep it dry while you shower. Do not take tub baths.    Contact Interventional Radiology at [phone] ([facility] PATIENTS: Contact Interventional Radiology at [phone]) ([facility] PATIENTS: Contact Interventional Radiology at  [phone]) if:  The skin around the nephrostomy tube is red, swollen, itches, or has a rash.   You have a fever greater than 101 or chills.  You have lower back or hip pain.  There are changes in how your urine looks or smells.  You have little or no urine draining from the nephrostomy tube.   You have nausea and are vomiting.  The black ashley on your tube has moved, or the tube is longer than when it was put in.   You have questions or concerns about your condition or care.  The nephrostomy tube comes out completely.   There is blood, pus, or a bad smell coming from the place where the tube enters your skin.  Urine is leaking around the tube.        The following pharmacies carry the flush syringes.       [facility]                     [address]                     [address] [facility]                    [phone]  [place]                       [place]  Phone [phone]            Phone [phone]                 [facility]                                                                                                   [phone]  [facility]             [facility]                             [address] [place], [place] [address] [facility]   Alex [place]                                 [phone]  Phone [phone]            Phone [phone]    [facility]                                                                         [facility] [phone]  [address]reet [address].  [place]   Phone [phone]

## 2025-04-29 NOTE — BRIEF OP NOTE (RAD/CATH)
INTERVENTIONAL RADIOLOGY PROCEDURE NOTE    Date: 4/29/2025    Procedure: Nephrostograma nd tube change  Procedure Summary       Date: 04/29/25 Room / Location: [facility] Interventional Radiology    Anesthesia Start:  Anesthesia Stop:     Procedure: IR NEPHROSTOMY TUBE CHECK/CHANGE/REPOSITION/REINSERTION/UPSIZE Diagnosis:       Malignant neoplasm of trigone of urinary bladder (HCC)      Hydronephrosis, bilateral      (routine)    Scheduled Providers:  Responsible Provider:     Anesthesia Type: Not recorded ASA Status: Not recorded            Preoperative diagnosis:   1. Malignant neoplasm of trigone of urinary bladder (HCC)    2. Hydronephrosis, bilateral         Postoperative diagnosis: Same.    Surgeon: Aleks Stapleton MD     Assistant: None. No qualified resident was available.    Blood loss: None    Specimens: None     Findings: Left nephrostogram showed non dilated system with complete occlusion at UV junction. Attempts to bypass with wire unsuccessful. A new 10 Fr APD replaced and connected to bag.    Right nephrostogram showed non dilated system and no flow into bladder. Using kumpe catheter distal ureter accessed and repeat injection showed no flow into bladder. However a wire could  be passed into bladder without difficulty opacifying a small contracted bladder. A new 10 fr APD placed in left renal pelvis and connected to a bag.  Both sutured in place.    Discussed with son and OK to flush every few days as long as urine is clear with no sediment and draining well.    Discussed with Dr Youssef.      Complications: None immediate.    Anesthesia: local

## 2025-05-02 ENCOUNTER — TELEPHONE (OUTPATIENT)
Dept: NUTRITION | Facility: HOSPITAL | Age: 85
End: 2025-05-02

## 2025-05-02 NOTE — TELEPHONE ENCOUNTER
Contacted son to follow up on Kilo's nutrition and see if he was interested in setting up an appointment or if symptoms improved. No answer. Left voicemail message explaining reason for call with RD contact information.

## 2025-05-06 ENCOUNTER — OFFICE VISIT (OUTPATIENT)
Age: 85
End: 2025-05-06
Payer: MEDICARE

## 2025-05-06 VITALS
HEART RATE: 65 BPM | RESPIRATION RATE: 16 BRPM | OXYGEN SATURATION: 98 % | HEIGHT: 68 IN | TEMPERATURE: 97.4 F | SYSTOLIC BLOOD PRESSURE: 133 MMHG | WEIGHT: 161 LBS | BODY MASS INDEX: 24.4 KG/M2 | DIASTOLIC BLOOD PRESSURE: 60 MMHG

## 2025-05-06 DIAGNOSIS — C61 PROSTATE CANCER (HCC): Primary | ICD-10-CM

## 2025-05-06 PROCEDURE — 99214 OFFICE O/P EST MOD 30 MIN: CPT | Performed by: INTERNAL MEDICINE

## 2025-05-06 PROCEDURE — G2211 COMPLEX E/M VISIT ADD ON: HCPCS | Performed by: INTERNAL MEDICINE

## 2025-05-06 NOTE — ASSESSMENT & PLAN NOTE
remote h/o prostate cancer, s/p prostatectomy in 1999 and more recently presenting with PSA of 360+ ng/mL with biopsy confirmed metastatic disease to the lymph nodes. On PSMA PET-CT there is extensive lymphadenopathy including mediastinal, abdomen and pelvis.  Stage IV disease.   Started on lupron (6-mo) on 1/6/2025 and  Xtandi in Jan 2025  He was doing well initially but about 2 months into treatment developed dizziness.   Excellent response in PSA - down to 0.21 ng/mL as of mid April 2025.    Xtandi has been on hold since early April 2025. His dizziness has almost resolved but he continues to c/o feeling extremely weak although sounds like he remains quite active.   He is not happy with the current energy level. We discussed holding off on adding another oral agent at this time. I would like him to continue on the Lupron. I will see him back in 2 months with a repeat PSA at that time. If there is up trend on his PSA, we can discuss alternate agents such as Zytiga/Erleada.       Orders:    Comprehensive metabolic panel; Future    PSA Total, Diagnostic; Future

## 2025-05-06 NOTE — PROGRESS NOTES
Name: Kilo Mix      : 1940      MRN: [id number]  Encounter Provider: Harvey Shankar MD  Encounter Date: 2025   Encounter department: [facility]  :  Assessment & Plan  Prostate cancer (HCC)  remote h/o prostate cancer, s/p prostatectomy in  and more recently presenting with PSA of 360+ ng/mL with biopsy confirmed metastatic disease to the lymph nodes. On PSMA PET-CT there is extensive lymphadenopathy including mediastinal, abdomen and pelvis.  Stage IV disease.   Started on lupron (6-mo) on 2025 and  Xtandi in 2025  He was doing well initially but about 2 months into treatment developed dizziness.   Excellent response in PSA - down to 0.21 ng/mL as of mid 2025.    Xtandi has been on hold since early 2025. His dizziness has almost resolved but he continues to c/o feeling extremely weak although sounds like he remains quite active.   He is not happy with the current energy level. We discussed holding off on adding another oral agent at this time. I would like him to continue on the Lupron. I will see him back in 2 months with a repeat PSA at that time. If there is up trend on his PSA, we can discuss alternate agents such as Zytiga/Erleada.       Orders:    Comprehensive metabolic panel; Future    PSA Total, Diagnostic; Future        History of Present Illness   Chief Complaint   Patient presents with    Follow-up     Oncology History   Prostate cancer (HCC)   2019 Initial Diagnosis    Prostate cancer (HCC)     2024 Biopsy    Final Diagnosis   A. Lymph Node, left para aortic,  core Biopsy:    -Metastatic carcinoma , immunocytochemically consistent with metastatic prostatic adenocarcinoma      Note:  Tumor cells are  positive for CKC,  prostatic  markers (NKX3.1  , PSAP,) PAX2 ( patchy) and  negative for CK7,  CK20,  urothelial markers (GATA3, thrombomodulin). Controls reacted appropriately            2024 -   "Cancer Staged    Staging form: Prostate, AJCC 8th Edition  - Clinical stage from 12/30/2024: Stage IVB (rcT4, cN1, cM1, PSA: 360) - Signed by Harvey Shankar MD on 1/28/2025  Histopathologic type: Adenocarcinoma, NOS  Stage prefix: Recurrence  Prostate specific antigen (PSA) range: 20 or greater  Stage used in treatment planning: Yes  National guidelines used in treatment planning: Yes       Malignant neoplasm of posterior wall of urinary bladder (HCC) (Resolved)   1/28/2025 Initial Diagnosis    Malignant neoplasm of posterior wall of urinary bladder (HCC)     1/28/2025 -  Cancer Staged    Staging form: Urinary Bladder, AJCC 8th Edition  - Clinical stage from 1/28/2025: Stage Unknown (cT2, cNX) - Signed by Harvey Shankar MD on 1/28/2025  Histopathologic type: Adenocarcinoma, NOS  Stage prefix: Initial diagnosis  WHO/ISUP grade (low/high): High Grade  Histologic grading system: 2 grade system           Kilo Mix is a 84 y.o. male with a prior history of prostate cancer, s/p radical prostatectomy in 1999 for \"high Rocio score prostate cancer\".  He states he took hormone shots prior to the surgery for a short duration. Other past medical history is also significant for diabetes, CKD stage III, bilateral carotid artery stenosis s/p stents, peripheral vascular disease, hypertension    Patient presented with painless hematuria in November 2024.  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  In the urinary bladder, there is a large hypervascular mass extending into the right perivesicular space with associated bilateral iliac and retroperitoneal lymphadenopathy, and moderate bilateral hydronephrosis leading to occlusion of the distal ureters.    He underwent a cystoscopy on December 16 which showed a large extensive tumor involving the entire trigone of the bladder, with both ureteral orifices obscured by the tumor.  Urine " cytology was suspicious for high-grade urothelial carcinoma.    Labs were significant for creatinine of 1.7/GFR 35, normal CBC and a  ng/mL.     On December 24,2024 he underwent a CT-guided biopsy of the left para-aortic lymph node. Pathology revealed metastatic prostate cancer.     He also underwent b/l nephrostomy tube placements.     PSMA PET-CT - significant for mediastinal lymphadenopathy, multiple metastatic lymph nodes in the abdomen and pelvis.  An infiltrative mass in the right posterior bladder, which is less PSMA avid than adenopathy.      Started on lupron (6-mo) and Xtandi on 1/6/2025     Underwent a cystoscopy on 1/21/25 - 5cm tumor invading entire trigone of bladder, and bladder neck and covering both ureteral orifices. Underwent TURBT. 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     Case presented at  tumor board and recommendation was for further debulking, FDG PET-CT and in the absence of distant metastatic ds, to pursue chemorad for the bladder.    FDG PET scan from February 19, 2025 showed previously noted PSMA avid lymphadenopathy in the chest, abdomen and pelvis has decreased in size.    He underwent TURBT on March 6, 2025, and pathology now shows prostatic adenocarcinoma with involvement of the muscularis propria.  On further review of pathology, the biopsy from the bladder from January 21, 2025 has now been amended and reflects extensive prostatic adenocarcinoma involving the muscularis propria, without any urothelial carcinoma.    Seen in March 2025 - c/o dizziness. Advised to try Xtandi at bedtime. Has not found that helpful. Xtandi has been on hold since early April/end of March 2025 due to ongoing dizziness.          Review of Systems   Constitutional:  Positive for appetite change and fatigue.   Respiratory:  Negative for shortness of breath.    Cardiovascular:  Negative for chest  pain, palpitations and leg swelling.   Endocrine:        Hot flashes +   Genitourinary:         Recent change in nephrostomy tubes   Neurological:  Positive for dizziness (almost resolved completely. BP reads have been low. now off HCTZ).     Medical History Reviewed by provider this encounter:     .  Current Outpatient Medications on File Prior to Visit   Medication Sig Dispense Refill    amLODIPine (NORVASC) 5 mg tablet Take 1 tablet (5 mg total) by mouth daily 90 tablet 2    ascorbic acid (VITAMIN C) 500 mg tablet Take 500 mg by mouth daily      aspirin 81 MG tablet Take 1 tablet by mouth daily 2/25/25 per pts son - pt was to hold ASA 5 day hold - will follow same instructions that were given for January surgery      atorvastatin (LIPITOR) 20 mg tablet Take 1 tablet by mouth once daily 90 tablet 1    B Complex Vitamins (VITAMIN B COMPLEX PO) Take 1 tablet by mouth daily      Cholecalciferol 1000 units CHEW Chew 1 tablet daily      clopidogrel (PLAVIX) 75 mg tablet Take 1 tablet by mouth once daily 90 tablet 1    enzalutamide (Xtandi) 40 mg capsule Take 4 capsules (160 mg total) by mouth daily 120 capsule 5    hydroCHLOROthiazide 25 mg tablet Take 1 tablet by mouth once daily 90 tablet 1    ibuprofen (MOTRIN) 600 mg tablet Take 1 tablet (600 mg total) by mouth every 6 (six) hours as needed for mild pain 30 tablet 0    lisinopril (ZESTRIL) 10 mg tablet Take 1 tablet by mouth once daily 90 tablet 2    metFORMIN (GLUCOPHAGE-XR) 500 mg 24 hr tablet Take 1 tablet by mouth once daily 90 tablet 3    sodium chloride, PF, 0.9 % 10 mL by Intracatheter route daily Intracatheter flushing daily. May substitute prefilled syringe with normal saline 10 mL vials, 10 mL syringes, and 18 g blunt needles 900 mL 0    sodium chloride, PF, 0.9 % 10 mL by Intracatheter route daily Intracatheter flushing daily. May substitute prefilled syringe with normal saline 10 mL vials, 10 mL syringes, and 18 g blunt needles 300 mL 2    vitamin E,  "tocopherol, 400 units capsule Take 1 capsule by mouth daily       No current facility-administered medications on file prior to visit.      Social History     Tobacco Use    Smoking status: Former     Current packs/day: 0.00     Average packs/day: 3.0 packs/day for 10.0 years (30.0 ttl pk-yrs)     Types: Cigarettes     Start date: 1/1/2000     Quit date: 1/1/2010     Years since quitting: 15.3    Smokeless tobacco: Never    Tobacco comments:     Former smoker - quit 2010   Vaping Use    Vaping status: Never Used   Substance and Sexual Activity    Alcohol use: Yes     Comment: rare glass of wine    Drug use: Never     Comment: Denies any drug use per pt    Sexual activity: Not Currently     Comment: defer         Objective   Ht 5' 8\" (1.727 m)   Wt 73 kg (161 lb)   BMI 24.48 kg/m²     ECOG   0  Physical Exam  Vitals reviewed.   Constitutional:       Appearance: Normal appearance.   Neurological:      Mental Status: He is alert.         Labs: I have reviewed the following labs:  Lab Results   Component Value Date/Time    WBC 6.51 04/15/2025 09:59 AM    RBC 3.95 04/15/2025 09:59 AM    Hemoglobin 12.8 04/15/2025 09:59 AM    Hematocrit 38.4 04/15/2025 09:59 AM    MCV 97 04/15/2025 09:59 AM    MCH 32.4 04/15/2025 09:59 AM    RDW 13.6 04/15/2025 09:59 AM    Platelets 262 04/15/2025 09:59 AM    Segmented % 70 04/15/2025 09:59 AM    Lymphocytes % 20 04/15/2025 09:59 AM    Monocytes % 7 04/15/2025 09:59 AM    Eosinophils Relative 3 04/15/2025 09:59 AM    Basophils Relative 0 04/15/2025 09:59 AM    Immature Grans % 0 04/15/2025 09:59 AM    Absolute Neutrophils 4.60 04/15/2025 09:59 AM     Lab Results   Component Value Date/Time    Potassium 4.4 04/15/2025 09:59 AM    Chloride 101 04/15/2025 09:59 AM    CO2 28 04/15/2025 09:59 AM    BUN 19 04/15/2025 09:59 AM    Creatinine 1.13 04/15/2025 09:59 AM    Glucose, Fasting 114 (H) 04/15/2025 09:59 AM    Calcium 9.6 04/15/2025 09:59 AM    AST 11 (L) 04/15/2025 09:59 AM    ALT 6 (L) " 04/15/2025 09:59 AM    Alkaline Phosphatase 68 04/15/2025 09:59 AM    Total Protein 6.4 04/15/2025 09:59 AM    Albumin 3.9 04/15/2025 09:59 AM    Total Bilirubin 0.81 04/15/2025 09:59 AM    eGFR 59 04/15/2025 09:59 AM       Administrative Statements

## 2025-05-10 ENCOUNTER — APPOINTMENT (EMERGENCY)
Dept: RADIOLOGY | Facility: HOSPITAL | Age: 85
DRG: 233 | End: 2025-05-10
Payer: MEDICARE

## 2025-05-10 ENCOUNTER — HOSPITAL ENCOUNTER (INPATIENT)
Facility: HOSPITAL | Age: 85
LOS: 18 days | Discharge: NURSING, REHAB OR LONG-TERM CARE | DRG: 233 | End: 2025-05-28
Attending: EMERGENCY MEDICINE | Admitting: THORACIC SURGERY (CARDIOTHORACIC VASCULAR SURGERY)
Payer: MEDICARE

## 2025-05-10 DIAGNOSIS — C61 PROSTATE CANCER (HCC): ICD-10-CM

## 2025-05-10 DIAGNOSIS — I65.22 INTERNAL CAROTID ARTERY STENOSIS, LEFT: ICD-10-CM

## 2025-05-10 DIAGNOSIS — I25.10 CAD (CORONARY ARTERY DISEASE): ICD-10-CM

## 2025-05-10 DIAGNOSIS — I21.4 NSTEMI (NON-ST ELEVATED MYOCARDIAL INFARCTION) (HCC): ICD-10-CM

## 2025-05-10 DIAGNOSIS — N18.30 CKD STAGE 3 DUE TO TYPE 2 DIABETES MELLITUS (HCC): ICD-10-CM

## 2025-05-10 DIAGNOSIS — Z95.1 S/P CABG X 3: Primary | ICD-10-CM

## 2025-05-10 DIAGNOSIS — I65.21 STENOSIS OF RIGHT EXTERNAL CAROTID ARTERY: ICD-10-CM

## 2025-05-10 DIAGNOSIS — I25.119 CORONARY ARTERY DISEASE INVOLVING NATIVE CORONARY ARTERY OF NATIVE HEART WITH ANGINA PECTORIS (HCC): ICD-10-CM

## 2025-05-10 DIAGNOSIS — E11.22 CKD STAGE 3 DUE TO TYPE 2 DIABETES MELLITUS (HCC): ICD-10-CM

## 2025-05-10 PROBLEM — Z98.890: Status: ACTIVE | Noted: 2025-05-10

## 2025-05-10 LAB
2HR DELTA HS TROPONIN: 798 NG/L
ANION GAP SERPL CALCULATED.3IONS-SCNC: 10 MMOL/L (ref 4–13)
APTT PPP: 29 SECONDS (ref 23–34)
ATRIAL RATE: 105 BPM
BASOPHILS # BLD MANUAL: 0.12 THOUSAND/UL (ref 0–0.1)
BASOPHILS NFR MAR MANUAL: 1 % (ref 0–1)
BUN SERPL-MCNC: 22 MG/DL (ref 5–25)
CALCIUM SERPL-MCNC: 9.1 MG/DL (ref 8.4–10.2)
CARDIAC TROPONIN I PNL SERPL HS: 1071 NG/L (ref ?–50)
CARDIAC TROPONIN I PNL SERPL HS: 273 NG/L (ref ?–50)
CHLORIDE SERPL-SCNC: 105 MMOL/L (ref 96–108)
CO2 SERPL-SCNC: 24 MMOL/L (ref 21–32)
CREAT SERPL-MCNC: 1.14 MG/DL (ref 0.6–1.3)
EOSINOPHIL # BLD MANUAL: 0 THOUSAND/UL (ref 0–0.4)
EOSINOPHIL NFR BLD MANUAL: 0 % (ref 0–6)
ERYTHROCYTE [DISTWIDTH] IN BLOOD BY AUTOMATED COUNT: 13.2 % (ref 11.6–15.1)
GFR SERPL CREATININE-BSD FRML MDRD: 59 ML/MIN/1.73SQ M
GLUCOSE SERPL-MCNC: 190 MG/DL (ref 65–140)
HCT VFR BLD AUTO: 38.9 % (ref 36.5–49.3)
HGB BLD-MCNC: 13 G/DL (ref 12–17)
INR PPP: 0.96 (ref 0.85–1.19)
LYMPHOCYTES # BLD AUTO: 0.37 THOUSAND/UL (ref 0.6–4.47)
LYMPHOCYTES # BLD AUTO: 3 % (ref 14–44)
MCH RBC QN AUTO: 32.6 PG (ref 26.8–34.3)
MCHC RBC AUTO-ENTMCNC: 33.4 G/DL (ref 31.4–37.4)
MCV RBC AUTO: 98 FL (ref 82–98)
MONOCYTES # BLD AUTO: 0.62 THOUSAND/UL (ref 0–1.22)
MONOCYTES NFR BLD: 5 % (ref 4–12)
NEUTROPHILS # BLD MANUAL: 11.35 THOUSAND/UL (ref 1.85–7.62)
NEUTS SEG NFR BLD AUTO: 91 % (ref 43–75)
P AXIS: 34 DEGREES
PLATELET # BLD AUTO: 228 THOUSANDS/UL (ref 149–390)
PLATELET BLD QL SMEAR: ADEQUATE
PMV BLD AUTO: 9.6 FL (ref 8.9–12.7)
POTASSIUM SERPL-SCNC: 3.6 MMOL/L (ref 3.5–5.3)
PR INTERVAL: 220 MS
PROTHROMBIN TIME: 13.1 SECONDS (ref 12.3–15)
QRS AXIS: -46 DEGREES
QRSD INTERVAL: 124 MS
QT INTERVAL: 366 MS
QTC INTERVAL: 483 MS
RBC # BLD AUTO: 3.99 MILLION/UL (ref 3.88–5.62)
RBC MORPH BLD: NORMAL
SODIUM SERPL-SCNC: 139 MMOL/L (ref 135–147)
T WAVE AXIS: 103 DEGREES
VENTRICULAR RATE: 105 BPM
WBC # BLD AUTO: 12.47 THOUSAND/UL (ref 4.31–10.16)

## 2025-05-10 PROCEDURE — 99285 EMERGENCY DEPT VISIT HI MDM: CPT

## 2025-05-10 PROCEDURE — 93005 ELECTROCARDIOGRAM TRACING: CPT

## 2025-05-10 PROCEDURE — 71045 X-RAY EXAM CHEST 1 VIEW: CPT

## 2025-05-10 PROCEDURE — 84484 ASSAY OF TROPONIN QUANT: CPT

## 2025-05-10 PROCEDURE — 96375 TX/PRO/DX INJ NEW DRUG ADDON: CPT

## 2025-05-10 PROCEDURE — 99291 CRITICAL CARE FIRST HOUR: CPT | Performed by: EMERGENCY MEDICINE

## 2025-05-10 PROCEDURE — 85007 BL SMEAR W/DIFF WBC COUNT: CPT

## 2025-05-10 PROCEDURE — 85027 COMPLETE CBC AUTOMATED: CPT

## 2025-05-10 PROCEDURE — 36415 COLL VENOUS BLD VENIPUNCTURE: CPT

## 2025-05-10 PROCEDURE — 96365 THER/PROPH/DIAG IV INF INIT: CPT

## 2025-05-10 PROCEDURE — 85610 PROTHROMBIN TIME: CPT

## 2025-05-10 PROCEDURE — 84484 ASSAY OF TROPONIN QUANT: CPT | Performed by: STUDENT IN AN ORGANIZED HEALTH CARE EDUCATION/TRAINING PROGRAM

## 2025-05-10 PROCEDURE — 80048 BASIC METABOLIC PNL TOTAL CA: CPT

## 2025-05-10 PROCEDURE — 85730 THROMBOPLASTIN TIME PARTIAL: CPT

## 2025-05-10 PROCEDURE — 99223 1ST HOSP IP/OBS HIGH 75: CPT | Performed by: STUDENT IN AN ORGANIZED HEALTH CARE EDUCATION/TRAINING PROGRAM

## 2025-05-10 RX ORDER — SODIUM CHLORIDE 9 MG/ML
10 INJECTION INTRAVENOUS DAILY
Status: DISCONTINUED | OUTPATIENT
Start: 2025-05-11 | End: 2025-05-21

## 2025-05-10 RX ORDER — HEPARIN SODIUM 10000 [USP'U]/100ML
3-20 INJECTION, SOLUTION INTRAVENOUS
Status: DISCONTINUED | OUTPATIENT
Start: 2025-05-10 | End: 2025-05-12

## 2025-05-10 RX ORDER — CLOPIDOGREL BISULFATE 75 MG/1
75 TABLET ORAL DAILY
Status: DISCONTINUED | OUTPATIENT
Start: 2025-05-11 | End: 2025-05-12

## 2025-05-10 RX ORDER — HEPARIN SODIUM 1000 [USP'U]/ML
4000 INJECTION, SOLUTION INTRAVENOUS; SUBCUTANEOUS EVERY 6 HOURS PRN
Status: DISCONTINUED | OUTPATIENT
Start: 2025-05-10 | End: 2025-05-12

## 2025-05-10 RX ORDER — HEPARIN SODIUM 1000 [USP'U]/ML
2000 INJECTION, SOLUTION INTRAVENOUS; SUBCUTANEOUS EVERY 6 HOURS PRN
Status: DISCONTINUED | OUTPATIENT
Start: 2025-05-10 | End: 2025-05-12

## 2025-05-10 RX ORDER — ATORVASTATIN CALCIUM 40 MG/1
40 TABLET, FILM COATED ORAL DAILY
Status: DISCONTINUED | OUTPATIENT
Start: 2025-05-11 | End: 2025-05-21

## 2025-05-10 RX ORDER — HEPARIN SODIUM 1000 [USP'U]/ML
4000 INJECTION, SOLUTION INTRAVENOUS; SUBCUTANEOUS ONCE
Status: COMPLETED | OUTPATIENT
Start: 2025-05-10 | End: 2025-05-10

## 2025-05-10 RX ORDER — LISINOPRIL 10 MG/1
10 TABLET ORAL DAILY
Status: DISCONTINUED | OUTPATIENT
Start: 2025-05-11 | End: 2025-05-14

## 2025-05-10 RX ORDER — ASPIRIN 81 MG/1
81 TABLET, CHEWABLE ORAL DAILY
Status: DISCONTINUED | OUTPATIENT
Start: 2025-05-11 | End: 2025-05-21

## 2025-05-10 RX ORDER — AMLODIPINE BESYLATE 5 MG/1
5 TABLET ORAL DAILY
Status: DISCONTINUED | OUTPATIENT
Start: 2025-05-11 | End: 2025-05-21

## 2025-05-10 RX ADMIN — HEPARIN SODIUM 12 UNITS/KG/HR: 10000 INJECTION, SOLUTION INTRAVENOUS at 20:53

## 2025-05-10 RX ADMIN — HEPARIN SODIUM 4000 UNITS: 1000 INJECTION, SOLUTION INTRAVENOUS; SUBCUTANEOUS at 20:52

## 2025-05-10 NOTE — ED PROVIDER NOTES
Time reflects when diagnosis was documented in both MDM as applicable and the Disposition within this note       Time User Action Codes Description Comment    5/10/2025  9:12 PM Cruzito Jewell Add [I21.4] NSTEMI (non-ST elevated myocardial infarction) (HCC)     5/12/2025  5:48 AM RobyLeo Modify [I21.4] NSTEMI (non-ST elevated myocardial infarction) (HCC)     5/12/2025 11:23 AM Mayur Lopez Add [I25.10] CAD (coronary artery disease)     5/12/2025  4:14 PM Juan Daniel Loyd Add [C61] Prostate cancer (HCC)           ED Disposition       ED Disposition   Admit    Condition   Stable    Date/Time   Sat May 10, 2025  9:12 PM    Comment                  Assessment & Plan       Medical Decision Making  Patient is an 84-year-old male presenting with chest pain.    Differential includes but not limited to ACS, less likely infectious, doubt dissection.  Cardiac workup.  Workup consistent with NSTEMI.  Patient received aspirin and Plavix already today.  Started heparin GTT.    Patient admitted for further management and workup.    Amount and/or Complexity of Data Reviewed  Labs: ordered.  Radiology: ordered and independent interpretation performed.    Risk  Prescription drug management.  Decision regarding hospitalization.             Medications   amLODIPine (NORVASC) tablet 5 mg (has no administration in time range)   aspirin chewable tablet 81 mg (has no administration in time range)   atorvastatin (LIPITOR) tablet 40 mg (has no administration in time range)   lisinopril (ZESTRIL) tablet 10 mg (has no administration in time range)   sodium chloride (PF) 0.9 % injection 10 mL (has no administration in time range)   sodium chloride (PF) 0.9 % injection 10 mL (has no administration in time range)   heparin (porcine) injection 4,000 Units (4,000 Units Intravenous Given 5/10/25 2052)       ED Risk Strat Scores   HEART Risk Score      Flowsheet Row Most Recent Value   Heart Score Risk Calculator    History 2 Filed at:  05/10/2025 2035   ECG 1 Filed at: 05/10/2025 2035   Age 2 Filed at: 05/10/2025 2035   Risk Factors 2 Filed at: 05/10/2025 2035   Troponin 2 Filed at: 05/10/2025 2035   HEART Score 9 Filed at: 05/10/2025 2035          HEART Risk Score      Flowsheet Row Most Recent Value   Heart Score Risk Calculator    History 2 Filed at: 05/10/2025 2035   ECG 1 Filed at: 05/10/2025 2035   Age 2 Filed at: 05/10/2025 2035   Risk Factors 2 Filed at: 05/10/2025 2035   Troponin 2 Filed at: 05/10/2025 2035   HEART Score 9 Filed at: 05/10/2025 2035                      No data recorded        SBIRT 22yo+      Flowsheet Row Most Recent Value   Initial Alcohol Screen: US AUDIT-C     1. How often do you have a drink containing alcohol? 0 Filed at: 05/10/2025 1924   2. How many drinks containing alcohol do you have on a typical day you are drinking?  0 Filed at: 05/10/2025 1924   3a. Male UNDER 65: How often do you have five or more drinks on one occasion? 0 Filed at: 05/10/2025 1924   3b. FEMALE Any Age, or MALE 65+: How often do you have 4 or more drinks on one occassion? 0 Filed at: 05/10/2025 1924   Audit-C Score 0 Filed at: 05/10/2025 1924   SANGEETA: How many times in the past year have you...    Used an illegal drug or used a prescription medication for non-medical reasons? Never Filed at: 05/10/2025 1924                            History of Present Illness       Chief Complaint   Patient presents with    Chest Pain     Pt complains of intermittent, worsening chest pain starting a few weeks ago.Pain radiated into jaw and hand today. Hx of MI and stents. Was given 324mg ASA at home.       Past Medical History:   Diagnosis Date    Bladder tumor     Cancer (HCC)     Prostate cancer    Diabetes mellitus (HCC)     Diverticulitis of colon     History of common carotid artery stent placement     RIGHT stent - per pts son - RIGHT stent  in  carotid artery    History of heart artery stent     x2 stents in heart per pts son    Hyperlipidemia      Hypertension     Migraines     Myocardial infarction (HCC)     about 30yrs ago    Nephrostomy present (HCC)     2/25/25 Per pts son Ed- pt has two nephrostomy tubes    Prostate cancer (HCC)     Seasonal allergies       Past Surgical History:   Procedure Laterality Date    CARDIAC CATHETERIZATION N/A 5/12/2025    Procedure: Cardiac Catheterization;  Surgeon: Daniel Mabry DO;  Location:  CARDIAC CATH LAB;  Service: Cardiology    CARDIAC SURGERY      CAROTID ENDARTARECTOMY Right     thromboendarterectomy    CAROTID STENT Right     CATARACT EXTRACTION Left     COLONOSCOPY      fiberoptic screening 2/6/08 5 yr / complete 3/15/13 5 yr    CORONARY ANGIOPLASTY WITH STENT PLACEMENT      ELBOW SURGERY      EYE SURGERY      IR BIOPSY LYMPH NODE  12/24/2024    IR NEPHROSTOMY TUBE CHECK/CHANGE/REPOSITION/REINSERTION/UPSIZE  2/25/2025    IR NEPHROSTOMY TUBE CHECK/CHANGE/REPOSITION/REINSERTION/UPSIZE  4/29/2025    IR NEPHROSTOMY TUBE PLACEMENT  12/24/2024    KNEE SURGERY      TN CYSTO W/REMOVAL OF LESIONS SMALL N/A 1/21/2025    Procedure: TRANSURETHRAL RESECTION OF BLADDER TUMOR (TURBT);  Surgeon: Jordan Youssef MD;  Location:  MAIN OR;  Service: Urology    TN CYSTO W/REMOVAL OF LESIONS SMALL N/A 3/6/2025    Procedure: TRANSURETHRAL RESECTION OF BLADDER TUMOR (TURBT);  Surgeon: Jordan Youssef MD;  Location: [facility] OR;  Service: Urology    PROSTATE SURGERY      SUPRAPUBIC PROSTATECTOMY  12/06/1999    UPPER GASTROINTESTINAL ENDOSCOPY        Family History   Problem Relation Age of Onset    Alcohol abuse Mother     No Known Problems Father     No Known Problems Sister     Substance Abuse Son     Heart attack Family         MI    Breast cancer Family     Colon cancer Neg Hx     Colon polyps Neg Hx     Anesthesia problems Neg Hx       Social History     Tobacco Use    Smoking status: Former     Current packs/day: 0.00     Average packs/day: 3.0 packs/day for 10.0 years (30.0 ttl pk-yrs)     Types: Cigarettes      Start date: 1/1/2000     Quit date: 1/1/2010     Years since quitting: 15.3    Smokeless tobacco: Never    Tobacco comments:     Former smoker - quit 2010   Vaping Use    Vaping status: Never Used   Substance Use Topics    Alcohol use: Yes     Comment: rare glass of wine    Drug use: Never     Comment: Denies any drug use per pt      E-Cigarette/Vaping    E-Cigarette Use Never User     Comments Denies any use per pt       E-Cigarette/Vaping Substances    Nicotine No     THC No     CBD No     Flavoring No       I have reviewed and agree with the history as documented.     Patient is an 84-year-old male with past medical history of prostate cancer, diabetes, MI with stents presenting with chest pain.  Patient states that over the last several weeks he has been having chest pain that radiates to the arms and up to the neck.  He states that it happens with exertion and resolves while he is resting.  Today, he had an episode where the pain did not improve after resting, so he called EMS.  After a while the symptoms had resolved.  He had associated vomiting with the pain today.  No fevers or chills, no other acute symptoms at this time.        Review of Systems        Objective       ED Triage Vitals   Temperature Pulse Blood Pressure Respirations SpO2 Patient Position - Orthostatic VS   05/10/25 1932 05/10/25 1923 05/10/25 1927 05/10/25 1923 05/10/25 1923 05/10/25 1927   98.6 °F (37 °C) (!) 107 159/77 20 96 % Lying      Temp Source Heart Rate Source BP Location FiO2 (%) Pain Score    05/10/25 1932 05/10/25 1923 05/10/25 1927 -- 05/11/25 0020    Oral Monitor Left arm  No Pain      Vitals      Date and Time Temp Pulse SpO2 Resp BP Pain Score FACES Pain Rating User   05/13/25 2046 -- -- -- -- -- No Pain -- NP   05/13/25 1759 97.7 °F (36.5 °C) -- -- 18 135/67 -- -- DII   05/13/25 1505 97.6 °F (36.4 °C) 59 98 % -- 123/58 -- -- DII   05/13/25 1052 98 °F (36.7 °C) 56 96 % -- 119/91 -- -- DII   05/13/25 0900 -- -- 100 % -- -- No  Pain -- AS   05/13/25 0756 98.1 °F (36.7 °C) 58 98 % -- 143/66 -- -- DII   05/13/25 0345 -- -- -- 18 -- No Pain --    05/13/25 0345 98.4 °F (36.9 °C) 42 97 % -- 141/66 -- -- DII   05/12/25 2333 -- -- -- -- -- No Pain --    05/12/25 2333 97.3 °F (36.3 °C) 60 96 % 16 139/68 -- -- DII   05/12/25 2137 -- 66 97 % -- 137/70 -- -- DII   05/12/25 1914 -- -- -- -- -- No Pain --    05/12/25 1914 98.6 °F (37 °C) 67 96 % -- 142/65 -- -- DII   05/12/25 1518 97.8 °F (36.6 °C) 56 96 % -- 120/47 -- -- DII   05/12/25 1043 -- -- -- -- -- No Pain --    05/12/25 0815 -- 70 -- -- 120/46 -- --    05/12/25 0751 98.3 °F (36.8 °C) 70 96 % 18 120/46 -- -- DII   05/12/25 0304 99.7 °F (37.6 °C) 75 96 % -- 135/54 -- -- DII   05/12/25 0100 -- -- -- -- -- No Pain -- ES   05/11/25 2143 -- 68 100 % -- 144/67 -- -- DII   05/11/25 1956 -- -- -- 16 -- -- -- JM   05/11/25 1956 98.1 °F (36.7 °C) 68 96 % -- 122/65 -- -- DII   05/11/25 1956 98.1 °F (36.7 °C) 69 97 % -- 122/65 -- -- DII   05/11/25 1605 98.5 °F (36.9 °C) 62 98 % 16 127/69 -- -- DII   05/11/25 0800 -- -- -- -- -- No Pain -- SHANIQUE   05/11/25 0745 98.9 °F (37.2 °C) 75 96 % 18 121/67 -- -- DII   05/11/25 0305 -- 87 96 % -- 123/71 -- -- DII   05/11/25 0020 -- -- -- 20 -- No Pain -- MB   05/11/25 0020 98.6 °F (37 °C) -- -- -- -- -- -- DR   05/11/25 0020 -- 96 94 % -- 136/79 -- -- DII   05/10/25 2300 -- 98 96 % 20 115/56 -- -- CM   05/10/25 2130 -- 102 97 % 20 135/65 -- -- CM   05/10/25 2100 -- 107 97 % 18 153/74 -- -- CM   05/10/25 2000 -- 105 96 % 13 157/80 -- -- CM   05/10/25 1932 98.6 °F (37 °C) -- -- -- -- -- -- CM   05/10/25 1930 -- 104 97 % 20 155/70 -- -- CM   05/10/25 1927 -- -- -- -- 159/77 -- -- CM   05/10/25 1923 -- 107 96 % 20 -- -- -- CM            Physical Exam  Constitutional:       General: He is not in acute distress.     Appearance: He is not ill-appearing, toxic-appearing or diaphoretic.   HENT:      Head: Normocephalic and atraumatic.   Cardiovascular:      Rate and  Rhythm: Normal rate and regular rhythm.      Heart sounds: Normal heart sounds.   Pulmonary:      Effort: Pulmonary effort is normal.      Breath sounds: Normal breath sounds.   Chest:      Chest wall: No tenderness.   Abdominal:      Palpations: Abdomen is soft.      Tenderness: There is no abdominal tenderness.      Comments: Bilateral percutaneous nephrostomy tubes   Musculoskeletal:         General: Normal range of motion.      Cervical back: Normal range of motion and neck supple.      Right lower leg: No tenderness. No edema.      Left lower leg: No tenderness. No edema.   Skin:     General: Skin is warm and dry.   Neurological:      General: No focal deficit present.      Mental Status: He is alert and oriented to person, place, and time.         Results Reviewed       Procedure Component Value Units Date/Time    APTT [[id number]]  (Abnormal) Collected: 05/11/25 0314    Lab Status: Final result Specimen: Blood from Arm, Left Updated: 05/11/25 0339     PTT 56 seconds     HS Troponin I 4hr [[id number]]  (Abnormal) Collected: 05/10/25 2356    Lab Status: Final result Specimen: Blood from Arm, Left Updated: 05/11/25 0028     hs TnI 4hr 1,760 ng/L      Delta 4hr hsTnI 1,487 ng/L     HS Troponin I 2hr [[id number]]  (Abnormal) Collected: 05/10/25 2235    Lab Status: Final result Specimen: Blood from Arm, Right Updated: 05/10/25 2318     hs TnI 2hr 1,071 ng/L      Delta 2hr hsTnI 798 ng/L     APTT six (6) hours after Heparin bolus/drip initiation or dosing change [[id number]]  (Normal) Collected: 05/10/25 2020    Lab Status: Final result Specimen: Blood from Arm, Right Updated: 05/10/25 2116     PTT 29 seconds     Protime-INR [[id number]]  (Normal) Collected: 05/10/25 2020    Lab Status: Final result Specimen: Blood from Arm, Right Updated: 05/10/25 2116     Protime 13.1 seconds      INR 0.96    Narrative:      INR Therapeutic Range    Indication                                             INR Range      Atrial  Fibrillation                                               2.0-3.0  Hypercoagulable State                                    2.0.2.3  Left Ventricular Asist Device                            2.0-3.0  Mechanical Heart Valve                                  -    Aortic(with afib, MI, embolism, HF, LA enlargement,    and/or coagulopathy)                                     2.0-3.0 (2.5-3.5)     Mitral                                                             2.5-3.5  Prosthetic/Bioprosthetic Heart Valve               2.0-3.0  Venous thromboembolism (VTE: VT, PE        2.0-3.0    RBC Morphology Reflex Test [[id number]] Collected: 05/10/25 1954    Lab Status: Final result Specimen: Blood from Arm, Right Updated: 05/10/25 2101    CBC and differential [[id number]]  (Abnormal) Collected: 05/10/25 1954    Lab Status: Final result Specimen: Blood from Arm, Right Updated: 05/10/25 2059     WBC 12.47 Thousand/uL      RBC 3.99 Million/uL      Hemoglobin 13.0 g/dL      Hematocrit 38.9 %      MCV 98 fL      MCH 32.6 pg      MCHC 33.4 g/dL      RDW 13.2 %      MPV 9.6 fL      Platelets 228 Thousands/uL     Narrative:      This is an appended report.  These results have been appended to a previously verified report.    Manual Differential(PHLEBS Do Not Order) [[id number]]  (Abnormal) Collected: 05/10/25 1954    Lab Status: Final result Specimen: Blood from Arm, Right Updated: 05/10/25 2059     Segmented % 91 %      Lymphocytes % 3 %      Monocytes % 5 %      Eosinophils % 0 %      Basophils % 1 %      Absolute Neutrophils 11.35 Thousand/uL      Absolute Lymphocytes 0.37 Thousand/uL      Absolute Monocytes 0.62 Thousand/uL      Absolute Eosinophils 0.00 Thousand/uL      Absolute Basophils 0.12 Thousand/uL      Total Counted --     RBC Morphology Normal     Platelet Estimate Adequate    HS Troponin 0hr (reflex protocol) [[id number]]  (Abnormal) Collected: 05/10/25 1954    Lab Status: Final result Specimen: Blood from Hand, Right  Updated: 05/10/25 2033     hs TnI 0hr 273 ng/L     Basic metabolic panel [[id number]]  (Abnormal) Collected: 05/10/25 1954    Lab Status: Final result Specimen: Blood from Arm, Right Updated: 05/10/25 2022     Sodium 139 mmol/L      Potassium 3.6 mmol/L      Chloride 105 mmol/L      CO2 24 mmol/L      ANION GAP 10 mmol/L      BUN 22 mg/dL      Creatinine 1.14 mg/dL      Glucose 190 mg/dL      Calcium 9.1 mg/dL      eGFR 59 ml/min/1.73sq m     Narrative:      National Kid[employer] guidelines for Chronic Kidney Disease (CKD):     Stage 1 with normal or high GFR (GFR > 90 mL/min/1.73 square meters)    Stage 2 Mild CKD (GFR = 60-89 mL/min/1.73 square meters)    Stage 3A Moderate CKD (GFR = 45-59 mL/min/1.73 square meters)    Stage 3B Moderate CKD (GFR = 30-44 mL/min/1.73 square meters)    Stage 4 Severe CKD (GFR = 15-29 mL/min/1.73 square meters)    Stage 5 End Stage CKD (GFR <15 mL/min/1.73 square meters)  Note: GFR calculation is accurate only with a steady state creatinine            CT chest wo contrast   Final Interpretation by Luis Curry MD (05/13 1428)      Persistent right middle lobe and right lower lobe pneumonia.         The study was marked in EPIC for immediate notification.      Workstation performed: [id number]         VAS lower limb vein mapping bypass graft   Final Interpretation by Jasmine Augustin DO (05/13 1408)      VAS carotid complete study   Final Interpretation by Jimmie Leslie DO (05/13 1701)      X-ray chest 1 view portable   ED Interpretation by Cruzito Jewell MD (05/10 2108)   No acute cardiopulmonary disease      Final Interpretation by Ira Pratt MD (05/11 8317)      Moderate right base pneumonia.      The study was marked in EPIC for immediate notification.            Workstation performed: [id number]             ECG 12 Lead Documentation Only    Date/Time: 5/10/2025 7:34 PM    Performed by: Cruzito Jewell MD  Authorized by: Cruzito CORTES  MD Fanta    ECG reviewed by me, the ED Provider: yes    Patient location:  ED  Previous ECG:     Previous ECG:  Compared to current    Similarity:  Changes noted  Interpretation:     Interpretation: abnormal    Rate:     ECG rate assessment: tachycardic    Rhythm:     Rhythm: sinus rhythm and A-V block    Ectopy:     Ectopy: none    QRS:     QRS axis:  Left    QRS intervals:  Normal  Conduction:     Conduction: abnormal      Abnormal conduction: 1st degree    ST segments:     ST segments:  Non-specific  T waves:     T waves: non-specific        ED Medication and Procedure Management   Prior to Admission Medications   Prescriptions Last Dose Informant Patient Reported? Taking?   B Complex Vitamins (VITAMIN B COMPLEX PO)  Self Yes No   Sig: Take 1 tablet by mouth daily   Cholecalciferol 1000 units CHEW  Self Yes No   Sig: Chew 1 tablet daily   amLODIPine (NORVASC) 5 mg tablet 5/10/2025 Self No Yes   Sig: Take 1 tablet (5 mg total) by mouth daily   ascorbic acid (VITAMIN C) 500 mg tablet 5/10/2025 Self Yes Yes   Sig: Take 500 mg by mouth daily   aspirin 81 MG tablet  Self Yes No   Sig: Take 1 tablet by mouth daily 2/25/25 per pts son - pt was to hold ASA 5 day hold - will follow same instructions that were given for January surgery   atorvastatin (LIPITOR) 20 mg tablet  Self No No   Sig: Take 1 tablet by mouth once daily   clopidogrel (PLAVIX) 75 mg tablet 5/10/2025 Self No Yes   Sig: Take 1 tablet by mouth once daily   ibuprofen (MOTRIN) 600 mg tablet Past Month Self No Yes   Sig: Take 1 tablet (600 mg total) by mouth every 6 (six) hours as needed for mild pain   lisinopril (ZESTRIL) 10 mg tablet  Self No No   Sig: Take 1 tablet by mouth once daily   metFORMIN (GLUCOPHAGE-XR) 500 mg 24 hr tablet 5/10/2025 Self No Yes   Sig: Take 1 tablet by mouth once daily   sodium chloride, PF, 0.9 %   No No   Sig: 10 mL by Intracatheter route daily Intracatheter flushing daily. May substitute prefilled syringe with normal saline  10 mL vials, 10 mL syringes, and 18 g blunt needles   sodium chloride, PF, 0.9 %   No No   Sig: 10 mL by Intracatheter route daily Intracatheter flushing daily. May substitute prefilled syringe with normal saline 10 mL vials, 10 mL syringes, and 18 g blunt needles   vitamin E, tocopherol, 400 units capsule  Self Yes No   Sig: Take 1 capsule by mouth daily      Facility-Administered Medications: None     Current Discharge Medication List        CONTINUE these medications which have CHANGED    Details   !! sodium chloride, PF, 0.9 % 10 mL by Intracatheter route daily Intracatheter flushing daily. 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 - Potential duplicate medications found. Please discuss with provider.        CONTINUE these medications which have NOT CHANGED    Details   amLODIPine (NORVASC) 5 mg tablet Take 1 tablet (5 mg total) by mouth daily  Qty: 90 tablet, Refills: 2    Associated Diagnoses: Primary hypertension      ascorbic acid (VITAMIN C) 500 mg tablet Take 500 mg by mouth daily      clopidogrel (PLAVIX) 75 mg tablet Take 1 tablet by mouth once daily  Qty: 90 tablet, Refills: 1    Associated Diagnoses: Peripheral vascular disease (HCC)      ibuprofen (MOTRIN) 600 mg tablet Take 1 tablet (600 mg total) by mouth every 6 (six) hours as needed for mild pain  Qty: 30 tablet, Refills: 0    Associated Diagnoses: Bladder tumor      metFORMIN (GLUCOPHAGE-XR) 500 mg 24 hr tablet Take 1 tablet by mouth once daily  Qty: 90 tablet, Refills: 3    Associated Diagnoses: 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)      aspirin 81 MG tablet Take 1 tablet by mouth daily 2/25/25 per pts son - pt was to hold ASA 5 day hold - will follow  same instructions that were given for January surgery      atorvastatin (LIPITOR) 20 mg tablet Take 1 tablet by mouth once daily  Qty: 90 tablet, Refills: 1    Associated Diagnoses: Dyslipidemia      B Complex Vitamins (VITAMIN B COMPLEX PO) Take 1 tablet by mouth daily      Cholecalciferol 1000 units CHEW Chew 1 tablet daily      lisinopril (ZESTRIL) 10 mg tablet Take 1 tablet by mouth once daily  Qty: 90 tablet, Refills: 2    Associated Diagnoses: Essential hypertension      !! sodium chloride, PF, 0.9 % 10 mL by Intracatheter route daily Intracatheter flushing daily. May substitute prefilled syringe with normal saline 10 mL vials, 10 mL syringes, and 18 g blunt needles  Qty: 300 mL, Refills: 2    Associated Diagnoses: Hydronephrosis, bilateral      vitamin E, tocopherol, 400 units capsule Take 1 capsule by mouth daily       !! - Potential duplicate medications found. Please discuss with provider.        No discharge procedures on file.  ED SEPSIS DOCUMENTATION   Time reflects when diagnosis was documented in both MDM as applicable and the Disposition within this note       Time User Action Codes Description Comment    5/10/2025  9:12 PM Cruzito Jewell Add [I21.4] NSTEMI (non-ST elevated myocardial infarction) ([facility])     5/12/2025  5:48 AM Leo Ca Modify [I21.4] NSTEMI (non-ST elevated myocardial infarction) (HCC)     5/12/2025 11:23 AM Mayur Lopez Add [I25.10] CAD (coronary artery disease)     5/12/2025  4:14 PM Juan Daniel oLyd Add [C61] Prostate cancer (HCC)                  Cruzito Jewell MD  05/13/25 [id number]

## 2025-05-11 PROBLEM — J18.9 PNEUMONIA INVOLVING RIGHT LUNG: Status: ACTIVE | Noted: 2025-05-11

## 2025-05-11 LAB
4HR DELTA HS TROPONIN: 1487 NG/L
ALBUMIN SERPL BCG-MCNC: 3.7 G/DL (ref 3.5–5)
ALP SERPL-CCNC: 57 U/L (ref 34–104)
ALT SERPL W P-5'-P-CCNC: 7 U/L (ref 7–52)
ANION GAP SERPL CALCULATED.3IONS-SCNC: 10 MMOL/L (ref 4–13)
APTT PPP: 56 SECONDS (ref 23–34)
APTT PPP: 62 SECONDS (ref 23–34)
APTT PPP: 69 SECONDS (ref 23–34)
APTT PPP: 73 SECONDS (ref 23–34)
AST SERPL W P-5'-P-CCNC: 23 U/L (ref 13–39)
BASOPHILS # BLD AUTO: 0.02 THOUSANDS/ÂΜL (ref 0–0.1)
BASOPHILS NFR BLD AUTO: 0 % (ref 0–1)
BILIRUB SERPL-MCNC: 0.84 MG/DL (ref 0.2–1)
BUN SERPL-MCNC: 18 MG/DL (ref 5–25)
CALCIUM SERPL-MCNC: 9.2 MG/DL (ref 8.4–10.2)
CARDIAC TROPONIN I PNL SERPL HS: 1760 NG/L (ref ?–50)
CARDIAC TROPONIN I PNL SERPL HS: 2989 NG/L (ref 8–18)
CHLORIDE SERPL-SCNC: 105 MMOL/L (ref 96–108)
CO2 SERPL-SCNC: 27 MMOL/L (ref 21–32)
CREAT SERPL-MCNC: 1.09 MG/DL (ref 0.6–1.3)
EOSINOPHIL # BLD AUTO: 0.02 THOUSAND/ÂΜL (ref 0–0.61)
EOSINOPHIL NFR BLD AUTO: 0 % (ref 0–6)
ERYTHROCYTE [DISTWIDTH] IN BLOOD BY AUTOMATED COUNT: 13.2 % (ref 11.6–15.1)
GFR SERPL CREATININE-BSD FRML MDRD: 62 ML/MIN/1.73SQ M
GLUCOSE SERPL-MCNC: 124 MG/DL (ref 65–140)
HCT VFR BLD AUTO: 35.2 % (ref 36.5–49.3)
HGB BLD-MCNC: 11.8 G/DL (ref 12–17)
IMM GRANULOCYTES # BLD AUTO: 0.04 THOUSAND/UL (ref 0–0.2)
IMM GRANULOCYTES NFR BLD AUTO: 0 % (ref 0–2)
LYMPHOCYTES # BLD AUTO: 0.83 THOUSANDS/ÂΜL (ref 0.6–4.47)
LYMPHOCYTES NFR BLD AUTO: 8 % (ref 14–44)
MCH RBC QN AUTO: 32.6 PG (ref 26.8–34.3)
MCHC RBC AUTO-ENTMCNC: 33.5 G/DL (ref 31.4–37.4)
MCV RBC AUTO: 97 FL (ref 82–98)
MONOCYTES # BLD AUTO: 0.54 THOUSAND/ÂΜL (ref 0.17–1.22)
MONOCYTES NFR BLD AUTO: 5 % (ref 4–12)
NEUTROPHILS # BLD AUTO: 8.59 THOUSANDS/ÂΜL (ref 1.85–7.62)
NEUTS SEG NFR BLD AUTO: 87 % (ref 43–75)
NRBC BLD AUTO-RTO: 0 /100 WBCS
PLATELET # BLD AUTO: 210 THOUSANDS/UL (ref 149–390)
PMV BLD AUTO: 9.4 FL (ref 8.9–12.7)
POTASSIUM SERPL-SCNC: 3.6 MMOL/L (ref 3.5–5.3)
PROCALCITONIN SERPL-MCNC: 0.12 NG/ML
PROT SERPL-MCNC: 5.9 G/DL (ref 6.4–8.4)
RBC # BLD AUTO: 3.62 MILLION/UL (ref 3.88–5.62)
SODIUM SERPL-SCNC: 142 MMOL/L (ref 135–147)
WBC # BLD AUTO: 10.04 THOUSAND/UL (ref 4.31–10.16)

## 2025-05-11 PROCEDURE — 85730 THROMBOPLASTIN TIME PARTIAL: CPT | Performed by: INTERNAL MEDICINE

## 2025-05-11 PROCEDURE — 80053 COMPREHEN METABOLIC PANEL: CPT | Performed by: STUDENT IN AN ORGANIZED HEALTH CARE EDUCATION/TRAINING PROGRAM

## 2025-05-11 PROCEDURE — 99232 SBSQ HOSP IP/OBS MODERATE 35: CPT | Performed by: INTERNAL MEDICINE

## 2025-05-11 PROCEDURE — 99223 1ST HOSP IP/OBS HIGH 75: CPT | Performed by: INTERNAL MEDICINE

## 2025-05-11 PROCEDURE — 85025 COMPLETE CBC W/AUTO DIFF WBC: CPT | Performed by: STUDENT IN AN ORGANIZED HEALTH CARE EDUCATION/TRAINING PROGRAM

## 2025-05-11 PROCEDURE — 84484 ASSAY OF TROPONIN QUANT: CPT | Performed by: STUDENT IN AN ORGANIZED HEALTH CARE EDUCATION/TRAINING PROGRAM

## 2025-05-11 PROCEDURE — 84145 PROCALCITONIN (PCT): CPT | Performed by: INTERNAL MEDICINE

## 2025-05-11 PROCEDURE — 85730 THROMBOPLASTIN TIME PARTIAL: CPT | Performed by: STUDENT IN AN ORGANIZED HEALTH CARE EDUCATION/TRAINING PROGRAM

## 2025-05-11 RX ORDER — ASPIRIN 81 MG/1
324 TABLET, CHEWABLE ORAL ONCE
Status: COMPLETED | OUTPATIENT
Start: 2025-05-12 | End: 2025-05-12

## 2025-05-11 RX ORDER — METOPROLOL TARTRATE 25 MG/1
25 TABLET, FILM COATED ORAL EVERY 12 HOURS SCHEDULED
Status: DISCONTINUED | OUTPATIENT
Start: 2025-05-11 | End: 2025-05-21

## 2025-05-11 RX ORDER — ASPIRIN 81 MG/1
324 TABLET, CHEWABLE ORAL ONCE
Status: DISCONTINUED | OUTPATIENT
Start: 2025-05-11 | End: 2025-05-11

## 2025-05-11 RX ORDER — NITROGLYCERIN 0.4 MG/1
0.4 TABLET SUBLINGUAL
Status: DISCONTINUED | OUTPATIENT
Start: 2025-05-11 | End: 2025-05-21

## 2025-05-11 RX ADMIN — ASPIRIN 81 MG CHEWABLE TABLET 81 MG: 81 TABLET CHEWABLE at 11:08

## 2025-05-11 RX ADMIN — LISINOPRIL 10 MG: 10 TABLET ORAL at 11:08

## 2025-05-11 RX ADMIN — AMLODIPINE BESYLATE 5 MG: 5 TABLET ORAL at 11:08

## 2025-05-11 RX ADMIN — METOPROLOL TARTRATE 25 MG: 25 TABLET, FILM COATED ORAL at 21:44

## 2025-05-11 RX ADMIN — HEPARIN SODIUM 2000 UNITS: 1000 INJECTION, SOLUTION INTRAVENOUS; SUBCUTANEOUS at 03:49

## 2025-05-11 RX ADMIN — ATORVASTATIN CALCIUM 40 MG: 40 TABLET, FILM COATED ORAL at 11:08

## 2025-05-11 RX ADMIN — SODIUM CHLORIDE, PRESERVATIVE FREE 10 ML: 5 INJECTION INTRAVENOUS at 11:10

## 2025-05-11 RX ADMIN — SODIUM CHLORIDE, PRESERVATIVE FREE 10 ML: 5 INJECTION INTRAVENOUS at 11:09

## 2025-05-11 RX ADMIN — CLOPIDOGREL BISULFATE 75 MG: 75 TABLET, FILM COATED ORAL at 11:08

## 2025-05-11 RX ADMIN — HEPARIN SODIUM 14 UNITS/KG/HR: 10000 INJECTION, SOLUTION INTRAVENOUS at 22:37

## 2025-05-11 RX ADMIN — METOPROLOL TARTRATE 25 MG: 25 TABLET, FILM COATED ORAL at 11:08

## 2025-05-11 RX ADMIN — CEFTRIAXONE SODIUM 1000 MG: 10 INJECTION, POWDER, FOR SOLUTION INTRAVENOUS at 11:10

## 2025-05-11 NOTE — CONSULTS
"Consultation - Cardiology Team One  Kilo Mix 84 y.o. male MRN: [id number]  Unit/Bed#: [facility] 425-01 Encounter: [id number]    Inpatient consult to Cardiology  Consult performed by: GLEN Williamson  Consult ordered by: Marques Cabrera DO        Physician Requesting Consult: Richy Frances DO  Reason for Consult / Principal Problem:  chest pain and elevated troponins    Assessment & Plan  NSTEMI (non-ST elevated myocardial infarction) (Formerly [facility])  POA:  Troponins elevated (1071>1760> 2989)  EKG sinus tachycardia with first-degree AV block rate 105 bpm, left axis deviation and anterior septal ST and T wave abnormalities  Chest x-ray: Moderate right base pneumonia  Coronary artery disease involving native coronary artery  History of MI (1995) with status post bare-metal stents to RCA  Status post PCI/LOUIE 2007  Home Rx Plavix 75 mg daily  Diabetes mellitus type 2 with neurological manifestations (Formerly [facility])  Lab Results   Component Value Date    HGBA1C 5.8 (H) 02/20/2025       No results for input(s): \"POCGLU\" in the last 72 hours.    Blood Sugar Average: Last 72 hrs:  Hemoglobin A1c 5.8  Managed by primary team    Dyslipidemia  Home Rx atorvastatin 20 mg daily  Cholesterol 166, triglycerides 113, HDL 66 and LDL 77  Hypertension  Home Rx lisinopril 10 mg daily and amlodipine 5 mg daily (he noted low blood pressure in April and subsequently stopped amlodipine on his own)  Blood pressure controlled 115-126 systolically and 67-79 diastolically.  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 year old male who has a past medical history of MI (1995 with bare-metal stent to RCA and status post restenting of RCA in 2001), hyperlipidemia, peripheral vascular disease carotid stenosis (status post stent), remote history of prostatectomy for prostate cancer with recurrent metastasis to lymph nodes complicated with urinary obstruction requiring nephrostomy tube, hypertension who presented to [facility] with complaints of midsternal chest pain with radiation to throat and left arm associated with vomiting which is occurred intermittently over the last 2 to 3 weeks.  Chest pain is relieved with rest and he has no further recurrences since admission.  Troponins elevated (1071>1760> 2989).   CXR:  Moderate consolidation in the right base consistent with pneumonia.  Follows with cardiologist  at [facility] (last office visit 9/30 CR).      Social history is remarkable for remote history of cigarette smoking quit in 2010.  No history of alcohol abuse.  No history of illicit drug abuse.      Previous cardiac studies  Echocardiogram (6/25/2020): Left ventricular ejection fraction 55 to 60%.  Grade 1 diastolic dysfunction.  No significant valvular disease.    Echo stress test (11/13/2013):   Q waves in V1 and V2 at rest.  No EKG changes during exercise.  Questionable mid inferior septal hypokinesis on echocardiogram.      EKG reviewed personally:  EKG sinus tachycardia with first-degree AV block rate 105 bpm, left axis deviation and anterior septal ST and T wave abnormalities        Telemetry reviewed personally: Normal sinus rhythm without ectopy        Review of Systems   Constitutional:  Negative. Negative for chills.   HENT: Negative.  Negative for congestion.    Eyes: Negative.    Cardiovascular:  Positive for dyspnea on exertion. Negative for leg swelling, orthopnea, palpitations, paroxysmal nocturnal dyspnea and syncope.   Respiratory: Negative.  Negative for shortness of breath and wheezing.    Endocrine: Negative.    Hematologic/Lymphatic: Negative.    Skin:  Positive for dry skin.   Musculoskeletal: Negative.  Negative for muscle weakness.   Gastrointestinal: Negative.    Genitourinary: Negative.    Neurological: Negative.    Psychiatric/Behavioral: Negative.     Allergic/Immunologic: Negative.      Historical Information   Past Medical History:   Diagnosis Date    Bladder tumor     Cancer (HCC)     Prostate cancer    Diabetes mellitus (HCC)     Diverticulitis of colon     History of common carotid artery stent placement     RIGHT stent - per pts son - RIGHT stent  in  carotid artery    History of heart artery stent     x2 stents in heart per pts son    Hyperlipidemia     Hypertension     Migraines     Myocardial infarction (HCC)     about 30yrs ago    Nephrostomy present (HCC)     2/25/25 Per pts son Ed- pt has two nephrostomy tubes    Prostate cancer (HCC)     Seasonal allergies      Past Surgical History:   Procedure Laterality Date    CARDIAC SURGERY      CAROTID ENDARTARECTOMY Right     thromboendarterectomy    CAROTID STENT Right     CATARACT EXTRACTION Left     COLONOSCOPY      fiberoptic screening 2/6/08 5 yr / complete 3/15/13 5 yr    CORONARY ANGIOPLASTY WITH STENT PLACEMENT      ELBOW SURGERY      EYE SURGERY      IR BIOPSY LYMPH NODE  12/24/2024    IR NEPHROSTOMY TUBE CHECK/CHANGE/REPOSITION/REINSERTION/UPSIZE  2/25/2025    IR NEPHROSTOMY TUBE CHECK/CHANGE/REPOSITION/REINSERTION/UPSIZE  4/29/2025    IR NEPHROSTOMY TUBE PLACEMENT  12/24/2024    KNEE SURGERY      GA CYSTO W/REMOVAL OF LESIONS SMALL N/A 1/21/2025    Procedure: TRANSURETHRAL RESECTION OF BLADDER TUMOR (TURBT);   "Surgeon: Jordan Youssef MD;  Location:  MAIN OR;  Service: Urology    OR CYSTO W/REMOVAL OF LESIONS SMALL N/A 3/6/2025    Procedure: TRANSURETHRAL RESECTION OF BLADDER TUMOR (TURBT);  Surgeon: Jordan Youssef MD;  Location:  MAIN OR;  Service: Urology    PROSTATE SURGERY      SUPRAPUBIC PROSTATECTOMY  1999    UPPER GASTROINTESTINAL ENDOSCOPY       Social History     Substance and Sexual Activity   Alcohol Use Yes    Comment: rare glass of wine     Social History     Substance and Sexual Activity   Drug Use Never    Comment: Denies any drug use per pt     Social History     Tobacco Use   Smoking Status Former    Current packs/day: 0.00    Average packs/day: 3.0 packs/day for 10.0 years (30.0 ttl pk-yrs)    Types: Cigarettes    Start date: 2000    Quit date: 2010    Years since quitting: 15.3   Smokeless Tobacco Never   Tobacco Comments    Former smoker - quit      Family History: Family history non-contributory    Meds/Allergies   all current active meds have been reviewed  heparin (porcine), 3-20 Units/kg/hr (Order-Specific), Last Rate: 14 Units/kg/hr (25 0349)        Allergies   Allergen Reactions    Losartan Syncope     Reaction Date: 2011;     Penicillins Other (See Comments)     As a child so does not recall reaction       Objective   Vitals: Blood pressure 123/71, pulse 87, temperature 98.6 °F (37 °C), temperature source Oral, resp. rate 20, height 5' 8\" (1.727 m), weight 72.3 kg (159 lb 6.3 oz), SpO2 96%.,     Body mass index is 24.24 kg/m².,     Systolic (24hrs), Av , Min:115 , Max:159     Diastolic (24hrs), Av, Min:56, Max:80    Wt Readings from Last 3 Encounters:   25 72.3 kg (159 lb 6.3 oz)   25 73 kg (161 lb)   25 69.9 kg (154 lb)      Lab Results   Component Value Date    CREATININE 1.09 2025    CREATININE 1.14 05/10/2025    CREATININE 1.13 04/15/2025         Intake/Output Summary (Last 24 hours) at 2025 0711  Last data filed at " 5/11/2025 0301  Gross per 24 hour   Intake --   Output 860 ml   Net -860 ml     Weight (last 2 days)       Date/Time Weight    05/11/25 0041 72.3 (159.39)          Invasive Devices       Peripheral Intravenous Line  Duration             Peripheral IV 05/10/25 Distal;Right Hand <1 day    Peripheral IV 05/10/25 Right Antecubital <1 day              Drain  Duration             Nephrostomy Left 10.2 Fr. 11 days    Nephrostomy Right 10.2 Fr. 11 days                      Physical Exam  Constitutional:       General: He is not in acute distress.     Appearance: Normal appearance. He is normal weight. He is not ill-appearing.   HENT:      Head: Normocephalic.      Nose: Nose normal.      Mouth/Throat:      Mouth: Mucous membranes are moist.   Eyes:      Conjunctiva/sclera: Conjunctivae normal.   Cardiovascular:      Rate and Rhythm: Normal rate and regular rhythm.      Pulses: Normal pulses.      Heart sounds: Normal heart sounds. No murmur heard.     No friction rub. No gallop.   Pulmonary:      Effort: Pulmonary effort is normal.      Breath sounds: Normal breath sounds. No wheezing or rales.   Abdominal:      General: Bowel sounds are normal. There is no distension.      Palpations: Abdomen is soft.   Genitourinary:     Comments: Nephrostomy tubes  Musculoskeletal:      Cervical back: Neck supple.      Right lower leg: No edema.      Left lower leg: No edema.   Skin:     General: Skin is warm and dry.      Capillary Refill: Capillary refill takes less than 2 seconds.   Neurological:      Mental Status: He is alert and oriented to person, place, and time.   Psychiatric:         Mood and Affect: Mood normal.         Behavior: Behavior normal.         Thought Content: Thought content normal.           LABORATORY RESULTS:      CBC with diff:   Results from last 7 days   Lab Units 05/11/25  0324 05/10/25  1954   WBC Thousand/uL 10.04 12.47*   HEMOGLOBIN g/dL 11.8* 13.0   HEMATOCRIT % 35.2* 38.9   MCV fL 97 98   PLATELETS  "Thousands/uL 210 228   RBC Million/uL 3.62* 3.99   MCH pg 32.6 32.6   MCHC g/dL 33.5 33.4   RDW % 13.2 13.2   MPV fL 9.4 9.6   NRBC AUTO /100 WBCs 0  --        CMP:  Results from last 7 days   Lab Units 05/11/25  0405 05/10/25  1954   POTASSIUM mmol/L 3.6 3.6   CHLORIDE mmol/L 105 105   CO2 mmol/L 27 24   BUN mg/dL 18 22   CREATININE mg/dL 1.09 1.14   CALCIUM mg/dL 9.2 9.1   AST U/L 23  --    ALT U/L 7  --    ALK PHOS U/L 57  --    EGFR ml/min/1.73sq m 62 59       BMP:  Results from last 7 days   Lab Units 05/11/25  0405 05/10/25  1954   POTASSIUM mmol/L 3.6 3.6   CHLORIDE mmol/L 105 105   CO2 mmol/L 27 24   BUN mg/dL 18 22   CREATININE mg/dL 1.09 1.14   CALCIUM mg/dL 9.2 9.1          No results found for: \"NTBNP\"                           Results from last 7 days   Lab Units 05/10/25  2020   INR  0.96     Lipid Profile:   Lab Results   Component Value Date    CHOL 142 08/03/2015    CHOL 146 07/29/2014     Lab Results   Component Value Date    HDL 66 04/15/2025    HDL 55 02/19/2024    HDL 51 02/02/2023     Lab Results   Component Value Date    LDLCALC 77 04/15/2025    LDLCALC 67 02/19/2024    LDLCALC 59 02/02/2023     Lab Results   Component Value Date    TRIG 113 04/15/2025    TRIG 117 02/19/2024    TRIG 89 02/02/2023       Imaging: Results Review Statement: I personally reviewed the following image studies/reports in PACS and discussed pertinent findings with Radiology: chest xray. My interpretation of the radiology images/reports is: Chest x-ray showing patchy infiltrates.  IR nephrostomy tube check/change/reposition/reinsertion/upsize  Result Date: 5/1/2025  Narrative: Bilateral nephrostogram and nephrostomy tube change Clinical History: Bladder cancer. Bilateral nephrostomy tube replacement. Contrast: 24 mL of iohexol Fluoro time: 5.9 MINUTES Number of Images: 16 Radiation dose: 28 mGy Concious sedation time: N/A Technique: The patient was brought to the interventional radiology suite and placed prone on the " table. The existing bilateral nephrostomy tubes prepped and draped in the usual sterile fashion. After a  view was obtained, contrast was injected into the existing catheter and multiple images of the urinary tract were obtained to the bladder. Findings: Left renal collecting system demonstrates no filling defects or dilatation. Left ureter is dilated and opacified. Complete occlusion at the ureterovesical junction. Right nephrostogram demonstrates nondilated system with no filling defects. Complete occlusion at the distal ureter. Intervention: The left catheter was removed over a heavy-duty straight wire, and a Kumpe catheter was advanced to the distal ureter. Attempts to advance a wire through the ureterovesical junction was unsuccessful. New 10.2 Kosovan nephrostomy tube was advanced, and the loop formed within the renal pelvis. The catheter was then injected, confirming satisfactory position. The right catheter was removed over a wire and exchanged for a Kumpe catheter which was advanced into the distal ureter. A Glidewire could be easily advanced through the ureterovesical junction into the bladder. Once the Kumpe catheter was in the bladder  contrast opacified nondistended bladder. Over the wire a new 10.2 Kosovan APD catheter was placed and looped in the renal pelvis. Both catheter was secured with sutures and connected to drainage bag.     Impression: Impression: 1. Nephrostogram demonstrates nondilated system. Complete occlusion of the distal left ureterovesical junction. Stenotic right ureterovesical junction could be bypassed with a wire and catheter. 2. Successful exchange of 10.2 Kosovan nephrostomy tubes under fluoroscopic control. Findings discussed with Dr. Youssef. Workstation performed: [id number]         Counseling / Coordination of Care  Total floor / unit time spent today 45 minutes.  Greater than 50% of total time was spent with the patient and / or family counseling and / or coordination of  care.  A description of the counseling / coordination of care: Review of history, current assessment, development of a plan.      Code Status: Level 1 - Full Code    ** Please Note: Dragon 360 Dictation voice to text software may have been used in the creation of this document. **

## 2025-05-11 NOTE — ASSESSMENT & PLAN NOTE
Patient noted late April with low blood pressure he self discontinued off amlodipine then recheck to PCP and was advised to restart amlodipine and discontinue hydrochlorothiazide    systolic blood pressure on this admission initially 150s then down to 130s,    Plan   Monitor blood pressure and reevaluate   continue home lisinopril and amlodipine,  As needed Lopressor for systolic greater than 180

## 2025-05-11 NOTE — ASSESSMENT & PLAN NOTE
On arrival 159/77 with mild tachy     Continue home amlodipine,  lisinopril and HCTZ   Monitor vitals , and if continue elevated my need adjustment of HTN meds   PRN Labetalol for SBP > 180 with HR >70

## 2025-05-11 NOTE — ASSESSMENT & PLAN NOTE
Home Rx lisinopril 10 mg daily and amlodipine 5 mg daily (he noted low blood pressure in April and subsequently stopped amlodipine on his own)  Blood pressure controlled 115-126 systolically and 67-79 diastolically.  Amlodipine 5 mg daily restarted inpatient

## 2025-05-11 NOTE — ASSESSMENT & PLAN NOTE
5/10/25 presenting with intermittent chest pain over last several weeks mainly with activity, but progressively worsening. Most recent episode before calling EMS lasted longer and did not resolve with rest, was radiating to neck and bilateral arms, with diaphoresis and vomiting.     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     EMS gave aspirin , and he took his home morning Plavix, and in the ED heparin drip was started. Symptoms are resolved while in the ED.     PLAN   - updated and will consult cardiology, input appreciated.  - admission to medicine , on telemetry.   - continue heparin drip.   - continue home ------------------------------------  - trend troponin and EKG as per protocol.   - PRN Nitro for chest pain.

## 2025-05-11 NOTE — ASSESSMENT & PLAN NOTE
Patient is on only 20 mg of atorvastatin however he has significant history of coronary artery disease and carotid artery disease s/p stenting for both  He denies intolerance of high intensity statin and agreeable to increase to 40 mg Lipitor

## 2025-05-11 NOTE — ASSESSMENT & PLAN NOTE
"Lab Results   Component Value Date    HGBA1C 5.8 (H) 02/20/2025       No results for input(s): \"POCGLU\" in the last 72 hours.    Blood Sugar Average: Last 72 hrs:    Most recent A1C 5.8 ; hold home metformin while inpatient -   Continue with insulin sliding scale and low carb diet.     "

## 2025-05-11 NOTE — ASSESSMENT & PLAN NOTE
Has nephrostomies bilaterally due to obstruction by prostate cancer metastasis to lymph nodes   nephrostomy care while patient, daily BMP and follow-up with urology, medical oncology and IR

## 2025-05-11 NOTE — ASSESSMENT & PLAN NOTE
History of MI (1995) with status post bare-metal stents to RCA  Status post PCI/LOUIE 2007  Home Rx Plavix 75 mg daily

## 2025-05-11 NOTE — PROGRESS NOTES
"Progress Note - Hospitalist   Name: Kilo Mix 84 y.o. male I MRN: [id number]  Unit/Bed#: [facility] 425-01 I Date of Admission: 5/10/2025   Date of Service: 5/11/2025 I Hospital Day: 1    Assessment & Plan  NSTEMI (non-ST elevated myocardial infarction) (HCC)  Has remote history of CAD /MI s/p coronary stents X2  Presenting with intermittent chest pain over last several weeks mainly with activity, but progressively worsening. Most recent episode before calling EMS lasted longer and did not resolve with rest, was radiating to neck and bilateral arms, with diaphoresis and vomiting.   Elevated troponin, EKG was sinus tachycardia 105 with some nonspecific ST changes  Patient started on heparin drip in the ED  Continue with aspirin, Plavix, statin  Continue heparin drip  Monitor on telemetry  As needed submental length of blistering  Cardiology consulted for further management  Follow on cardiac echo  - has recent A1C, Lipid and TSH , all were unremarkable.   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    Blood Sugar Average: Last 72 hrs:    Most recent A1C 5.8 ; hold home metformin while inpatient -   Continue with insulin sliding scale and low carb diet.     Hypertension  Patient noted late April with low blood pressure he self discontinued off amlodipine then recheck to PCP and was advised to restart amlodipine and discontinue hydrochlorothiazide  Monitor blood pressure and reevaluate   Continue home lisinopril and amlodipine,  As needed Lopressor for systolic greater than " 180      Peripheral vascular disease (HCC)  History of carotid artery disease s/p stenting as well as coronary artery disease/MI s/p stenting more than 20 years ago  Continue aspirin Plavix and statin, increasing Lipitor to 40 mg  Dyslipidemia  Patient is on only 20 mg of atorvastatin however he has significant history of coronary artery disease and carotid artery disease s/p stenting for both  He denies intolerance of high intensity statin and agreeable to increase to 40 mg Lipitor  Prostate cancer (HCC)  Stage IV prostate cancer with mets to the lymph nodes and complicated with urinary obstruction requiring bilateral nephrostomies    January 2025 he was started on ADT and Xtandi, complicated with dizziness for which Xtandi was discontinued in April 2025    Patient to continue follow-up with medical oncology established with Dr. Shankar  H/O insertion of nephrostomy tube  Has nephrostomies bilaterally due to obstruction by prostate cancer metastasis to lymph nodes   nephrostomy care while patient,   follow-up with urology, medical oncology and IR  Coronary artery disease involving native coronary artery      VTE Pharmacologic Prophylaxis: VTE Score: 8 High Risk (Score >/= 5) - Pharmacological DVT Prophylaxis Ordered: heparin drip. Sequential Compression Devices Ordered.    Mobility:   Basic Mobility Inpatient Raw Score: 23  JH-HLM Goal: 7: Walk 25 feet or more  JH-HLM Achieved: 6: Walk 10 steps or more  JH-HLM Goal NOT achieved. Continue with multidisciplinary rounding and encourage appropriate mobility to improve upon JH-HLM goals.    Patient Centered Rounds: I performed bedside rounds with nursing staff today.   Discussions with Specialists or Other Care Team Provider: Nursing    Education and Discussions with Family / Patient:  Patient.     Current Length of Stay: 1 day(s)  Current Patient Status: Inpatient   Certification Statement: The patient will continue to require additional inpatient hospital stay due to  management of non-STEMI  Discharge Plan: Anticipate discharge in 48-72 hrs to home.    Code Status: Level 1 - Full Code    Subjective   Patient seen and examined  Comfortable in bed  No chest pain or shortness of breath    Objective :  Temp:  [98.6 °F (37 °C)-98.9 °F (37.2 °C)] 98.9 °F (37.2 °C)  HR:  [] 75  BP: (115-159)/(56-80) 121/67  Resp:  [13-20] 18  SpO2:  [94 %-97 %] 96 %  O2 Device: None (Room air)    Body mass index is 24.24 kg/m².     Input and Output Summary (last 24 hours):     Intake/Output Summary (Last 24 hours) at 5/11/2025 0823  Last data filed at 5/11/2025 0301  Gross per 24 hour   Intake --   Output 860 ml   Net -860 ml       Physical Exam  Patient is awake alert oriented no acute distress  Comfortable in bed  Lungs clear to auscultation bilateral   Heart positive for S2 no murmur   Abdomen soft nontender    Lower extremities no edema    Lines/Drains:  Lines/Drains/Airways       Active Status       Name Placement date Placement time Site Days    Nephrostomy Left 10.2 Fr. 04/29/25  1109  Left  11    Nephrostomy Right 10.2 Fr. 04/29/25  1111  Right  11                      Telemetry:  Telemetry Orders (From admission, onward)               24 Hour Telemetry Monitoring  Continuous x 24 Hours (Telem)        Expiring   Question:  Reason for 24 Hour Telemetry  Answer:  PCI/EP study (including pacer and ICD implementation), Cardiac surgery, MI, abnormal cardiac cath, and chest pain- rule out MI                     Telemetry Reviewed:  yes  Indication for Continued Telemetry Use: Acute MI/Unstable Angina/Rule out ACS               Lab Results: I have reviewed the following results:   Results from last 7 days   Lab Units 05/11/25  0324   WBC Thousand/uL 10.04   HEMOGLOBIN g/dL 11.8*   HEMATOCRIT % 35.2*   PLATELETS Thousands/uL 210   SEGS PCT % 87*   LYMPHO PCT % 8*   MONO PCT % 5   EOS PCT % 0     Results from last 7 days   Lab Units 05/11/25  0405   SODIUM mmol/L 142   POTASSIUM mmol/L 3.6    CHLORIDE mmol/L 105   CO2 mmol/L 27   BUN mg/dL 18   CREATININE mg/dL 1.09   ANION GAP mmol/L 10   CALCIUM mg/dL 9.2   ALBUMIN g/dL 3.7   TOTAL BILIRUBIN mg/dL 0.84   ALK PHOS U/L 57   ALT U/L 7   AST U/L 23   GLUCOSE RANDOM mg/dL 124     Results from last 7 days   Lab Units 05/10/25  2020   INR  0.96                   Recent Cultures (last 7 days):         Imaging Results Review: I reviewed radiology reports from this admission including: chest xray.  Moderate right-sided pneumonia  Other Study Results Review: No additional pertinent studies reviewed.    Last 24 Hours Medication List:     Current Facility-Administered Medications:     amLODIPine (NORVASC) tablet 5 mg, Daily    aspirin chewable tablet 81 mg, Daily    atorvastatin (LIPITOR) tablet 40 mg, Daily    clopidogrel (PLAVIX) tablet 75 mg, Daily    heparin (porcine) 25,000 units in 0.45% NaCl 250 mL infusion (premix), Titrated, Last Rate: 14 Units/kg/hr (05/11/25 0349)    heparin (porcine) injection 2,000 Units, Q6H PRN    heparin (porcine) injection 4,000 Units, Q6H PRN    lisinopril (ZESTRIL) tablet 10 mg, Daily    nitroglycerin (NITROSTAT) SL tablet 0.4 mg, Q5 Min PRN    sodium chloride (PF) 0.9 % injection 10 mL, Daily    sodium chloride (PF) 0.9 % injection 10 mL, Daily    Administrative Statements   Today, Patient Was Seen By: Richy Frances, DO  I have spent a total time of 35 minutes in caring for this patient on the day of the visit/encounter including Counseling / Coordination of care, Documenting in the medical record, Reviewing/placing orders in the medical record (including tests, medications, and/or procedures), Obtaining or reviewing history  , and Communicating with other healthcare professionals .    **Please Note: This note may have been constructed using a voice recognition system.**

## 2025-05-11 NOTE — ASSESSMENT & PLAN NOTE
History of carotid artery disease s/p stenting as well as coronary artery disease/MI s/p stenting more than 20 years ago  Continue aspirin Plavix and statin, increasing Lipitor to 40 mg

## 2025-05-11 NOTE — ASSESSMENT & PLAN NOTE
Chest x-ray with moderate right base pneumonia  Patient is afebrile, denied cough or shortness of breath  Upper normal white blood cells  Start IV ceftriaxone empirically  Check procalcitonin  Consider  discontinue IV antibiotic with negative procalcitonin

## 2025-05-11 NOTE — CASE MANAGEMENT
Case Management Assessment & Discharge Planning Note    Patient name Kilo Mix  Location [facility] 425/[facility] 425-01 MRN [id number]  : 1940 Date 2025       Current Admission Date: 5/10/2025  Current Admission Diagnosis:NSTEMI (non-ST elevated myocardial infarction) ([facility])   Patient Active Problem List    Diagnosis Date Noted Date Diagnosed    Pneumonia involving right lung 2025     NSTEMI (non-ST elevated myocardial infarction) ([facility]) 05/10/2025     H/O insertion of nephrostomy tube 05/10/2025     Urinary tract infection associated with indwelling urethral catheter  ([facility]) 2025     CKD stage 3 due to type 2 diabetes mellitus ([facility]) 2025     Hydronephrosis 2024     Acute kidney injury ([facility]) 2024     Primary osteoarthritis of left shoulder 2024     Traumatic complete tear of right rotator cuff 2024     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     Personal history of colonic polyps 2020     Schatzki's ring 2020     Dysphagia 2020     Antiplatelet or antithrombotic long-term use 2020     Plantar fasciitis of right foot 2020     AMD (age related macular degeneration) 2020     Prostate cancer ([facility]) 2019     Asymptomatic stenosis of left carotid artery 2018     Diabetes mellitus type 2 with neurological manifestations ([facility]) 2015     Peripheral vascular disease ([facility]) 2012     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  [address]  [place] [id number]  Phone: [phone] Fax: [phone]    [facility] - [place], [place] - [address]  [address]  [address]  [place] [address]  Phone: [phone] Fax: [phone]    Primary Care Provider: Dolores Vizcarra DO    Primary Insurance: MEDICARE  Secondary Insurance: [employer]ET[employer]    ASSESSMENT:  Active Health Care Proxies    There are no active Health Care Proxies on file.                      Patient Information  Admitted from:: Home  Mental Status: Alert  During Assessment patient was accompanied by: Son  Assessment information provided by:: Son  Primary Caregiver: Child  Support Systems: Self, Spouse/significant other, Son  What city do you live in?: spouse  Home entry access options. Select all that apply.: Stairs  Number of steps to enter home.: 9  Type of Current Residence: [facility]  Living Arrangements: Lives w/ Spouse/significant other    Activities of Daily Living Prior to Admission  Functional Status: Assistance  Completes ADLs independently?: No  Level of ADL dependence: Assistance  Ambulates independently?: Yes  Does patient use assisted devices?: No  Does patient currently own DME?: No  Does patient have a history of Outpatient Therapy (PT/OT)?: No  Does the patient have a history of Short-Term Rehab?: No  Does patient have a history of HHC?: No  Does patient currently have HHC?: No         Patient Information Continued  Income Source: Pension/FCI  Does patient have a history of substance abuse?: No  Does patient have a history of Mental Health Diagnosis?: No         Means of Transportation  [facility]          DISCHARGE DETAILS:       Freedom of Choice: Yes     CM contacted family/caregiver?: Yes  Were Treatment Team discharge recommendations reviewed with  patient/caregiver?: Yes  Did patient/caregiver verbalize understanding of patient care needs?: Yes  Were patient/caregiver advised of the risks associated with not following Treatment Team discharge recommendations?: Yes    Contacts  Relationship to Patient:: Family  Contact Method: Phone  Phone Number: [phone]  Reason/Outcome: Continuity of Care    Requested Home Health Care         Is the patient interested in C at discharge?: No                   Treatment Team Recommendation: Short Term Rehab  Discharge Destination Plan:: Short Term Rehab

## 2025-05-11 NOTE — ASSESSMENT & PLAN NOTE
"Lab Results   Component Value Date    HGBA1C 5.8 (H) 02/20/2025       No results for input(s): \"POCGLU\" in the last 72 hours.    Blood Sugar Average: Last 72 hrs:    Most recent A1C 5.8 ; hold home metformin while inpatient - will uses SSI and low carb diet.     "

## 2025-05-11 NOTE — ASSESSMENT & PLAN NOTE
Has nephrostomies bilaterally due to obstruction by prostate cancer metastasis to lymph nodes   nephrostomy care while patient,   follow-up with urology, medical oncology and IR

## 2025-05-11 NOTE — ASSESSMENT & PLAN NOTE
"Lab Results   Component Value Date    HGBA1C 5.8 (H) 02/20/2025       No results for input(s): \"POCGLU\" in the last 72 hours.    Blood Sugar Average: Last 72 hrs:  Hemoglobin A1c 5.8  Managed by primary team    "

## 2025-05-11 NOTE — ASSESSMENT & PLAN NOTE
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 Shankar

## 2025-05-11 NOTE — H&P
"H&P - Hospitalist   Name: Kilo Mix 84 y.o. male I MRN: [id number]  Unit/Bed#: ED 03 I Date of Admission: 5/10/2025   Date of Service: 5/10/2025 I Hospital Day: 0     Assessment & Plan  NSTEMI (non-ST elevated myocardial infarction) (HCC)  Has remote history of MI s/p coronary stents X2    5/10/25 presenting with intermittent chest pain over last several weeks mainly with activity, but progressively worsening. Most recent episode before calling EMS lasted longer and did not resolve with rest, was radiating to neck and bilateral arms, with diaphoresis and vomiting.      Hs Troponin 273 ng/L ; EKG sinus tachy 105 with some nonspecific ST changes , he took additional aspirin and he took his home morning Plavix, and in the ED heparin drip was started. Symptoms are resolved while in the ED.      PLAN   - Discussed with cardiology on-call Dr. Hardin no need for emergent cath  - admission to medicine , on telemetry.   - continue heparin drip.   - Continue home lisinopril, amlodipine, aspirin and Plavix  - trend troponin and EKG as per protocol.   - PRN Nitro for chest pain.   - has recent A1C, Lipid and TSH , all were unremarkable.     Diabetes mellitus type 2 with neurological manifestations ([facility])  Lab Results   Component Value Date    HGBA1C 5.8 (H) 02/20/2025       No results for input(s): \"POCGLU\" in the last 72 hours.    Blood Sugar Average: Last 72 hrs:    Most recent A1C 5.8 ; hold home metformin while inpatient - will uses SSI and low carb diet.     Dyslipidemia  Patient is on only 20 mg of atorvastatin however he has significant history of coronary artery disease and carotid artery disease s/p stenting for both    He denies intolerance of high intensity statin and agreeable to increase to 40 mg Lipitor  Hypertension  Patient noted late April with low blood pressure he self discontinued off amlodipine then recheck to PCP and was advised to restart amlodipine and discontinue hydrochlorothiazide    systolic blood " pressure on this admission initially 150s then down to 130s,    Plan   Monitor blood pressure and reevaluate   continue home lisinopril and amlodipine,  As needed Lopressor for systolic greater than 180      Peripheral vascular disease (HCC)  History of carotid artery disease s/p stenting as well as coronary artery disease/MI s/p stenting more than 20 years ago    Continue aspirin Plavix and statin, increasing Lipitor to 40 mg  Prostate cancer (HCC)  Stage IV prostate cancer with mets to the lymph nodes and complicated with urinary obstruction requiring bilateral nephrostomies    January 2025 he was started on ADT and Xtandi, complicated with dizziness for which Xtandi was discontinued in April 2025    Patient to continue follow-up with medical oncology established with Dr. Shankar  H/O insertion of nephrostomy tube  Has nephrostomies bilaterally due to obstruction by prostate cancer metastasis to lymph nodes   nephrostomy care while patient, daily BMP and follow-up with urology, medical oncology and IR      VTE Pharmacologic Prophylaxis:   Moderate Risk (Score 3-4) - Pharmacological DVT Prophylaxis Ordered: heparin drip.  Code Status: Full Code  Discussion with family: Updated  (son and daughter) at bedside.    Anticipated Length of Stay: Patient will be admitted on an inpatient basis with an anticipated length of stay of greater than 2 midnights secondary to NSTEMI.    History of Present Illness   Chief Complaint: chest pain     Kilo Mix is a 84 y.o. male with a PMH remarkable of carotid stenosis requiring stenting, MI s/p stenting X2 more than 20 years ago, on aspirin and Plavix, remote prostatectomy for prostate cancer lately with recurrent/metastatic to lymph nodes complicated with urinary obstruction requiring bilateral nephrostomies, on ADT, presenting with progressively worsening exertional chest pain over the past 2 weeks with the worst episode happened 5/10 while in activity however did not  resolve quickly at rest and was radiating to neck and bilateral arms complicated with diaphoresis nausea and vomiting X2 for which he called the EMS, see above for assessment plan details      Patient was seen and examined in the emergency room with his son and daughter at bedside, he was feeling better and the symptoms resolved during the exam time.  He confirmed the above history and presentation.  We reviewed home medications .  Confirmed full code .      Review of Systems  - GENERAL:  negative for any fevers, chills, or weight loss.  - HEENT: Negative for any head/Neck trauma, pain, double/blurry vision, sinusitis, rhinitis, nose bleeding.  - CARDIAC: Progressive chest pain radiating to the neck and bilateral arms today earlier, however now resolved while in the ED ; negative for any  palpitation, Dyspnea on exertion, peripheral edema.  - PULMONARY: Negative for any SOB, cough, wheezing.   - GASTROINTESTINAL: Had nausea and vomiting while having the chest pain before coming to the emergency room but both resolved now ;Negative for any abdominal pain,, diarrhea or constipation, or  blood in stool.   - GENITOURINARY: Chronic oligoria due to known prostate cancer obstruction with nephrostomy tube in place; negative for any dysuria, hematuria, incontinence.  - NEUROLOGIC: Negative for any muscle weakness, numbness/tingling, memory changes.    - MUSCULOSKELETAL: Negative for any joint pains/swelling, limited ROM.   - INTEGUMENTARY: Negative for any rashes, cuts/ lesions.  - HEMATOLOGIC: Negative for any abnormal bruising, frequent infections or bleeding.    Historical Information   Past Medical History:   Diagnosis Date    Bladder tumor     Cancer (HCC)     Prostate cancer    Diabetes mellitus (HCC)     Diverticulitis of colon     History of common carotid artery stent placement     RIGHT stent - per pts son - RIGHT stent  in  carotid artery    History of heart artery stent     x2 stents in heart per pts son     Hyperlipidemia     Hypertension     Migraines     Myocardial infarction (HCC)     about 30yrs ago    Nephrostomy present (HCC)     2/25/25 Per pts son Ed- pt has two nephrostomy tubes    Prostate cancer (HCC)     Seasonal allergies      Past Surgical History:   Procedure Laterality Date    CARDIAC SURGERY      CAROTID ENDARTARECTOMY Right     thromboendarterectomy    CAROTID STENT Right     CATARACT EXTRACTION Left     COLONOSCOPY      fiberoptic screening 2/6/08 5 yr / complete 3/15/13 5 yr    CORONARY ANGIOPLASTY WITH STENT PLACEMENT      ELBOW SURGERY      EYE SURGERY      IR BIOPSY LYMPH NODE  12/24/2024    IR NEPHROSTOMY TUBE CHECK/CHANGE/REPOSITION/REINSERTION/UPSIZE  2/25/2025    IR NEPHROSTOMY TUBE CHECK/CHANGE/REPOSITION/REINSERTION/UPSIZE  4/29/2025    IR NEPHROSTOMY TUBE PLACEMENT  12/24/2024    KNEE SURGERY      [place] CYSTO W/REMOVAL OF LESIONS SMALL N/A 1/21/2025    Procedure: TRANSURETHRAL RESECTION OF BLADDER TUMOR (TURBT);  Surgeon: Jordan Youssef MD;  Location:  MAIN OR;  Service: Urology    WV CYSTO W/REMOVAL OF LESIONS SMALL N/A 3/6/2025    Procedure: TRANSURETHRAL RESECTION OF BLADDER TUMOR (TURBT);  Surgeon: Jordan Youssef MD;  Location:  MAIN OR;  Service: Urology    PROSTATE SURGERY      SUPRAPUBIC PROSTATECTOMY  12/06/1999    UPPER GASTROINTESTINAL ENDOSCOPY       Social History     Tobacco Use    Smoking status: Former     Current packs/day: 0.00     Average packs/day: 3.0 packs/day for 10.0 years (30.0 ttl pk-yrs)     Types: Cigarettes     Start date: 1/1/2000     Quit date: 1/1/2010     Years since quitting: 15.3    Smokeless tobacco: Never    Tobacco comments:     Former smoker - quit 2010   Vaping Use    Vaping status: Never Used   Substance and Sexual Activity    Alcohol use: Yes     Comment: rare glass of wine    Drug use: Never     Comment: Denies any drug use per pt    Sexual activity: Not Currently     Comment: defer     E-Cigarette/Vaping    E-Cigarette Use Never User      Comments Denies any use per pt      E-Cigarette/Vaping Substances     Family History   Problem Relation Age of Onset    Alcohol abuse Mother     No Known Problems Father     No Known Problems Sister     Substance Abuse Son     Heart attack Family         MI    Breast cancer Family     Colon cancer Neg Hx     Colon polyps Neg Hx     Anesthesia problems Neg Hx      Social History:  Marital Status: /Civil Union   Occupation:   Patient Pre-hospital Living Situation: Home  Patient Pre-hospital Level of Mobility: walks  Patient Pre-hospital Diet Restrictions: low carb     Meds/Allergies   I have reviewed home medications with patient personally.  Prior to Admission medications    Medication Sig Start Date End Date Taking? Authorizing Provider   amLODIPine (NORVASC) 5 mg tablet Take 1 tablet (5 mg total) by mouth daily 9/10/24   Dolores Vizcarra DO   ascorbic acid (VITAMIN C) 500 mg tablet Take 500 mg by mouth daily 2/22/11   Historical Provider, MD   aspirin 81 MG tablet Take 1 tablet by mouth daily 2/25/25 per pts son - pt was to hold ASA 5 day hold - will follow same instructions that were given for January surgery 9/10/12   Historical Provider, MD   atorvastatin (LIPITOR) 20 mg tablet Take 1 tablet by mouth once daily 12/3/24   Dolores Vizcarra DO   B Complex Vitamins (VITAMIN B COMPLEX PO) Take 1 tablet by mouth daily 2/22/11   Historical Provider, MD   Cholecalciferol 1000 units CHEW Chew 1 tablet daily    Historical Provider, MD   clopidogrel (PLAVIX) 75 mg tablet Take 1 tablet by mouth once daily 3/17/25   Dolores Vizcarra DO   hydroCHLOROthiazide 25 mg tablet Take 1 tablet by mouth once daily 11/18/24   Dolores Vizcarra DO   ibuprofen (MOTRIN) 600 mg tablet Take 1 tablet (600 mg total) by mouth every 6 (six) hours as needed for mild pain 1/21/25   Jordan Youssef MD   lisinopril (ZESTRIL) 10 mg tablet Take 1 tablet by mouth once daily 9/9/24   Dolores Vizcarra DO   metFORMIN  (GLUCOPHAGE-XR) 500 mg 24 hr tablet Take 1 tablet by mouth once daily 7/13/24   Dolores Vizcarra DO   sodium chloride, PF, 0.9 % 10 mL by Intracatheter route daily Intracatheter flushing daily. May substitute prefilled syringe with normal saline 10 mL vials, 10 mL syringes, and 18 g blunt needles 3/12/25 6/10/25  GLEN Rios   sodium chloride, PF, 0.9 % 10 mL by Intracatheter route daily Intracatheter flushing daily. May substitute prefilled syringe with normal saline 10 mL vials, 10 mL syringes, and 18 g blunt needles 4/29/25 7/28/25  Aleks Stapleton MD   vitamin E, tocopherol, 400 units capsule Take 1 capsule by mouth daily 6/4/12   Historical Provider, MD     Allergies   Allergen Reactions    Losartan Syncope     Reaction Date: [id number];     Penicillins Other (See Comments)     As a child so does not recall reaction       Objective :  Temp:  [98.6 °F (37 °C)] 98.6 °F (37 °C)  HR:  [102-107] 102  BP: (135-159)/(65-80) 135/65  Resp:  [13-20] 20  SpO2:  [96 %-97 %] 97 %  O2 Device: None (Room air)    Physical Exam   - GEN: Appears well, alert and oriented x 3, pleasant and cooperative, in no acute distress  - HEENT: Anicteric, mucous membranes moist, PERRL and EOMI   - NECK: No lymphadenopathy, JVD or carotid bruits   - HEART: RRR, normal S1 and S2, no murmurs, clicks, gallops or rubs   - LUNGS: Clear to auscultation bilaterally; no wheezes, rales, or rhonchi  - ABDOMEN: Normal bowel sounds, soft, no tenderness, no distention, no organomegaly or masses felt on exam.   - EXTREMITIES: Peripheral pulses normal; no clubbing, cyanosis, or edema  - NEURO: No focal findings, CN II-XII are grossly intact.   - Musculoskeletal: 5/5 strength, normal ROM, no swollen or erythematous joints.   - SKIN: Normal without suspicious lesions on exposed skin      Lines/Drains:  Lines/Drains/Airways       Active Status       Name Placement date Placement time Site Days    Nephrostomy Left 10.2 Fr. 04/29/25  9206   Left  11    Nephrostomy Right 10.2 Fr. 04/29/25  1111  Right  11                          Lab Results: I have reviewed the following results:  Results from last 7 days   Lab Units 05/10/25  1954   WBC Thousand/uL 12.47*   HEMOGLOBIN g/dL 13.0   HEMATOCRIT % 38.9   PLATELETS Thousands/uL 228   LYMPHO PCT % 3*   MONO PCT % 5   EOS PCT % 0     Results from last 7 days   Lab Units 05/10/25  1954   SODIUM mmol/L 139   POTASSIUM mmol/L 3.6   CHLORIDE mmol/L 105   CO2 mmol/L 24   BUN mg/dL 22   CREATININE mg/dL 1.14   ANION GAP mmol/L 10   CALCIUM mg/dL 9.1   GLUCOSE RANDOM mg/dL 190*     Results from last 7 days   Lab Units 05/10/25  2020   INR  0.96         Lab Results   Component Value Date    HGBA1C 5.8 (H) 02/20/2025    HGBA1C 5.8 (H) 01/06/2025    HGBA1C 6.1 (H) 08/22/2024           Imaging Results Review: I reviewed radiology reports from this admission including: chest xray.  Other Study Results Review: EKG was reviewed.     Administrative Statements   I have spent a total time of 50 minutes in caring for this patient on the day of the visit/encounter including Diagnostic results, Prognosis, Risks and benefits of tx options, Instructions for management, Patient and family education, Importance of tx compliance, Risk factor reductions, Impressions, Counseling / Coordination of care, Documenting in the medical record, Reviewing/placing orders in the medical record (including tests, medications, and/or procedures), Obtaining or reviewing history  , and Communicating with other healthcare professionals .    ** Please Note: This note has been constructed using a voice recognition system. **

## 2025-05-11 NOTE — ASSESSMENT & PLAN NOTE
S/p prostatectomy 1999 then had mets to LN, stage IV   In January 2025 started on ADT and Xtandi but due to dizziness Xtandi was held in 4/2025   Established and follows with medical oncology; follows with Dr. Shankar

## 2025-05-11 NOTE — ASSESSMENT & PLAN NOTE
Patient noted late April with low blood pressure he self discontinued off amlodipine then recheck to PCP and was advised to restart amlodipine and discontinue hydrochlorothiazide  Monitor blood pressure and reevaluate   Continue home lisinopril and amlodipine,  As needed Lopressor for systolic greater than 180

## 2025-05-11 NOTE — ASSESSMENT & PLAN NOTE
Has remote history of MI s/p coronary stents X2    5/10/25 presenting with intermittent chest pain over last several weeks mainly with activity, but progressively worsening. Most recent episode before calling EMS lasted longer and did not resolve with rest, was radiating to neck and bilateral arms, with diaphoresis and vomiting.      Hs Troponin 273 ng/L ; EKG sinus tachy 105 with some nonspecific ST changes , he took additional aspirin and he took his home morning Plavix, and in the ED heparin drip was started. Symptoms are resolved while in the ED.      PLAN   - Discussed with cardiology on-call Dr. Hardin no need for emergent cath  - admission to medicine , on telemetry.   - continue heparin drip.   - Continue home lisinopril, amlodipine, aspirin and Plavix  - trend troponin and EKG as per protocol.   - PRN Nitro for chest pain.   - has recent A1C, Lipid and TSH , all were unremarkable.

## 2025-05-11 NOTE — ASSESSMENT & PLAN NOTE
Prostate cancer with metastasis to lymph nodes/complicated with urinary obstruction  Status post bilateral nephrostomy tubes  Currently on ADT

## 2025-05-11 NOTE — ED ATTENDING ATTESTATION
5/10/2025  IMedhat MD, saw and evaluated the patient. I have discussed the patient with the resident/non-physician practitioner and agree with the resident's/non-physician practitioner's findings, Plan of Care, and MDM as documented in the resident's/non-physician practitioner's note, except where noted. All available labs and Radiology studies were reviewed.  I was present for key portions of any procedure(s) performed by the resident/non-physician practitioner and I was immediately available to provide assistance.       At this point I agree with the current assessment done in the Emergency Department.  I have conducted an independent evaluation of this patient a history and physical is as follows:    ED Course  ED Course as of 05/10/25 2131   Sat May 10, 2025   1944 Per resident h&p 83 YO M presents with CP over the last several weeks; occasionally radiates to arms; symptoms did not resolve with rest; currently asymptomatic; some associated vomiting; O: bilateral perc nephrostomy for cancer abdomen benign I/P r/o ACS; cardiac w/u;      Emergency Department Note- Kilo Mix 84 y.o. male MRN: [id number]    Unit/Bed#: ED 03 Encounter: [id number]    Kilo Mix is a 84 y.o. male who presents with   Chief Complaint   Patient presents with    Chest Pain     Pt complains of intermittent, worsening chest pain starting a few weeks ago.Pain radiated into jaw and hand today. Hx of MI and stents. Was given 324mg ASA at home.         History of Present Illness   HPI:  Kilo Mix is a 84 y.o. male who presents for evaluation of:  Intermittent episodes of chest pain over the last several weeks; the chest pain is central and radiates to his arms and to his mandible when it is most severe.  The patient's episodes of chest discomfort were worse today and were not resolved by rest as they usually are.  He denies associated dyspnea.  He denies any associated pleuritic chest pain.  He has no history of venous  thromboembolism.  He is currently chest pain-free.  Deep breathing does not provoke the discomfort.  He has a history of hypertension for which he recently stopped his amlodipine.  He has a history of coronary artery disease for which he has had a cardiac catheterization and a stent.    Review of Systems   Constitutional:  Negative for fatigue and fever.   HENT:  Negative for congestion and sore throat.    Respiratory:  Negative for cough and shortness of breath.    Cardiovascular:  Positive for chest pain. Negative for palpitations.   Gastrointestinal:  Negative for abdominal pain and nausea.   Genitourinary:  Negative for flank pain and frequency.   Neurological:  Negative for light-headedness and headaches.   Psychiatric/Behavioral:  Negative for dysphoric mood and hallucinations.    All other systems reviewed and are negative.      Historical Information   Past Medical History:   Diagnosis Date    Bladder tumor     Cancer (HCC)     Prostate cancer    Diabetes mellitus (HCC)     Diverticulitis of colon     History of common carotid artery stent placement     RIGHT stent - per pts son - RIGHT stent  in  carotid artery    History of heart artery stent     x2 stents in heart per pts son    Hyperlipidemia     Hypertension     Migraines     Myocardial infarction (HCC)     about 30yrs ago    Nephrostomy present (HCC)     2/25/25 Per pts son Ed- pt has two nephrostomy tubes    Prostate cancer (HCC)     Seasonal allergies      Past Surgical History:   Procedure Laterality Date    CARDIAC SURGERY      CAROTID ENDARTARECTOMY Right     thromboendarterectomy    CAROTID STENT Right     CATARACT EXTRACTION Left     COLONOSCOPY      fiberoptic screening 2/6/08 5 yr / complete 3/15/13 5 yr    CORONARY ANGIOPLASTY WITH STENT PLACEMENT      ELBOW SURGERY      EYE SURGERY      IR BIOPSY LYMPH NODE  12/24/2024    IR NEPHROSTOMY TUBE CHECK/CHANGE/REPOSITION/REINSERTION/UPSIZE  2/25/2025    IR NEPHROSTOMY TUBE  CHECK/CHANGE/REPOSITION/REINSERTION/UPSIZE  4/29/2025    IR NEPHROSTOMY TUBE PLACEMENT  12/24/2024    KNEE SURGERY      RI CYSTO W/REMOVAL OF LESIONS SMALL N/A 1/21/2025    Procedure: TRANSURETHRAL RESECTION OF BLADDER TUMOR (TURBT);  Surgeon: Jordan Youssef MD;  Location:  MAIN OR;  Service: Urology    RI CYSTO W/REMOVAL OF LESIONS SMALL N/A 3/6/2025    Procedure: TRANSURETHRAL RESECTION OF BLADDER TUMOR (TURBT);  Surgeon: Jordan Youssef MD;  Location:  MAIN OR;  Service: Urology    PROSTATE SURGERY      SUPRAPUBIC PROSTATECTOMY  12/06/1999    UPPER GASTROINTESTINAL ENDOSCOPY       Social History   Social History     Substance and Sexual Activity   Alcohol Use Yes    Comment: rare glass of wine     Social History     Substance and Sexual Activity   Drug Use Never    Comment: Denies any drug use per pt     Social History     Tobacco Use   Smoking Status Former    Current packs/day: 0.00    Average packs/day: 3.0 packs/day for 10.0 years (30.0 ttl pk-yrs)    Types: Cigarettes    Start date: 1/1/2000    Quit date: 1/1/2010    Years since quitting: 15.3   Smokeless Tobacco Never   Tobacco Comments    Former smoker - quit 2010     Family History:   Family History   Problem Relation Age of Onset    Alcohol abuse Mother     No Known Problems Father     No Known Problems Sister     Substance Abuse Son     Heart attack Family         MI    Breast cancer Family     Colon cancer Neg Hx     Colon polyps Neg Hx     Anesthesia problems Neg Hx        Meds/Allergies   PTA meds:   Prior to Admission Medications   Prescriptions Last Dose Informant Patient Reported? Taking?   B Complex Vitamins (VITAMIN B COMPLEX PO)  Self Yes No   Sig: Take 1 tablet by mouth daily   Cholecalciferol 1000 units CHEW  Self Yes No   Sig: Chew 1 tablet daily   amLODIPine (NORVASC) 5 mg tablet  Self No No   Sig: Take 1 tablet (5 mg total) by mouth daily   ascorbic acid (VITAMIN C) 500 mg tablet  Self Yes No   Sig: Take 500 mg by mouth daily    aspirin 81 MG tablet  Self Yes No   Sig: Take 1 tablet by mouth daily 2/25/25 per pts son - pt was to hold ASA 5 day hold - will follow same instructions that were given for January surgery   atorvastatin (LIPITOR) 20 mg tablet  Self No No   Sig: Take 1 tablet by mouth once daily   clopidogrel (PLAVIX) 75 mg tablet  Self No No   Sig: Take 1 tablet by mouth once daily   hydroCHLOROthiazide 25 mg tablet  Self No No   Sig: Take 1 tablet by mouth once daily   ibuprofen (MOTRIN) 600 mg tablet  Self No No   Sig: Take 1 tablet (600 mg total) by mouth every 6 (six) hours as needed for mild pain   lisinopril (ZESTRIL) 10 mg tablet  Self No No   Sig: Take 1 tablet by mouth once daily   metFORMIN (GLUCOPHAGE-XR) 500 mg 24 hr tablet  Self No No   Sig: Take 1 tablet by mouth once daily   sodium chloride, PF, 0.9 %  Self No No   Sig: 10 mL by Intracatheter route daily Intracatheter flushing daily. May substitute prefilled syringe with normal saline 10 mL vials, 10 mL syringes, and 18 g blunt needles   sodium chloride, PF, 0.9 %   No No   Sig: 10 mL by Intracatheter route daily Intracatheter flushing daily. May substitute prefilled syringe with normal saline 10 mL vials, 10 mL syringes, and 18 g blunt needles   vitamin E, tocopherol, 400 units capsule  Self Yes No   Sig: Take 1 capsule by mouth daily      Facility-Administered Medications: None     Allergies   Allergen Reactions    Losartan Syncope     Reaction Date: 07Jun2011;     Penicillins Other (See Comments)     As a child so does not recall reaction       Objective   First Vitals:   Blood Pressure: 159/77 (05/10/25 1927)  Pulse: (!) 107 (05/10/25 1923)  Temperature: 98.6 °F (37 °C) (05/10/25 1932)  Temp Source: Oral (05/10/25 1932)  Respirations: 20 (05/10/25 1923)  SpO2: 96 % (05/10/25 1923)    Current Vitals:   Blood Pressure: 153/74 (05/10/25 2100)  Pulse: (!) 107 (05/10/25 2100)  Temperature: 98.6 °F (37 °C) (05/10/25 1932)  Temp Source: Oral (05/10/25  193)  Respirations: 18 (05/10/25 2100)  SpO2: 97 % (05/10/25 2100)      Intake/Output Summary (Last 24 hours) at 5/10/2025 2131  Last data filed at 5/10/2025 2030  Gross per 24 hour   Intake --   Output 510 ml   Net -510 ml       Invasive Devices       Peripheral Intravenous Line  Duration             Peripheral IV 05/10/25 Distal;Right Hand <1 day              Drain  Duration             Nephrostomy Left 10.2 Fr. 11 days    Nephrostomy Right 10.2 Fr. 11 days                    Physical Exam  Vitals and nursing note reviewed.   Constitutional:       General: He is not in acute distress.     Appearance: Normal appearance. He is well-developed.   HENT:      Head: Normocephalic and atraumatic.      Right Ear: External ear normal.      Left Ear: External ear normal.      Nose: Nose normal.      Mouth/Throat:      Pharynx: No oropharyngeal exudate.   Eyes:      Conjunctiva/sclera: Conjunctivae normal.      Pupils: Pupils are equal, round, and reactive to light.   Cardiovascular:      Rate and Rhythm: Normal rate and regular rhythm.   Pulmonary:      Effort: Pulmonary effort is normal. No respiratory distress.   Abdominal:      General: Abdomen is flat. There is no distension.      Palpations: Abdomen is soft.   Musculoskeletal:         General: No deformity. Normal range of motion.      Cervical back: Normal range of motion and neck supple.   Skin:     General: Skin is warm and dry.      Capillary Refill: Capillary refill takes less than 2 seconds.   Neurological:      General: No focal deficit present.      Mental Status: He is alert and oriented to person, place, and time. Mental status is at baseline.      Coordination: Coordination normal.   Psychiatric:         Mood and Affect: Mood normal.         Behavior: Behavior normal.         Thought Content: Thought content normal.         Judgment: Judgment normal.           Medical Decision Makin.  Acute chest discomfort: Plan Complete blood count obtained to assess  for leukocytosis and anemia; Basic Metabolic profile obtained to assess for electrolyte disturbance, uremia, and disorders of glucose metabolism; electrocardiogram obtained to assess for arrhythmia; Troponin obtained to assess for myocardial infarction and myocarditis.  Chest x-ray rule out pneumonia and pneumothorax.  X-ray chest 1 view portable   ED Interpretation   No acute cardiopulmonary disease        Critical Care Time Statement: Upon my evaluation, this patient had a high probability of imminent or life-threatening deterioration due to chest pain secondary to acute myocardial infarction, which required my direct attention, intervention, and personal management.  I spent a total of 32 minutes directly providing critical care services, including interpretation of complex medical databases, evaluating for the presence of life-threatening injuries or illnesses, management of organ system failure(s) , complex medical decision making (to support/prevent further life-threatening deterioration)., and interpretation of hemodynamic data. This time is exclusive of procedures, teaching, treating other patients, family meetings, and any prior time recorded by providers other than myself.     Recent Results (from the past 36 hours)   ECG 12 lead    Collection Time: 05/10/25  7:29 PM   Result Value Ref Range    Ventricular Rate 105 BPM    Atrial Rate 105 BPM    VT Interval 220 ms    QRSD Interval 124 ms    QT Interval 366 ms    QTC Interval 483 ms    P Axis 34 degrees    QRS Axis -46 degrees    T Wave Axis 103 degrees   CBC and differential    Collection Time: 05/10/25  7:54 PM   Result Value Ref Range    WBC 12.47 (H) 4.31 - 10.16 Thousand/uL    RBC 3.99 3.88 - 5.62 Million/uL    Hemoglobin 13.0 12.0 - 17.0 g/dL    Hematocrit 38.9 36.5 - 49.3 %    MCV 98 82 - 98 fL    MCH 32.6 26.8 - 34.3 pg    MCHC 33.4 31.4 - 37.4 g/dL    RDW 13.2 11.6 - 15.1 %    MPV 9.6 8.9 - 12.7 fL    Platelets 228 149 - 390 Thousands/uL   Basic  "metabolic panel    Collection Time: 05/10/25  7:54 PM   Result Value Ref Range    Sodium 139 135 - 147 mmol/L    Potassium 3.6 3.5 - 5.3 mmol/L    Chloride 105 96 - 108 mmol/L    CO2 24 21 - 32 mmol/L    ANION GAP 10 4 - 13 mmol/L    BUN 22 5 - 25 mg/dL    Creatinine 1.14 0.60 - 1.30 mg/dL    Glucose 190 (H) 65 - 140 mg/dL    Calcium 9.1 8.4 - 10.2 mg/dL    eGFR 59 ml/min/1.73sq m   HS Troponin 0hr (reflex protocol)    Collection Time: 05/10/25  7:54 PM   Result Value Ref Range    hs TnI 0hr 273 (H) \"Refer to ACS Flowchart\"- see link ng/L   Manual Differential(PHLEBS Do Not Order)    Collection Time: 05/10/25  7:54 PM   Result Value Ref Range    Segmented % 91 (H) 43 - 75 %    Lymphocytes % 3 (L) 14 - 44 %    Monocytes % 5 4 - 12 %    Eosinophils % 0 0 - 6 %    Basophils % 1 0 - 1 %    Absolute Neutrophils 11.35 (H) 1.85 - 7.62 Thousand/uL    Absolute Lymphocytes 0.37 (L) 0.60 - 4.47 Thousand/uL    Absolute Monocytes 0.62 0.00 - 1.22 Thousand/uL    Absolute Eosinophils 0.00 0.00 - 0.40 Thousand/uL    Absolute Basophils 0.12 (H) 0.00 - 0.10 Thousand/uL    Total Counted      RBC Morphology Normal     Platelet Estimate Adequate Adequate   APTT six (6) hours after Heparin bolus/drip initiation or dosing change    Collection Time: 05/10/25  8:20 PM   Result Value Ref Range    PTT 29 23 - 34 seconds   Protime-INR    Collection Time: 05/10/25  8:20 PM   Result Value Ref Range    Protime 13.1 12.3 - 15.0 seconds    INR 0.96 0.85 - 1.19     X-ray chest 1 view portable   ED Interpretation   No acute cardiopulmonary disease            Portions of the record may have been created with voice recognition software. Occasional wrong word or \"sound a like\" substitutions may have occurred due to the inherent limitations of voice recognition software.  Read the chart carefully and recognize, using context, where substitutions have occurred.        Critical Care Time  ECG 12 Lead Documentation Only    Date/Time: 5/10/2025 8:31 " PM    Performed by: Medhat Lee MD  Authorized by: Medhat Lee MD    Indications / Diagnosis:  Chest pain  ECG reviewed by me, the ED Provider: yes    Patient location:  ED  Previous ECG:     Previous ECG:  Compared to current    Comparison ECG info:  January 26, 2025    Similarity:  Changes noted    Comparison to cardiac monitor: Yes    Interpretation:     Interpretation: abnormal    Rate:     ECG rate:  105    ECG rate assessment: tachycardic    Rhythm:     Rhythm: sinus rhythm    Ectopy:     Ectopy: none    QRS:     QRS axis:  Left    QRS intervals:  Wide  Conduction:     Conduction: abnormal      Abnormal conduction: complete LBBB    ST segments:     ST segments:  Normal  T waves:     T waves: non-specific

## 2025-05-11 NOTE — PLAN OF CARE
Problem: PAIN - ADULT  Goal: Verbalizes/displays adequate comfort level or baseline comfort level  Description: Interventions:- Encourage patient to monitor pain and request assistance- Assess pain using appropriate pain scale- Administer analgesics based on type and severity of pain and evaluate response- Implement non-pharmacological measures as appropriate and evaluate response- Consider cultural and social influences on pain and pain management- Notify physician/advanced practitioner if interventions unsuccessful or patient reports new pain  Outcome: Progressing     Problem: INFECTION - ADULT  Goal: Absence or prevention of progression during hospitalization  Description: INTERVENTIONS:- Assess and monitor for signs and symptoms of infection- Monitor lab/diagnostic results- Monitor all insertion sites, i.e. indwelling lines, tubes, and drains- Monitor endotracheal if appropriate and nasal secretions for changes in amount and color- [place] appropriate cooling/warming therapies per order- Administer medications as ordered- Instruct and encourage patient and family to use good hand hygiene technique- Identify and instruct in appropriate isolation precautions for identified infection/condition  Outcome: Progressing  Goal: Absence of fever/infection during neutropenic period  Description: INTERVENTIONS:- Monitor WBC  Outcome: Progressing

## 2025-05-11 NOTE — ASSESSMENT & PLAN NOTE
History of carotid artery disease status post stenting  Home Rx Plavix 75 mg daily and atorvastatin 20 mg daily

## 2025-05-11 NOTE — ASSESSMENT & PLAN NOTE
POA:  Troponins elevated (1071>1760> 2989)  EKG sinus tachycardia with first-degree AV block rate 105 bpm, left axis deviation and anterior septal ST and T wave abnormalities  Chest x-ray: Moderate right base pneumonia

## 2025-05-11 NOTE — ASSESSMENT & PLAN NOTE
Has remote history of CAD /MI s/p coronary stents X2  Presenting with intermittent chest pain over last several weeks mainly with activity, but progressively worsening. Most recent episode before calling EMS lasted longer and did not resolve with rest, was radiating to neck and bilateral arms, with diaphoresis and vomiting.   Elevated troponin, EKG was sinus tachycardia 105 with some nonspecific ST changes  Patient started on heparin drip in the ED  Continue with aspirin, Plavix, statin  Continue heparin drip  Monitor on telemetry  As needed submental length of blistering  Cardiology consulted for further management  Follow on cardiac echo  - has recent A1C, Lipid and TSH , all were unremarkable.   Add beta-blocker

## 2025-05-12 ENCOUNTER — APPOINTMENT (INPATIENT)
Dept: NON INVASIVE DIAGNOSTICS | Facility: HOSPITAL | Age: 85
DRG: 233 | End: 2025-05-12
Payer: MEDICARE

## 2025-05-12 DIAGNOSIS — C67.0 MALIGNANT NEOPLASM OF TRIGONE OF URINARY BLADDER (HCC): ICD-10-CM

## 2025-05-12 DIAGNOSIS — Z93.6 NEPHROSTOMY PRESENT (HCC): ICD-10-CM

## 2025-05-12 DIAGNOSIS — N13.30 HYDRONEPHROSIS DUE TO OBSTRUCTIVE MALIGNANT NEOPLASM OF BLADDER (HCC): ICD-10-CM

## 2025-05-12 DIAGNOSIS — C67.9 HYDRONEPHROSIS DUE TO OBSTRUCTIVE MALIGNANT NEOPLASM OF BLADDER (HCC): ICD-10-CM

## 2025-05-12 LAB
ANION GAP SERPL CALCULATED.3IONS-SCNC: 6 MMOL/L (ref 4–13)
AORTIC ROOT: 3.3 CM
ASCENDING AORTA: 3.2 CM
ATRIAL RATE: 105 BPM
ATRIAL RATE: 93 BPM
BASOPHILS # BLD AUTO: 0.02 THOUSANDS/ÂΜL (ref 0–0.1)
BASOPHILS NFR BLD AUTO: 0 % (ref 0–1)
BSA FOR ECHO PROCEDURE: 1.85 M2
BUN SERPL-MCNC: 19 MG/DL (ref 5–25)
CALCIUM SERPL-MCNC: 8.9 MG/DL (ref 8.4–10.2)
CHLORIDE SERPL-SCNC: 104 MMOL/L (ref 96–108)
CO2 SERPL-SCNC: 28 MMOL/L (ref 21–32)
CREAT SERPL-MCNC: 1.12 MG/DL (ref 0.6–1.3)
E WAVE DECELERATION TIME: 242 MS
E/A RATIO: 0.61
EOSINOPHIL # BLD AUTO: 0.14 THOUSAND/ÂΜL (ref 0–0.61)
EOSINOPHIL NFR BLD AUTO: 2 % (ref 0–6)
ERYTHROCYTE [DISTWIDTH] IN BLOOD BY AUTOMATED COUNT: 13.2 % (ref 11.6–15.1)
FRACTIONAL SHORTENING: 24 % (ref 28–44)
GFR SERPL CREATININE-BSD FRML MDRD: 60 ML/MIN/1.73SQ M
GLUCOSE SERPL-MCNC: 97 MG/DL (ref 65–140)
HCT VFR BLD AUTO: 33.7 % (ref 36.5–49.3)
HGB BLD-MCNC: 11 G/DL (ref 12–17)
IMM GRANULOCYTES # BLD AUTO: 0.03 THOUSAND/UL (ref 0–0.2)
IMM GRANULOCYTES NFR BLD AUTO: 0 % (ref 0–2)
INTERVENTRICULAR SEPTUM IN DIASTOLE (PARASTERNAL SHORT AXIS VIEW): 0.92 CM
INTERVENTRICULAR SEPTUM: 1.4 CM (ref 0.6–1.1)
LAAS-AP2: 17 CM2
LAAS-AP4: 21.3 CM2
LEFT ATRIUM SIZE: 4.4 CM
LEFT ATRIUM VOLUME (MOD BIPLANE): 60 ML
LEFT ATRIUM VOLUME INDEX (MOD BIPLANE): 35 ML/M2
LEFT INTERNAL DIMENSION IN SYSTOLE: 3.5 CM (ref 2.1–4)
LEFT VENTRICULAR INTERNAL DIMENSION IN DIASTOLE: 4.58 CM (ref 3.5–6)
LEFT VENTRICULAR POSTERIOR WALL IN END DIASTOLE: 0.82 CM
LEFT VENTRICULAR STROKE VOLUME: 40 ML
LV EF US.2D.A4C+ESTIMATED: 61 %
LVSV (TEICH): 40 ML
LYMPHOCYTES # BLD AUTO: 1.19 THOUSANDS/ÂΜL (ref 0.6–4.47)
LYMPHOCYTES NFR BLD AUTO: 13 % (ref 14–44)
MAGNESIUM SERPL-MCNC: 1.8 MG/DL (ref 1.9–2.7)
MCH RBC QN AUTO: 32.2 PG (ref 26.8–34.3)
MCHC RBC AUTO-ENTMCNC: 32.6 G/DL (ref 31.4–37.4)
MCV RBC AUTO: 99 FL (ref 82–98)
MONOCYTES # BLD AUTO: 0.6 THOUSAND/ÂΜL (ref 0.17–1.22)
MONOCYTES NFR BLD AUTO: 6 % (ref 4–12)
MV E'TISSUE VEL-LAT: 8 CM/S
MV E'TISSUE VEL-SEP: 5 CM/S
MV PEAK A VEL: 1.27 M/S
MV PEAK E VEL: 77 CM/S
MV STENOSIS PRESSURE HALF TIME: 70 MS
MV VALVE AREA P 1/2 METHOD: 3.14
NEUTROPHILS # BLD AUTO: 7.37 THOUSANDS/ÂΜL (ref 1.85–7.62)
NEUTS SEG NFR BLD AUTO: 79 % (ref 43–75)
NRBC BLD AUTO-RTO: 0 /100 WBCS
P AXIS: 34 DEGREES
P AXIS: 51 DEGREES
PLATELET # BLD AUTO: 181 THOUSANDS/UL (ref 149–390)
PMV BLD AUTO: 10 FL (ref 8.9–12.7)
POTASSIUM SERPL-SCNC: 3.8 MMOL/L (ref 3.5–5.3)
PR INTERVAL: 220 MS
PR INTERVAL: 238 MS
PROCALCITONIN SERPL-MCNC: 0.07 NG/ML
QRS AXIS: -43 DEGREES
QRS AXIS: -46 DEGREES
QRSD INTERVAL: 120 MS
QRSD INTERVAL: 124 MS
QT INTERVAL: 366 MS
QT INTERVAL: 370 MS
QTC INTERVAL: 461 MS
QTC INTERVAL: 483 MS
RA PRESSURE ESTIMATED: 3 MMHG
RBC # BLD AUTO: 3.42 MILLION/UL (ref 3.88–5.62)
RIGHT ATRIUM AREA SYSTOLE A4C: 12.8 CM2
RIGHT VENTRICLE ID DIMENSION: 3.1 CM
RV PSP: 41 MMHG
SL CV LEFT ATRIUM LENGTH A2C: 4.8 CM
SL CV LV EF: 60
SL CV PED ECHO LEFT VENTRICLE DIASTOLIC VOLUME (MOD BIPLANE) 2D: 90 ML
SL CV PED ECHO LEFT VENTRICLE SYSTOLIC VOLUME (MOD BIPLANE) 2D: 50 ML
SODIUM SERPL-SCNC: 138 MMOL/L (ref 135–147)
T WAVE AXIS: 103 DEGREES
T WAVE AXIS: 95 DEGREES
TR MAX PG: 38 MMHG
TR PEAK VELOCITY: 3.1 M/S
TRICUSPID ANNULAR PLANE SYSTOLIC EXCURSION: 1.8 CM
TRICUSPID VALVE PEAK REGURGITATION VELOCITY: 3.07 M/S
VENTRICULAR RATE: 105 BPM
VENTRICULAR RATE: 93 BPM
WBC # BLD AUTO: 9.35 THOUSAND/UL (ref 4.31–10.16)

## 2025-05-12 PROCEDURE — C1769 GUIDE WIRE: HCPCS | Performed by: INTERNAL MEDICINE

## 2025-05-12 PROCEDURE — 85025 COMPLETE CBC W/AUTO DIFF WBC: CPT | Performed by: INTERNAL MEDICINE

## 2025-05-12 PROCEDURE — 80048 BASIC METABOLIC PNL TOTAL CA: CPT | Performed by: INTERNAL MEDICINE

## 2025-05-12 PROCEDURE — 83735 ASSAY OF MAGNESIUM: CPT | Performed by: INTERNAL MEDICINE

## 2025-05-12 PROCEDURE — 99152 MOD SED SAME PHYS/QHP 5/>YRS: CPT | Performed by: INTERNAL MEDICINE

## 2025-05-12 PROCEDURE — 93454 CORONARY ARTERY ANGIO S&I: CPT | Performed by: INTERNAL MEDICINE

## 2025-05-12 PROCEDURE — 99223 1ST HOSP IP/OBS HIGH 75: CPT | Performed by: THORACIC SURGERY (CARDIOTHORACIC VASCULAR SURGERY)

## 2025-05-12 PROCEDURE — 93306 TTE W/DOPPLER COMPLETE: CPT

## 2025-05-12 PROCEDURE — 99232 SBSQ HOSP IP/OBS MODERATE 35: CPT | Performed by: INTERNAL MEDICINE

## 2025-05-12 PROCEDURE — 99232 SBSQ HOSP IP/OBS MODERATE 35: CPT

## 2025-05-12 PROCEDURE — 93010 ELECTROCARDIOGRAM REPORT: CPT | Performed by: INTERNAL MEDICINE

## 2025-05-12 PROCEDURE — 93306 TTE W/DOPPLER COMPLETE: CPT | Performed by: INTERNAL MEDICINE

## 2025-05-12 PROCEDURE — C1894 INTRO/SHEATH, NON-LASER: HCPCS | Performed by: INTERNAL MEDICINE

## 2025-05-12 PROCEDURE — B2111ZZ FLUOROSCOPY OF MULTIPLE CORONARY ARTERIES USING LOW OSMOLAR CONTRAST: ICD-10-PCS | Performed by: INTERNAL MEDICINE

## 2025-05-12 PROCEDURE — 84145 PROCALCITONIN (PCT): CPT | Performed by: INTERNAL MEDICINE

## 2025-05-12 RX ORDER — SODIUM CHLORIDE 9 MG/ML
75 INJECTION, SOLUTION INTRAVENOUS CONTINUOUS
Status: DISPENSED | OUTPATIENT
Start: 2025-05-12 | End: 2025-05-12

## 2025-05-12 RX ORDER — VERAPAMIL HYDROCHLORIDE 2.5 MG/ML
INJECTION INTRAVENOUS CODE/TRAUMA/SEDATION MEDICATION
Status: DISCONTINUED | OUTPATIENT
Start: 2025-05-12 | End: 2025-05-12 | Stop reason: HOSPADM

## 2025-05-12 RX ORDER — FENTANYL CITRATE 50 UG/ML
INJECTION, SOLUTION INTRAMUSCULAR; INTRAVENOUS CODE/TRAUMA/SEDATION MEDICATION
Status: DISCONTINUED | OUTPATIENT
Start: 2025-05-12 | End: 2025-05-12 | Stop reason: HOSPADM

## 2025-05-12 RX ORDER — NITROGLYCERIN 20 MG/100ML
INJECTION INTRAVENOUS CODE/TRAUMA/SEDATION MEDICATION
Status: DISCONTINUED | OUTPATIENT
Start: 2025-05-12 | End: 2025-05-12 | Stop reason: HOSPADM

## 2025-05-12 RX ORDER — HEPARIN SODIUM 1000 [USP'U]/ML
INJECTION, SOLUTION INTRAVENOUS; SUBCUTANEOUS CODE/TRAUMA/SEDATION MEDICATION
Status: DISCONTINUED | OUTPATIENT
Start: 2025-05-12 | End: 2025-05-12 | Stop reason: HOSPADM

## 2025-05-12 RX ORDER — HEPARIN SODIUM 10000 [USP'U]/100ML
3-20 INJECTION, SOLUTION INTRAVENOUS
Status: DISCONTINUED | OUTPATIENT
Start: 2025-05-13 | End: 2025-05-13

## 2025-05-12 RX ORDER — LIDOCAINE HYDROCHLORIDE 20 MG/ML
INJECTION, SOLUTION EPIDURAL; INFILTRATION; INTRACAUDAL; PERINEURAL CODE/TRAUMA/SEDATION MEDICATION
Status: DISCONTINUED | OUTPATIENT
Start: 2025-05-12 | End: 2025-05-12 | Stop reason: HOSPADM

## 2025-05-12 RX ORDER — MIDAZOLAM HYDROCHLORIDE 2 MG/2ML
INJECTION, SOLUTION INTRAMUSCULAR; INTRAVENOUS CODE/TRAUMA/SEDATION MEDICATION
Status: DISCONTINUED | OUTPATIENT
Start: 2025-05-12 | End: 2025-05-12 | Stop reason: HOSPADM

## 2025-05-12 RX ADMIN — CEFTRIAXONE SODIUM 1000 MG: 10 INJECTION, POWDER, FOR SOLUTION INTRAVENOUS at 09:24

## 2025-05-12 RX ADMIN — METOPROLOL TARTRATE 25 MG: 25 TABLET, FILM COATED ORAL at 09:23

## 2025-05-12 RX ADMIN — ATORVASTATIN CALCIUM 40 MG: 40 TABLET, FILM COATED ORAL at 09:23

## 2025-05-12 RX ADMIN — ASPIRIN 81 MG CHEWABLE TABLET 324 MG: 81 TABLET CHEWABLE at 09:24

## 2025-05-12 RX ADMIN — METOPROLOL TARTRATE 25 MG: 25 TABLET, FILM COATED ORAL at 21:36

## 2025-05-12 RX ADMIN — CLOPIDOGREL BISULFATE 75 MG: 75 TABLET, FILM COATED ORAL at 09:23

## 2025-05-12 RX ADMIN — AMLODIPINE BESYLATE 5 MG: 5 TABLET ORAL at 09:23

## 2025-05-12 NOTE — PLAN OF CARE
Problem: PAIN - ADULT  Goal: Verbalizes/displays adequate comfort level or baseline comfort level  Description: Interventions:- Encourage patient to monitor pain and request assistance- Assess pain using appropriate pain scale- Administer analgesics based on type and severity of pain and evaluate response- Implement non-pharmacological measures as appropriate and evaluate response- Consider cultural and social influences on pain and pain management- Notify physician/advanced practitioner if interventions unsuccessful or patient reports new pain  Outcome: Progressing     Problem: INFECTION - ADULT  Goal: Absence or prevention of progression during hospitalization  Description: INTERVENTIONS:- Assess and monitor for signs and symptoms of infection- Monitor lab/diagnostic results- Monitor all insertion sites, i.e. indwelling lines, tubes, and drains- Monitor endotracheal if appropriate and nasal secretions for changes in amount and color- [place] appropriate cooling/warming therapies per order- Administer medications as ordered- Instruct and encourage patient and family to use good hand hygiene technique- Identify and instruct in appropriate isolation precautions for identified infection/condition  Outcome: Progressing  Goal: Absence of fever/infection during neutropenic period  Description: INTERVENTIONS:- Monitor WBC  Outcome: Progressing     Problem: SAFETY ADULT  Goal: Maintain or return to baseline ADL function  Description: INTERVENTIONS:-  Assess patient's ability to carry out ADLs; assess patient's baseline for ADL function and identify physical deficits which impact ability to perform ADLs (bathing, care of mouth/teeth, toileting, grooming, dressing, etc.)- Assess/evaluate cause of self-care deficits - Assess range of motion- Assess patient's mobility; develop plan if impaired- Assess patient's need for assistive devices and provide as appropriate- Encourage maximum independence but intervene and supervise  when necessary- Involve family in performance of ADLs- Assess for home care needs following discharge - Consider OT consult to assist with ADL evaluation and planning for discharge- Provide patient education as appropriate  Outcome: Progressing     Problem: DISCHARGE PLANNING  Goal: Discharge to home or other facility with appropriate resources  Description: INTERVENTIONS:- Identify barriers to discharge w/patient and caregiver- Arrange for needed discharge resources and transportation as appropriate- Identify discharge learning needs (meds, wound care, etc.)- Arrange for interpretive services to assist at discharge as needed- Refer to Case Management Department for coordinating discharge planning if the patient needs post-hospital services based on physician/advanced practitioner order or complex needs related to functional status, cognitive ability, or social support system  Outcome: Progressing     Problem: Knowledge Deficit  Goal: Patient/family/caregiver demonstrates understanding of disease process, treatment plan, medications, and discharge instructions  Description: Complete learning assessment and assess knowledge base.Interventions:- Provide teaching at level of understanding- Provide teaching via preferred learning methods  Outcome: Progressing     Problem: CARDIOVASCULAR - ADULT  Goal: Maintains optimal cardiac output and hemodynamic stability  Description: INTERVENTIONS:- Monitor I/O, vital signs and rhythm- Monitor for S/S and trends of decreased cardiac output- Administer and titrate ordered vasoactive medications to optimize hemodynamic stability- Assess quality of pulses, skin color and temperature- Assess for signs of decreased coronary artery perfusion- Instruct patient to report change in severity of symptoms  Outcome: Progressing  Goal: Absence of cardiac dysrhythmias or at baseline rhythm  Description: INTERVENTIONS:- Continuous cardiac monitoring, vital signs, obtain 12 lead EKG if ordered-  Administer antiarrhythmic and heart rate control medications as ordered- Monitor electrolytes and administer replacement therapy as ordered  Outcome: Progressing     Problem: RESPIRATORY - ADULT  Goal: Achieves optimal ventilation and oxygenation  Description: INTERVENTIONS:- Assess for changes in respiratory status- Assess for changes in mentation and behavior- Position to facilitate oxygenation and minimize respiratory effort- Oxygen administered by appropriate delivery if ordered- Initiate smoking cessation education as indicated- Encourage broncho-pulmonary hygiene including cough, deep breathe, Incentive Spirometry- Assess the need for suctioning and aspirate as needed- Assess and instruct to report SOB or any respiratory difficulty- Respiratory Therapy support as indicated  Outcome: Progressing     Problem: GENITOURINARY - ADULT  Goal: Maintains or returns to baseline urinary function  Description: INTERVENTIONS:- Assess urinary function- Encourage oral fluids to ensure adequate hydration if ordered- Administer IV fluids as ordered to ensure adequate hydration- Administer ordered medications as needed- Offer frequent toileting- Follow urinary retention protocol if ordered  Outcome: Progressing  Goal: Absence of urinary retention  Description: INTERVENTIONS:- Assess patient’s ability to void and empty bladder- Monitor I/O- Bladder scan as needed- Discuss with physician/AP medications to alleviate retention as needed- Discuss catheterization for long term situations as appropriate  Outcome: Progressing  Goal: Urinary catheter remains patent  Description: INTERVENTIONS:- Assess patency of urinary catheter- If patient has a chronic lai, consider changing catheter if non-functioning- Follow guidelines for intermittent irrigation of non-functioning urinary catheter  Outcome: Progressing

## 2025-05-12 NOTE — H&P (VIEW-ONLY)
Consultation - Cardiac Surgery   Kilo Mix 84 y.o. male MRN: [id number]  Unit/Bed#: [facility] 425-01 Encounter: [id number]    Physician Requesting Consult: Emilie Soyeon Kim, MD    Reason for Consult / Principal Problem: MV CAD    Inpatient consult to Cardiac Surgery  Consult performed by: Juan Daniel Loyd PA-C  Consult ordered by: Mayur Lopez MD        History of Present Illness: Kilo Mix is a 84 y.o. male with cardiovascular past medical history significant for coronary artery disease.  He initially underwent cardiac catheterization and 1995.  He had subsequent repeat coronary angiography completed in 2001.  At both instances he had percutaneous intervention with bare-metal stent implanted in 1995.  Initially he was managed by his primary cardiologist, Dr. Gray; however since his residential, he has not been seen in several years.    His current clinical course began several weeks ago.  At that time he began to note exertional dyspnea and angina.  He ultimately presented for evaluation treatment at [facility].  NSTEMI was identified.  He underwent echocardiogram and cardiac catheterization.  Multivessel coronary disease was identified.  Cardiac surgery consultation was obtained at this time.    Of note, he was recently diagnosed several months ago with metastatic prostate cancer.  He initially underwent prostatectomy for cancer in 1999.  Recurrence of his disease recently was associated with metastatic disease, stage IV.  He is currently receiving oral chemotherapy with intramuscular injection every 6 months.  He follows with his primary oncologist, Dr. Shankar.     His past medical history is otherwise significant for hyperlipidemia, peripheral vascular disease Right carotid artery stenosis s/p right carotid stent X 2, remote history of prostatectomy for prostate cancer with recurrent metastasis to lymph nodes complicated with urinary obstruction requiring nephrostomy tube, hypertension       Past Medical History:  Past Medical History:   Diagnosis Date    Bladder tumor     Cancer (HCC)     Prostate cancer    Diabetes mellitus (HCC)     Diverticulitis of colon     History of common carotid artery stent placement     RIGHT stent - per pts son - RIGHT stent  in  carotid artery    History of heart artery stent     x2 stents in heart per pts son    Hyperlipidemia     Hypertension     Migraines     Myocardial infarction (HCC)     about 30yrs ago    Nephrostomy present (HCC)     2/25/25 Per pts son Ed- pt has two nephrostomy tubes    Prostate cancer (HCC)     Seasonal allergies          Past Surgical History:   Past Surgical History:   Procedure Laterality Date    CARDIAC SURGERY      CAROTID ENDARTARECTOMY Right     thromboendarterectomy    CAROTID STENT Right     CATARACT EXTRACTION Left     COLONOSCOPY      fiberoptic screening 2/6/08 5 yr / complete 3/15/13 5 yr    CORONARY ANGIOPLASTY WITH STENT PLACEMENT      ELBOW SURGERY      EYE SURGERY      IR BIOPSY LYMPH NODE  12/24/2024    IR NEPHROSTOMY TUBE CHECK/CHANGE/REPOSITION/REINSERTION/UPSIZE  2/25/2025    IR NEPHROSTOMY TUBE CHECK/CHANGE/REPOSITION/REINSERTION/UPSIZE  4/29/2025    IR NEPHROSTOMY TUBE PLACEMENT  12/24/2024    KNEE SURGERY      NH CYSTO W/REMOVAL OF LESIONS SMALL N/A 1/21/2025    Procedure: TRANSURETHRAL RESECTION OF BLADDER TUMOR (TURBT);  Surgeon: Jordan Youssef MD;  Location:  MAIN OR;  Service: Urology    NH CYSTO W/REMOVAL OF LESIONS SMALL N/A 3/6/2025    Procedure: TRANSURETHRAL RESECTION OF BLADDER TUMOR (TURBT);  Surgeon: Jordan Youssef MD;  Location:  MAIN OR;  Service: Urology    PROSTATE SURGERY      SUPRAPUBIC PROSTATECTOMY  12/06/1999    UPPER GASTROINTESTINAL ENDOSCOPY           Family History:  Family History   Problem Relation Age of Onset    Alcohol abuse Mother     No Known Problems Father     No Known Problems Sister     Substance Abuse Son     Heart attack Family         MI    Breast cancer Family      Colon cancer Neg Hx     Colon polyps Neg Hx     Anesthesia problems Neg Hx          Social History:  Social History     Substance and Sexual Activity   Alcohol Use Yes    Comment: rare glass of wine     Social History     Substance and Sexual Activity   Drug Use Never    Comment: Denies any drug use per pt     Social History     Tobacco Use   Smoking Status Former    Current packs/day: 0.00    Average packs/day: 3.0 packs/day for 10.0 years (30.0 ttl pk-yrs)    Types: Cigarettes    Start date: 1/1/2000    Quit date: 1/1/2010    Years since quitting: 15.3   Smokeless Tobacco Never   Tobacco Comments    Former smoker - quit 2010     Marital Status: /Civil Union    Home Medications:   Prior to Admission medications    Medication Sig Start Date End Date Taking? Authorizing Provider   amLODIPine (NORVASC) 5 mg tablet Take 1 tablet (5 mg total) by mouth daily 9/10/24  Yes Dolores Vizcarra DO   ascorbic acid (VITAMIN C) 500 mg tablet Take 500 mg by mouth daily 2/22/11  Yes Historical Provider, MD   clopidogrel (PLAVIX) 75 mg tablet Take 1 tablet by mouth once daily 3/17/25  Yes Dolores Vizcarra DO   ibuprofen (MOTRIN) 600 mg tablet Take 1 tablet (600 mg total) by mouth every 6 (six) hours as needed for mild pain 1/21/25  Yes Jordan Youssef MD   metFORMIN (GLUCOPHAGE-XR) 500 mg 24 hr tablet Take 1 tablet by mouth once daily 7/13/24  Yes Dolores Vizcarra DO   aspirin 81 MG tablet Take 1 tablet by mouth daily 2/25/25 per pts son - pt was to hold ASA 5 day hold - will follow same instructions that were given for January surgery 9/10/12   Historical Provider, MD   atorvastatin (LIPITOR) 20 mg tablet Take 1 tablet by mouth once daily 12/3/24   Dolores Vizcarra DO   B Complex Vitamins (VITAMIN B COMPLEX PO) Take 1 tablet by mouth daily 2/22/11   Historical Provider, MD   Cholecalciferol 1000 units CHEW Chew 1 tablet daily    Historical Provider, MD   lisinopril (ZESTRIL) 10 mg tablet Take 1  tablet by mouth once daily 9/9/24   Dolores Vizcarra DO   sodium chloride, PF, 0.9 % 10 mL by Intracatheter route daily Intracatheter flushing daily. May substitute prefilled syringe with normal saline 10 mL vials, 10 mL syringes, and 18 g blunt needles 3/12/25 6/10/25  GLEN Rios   sodium chloride, PF, 0.9 % 10 mL by Intracatheter route daily Intracatheter flushing daily. May substitute prefilled syringe with normal saline 10 mL vials, 10 mL syringes, and 18 g blunt needles 4/29/25 7/28/25  Aleks Stapleton MD   vitamin E, tocopherol, 400 units capsule Take 1 capsule by mouth daily 6/4/12   Historical Provider, MD       Inpatient Medications:  Scheduled Meds:   Current Facility-Administered Medications   Medication Dose Route Frequency Provider Last Rate    amLODIPine  5 mg Oral Daily Mayur Lopez MD      [Held by provider] aspirin  81 mg Oral Daily Marques Cabrera DO      atorvastatin  40 mg Oral Daily Mayur Lopez MD      cefTRIAXone  1,000 mg Intravenous Q24H Mayur Lopez MD 1,000 mg (05/12/25 0924)    heparin (porcine)  3-20 Units/kg/hr (Order-Specific) Intravenous Titrated Mayur Lopez MD Stopped (05/12/25 1201)    heparin (porcine)  2,000 Units Intravenous Q6H PRN Mayur Lopez MD      heparin (porcine)  4,000 Units Intravenous Q6H PRN Mayur Lopez MD      [Held by provider] lisinopril  10 mg Oral Daily GLEN Williamson      metoprolol tartrate  25 mg Oral Q12H AMADOU Mayur Lopez MD      nitroglycerin  0.4 mg Sublingual Q5 Min PRN Mayur Lopez MD      sodium chloride (PF)  10 mL Intracatheter Daily Mayur Lopez MD      sodium chloride (PF)  10 mL Intracatheter Daily Mayur Lopez MD      sodium chloride  75 mL/hr Intravenous Continuous Mayur Lopez MD 75 mL/hr (05/12/25 1140)     Continuous Infusions: heparin (porcine), 3-20 Units/kg/hr (Order-Specific), Last Rate: Stopped (05/12/25 1201)  sodium chloride, 75 mL/hr, Last Rate: 75 mL/hr (05/12/25 5000)      PRN  "Meds:  heparin (porcine), 2,000 Units, Q6H PRN  heparin (porcine), 4,000 Units, Q6H PRN  nitroglycerin, 0.4 mg, Q5 Min PRN        Allergies:  Allergies   Allergen Reactions    Losartan Syncope     Reaction Date: 07Jun2011;     Penicillins Other (See Comments)     As a child so does not recall reaction       Review of Systems:  Review of Systems   Constitutional:  Positive for activity change and fatigue.   HENT: Negative.     Eyes: Negative.    Respiratory:  Positive for chest tightness and shortness of breath.    Cardiovascular:  Positive for chest pain. Negative for leg swelling.   Endocrine: Negative.    Genitourinary: Negative.    Musculoskeletal: Negative.    Skin: Negative.    Neurological:  Positive for weakness.   Psychiatric/Behavioral: Negative.         Vital Signs:     Vitals:    05/11/25 2143 05/12/25 0304 05/12/25 0751 05/12/25 0815   BP: 144/67 135/54 (!) 120/46 (!) 120/46   BP Location:  Left arm     Pulse: 68 75 70 70   Resp:   18    Temp:  99.7 °F (37.6 °C) 98.3 °F (36.8 °C)    TempSrc:  Oral     SpO2: 100% 96% 96%    Weight:    72.1 kg (159 lb)   Height:    5' 8\" (1.727 m)     Invasive Devices       Peripheral Intravenous Line  Duration             Peripheral IV 05/10/25 Distal;Right Hand 1 day    Peripheral IV 05/10/25 Right Antecubital 1 day              Drain  Duration             Nephrostomy Left 10.2 Fr. 13 days    Nephrostomy Right 10.2 Fr. 13 days                    Physical Exam:  Physical Exam  Constitutional:       Appearance: Normal appearance. He is well-developed.   HENT:      Head: Normocephalic and atraumatic.   Eyes:      Conjunctiva/sclera: Conjunctivae normal.   Neck:      Thyroid: No thyromegaly.      Vascular: No carotid bruit or JVD.      Trachea: No tracheal deviation.   Cardiovascular:      Rate and Rhythm: Normal rate and regular rhythm.      Pulses:           Carotid pulses are 2+ on the right side and 2+ on the left side.       Dorsalis pedis pulses are 2+ on the right " side and 2+ on the left side.        Posterior tibial pulses are 2+ on the right side and 2+ on the left side.      Heart sounds: S1 normal and S2 normal.   Pulmonary:      Effort: No accessory muscle usage or respiratory distress.      Breath sounds: No wheezing or rales.   Chest:      Chest wall: No tenderness.   Abdominal:      General: Bowel sounds are normal.      Palpations: Abdomen is soft.      Tenderness: There is no abdominal tenderness.   Musculoskeletal:         General: Normal range of motion.      Cervical back: Full passive range of motion without pain and normal range of motion.   Skin:     General: Skin is warm and dry.   Neurological:      Mental Status: He is alert and oriented to person, place, and time.      Cranial Nerves: No cranial nerve deficit.      Sensory: No sensory deficit.   Psychiatric:         Speech: Speech normal.         Behavior: Behavior normal.         Lab Results:     Results from last 7 days   Lab Units 05/12/25  0621 05/11/25  0324 05/10/25  1954   WBC Thousand/uL 9.35 10.04 12.47*   HEMOGLOBIN g/dL 11.0* 11.8* 13.0   HEMATOCRIT % 33.7* 35.2* 38.9   PLATELETS Thousands/uL 181 210 228     Results from last 7 days   Lab Units 05/12/25  0621 05/11/25  0405 05/10/25  1954   POTASSIUM mmol/L 3.8 3.6 3.6   CHLORIDE mmol/L 104 105 105   CO2 mmol/L 28 27 24   BUN mg/dL 19 18 22   CREATININE mg/dL 1.12 1.09 1.14   CALCIUM mg/dL 8.9 9.2 9.1     Results from last 7 days   Lab Units 05/11/25  2214 05/11/25  1758 05/11/25  1146 05/11/25  0314 05/10/25  2020   INR   --   --   --   --  0.96   PTT seconds 62* 69* 73*   < > 29    < > = values in this interval not displayed.     Lab Results   Component Value Date    HGBA1C 5.8 (H) 02/20/2025     Lab Results   Component Value Date    TROPONINI <0.02 04/17/2018       Imaging Studies:     Cardiac Catheterization: Completed; report pending    Echocardiogram: completed; report pending.     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    Review of coronary angiography imaging does demonstrate complex distal left main ostial circumflex lesion.  This appears to not be amenable to straightforward PCI.  However, with patient's advanced prostate CA, currently stage IV, we will need input from oncology regarding anticipated course before recommending surgical revascularization.  We will hold off on imaging preop studies pending oncology input.    Kilo Mix was comfortable with our recommendations, and their questions were answered to their satisfaction.  We will continue to evaluate the patient daily with further recommendations as work up is completed.  Thank you for allowing us to participate in the care of this patient.     SIGNATURE: Juan Daniel Loyd PA-C  DATE: May 12, 2025  TIME: 12:06 PM    * This note was completed in part utilizing [employer] direct voice recognition software.   Grammatical errors, random word insertion, spelling mistakes, and incomplete sentences may be an occasional consequence of the system secondary to software limitations, ambient noise and hardware issues. At the time of dictation, efforts were made to edit, clarify and /or correct errors. Please read the chart carefully and recognize, using context, where substitutions have occurred.  If you have any questions or concerns about the context, text or information contained within the body of this dictation, please contact myself, the provider, for further  clarification.

## 2025-05-12 NOTE — PROGRESS NOTES
"Progress Note - Hospitalist   Name: Kilo Mix 84 y.o. male I MRN: [id number]  Unit/Bed#: [facility] 425-01 I Date of Admission: 5/10/2025   Date of Service: 5/12/2025 I Hospital Day: 2    Assessment & Plan  NSTEMI (non-ST elevated myocardial infarction) (HCC)  Has remote history of CAD /MI s/p coronary stents X2  Presenting with intermittent chest pain over last several weeks mainly with activity, but progressively worsening. Most recent episode before calling EMS lasted longer and did not resolve with rest, was radiating to neck and bilateral arms, with diaphoresis and vomiting.   Elevated troponin, EKG was sinus tachycardia 105 with some nonspecific ST changes  Patient started on heparin drip in the ED  Monitor on telemetry  As needed submental length of blistering  Cardiology consulted for further management  Follow on cardiac echo  - has recent A1C, Lipid and TSH , all were unremarkable.   Add beta-blocker  Left heart cath today 5/12 shows three-vessel disease, plan for evaluation for potential CABG  Discussed with cardiology, , Resume heparin drip without bolus tomorrow after TR band from radial access removed (scheduled for noon)  Hold off Plavix pending CT surgery recommendations; continue on aspirin and statin    Pneumonia involving right lung  Chest x-ray with moderate right base pneumonia  Patient is afebrile, denied cough or shortness of breath  Upper normal white blood cells  Start IV ceftriaxone empirically  Check procalcitonin  Consider  discontinue IV antibiotic with negative procalcitonin  Diabetes mellitus type 2 with neurological manifestations ([facility])  Lab Results   Component Value Date    HGBA1C 5.8 (H) 02/20/2025       No results for input(s): \"POCGLU\" in the last 72 hours.    Blood Sugar Average: Last 72 hrs:    Most recent A1C 5.8 ; hold home metformin while inpatient -   Continue with insulin sliding scale and low carb diet.     Hypertension  Patient noted late April with low blood " pressure he self discontinued off amlodipine then recheck to PCP and was advised to restart amlodipine and discontinue hydrochlorothiazide  Monitor blood pressure and reevaluate   Continue home lisinopril and amlodipine,  As needed Lopressor for systolic greater than 180      Peripheral vascular disease (HCC)  History of carotid artery disease s/p stenting as well as coronary artery disease/MI s/p stenting more than 20 years ago  Continue aspirin Plavix and statin, increasing Lipitor to 40 mg  Dyslipidemia  Patient is on only 20 mg of atorvastatin however he has significant history of coronary artery disease and carotid artery disease s/p stenting for both  He denies intolerance of high intensity statin and agreeable to increase to 40 mg Lipitor  Prostate cancer (HCC)  Stage IV prostate cancer with mets to the lymph nodes and complicated with urinary obstruction requiring bilateral nephrostomies    January 2025 he was started on ADT and Xtandi, complicated with dizziness for which Xtandi was discontinued in April 2025    Patient to continue follow-up with medical oncology established with Dr. Shankar  H/O insertion of nephrostomy tube  Has nephrostomies bilaterally due to obstruction by prostate cancer metastasis to lymph nodes   nephrostomy care while patient,   follow-up with urology, medical oncology and IR  Coronary artery disease involving native coronary artery      VTE Pharmacologic Prophylaxis: VTE Score: 8 heparin    Mobility:   Basic Mobility Inpatient Raw Score: 17  JH-HLM Goal: 5: Stand one or more mins  JH-HLM Achieved: 7: Walk 25 feet or more  JH-HLM Goal achieved. Continue to encourage appropriate mobility.    Patient Centered Rounds: I performed bedside rounds with nursing staff today.   Discussions with Specialists or Other Care Team Provider: Cardiology     Education and Discussions with Family / Patient: Updated  (son) via phone.    Current Length of Stay: 2 day(s)  Current Patient  Status: Inpatient   Certification Statement: The patient will continue to require additional inpatient hospital stay due to CABG eval  Discharge Plan: Anticipate discharge in >72 hrs to TBD    Code Status: Level 1 - Full Code    Subjective   Is feeling well and is hungry.  Denies any chest pain or palpitations at this time.  No SOB.    Objective :  Temp:  [97.8 °F (36.6 °C)-99.7 °F (37.6 °C)] 97.8 °F (36.6 °C)  HR:  [56-75] 56  BP: (120-144)/(46-69) 120/47  Resp:  [16-18] 18  SpO2:  [96 %-100 %] 96 %  O2 Device: None (Room air)    Body mass index is 24.18 kg/m².     Input and Output Summary (last 24 hours):     Intake/Output Summary (Last 24 hours) at 5/12/2025 1557  Last data filed at 5/12/2025 1435  Gross per 24 hour   Intake 676.04 ml   Output 1100 ml   Net -423.96 ml       Physical Exam  Vitals and nursing note reviewed.   Constitutional:       General: He is not in acute distress.     Appearance: He is well-developed.   HENT:      Head: Normocephalic and atraumatic.   Eyes:      Conjunctiva/sclera: Conjunctivae normal.   Cardiovascular:      Rate and Rhythm: Normal rate and regular rhythm.      Heart sounds: No murmur heard.  Pulmonary:      Effort: Pulmonary effort is normal. No respiratory distress.      Breath sounds: Normal breath sounds. No wheezing or rales.   Abdominal:      Palpations: Abdomen is soft.      Tenderness: There is no abdominal tenderness.   Musculoskeletal:         General: No swelling.      Cervical back: Neck supple.   Skin:     General: Skin is warm and dry.      Capillary Refill: Capillary refill takes less than 2 seconds.   Neurological:      Mental Status: He is alert.   Psychiatric:         Mood and Affect: Mood normal.           Lines/Drains:  Lines/Drains/Airways       Active Status       Name Placement date Placement time Site Days    Nephrostomy Left 10.2 Fr. 04/29/25  1109  Left  13    Nephrostomy Right 10.2 Fr. 04/29/25  1111  Right  13                            Lab Results:  I have reviewed the following results:   Results from last 7 days   Lab Units 05/12/25  0621   WBC Thousand/uL 9.35   HEMOGLOBIN g/dL 11.0*   HEMATOCRIT % 33.7*   PLATELETS Thousands/uL 181   SEGS PCT % 79*   LYMPHO PCT % 13*   MONO PCT % 6   EOS PCT % 2     Results from last 7 days   Lab Units 05/12/25  0621 05/11/25  0405   SODIUM mmol/L 138 142   POTASSIUM mmol/L 3.8 3.6   CHLORIDE mmol/L 104 105   CO2 mmol/L 28 27   BUN mg/dL 19 18   CREATININE mg/dL 1.12 1.09   ANION GAP mmol/L 6 10   CALCIUM mg/dL 8.9 9.2   ALBUMIN g/dL  --  3.7   TOTAL BILIRUBIN mg/dL  --  0.84   ALK PHOS U/L  --  57   ALT U/L  --  7   AST U/L  --  23   GLUCOSE RANDOM mg/dL 97 124     Results from last 7 days   Lab Units 05/10/25  2020   INR  0.96             Results from last 7 days   Lab Units 05/12/25  0621 05/11/25  1146   PROCALCITONIN ng/ml 0.07 0.12       Recent Cultures (last 7 days):         Imaging Results Review: No pertinent imaging studies reviewed.  Other Study Results Review: No additional pertinent studies reviewed.    Last 24 Hours Medication List:     Current Facility-Administered Medications:     amLODIPine (NORVASC) tablet 5 mg, Daily    [Held by provider] aspirin chewable tablet 81 mg, Daily    atorvastatin (LIPITOR) tablet 40 mg, Daily    cefTRIAXone (ROCEPHIN) 1,000 mg in dextrose 5 % 50 mL IVPB, Q24H, Last Rate: 1,000 mg (05/12/25 0924)    heparin (porcine) 25,000 units in 0.45% NaCl 250 mL infusion (premix), Titrated, Last Rate: Stopped (05/12/25 1201)    heparin (porcine) injection 2,000 Units, Q6H PRN    heparin (porcine) injection 4,000 Units, Q6H PRN    [Held by provider] lisinopril (ZESTRIL) tablet 10 mg, Daily    metoprolol tartrate (LOPRESSOR) tablet 25 mg, Q12H AMADOU    nitroglycerin (NITROSTAT) SL tablet 0.4 mg, Q5 Min PRN    sodium chloride (PF) 0.9 % injection 10 mL, Daily    sodium chloride (PF) 0.9 % injection 10 mL, Daily    sodium chloride 0.9 % infusion, Continuous, Last Rate: 75 mL/hr (05/12/25  1140)    Administrative Statements   Today, Patient Was Seen By: Emilie Soyeon Kim, MD      **Please Note: This note may have been constructed using a voice recognition system.**

## 2025-05-12 NOTE — PLAN OF CARE
Problem: PAIN - ADULT  Goal: Verbalizes/displays adequate comfort level or baseline comfort level  Description: Interventions:- Encourage patient to monitor pain and request assistance- Assess pain using appropriate pain scale- Administer analgesics based on type and severity of pain and evaluate response- Implement non-pharmacological measures as appropriate and evaluate response- Consider cultural and social influences on pain and pain management- Notify physician/advanced practitioner if interventions unsuccessful or patient reports new pain  Outcome: Progressing     Problem: SAFETY ADULT  Goal: Patient will remain free of falls  Description: INTERVENTIONS:- Educate patient/family on patient safety including physical limitations- Instruct patient to call for assistance with activity - Consult OT/PT to assist with strengthening/mobility - Keep Call bell within reach- Keep bed low and locked with side rails adjusted as appropriate- Keep care items and personal belongings within reach- Initiate and maintain comfort rounds- Make Fall Risk Sign visible to staff- Offer Toileting every 2 Hours, in advance of need- Initiate/Maintain bed/chair alarm- Obtain necessary fall risk management equipment: - Apply yellow socks and bracelet for high fall risk patients- Consider moving patient to room near nurses station  Outcome: Progressing     Problem: METABOLIC, FLUID AND ELECTROLYTES - ADULT  Goal: Electrolytes maintained within normal limits  Description: INTERVENTIONS:- Monitor labs and assess patient for signs and symptoms of electrolyte imbalances- Administer electrolyte replacement as ordered- Monitor response to electrolyte replacements, including repeat lab results as appropriate- Instruct patient on fluid and nutrition as appropriate  Outcome: Progressing     Problem: METABOLIC, FLUID AND ELECTROLYTES - ADULT  Goal: Fluid balance maintained  Description: INTERVENTIONS:- Monitor labs - Monitor I/O and WT- Instruct  patient on fluid and nutrition as appropriate- Assess for signs & symptoms of volume excess or deficit  Outcome: Progressing

## 2025-05-12 NOTE — TELEPHONE ENCOUNTER
Reason for call:   [x] Refill   [] Prior Auth  [] Other:     Office:   [] PCP/Provider -   [x] Specialty/Provider - [facility] Rita Dooley    Medication: Sodium Chloride PF    Dose/Frequency: 0.9% 10 ml flush Daily     Quantity: 900 ml    Pharmacy: [facility] [place] [facility]    Local Pharmacy   Does the patient have enough for 3 days?   [x] Yes   [] No - Send as HP to POD    Mail Away Pharmacy   Does the patient have enough for 10 days?   [] Yes   [] No - Send as HP to POD

## 2025-05-12 NOTE — ASSESSMENT & PLAN NOTE
5/10/2025 presentation with chest discomfort  5/10/2025 EKG: Sinus rhythm, nonspecific ST changes, no ST elevation  Peak troponin 2989  5/12/2025 LHC: Severe three-vessel disease including distal left main for which CT surgery consultation has been advised

## 2025-05-12 NOTE — CASE MANAGEMENT
Case Management Discharge Planning Note    Patient name Kilo Mix  Location [facility] 425/[facility] 425-01 MRN [id number]  : 1940 Date 2025       Current Admission Date: 5/10/2025  Current Admission Diagnosis:NSTEMI (non-ST elevated myocardial infarction) ([facility])   Patient Active Problem List    Diagnosis Date Noted Date Diagnosed    Pneumonia involving right lung 2025     NSTEMI (non-ST elevated myocardial infarction) ([facility]) 05/10/2025     H/O insertion of nephrostomy tube 05/10/2025     Urinary tract infection associated with indwelling urethral catheter  ([facility]) 2025     CKD stage 3 due to type 2 diabetes mellitus ([facility]) 2025     Hydronephrosis 2024     Acute kidney injury ([facility]) 2024     Primary osteoarthritis of left shoulder 2024     Traumatic complete tear of right rotator cuff 2024     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[facility]) 2021     Personal history of colonic polyps 2020     Schatzki's ring 2020     Dysphagia 2020     Antiplatelet or antithrombotic long-term use 2020     Plantar fasciitis of right foot 2020     AMD (age related macular degeneration) 2020     Prostate cancer ([facility]) 2019     Asymptomatic stenosis of left carotid artery 2018     Diabetes mellitus type 2 with neurological manifestations ([facility]) 2015     Peripheral vascular disease ([facility]) 2012     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     Diabetic polyneuropathy ([facility]) 2012     Diastolic dysfunction 2012     Dyslipidemia 2012     Lower back pain 2012     Sinus bradycardia 2012     Hypertension 2012     Recurrent stenosis of right carotid artery 2012     Coronary artery disease involving native coronary artery 2011       LOS (days): 2  Geometric Mean LOS (GMLOS) (days): 4.1  Days to GMLOS:2.4     OBJECTIVE:  Risk of Unplanned Readmission Score: 10.3         Current  admission status: Inpatient   Preferred Pharmacy:   [id number] - [place] - [address]  [address]  [place] [address]  Phone: [phone] Fax: [phone]    [facility] - [place], [place] - [address] [facility]  [address]  [address]  [place] [address]  Phone: [phone] Fax: [phone]    Primary Care Provider: Dolores Vizcarra DO    Primary Insurance: MEDICARE  Secondary Insurance: AETNA    DISCHARGE DETAILS:    CM met with patient and patient's son at bedside. They are inquiring if patient will qualify for PT services at discharge. CM explained that patient can be evaluated by PT and OT in the hospital and the CM will follow up regarding recommendations. CM sent message to provider requesting PT/OT consult.

## 2025-05-12 NOTE — CONSULTS
Consultation - Cardiac Surgery   Kilo Mix 84 y.o. male MRN: [id number]  Unit/Bed#: [facility] 425-01 Encounter: [id number]    Physician Requesting Consult: Emilie Soyeon Kim, MD    Reason for Consult / Principal Problem: MV CAD    Inpatient consult to Cardiac Surgery  Consult performed by: Juan Daniel Loyd PA-C  Consult ordered by: Mayur Lopez MD        History of Present Illness: Kilo Mix is a 84 y.o. male with cardiovascular past medical history significant for coronary artery disease.  He initially underwent cardiac catheterization and 1995.  He had subsequent repeat coronary angiography completed in 2001.  At both instances he had percutaneous intervention with bare-metal stent implanted in 1995.  Initially he was managed by his primary cardiologist, Dr. Gray; however since his long term, he has not been seen in several years.    His current clinical course began several weeks ago.  At that time he began to note exertional dyspnea and angina.  He ultimately presented for evaluation treatment at [facility].  NSTEMI was identified.  He underwent echocardiogram and cardiac catheterization.  Multivessel coronary disease was identified.  Cardiac surgery consultation was obtained at this time.    Of note, he was recently diagnosed several months ago with metastatic prostate cancer.  He initially underwent prostatectomy for cancer in 1999.  Recurrence of his disease recently was associated with metastatic disease, stage IV.  He is currently receiving oral chemotherapy with intramuscular injection every 6 months.  He follows with his primary oncologist, Dr. Shankar.     His past medical history is otherwise significant for hyperlipidemia, peripheral vascular disease Right carotid artery stenosis s/p right carotid stent X 2, remote history of prostatectomy for prostate cancer with recurrent metastasis to lymph nodes complicated with urinary obstruction requiring nephrostomy tube, hypertension       Past Medical History:  Past Medical History:   Diagnosis Date    Bladder tumor     Cancer (HCC)     Prostate cancer    Diabetes mellitus (HCC)     Diverticulitis of colon     History of common carotid artery stent placement     RIGHT stent - per pts son - RIGHT stent  in  carotid artery    History of heart artery stent     x2 stents in heart per pts son    Hyperlipidemia     Hypertension     Migraines     Myocardial infarction (HCC)     about 30yrs ago    Nephrostomy present (HCC)     2/25/25 Per pts son Ed- pt has two nephrostomy tubes    Prostate cancer (HCC)     Seasonal allergies          Past Surgical History:   Past Surgical History:   Procedure Laterality Date    CARDIAC SURGERY      CAROTID ENDARTARECTOMY Right     thromboendarterectomy    CAROTID STENT Right     CATARACT EXTRACTION Left     COLONOSCOPY      fiberoptic screening 2/6/08 5 yr / complete 3/15/13 5 yr    CORONARY ANGIOPLASTY WITH STENT PLACEMENT      ELBOW SURGERY      EYE SURGERY      IR BIOPSY LYMPH NODE  12/24/2024    IR NEPHROSTOMY TUBE CHECK/CHANGE/REPOSITION/REINSERTION/UPSIZE  2/25/2025    IR NEPHROSTOMY TUBE CHECK/CHANGE/REPOSITION/REINSERTION/UPSIZE  4/29/2025    IR NEPHROSTOMY TUBE PLACEMENT  12/24/2024    KNEE SURGERY      MS CYSTO W/REMOVAL OF LESIONS SMALL N/A 1/21/2025    Procedure: TRANSURETHRAL RESECTION OF BLADDER TUMOR (TURBT);  Surgeon: Jordan Youssef MD;  Location:  MAIN OR;  Service: Urology    MS CYSTO W/REMOVAL OF LESIONS SMALL N/A 3/6/2025    Procedure: TRANSURETHRAL RESECTION OF BLADDER TUMOR (TURBT);  Surgeon: Jordan Youssef MD;  Location:  MAIN OR;  Service: Urology    PROSTATE SURGERY      SUPRAPUBIC PROSTATECTOMY  12/06/1999    UPPER GASTROINTESTINAL ENDOSCOPY           Family History:  Family History   Problem Relation Age of Onset    Alcohol abuse Mother     No Known Problems Father     No Known Problems Sister     Substance Abuse Son     Heart attack Family         MI    Breast cancer Family      Colon cancer Neg Hx     Colon polyps Neg Hx     Anesthesia problems Neg Hx          Social History:  Social History     Substance and Sexual Activity   Alcohol Use Yes    Comment: rare glass of wine     Social History     Substance and Sexual Activity   Drug Use Never    Comment: Denies any drug use per pt     Social History     Tobacco Use   Smoking Status Former    Current packs/day: 0.00    Average packs/day: 3.0 packs/day for 10.0 years (30.0 ttl pk-yrs)    Types: Cigarettes    Start date: 1/1/2000    Quit date: 1/1/2010    Years since quitting: 15.3   Smokeless Tobacco Never   Tobacco Comments    Former smoker - quit 2010     Marital Status: /Civil Union    Home Medications:   Prior to Admission medications    Medication Sig Start Date End Date Taking? Authorizing Provider   amLODIPine (NORVASC) 5 mg tablet Take 1 tablet (5 mg total) by mouth daily 9/10/24  Yes Dolores Vizcarra DO   ascorbic acid (VITAMIN C) 500 mg tablet Take 500 mg by mouth daily 2/22/11  Yes Historical Provider, MD   clopidogrel (PLAVIX) 75 mg tablet Take 1 tablet by mouth once daily 3/17/25  Yes Dolores Vizcarra DO   ibuprofen (MOTRIN) 600 mg tablet Take 1 tablet (600 mg total) by mouth every 6 (six) hours as needed for mild pain 1/21/25  Yes Jordan Youssef MD   metFORMIN (GLUCOPHAGE-XR) 500 mg 24 hr tablet Take 1 tablet by mouth once daily 7/13/24  Yes Dolores Vizcarra DO   aspirin 81 MG tablet Take 1 tablet by mouth daily 2/25/25 per pts son - pt was to hold ASA 5 day hold - will follow same instructions that were given for January surgery 9/10/12   Historical Provider, MD   atorvastatin (LIPITOR) 20 mg tablet Take 1 tablet by mouth once daily 12/3/24   Dolores Vizcarra DO   B Complex Vitamins (VITAMIN B COMPLEX PO) Take 1 tablet by mouth daily 2/22/11   Historical Provider, MD   Cholecalciferol 1000 units CHEW Chew 1 tablet daily    Historical Provider, MD   lisinopril (ZESTRIL) 10 mg tablet Take 1  tablet by mouth once daily 9/9/24   Dolores Vizcarra DO   sodium chloride, PF, 0.9 % 10 mL by Intracatheter route daily Intracatheter flushing daily. May substitute prefilled syringe with normal saline 10 mL vials, 10 mL syringes, and 18 g blunt needles 3/12/25 6/10/25  GLEN Rios   sodium chloride, PF, 0.9 % 10 mL by Intracatheter route daily Intracatheter flushing daily. May substitute prefilled syringe with normal saline 10 mL vials, 10 mL syringes, and 18 g blunt needles 4/29/25 7/28/25  Aleks Stapleton MD   vitamin E, tocopherol, 400 units capsule Take 1 capsule by mouth daily 6/4/12   Historical Provider, MD       Inpatient Medications:  Scheduled Meds:   Current Facility-Administered Medications   Medication Dose Route Frequency Provider Last Rate    amLODIPine  5 mg Oral Daily Mayur Lopez MD      [Held by provider] aspirin  81 mg Oral Daily Marques Cabrera DO      atorvastatin  40 mg Oral Daily Mayur Lopez MD      cefTRIAXone  1,000 mg Intravenous Q24H Mayur Lopez MD 1,000 mg (05/12/25 0924)    heparin (porcine)  3-20 Units/kg/hr (Order-Specific) Intravenous Titrated Mayur Lopez MD Stopped (05/12/25 1201)    heparin (porcine)  2,000 Units Intravenous Q6H PRN Mayur Lopez MD      heparin (porcine)  4,000 Units Intravenous Q6H PRN Mayur Lopez MD      [Held by provider] lisinopril  10 mg Oral Daily GLEN Williamson      metoprolol tartrate  25 mg Oral Q12H AMADOU Mayur Lopez MD      nitroglycerin  0.4 mg Sublingual Q5 Min PRN Mayur Lopez MD      sodium chloride (PF)  10 mL Intracatheter Daily Mayur Lopez MD      sodium chloride (PF)  10 mL Intracatheter Daily Mayur Lopez MD      sodium chloride  75 mL/hr Intravenous Continuous Mayur Lopez MD 75 mL/hr (05/12/25 1140)     Continuous Infusions: heparin (porcine), 3-20 Units/kg/hr (Order-Specific), Last Rate: Stopped (05/12/25 1201)  sodium chloride, 75 mL/hr, Last Rate: 75 mL/hr (05/12/25 7340)      PRN  "Meds:  heparin (porcine), 2,000 Units, Q6H PRN  heparin (porcine), 4,000 Units, Q6H PRN  nitroglycerin, 0.4 mg, Q5 Min PRN        Allergies:  Allergies   Allergen Reactions    Losartan Syncope     Reaction Date: 07Jun2011;     Penicillins Other (See Comments)     As a child so does not recall reaction       Review of Systems:  Review of Systems   Constitutional:  Positive for activity change and fatigue.   HENT: Negative.     Eyes: Negative.    Respiratory:  Positive for chest tightness and shortness of breath.    Cardiovascular:  Positive for chest pain. Negative for leg swelling.   Endocrine: Negative.    Genitourinary: Negative.    Musculoskeletal: Negative.    Skin: Negative.    Neurological:  Positive for weakness.   Psychiatric/Behavioral: Negative.         Vital Signs:     Vitals:    05/11/25 2143 05/12/25 0304 05/12/25 0751 05/12/25 0815   BP: 144/67 135/54 (!) 120/46 (!) 120/46   BP Location:  Left arm     Pulse: 68 75 70 70   Resp:   18    Temp:  99.7 °F (37.6 °C) 98.3 °F (36.8 °C)    TempSrc:  Oral     SpO2: 100% 96% 96%    Weight:    72.1 kg (159 lb)   Height:    5' 8\" (1.727 m)     Invasive Devices       Peripheral Intravenous Line  Duration             Peripheral IV 05/10/25 Distal;Right Hand 1 day    Peripheral IV 05/10/25 Right Antecubital 1 day              Drain  Duration             Nephrostomy Left 10.2 Fr. 13 days    Nephrostomy Right 10.2 Fr. 13 days                    Physical Exam:  Physical Exam  Constitutional:       Appearance: Normal appearance. He is well-developed.   HENT:      Head: Normocephalic and atraumatic.   Eyes:      Conjunctiva/sclera: Conjunctivae normal.   Neck:      Thyroid: No thyromegaly.      Vascular: No carotid bruit or JVD.      Trachea: No tracheal deviation.   Cardiovascular:      Rate and Rhythm: Normal rate and regular rhythm.      Pulses:           Carotid pulses are 2+ on the right side and 2+ on the left side.       Dorsalis pedis pulses are 2+ on the right " side and 2+ on the left side.        Posterior tibial pulses are 2+ on the right side and 2+ on the left side.      Heart sounds: S1 normal and S2 normal.   Pulmonary:      Effort: No accessory muscle usage or respiratory distress.      Breath sounds: No wheezing or rales.   Chest:      Chest wall: No tenderness.   Abdominal:      General: Bowel sounds are normal.      Palpations: Abdomen is soft.      Tenderness: There is no abdominal tenderness.   Musculoskeletal:         General: Normal range of motion.      Cervical back: Full passive range of motion without pain and normal range of motion.   Skin:     General: Skin is warm and dry.   Neurological:      Mental Status: He is alert and oriented to person, place, and time.      Cranial Nerves: No cranial nerve deficit.      Sensory: No sensory deficit.   Psychiatric:         Speech: Speech normal.         Behavior: Behavior normal.         Lab Results:     Results from last 7 days   Lab Units 05/12/25  0621 05/11/25  0324 05/10/25  1954   WBC Thousand/uL 9.35 10.04 12.47*   HEMOGLOBIN g/dL 11.0* 11.8* 13.0   HEMATOCRIT % 33.7* 35.2* 38.9   PLATELETS Thousands/uL 181 210 228     Results from last 7 days   Lab Units 05/12/25  0621 05/11/25  0405 05/10/25  1954   POTASSIUM mmol/L 3.8 3.6 3.6   CHLORIDE mmol/L 104 105 105   CO2 mmol/L 28 27 24   BUN mg/dL 19 18 22   CREATININE mg/dL 1.12 1.09 1.14   CALCIUM mg/dL 8.9 9.2 9.1     Results from last 7 days   Lab Units 05/11/25  2214 05/11/25  1758 05/11/25  1146 05/11/25  0314 05/10/25  2020   INR   --   --   --   --  0.96   PTT seconds 62* 69* 73*   < > 29    < > = values in this interval not displayed.     Lab Results   Component Value Date    HGBA1C 5.8 (H) 02/20/2025     Lab Results   Component Value Date    TROPONINI <0.02 04/17/2018       Imaging Studies:     Cardiac Catheterization: Completed; report pending    Echocardiogram: completed; report pending.     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    Review of coronary angiography imaging does demonstrate complex distal left main ostial circumflex lesion.  This appears to not be amenable to straightforward PCI.  However, with patient's advanced prostate CA, currently stage IV, we will need input from oncology regarding anticipated course before recommending surgical revascularization.  We will hold off on imaging preop studies pending oncology input.    Kilo Mix was comfortable with our recommendations, and their questions were answered to their satisfaction.  We will continue to evaluate the patient daily with further recommendations as work up is completed.  Thank you for allowing us to participate in the care of this patient.     SIGNATURE: Juan Daniel Loyd PA-C  DATE: May 12, 2025  TIME: 12:06 PM    * This note was completed in part utilizing [employer] direct voice recognition software.   Grammatical errors, random word insertion, spelling mistakes, and incomplete sentences may be an occasional consequence of the system secondary to software limitations, ambient noise and hardware issues. At the time of dictation, efforts were made to edit, clarify and /or correct errors. Please read the chart carefully and recognize, using context, where substitutions have occurred.  If you have any questions or concerns about the context, text or information contained within the body of this dictation, please contact myself, the provider, for further  clarification.

## 2025-05-12 NOTE — PROGRESS NOTES
Progress Note - Cardiology   Name: Kilo Mix 84 y.o. male I MRN: [id number]  [facility] I Date of Admission: 5/10/2025   Date of Service: 5/12/2025 I Hospital Day: 2     Assessment & Plan  NSTEMI (non-ST elevated myocardial infarction) (HCC)  5/10/2025 presentation with chest discomfort  5/10/2025 EKG: Sinus rhythm, nonspecific ST changes, no ST elevation  Peak troponin 2989  5/12/2025 LHC: Severe three-vessel disease including distal left main for which CT surgery consultation has been advised  Coronary artery disease involving native coronary artery  History of MI (1995) with status post bare-metal stents to RCA  Status post PCI/LOUIE 2007  Home Rx Plavix 75 mg daily  Dyslipidemia  Home Rx atorvastatin 20 mg daily  Cholesterol 166, triglycerides 113, HDL 66 and LDL 77  Hypertension  Home Rx lisinopril 10 mg daily and amlodipine 5 mg daily (he noted low blood pressure in April and subsequently stopped amlodipine on his own)  Amlodipine 5 mg daily restarted inpatient  Peripheral vascular disease ([facility])  History of carotid artery disease status post stenting  Home Rx Plavix 75 mg daily and atorvastatin 20 mg daily    Plan/discussion:  LHC performed today showing severe three-vessel disease including distal left main  CT surgery consultation has been requested  Keep on IV heparin pending final recommendations  Discontinue Plavix (last dose 5/12/2025)  Continue aspirin 81 daily  Echo performed with review forthcoming  Current blood pressure is well-controlled on amlodipine and metoprolol    Subjective   Chief Complaint:   No chest pain right now    Objective :  Temp:  [98.1 °F (36.7 °C)-99.7 °F (37.6 °C)] 98.3 °F (36.8 °C)  HR:  [62-75] 70  BP: (120-144)/(46-69) 120/46  Resp:  [16-18] 18  SpO2:  [96 %-100 %] 96 %  O2 Device: None (Room air)  Orthostatic Blood Pressures      Flowsheet Row Most Recent Value   Blood Pressure 120/46 filed at 05/12/2025 0815   Patient Position - Orthostatic VS Lying filed at  05/12/2025 0304          First Weight: Weight - Scale: 72.3 kg (159 lb 6.3 oz) (05/11/25 0041)  Vitals:    05/11/25 0041 05/12/25 0815   Weight: 72.3 kg (159 lb 6.3 oz) 72.1 kg (159 lb)     Physical Exam  Vitals and nursing note reviewed.   Constitutional:       General: He is not in acute distress.     Appearance: He is well-developed.   HENT:      Head: Normocephalic and atraumatic.   Eyes:      Conjunctiva/sclera: Conjunctivae normal.   Cardiovascular:      Rate and Rhythm: Normal rate and regular rhythm.      Heart sounds: No murmur heard.  Pulmonary:      Effort: Pulmonary effort is normal. No respiratory distress.      Breath sounds: Normal breath sounds.   Abdominal:      Palpations: Abdomen is soft.      Tenderness: There is no abdominal tenderness.   Musculoskeletal:         General: No swelling.      Cervical back: Neck supple.   Skin:     General: Skin is warm and dry.      Capillary Refill: Capillary refill takes less than 2 seconds.   Neurological:      Mental Status: He is alert.   Psychiatric:         Mood and Affect: Mood normal.           Lab Results: I have reviewed the following results:  Results from last 7 days   Lab Units 05/12/25  0621 05/11/25  0324 05/10/25  1954   WBC Thousand/uL 9.35 10.04 12.47*   HEMOGLOBIN g/dL 11.0* 11.8* 13.0   HEMATOCRIT % 33.7* 35.2* 38.9   PLATELETS Thousands/uL 181 210 228     Results from last 7 days   Lab Units 05/12/25  0621 05/11/25  0405 05/10/25  1954   POTASSIUM mmol/L 3.8 3.6 3.6   CHLORIDE mmol/L 104 105 105   CO2 mmol/L 28 27 24   BUN mg/dL 19 18 22   CREATININE mg/dL 1.12 1.09 1.14   CALCIUM mg/dL 8.9 9.2 9.1     Results from last 7 days   Lab Units 05/11/25  2214 05/11/25  1758 05/11/25  1146 05/11/25  0314 05/10/25  2020   INR   --   --   --   --  0.96   PTT seconds 62* 69* 73*   < > 29    < > = values in this interval not displayed.     Lab Results   Component Value Date    HGBA1C 5.8 (H) 02/20/2025     Lab Results   Component Value Date    TROPONINI  <0.02 04/17/2018             VTE Pharmacologic Prophylaxis:   VTE Mechanical Prophylaxis:

## 2025-05-12 NOTE — ASSESSMENT & PLAN NOTE
Home Rx lisinopril 10 mg daily and amlodipine 5 mg daily (he noted low blood pressure in April and subsequently stopped amlodipine on his own)  Amlodipine 5 mg daily restarted inpatient

## 2025-05-12 NOTE — ASSESSMENT & PLAN NOTE
Has remote history of CAD /MI s/p coronary stents X2  Presenting with intermittent chest pain over last several weeks mainly with activity, but progressively worsening. Most recent episode before calling EMS lasted longer and did not resolve with rest, was radiating to neck and bilateral arms, with diaphoresis and vomiting.   Elevated troponin, EKG was sinus tachycardia 105 with some nonspecific ST changes  Patient started on heparin drip in the ED  Monitor on telemetry  As needed submental length of blistering  Cardiology consulted for further management  Follow on cardiac echo  - has recent A1C, Lipid and TSH , all were unremarkable.   Add beta-blocker  Left heart cath today 5/12 shows three-vessel disease, plan for evaluation for potential CABG  Discussed with cardiology, , Resume heparin drip without bolus tomorrow after TR band from radial access removed (scheduled for noon)  Hold off Plavix pending CT surgery recommendations; continue on aspirin and statin

## 2025-05-13 ENCOUNTER — APPOINTMENT (INPATIENT)
Dept: NON INVASIVE DIAGNOSTICS | Facility: HOSPITAL | Age: 85
DRG: 233 | End: 2025-05-13
Payer: MEDICARE

## 2025-05-13 ENCOUNTER — APPOINTMENT (INPATIENT)
Dept: RADIOLOGY | Facility: HOSPITAL | Age: 85
DRG: 233 | End: 2025-05-13
Payer: MEDICARE

## 2025-05-13 LAB
ABO GROUP BLD: NORMAL
ABO GROUP BLD: NORMAL
ANION GAP SERPL CALCULATED.3IONS-SCNC: 9 MMOL/L (ref 4–13)
APTT PPP: 31 SECONDS (ref 23–34)
APTT PPP: 42 SECONDS (ref 23–34)
BLD GP AB SCN SERPL QL: NEGATIVE
BUN SERPL-MCNC: 22 MG/DL (ref 5–25)
CALCIUM SERPL-MCNC: 9.3 MG/DL (ref 8.4–10.2)
CHLORIDE SERPL-SCNC: 104 MMOL/L (ref 96–108)
CHOLEST SERPL-MCNC: 154 MG/DL (ref ?–200)
CO2 SERPL-SCNC: 26 MMOL/L (ref 21–32)
CREAT SERPL-MCNC: 1.17 MG/DL (ref 0.6–1.3)
EST. AVERAGE GLUCOSE BLD GHB EST-MCNC: 114 MG/DL
GFR SERPL CREATININE-BSD FRML MDRD: 57 ML/MIN/1.73SQ M
GLUCOSE SERPL-MCNC: 99 MG/DL (ref 65–140)
HBA1C MFR BLD: 5.6 %
HDLC SERPL-MCNC: 56 MG/DL
LDLC SERPL CALC-MCNC: 77 MG/DL (ref 0–100)
NONHDLC SERPL-MCNC: 98 MG/DL
POTASSIUM SERPL-SCNC: 3.8 MMOL/L (ref 3.5–5.3)
RH BLD: POSITIVE
RH BLD: POSITIVE
SODIUM SERPL-SCNC: 139 MMOL/L (ref 135–147)
SPECIMEN EXPIRATION DATE: NORMAL
TRIGL SERPL-MCNC: 103 MG/DL (ref ?–150)

## 2025-05-13 PROCEDURE — 99232 SBSQ HOSP IP/OBS MODERATE 35: CPT | Performed by: INTERNAL MEDICINE

## 2025-05-13 PROCEDURE — 85730 THROMBOPLASTIN TIME PARTIAL: CPT

## 2025-05-13 PROCEDURE — 80061 LIPID PANEL: CPT | Performed by: PHYSICIAN ASSISTANT

## 2025-05-13 PROCEDURE — 99232 SBSQ HOSP IP/OBS MODERATE 35: CPT | Performed by: FAMILY MEDICINE

## 2025-05-13 PROCEDURE — 93971 EXTREMITY STUDY: CPT

## 2025-05-13 PROCEDURE — 86901 BLOOD TYPING SEROLOGIC RH(D): CPT | Performed by: PHYSICIAN ASSISTANT

## 2025-05-13 PROCEDURE — 80048 BASIC METABOLIC PNL TOTAL CA: CPT | Performed by: STUDENT IN AN ORGANIZED HEALTH CARE EDUCATION/TRAINING PROGRAM

## 2025-05-13 PROCEDURE — 86850 RBC ANTIBODY SCREEN: CPT | Performed by: PHYSICIAN ASSISTANT

## 2025-05-13 PROCEDURE — 99233 SBSQ HOSP IP/OBS HIGH 50: CPT | Performed by: PHYSICIAN ASSISTANT

## 2025-05-13 PROCEDURE — 83036 HEMOGLOBIN GLYCOSYLATED A1C: CPT | Performed by: PHYSICIAN ASSISTANT

## 2025-05-13 PROCEDURE — 93971 EXTREMITY STUDY: CPT | Performed by: SURGERY

## 2025-05-13 PROCEDURE — 85730 THROMBOPLASTIN TIME PARTIAL: CPT | Performed by: FAMILY MEDICINE

## 2025-05-13 PROCEDURE — 86900 BLOOD TYPING SEROLOGIC ABO: CPT | Performed by: PHYSICIAN ASSISTANT

## 2025-05-13 PROCEDURE — 93880 EXTRACRANIAL BILAT STUDY: CPT | Performed by: SURGERY

## 2025-05-13 PROCEDURE — 93880 EXTRACRANIAL BILAT STUDY: CPT

## 2025-05-13 PROCEDURE — 71250 CT THORAX DX C-: CPT

## 2025-05-13 RX ORDER — SODIUM CHLORIDE 9 MG/ML
10 INJECTION, SOLUTION INTRAMUSCULAR; INTRAVENOUS; SUBCUTANEOUS DAILY
Qty: 900 ML | Refills: 1 | Status: SHIPPED | OUTPATIENT
Start: 2025-05-13 | End: 2025-08-11

## 2025-05-13 RX ORDER — HEPARIN SODIUM 10000 [USP'U]/100ML
3-20 INJECTION, SOLUTION INTRAVENOUS
Status: DISCONTINUED | OUTPATIENT
Start: 2025-05-13 | End: 2025-05-21

## 2025-05-13 RX ORDER — HEPARIN SODIUM 1000 [USP'U]/ML
2000 INJECTION, SOLUTION INTRAVENOUS; SUBCUTANEOUS EVERY 6 HOURS PRN
Status: DISCONTINUED | OUTPATIENT
Start: 2025-05-13 | End: 2025-05-21

## 2025-05-13 RX ORDER — HEPARIN SODIUM 1000 [USP'U]/ML
4000 INJECTION, SOLUTION INTRAVENOUS; SUBCUTANEOUS EVERY 6 HOURS PRN
Status: DISCONTINUED | OUTPATIENT
Start: 2025-05-13 | End: 2025-05-21

## 2025-05-13 RX ORDER — CHLORHEXIDINE GLUCONATE ORAL RINSE 1.2 MG/ML
15 SOLUTION DENTAL EVERY 12 HOURS SCHEDULED
Status: DISCONTINUED | OUTPATIENT
Start: 2025-05-13 | End: 2025-05-21

## 2025-05-13 RX ADMIN — SODIUM CHLORIDE, PRESERVATIVE FREE 10 ML: 5 INJECTION INTRAVENOUS at 08:38

## 2025-05-13 RX ADMIN — HEPARIN SODIUM 16 UNITS/KG/HR: 10000 INJECTION, SOLUTION INTRAVENOUS at 22:31

## 2025-05-13 RX ADMIN — METOPROLOL TARTRATE 25 MG: 25 TABLET, FILM COATED ORAL at 08:38

## 2025-05-13 RX ADMIN — CHLORHEXIDINE GLUCONATE 15 ML: 1.2 SOLUTION ORAL at 21:09

## 2025-05-13 RX ADMIN — MUPIROCIN 1 APPLICATION: 20 OINTMENT TOPICAL at 11:05

## 2025-05-13 RX ADMIN — ATORVASTATIN CALCIUM 40 MG: 40 TABLET, FILM COATED ORAL at 08:38

## 2025-05-13 RX ADMIN — HEPARIN SODIUM 12 UNITS/KG/HR: 10000 INJECTION, SOLUTION INTRAVENOUS at 13:12

## 2025-05-13 RX ADMIN — AMLODIPINE BESYLATE 5 MG: 5 TABLET ORAL at 08:38

## 2025-05-13 RX ADMIN — METOPROLOL TARTRATE 25 MG: 25 TABLET, FILM COATED ORAL at 21:09

## 2025-05-13 RX ADMIN — CHLORHEXIDINE GLUCONATE 15 ML: 1.2 SOLUTION ORAL at 11:05

## 2025-05-13 RX ADMIN — HEPARIN SODIUM 4000 UNITS: 1000 INJECTION, SOLUTION INTRAVENOUS; SUBCUTANEOUS at 22:30

## 2025-05-13 RX ADMIN — SODIUM CHLORIDE, PRESERVATIVE FREE 10 ML: 5 INJECTION INTRAVENOUS at 08:39

## 2025-05-13 RX ADMIN — CEFTRIAXONE SODIUM 1000 MG: 10 INJECTION, POWDER, FOR SOLUTION INTRAVENOUS at 11:06

## 2025-05-13 NOTE — PROGRESS NOTES
"Progress Note - Hospitalist   Name: Kilo Mix 84 y.o. male I MRN: [id number]  Unit/Bed#: [facility] 425-01 I Date of Admission: 5/10/2025   Date of Service: 5/13/2025 I Hospital Day: 3    Assessment & Plan  NSTEMI (non-ST elevated myocardial infarction) (HCC)  Has remote history of CAD /MI s/p coronary stents X2  Presenting with intermittent chest pain over last several weeks mainly with activity, but progressively worsening. Most recent episode before calling EMS lasted longer and did not resolve with rest, was radiating to neck and bilateral arms, with diaphoresis and vomiting.   Elevated troponin, EKG was sinus tachycardia 105 with some nonspecific ST changes  Patient started on heparin drip in the ED  Monitor on telemetry  As needed submental length of blistering  Cardiology consulted for further management  Follow on cardiac echo  - has recent A1C, Lipid and TSH , all were unremarkable.   Add beta-blocker  Left heart cath today 5/12 shows three-vessel disease, plan for evaluation for potential CABG  Discussed with cardiology, , Resume heparin drip without bolus after TR band from radial access removed   Hold off Plavix pending CT surgery recommendations; continue on aspirin and statin-surgery consultation appreciated    Pneumonia involving right lung  Chest x-ray with moderate right base pneumonia  Patient is afebrile, denied cough or shortness of breath  Upper normal white blood cells  Start IV ceftriaxone empirically  Check procalcitonin  Consider  discontinue IV antibiotic with negative procalcitonin  Diabetes mellitus type 2 with neurological manifestations ([facility])  Lab Results   Component Value Date    HGBA1C 5.6 05/13/2025       No results for input(s): \"POCGLU\" in the last 72 hours.    Blood Sugar Average: Last 72 hrs:    Most recent A1C 5.8 ; hold home metformin while inpatient -   Continue with insulin sliding scale and low carb diet.     Hypertension  Patient noted late April with low blood " pressure he self discontinued off amlodipine then recheck to PCP and was advised to restart amlodipine and discontinue hydrochlorothiazide  Monitor blood pressure and reevaluate   Continue home lisinopril and amlodipine,  As needed Lopressor IV for systolic greater than 180      Peripheral vascular disease (HCC)  History of carotid artery disease s/p stenting as well as coronary artery disease/MI s/p stenting more than 20 years ago  Continue aspirin Plavix and statin, increasing Lipitor to 40 mg  Dyslipidemia  Patient is on only 20 mg of atorvastatin however he has significant history of coronary artery disease and carotid artery disease s/p stenting for both  He denies intolerance of high intensity statin and agreeable to increase to 40 mg Lipitor  Prostate cancer (HCC)  Stage IV prostate cancer with mets to the lymph nodes and complicated with urinary obstruction requiring bilateral nephrostomies    January 2025 he was started on ADT and Xtandi, complicated with dizziness for which Xtandi was discontinued in April 2025    Patient to continue follow-up with medical oncology established with Dr. Shankar  Cardiac surgery discussed with oncology regarding prognosis.  H/O insertion of nephrostomy tube  Has nephrostomies bilaterally due to obstruction by prostate cancer metastasis to lymph nodes   nephrostomy care while patient,   follow-up with urology, medical oncology and IR  Coronary artery disease involving native coronary artery      VTE Pharmacologic Prophylaxis: VTE Score: 8 Moderate Risk (Score 3-4) - Pharmacological DVT Prophylaxis Ordered: heparin drip.    Mobility:   Basic Mobility Inpatient Raw Score: 17  -HLM Goal: 5: Stand one or more mins  -HLM Achieved: 6: Walk 10 steps or more      Patient Centered Rounds: I performed bedside rounds with nursing staff today.   Discussions with Specialists or Other Care Team Provider:     Education and Discussions with Family / Patient:     Current Length of Stay: 3  day(s)  Current Patient Status: Inpatient   Certification Statement: The patient will continue to require additional inpatient hospital stay due to pending CABG  Discharge Plan: Anticipate discharge in >72 hrs to home with home services.    Code Status: Level 1 - Full Code    Subjective   Patient seen and examined.  Cardiology and CT surgery follow-up appreciated.  CABG workup in progress    Objective :  Temp:  [97.3 °F (36.3 °C)-98.6 °F (37 °C)] 97.6 °F (36.4 °C)  HR:  [42-67] 59  BP: (119-143)/(58-91) 123/58  Resp:  [16-18] 18  SpO2:  [96 %-100 %] 98 %  O2 Device: None (Room air)    Body mass index is 24.18 kg/m².     Input and Output Summary (last 24 hours):     Intake/Output Summary (Last 24 hours) at 5/13/2025 1643  Last data filed at 5/13/2025 1100  Gross per 24 hour   Intake 1100 ml   Output 1000 ml   Net 100 ml       Physical Exam  Constitutional:       General: He is not in acute distress.     Appearance: He is not ill-appearing.   HENT:      Head: Normocephalic.      Nose: Nose normal.   Eyes:      General: No scleral icterus.  Cardiovascular:      Rate and Rhythm: Normal rate and regular rhythm.      Heart sounds: No murmur heard.  Pulmonary:      Effort: No respiratory distress.   Abdominal:      General: There is no distension.      Tenderness: There is no abdominal tenderness.   Musculoskeletal:         General: Normal range of motion.   Skin:     General: Skin is warm.      Coloration: Skin is not jaundiced.   Neurological:      Mental Status: He is alert. Mental status is at baseline.      Cranial Nerves: No cranial nerve deficit.           Lines/Drains:  Lines/Drains/Airways       Active Status       Name Placement date Placement time Site Days    Nephrostomy Left 10.2 Fr. 04/29/25  1109  Left  14    Nephrostomy Right 10.2 Fr. 04/29/25  1111  Right  14                      Telemetry:  Telemetry Orders (From admission, onward)               24 Hour Telemetry Monitoring  Continuous x 24 Hours (Telem)         Expiring   Question:  Reason for 24 Hour Telemetry  Answer:  PCI/EP study (including pacer and ICD implementation), Cardiac surgery, MI, abnormal cardiac cath, and chest pain- rule out MI                     Telemetry Reviewed: Normal Sinus Rhythm  Indication for Continued Telemetry Use: Awaiting PCI/EP Study/CABG               Lab Results: I have reviewed the following results:   Results from last 7 days   Lab Units 05/12/25  0621   WBC Thousand/uL 9.35   HEMOGLOBIN g/dL 11.0*   HEMATOCRIT % 33.7*   PLATELETS Thousands/uL 181   SEGS PCT % 79*   LYMPHO PCT % 13*   MONO PCT % 6   EOS PCT % 2     Results from last 7 days   Lab Units 05/13/25  0529 05/12/25  0621 05/11/25  0405   SODIUM mmol/L 139   < > 142   POTASSIUM mmol/L 3.8   < > 3.6   CHLORIDE mmol/L 104   < > 105   CO2 mmol/L 26   < > 27   BUN mg/dL 22   < > 18   CREATININE mg/dL 1.17   < > 1.09   ANION GAP mmol/L 9   < > 10   CALCIUM mg/dL 9.3   < > 9.2   ALBUMIN g/dL  --   --  3.7   TOTAL BILIRUBIN mg/dL  --   --  0.84   ALK PHOS U/L  --   --  57   ALT U/L  --   --  7   AST U/L  --   --  23   GLUCOSE RANDOM mg/dL 99   < > 124    < > = values in this interval not displayed.     Results from last 7 days   Lab Units 05/10/25  2020   INR  0.96         Results from last 7 days   Lab Units 05/13/25  1253   HEMOGLOBIN A1C % 5.6     Results from last 7 days   Lab Units 05/12/25  0621 05/11/25  1146   PROCALCITONIN ng/ml 0.07 0.12       Recent Cultures (last 7 days):               Last 24 Hours Medication List:     Current Facility-Administered Medications:     amLODIPine (NORVASC) tablet 5 mg, Daily    aspirin chewable tablet 81 mg, Daily    atorvastatin (LIPITOR) tablet 40 mg, Daily    cefTRIAXone (ROCEPHIN) 1,000 mg in dextrose 5 % 50 mL IVPB, Q24H, Last Rate: 1,000 mg (05/13/25 1106)    chlorhexidine (PERIDEX) 0.12 % oral rinse 15 mL, Q12H AMADOU    heparin (porcine) 25,000 units in 0.45% NaCl 250 mL infusion (premix), Titrated, Last Rate: 12 Units/kg/hr  (05/13/25 1312)    lisinopril (ZESTRIL) tablet 10 mg, Daily    metoprolol tartrate (LOPRESSOR) tablet 25 mg, Q12H AMADOU    mupirocin (BACTROBAN) 2 % nasal ointment 1 Application, Q12H AMADOU    nitroglycerin (NITROSTAT) SL tablet 0.4 mg, Q5 Min PRN    sodium chloride (PF) 0.9 % injection 10 mL, Daily    sodium chloride (PF) 0.9 % injection 10 mL, Daily    Administrative Statements   Today, Patient Was Seen By: Candy Bailey MD      **Please Note: This note may have been constructed using a voice recognition system.**

## 2025-05-13 NOTE — ASSESSMENT & PLAN NOTE
5/10/2025 presentation with chest discomfort  5/10/2025 EKG: Sinus rhythm, nonspecific ST changes, no ST elevation  Peak troponin 2989  5/12/2025 LHC: Severe three-vessel disease including distal left main for which CT surgery consultation has been advised   electronic

## 2025-05-13 NOTE — ASSESSMENT & PLAN NOTE
Has remote history of CAD /MI s/p coronary stents X2  Presenting with intermittent chest pain over last several weeks mainly with activity, but progressively worsening. Most recent episode before calling EMS lasted longer and did not resolve with rest, was radiating to neck and bilateral arms, with diaphoresis and vomiting.   Elevated troponin, EKG was sinus tachycardia 105 with some nonspecific ST changes  Patient started on heparin drip in the ED  Monitor on telemetry  As needed submental length of blistering  Cardiology consulted for further management  Follow on cardiac echo  - has recent A1C, Lipid and TSH , all were unremarkable.   Add beta-blocker  Left heart cath today 5/12 shows three-vessel disease, plan for evaluation for potential CABG  Discussed with cardiology, , Resume heparin drip without bolus after TR band from radial access removed   Hold off Plavix pending CT surgery recommendations; continue on aspirin and statin-surgery consultation appreciated

## 2025-05-13 NOTE — ASSESSMENT & PLAN NOTE
"Lab Results   Component Value Date    HGBA1C 5.6 05/13/2025       No results for input(s): \"POCGLU\" in the last 72 hours.    Blood Sugar Average: Last 72 hrs:    Most recent A1C 5.8 ; hold home metformin while inpatient -   Continue with insulin sliding scale and low carb diet.     "

## 2025-05-13 NOTE — PLAN OF CARE
Problem: PAIN - ADULT  Goal: Verbalizes/displays adequate comfort level or baseline comfort level  Description: Interventions:- Encourage patient to monitor pain and request assistance- Assess pain using appropriate pain scale- Administer analgesics based on type and severity of pain and evaluate response- Implement non-pharmacological measures as appropriate and evaluate response- Consider cultural and social influences on pain and pain management- Notify physician/advanced practitioner if interventions unsuccessful or patient reports new pain  Outcome: Progressing       Problem: PAIN - ADULT  Goal: Verbalizes/displays adequate comfort level or baseline comfort level  Description: Interventions:- Encourage patient to monitor pain and request assistance- Assess pain using appropriate pain scale- Administer analgesics based on type and severity of pain and evaluate response- Implement non-pharmacological measures as appropriate and evaluate response- Consider cultural and social influences on pain and pain management- Notify physician/advanced practitioner if interventions unsuccessful or patient reports new pain  Outcome: Progressing     Problem: SAFETY ADULT  Goal: Patient will remain free of fallsProblem: DISCHARGE PLANNING  Goal: Discharge to home or other facility with appropriate resources  Description: INTERVENTIONS:- Identify barriers to discharge w/patient and caregiver- Arrange for needed discharge resources and transportation as appropriate- Identify discharge learning needs (meds, wound care, etc.)- Arrange for interpretive services to assist at discharge as needed- Refer to Case Management Department for coordinating discharge planning if the patient needs post-hospital services based on physician/advanced practitioner order or complex needs related to functional status, cognitive ability, or social support system  Outcome: Progressing     Problem: CARDIOVASCULAR - ADULT  Goal: Maintains optimal  cardiac output and hemodynamic stability  Description: INTERVENTIONS:- Monitor I/O, vital signs and rhythm- Monitor for S/S and trends of decreased cardiac output- Administer and titrate ordered vasoactive medications to optimize hemodynamic stability- Assess quality of pulses, skin color and temperature- Assess for signs of decreased coronary artery perfusion- Instruct patient to report change in severity of symptoms  INTERVENTIONS:- Monitor I/O, vital signs and rhythm- Monitor for S/S and trends of decreased cardiac output- Administer and titrate ordered vasoactive medications to optimize hemodynamic stability- Assess quality of pulses, skin color and temperature- Assess for signs of decreased coronary artery perfusion- Instruct patient to report change in severity of symptoms  Outcome: Progressing  Goal: Absence of cardiac dysrhythmias or at baseline rhythm  Description: INTERVENTIONS:- Continuous cardiac monitoring, vital signs, obtain 12 lead EKG if ordered- Administer antiarrhythmic and heart rate control medications as ordered- Monitor electrolytes and administer replacement therapy as ordered  INTERVENTIONS:- Continuous cardiac monitoring, vital signs, obtain 12 lead EKG if ordered- Administer antiarrhythmic and heart rate control medications as ordered- Monitor electrolytes and administer replacement therapy as ordered  Outcome: Progressing      Problem: DISCHARGE PLANNING  Goal: Discharge to home or other facility with appropriate resources  Description: INTERVENTIONS:- Identify barriers to discharge w/patient and caregiver- Arrange for needed discharge resources and transportation as appropriate- Identify discharge learning needs (meds, wound care, etc.)- Arrange for interpretive services to assist at discharge as needed- Refer to Case Management Department for coordinating discharge planning if the patient needs post-hospital services based on physician/advanced practitioner order or complex needs  related to functional status, cognitive ability, or social support system  Outcome: Progressing

## 2025-05-13 NOTE — PHYSICAL THERAPY NOTE
Physical Therapy Cancellation Note         05/13/25 4135   PT Last Visit   PT Visit Date 05/13/25   Note Type   Note type Cancelled Session   Cancel Reasons Medical status       PT orders received and chart reviewed. Per EMR Pt pending oncology consult and CABG workup. Will hold PT evaluation until medical plan is determined. Will continue to follow as appropriate.       Devon Dotson PT, DPT

## 2025-05-13 NOTE — OCCUPATIONAL THERAPY NOTE
Occupational Therapy Cancellation Note        Patient Name: Kilo Mix  Today's Date: 5/13/2025 05/13/25 1344   OT Last Visit   OT Visit Date 05/13/25   Note Type   Note type Evaluation;Cancelled Session   Cancel Reasons Medical status     OT orders received and chart reviewed. Per EMR Pt pending oncology consult and CABG workup. Will hold OT evaluation until medical plan is determined. Will continue to follow as appropriate.     Cher Du, RACHNA, OTR/L

## 2025-05-13 NOTE — PROGRESS NOTES
Progress Note - Cardiac Surgery   Kilo Mix 84 y.o. male MRN: [id number]  Unit/Bed#: [facility] 425-01 Encounter: [id number]      24 Hour Events: no CP or SOB. No events. Remains on hep gtt    Medications:   Scheduled Meds:  Current Facility-Administered Medications   Medication Dose Route Frequency Provider Last Rate    amLODIPine  5 mg Oral Daily Mayur Lopez MD      [Transfer Hold] aspirin  81 mg Oral Daily Marques Cabrera DO      atorvastatin  40 mg Oral Daily Mayur Lopez MD      cefTRIAXone  1,000 mg Intravenous Q24H Mayur Lopez MD 1,000 mg (05/12/25 0924)    heparin (porcine)  3-20 Units/kg/hr (Order-Specific) Intravenous Titrated Emilie Soyeon Kim, MD      [Transfer Hold] lisinopril  10 mg Oral Daily GLEN Williamson      metoprolol tartrate  25 mg Oral Q12H AMADOU Mayur Lopez MD      nitroglycerin  0.4 mg Sublingual Q5 Min PRN Mayur Lopez MD      sodium chloride (PF)  10 mL Intracatheter Daily Mayur Lopez MD      sodium chloride (PF)  10 mL Intracatheter Daily Mayur Lopez MD       Continuous Infusions:heparin (porcine), 3-20 Units/kg/hr (Order-Specific)        Results:   Results from last 7 days   Lab Units 05/12/25  0621 05/11/25  0324 05/10/25  1954   WBC Thousand/uL 9.35 10.04 12.47*   HEMOGLOBIN g/dL 11.0* 11.8* 13.0   HEMATOCRIT % 33.7* 35.2* 38.9   PLATELETS Thousands/uL 181 210 228     Results from last 7 days   Lab Units 05/13/25  0529 05/12/25  0621 05/11/25  0405   POTASSIUM mmol/L 3.8 3.8 3.6   CHLORIDE mmol/L 104 104 105   CO2 mmol/L 26 28 27   BUN mg/dL 22 19 18   CREATININE mg/dL 1.17 1.12 1.09   CALCIUM mg/dL 9.3 8.9 9.2     Results from last 7 days   Lab Units 05/11/25  2214 05/11/25  1758 05/11/25  1146 05/11/25  0314 05/10/25  2020   INR   --   --   --   --  0.96   PTT seconds 62* 69* 73*   < > 29    < > = values in this interval not displayed.       Studies:     Cardiac Cath: distal LM 70%, Mid LAD 80% and 70%, Diag2 80%, Ost Cx 90%, Mid RCA 85%, Dist RCA 60%, RPDA 70%      Echo: EF 60%, normal wall motion, mild MV annular calcification with mild MR, mild TR       Results Review Statement: I personally reviewed the following image studies in PACS and associated radiology reports: as above. My interpretation of the radiology images/reports is: as above.    Vitals:   Vitals:    05/12/25 2137 05/12/25 2333 05/13/25 0345 05/13/25 0756   BP: 137/70 139/68 141/66 143/66   BP Location:  Right arm Right arm Right arm   Pulse: 66 60 (!) 42 58   Resp:  16 18    Temp:  (!) 97.3 °F (36.3 °C) 98.4 °F (36.9 °C) 98.1 °F (36.7 °C)   TempSrc:  Oral  Oral   SpO2: 97% 96% 97% 98%   Weight:       Height:           Physical Exam:    General: No acute distress, Alert, and Normal appearance  HEENT/NECK:  Normocephalic. Atraumatic.    Cardiac: Regular rate and rhythm  Pulmonary:  Breath sounds clear bilaterally  Neuro: Alert and oriented X 3, Sensation is grossly intact, and No focal deficits  Skin: Warm/Dry, without rashes or lesions.      Assessment:    NSTEMI, Severe MCAD involving distal LM; Ongoing CABG workup  DMII  PVD, h/o carotid dx s/p stenting, on Plavix  HTN/HLD  Prostate cancer, stage IV with mets to lymph nodes, complicated with urinary obstruction requiring bilateral nephrostomies       Plan:  Patient agreeable to proceed with surgical workup    Oncology contacted for context of his stage 4 prostate CA. Specifically, his one year and 5 year survival.    Spoke with oncologist - he has pretty extensive prostate cancer, 5 yr survival is less than 40-50% though 1 year survival is very good      Plavix held    Will speak with Dr Duke and order testing. If deemed a surgical candidate possible surgery Fri 5/16, surgeon TBD    SIGNATURE: Anaid Williamson PA-C  DATE: May 13, 2025  TIME: 8:09 AM    * This note was completed in part utilizing m-WireOver fluency direct voice recognition software.   Grammatical errors, random word insertion, spelling mistakes, and incomplete sentences may be an  occasional consequence of the system secondary to software limitations, ambient noise and hardware issues. At the time of dictation, efforts were made to edit, clarify and /or correct errors. Please read the chart carefully and recognize, using context, where substitutions have occurred.  If you have any questions or concerns about the context, text or information contained within the body of this dictation, please contact myself, the provider, for further clarification.

## 2025-05-13 NOTE — ASSESSMENT & PLAN NOTE
Patient noted late April with low blood pressure he self discontinued off amlodipine then recheck to PCP and was advised to restart amlodipine and discontinue hydrochlorothiazide  Monitor blood pressure and reevaluate   Continue home lisinopril and amlodipine,  As needed Lopressor IV for systolic greater than 180

## 2025-05-13 NOTE — PROGRESS NOTES
Progress Note - Cardiology   Name: Kilo Mix 84 y.o. male I MRN: [id number]  Unit/Bed#: [facility] 425-01 I Date of Admission: 5/10/2025   Date of Service: 5/13/2025 I Hospital Day: 3     Assessment & Plan  NSTEMI (non-ST elevated myocardial infarction) (HCC)  5/10/2025 presentation with chest discomfort  5/10/2025 EKG: Sinus rhythm, nonspecific ST changes, no ST elevation  Peak troponin 2989  5/12/2025 LHC: Severe three-vessel disease including distal left main for which CT surgery consultation has been advised  Coronary artery disease involving native coronary artery  History of MI (1995) with status post bare-metal stents to RCA  Status post PCI/LOUIE 2007  Home Rx Plavix 75 mg daily  Dyslipidemia  Home Rx atorvastatin 20 mg daily  Cholesterol 166, triglycerides 113, HDL 66 and LDL 77  Hypertension  Home Rx lisinopril 10 mg daily and amlodipine 5 mg daily (he noted low blood pressure in April and subsequently stopped amlodipine on his own)  Amlodipine 5 mg daily restarted inpatient  Peripheral vascular disease ([facility])  History of carotid artery disease status post stenting  Home Rx Plavix 75 mg daily and atorvastatin 20 mg daily    Plan:  Patient chest pain-free and hemodynamically stable today  Remains on IV heparin, statin, beta-blocker  CTS consulted and patient for potential bypass later this week pending preoperative testing  Continue to monitor telemetry  Plavix washout    Subjective   Chief Complaint:   No chest pain today  Breathing is fine    Objective :  Temp:  [97.3 °F (36.3 °C)-98.6 °F (37 °C)] 98 °F (36.7 °C)  HR:  [42-67] 56  BP: (119-143)/(47-91) 119/91  Resp:  [16-18] 18  SpO2:  [96 %-98 %] 96 %  O2 Device: None (Room air)  Orthostatic Blood Pressures      Flowsheet Row Most Recent Value   Blood Pressure 119/91 filed at 05/13/2025 1052   Patient Position - Orthostatic VS Sitting filed at 05/13/2025 1052          First Weight: Weight - Scale: 72.3 kg (159 lb 6.3 oz) (05/11/25 0041)  Vitals:    05/11/25  0041 05/12/25 0815   Weight: 72.3 kg (159 lb 6.3 oz) 72.1 kg (159 lb)     Physical Exam  Constitutional:       Appearance: He is well-developed.   HENT:      Head: Normocephalic and atraumatic.   Eyes:      Pupils: Pupils are equal, round, and reactive to light.   Cardiovascular:      Rate and Rhythm: Normal rate and regular rhythm.      Heart sounds: No murmur heard.     No friction rub. No gallop.   Pulmonary:      Effort: Pulmonary effort is normal. No respiratory distress.      Breath sounds: Normal breath sounds. No wheezing or rales.   Abdominal:      General: Bowel sounds are normal. There is no distension.      Palpations: Abdomen is soft.      Tenderness: There is no abdominal tenderness.   Musculoskeletal:         General: No deformity. Normal range of motion.      Cervical back: Normal range of motion and neck supple.   Skin:     General: Skin is warm and dry.   Neurological:      Mental Status: He is alert and oriented to person, place, and time.   Psychiatric:         Behavior: Behavior normal.           Lab Results: I have reviewed the following results:  Results from last 7 days   Lab Units 05/12/25  0621 05/11/25  0324 05/10/25  1954   WBC Thousand/uL 9.35 10.04 12.47*   HEMOGLOBIN g/dL 11.0* 11.8* 13.0   HEMATOCRIT % 33.7* 35.2* 38.9   PLATELETS Thousands/uL 181 210 228     Results from last 7 days   Lab Units 05/13/25  0529 05/12/25  0621 05/11/25  0405   POTASSIUM mmol/L 3.8 3.8 3.6   CHLORIDE mmol/L 104 104 105   CO2 mmol/L 26 28 27   BUN mg/dL 22 19 18   CREATININE mg/dL 1.17 1.12 1.09   CALCIUM mg/dL 9.3 8.9 9.2     Results from last 7 days   Lab Units 05/11/25  2214 05/11/25  1758 05/11/25  1146 05/11/25  0314 05/10/25  2020   INR   --   --   --   --  0.96   PTT seconds 62* 69* 73*   < > 29    < > = values in this interval not displayed.     Lab Results   Component Value Date    HGBA1C 5.8 (H) 02/20/2025     Lab Results   Component Value Date    TROPONINI <0.02 04/17/2018             VTE  Pharmacologic Prophylaxis:   VTE Mechanical Prophylaxis:

## 2025-05-14 ENCOUNTER — ANESTHESIA EVENT (INPATIENT)
Dept: PERIOP | Facility: HOSPITAL | Age: 85
DRG: 233 | End: 2025-05-14
Payer: MEDICARE

## 2025-05-14 ENCOUNTER — PREP FOR PROCEDURE (OUTPATIENT)
Dept: CARDIAC SURGERY | Facility: CLINIC | Age: 85
End: 2025-05-14

## 2025-05-14 ENCOUNTER — TELEPHONE (OUTPATIENT)
Dept: CARDIAC SURGERY | Facility: CLINIC | Age: 85
End: 2025-05-14

## 2025-05-14 DIAGNOSIS — I21.4 NSTEMI (NON-ST ELEVATED MYOCARDIAL INFARCTION) (HCC): ICD-10-CM

## 2025-05-14 DIAGNOSIS — I25.119 CORONARY ARTERY DISEASE INVOLVING NATIVE CORONARY ARTERY OF NATIVE HEART WITH ANGINA PECTORIS (HCC): Primary | ICD-10-CM

## 2025-05-14 PROBLEM — R54 FRAILTY: Status: ACTIVE | Noted: 2025-05-14

## 2025-05-14 PROBLEM — Z01.818 PREOPERATIVE CLEARANCE: Status: ACTIVE | Noted: 2025-05-14

## 2025-05-14 PROBLEM — F32.A DEPRESSION: Status: ACTIVE | Noted: 2025-05-14

## 2025-05-14 PROBLEM — G47.33 OSA (OBSTRUCTIVE SLEEP APNEA): Status: ACTIVE | Noted: 2025-05-14

## 2025-05-14 PROBLEM — J41.1 MUCOPURULENT CHRONIC BRONCHITIS (HCC): Status: ACTIVE | Noted: 2025-05-14

## 2025-05-14 PROBLEM — Z91.89 AT RISK FOR DELIRIUM: Status: ACTIVE | Noted: 2025-05-14

## 2025-05-14 PROBLEM — R41.89 COGNITIVE IMPAIRMENT: Status: ACTIVE | Noted: 2025-05-14

## 2025-05-14 LAB
APTT PPP: 109 SECONDS (ref 23–34)
APTT PPP: 51 SECONDS (ref 23–34)
APTT PPP: 58 SECONDS (ref 23–34)
APTT PPP: 63 SECONDS (ref 23–34)

## 2025-05-14 PROCEDURE — 85730 THROMBOPLASTIN TIME PARTIAL: CPT | Performed by: FAMILY MEDICINE

## 2025-05-14 PROCEDURE — 99232 SBSQ HOSP IP/OBS MODERATE 35: CPT | Performed by: INTERNAL MEDICINE

## 2025-05-14 PROCEDURE — 93005 ELECTROCARDIOGRAM TRACING: CPT

## 2025-05-14 PROCEDURE — 99233 SBSQ HOSP IP/OBS HIGH 50: CPT | Performed by: PHYSICIAN ASSISTANT

## 2025-05-14 PROCEDURE — 99223 1ST HOSP IP/OBS HIGH 75: CPT | Performed by: NURSE PRACTITIONER

## 2025-05-14 PROCEDURE — 99223 1ST HOSP IP/OBS HIGH 75: CPT | Performed by: INTERNAL MEDICINE

## 2025-05-14 PROCEDURE — 99223 1ST HOSP IP/OBS HIGH 75: CPT

## 2025-05-14 PROCEDURE — 87081 CULTURE SCREEN ONLY: CPT | Performed by: FAMILY MEDICINE

## 2025-05-14 PROCEDURE — 99232 SBSQ HOSP IP/OBS MODERATE 35: CPT | Performed by: FAMILY MEDICINE

## 2025-05-14 RX ADMIN — HEPARIN SODIUM 2000 UNITS: 1000 INJECTION, SOLUTION INTRAVENOUS; SUBCUTANEOUS at 16:50

## 2025-05-14 RX ADMIN — CEFTRIAXONE SODIUM 1000 MG: 10 INJECTION, POWDER, FOR SOLUTION INTRAVENOUS at 09:27

## 2025-05-14 RX ADMIN — SODIUM CHLORIDE, PRESERVATIVE FREE 10 ML: 5 INJECTION INTRAVENOUS at 08:47

## 2025-05-14 RX ADMIN — MUPIROCIN 1 APPLICATION: 20 OINTMENT TOPICAL at 08:43

## 2025-05-14 RX ADMIN — METOPROLOL TARTRATE 25 MG: 25 TABLET, FILM COATED ORAL at 20:09

## 2025-05-14 RX ADMIN — MUPIROCIN 1 APPLICATION: 20 OINTMENT TOPICAL at 20:09

## 2025-05-14 RX ADMIN — CHLORHEXIDINE GLUCONATE 15 ML: 1.2 SOLUTION ORAL at 08:43

## 2025-05-14 RX ADMIN — LISINOPRIL 10 MG: 10 TABLET ORAL at 08:40

## 2025-05-14 RX ADMIN — CHLORHEXIDINE GLUCONATE 15 ML: 1.2 SOLUTION ORAL at 20:09

## 2025-05-14 RX ADMIN — AMLODIPINE BESYLATE 5 MG: 5 TABLET ORAL at 08:40

## 2025-05-14 RX ADMIN — ATORVASTATIN CALCIUM 40 MG: 40 TABLET, FILM COATED ORAL at 08:40

## 2025-05-14 RX ADMIN — HEPARIN SODIUM 2000 UNITS: 1000 INJECTION, SOLUTION INTRAVENOUS; SUBCUTANEOUS at 23:48

## 2025-05-14 RX ADMIN — ASPIRIN 81 MG CHEWABLE TABLET 81 MG: 81 TABLET CHEWABLE at 08:40

## 2025-05-14 RX ADMIN — METOPROLOL TARTRATE 25 MG: 25 TABLET, FILM COATED ORAL at 08:40

## 2025-05-14 RX ADMIN — HEPARIN SODIUM 14 UNITS/KG/HR: 10000 INJECTION, SOLUTION INTRAVENOUS at 12:33

## 2025-05-14 NOTE — ASSESSMENT & PLAN NOTE
ARISCAT 42% of post op complications  Keene score 7.8%  While his perioperative risk is high, I believe that it is related to his chronic conditions and age and not due to current pneumonia.   He may proceed to surgery with acceptable risk.  There is high risk of acute hypoxic and hypercapnic respiratory failure due to COPD, ALPHONSO and pulmonary hypertension.      -  In perioperative period I would utilize duonebs every TID in pre and post operative period      -  Monitor closely for the development of hypoxia      -  Minimize the use of benzodiazepines and narcotics as possible      -  Would encourage incentive spirometry to prevent atelectasis      -  Early ambulation as tolerated      -  Pharmacologic DVT prophylaxis.

## 2025-05-14 NOTE — ASSESSMENT & PLAN NOTE
-- Pt has hx of carotid artery disease s/p stenting and coronary artery disease s/p stenting.   -- Continue ASA and Plavix.   -- Continue atorvastatin 40 mg.

## 2025-05-14 NOTE — ASSESSMENT & PLAN NOTE
Pulmonary on consult  CT Chest (5/13/25) persistent right middle lobe and right lower lobe pneumonia  IV ceftriaxone     CT on consult  Planned for CABG     Concern for increased complication risk with CABG secondary to frailty, impaired physical, mental and cognitive reserve

## 2025-05-14 NOTE — ASSESSMENT & PLAN NOTE
Stage IV disease  Follows with [facility] and urology as outpatient  S/p prostatectomy in 1999  PSA greater than 360 with confirmed metastatic disease to lymph nodes  Was given lupron and started on xtandi, xtandi on hold secondary to dizziness

## 2025-05-14 NOTE — ASSESSMENT & PLAN NOTE
Exacerbated by hospitalization and underlying medical comorbities, decreased appetite, weight loss, limited home support (reports wife in poor health)  Needs increased home support  Recommend protein supplementation for nutritional support  PT/OT

## 2025-05-14 NOTE — PLAN OF CARE
Problem: PAIN - ADULT  Goal: Verbalizes/displays adequate comfort level or baseline comfort level  Description: Interventions:- Encourage patient to monitor pain and request assistance- Assess pain using appropriate pain scale- Administer analgesics based on type and severity of pain and evaluate response- Implement non-pharmacological measures as appropriate and evaluate response- Consider cultural and social influences on pain and pain management- Notify physician/advanced practitioner if interventions unsuccessful or patient reports new pain  Outcome: Progressing     Problem: INFECTION - ADULT  Goal: Absence or prevention of progression during hospitalization  Description: INTERVENTIONS:- Assess and monitor for signs and symptoms of infection- Monitor lab/diagnostic results- Monitor all insertion sites, i.e. indwelling lines, tubes, and drains- Monitor endotracheal if appropriate and nasal secretions for changes in amount and color- [place] appropriate cooling/warming therapies per order- Administer medications as ordered- Instruct and encourage patient and family to use good hand hygiene technique- Identify and instruct in appropriate isolation precautions for identified infection/condition  Outcome: Progressing  Goal: Absence of fever/infection during neutropenic period  Description: INTERVENTIONS:- Monitor WBC  Outcome: Progressing     Problem: SAFETY ADULT  Goal: Patient will remain free of falls  Description: INTERVENTIONS:- Educate patient/family on patient safety including physical limitations- Instruct patient to call for assistance with activity - Consult OT/PT to assist with strengthening/mobility - Keep Call bell within reach- Keep bed low and locked with side rails adjusted as appropriate- Keep care items and personal belongings within reach- Initiate and maintain comfort rounds- Make Fall Risk Sign visible to staff- Offer Toileting every  Hours, in advance of need- Initiate/Maintain alarm- Obtain  necessary fall risk management equipment- Apply yellow socks and bracelet for high fall risk patients- Consider moving patient to room near nurses station  Outcome: Progressing  Goal: Maintain or return to baseline ADL function  Description: INTERVENTIONS:-  Assess patient's ability to carry out ADLs; assess patient's baseline for ADL function and identify physical deficits which impact ability to perform ADLs (bathing, care of mouth/teeth, toileting, grooming, dressing, etc.)- Assess/evaluate cause of self-care deficits - Assess range of motion- Assess patient's mobility; develop plan if impaired- Assess patient's need for assistive devices and provide as appropriate- Encourage maximum independence but intervene and supervise when necessary- Involve family in performance of ADLs- Assess for home care needs following discharge - Consider OT consult to assist with ADL evaluation and planning for discharge- Provide patient education as appropriate  Outcome: Progressing  Goal: Maintains/Returns to pre admission functional level  Description: INTERVENTIONS:- Perform AM-PAC 6 Click Basic Mobility/ Daily Activity assessment daily.- Set and communicate daily mobility goal to care team and patient/family/caregiver. - Collaborate with rehabilitation services on mobility goals if consulted- Perform Range of Motion  times a day.- Reposition patient every  hours.- Dangle patient  times a day- Stand patient  times a day- Ambulate patient  times a day- Out of bed to chair  times a day - Out of bed for meals  times a day- Out of bed for toileting- Record patient progress and toleration of activity level   Outcome: Progressing

## 2025-05-14 NOTE — PROGRESS NOTES
"Progress Note - Cardiac Surgery   Kilo Mix 84 y.o. male MRN: [id number]  Unit/Bed#: [facility] 425-01 Encounter: [id number]      24 Hour Events: Continues on Heparin gtt. Feels well, denies CP or SOB. Pt states he \"does not want to have open heart surgery, but knows he needs to have it done, as there is no other choice.\"    Medications:   Scheduled Meds:  Current Facility-Administered Medications   Medication Dose Route Frequency Provider Last Rate    amLODIPine  5 mg Oral Daily Candy Bailey MD      aspirin  81 mg Oral Daily Candy Bailey MD      atorvastatin  40 mg Oral Daily Candy Bailey MD      cefTRIAXone  1,000 mg Intravenous Q24H Candy Bailey  mL/hr at 05/14/25 0252    chlorhexidine  15 mL Mouth/Throat Q12H [facility] Candy Bailey MD      heparin (porcine)  3-20 Units/kg/hr (Order-Specific) Intravenous Titrated Analy YOHANNES Farrell-C 14 Units/kg/hr (05/14/25 0252)    heparin (porcine)  2,000 Units Intravenous Q6H PRN Analy A YOHANNES Phan-C      heparin (porcine)  4,000 Units Intravenous Q6H PRN Analy Phan PA-C      lisinopril  10 mg Oral Daily Candy Bailey MD      metoprolol tartrate  25 mg Oral Q12H [facility] Candy Bailey MD      mupirocin  1 Application Nasal Q12H [facility] Candy Bailey MD      nitroglycerin  0.4 mg Sublingual Q5 Min PRN Canyd Bailey MD      sodium chloride (PF)  10 mL Intracatheter Daily Candy Bailey MD      sodium chloride (PF)  10 mL Intracatheter Daily Candy Bailey MD       Continuous Infusions:heparin (porcine), 3-20 Units/kg/hr (Order-Specific), Last Rate: 14 Units/kg/hr (05/14/25 0252)        Results:   Results from last 7 days   Lab Units 05/12/25  0621 05/11/25  0324 05/10/25  1954   WBC Thousand/uL 9.35 10.04 12.47*   HEMOGLOBIN g/dL 11.0* 11.8* 13.0   HEMATOCRIT % 33.7* 35.2* 38.9   PLATELETS Thousands/uL 181 210 228     Results from last 7 days   Lab Units 05/13/25  0529 05/12/25  0621 05/11/25  0405   POTASSIUM mmol/L 3.8 3.8 3.6 "   CHLORIDE mmol/L 104 104 105   CO2 mmol/L 26 28 27   BUN mg/dL 22 19 18   CREATININE mg/dL 1.17 1.12 1.09   CALCIUM mg/dL 9.3 8.9 9.2     Results from last 7 days   Lab Units 05/14/25  0207 05/13/25 2000 05/13/25  1253 05/11/25  0314 05/10/25  2020   INR   --   --   --   --  0.96   PTT seconds 109* 42* 31   < > 29    < > = values in this interval not displayed.     MRSA pending  A1C 5.6  Lipid panel completed      Studies:     Cardiac Cath: distal LM 70%, Mid LAD 80% and 70%, Diag2 80%, Ost Cx 90%, Mid RCA 85%, Dist RCA 60%, RPDA 70%      Echo: EF 60%, normal wall motion, mild MV annular calcification with mild MR, mild TR     CT chest 5/13: Persistent right middle lobe and right lower lobe pneumonia. Cholelithiasis. 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 Atrophic right kidney. Simple appearing renal cyst     Carotid artery duplex:  Right: There is <50% stenosis noted in the internal carotid artery. The common carotid artery/bulb stent is patent. Plaque is homogenous and smooth.   A severe stenosis was identified in the proximal external carotid artery.  Vertebral artery flow is antegrade.  There is no significant subclavian artery disease.       LEFT:   There is 50-69% stenosis noted in the internal carotid artery.  Plaque is heterogenous and irregular.  Vertebral artery flow is antegrade.  There is no significant subclavian artery disease.     Greater saphenous vein mapping: Bilateral GSV with small calibers for conduit. Possible segment in left thigh    Results Review Statement: I personally reviewed the following image studies in PACS and associated radiology reports: Carotid US, Vein mapping, cardiac cath, CT chest, and Echocardiogram. My interpretation of the radiology images/reports is: as noted above.    Vitals:   Vitals:    05/13/25 1759 05/13/25 2236 05/13/25 2237 05/14/25 0257   BP: 135/67 140/64 140/64 148/68   BP Location:    Right arm   Pulse:  55 57 (!) 50   Resp: 18  16 18   Temp: 97.7 °F (36.5 °C)  97.7 °F (36.5 °C) 97.7 °F (36.5 °C) 98 °F (36.7 °C)   TempSrc:    Oral   SpO2:  97% 95% 97%   Weight:       Height:           Physical Exam:    General: No acute distress, Alert, and Normal appearance  HEENT/NECK:  Normocephalic. Atraumatic.  No jugular venous distention.    Cardiac: Regular rate and rhythm  Pulmonary:  Breath sounds clear bilaterally  Abdomen:  Non-tender and Non-distended  Extremities: Extremities warm/dry and No edema B/L  Neuro: Alert and oriented X 3, Sensation is grossly intact, and No focal deficits  Skin: Warm/Dry, without rashes or lesions.      Assessment:    Severe coronary artery disease; Ongoing CABG workup    Plan:  Patient agreeable to proceed with surgery; all questions and concerns addressed. Informed consent signed    Oncology evaluated for his stage 4 prostate CA. Specifically, his one year and 5 year survival. Spoke with oncologist - he has pretty extensive prostate cancer, 5 yr survival is less than 40-50% though 1 year survival is very good      Carotid ultrasound shows 50-69% left ICA stenosis and severe stenosis of right external carotid artery. Will consult Vascular surgery for evaluation.    Vein mapping completed; Findings show small caliber vein bilaterally    Blood type and cross match ordered for tomorrow    Discontinue Lisinopril in anticipation for surgery    Continue Mupirocin 2% nasal ointment q 12 hrs    Continue topical chlorhexidine bath and mouth rise    Will arrange for transfer to  for preop care/education    coronary artery bypass grafting scheduled for Friday 5/16 with Dr. Zeb Duke M.D.    SIGNATURE: Alicia Bender PA-C  DATE: May 14, 2025  TIME: 8:29 AM    * This note was completed in part utilizing [employer] direct voice recognition software.   Grammatical errors, random word insertion, spelling mistakes, and incomplete sentences may be an occasional consequence of the system secondary to software limitations, ambient noise and hardware  issues. At the time of dictation, efforts were made to edit, clarify and /or correct errors. Please read the chart carefully and recognize, using context, where substitutions have occurred.  If you have any questions or concerns about the context, text or information contained within the body of this dictation, please contact myself, the provider, for further clarification.

## 2025-05-14 NOTE — ASSESSMENT & PLAN NOTE
"Lab Results   Component Value Date    HGBA1C 5.6 05/13/2025       No results for input(s): \"POCGLU\" in the last 72 hours.    Blood Sugar Average: Last 72 hrs:      "

## 2025-05-14 NOTE — ASSESSMENT & PLAN NOTE
Oriented x 3, no signs of delirium  At HIGH risk for delirium secondary to hospitalization, age, surgery, depression, cognitive impairment, pneumonia  Maintain delirium precautions:  Provide frequent redirection, reorientation, distraction techniques  Avoid deliriogenic medications such as tramadol, benzodiazepines, anticholinergics,  Benadryl  Treat pain, See geriatric pain protocol  Monitor for constipation and urinary retention  Encourage early and frequent moblization, OOB  Encourage Hydration/ Nutrition  Implement sleep hygiene, limit night time interuptions, group activities

## 2025-05-14 NOTE — PROGRESS NOTES
"Progress Note - Hospitalist   Name: Kilo Mix 84 y.o. male I MRN: [id number]  Unit/Bed#: [facility] 425-01 I Date of Admission: 5/10/2025   Date of Service: 5/14/2025 I Hospital Day: 4    Assessment & Plan  NSTEMI (non-ST elevated myocardial infarction) (HCC)  Has remote history of CAD /MI s/p coronary stents X2  Presenting with intermittent chest pain over last several weeks mainly with activity, but progressively worsening. Most recent episode before calling EMS lasted longer and did not resolve with rest, was radiating to neck and bilateral arms, with diaphoresis and vomiting.   Elevated troponin, EKG was sinus tachycardia 105 with some nonspecific ST changes  Patient started on heparin drip in the ED  Monitor on telemetry  As needed submental length of blistering  Cardiology consulted for further management  Follow on cardiac echo  - has recent A1C, Lipid and TSH , all were unremarkable.   Add beta-blocker  Left heart cath today 5/12 shows three-vessel disease, plan for evaluation for potential CABG  Heparin drip was resumed after cardiac catheterization  Plan for CABG on 5/16 noted      Pneumonia involving right lung  Chest x-ray with moderate right base pneumonia  Patient is afebrile, denied cough or shortness of breath  Upper normal white blood cells  Start IV ceftriaxone empirically  Procalcitonin and remained negative x 2.  Pulmonology consultation appreciated.-Continue to finish the course of antibiotics  CT chest from yesterday reviewed.  Persistent right middle lobe and right lower lobe pneumonia  Diabetes mellitus type 2 with neurological manifestations ([facility])  Lab Results   Component Value Date    HGBA1C 5.6 05/13/2025       No results for input(s): \"POCGLU\" in the last 72 hours.    Blood Sugar Average: Last 72 hrs:    Most recent A1C 5.8 ; hold home metformin while inpatient -   Continue with insulin sliding scale and low carb diet.     Hypertension  Patient noted late April with low blood pressure he " self discontinued off amlodipine then recheck to PCP and was advised to restart amlodipine and discontinue hydrochlorothiazide  Monitor blood pressure and reevaluate   Continue home lisinopril and amlodipine,  As needed Lopressor IV for systolic greater than 180      Peripheral vascular disease (HCC)  History of carotid artery disease s/p stenting as well as coronary artery disease/MI s/p stenting more than 20 years ago  Continue aspirin Plavix and statin, increasing Lipitor to 40 mg  Dyslipidemia  Patient is on only 20 mg of atorvastatin however he has significant history of coronary artery disease and carotid artery disease s/p stenting for both  He denies intolerance of high intensity statin and agreeable to increase to 40 mg Lipitor  Prostate cancer (HCC)  Stage IV prostate cancer with mets to the lymph nodes and complicated with urinary obstruction requiring bilateral nephrostomies    January 2025 he was started on ADT and Xtandi, complicated with dizziness for which Xtandi was discontinued in April 2025    Patient to continue follow-up with medical oncology established with Dr. Shankar  Cardiac surgery discussed with oncology regarding prognosis.  H/O insertion of nephrostomy tube  Has nephrostomies bilaterally due to obstruction by prostate cancer metastasis to lymph nodes   nephrostomy care while patient,   follow-up with urology, medical oncology and IR  Coronary artery disease involving native coronary artery    Asymptomatic stenosis of left carotid artery  Vascular surgery consultation appreciated.  Recommended outpatient follow-up  Preoperative clearance    ALPHONSO (obstructive sleep apnea)    Mucopurulent chronic bronchitis (HCC)    At risk for delirium    Depression    Frailty    Cognitive impairment      VTE Pharmacologic Prophylaxis: VTE Score: 8 Moderate Risk (Score 3-4) - Pharmacological DVT Prophylaxis Ordered: heparin drip.    Mobility:   Basic Mobility Inpatient Raw Score: 23  -[facility] Goal: 7: Walk 25  feet or more  JH-HLM Achieved: 6: Walk 10 steps or more  JH-HLM Goal achieved. Continue to encourage appropriate mobility.    Patient Centered Rounds: I performed bedside rounds with nursing staff today.   Discussions with Specialists or Other Care Team Provider:     Education and Discussions with Family / Patient:patient declined  Current Length of Stay: 4 day(s)  Current Patient Status: Inpatient   Certification Statement: The patient will continue to require additional inpatient hospital stay due to pending CABG  Discharge Plan: Anticipate discharge in >72 hrs to home.    Code Status: Level 1 - Full Code    Subjective   Patient seen and examined.  No chest pain or shortness of breath.  Waiting for CABG    Objective :  Temp:  [97.4 °F (36.3 °C)-98 °F (36.7 °C)] 97.4 °F (36.3 °C)  HR:  [50-60] 60  BP: (133-148)/(61-68) 133/61  Resp:  [16-18] 18  SpO2:  [95 %-97 %] 95 %  O2 Device: None (Room air)    Body mass index is 24.18 kg/m².     Input and Output Summary (last 24 hours):     Intake/Output Summary (Last 24 hours) at 5/14/2025 1610  Last data filed at 5/14/2025 0900  Gross per 24 hour   Intake 1401.25 ml   Output 1200 ml   Net 201.25 ml       Physical Exam  Constitutional:       General: He is not in acute distress.     Appearance: He is not ill-appearing.   HENT:      Head: Normocephalic and atraumatic.      Nose: Nose normal.     Eyes:      General: No scleral icterus.      Cardiovascular:      Rate and Rhythm: Normal rate and regular rhythm.      Heart sounds: No murmur heard.  Pulmonary:      Effort: No respiratory distress.   Abdominal:      General: There is no distension.      Tenderness: There is no abdominal tenderness.     Musculoskeletal:         General: No swelling or deformity.     Skin:     General: Skin is warm.      Coloration: Skin is not jaundiced.     Neurological:      Mental Status: He is alert.      Cranial Nerves: No cranial nerve deficit.           Lines/Drains:  Lines/Drains/Airways        Active Status       Name Placement date Placement time Site Days    Nephrostomy Left 10.2 Fr. 04/29/25  1109  Left  15    Nephrostomy Right 10.2 Fr. 04/29/25  1111  Right  15                      Telemetry:  Telemetry Orders (From admission, onward)               24 Hour Telemetry Monitoring  Continuous x 24 Hours (Telem)        Expiring   Question:  Reason for 24 Hour Telemetry  Answer:  PCI/EP study (including pacer and ICD implementation), Cardiac surgery, MI, abnormal cardiac cath, and chest pain- rule out MI                     Telemetry Reviewed: Normal Sinus Rhythm  Indication for Continued Telemetry Use: Awaiting PCI/EP Study/CABG               Lab Results: I have reviewed the following results:   Results from last 7 days   Lab Units 05/12/25  0621   WBC Thousand/uL 9.35   HEMOGLOBIN g/dL 11.0*   HEMATOCRIT % 33.7*   PLATELETS Thousands/uL 181   SEGS PCT % 79*   LYMPHO PCT % 13*   MONO PCT % 6   EOS PCT % 2     Results from last 7 days   Lab Units 05/13/25  0529 05/12/25  0621 05/11/25  0405   SODIUM mmol/L 139   < > 142   POTASSIUM mmol/L 3.8   < > 3.6   CHLORIDE mmol/L 104   < > 105   CO2 mmol/L 26   < > 27   BUN mg/dL 22   < > 18   CREATININE mg/dL 1.17   < > 1.09   ANION GAP mmol/L 9   < > 10   CALCIUM mg/dL 9.3   < > 9.2   ALBUMIN g/dL  --   --  3.7   TOTAL BILIRUBIN mg/dL  --   --  0.84   ALK PHOS U/L  --   --  57   ALT U/L  --   --  7   AST U/L  --   --  23   GLUCOSE RANDOM mg/dL 99   < > 124    < > = values in this interval not displayed.     Results from last 7 days   Lab Units 05/10/25  2020   INR  0.96         Results from last 7 days   Lab Units 05/13/25  1253   HEMOGLOBIN A1C % 5.6     Results from last 7 days   Lab Units 05/12/25  0621 05/11/25  1146   PROCALCITONIN ng/ml 0.07 0.12       Recent Cultures (last 7 days):         Imaging Results Review: I reviewed radiology reports from this admission including: CT chest.      Last 24 Hours Medication List:     Current Facility-Administered  Medications:     amLODIPine (NORVASC) tablet 5 mg, Daily    aspirin chewable tablet 81 mg, Daily    atorvastatin (LIPITOR) tablet 40 mg, Daily    cefTRIAXone (ROCEPHIN) 1,000 mg in dextrose 5 % 50 mL IVPB, Q24H, Last Rate: 1,000 mg (05/14/25 0927)    chlorhexidine (PERIDEX) 0.12 % oral rinse 15 mL, Q12H AMADOU    heparin (porcine) 25,000 units in 0.45% NaCl 250 mL infusion (premix), Titrated, Last Rate: 14 Units/kg/hr (05/14/25 1233)    heparin (porcine) injection 2,000 Units, Q6H PRN    heparin (porcine) injection 4,000 Units, Q6H PRN    metoprolol tartrate (LOPRESSOR) tablet 25 mg, Q12H AMADOU    mupirocin (BACTROBAN) 2 % nasal ointment 1 Application, Q12H AMADOU    nitroglycerin (NITROSTAT) SL tablet 0.4 mg, Q5 Min PRN    sodium chloride (PF) 0.9 % injection 10 mL, Daily    sodium chloride (PF) 0.9 % injection 10 mL, Daily    Administrative Statements   Today, Patient Was Seen By: Candy Bailey MD      **Please Note: This note may have been constructed using a voice recognition system.**

## 2025-05-14 NOTE — ASSESSMENT & PLAN NOTE
CT scan chest (5/13/25) persistent right middle lobe and right lower lobe pneumonia  On IV ceftriaxone  Pulmonary on consult

## 2025-05-14 NOTE — ASSESSMENT & PLAN NOTE
5/10/2025 presentation with chest discomfort  5/10/2025 EKG: Sinus rhythm, nonspecific ST changes, no ST elevation  Peak troponin 2989  5/12/2025 LHC: Severe three-vessel disease including distal left main for which CT surgery consultation has been advised  Patient undergoing workup with CTS for potential CABG later this week

## 2025-05-14 NOTE — ASSESSMENT & PLAN NOTE
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 Shankar  Cardiac surgery discussed with oncology regarding prognosis.

## 2025-05-14 NOTE — PROGRESS NOTES
Progress Note - Cardiology   Name: Kilo Mix 84 y.o. male I MRN: [id number]  [facility] I Date of Admission: 5/10/2025   Date of Service: 5/14/2025 I Hospital Day: 4     Assessment & Plan  NSTEMI (non-ST elevated myocardial infarction) (HCC)  5/10/2025 presentation with chest discomfort  5/10/2025 EKG: Sinus rhythm, nonspecific ST changes, no ST elevation  Peak troponin 2989  5/12/2025 LHC: Severe three-vessel disease including distal left main for which CT surgery consultation has been advised  Patient undergoing workup with CTS for potential CABG later this week  Coronary artery disease involving native coronary artery  History of MI (1995) with status post bare-metal stents to RCA  Status post PCI/LOUIE 2007  Home Rx Plavix 75 mg daily  Dyslipidemia  Home Rx atorvastatin 20 mg daily  Cholesterol 166, triglycerides 113, HDL 66 and LDL 77  Hypertension  Home Rx lisinopril 10 mg daily and amlodipine 5 mg daily (he noted low blood pressure in April and subsequently stopped amlodipine on his own)  Amlodipine 5 mg daily restarted inpatient  Peripheral vascular disease (HCC)  History of carotid artery disease status post stenting  Home Rx Plavix 75 mg daily and atorvastatin 20 mg daily    Plan:  Patient chest pain-free and hemodynamically stable today  Remains on IV heparin, statin, beta-blocker  Pt planned for CABG 5/16/25 tentative   Continue to monitor telemetry  Plavix washout    Subjective   Chief Complaint:   Feeling ok today  No chest pain    Objective :  Temp:  [97.6 °F (36.4 °C)-98 °F (36.7 °C)] 98 °F (36.7 °C)  HR:  [50-59] 52  BP: (119-148)/(58-91) 148/68  Resp:  [16-18] 18  SpO2:  [95 %-98 %] 97 %  O2 Device: None (Room air)  Orthostatic Blood Pressures      Flowsheet Row Most Recent Value   Blood Pressure 148/68 filed at 05/14/2025 0257   Patient Position - Orthostatic VS Lying filed at 05/14/2025 0257          First Weight: Weight - Scale: 72.3 kg (159 lb 6.3 oz) (05/11/25 0041)  Vitals:     05/11/25 0041 05/12/25 0815   Weight: 72.3 kg (159 lb 6.3 oz) 72.1 kg (159 lb)     Physical Exam  Constitutional:       Appearance: He is well-developed.   HENT:      Head: Normocephalic and atraumatic.     Eyes:      Pupils: Pupils are equal, round, and reactive to light.       Cardiovascular:      Rate and Rhythm: Normal rate and regular rhythm.      Heart sounds: No murmur heard.     No friction rub. No gallop.   Pulmonary:      Effort: Pulmonary effort is normal. No respiratory distress.      Breath sounds: Normal breath sounds. No wheezing or rales.   Abdominal:      General: Bowel sounds are normal. There is no distension.      Palpations: Abdomen is soft.      Tenderness: There is no abdominal tenderness.     Musculoskeletal:         General: No deformity. Normal range of motion.      Cervical back: Normal range of motion and neck supple.     Skin:     General: Skin is warm and dry.     Neurological:      Mental Status: He is alert and oriented to person, place, and time.     Psychiatric:         Behavior: Behavior normal.           Lab Results: I have reviewed the following results:  Results from last 7 days   Lab Units 05/12/25  0621 05/11/25  0324 05/10/25  1954   WBC Thousand/uL 9.35 10.04 12.47*   HEMOGLOBIN g/dL 11.0* 11.8* 13.0   HEMATOCRIT % 33.7* 35.2* 38.9   PLATELETS Thousands/uL 181 210 228     Results from last 7 days   Lab Units 05/13/25  0529 05/12/25  0621 05/11/25  0405   POTASSIUM mmol/L 3.8 3.8 3.6   CHLORIDE mmol/L 104 104 105   CO2 mmol/L 26 28 27   BUN mg/dL 22 19 18   CREATININE mg/dL 1.17 1.12 1.09   CALCIUM mg/dL 9.3 8.9 9.2     Results from last 7 days   Lab Units 05/14/25  0207 05/13/25  2000 05/13/25  1253 05/11/25  0314 05/10/25  2020   INR   --   --   --   --  0.96   PTT seconds 109* 42* 31   < > 29    < > = values in this interval not displayed.     Lab Results   Component Value Date    HGBA1C 5.6 05/13/2025     Lab Results   Component Value Date    TROPONINI <0.02 04/17/2018              VTE Pharmacologic Prophylaxis:   VTE Mechanical Prophylaxis:

## 2025-05-14 NOTE — ASSESSMENT & PLAN NOTE
Probably ALPHONSO, his son passed from complications around ALPHONSO.  He has never had a sleep study but does have snoring and I do think it is likely to be a potential complicating factor.

## 2025-05-14 NOTE — CONSULTS
Consultation - Geriatric Medicine   Name: Kilo Mix 84 y.o. male I MRN: [id number]  Unit/Bed#: [facility] 425-01 I Date of Admission: 5/10/2025   Date of Service: 5/14/2025 I Hospital Day: 4   Inpatient consult to Gerontology  Consult performed by: GLEN Hernandez  Consult ordered by: Alicia Bender PA-C        Physician Requesting Evaluation: Candy Bailey MD   Reason for Evaluation / Principal Problem: ISAR greater than 2    Assessment & Plan  Cognitive impairment  Pt denies memory issues  Mini cog 4/5 (recall 3/3, clock draw 1/2)-this demonstrates intact short term memory with full recall.  However with only partially accurate 1/2 indicating some difficulty with executive functioning or higher level decision making. Mini cog is not a test to determine capacity to make decisions, rather that patient may struggle with higher level thinking, insight and understanding  Pt did express that he feels he has no option other than surgical intervention. He states he is concerned about the surgical risks but fears going home and experiencing chest pain again. He does not think that using NTG for pain will be effective for pain relief. He also is concerned for his general health of having stage IV prostate cancer   TSH 1.883 (2/20/25)  Recommend check Vitamin B12  No CT head on file  OT for MOCA evaluation ordered   If concern for capacity and decision making neuropsych should be consulted  At risk for delirium  Oriented x 3, no signs of delirium  At HIGH risk for delirium secondary to hospitalization, age, surgery, depression, cognitive impairment, pneumonia  Maintain delirium precautions:  Provide frequent redirection, reorientation, distraction techniques  Avoid deliriogenic medications such as tramadol, benzodiazepines, anticholinergics,  Benadryl  Treat pain, See geriatric pain protocol  Monitor for constipation and urinary retention  Encourage early and frequent moblization, OOB  Encourage Hydration/  Nutrition  Implement sleep hygiene, limit night time interuptions, group activities  Coronary artery disease involving native coronary artery  Pulmonary on consult  CT Chest (5/13/25) persistent right middle lobe and right lower lobe pneumonia  IV ceftriaxone     CT on consult  Planned for CABG     Concern for increased complication risk with CABG secondary to frailty, impaired physical, mental and cognitive reserve  Frailty  Exacerbated by hospitalization and underlying medical comorbities, decreased appetite, weight loss, limited home support (reports wife in poor health)  Needs increased home support  Recommend protein supplementation for nutritional support  PT/OT   Prostate cancer (HCC)  Stage IV disease  Follows with [facility] and urology as outpatient  S/p prostatectomy in 1999  PSA greater than 360 with confirmed metastatic disease to lymph nodes  Was given lupron and started on xtandi, xtandi on hold secondary to dizziness    Mucopurulent chronic bronchitis (HCC)  Pulmonary on consult  Depression  Related to health, cancer and need for CABG, additionally concern for wife and her health    Pneumonia involving right lung  CT scan chest (5/13/25) persistent right middle lobe and right lower lobe pneumonia  On IV ceftriaxone  Pulmonary on consult        History of Present Illness   Hx and PE limited by: cognitive impairment  HPI: Kilo Mix is a 84 y.o. year old male who presents with chest pain x 2 weeks, worsening with activity. His most recent episode lasted longer , was radiating to his neck and bilateral arms and did not resolve with rest.    He has PVD, prostate cancer, HTN, dyslipidemia, DM2.    Prior to arrival he lives at home with his wife. He reports his wife is not in good health. He needs assistance with IADLs and ADLs. He ambulates independently. He denies falls.     Review of Systems   Constitutional:  Positive for unexpected weight change.   HENT:  Negative for hearing loss.    Eyes:  Negative  for visual disturbance.   Respiratory:  Negative for cough.    Cardiovascular:  Negative for chest pain.   Gastrointestinal:  Negative for constipation.   Genitourinary:  Negative for difficulty urinating.   Musculoskeletal:  Positive for arthralgias.   Skin:  Negative for color change.   Neurological:  Negative for weakness.   Psychiatric/Behavioral:          Flat affect           Historical Information   Past Medical History:   Diagnosis Date    Bladder tumor     Cancer (HCC)     Prostate cancer    Depression 5/14/2025    Diabetes mellitus (HCC)     Diverticulitis of colon     History of common carotid artery stent placement     RIGHT stent - per pts son - RIGHT stent  in  carotid artery    History of heart artery stent     x2 stents in heart per pts son    Hyperlipidemia     Hypertension     Migraines     Myocardial infarction (HCC)     about 30yrs ago    Nephrostomy present (HCC)     2/25/25 Per pts son Ed- pt has two nephrostomy tubes    Prostate cancer (HCC)     Seasonal allergies      Past Surgical History:   Procedure Laterality Date    CARDIAC CATHETERIZATION N/A 5/12/2025    Procedure: Cardiac Catheterization;  Surgeon: Daniel Mabry DO;  Location:  CARDIAC CATH LAB;  Service: Cardiology    CARDIAC SURGERY      CAROTID ENDARTARECTOMY Right     thromboendarterectomy    CAROTID STENT Right     CATARACT EXTRACTION Left     COLONOSCOPY      fiberoptic screening 2/6/08 5 yr / complete 3/15/13 5 yr    CORONARY ANGIOPLASTY WITH STENT PLACEMENT      ELBOW SURGERY      EYE SURGERY      IR BIOPSY LYMPH NODE  12/24/2024    IR NEPHROSTOMY TUBE CHECK/CHANGE/REPOSITION/REINSERTION/UPSIZE  2/25/2025    IR NEPHROSTOMY TUBE CHECK/CHANGE/REPOSITION/REINSERTION/UPSIZE  4/29/2025    IR NEPHROSTOMY TUBE PLACEMENT  12/24/2024    KNEE SURGERY      ME CYSTO W/REMOVAL OF LESIONS SMALL N/A 1/21/2025    Procedure: TRANSURETHRAL RESECTION OF BLADDER TUMOR (TURBT);  Surgeon: Jrodan Youssef MD;  Location:  MAIN OR;   Service: Urology    VA CYSTO W/REMOVAL OF LESIONS SMALL N/A 3/6/2025    Procedure: TRANSURETHRAL RESECTION OF BLADDER TUMOR (TURBT);  Surgeon: Jordan Youssef MD;  Location:  MAIN OR;  Service: Urology    PROSTATE SURGERY      SUPRAPUBIC PROSTATECTOMY  12/06/1999    UPPER GASTROINTESTINAL ENDOSCOPY       Social History     Tobacco Use    Smoking status: Former     Current packs/day: 0.00     Average packs/day: 3.0 packs/day for 10.0 years (30.0 ttl pk-yrs)     Types: Cigarettes     Start date: 1/1/2000     Quit date: 1/1/2010     Years since quitting: 15.3    Smokeless tobacco: Never    Tobacco comments:     Former smoker - quit 2010   Vaping Use    Vaping status: Never Used   Substance and Sexual Activity    Alcohol use: Yes     Comment: rare glass of wine    Drug use: Never     Comment: Denies any drug use per pt    Sexual activity: Not Currently     Comment: defer     E-Cigarette/Vaping    E-Cigarette Use Never User     Comments Denies any use per pt      E-Cigarette/Vaping Substances    Nicotine No     THC No     CBD No     Flavoring No      Family History   Problem Relation Age of Onset    Alcohol abuse Mother     No Known Problems Father     No Known Problems Sister     Substance Abuse Son     Heart attack Family         MI    Breast cancer Family     Colon cancer Neg Hx     Colon polyps Neg Hx     Anesthesia problems Neg Hx      Social History     Tobacco Use    Smoking status: Former     Current packs/day: 0.00     Average packs/day: 3.0 packs/day for 10.0 years (30.0 ttl pk-yrs)     Types: Cigarettes     Start date: 1/1/2000     Quit date: 1/1/2010     Years since quitting: 15.3    Smokeless tobacco: Never    Tobacco comments:     Former smoker - quit 2010   Vaping Use    Vaping status: Never Used   Substance and Sexual Activity    Alcohol use: Yes     Comment: rare glass of wine    Drug use: Never     Comment: Denies any drug use per pt    Sexual activity: Not Currently     Comment: defer       Current  Facility-Administered Medications:     amLODIPine (NORVASC) tablet 5 mg, Daily    aspirin chewable tablet 81 mg, Daily    atorvastatin (LIPITOR) tablet 40 mg, Daily    cefTRIAXone (ROCEPHIN) 1,000 mg in dextrose 5 % 50 mL IVPB, Q24H, Last Rate: 1,000 mg (05/14/25 0927)    chlorhexidine (PERIDEX) 0.12 % oral rinse 15 mL, Q12H AMADOU    heparin (porcine) 25,000 units in 0.45% NaCl 250 mL infusion (premix), Titrated, Last Rate: 14 Units/kg/hr (05/14/25 1233)    heparin (porcine) injection 2,000 Units, Q6H PRN    heparin (porcine) injection 4,000 Units, Q6H PRN    metoprolol tartrate (LOPRESSOR) tablet 25 mg, Q12H AMADOU    mupirocin (BACTROBAN) 2 % nasal ointment 1 Application, Q12H AMADOU    nitroglycerin (NITROSTAT) SL tablet 0.4 mg, Q5 Min PRN    sodium chloride (PF) 0.9 % injection 10 mL, Daily    sodium chloride (PF) 0.9 % injection 10 mL, Daily  Losartan and Penicillins    Meds/Allergies   Home medication review  [facility]  Plavix 75 mg po daily, last refill 3/17/25 # 90 days  Atorvastatin 20 mg po daily, last refill 3/4/25 # 90 days  Amlodipine 5 mg po daily, last refill 3/8/25 # 90 days  Lisinopril 10 mg po daily, last refill 3/8/25 # 90 days  Metformin  mg po daily, last refill 4/13/25 # 90 days          Objective :  Temp:  [97.5 °F (36.4 °C)-98 °F (36.7 °C)] 97.5 °F (36.4 °C)  HR:  [50-59] 58  BP: (123-148)/(58-68) 136/62  Resp:  [16-18] 18  SpO2:  [95 %-98 %] 96 %  O2 Device: None (Room air)    Physical Exam  Vitals and nursing note reviewed.   HENT:      Head: Normocephalic.      Nose: No congestion.      Mouth/Throat:      Mouth: Mucous membranes are moist.     Eyes:      General:         Right eye: No discharge.         Left eye: No discharge.       Cardiovascular:      Rate and Rhythm: Normal rate and regular rhythm.   Pulmonary:      Breath sounds: Rhonchi present.   Abdominal:      General: Abdomen is flat.      Palpations: Abdomen is soft.     Musculoskeletal:         General: Normal range of motion.       Cervical back: Normal range of motion.     Skin:     General: Skin is warm and dry.     Neurological:      Mental Status: He is alert and oriented to person, place, and time. Mental status is at baseline.     Psychiatric:      Comments: Flat affect           Lab Results: I have reviewed the following results:CBC/BMP: No new results in last 24 hours.     Imaging Results Review: I reviewed radiology reports from this admission including: CT chest.  Other Study Results Review: EKG was reviewed.     Therapies:   Basic Mobility Inpatient Raw Score: 23  JH-HLM Goal: 7: Walk 25 feet or more  JH-HLM Achieved: 6: Walk 10 steps or more      VTE Prophylaxis: Sequential compression device (Venodyne)     Code Status: Level 1 - Full Code  Advance Directive and Living Will:      Power of : Yes      Family and Social Support:   No data recorded    Goals of Care:  full code- but  reports he would not want to be on life support    I have spent a total time of 90 minutes in caring for this patient on the day of the visit/encounter including Diagnostic results, Prognosis, Risks and benefits of tx options, Instructions for management, Patient and family education, Importance of tx compliance, Risk factor reductions, Impressions, Counseling / Coordination of care, Documenting in the medical record, Reviewing/placing orders in the medical record (including tests, medications, and/or procedures), Obtaining or reviewing history  , and Communicating with other healthcare professionals .

## 2025-05-14 NOTE — ASSESSMENT & PLAN NOTE
Continue Rocephin Daily.   On Friday he will be on day 5 of antibiotics and nearing the end of the course.  He is on RA and has minimal symptoms beyond could.  I do not think this will limit his ability to go to surgery.

## 2025-05-14 NOTE — OCCUPATIONAL THERAPY NOTE
Occupational Therapy Cancel        Patient Name: Kilo Mix  Today's Date: 5/14/2025 05/14/25 1012   OT Last Visit   OT Visit Date 05/14/25   Note Type   Note type Cancelled Session   Cancel Reasons Medical status   Additional Comments Per EMR pt planned for CABG during hospitilzation. OT evaluation not appropriate at this time. Will d/c IP OT, please re-consult after procedure.       LEIF Ponce, OTR/L

## 2025-05-14 NOTE — CONSULTS
Consultation - Pulmonology   Name: Kilo Mix 84 y.o. male I MRN: [id number]  Unit/Bed#: [facility] 425-01 I Date of Admission: 5/10/2025   Date of Service: 5/14/2025 I Hospital Day: 4   Inpatient consult to Pulmonology  Consult performed by: Lee Gordon DO  Consult ordered by: Juan Daniel Loyd PA-C        Physician Requesting Evaluation: Candy Bailey MD   Reason for Evaluation / Principal Problem: Preoperative clearance    Assessment & Plan  Preoperative clearance  ARISCAT 42% of post op complications  Keene score 7.8%  While his perioperative risk is high, I believe that it is related to his chronic conditions and age and not due to current pneumonia.   He may proceed to surgery with acceptable risk.  There is high risk of acute hypoxic and hypercapnic respiratory failure due to COPD, ALPHONSO and pulmonary hypertension.      -  In perioperative period I would utilize duonebs every TID in pre and post operative period      -  Monitor closely for the development of hypoxia      -  Minimize the use of benzodiazepines and narcotics as possible      -  Would encourage incentive spirometry to prevent atelectasis      -  Early ambulation as tolerated      -  Pharmacologic DVT prophylaxis.      Pneumonia involving right lung  Continue Rocephin Daily.   On Friday he will be on day 5 of antibiotics and nearing the end of the course.  He is on RA and has minimal symptoms beyond could.  I do not think this will limit his ability to go to surgery.    Mucopurulent chronic bronchitis (HCC)  Former extensive smoking history > 3 packs per day for 40 years.  Likely COPD   Start Xopenex and Atrovent TID  No need for steroids at this time.    ALPHONSO (obstructive sleep apnea)  Probably ALPHONSO, his son passed from complications around ALPHONSO.  He has never had a sleep study but does have snoring and I do think it is likely to be a potential complicating factor.    NSTEMI (non-ST elevated myocardial infarction) (HCC)  Continue heparin  "gtt.  Cardiology and cardiothoracic surgery following.    Coronary artery disease involving native coronary artery  Cardiothoracic surgery is following and considering CABG.    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    Blood Sugar Average: Last 72 hrs:      Frailty    Please contact the SecureChat role for the Pulmonology service with any questions/concerns.    History of Present Illness   Kilo Mix is a 84 y.o. male who presents with PMHx of carotid stenosis, CAD s/p MI with stent with aspirin and plavix, prostate cancer who comes in for chest pain.  The pain was worse with exertion.  He had pain that was 5/10 which myriam to the ER.  He felt it radiation down his arms.  He was noting diaphoresis, nausea and vomiting z3.   He called EMS and was brought to the ER.   He had previous antiogram reviewed and it was recommended that he had cardiac surgery evaluation.  Intention is to bring patient to the OR for CABG.   He was also noted to have a pneumonia.  Request to see from a pulmonary perspective due to pneumonia.      Review of Systems   Constitutional:  Positive for fatigue.   HENT: Negative.     Eyes: Negative.    Respiratory:  Positive for cough and shortness of breath. Negative for wheezing.    Cardiovascular: Negative.    Gastrointestinal: Negative.    Endocrine: Negative.    Genitourinary: Negative.    Musculoskeletal: Negative.    Skin: Negative.    Neurological: Negative.    Hematological: Negative.    Psychiatric/Behavioral:  Positive for sleep disturbance. Negative for decreased concentration.        Historical Information   Historical Information   Past Medical History:   Diagnosis Date    Bladder tumor     Cancer (HCC)     Prostate cancer    Depression 5/14/2025    Diabetes mellitus (HCC)     Diverticulitis of colon     History of common carotid artery " stent placement     RIGHT stent - per pts son - RIGHT stent  in  carotid artery    History of heart artery stent     x2 stents in heart per pts son    Hyperlipidemia     Hypertension     Migraines     Myocardial infarction (HCC)     about 30yrs ago    Nephrostomy present (HCC)     2/25/25 Per pts son Ed- pt has two nephrostomy tubes    Prostate cancer (HCC)     Seasonal allergies      Past Surgical History:   Procedure Laterality Date    CARDIAC CATHETERIZATION N/A 5/12/2025    Procedure: Cardiac Catheterization;  Surgeon: Daniel Mabry DO;  Location:  CARDIAC CATH LAB;  Service: Cardiology    CARDIAC SURGERY      CAROTID ENDARTARECTOMY Right     thromboendarterectomy    CAROTID STENT Right     CATARACT EXTRACTION Left     COLONOSCOPY      fiberoptic screening 2/6/08 5 yr / complete 3/15/13 5 yr    CORONARY ANGIOPLASTY WITH STENT PLACEMENT      ELBOW SURGERY      EYE SURGERY      IR BIOPSY LYMPH NODE  12/24/2024    IR NEPHROSTOMY TUBE CHECK/CHANGE/REPOSITION/REINSERTION/UPSIZE  2/25/2025    IR NEPHROSTOMY TUBE CHECK/CHANGE/REPOSITION/REINSERTION/UPSIZE  4/29/2025    IR NEPHROSTOMY TUBE PLACEMENT  12/24/2024    KNEE SURGERY      KY CYSTO W/REMOVAL OF LESIONS SMALL N/A 1/21/2025    Procedure: TRANSURETHRAL RESECTION OF BLADDER TUMOR (TURBT);  Surgeon: Jordan Youssef MD;  Location:  MAIN OR;  Service: Urology    KY CYSTO W/REMOVAL OF LESIONS SMALL N/A 3/6/2025    Procedure: TRANSURETHRAL RESECTION OF BLADDER TUMOR (TURBT);  Surgeon: Jordan Youssef MD;  Location:  MAIN OR;  Service: Urology    PROSTATE SURGERY      SUPRAPUBIC PROSTATECTOMY  12/06/1999    UPPER GASTROINTESTINAL ENDOSCOPY       Social History     Tobacco Use    Smoking status: Former     Current packs/day: 0.00     Average packs/day: 3.0 packs/day for 10.0 years (30.0 ttl pk-yrs)     Types: Cigarettes     Start date: 1/1/2000     Quit date: 1/1/2010     Years since quitting: 15.3    Smokeless tobacco: Never    Tobacco comments:      Former smoker - quit 2010   Vaping Use    Vaping status: Never Used   Substance and Sexual Activity    Alcohol use: Yes     Comment: rare glass of wine    Drug use: Never     Comment: Denies any drug use per pt    Sexual activity: Not Currently     Comment: defer     E-Cigarette/Vaping    E-Cigarette Use Never User     Comments Denies any use per pt      E-Cigarette/Vaping Substances    Nicotine No     THC No     CBD No     Flavoring No      Family history non-contributory  Tobacco History: 3-4 packs a day for nearly 40 years  Occupational History: No occupational exposure    Objective :  Temp:  [97.4 °F (36.3 °C)-98 °F (36.7 °C)] 97.4 °F (36.3 °C)  HR:  [50-60] 60  BP: (133-148)/(61-68) 133/61  Resp:  [16-18] 18  SpO2:  [95 %-97 %] 95 %  O2 Device: None (Room air)    Physical Exam  General: Pleasant, Awake alert and oriented x 3, conversant without conversational dyspnea, NAD, normal affect  HEENT:  PERRL, Sclera noninjected, nonicteric OU, Nares patent,  no craniofacial abnormalities, Mucous membranes, moist, no oral lesions, normal dentition  NECK: Trachea midline, no accessory muscle use, no stridor, no cervical or supraclavicular adenopathy, JVP not elevated  CARDIAC: Reg, single s1/S2, no m/r/g  PULM: CTA bilaterally no wheezing, rhonchi or rales  ABD: Normoactive bowel sounds, soft nontender, nondistended, no rebound, no rigidity, no guarding  EXT: No cyanosis, no clubbing, no edema, normal capillary refill  NEURO: no focal neurologic deficits, AAOx3, moving all extremities appropriately      Lab Results: I have reviewed the following results:  .     05/14/25  0946   PTT 63*     ABG: No new results in last 24 hours.    Imaging Results Review: I personally reviewed the following image studies/reports in PACS and discussed pertinent findings with Radiology: CT chest. My interpretation of the radiology images/reports is: RML and RLL opacities.  CT chest  IMPRESSION:  Persistent right middle lobe and right lower  lobe pneumonia.     CXR  IMPRESSION:  Moderate right base pneumonia.     Other Study Results Review: EKG was reviewed.     Echo    Left Ventricle: Left ventricular cavity size is normal. Wall thickness is normal. There is mild concentric hypertrophy. The left ventricular ejection fraction is 60%. Systolic function is normal. Wall motion is normal. Diastolic function is mildly abnormal, consistent with grade I (abnormal) relaxation.    Right Ventricle: Right ventricular cavity size is normal. Systolic function is normal.    Left Atrium: The atrium is mildly dilated (35-41 mL/m2).    Mitral Valve: There is mild annular calcification. There is mild regurgitation.    Tricuspid Valve: There is mild regurgitation. The right ventricular systolic pressure is mildly elevated. The estimated right ventricular systolic pressure is 41.00 mmHg.        PFT Results Reviewed: not performed    VTE Prophylaxis: VTE covered by:  heparin (porcine), Intravenous, 14 Units/kg/hr at 05/14/25 1233  heparin (porcine), Intravenous  heparin (porcine), Intravenous, 4,000 Units at 05/13/25 0270       Administrative Statements   I have spent a total time of 35 minutes in caring for this patient on the day of the visit/encounter including Risks and benefits of tx options and Risk factor reductions.

## 2025-05-14 NOTE — PHYSICAL THERAPY NOTE
Physical Therapy Cancellation Note       05/14/25 1013   PT Last Visit   PT Visit Date 05/14/25   Note Type   Note type Cancelled Session   Cancel Reasons Medical status     PT orders received and chart reviewed. Per EMR pt planned for CABG during hospitilzation. PT evaluation not appropriate at this time. Will d/c IP PT, please re-consult after procedure. Thank you    Devon Dotson PT, DPT

## 2025-05-14 NOTE — ASSESSMENT & PLAN NOTE
Chest x-ray with moderate right base pneumonia  Patient is afebrile, denied cough or shortness of breath  Upper normal white blood cells  Start IV ceftriaxone empirically  Procalcitonin and remained negative x 2.  Pulmonology consultation appreciated.-Continue to finish the course of antibiotics  CT chest from yesterday reviewed.  Persistent right middle lobe and right lower lobe pneumonia

## 2025-05-14 NOTE — CONSULTS
"Consultation - Vascular Surgery   Name: Kilo Mix 84 y.o. male I MRN: [id number]  Unit/Bed#: [facility] 425-01 I Date of Admission: 5/10/2025   Date of Service: 5/14/2025 I Hospital Day: 4   Inpatient consult to Vascular Surgery  Consult performed by: Kavitha Solares PA-C  Consult ordered by: Alicia Bender PA-C        Physician Requesting Evaluation: Candy Bailey MD   Reason for Evaluation / Principal Problem: Carotid stenosis    Assessment & Plan  Asymptomatic stenosis of left carotid artery  -- Carotid US on 5/13/25 reveals <50% stenosis on R, 50-69% stenosis on L, and no significant subclavian artery disease.  -- Pt is asymptomatic.  -- Continue carotid surveillance with carotid ultrasound in 6 months.   -- Will arrange f/u appt with [facility]; no longer following with [facility].  -- Continue ASA and Plavix.   -- Continue atorvastatin 40 mg.   -- Ok to proceed with CABG.   NSTEMI (non-ST elevated myocardial infarction) (HCC)  -- Pt undergoing workup with CT surgery for CABG later this week.   Peripheral vascular disease (HCC)  -- Pt has hx of carotid artery disease s/p stenting and coronary artery disease s/p stenting.   -- Continue ASA and Plavix.   -- Continue atorvastatin 40 mg.  Dyslipidemia  -- Continue atorvastatin 40 mg.  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    Blood Sugar Average: Last 72 hrs:      Coronary artery disease involving native coronary artery    Hypertension    Prostate cancer (HCC)  -- Continue f/u with oncology.       History of Present Illness   Kilo Mix is a 84 y.o. male with PMH of HTN, HLD, DM (type 2), carotid artery disease s/p stenting, and coronary artery disease s/p stenting who presents with NSTEMI. His ongoing cardiac workup includes L heart cath on 5/12/25 revealing complex three-vessel disease and carotid US revealing " <50% stenosis on R and 50-69% stenosis on L with no significant subclavian artery disease. Vascular surgery consulted to evaluate carotid stenosis ahead of CT surgery tentative plans for CABG on 5/16/25. Pt is currently asymptomatic. He denies chest pain, palpitations, dysrhythmias, SOB, N/V, visual changes including blindness, headaches, slurred speech, numbness, paresthesia, or unilateral weakness. He denies hx of stroke or stroke-like sxs. He denies family hx of stroke or known cardiac disease.     Pt has PMH of MI (1995), HTN - controlled on amlodipine and lisinopril at home, HLD - controlled on atorvastatin, DM (type 2) - taking metformin, stage IV prostate cancer, and macular degeneration in left eye - receiving injections. He admits to taking ASA 81 mg at home. Pt endorses carotid artery disease s/p R CEA (2011) and TCAR (4/2018) performed at [facility]. He reports coronary artery disease s/p 2 stents (1995 & 2001). Pt surgical hx also includes prostatectomy for stage IV prostate cancer. Pt denies issues with anesthesia. Pt is a former smoker (quit 2010) and admits to occasional alcohol use. Pt denies IVDU. Pt states allergy to penicillin (childhood allergy) and losartan - syncope. Pt seen with son (Ed) at bedside.      Review of Systems   Eyes:  Negative for visual disturbance.   Respiratory:  Negative for shortness of breath.    Cardiovascular:  Negative for chest pain and palpitations.   Gastrointestinal:  Negative for abdominal distention, abdominal pain, nausea and vomiting.   Neurological:  Negative for dizziness, speech difficulty, weakness, numbness and headaches.     Historical Information   Past Medical History:   Diagnosis Date    Bladder tumor     Cancer (HCC)     Prostate cancer    Diabetes mellitus (HCC)     Diverticulitis of colon     History of common carotid artery stent placement     RIGHT stent - per pts son - RIGHT stent  in  carotid artery    History of heart artery stent     x2 stents  in heart per pts son    Hyperlipidemia     Hypertension     Migraines     Myocardial infarction (HCC)     about 30yrs ago    Nephrostomy present (HCC)     2/25/25 Per pts son Ed- pt has two nephrostomy tubes    Prostate cancer (HCC)     Seasonal allergies      Past Surgical History:   Procedure Laterality Date    CARDIAC CATHETERIZATION N/A 5/12/2025    Procedure: Cardiac Catheterization;  Surgeon: Daniel Mabry DO;  Location:  CARDIAC CATH LAB;  Service: Cardiology    CARDIAC SURGERY      CAROTID ENDARTARECTOMY Right     thromboendarterectomy    CAROTID STENT Right     CATARACT EXTRACTION Left     COLONOSCOPY      fiberoptic screening 2/6/08 5 yr / complete 3/15/13 5 yr    CORONARY ANGIOPLASTY WITH STENT PLACEMENT      ELBOW SURGERY      EYE SURGERY      IR BIOPSY LYMPH NODE  12/24/2024    IR NEPHROSTOMY TUBE CHECK/CHANGE/REPOSITION/REINSERTION/UPSIZE  2/25/2025    IR NEPHROSTOMY TUBE CHECK/CHANGE/REPOSITION/REINSERTION/UPSIZE  4/29/2025    IR NEPHROSTOMY TUBE PLACEMENT  12/24/2024    KNEE SURGERY      [place] CYSTO W/REMOVAL OF LESIONS SMALL N/A 1/21/2025    Procedure: TRANSURETHRAL RESECTION OF BLADDER TUMOR (TURBT);  Surgeon: Jordan Youssef MD;  Location: [facility] OR;  Service: Urology    NV CYSTO W/REMOVAL OF LESIONS SMALL N/A 3/6/2025    Procedure: TRANSURETHRAL RESECTION OF BLADDER TUMOR (TURBT);  Surgeon: Jordan Youssef MD;  Location:  MAIN OR;  Service: Urology    PROSTATE SURGERY      SUPRAPUBIC PROSTATECTOMY  12/06/1999    UPPER GASTROINTESTINAL ENDOSCOPY       Social History     Tobacco Use    Smoking status: Former     Current packs/day: 0.00     Average packs/day: 3.0 packs/day for 10.0 years (30.0 ttl pk-yrs)     Types: Cigarettes     Start date: 1/1/2000     Quit date: 1/1/2010     Years since quitting: 15.3    Smokeless tobacco: Never    Tobacco comments:     Former smoker - quit 2010   Vaping Use    Vaping status: Never Used   Substance and Sexual Activity    Alcohol use: Yes      Comment: rare glass of wine    Drug use: Never     Comment: Denies any drug use per pt    Sexual activity: Not Currently     Comment: defer     E-Cigarette/Vaping    E-Cigarette Use Never User     Comments Denies any use per pt      E-Cigarette/Vaping Substances    Nicotine No     THC No     CBD No     Flavoring No      Family history non-contributory  Social History     Tobacco Use    Smoking status: Former     Current packs/day: 0.00     Average packs/day: 3.0 packs/day for 10.0 years (30.0 ttl pk-yrs)     Types: Cigarettes     Start date: 1/1/2000     Quit date: 1/1/2010     Years since quitting: 15.3    Smokeless tobacco: Never    Tobacco comments:     Former smoker - quit 2010   Vaping Use    Vaping status: Never Used   Substance and Sexual Activity    Alcohol use: Yes     Comment: rare glass of wine    Drug use: Never     Comment: Denies any drug use per pt    Sexual activity: Not Currently     Comment: defer     Losartan and Penicillins    Objective :  Temp:  [97.6 °F (36.4 °C)-98 °F (36.7 °C)] 98 °F (36.7 °C)  HR:  [50-59] 52  BP: (119-148)/(58-91) 148/68  Resp:  [16-18] 18  SpO2:  [95 %-98 %] 97 %  O2 Device: None (Room air)    I/O         05/12 0701  05/13 0700 05/13 0701  05/14 0700 05/14 0701  05/15 0700    P.O. 520 1700 260    I.V. (mL/kg) 117.8 (1.6) 151.3 (2.1)     Other       IV Piggyback  50     Total Intake(mL/kg) 637.8 (8.8) 1901.3 (26.4) 260 (3.6)    Urine (mL/kg/hr) 2100 (1.2) 1200 (0.7)     Total Output 2100 1200     Net -1462.2 +701.3 +260                 Lines/Drains/Airways       Active Status       Name Placement date Placement time Site Days    Nephrostomy Left 10.2 Fr. 04/29/25  1109  Left  14    Nephrostomy Right 10.2 Fr. 04/29/25  1111  Right  14                  Physical Exam  Constitutional:       Appearance: Normal appearance. He is well-developed, well-groomed and normal weight.   HENT:      Head: Normocephalic and atraumatic.      Ears:      Comments: Hearing intact to normal  conversation.     Nose: Nose normal.     Cardiovascular:      Rate and Rhythm: Normal rate and regular rhythm.      Chest Wall: No thrill.      Pulses:           Carotid pulses are 1+ on the right side and 1+ on the left side.       Radial pulses are 2+ on the right side and 2+ on the left side.        Femoral pulses are 2+ on the right side and 2+ on the left side.       Popliteal pulses are 1+ on the right side and 1+ on the left side.        Dorsalis pedis pulses are 1+ on the right side and 1+ on the left side.        Posterior tibial pulses are 1+ on the right side and 1+ on the left side.      Heart sounds: Normal heart sounds.      Comments: 2+ brachial pulse. Sensory and motor intact. Equal strength bilateral.   Pulmonary:      Effort: Pulmonary effort is normal.      Breath sounds: Normal breath sounds.   Abdominal:      General: Bowel sounds are normal. There is no distension.      Palpations: Abdomen is soft.      Tenderness: There is no abdominal tenderness.     Musculoskeletal:      Cervical back: Normal range of motion.      Right lower leg: No edema.      Left lower leg: No edema.     Skin:     General: Skin is warm and dry.      Capillary Refill: Capillary refill takes less than 2 seconds.     Neurological:      General: No focal deficit present.      Mental Status: He is alert. Mental status is at baseline.      Sensory: Sensation is intact.      Motor: Motor function is intact.      Comments: Facial movement symmetry. No tongue deviation.    Psychiatric:         Attention and Perception: Attention normal.         Mood and Affect: Mood and affect normal.         Speech: Speech normal.         Behavior: Behavior normal. Behavior is cooperative.         Cognition and Memory: Cognition and memory normal.            Lab Results: I have reviewed the following results:  Recent Labs     05/12/25  0621 05/13/25  0529 05/13/25  1253 05/14/25  0207   WBC 9.35  --   --   --    HGB 11.0*  --   --   --    HCT  33.7*  --   --   --      --   --   --    SODIUM 138 139  --   --    K 3.8 3.8  --   --     104  --   --    CO2 28 26  --   --    BUN 19 22  --   --    CREATININE 1.12 1.17  --   --    GLUC 97 99  --   --    MG 1.8*  --   --   --    PTT  --   --    < > 109*    < > = values in this interval not displayed.       Imaging Study Review:  5/13 Carotid US - <50% stenosis on R (PSV 93, EDV 22, ratio 1.18), 50-69% stenosis on L (, EDV 57, ratio 2.37), and no significant subclavian artery disease.   Other Study Results Review: No additional pertinent studies reviewed.    VTE Prophylaxis: VTE covered by:  heparin (porcine), Intravenous, 14 Units/kg/hr at 05/14/25 0252  heparin (porcine), Intravenous  heparin (porcine), Intravenous, 4,000 Units at 05/13/25 7315

## 2025-05-14 NOTE — ASSESSMENT & PLAN NOTE
Pt denies memory issues  Mini cog 4/5 (recall 3/3, clock draw 1/2)-this demonstrates intact short term memory with full recall.  However with only partially accurate 1/2 indicating some difficulty with executive functioning or higher level decision making. Mini cog is not a test to determine capacity to make decisions, rather that patient may struggle with higher level thinking, insight and understanding  Pt did express that he feels he has no option other than surgical intervention. He states he is concerned about the surgical risks but fears going home and experiencing chest pain again. He does not think that using NTG for pain will be effective for pain relief. He also is concerned for his general health of having stage IV prostate cancer   TSH 1.883 (2/20/25)  Recommend check Vitamin B12  No CT head on file  OT for MOCA evaluation ordered   If concern for capacity and decision making neuropsych should be consulted

## 2025-05-14 NOTE — ASSESSMENT & PLAN NOTE
-- Carotid US on 5/13/25 reveals <50% stenosis on R, 50-69% stenosis on L, and no significant subclavian artery disease.  -- Pt is asymptomatic.  -- Continue carotid surveillance with carotid ultrasound in 6 months.   -- Will arrange f/u appt with [facility]; no longer following with [facility].  -- Continue ASA and Plavix.   -- Continue atorvastatin 40 mg.   -- Ok to proceed with CABG.

## 2025-05-14 NOTE — ASSESSMENT & PLAN NOTE
Former extensive smoking history > 3 packs per day for 40 years.  Likely COPD   Start Xopenex and Atrovent TID  No need for steroids at this time.

## 2025-05-14 NOTE — ASSESSMENT & PLAN NOTE
Has remote history of CAD /MI s/p coronary stents X2  Presenting with intermittent chest pain over last several weeks mainly with activity, but progressively worsening. Most recent episode before calling EMS lasted longer and did not resolve with rest, was radiating to neck and bilateral arms, with diaphoresis and vomiting.   Elevated troponin, EKG was sinus tachycardia 105 with some nonspecific ST changes  Patient started on heparin drip in the ED  Monitor on telemetry  As needed submental length of blistering  Cardiology consulted for further management  Follow on cardiac echo  - has recent A1C, Lipid and TSH , all were unremarkable.   Add beta-blocker  Left heart cath today 5/12 shows three-vessel disease, plan for evaluation for potential CABG  Heparin drip was resumed after cardiac catheterization  Plan for CABG on 5/16 noted

## 2025-05-14 NOTE — ASSESSMENT & PLAN NOTE
Probably ALPHONSO, his son passed from complications around ALPHONSO     Spontaneous, unlabored and symmetrical

## 2025-05-15 ENCOUNTER — APPOINTMENT (INPATIENT)
Dept: PULMONOLOGY | Facility: HOSPITAL | Age: 85
DRG: 233 | End: 2025-05-15
Attending: PHYSICIAN ASSISTANT
Payer: MEDICARE

## 2025-05-15 LAB
ABO GROUP BLD: NORMAL
ANION GAP SERPL CALCULATED.3IONS-SCNC: 7 MMOL/L (ref 4–13)
APTT PPP: 49 SECONDS (ref 23–34)
APTT PPP: 91 SECONDS (ref 23–34)
APTT PPP: 97 SECONDS (ref 23–34)
ATRIAL RATE: 56 BPM
BLD GP AB SCN SERPL QL: NEGATIVE
BUN SERPL-MCNC: 23 MG/DL (ref 5–25)
CALCIUM SERPL-MCNC: 9.4 MG/DL (ref 8.4–10.2)
CHLORIDE SERPL-SCNC: 106 MMOL/L (ref 96–108)
CO2 SERPL-SCNC: 28 MMOL/L (ref 21–32)
CREAT SERPL-MCNC: 1.24 MG/DL (ref 0.6–1.3)
ERYTHROCYTE [DISTWIDTH] IN BLOOD BY AUTOMATED COUNT: 13 % (ref 11.6–15.1)
GFR SERPL CREATININE-BSD FRML MDRD: 53 ML/MIN/1.73SQ M
GLUCOSE SERPL-MCNC: 110 MG/DL (ref 65–140)
HCT VFR BLD AUTO: 36.7 % (ref 36.5–49.3)
HGB BLD-MCNC: 12.1 G/DL (ref 12–17)
MCH RBC QN AUTO: 32.2 PG (ref 26.8–34.3)
MCHC RBC AUTO-ENTMCNC: 33 G/DL (ref 31.4–37.4)
MCV RBC AUTO: 98 FL (ref 82–98)
MRSA NOSE QL CULT: NORMAL
P AXIS: 63 DEGREES
PLATELET # BLD AUTO: 218 THOUSANDS/UL (ref 149–390)
PMV BLD AUTO: 10.1 FL (ref 8.9–12.7)
POTASSIUM SERPL-SCNC: 3.8 MMOL/L (ref 3.5–5.3)
PR INTERVAL: 226 MS
QRS AXIS: -33 DEGREES
QRSD INTERVAL: 124 MS
QT INTERVAL: 472 MS
QTC INTERVAL: 455 MS
RBC # BLD AUTO: 3.76 MILLION/UL (ref 3.88–5.62)
RH BLD: POSITIVE
SODIUM SERPL-SCNC: 141 MMOL/L (ref 135–147)
SPECIMEN EXPIRATION DATE: NORMAL
T WAVE AXIS: 80 DEGREES
VENTRICULAR RATE: 56 BPM
WBC # BLD AUTO: 6.34 THOUSAND/UL (ref 4.31–10.16)

## 2025-05-15 PROCEDURE — 86850 RBC ANTIBODY SCREEN: CPT | Performed by: PHYSICIAN ASSISTANT

## 2025-05-15 PROCEDURE — NC001 PR NO CHARGE: Performed by: PHYSICIAN ASSISTANT

## 2025-05-15 PROCEDURE — 97166 OT EVAL MOD COMPLEX 45 MIN: CPT

## 2025-05-15 PROCEDURE — 85730 THROMBOPLASTIN TIME PARTIAL: CPT | Performed by: FAMILY MEDICINE

## 2025-05-15 PROCEDURE — 94729 DIFFUSING CAPACITY: CPT | Performed by: INTERNAL MEDICINE

## 2025-05-15 PROCEDURE — 80048 BASIC METABOLIC PNL TOTAL CA: CPT | Performed by: FAMILY MEDICINE

## 2025-05-15 PROCEDURE — 94060 EVALUATION OF WHEEZING: CPT | Performed by: INTERNAL MEDICINE

## 2025-05-15 PROCEDURE — 94729 DIFFUSING CAPACITY: CPT

## 2025-05-15 PROCEDURE — 99232 SBSQ HOSP IP/OBS MODERATE 35: CPT | Performed by: FAMILY MEDICINE

## 2025-05-15 PROCEDURE — 94726 PLETHYSMOGRAPHY LUNG VOLUMES: CPT

## 2025-05-15 PROCEDURE — 94060 EVALUATION OF WHEEZING: CPT

## 2025-05-15 PROCEDURE — 99232 SBSQ HOSP IP/OBS MODERATE 35: CPT | Performed by: INTERNAL MEDICINE

## 2025-05-15 PROCEDURE — 94726 PLETHYSMOGRAPHY LUNG VOLUMES: CPT | Performed by: INTERNAL MEDICINE

## 2025-05-15 PROCEDURE — 94760 N-INVAS EAR/PLS OXIMETRY 1: CPT

## 2025-05-15 PROCEDURE — 86901 BLOOD TYPING SEROLOGIC RH(D): CPT | Performed by: PHYSICIAN ASSISTANT

## 2025-05-15 PROCEDURE — 86900 BLOOD TYPING SEROLOGIC ABO: CPT | Performed by: PHYSICIAN ASSISTANT

## 2025-05-15 PROCEDURE — 85027 COMPLETE CBC AUTOMATED: CPT | Performed by: FAMILY MEDICINE

## 2025-05-15 PROCEDURE — 99232 SBSQ HOSP IP/OBS MODERATE 35: CPT | Performed by: NURSE PRACTITIONER

## 2025-05-15 PROCEDURE — 93010 ELECTROCARDIOGRAM REPORT: CPT | Performed by: INTERNAL MEDICINE

## 2025-05-15 PROCEDURE — 97129 THER IVNTJ 1ST 15 MIN: CPT

## 2025-05-15 RX ORDER — ALBUTEROL SULFATE 0.83 MG/ML
2.5 SOLUTION RESPIRATORY (INHALATION) ONCE
Status: COMPLETED | OUTPATIENT
Start: 2025-05-15 | End: 2025-05-15

## 2025-05-15 RX ADMIN — ATORVASTATIN CALCIUM 40 MG: 40 TABLET, FILM COATED ORAL at 08:43

## 2025-05-15 RX ADMIN — CHLORHEXIDINE GLUCONATE 15 ML: 1.2 SOLUTION ORAL at 08:42

## 2025-05-15 RX ADMIN — SODIUM CHLORIDE, PRESERVATIVE FREE 10 ML: 5 INJECTION INTRAVENOUS at 08:47

## 2025-05-15 RX ADMIN — MUPIROCIN 1 APPLICATION: 20 OINTMENT TOPICAL at 08:43

## 2025-05-15 RX ADMIN — METOPROLOL TARTRATE 25 MG: 25 TABLET, FILM COATED ORAL at 08:43

## 2025-05-15 RX ADMIN — MUPIROCIN 1 APPLICATION: 20 OINTMENT TOPICAL at 20:44

## 2025-05-15 RX ADMIN — ALBUTEROL SULFATE 2.5 MG: 2.5 SOLUTION RESPIRATORY (INHALATION) at 14:12

## 2025-05-15 RX ADMIN — METOPROLOL TARTRATE 25 MG: 25 TABLET, FILM COATED ORAL at 20:44

## 2025-05-15 RX ADMIN — CEFTRIAXONE SODIUM 1000 MG: 10 INJECTION, POWDER, FOR SOLUTION INTRAVENOUS at 10:30

## 2025-05-15 RX ADMIN — CHLORHEXIDINE GLUCONATE 15 ML: 1.2 SOLUTION ORAL at 20:44

## 2025-05-15 RX ADMIN — HEPARIN SODIUM 2000 UNITS: 1000 INJECTION, SOLUTION INTRAVENOUS; SUBCUTANEOUS at 13:28

## 2025-05-15 RX ADMIN — ASPIRIN 81 MG CHEWABLE TABLET 81 MG: 81 TABLET CHEWABLE at 08:43

## 2025-05-15 RX ADMIN — AMLODIPINE BESYLATE 5 MG: 5 TABLET ORAL at 08:43

## 2025-05-15 RX ADMIN — HEPARIN SODIUM 16 UNITS/KG/HR: 10000 INJECTION, SOLUTION INTRAVENOUS at 08:45

## 2025-05-15 NOTE — ASSESSMENT & PLAN NOTE
Continue Rocephin Daily.   On Friday he will be on day 5 of antibiotics and nearing the end of the course.  He is on RA and has minimal symptoms beyond could.  I do not think this will limit his ability to go to surgery.       In addition, the abnormal CT is likely due to timing and there was likely to be persistent changes as he is still early in the course of histreatment for pneumonia.

## 2025-05-15 NOTE — ASSESSMENT & PLAN NOTE
History of MI (1995) with status post bare-metal stents to RCA  Status post PCI/LOUIE 2007  Clopidogrel on hold.  CABG planned.  No angina.

## 2025-05-15 NOTE — ASSESSMENT & PLAN NOTE
Pt denies memory issues  Mini cog 4/5 (recall 3/3, clock draw 1/2)-this demonstrates intact short term memory with full recall.  However with only partially accurate 1/2 indicating some difficulty with executive functioning or higher level decision making. Mini cog is not a test to determine capacity to make decisions, rather that patient may struggle with higher level thinking, insight and understanding  Pt reports   TSH 1.883 (2/20/25)  Recommend check Vitamin B12  No CT head on file  MOCA (5/15/25) 22/30 indicating mild cognitive impairment, consistent with previous findings on mini cog     Recommend follow up as outpatient for formal comprehensive assessment and supportive resources

## 2025-05-15 NOTE — ASSESSMENT & PLAN NOTE
5/10/2025 presentation with chest discomfort  5/10/2025 EKG: Sinus rhythm, nonspecific ST changes, no ST elevation  Peak troponin 2989  5/12/2025 LHC: Severe three-vessel disease including distal left main for which CT surgery consultation has been advised  Cardiac surgery notes reviewed.  CABG planned during this admission.  Preop testing reviewed.  Mild to moderate carotid disease on Doppler which is asymptomatic.  Prior history of right CEA and restenosis requiring right side TCAR in 2018.  CT finding of residual pneumonia.  Plans for delaying surgery till antibiotics completed.  Currently on ceftriaxone.

## 2025-05-15 NOTE — ASSESSMENT & PLAN NOTE
ARISCAT 42% of post op complications  Keene score 7.8%  His perioperative risk is related to his chronic conditions and age and not due to current pneumonia.   He may proceed to surgery with acceptable risk.  There is moderate to high risk of acute hypoxic and hypercapnic respiratory failure due to COPD, ALPHONSO and pulmonary hypertension.      -  In perioperative period I would utilize duonebs every TID in pre and post operative period      -  Monitor closely for the development of hypoxia      -  Minimize the use of benzodiazepines and narcotics as possible      -  Would encourage incentive spirometry to prevent atelectasis      -  Early ambulation as tolerated      -  Pharmacologic DVT prophylaxis.

## 2025-05-15 NOTE — PROGRESS NOTES
"Progress Note - Hospitalist   Name: Kilo Mix 84 y.o. male I MRN: [id number]  Unit/Bed#: PPHP-322-01 I Date of Admission: 5/10/2025   Date of Service: 5/15/2025 I Hospital Day: 5    Assessment & Plan  NSTEMI (non-ST elevated myocardial infarction) (HCC)  Has remote history of CAD /MI s/p coronary stents X2  Presenting with intermittent chest pain over last several weeks mainly with activity, but progressively worsening. Most recent episode before calling EMS lasted longer and did not resolve with rest, was radiating to neck and bilateral arms, with diaphoresis and vomiting.   Elevated troponin, EKG was sinus tachycardia 105 with some nonspecific ST changes  Patient started on heparin drip in the ED  Monitor on telemetry  As needed submental length of blistering  Cardiology consulted for further management  Follow on cardiac echo  - has recent A1C, Lipid and TSH , all were unremarkable.   Add beta-blocker  Left heart cath today 5/12 shows three-vessel disease, plan for evaluation for potential CABG  Heparin drip was resumed after cardiac catheterization  Initial plan for CABG for tomorrow but postponed to 5/21 due to pneumonia/further decision making per cardiac surgery      Pneumonia involving right lung  Chest x-ray with moderate right base pneumonia  Patient is afebrile, denied cough or shortness of breath  Upper normal white blood cells  Start IV ceftriaxone empirically  Procalcitonin and remained negative x 2.  Pulmonology consultation appreciated.-Continue to finish the course of antibiotics  CT chest from 5/13 reviewed.  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    Blood Sugar Average: Last 72 hrs:    Most recent A1C 5.8 ; hold home metformin " while inpatient -   Continue with insulin sliding scale and low carb diet.     Hypertension  Patient noted late April with low blood pressure he self discontinued off amlodipine then recheck to PCP and was advised to restart amlodipine and discontinue hydrochlorothiazide  Monitor blood pressure and reevaluate   Continue home lisinopril and amlodipine,  As needed Lopressor IV for systolic greater than 180  Lisinopril on hold hold in anticipation for CABG      Peripheral vascular disease (HCC)  History of carotid artery disease s/p stenting as well as coronary artery disease/MI s/p stenting more than 20 years ago  Continue aspirin Plavix and statin, increasing Lipitor to 40 mg  Dyslipidemia  Patient is on only 20 mg of atorvastatin however he has significant history of coronary artery disease and carotid artery disease s/p stenting for both  He denies intolerance of high intensity statin and agreeable to increase to 40 mg Lipitor  Prostate cancer (HCC)  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 Shankar  Cardiac surgery discussed with oncology regarding prognosis.  H/O insertion of nephrostomy tube  Has nephrostomies bilaterally due to obstruction by prostate cancer metastasis to lymph nodes   nephrostomy care while patient,   follow-up with urology, medical oncology and IR  Coronary artery disease involving native coronary artery    Asymptomatic stenosis of left carotid artery  Vascular surgery consultation appreciated.  Recommended outpatient follow-up  Preoperative clearance    ALPHONSO (obstructive sleep apnea)    Mucopurulent chronic bronchitis (HCC)    At risk for delirium    Depression    Frailty    Cognitive impairment      VTE  Pharmacologic Prophylaxis: VTE Score: 8 Moderate Risk (Score 3-4) - Pharmacological DVT Prophylaxis Ordered: heparin drip.    Mobility:   Basic Mobility Inpatient Raw Score: 23  JH-HLM Goal: 7: Walk 25 feet or more  JH-HLM Achieved: 7: Walk 25 feet or more  JH-HLM Goal achieved. Continue to encourage appropriate mobility.    Patient Centered Rounds: I performed bedside rounds with nursing staff today.   Discussions with Specialists or Other Care Team Provider: Pulmonology    Education and Discussions with Family / Patient: Patient declined call to .     Current Length of Stay: 5 day(s)  Current Patient Status: Inpatient   Certification Statement: The patient will continue to require additional inpatient hospital stay due to pending CABG  Discharge Plan: Anticipate discharge in >72 hrs to home with home services.    Code Status: Level 1 - Full Code    Subjective   Patient seen and examined.  Denies any shortness of breath or chest pain.  Looks like surgery was postponed to 5/21 per CT surgery.  Discussed with pulmonology    Objective :  Temp:  [97.3 °F (36.3 °C)-98 °F (36.7 °C)] 97.3 °F (36.3 °C)  HR:  [43-60] 52  BP: (117-133)/(54-62) 123/54  Resp:  [12-14] 12  SpO2:  [95 %-100 %] 99 %  O2 Device: None (Room air)    Body mass index is 24.18 kg/m².     Input and Output Summary (last 24 hours):     Intake/Output Summary (Last 24 hours) at 5/15/2025 1402  Last data filed at 5/15/2025 1030  Gross per 24 hour   Intake 375.12 ml   Output 1190 ml   Net -814.88 ml       Physical Exam  Constitutional:       General: He is not in acute distress.     Appearance: He is not ill-appearing.   HENT:      Head: Normocephalic and atraumatic.      Nose: Nose normal. No congestion.     Eyes:      General: No scleral icterus.      Cardiovascular:      Rate and Rhythm: Normal rate.      Pulses: Normal pulses.      Heart sounds: No murmur heard.  Pulmonary:      Effort: No respiratory distress.   Abdominal:      General:  There is no distension.     Musculoskeletal:      Cervical back: Normal range of motion.      Right lower leg: No edema.      Left lower leg: No edema.     Skin:     General: Skin is warm.      Coloration: Skin is not jaundiced.     Neurological:      Mental Status: He is alert. Mental status is at baseline.           Lines/Drains:  Lines/Drains/Airways       Active Status       Name Placement date Placement time Site Days    Nephrostomy Left 10.2 Fr. 04/29/25  1109  Left  16    Nephrostomy Right 10.2 Fr. 04/29/25  1111  Right  16                      Telemetry:  Telemetry Orders (From admission, onward)               24 Hour Telemetry Monitoring  Continuous x 24 Hours (Telem)        Expiring   Question:  Reason for 24 Hour Telemetry  Answer:  PCI/EP study (including pacer and ICD implementation), Cardiac surgery, MI, abnormal cardiac cath, and chest pain- rule out MI                     Telemetry Reviewed: Normal Sinus Rhythm  Indication for Continued Telemetry Use: Awaiting PCI/EP Study/CABG               Lab Results: I have reviewed the following results:   Results from last 7 days   Lab Units 05/15/25  0544 05/12/25  0621   WBC Thousand/uL 6.34 9.35   HEMOGLOBIN g/dL 12.1 11.0*   HEMATOCRIT % 36.7 33.7*   PLATELETS Thousands/uL 218 181   SEGS PCT %  --  79*   LYMPHO PCT %  --  13*   MONO PCT %  --  6   EOS PCT %  --  2     Results from last 7 days   Lab Units 05/15/25  0544 05/12/25  0621 05/11/25  0405   SODIUM mmol/L 141   < > 142   POTASSIUM mmol/L 3.8   < > 3.6   CHLORIDE mmol/L 106   < > 105   CO2 mmol/L 28   < > 27   BUN mg/dL 23   < > 18   CREATININE mg/dL 1.24   < > 1.09   ANION GAP mmol/L 7   < > 10   CALCIUM mg/dL 9.4   < > 9.2   ALBUMIN g/dL  --   --  3.7   TOTAL BILIRUBIN mg/dL  --   --  0.84   ALK PHOS U/L  --   --  57   ALT U/L  --   --  7   AST U/L  --   --  23   GLUCOSE RANDOM mg/dL 110   < > 124    < > = values in this interval not displayed.     Results from last 7 days   Lab Units  05/10/25  2020   INR  0.96         Results from last 7 days   Lab Units 05/13/25  1253   HEMOGLOBIN A1C % 5.6     Results from last 7 days   Lab Units 05/12/25  0621 05/11/25  1146   PROCALCITONIN ng/ml 0.07 0.12       Recent Cultures (last 7 days):               Last 24 Hours Medication List:     Current Facility-Administered Medications:     albuterol inhalation solution 2.5 mg, Once    amLODIPine (NORVASC) tablet 5 mg, Daily    aspirin chewable tablet 81 mg, Daily    atorvastatin (LIPITOR) tablet 40 mg, Daily    cefTRIAXone (ROCEPHIN) 1,000 mg in dextrose 5 % 50 mL IVPB, Q24H, Last Rate: 1,000 mg (05/15/25 1030)    chlorhexidine (PERIDEX) 0.12 % oral rinse 15 mL, Q12H AMADOU    heparin (porcine) 25,000 units in 0.45% NaCl 250 mL infusion (premix), Titrated, Last Rate: 18 Units/kg/hr (05/15/25 1328)    heparin (porcine) injection 2,000 Units, Q6H PRN    heparin (porcine) injection 4,000 Units, Q6H PRN    metoprolol tartrate (LOPRESSOR) tablet 25 mg, Q12H AMADOU    mupirocin (BACTROBAN) 2 % nasal ointment 1 Application, Q12H AMADOU    nitroglycerin (NITROSTAT) SL tablet 0.4 mg, Q5 Min PRN    sodium chloride (PF) 0.9 % injection 10 mL, Daily    sodium chloride (PF) 0.9 % injection 10 mL, Daily    Administrative Statements   Today, Patient Was Seen By: Candy Bailey MD      **Please Note: This note may have been constructed using a voice recognition system.**

## 2025-05-15 NOTE — ASSESSMENT & PLAN NOTE
Chest x-ray with moderate right base pneumonia  Patient is afebrile, denied cough or shortness of breath  Upper normal white blood cells  Start IV ceftriaxone empirically  Procalcitonin and remained negative x 2.  Pulmonology consultation appreciated.-Continue to finish the course of antibiotics  CT chest from 5/13 reviewed.  Persistent right middle lobe and right lower lobe pneumonia-it may takes 4 to 6 weeks for clearance of pneumonia on imaging

## 2025-05-15 NOTE — ASSESSMENT & PLAN NOTE
Cardiothoracic surgery is following and considering CABG but are considering delaying until next week.

## 2025-05-15 NOTE — ASSESSMENT & PLAN NOTE
CT scan chest (5/13/25) persistent right middle lobe and right lower lobe pneumonia  On IV ceftriaxone  Pulmonary on consult  PFT s pending

## 2025-05-15 NOTE — ASSESSMENT & PLAN NOTE
Stage IV disease  Follows with [facility] and urology as outpatient  S/p prostatectomy in 1999  PSA greater than 360 with confirmed metastatic disease to lymph nodes  Was given lupron and started on xtandi, xtandi on hold secondary to dizziness

## 2025-05-15 NOTE — PROGRESS NOTES
"Progress Note - Cardiology   Name: Kilo Mix 84 y.o. male I MRN: [id number]  Unit/Bed#: PPHP-322-01 I Date of Admission: 5/10/2025   Date of Service: 5/15/2025 I Hospital Day: 5     Assessment & Plan  NSTEMI (non-ST elevated myocardial infarction) (HCC)  5/10/2025 presentation with chest discomfort  5/10/2025 EKG: Sinus rhythm, nonspecific ST changes, no ST elevation  Peak troponin 2989  2025 LHC: Severe three-vessel disease including distal left main for which CT surgery consultation has been advised  Cardiac surgery notes reviewed.  CABG planned during this admission.  Preop testing reviewed.  Mild to moderate carotid disease on Doppler which is asymptomatic.  Prior history of right CEA and restenosis requiring right side TCAR in 2018.  CT finding of residual pneumonia.  Plans for delaying surgery till antibiotics completed.  Currently on ceftriaxone.  Coronary artery disease involving native coronary artery  History of MI () with status post bare-metal stents to RCA  Status post PCI/LOUIE   Clopidogrel on hold.  CABG planned.  No angina.  Dyslipidemia  Atorvastatin dose increased.  No side effects.  Cholesterol 166, triglycerides 113, HDL 66 and LDL 77      Subjective:     Overnight events reviewed.  Telemetry reviewed.  Reports fatigue and sleep disturbance.  No angina or dyspnea.  Anxious regarding timing of surgery.    Review of Systems   Constitutional:  Positive for fatigue.   Respiratory:  Negative for shortness of breath.    Cardiovascular:  Negative for chest pain.   Musculoskeletal:  Negative for arthralgias.   Psychiatric/Behavioral:  Positive for sleep disturbance.         Current Medications[1]      Objective:     Vitals:   Blood pressure 125/60, pulse (!) 43, temperature 98 °F (36.7 °C), resp. rate 12, height 5' 8\" (1.727 m), weight 72.1 kg (159 lb), SpO2 97%.  Body mass index is 24.18 kg/m².  Systolic (24hrs), Av , Min:117 , Max:136     Diastolic (24hrs), Av, Min:55, " Max:62      Intake/Output Summary (Last 24 hours) at 5/15/2025 0813  Last data filed at 5/14/2025 2301  Gross per 24 hour   Intake 435.12 ml   Output 450 ml   Net -14.88 ml     Weight (last 2 days)       None            Physical Exam  Vitals reviewed.   Constitutional:       General: He is not in acute distress.  HENT:      Head: Normocephalic.     Cardiovascular:      Rate and Rhythm: Normal rate and regular rhythm.      Heart sounds: No murmur heard.  Pulmonary:      Breath sounds: No wheezing or rales.     Musculoskeletal:         General: No swelling.     Skin:     General: Skin is warm.     Neurological:      Mental Status: He is alert.     Psychiatric:         Mood and Affect: Mood normal.         Labs & Results:    Lab Results   Component Value Date    SODIUM 141 05/15/2025    K 3.8 05/15/2025     05/15/2025    CO2 28 05/15/2025    BUN 23 05/15/2025    CREATININE 1.24 05/15/2025    GLUC 110 05/15/2025    CALCIUM 9.4 05/15/2025     Results from last 7 days   Lab Units 05/15/25  0544 05/13/25  0529 05/12/25  0621 05/11/25  0405 05/10/25  1954   POTASSIUM mmol/L 3.8 3.8 3.8 3.6 3.6   CHLORIDE mmol/L 106 104 104 105 105   CO2 mmol/L 28 26 28 27 24   BUN mg/dL 23 22 19 18 22   CREATININE mg/dL 1.24 1.17 1.12 1.09 1.14   CALCIUM mg/dL 9.4 9.3 8.9 9.2 9.1     Lab Results   Component Value Date    WBC 6.34 05/15/2025    WBC 6.24 08/03/2015    RBC 3.76 (L) 05/15/2025    RBC 4.96 08/03/2015    HGB 12.1 05/15/2025    HGB 15.2 08/03/2015    HCT 36.7 05/15/2025    HCT 44.8 08/03/2015    MCV 98 05/15/2025    MCV 90 08/03/2015    MCH 32.2 05/15/2025    MCH 30.6 08/03/2015    RDW 13.0 05/15/2025    RDW 13.9 08/03/2015     05/15/2025     08/03/2015     Results from last 7 days   Lab Units 05/15/25  0544 05/14/25  2309 05/14/25  1617 05/11/25  0314 05/10/25  2020   PTT seconds 97* 58* 51*   < > 29   INR   --   --   --   --  0.96    < > = values in this interval not displayed.       Available cardiology  "studies, imaging and lab results independently reviewed today.      This note was completed in part utilizing voice recognition software.   Grammatical errors, random word insertion, spelling mistakes, occasional wrong word or \"sound-alike\" substitutions and incomplete sentences may be an occasional consequence of the system secondary to software limitations, ambient noise and hardware issues. At the time of dictation, efforts were made to edit, clarify and /or correct errors.  Please read the chart carefully and recognize, using context, where substitutions have occurred.  If you have any questions or concerns about the context, text or information contained within the body of this dictation, please contact myself, the provider, for further clarification.           [1]   Current Facility-Administered Medications:     amLODIPine (NORVASC) tablet 5 mg, 5 mg, Oral, Daily, Candy Bailey MD, 5 mg at 05/14/25 0840    aspirin chewable tablet 81 mg, 81 mg, Oral, Daily, Candy Bailey MD, 81 mg at 05/14/25 0840    atorvastatin (LIPITOR) tablet 40 mg, 40 mg, Oral, Daily, Candy Bailey MD, 40 mg at 05/14/25 0840    cefTRIAXone (ROCEPHIN) 1,000 mg in dextrose 5 % 50 mL IVPB, 1,000 mg, Intravenous, Q24H, Candy Bailey MD, Last Rate: 100 mL/hr at 05/14/25 0927, 1,000 mg at 05/14/25 0927    chlorhexidine (PERIDEX) 0.12 % oral rinse 15 mL, 15 mL, Mouth/Throat, Q12H AMADOU, Candy Bailey MD, 15 mL at 05/14/25 2009    heparin (porcine) 25,000 units in 0.45% NaCl 250 mL infusion (premix), 3-20 Units/kg/hr (Order-Specific), Intravenous, Titrated, Analy Phan PA-C, Last Rate: 11.2 mL/hr at 05/15/25 0637, 16 Units/kg/hr at 05/15/25 0637    heparin (porcine) injection 2,000 Units, 2,000 Units, Intravenous, Q6H PRN, Analy Phan PA-C, 2,000 Units at 05/14/25 2348    heparin (porcine) injection 4,000 Units, 4,000 Units, Intravenous, Q6H PRN, Analy Phan PA-C, 4,000 Units at 05/13/25 2230    metoprolol " tartrate (LOPRESSOR) tablet 25 mg, 25 mg, Oral, Q12H [facility], Candy Bailey MD, 25 mg at 05/14/25 2009    mupirocin (BACTROBAN) 2 % nasal ointment 1 Application, 1 Application, Nasal, Q12H [facility], Candy Bailey MD, 1 Application at 05/14/25 2009    nitroglycerin (NITROSTAT) SL tablet 0.4 mg, 0.4 mg, Sublingual, Q5 Min PRN, Candy Bailey MD    sodium chloride (PF) 0.9 % injection 10 mL, 10 mL, Intracatheter, Daily, Candy Bailey MD, 10 mL at 05/14/25 0847    sodium chloride (PF) 0.9 % injection 10 mL, 10 mL, Intracatheter, Daily, Candy Bailey MD, 10 mL at 05/14/25 0847

## 2025-05-15 NOTE — ASSESSMENT & PLAN NOTE
Pulmonary on consult  CT Chest (5/13/25) persistent right middle lobe and right lower lobe pneumonia  IV ceftriaxone     CT on consult  Planned for CABG - moved to 5/21 for pneumonia resolution and further decision making    Concern for increased complication risk with CABG secondary to frailty, impaired physical, mental and cognitive reserve

## 2025-05-15 NOTE — PLAN OF CARE
Problem: PAIN - ADULT  Goal: Verbalizes/displays adequate comfort level or baseline comfort level  Description: Interventions:- Encourage patient to monitor pain and request assistance- Assess pain using appropriate pain scale- Administer analgesics based on type and severity of pain and evaluate response- Implement non-pharmacological measures as appropriate and evaluate response- Consider cultural and social influences on pain and pain management- Notify physician/advanced practitioner if interventions unsuccessful or patient reports new pain  Outcome: Progressing     Problem: INFECTION - ADULT  Goal: Absence or prevention of progression during hospitalization  Description: INTERVENTIONS:- Assess and monitor for signs and symptoms of infection- Monitor lab/diagnostic results- Monitor all insertion sites, i.e. indwelling lines, tubes, and drains- Monitor endotracheal if appropriate and nasal secretions for changes in amount and color- [place] appropriate cooling/warming therapies per order- Administer medications as ordered- Instruct and encourage patient and family to use good hand hygiene technique- Identify and instruct in appropriate isolation precautions for identified infection/condition  Outcome: Progressing  Goal: Absence of fever/infection during neutropenic period  Description: INTERVENTIONS:- Monitor WBC  Outcome: Progressing     Problem: SAFETY ADULT  Goal: Patient will remain free of falls  Description: INTERVENTIONS:- Educate patient/family on patient safety including physical limitations- Instruct patient to call for assistance with activity - Consult OT/PT to assist with strengthening/mobility - Keep Call bell within reach- Keep bed low and locked with side rails adjusted as appropriate- Keep care items and personal belongings within reach- Initiate and maintain comfort rounds- Make Fall Risk Sign visible to staff- Offer Toileting every 2 Hours, in advance of need- Initiate/Maintain bed/chair  alarm- Obtain necessary fall risk management equipment: - Apply yellow socks and bracelet for high fall risk patients- Consider moving patient to room near nurses station  Outcome: Progressing  Goal: Maintain or return to baseline ADL function  Description: INTERVENTIONS:-  Assess patient's ability to carry out ADLs; assess patient's baseline for ADL function and identify physical deficits which impact ability to perform ADLs (bathing, care of mouth/teeth, toileting, grooming, dressing, etc.)- Assess/evaluate cause of self-care deficits - Assess range of motion- Assess patient's mobility; develop plan if impaired- Assess patient's need for assistive devices and provide as appropriate- Encourage maximum independence but intervene and supervise when necessary- Involve family in performance of ADLs- Assess for home care needs following discharge - Consider OT consult to assist with ADL evaluation and planning for discharge- Provide patient education as appropriate  Outcome: Progressing  Goal: Maintains/Returns to pre admission functional level  Description: INTERVENTIONS:- Perform AM-PAC 6 Click Basic Mobility/ Daily Activity assessment daily.- Set and communicate daily mobility goal to care team and patient/family/caregiver. - Collaborate with rehabilitation services on mobility goals if consulted- Perform Range of Motion 3 times a day.- Reposition patient every 2 hours.- Dangle patient 3 times a day- Stand patient 3 times a day- Ambulate patient 3 times a day- Out of bed to chair 3 times a day - Out of bed for meals 3 times a day- Out of bed for toileting- Record patient progress and toleration of activity level   Outcome: Progressing     Problem: DISCHARGE PLANNING  Goal: Discharge to home or other facility with appropriate resources  Description: INTERVENTIONS:- Identify barriers to discharge w/patient and caregiver- Arrange for needed discharge resources and transportation as appropriate- Identify discharge  learning needs (meds, wound care, etc.)- Arrange for interpretive services to assist at discharge as needed- Refer to Case Management Department for coordinating discharge planning if the patient needs post-hospital services based on physician/advanced practitioner order or complex needs related to functional status, cognitive ability, or social support system  Outcome: Progressing     Problem: CARDIOVASCULAR - ADULT  Goal: Maintains optimal cardiac output and hemodynamic stability  Description: INTERVENTIONS:- Monitor I/O, vital signs and rhythm- Monitor for S/S and trends of decreased cardiac output- Administer and titrate ordered vasoactive medications to optimize hemodynamic stability- Assess quality of pulses, skin color and temperature- Assess for signs of decreased coronary artery perfusion- Instruct patient to report change in severity of symptoms  INTERVENTIONS:- Monitor I/O, vital signs and rhythm- Monitor for S/S and trends of decreased cardiac output- Administer and titrate ordered vasoactive medications to optimize hemodynamic stability- Assess quality of pulses, skin color and temperature- Assess for signs of decreased coronary artery perfusion- Instruct patient to report change in severity of symptoms  Outcome: Progressing  Goal: Absence of cardiac dysrhythmias or at baseline rhythm  Description: INTERVENTIONS:- Continuous cardiac monitoring, vital signs, obtain 12 lead EKG if ordered- Administer antiarrhythmic and heart rate control medications as ordered- Monitor electrolytes and administer replacement therapy as ordered  INTERVENTIONS:- Continuous cardiac monitoring, vital signs, obtain 12 lead EKG if ordered- Administer antiarrhythmic and heart rate control medications as ordered- Monitor electrolytes and administer replacement therapy as ordered  Outcome: Progressing     Problem: Knowledge Deficit  Goal: Patient/family/caregiver demonstrates understanding of disease process, treatment plan,  medications, and discharge instructions  Description: Complete learning assessment and assess knowledge base.Interventions:- Provide teaching at level of understanding- Provide teaching via preferred learning methods  Outcome: Progressing     Problem: RESPIRATORY - ADULT  Goal: Achieves optimal ventilation and oxygenation  Description: INTERVENTIONS:- Assess for changes in respiratory status- Assess for changes in mentation and behavior- Position to facilitate oxygenation and minimize respiratory effort- Oxygen administered by appropriate delivery if ordered- Initiate smoking cessation education as indicated- Encourage broncho-pulmonary hygiene including cough, deep breathe, Incentive Spirometry- Assess the need for suctioning and aspirate as needed- Assess and instruct to report SOB or any respiratory difficulty- Respiratory Therapy support as indicated  Outcome: Progressing     Problem: GENITOURINARY - ADULT  Goal: Maintains or returns to baseline urinary function  Description: INTERVENTIONS:- Assess urinary function- Encourage oral fluids to ensure adequate hydration if ordered- Administer IV fluids as ordered to ensure adequate hydration- Administer ordered medications as needed- Offer frequent toileting- Follow urinary retention protocol if ordered  Outcome: Progressing  Goal: Absence of urinary retention  Description: INTERVENTIONS:- Assess patient’s ability to void and empty bladder- Monitor I/O- Bladder scan as needed- Discuss with physician/AP medications to alleviate retention as needed- Discuss catheterization for long term situations as appropriate  Outcome: Progressing  Goal: Urinary catheter remains patent  Description: INTERVENTIONS:- Assess patency of urinary catheter- If patient has a chronic lai, consider changing catheter if non-functioning- Follow guidelines for intermittent irrigation of non-functioning urinary catheter  Outcome: Progressing     Problem: METABOLIC, FLUID AND ELECTROLYTES -  ADULT  Goal: Electrolytes maintained within normal limits  Description: INTERVENTIONS:- Monitor labs and assess patient for signs and symptoms of electrolyte imbalances- Administer electrolyte replacement as ordered- Monitor response to electrolyte replacements, including repeat lab results as appropriate- Instruct patient on fluid and nutrition as appropriate  Outcome: Progressing  Goal: Fluid balance maintained  Description: INTERVENTIONS:- Monitor labs - Monitor I/O and WT- Instruct patient on fluid and nutrition as appropriate- Assess for signs & symptoms of volume excess or deficit  Outcome: Progressing  Goal: Glucose maintained within target range  Description: INTERVENTIONS:- Monitor Blood Glucose as ordered- Assess for signs and symptoms of hyperglycemia and hypoglycemia- Administer ordered medications to maintain glucose within target range- Assess nutritional intake and initiate nutrition service referral as needed  Outcome: Progressing     Problem: Prexisting or High Potential for Compromised Skin Integrity  Goal: Skin integrity is maintained or improved  Description: INTERVENTIONS:- Identify patients at risk for skin breakdown- Assess and monitor skin integrity- Assess and monitor nutrition and hydration status- Monitor labs - Assess for incontinence - Turn and reposition patient- Assist with mobility/ambulation- Relieve pressure over bony prominences- Avoid friction and shearing- Provide appropriate hygiene as needed including keeping skin clean and dry- Evaluate need for skin moisturizer/barrier cream- Collaborate with interdisciplinary team - Patient/family teaching- Consider wound care consult   Outcome: Progressing

## 2025-05-15 NOTE — PROGRESS NOTES
Progress Note - Pulmonology   Name: Kilo Mix 84 y.o. male I MRN: [id number]  Unit/Bed#: PPHP-322-01 I Date of Admission: 5/10/2025   Date of Service: 5/15/2025 I Hospital Day: 5    Assessment & Plan  Preoperative clearance  ARISCAT 42% of post op complications  Keene score 7.8%  His perioperative risk is related to his chronic conditions and age and not due to current pneumonia.   He may proceed to surgery with acceptable risk.  There is moderate to high risk of acute hypoxic and hypercapnic respiratory failure due to COPD, ALPHONSO and pulmonary hypertension.      -  In perioperative period I would utilize duonebs every TID in pre and post operative period      -  Monitor closely for the development of hypoxia      -  Minimize the use of benzodiazepines and narcotics as possible      -  Would encourage incentive spirometry to prevent atelectasis      -  Early ambulation as tolerated      -  Pharmacologic DVT prophylaxis.      Pneumonia involving right lung  Continue Rocephin Daily.   On Friday he will be on day 5 of antibiotics and nearing the end of the course.  He is on RA and has minimal symptoms beyond could.  I do not think this will limit his ability to go to surgery.       In addition, the abnormal CT is likely due to timing and there was likely to be persistent changes as he is still early in the course of histreatment for pneumonia.  Mucopurulent chronic bronchitis (HCC)  Former extensive smoking history > 3 packs per day for 40 years.  Likely COPD  Start Xopenex and Atrovent TID  No need for steroids at this time.    ALPHONSO (obstructive sleep apnea)  Probably ALPHONSO, his son passed from complications around ALPHONSO.  He has never had a sleep study but does have snoring and I do think it is likely to be a potential complicating factor.    NSTEMI (non-ST elevated myocardial infarction) (HCC)  Continue heparin gtt.  Cardiology and cardiothoracic surgery following.    Coronary artery disease involving native coronary  "artery  Cardiothoracic surgery is following and considering CABG but are considering delaying until next week.    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    Blood Sugar Average: Last 72 hrs:      Frailty      24 Hour Events : No new events overnight.  He states that he is feeling well.      Objective :  Temp:  [97.3 °F (36.3 °C)-98 °F (36.7 °C)] 97.3 °F (36.3 °C)  HR:  [43-60] 52  BP: (117-133)/(54-62) 123/54  Resp:  [12-14] 12  SpO2:  [95 %-100 %] 99 %  O2 Device: None (Room air)    Physical Exam  General: Pleasant, Awake alert and oriented x 3, conversant without conversational dyspnea, NAD, normal affect  HEENT:  PERRL, Sclera noninjected, nonicteric OU, Nares patent,  no craniofacial abnormalities, Mucous membranes, moist, no oral lesions, normal dentition  NECK: Trachea midline, no accessory muscle use, no stridor, no cervical or supraclavicular adenopathy, JVP not elevated  CARDIAC: Reg, single s1/S2, no m/r/g  PULM: Decreased breath sounds, mild rhonchi in R lower lobe  ABD: Normoactive bowel sounds, soft nontender, nondistended, no rebound, no rigidity, no guarding  EXT: No cyanosis, no clubbing, no edema, normal capillary refill  NEURO: no focal neurologic deficits, AAOx3, moving all extremities appropriately      Lab Results: I have reviewed the following results:   .     05/15/25  0544   WBC 6.34   HGB 12.1   HCT 36.7      SODIUM 141   K 3.8      CO2 28   BUN 23   CREATININE 1.24   GLUC 110   PTT 97*     ABG: No new results in last 24 hours.    No new imaging  "

## 2025-05-15 NOTE — PROGRESS NOTES
Progress Note - Geriatric Medicine   Name: Kilo Mix 84 y.o. male I MRN: [id number]  Unit/Bed#: PPHP-322-01 I Date of Admission: 5/10/2025   Date of Service: 5/15/2025 I Hospital Day: 5    Assessment & Plan  Cognitive impairment  Pt denies memory issues  Mini cog 4/5 (recall 3/3, clock draw 1/2)-this demonstrates intact short term memory with full recall.  However with only partially accurate 1/2 indicating some difficulty with executive functioning or higher level decision making. Mini cog is not a test to determine capacity to make decisions, rather that patient may struggle with higher level thinking, insight and understanding  Pt reports   TSH 1.883 (2/20/25)  Recommend check Vitamin B12  No CT head on file  MOCA (5/15/25) 22/30 indicating mild cognitive impairment, consistent with previous findings on mini cog     Recommend follow up as outpatient for formal comprehensive assessment and supportive resources    At risk for delirium  Oriented x 3, no signs of delirium  At HIGH risk for delirium secondary to hospitalization, age, surgery, depression, cognitive impairment, pneumonia  Maintain delirium precautions:  Provide frequent redirection, reorientation, distraction techniques  Avoid deliriogenic medications such as tramadol, benzodiazepines, anticholinergics,  Benadryl  Treat pain, See geriatric pain protocol  Monitor for constipation and urinary retention  Encourage early and frequent moblization, OOB  Encourage Hydration/ Nutrition  Implement sleep hygiene, limit night time interuptions, group activities  Coronary artery disease involving native coronary artery  Pulmonary on consult  CT Chest (5/13/25) persistent right middle lobe and right lower lobe pneumonia  IV ceftriaxone     CT on consult  Planned for CABG - moved to 5/21 for pneumonia resolution and further decision making    Concern for increased complication risk with CABG secondary to frailty, impaired physical, mental and cognitive  "reserve  Frailty  Exacerbated by hospitalization and underlying medical comorbities, decreased appetite, weight loss, limited home support (reports wife in poor health)  Needs increased home support  Recommend protein supplementation for nutritional support  PT/OT   Prostate cancer (HCC)  Stage IV disease  Follows with [facility] and urology as outpatient  S/p prostatectomy in 1999  PSA greater than 360 with confirmed metastatic disease to lymph nodes  Was given lupron and started on xtandi, xtandi on hold secondary to dizziness  Mucopurulent chronic bronchitis (HCC)  Pulmonary on consult  Depression  Related to health, cancer and need for CABG, additionally concern for wife and her health    Pneumonia involving right lung  CT scan chest (5/13/25) persistent right middle lobe and right lower lobe pneumonia  On IV ceftriaxone  Pulmonary on consult  PFT s pending    Subjective   He is oob in recliner chair, oriented x 3. He reports he is frustrated because his surgery is delayed and he \"just wants to get it over with\" he states he needs to get home because he has things to do.  Explained that open heart surgery is not a quick in and out surgery. He will need time to recover and likely rehab. He states I cannot do rehab. I will refuse. I need to get home.     Objective :  Temp:  [97.4 °F (36.3 °C)-98 °F (36.7 °C)] 98 °F (36.7 °C)  HR:  [43-60] 60  BP: (117-136)/(55-62) 125/60  Resp:  [12-14] 12  SpO2:  [95 %-100 %] 100 %  O2 Device: None (Room air)    Physical Exam  Vitals and nursing note reviewed.   HENT:      Head: Normocephalic.      Mouth/Throat:      Mouth: Mucous membranes are moist.     Eyes:      General:         Right eye: No discharge.         Left eye: No discharge.       Cardiovascular:      Rate and Rhythm: Normal rate and regular rhythm.   Pulmonary:      Effort: Pulmonary effort is normal.   Abdominal:      General: There is no distension.     Musculoskeletal:         General: Normal range of motion.      " Cervical back: Normal range of motion.     Skin:     General: Skin is warm and dry.     Neurological:      Mental Status: He is alert and oriented to person, place, and time. Mental status is at baseline.     Psychiatric:         Mood and Affect: Mood normal.           Lab Results: I have reviewed the following results:CBC/BMP:   .     05/15/25  0544   WBC 6.34   HGB 12.1   HCT 36.7      SODIUM 141   K 3.8      CO2 28   BUN 23   CREATININE 1.24   GLUC 110          Therapies:   Basic Mobility Inpatient Raw Score: 23  -[facility] Goal: 7: Walk 25 feet or more  -[facility] Achieved: 7: Walk 25 feet or more      VTE Prophylaxis: Sequential compression device (Venodyne)     Code Status: Level 1 - Full Code  Power of : Yes      Family and Social Support:   No data recorded    Goals of Care: full code    I have spent a total time of 35 minutes in caring for this patient on the day of the visit/encounter including Diagnostic results, Prognosis, Instructions for management, Patient and family education, Importance of tx compliance, Risk factor reductions, Impressions, Counseling / Coordination of care, Documenting in the medical record, Obtaining or reviewing history  , and Communicating with other healthcare professionals .

## 2025-05-15 NOTE — ASSESSMENT & PLAN NOTE
Has remote history of CAD /MI s/p coronary stents X2  Presenting with intermittent chest pain over last several weeks mainly with activity, but progressively worsening. Most recent episode before calling EMS lasted longer and did not resolve with rest, was radiating to neck and bilateral arms, with diaphoresis and vomiting.   Elevated troponin, EKG was sinus tachycardia 105 with some nonspecific ST changes  Patient started on heparin drip in the ED  Monitor on telemetry  As needed submental length of blistering  Cardiology consulted for further management  Follow on cardiac echo  - has recent A1C, Lipid and TSH , all were unremarkable.   Add beta-blocker  Left heart cath today 5/12 shows three-vessel disease, plan for evaluation for potential CABG  Heparin drip was resumed after cardiac catheterization  Initial plan for CABG for tomorrow but postponed to 5/21 due to pneumonia/further decision making per cardiac surgery

## 2025-05-15 NOTE — PROGRESS NOTES
Progress Note - Cardiac Surgery   Kilo Mix 84 y.o. male MRN: [id number]  Unit/Bed#: PPHP-322-01 Encounter: [id number]      24 Hour Events: Continues on Heparin gtt. Feels well, denies CP or SOB.  Case moved to 5/21 to allow for pneumonia resolution as well as further decision making to be done, PFTs ordered. Repeat type and screen re-ordered. MRSA pending. No other events or complaints.    Medications:   Scheduled Meds:  Current Facility-Administered Medications   Medication Dose Route Frequency Provider Last Rate    amLODIPine  5 mg Oral Daily Candy Bailey MD      aspirin  81 mg Oral Daily Candy Bailey MD      atorvastatin  40 mg Oral Daily Candy Bailey MD      cefTRIAXone  1,000 mg Intravenous Q24H Candy Bailey MD 1,000 mg (05/14/25 0927)    chlorhexidine  15 mL Mouth/Throat Q12H [facility] Candy Bailey MD      heparin (porcine)  3-20 Units/kg/hr (Order-Specific) Intravenous Titrated Analy SOPHIA Phan PA-C 16 Units/kg/hr (05/15/25 0845)    heparin (porcine)  2,000 Units Intravenous Q6H PRN Analy A Sylvester PA-C      heparin (porcine)  4,000 Units Intravenous Q6H PRN Analy Phan PA-C      metoprolol tartrate  25 mg Oral Q12H [facility] Candy Bailey MD      mupirocin  1 Application Nasal Q12H [facility] Candy Bailey MD      nitroglycerin  0.4 mg Sublingual Q5 Min PRN Candy Bailey MD      sodium chloride (PF)  10 mL Intracatheter Daily Candy Bailey MD      sodium chloride (PF)  10 mL Intracatheter Daily Candy Bailey MD       Continuous Infusions:heparin (porcine), 3-20 Units/kg/hr (Order-Specific), Last Rate: 16 Units/kg/hr (05/15/25 0845)        Results:   Results from last 7 days   Lab Units 05/15/25  0544 05/12/25  0621 05/11/25  0324   WBC Thousand/uL 6.34 9.35 10.04   HEMOGLOBIN g/dL 12.1 11.0* 11.8*   HEMATOCRIT % 36.7 33.7* 35.2*   PLATELETS Thousands/uL 218 181 210     Results from last 7 days   Lab Units 05/15/25  0544 05/13/25  0529 05/12/25  0621   POTASSIUM mmol/L  3.8 3.8 3.8   CHLORIDE mmol/L 106 104 104   CO2 mmol/L 28 26 28   BUN mg/dL 23 22 19   CREATININE mg/dL 1.24 1.17 1.12   CALCIUM mg/dL 9.4 9.3 8.9     Results from last 7 days   Lab Units 05/15/25  0544 05/14/25  2309 05/14/25  1617 05/11/25  0314 05/10/25  2020   INR   --   --   --   --  0.96   PTT seconds 97* 58* 51*   < > 29    < > = values in this interval not displayed.     MRSA pending  A1C 5.6  Lipid panel completed      Studies:     Cardiac Cath: distal LM 70%, Mid LAD 80% and 70%, Diag2 80%, Ost Cx 90%, Mid RCA 85%, Dist RCA 60%, RPDA 70%      Echo: EF 60%, normal wall motion, mild MV annular calcification with mild MR, mild TR     CT chest 5/13: Persistent right middle lobe and right lower lobe pneumonia. Cholelithiasis. 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 Atrophic right kidney. Simple appearing renal cyst     Carotid artery duplex:  Right: There is <50% stenosis noted in the internal carotid artery. The common carotid artery/bulb stent is patent. Plaque is homogenous and smooth.   A severe stenosis was identified in the proximal external carotid artery.  Vertebral artery flow is antegrade.  There is no significant subclavian artery disease.       LEFT:   There is 50-69% stenosis noted in the internal carotid artery.  Plaque is heterogenous and irregular.  Vertebral artery flow is antegrade.  There is no significant subclavian artery disease.     Greater saphenous vein mapping: Bilateral GSV with small calibers for conduit. Possible segment in left thigh    Results Review Statement: I personally reviewed the following image studies in PACS and associated radiology reports: Carotid US, Vein mapping, cardiac cath, CT chest, and Echocardiogram. My interpretation of the radiology images/reports is: as noted above.    Vitals:   Vitals:    05/14/25 2306 05/15/25 0301 05/15/25 0703 05/15/25 0843   BP: 121/55 127/62 125/60    BP Location:       Pulse: (!) 53 58 (!) 43 60   Resp: 14 12     Temp: 97.9 °F (36.6 °C)  97.9 °F (36.6 °C) 98 °F (36.7 °C)    TempSrc:       SpO2: 97% 98% 97%    Weight:       Height:         Physical Exam:    General: No acute distress and Normal appearance  HEENT/NECK:  Normocephalic. Atraumatic.  No jugular venous distention.    Cardiac: Regular rate and rhythm and No murmurs/rubs/gallops  Pulmonary:  Breath sounds clear bilaterally and No rales/rhonchi/wheezes  Abdomen:  Non-tender, Non-distended, and Normal bowel sounds  Extremities: Extremities warm/dry and No edema B/L  Neuro: Alert and oriented X 3 and No focal deficits  Skin: Warm/Dry, without rashes or lesions.       Assessment:    Severe coronary artery disease; Ongoing CABG workup    Plan:  Patient agreeable to proceed with surgery; all questions and concerns addressed. Informed consent signed    Oncology evaluated for his stage 4 prostate CA. Specifically, his one year and 5 year survival. Spoke with oncologist - he has pretty extensive prostate cancer, 5 yr survival is less than 40-50% though 1 year survival is very good      Carotid ultrasound shows 50-69% left ICA stenosis and severe stenosis of right external carotid artery. Consulted vascular with recommendations to f/u as OP    Vein mapping completed; Findings show small caliber vein bilaterally- bedside U/S performed revealing b/l adequate thighs    Blood type and cross match ordered for 5/20    Discontinued Lisinopril in anticipation for surgery    F/U MRSA    F/u PFTs    Continue Mupirocin 2% nasal ointment q 12 hrs    Continue topical chlorhexidine bath and mouth rise    coronary artery bypass grafting scheduled for Wednesday 5/21 with [email or web address] [email or web address].    SIGNATURE: Cristin Rodriges PA-C  DATE: May 15, 2025  TIME: 8:58 AM    * This note was completed in part utilizing [employer] direct voice recognition software.   Grammatical errors, random word insertion, spelling mistakes, and incomplete sentences may be an occasional consequence of the system secondary to software  limitations, ambient noise and hardware issues. At the time of dictation, efforts were made to edit, clarify and /or correct errors. Please read the chart carefully and recognize, using context, where substitutions have occurred.  If you have any questions or concerns about the context, text or information contained within the body of this dictation, please contact myself, the provider, for further clarification.

## 2025-05-15 NOTE — OCCUPATIONAL THERAPY NOTE
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)    Dyslipidemia    Hypertension    Peripheral vascular disease (HCC)    Asymptomatic stenosis of left carotid artery    Prostate cancer (HCC)    H/O insertion of nephrostomy tube    Pneumonia involving right lung    Preoperative clearance    ALPHONSO (obstructive sleep apnea)    Mucopurulent chronic bronchitis (HCC)    At risk for delirium    Depression    Frailty    Cognitive impairment    Past Medical History  Past Medical History:   Diagnosis Date    Bladder tumor     Cancer (HCC)     Prostate cancer    Depression 5/14/2025    Diabetes mellitus (HCC)     Diverticulitis of colon     History of common carotid artery stent placement     RIGHT stent - per pts son - RIGHT stent  in  carotid artery    History of heart artery stent     x2 stents in heart per pts son    Hyperlipidemia     Hypertension     Migraines     Myocardial infarction (HCC)     about 30yrs ago    Nephrostomy present (HCC)     2/25/25 Per pts son Ed- pt has two nephrostomy tubes    Prostate cancer (HCC)     Seasonal allergies      Past Surgical History  Past Surgical History:   Procedure Laterality Date    CARDIAC CATHETERIZATION N/A 5/12/2025    Procedure: Cardiac Catheterization;  Surgeon: Daniel Mabry DO;  Location: [facility];  Service: Cardiology    CARDIAC SURGERY      CAROTID ENDARTARECTOMY Right     thromboendarterectomy    CAROTID STENT Right     CATARACT EXTRACTION Left     COLONOSCOPY      fiberoptic screening 2/6/08 5 yr / complete 3/15/13 5 yr    CORONARY ANGIOPLASTY WITH STENT PLACEMENT      ELBOW SURGERY      EYE SURGERY      IR BIOPSY LYMPH NODE  12/24/2024    IR NEPHROSTOMY TUBE  CHECK/CHANGE/REPOSITION/REINSERTION/UPSIZE  2/25/2025    IR NEPHROSTOMY TUBE CHECK/CHANGE/REPOSITION/REINSERTION/UPSIZE  4/29/2025    IR NEPHROSTOMY TUBE PLACEMENT  12/24/2024    KNEE SURGERY      NH CYSTO W/REMOVAL OF LESIONS SMALL N/A 1/21/2025    Procedure: TRANSURETHRAL RESECTION OF BLADDER TUMOR (TURBT);  Surgeon: Jordan Youssef MD;  Location:  MAIN OR;  Service: Urology    NH CYSTO W/REMOVAL OF LESIONS SMALL N/A 3/6/2025    Procedure: TRANSURETHRAL RESECTION OF BLADDER TUMOR (TURBT);  Surgeon: Jordan Youssef MD;  Location:  MAIN OR;  Service: Urology    PROSTATE SURGERY      SUPRAPUBIC PROSTATECTOMY  12/06/1999    UPPER GASTROINTESTINAL ENDOSCOPY          05/15/25 1011   OT Last Visit   OT Visit Date 05/15/25   Note Type   Note type Evaluation  (+ cog assessment)   Pain Assessment   Pain Assessment Tool 0-10   Pain Score No Pain   Restrictions/Precautions   Weight Bearing Precautions Per Order No   Other Precautions Cognitive;Multiple lines;Fall Risk   Home Living   Type of Home House   Home Layout One level;Stairs to enter without rails  (7-8 KEN no rails, basement)   Bathroom Shower/Tub Tub/shower unit   Bathroom Toilet Raised   Bathroom Equipment Shower chair;Grab bars in shower   Home Equipment   (denies)   Additional Comments Pt reports living in 1SH w/ 7-8 KEN and + basement. Pt lives w/ spouse who he cares for. Denies use of AD PTA.   Prior Function   Level of [place] Independent with ADLs;Independent with functional mobility;Independent with IADLS   Lives With Spouse   Receives Help From Family  (son and DIL next door, daughter ~30 mins away)   IADLs Independent with driving;Independent with meal prep;Independent with medication management   Falls in the last 6 months 0   Vocational Retired   Comments Pt reports being completely independent PTA. (+) . Primary caregiver to his wife. Daughter is currently staying w/ wife while Pt is in hospital. Additional family support next door.  "  Lifestyle   Autonomy PTA, Pt reports I w/ ADL, IADL, FM w/out AD. (+) .   Reciprocal Relationships Spouse, son, DIL, daughter, grandchildren   Service to Others Retired   General   Family/Caregiver Present No   Subjective   Subjective \"Another memory test huh?\"   ADL   Where Assessed Chair   Eating Assistance 6  Modified independent   Grooming Assistance 5  Supervision/Setup   UB Bathing Assistance 5  Supervision/Setup   LB Bathing Assistance 5  Supervision/Setup   UB Dressing Assistance 5  Supervision/Setup   LB Dressing Assistance 5  Supervision/Setup   Toileting Assistance  5  Supervision/Setup   Functional Assistance 5  Supervision/Setup   Bed Mobility   Supine to Sit Unable to assess   Sit to Supine Unable to assess   Additional Comments Pt greeted and left OOB in chair w/ alarm on and all needs within reach   Transfers   Sit to Stand 5  Supervision   Additional items Verbal cues   Stand to Sit 5  Supervision   Additional items Verbal cues   Additional Comments no AD   Functional Mobility   Functional Mobility 5  Supervision   Additional Comments Pt completes short household distance mobility w/ Supervision, holding onto IV pole   Additional items   (IV pole)   Balance   Static Sitting Fair +   Dynamic Sitting Fair   Static Standing Fair -   Dynamic Standing Fair -   Ambulatory Fair -   Activity Tolerance   Activity Tolerance Patient tolerated treatment well   Medical Staff Made Aware CM updated   Nurse Made Aware RN cleared   RUE Assessment   RUE Assessment WFL   LUE Assessment   LUE Assessment WFL   Hand Function   Gross Motor Coordination Functional   Fine Motor Coordination Functional   Sensation   Light Touch No apparent deficits   Vision-Basic Assessment   Current Vision Wears glasses only for reading   Cognition   Overall Cognitive Status Impaired   Arousal/Participation Alert;Cooperative   Attention Attends with cues to redirect   Orientation Level Oriented X4   Memory Decreased short term " memory;Decreased recall of precautions   Following Commands Follows one step commands without difficulty   Comments Pt is pleasant and cooperative. Please see treatment assessment below for formal cognitive assessment.   Cognition Assessment Tools MOCA   Score 22   Assessment   Limitation Decreased ADL status;Decreased cognition;Decreased endurance;Decreased high-level ADLs   Prognosis Good   Assessment Pt is a 84 y.o. male seen for OT evaluation s/p admission to [facility] on 5/10/2025 with NSTEMI (non-ST elevated myocardial infarction) ([facility]), pending workup for CABG. Pt  has a past medical history of Bladder tumor, Cancer (HCC), Depression, Diabetes mellitus (HCC), Diverticulitis of colon, History of common carotid artery stent placement, History of heart artery stent, Hyperlipidemia, Hypertension, Migraines, Myocardial infarction (HCC), Nephrostomy present (HCC), Prostate cancer (HCC), and Seasonal allergies.  Pt with active OT cognitive evaluation orders as part of pre-op workup. Pt reports living w/ spouse who he cares for in 1SH w/ 7-8STE.  PTA, Pt was I w/ ADL/IADL and functional mobility, was driving and was not using DME at baseline. Pt agreeable and willing to participate in OT evaluation. Pt was greeted and left OOB in chair w/ alarm activated and all needs within reach. During evaluation, pt is Supervision ADLs, transfers, functional mobility. Pt currently presents with impairments in the following categories -steps to enter environment, limited home support, difficulty performing ADLS, and difficulty performing IADLS  activity tolerance, endurance, standing balance/tolerance, memory, insight, safety , and attention . These impairments, as well as pt's risk for falls and home environment  limit pt's ability to safely engage in all baseline areas of occupation, includinggrooming, bathing, dressing, toileting, functional mobility/transfers, community mobility, laundry , driving, medication  management, meal prep, cleaning, and care of children. Pt would benefit from continued acute OT services throughout hospital course in order to maximize Pt's independence and overall occupational performance. Plan for OT interventions 1-2x per week. From OT standpoint,  recommend Level III (Minimum Resource Intensity) upon d/c when pt medically stable to d/c from acute care. Will continue to follow.   Goals   Patient Goals to go home   LTG Time Frame 10-14   Long Term Goal See goals below   Plan   Treatment Interventions ADL retraining;Functional transfer training;Endurance training;Cognitive reorientation;Patient/family training;Continued evaluation;Energy conservation;Activityengagement   Goal Expiration Date 05/29/25   OT Treatment Day 0   OT Frequency 1-2x/wk   Discharge Recommendation   Rehab Resource Intensity Level, OT III (Minimum Resource Intensity)  (pending progress and possible CABG this admission)   Additional Comments  The patient's raw score on the -PAC Daily Activity Inpatient Short Form is 19. A raw score of greater than or equal to 19 suggests the patient may benefit from discharge to home. Please refer to the recommendation of the Occupational Therapist for safe discharge planning.   AM-PAC Daily Activity Inpatient   Lower Body Dressing 3   Bathing 3   Toileting 3   Upper Body Dressing 3   Grooming 3   Eating 4   Daily Activity Raw Score 19   Daily Activity Standardized Score (Calc for Raw Score >=11) 40.22   AM-PAC Applied Cognition Inpatient   Following a Speech/Presentation 3   Understanding Ordinary Conversation 4   Taking Medications 3   Remembering Where Things Are Placed or Put Away 4   Remembering List of 4-5 Errands 3   Taking Care of Complicated Tasks 3   Applied Cognition Raw Score 20   Applied Cognition Standardized Score 41.76   MOCA   Version 7.1   Visuopatial/Executive 3   Naming 3   Memory 0   Attention: Digits 2   Attention: Letters 1   Attention: Serial 1   Language: Repeat 2    Language: Fluency 1   Abstraction 1   Delayed Recall 1   Orientation 6   Does patient have less than or equal to 12 years of education? 1   MOCA Total Score 22   Additional Treatment Session   Start Time 0955   End Time 1011   Treatment Assessment Pt seen for follow up tx session on this date for formal cognitive assessment. Pt administered MOCA in quiet, minimally distracting environment. Pt however is easily distracted t/o session requiring VC for redirection. Pt scored 22/30 on MOCA indicating mild cognitive impairment. Pt demonstrated impairments in the following categories: visuospatial and executive function, STM/delayed recall, attention, and abstraction. Based on perfomance, Pt may require assistance w/ executive function, safety awareness, insight, and other higher level cognitive functions. Will plan for treatment 1-2x/week to address cognitive deficits as well as others as defined above. Will continue to follow.   Additional Treatment Day 1   End of Consult   Education Provided Yes   Patient Position at End of Consult Bedside chair;Bed/Chair alarm activated;All needs within reach   Nurse Communication Nurse aware of consult     OT Goals:     - Pt will complete LB ADLs w/ Mod I using appropriate AD/DME as needed to maximize functional independence.    - Pt will complete UB ADLs w/ Mod I using appropriate AD/DME  as needed to maximize functional independence.    - Pt will complete toileting routine (transfers, hygiene, and clothing management) with Mod I  to maximize functional independence.    - Pt will complete bed mobility supine >< sit w/ Mod I using appropriate AD/DME as needed.    - Pt will transfer to bed, chair, and toilet w/ Mod I using appropriate AD/DME as needed.    - Pt will be Mod I with functional mobility for household distances using appropriate AD/DME as needed.     - Pt will increase activity tolerance (and sitting tolerance) by eating all meals OOB in the chair.     - Pt will increase  standing tolerance to 10-15 minutes to maximize independence w/ functional standing activities.      - Pt will tolerate therapeutic activities for greater than 30 minutes in order to increase tolerance for functional activities.     - Pt will independently integrate pacing/energy conservation strategies into functional activities.     - Pt will participate in ongoing OT assessment of cognitive skills to assist with safe d/c planning/recommendations.     - Pt will consistently follow one-step directions with min to no vc or prompting.     - Pt will attend to functional tasks for 10 minutes with min to no vc for attention/redirection.       Cher Du, MOT, OTR/L

## 2025-05-15 NOTE — PLAN OF CARE
Problem: OCCUPATIONAL THERAPY ADULT  Goal: Performs self-care activities at highest level of function for planned discharge setting.  See evaluation for individualized goals.  Description: Treatment Interventions: ADL retraining, Functional transfer training, Endurance training, Cognitive reorientation, Patient/family training, Continued evaluation, Energy conservation, Activityengagement          See flowsheet documentation for full assessment, interventions and recommendations.   Outcome: Progressing  Note: Limitation: Decreased ADL status, Decreased cognition, Decreased endurance, Decreased high-level ADLs  Prognosis: Good  Assessment: Pt is a 84 y.o. male seen for OT evaluation s/p admission to [facility] on 5/10/2025 with NSTEMI (non-ST elevated myocardial infarction) (HCC), pending workup for CABG. Pt  has a past medical history of Bladder tumor, Cancer (HCC), Depression, Diabetes mellitus (HCC), Diverticulitis of colon, History of common carotid artery stent placement, History of heart artery stent, Hyperlipidemia, Hypertension, Migraines, Myocardial infarction (HCC), Nephrostomy present (HCC), Prostate cancer (HCC), and Seasonal allergies.  Pt with active OT cognitive evaluation orders as part of pre-op workup. Pt reports living w/ spouse who he cares for in 1SH w/ 7-8STE.  PTA, Pt was I w/ ADL/IADL and functional mobility, was driving and was not using DME at baseline. Pt agreeable and willing to participate in OT evaluation. Pt was greeted and left OOB in chair w/ alarm activated and all needs within reach. During evaluation, pt is Supervision ADLs, transfers, functional mobility. Pt currently presents with impairments in the following categories -steps to enter environment, limited home support, difficulty performing ADLS, and difficulty performing IADLS  activity tolerance, endurance, standing balance/tolerance, memory, insight, safety , and attention . These impairments, as well as pt's risk  for falls and home environment  limit pt's ability to safely engage in all baseline areas of occupation, includinggrooming, bathing, dressing, toileting, functional mobility/transfers, community mobility, laundry , driving, medication management, meal prep, cleaning, and care of children. Pt would benefit from continued acute OT services throughout hospital course in order to maximize Pt's independence and overall occupational performance. Plan for OT interventions 1-2x per week. From OT standpoint,  recommend Level III (Minimum Resource Intensity) upon d/c when pt medically stable to d/c from acute care. Will continue to follow.     Rehab Resource Intensity Level, OT: III (Minimum Resource Intensity) (pending progress and possible CABG this admission)

## 2025-05-15 NOTE — ASSESSMENT & PLAN NOTE
Patient noted late April with low blood pressure he self discontinued off amlodipine then recheck to PCP and was advised to restart amlodipine and discontinue hydrochlorothiazide  Monitor blood pressure and reevaluate   Continue home lisinopril and amlodipine,  As needed Lopressor IV for systolic greater than 180  Lisinopril on hold hold in anticipation for CABG

## 2025-05-15 NOTE — ASSESSMENT & PLAN NOTE
Atorvastatin dose increased.  No side effects.  Cholesterol 166, triglycerides 113, HDL 66 and LDL 77

## 2025-05-16 ENCOUNTER — APPOINTMENT (INPATIENT)
Dept: NON INVASIVE DIAGNOSTICS | Facility: HOSPITAL | Age: 85
DRG: 233 | End: 2025-05-16
Payer: MEDICARE

## 2025-05-16 ENCOUNTER — ANESTHESIA (INPATIENT)
Dept: PERIOP | Facility: HOSPITAL | Age: 85
DRG: 233 | End: 2025-05-16
Payer: MEDICARE

## 2025-05-16 PROBLEM — J98.4 RESTRICTIVE LUNG DISEASE: Status: ACTIVE | Noted: 2025-05-16

## 2025-05-16 PROBLEM — E44.0 MODERATE PROTEIN-CALORIE MALNUTRITION (HCC): Status: ACTIVE | Noted: 2025-05-16

## 2025-05-16 PROBLEM — Z86.12: Status: ACTIVE | Noted: 2025-05-16

## 2025-05-16 PROBLEM — Z87.891 FORMER SMOKER: Status: ACTIVE | Noted: 2025-05-14

## 2025-05-16 LAB
APTT PPP: 76 SECONDS (ref 23–34)
APTT PPP: 88 SECONDS (ref 23–34)

## 2025-05-16 PROCEDURE — 85730 THROMBOPLASTIN TIME PARTIAL: CPT | Performed by: FAMILY MEDICINE

## 2025-05-16 PROCEDURE — 99232 SBSQ HOSP IP/OBS MODERATE 35: CPT | Performed by: INTERNAL MEDICINE

## 2025-05-16 PROCEDURE — 85730 THROMBOPLASTIN TIME PARTIAL: CPT | Performed by: THORACIC SURGERY (CARDIOTHORACIC VASCULAR SURGERY)

## 2025-05-16 PROCEDURE — 99232 SBSQ HOSP IP/OBS MODERATE 35: CPT | Performed by: FAMILY MEDICINE

## 2025-05-16 PROCEDURE — 94640 AIRWAY INHALATION TREATMENT: CPT

## 2025-05-16 PROCEDURE — 94664 DEMO&/EVAL PT USE INHALER: CPT

## 2025-05-16 PROCEDURE — NC001 PR NO CHARGE: Performed by: PHYSICIAN ASSISTANT

## 2025-05-16 PROCEDURE — 94760 N-INVAS EAR/PLS OXIMETRY 1: CPT

## 2025-05-16 RX ORDER — LEVALBUTEROL INHALATION SOLUTION 0.63 MG/3ML
0.63 SOLUTION RESPIRATORY (INHALATION)
Status: DISCONTINUED | OUTPATIENT
Start: 2025-05-16 | End: 2025-05-19

## 2025-05-16 RX ADMIN — SODIUM CHLORIDE, PRESERVATIVE FREE 10 ML: 5 INJECTION INTRAVENOUS at 08:20

## 2025-05-16 RX ADMIN — LEVALBUTEROL HYDROCHLORIDE 0.63 MG: 0.63 SOLUTION RESPIRATORY (INHALATION) at 19:26

## 2025-05-16 RX ADMIN — ASPIRIN 81 MG CHEWABLE TABLET 81 MG: 81 TABLET CHEWABLE at 08:20

## 2025-05-16 RX ADMIN — CHLORHEXIDINE GLUCONATE 15 ML: 1.2 SOLUTION ORAL at 08:20

## 2025-05-16 RX ADMIN — MUPIROCIN 1 APPLICATION: 20 OINTMENT TOPICAL at 08:20

## 2025-05-16 RX ADMIN — HEPARIN SODIUM 16 UNITS/KG/HR: 10000 INJECTION, SOLUTION INTRAVENOUS at 09:43

## 2025-05-16 RX ADMIN — IPRATROPIUM BROMIDE 0.5 MG: 0.5 SOLUTION RESPIRATORY (INHALATION) at 13:25

## 2025-05-16 RX ADMIN — METOPROLOL TARTRATE 25 MG: 25 TABLET, FILM COATED ORAL at 08:20

## 2025-05-16 RX ADMIN — IPRATROPIUM BROMIDE 0.5 MG: 0.5 SOLUTION RESPIRATORY (INHALATION) at 19:25

## 2025-05-16 RX ADMIN — CEFTRIAXONE SODIUM 1000 MG: 10 INJECTION, POWDER, FOR SOLUTION INTRAVENOUS at 09:42

## 2025-05-16 RX ADMIN — ATORVASTATIN CALCIUM 40 MG: 40 TABLET, FILM COATED ORAL at 08:20

## 2025-05-16 RX ADMIN — MUPIROCIN 1 APPLICATION: 20 OINTMENT TOPICAL at 20:26

## 2025-05-16 RX ADMIN — LEVALBUTEROL HYDROCHLORIDE 0.63 MG: 0.63 SOLUTION RESPIRATORY (INHALATION) at 13:25

## 2025-05-16 RX ADMIN — METOPROLOL TARTRATE 25 MG: 25 TABLET, FILM COATED ORAL at 20:25

## 2025-05-16 RX ADMIN — AMLODIPINE BESYLATE 5 MG: 5 TABLET ORAL at 08:20

## 2025-05-16 RX ADMIN — CHLORHEXIDINE GLUCONATE 15 ML: 1.2 SOLUTION ORAL at 20:25

## 2025-05-16 NOTE — PROGRESS NOTES
"Progress Note - Cardiology   Name: Kilo Mix 84 y.o. male I MRN: [id number]  Unit/Bed#: PPHP-322-01 I Date of Admission: 5/10/2025   Date of Service: 5/16/2025 I Hospital Day: 6     Assessment & Plan  NSTEMI (non-ST elevated myocardial infarction) (HCC)  5/10/2025 presentation with chest discomfort  5/10/2025 EKG: Sinus rhythm, nonspecific ST changes, no ST elevation  Peak troponin 2989  5/12/2025 LHC: Severe three-vessel disease including distal left main  Cardiac surgery consulted.  Cardiac surgery notes reviewed.  CABG planned during this admission.  Preop testing reviewed.  Mild to moderate carotid disease on Doppler which is asymptomatic.  Prior history of right CEA and restenosis requiring right side TCAR in 2018.  CT finding of residual pneumonia.  Plans for delaying surgery till antibiotics completed.  Currently on ceftriaxone for pneumonia.  No worsening angina.  Continue present medical therapy.  Coronary artery disease involving native coronary artery  History of MI (1995) with status post bare-metal stents to RCA  Status post PCI/LOUIE 2007  Clopidogrel on hold.  Surgical revascularization planned next week.  Dyslipidemia  Tolerating high-dose atorvastatin.  Cholesterol 166, triglycerides 113, HDL 66 and LDL 77  Continue present medicines.    Plan of care discussed with the patient.  Medical therapy reviewed.  All questions answered.  Encouraged ambulation.    Subjective:     Overnight events reviewed.  Blood pressure is stable.  Negative fluid balance noted.  No angina reported.  No labs today.  No cough or fever.    Review of Systems   Constitutional:  Positive for fatigue.   Respiratory:  Negative for shortness of breath.    Cardiovascular:  Negative for chest pain.   Psychiatric/Behavioral:  Positive for dysphoric mood.         Current Medications[1]      Objective:     Vitals:   Blood pressure 124/51, pulse 56, temperature (!) 97.1 °F (36.2 °C), resp. rate 12, height 5' 8\" (1.727 m), weight 72.1 kg " (159 lb), SpO2 98%.  Body mass index is 24.18 kg/m².  Systolic (24hrs), Av , Min:123 , Max:133     Diastolic (24hrs), Av, Min:51, Max:62      Intake/Output Summary (Last 24 hours) at 2025 0837  Last data filed at 2025 0829  Gross per 24 hour   Intake 460 ml   Output 2660 ml   Net -2200 ml     Weight (last 2 days)       None            Physical Exam  Vitals reviewed.   Constitutional:       General: He is not in acute distress.  HENT:      Head: Normocephalic.     Cardiovascular:      Rate and Rhythm: Normal rate and regular rhythm.      Heart sounds: No murmur heard.  Pulmonary:      Breath sounds: No wheezing or rales.     Musculoskeletal:         General: No swelling.     Skin:     General: Skin is warm.     Neurological:      General: No focal deficit present.      Mental Status: He is alert.     Psychiatric:         Mood and Affect: Mood normal.         Labs & Results:    Lab Results   Component Value Date    SODIUM 141 05/15/2025    K 3.8 05/15/2025     05/15/2025    CO2 28 05/15/2025    BUN 23 05/15/2025    CREATININE 1.24 05/15/2025    GLUC 110 05/15/2025    CALCIUM 9.4 05/15/2025     Results from last 7 days   Lab Units 05/15/25  0544 25  0529 25  0621 25  0405 05/10/25  1954   POTASSIUM mmol/L 3.8 3.8 3.8 3.6 3.6   CHLORIDE mmol/L 106 104 104 105 105   CO2 mmol/L 28 26 28 27 24   BUN mg/dL 23 22 19 18 22   CREATININE mg/dL 1.24 1.17 1.12 1.09 1.14   CALCIUM mg/dL 9.4 9.3 8.9 9.2 9.1     Lab Results   Component Value Date    WBC 6.34 05/15/2025    WBC 6.24 2015    RBC 3.76 (L) 05/15/2025    RBC 4.96 2015    HGB 12.1 05/15/2025    HGB 15.2 2015    HCT 36.7 05/15/2025    HCT 44.8 2015    MCV 98 05/15/2025    MCV 90 2015    MCH 32.2 05/15/2025    MCH 30.6 2015    RDW 13.0 05/15/2025    RDW 13.9 2015     05/15/2025     2015     Results from last 7 days   Lab Units 25  0252 05/15/25  1939 05/15/25  1245  "05/11/25 0314 05/10/25  2020   PTT seconds 76* 91* 49*   < > 29   INR   --   --   --   --  0.96    < > = values in this interval not displayed.       Available cardiology studies, imaging and lab results independently reviewed today.      This note was completed in part utilizing voice recognition software.   Grammatical errors, random word insertion, spelling mistakes, occasional wrong word or \"sound-alike\" substitutions and incomplete sentences may be an occasional consequence of the system secondary to software limitations, ambient noise and hardware issues. At the time of dictation, efforts were made to edit, clarify and /or correct errors.  Please read the chart carefully and recognize, using context, where substitutions have occurred.  If you have any questions or concerns about the context, text or information contained within the body of this dictation, please contact myself, the provider, for further clarification.         [1]   Current Facility-Administered Medications:     amLODIPine (NORVASC) tablet 5 mg, 5 mg, Oral, Daily, Candy Bailey MD, 5 mg at 05/16/25 0820    aspirin chewable tablet 81 mg, 81 mg, Oral, Daily, Candy Bailey MD, 81 mg at 05/16/25 0820    atorvastatin (LIPITOR) tablet 40 mg, 40 mg, Oral, Daily, Candy Bailey MD, 40 mg at 05/16/25 0820    cefTRIAXone (ROCEPHIN) 1,000 mg in dextrose 5 % 50 mL IVPB, 1,000 mg, Intravenous, Q24H, Candy Bailey MD, Last Rate: 100 mL/hr at 05/15/25 1030, 1,000 mg at 05/15/25 1030    chlorhexidine (PERIDEX) 0.12 % oral rinse 15 mL, 15 mL, Mouth/Throat, Q12H AMADOU, Candy Bailey MD, 15 mL at 05/16/25 0820    heparin (porcine) 25,000 units in 0.45% NaCl 250 mL infusion (premix), 3-20 Units/kg/hr (Order-Specific), Intravenous, Titrated, Analy Phan PA-C, Last Rate: 11.2 mL/hr at 05/15/25 2041, 16 Units/kg/hr at 05/15/25 2041    heparin (porcine) injection 2,000 Units, 2,000 Units, Intravenous, Q6H PRN, Analy Phan PA-C, 2,000 " Units at 05/15/25 1328    heparin (porcine) injection 4,000 Units, 4,000 Units, Intravenous, Q6H PRN, Analy Phan PA-C, 4,000 Units at 05/13/25 2230    metoprolol tartrate (LOPRESSOR) tablet 25 mg, 25 mg, Oral, Q12H AMADOU, Candy Bailey MD, 25 mg at 05/16/25 0820    mupirocin (BACTROBAN) 2 % nasal ointment 1 Application, 1 Application, Nasal, Q12H AMADOU, Candy Bailey MD, 1 Application at 05/16/25 0820    nitroglycerin (NITROSTAT) SL tablet 0.4 mg, 0.4 mg, Sublingual, Q5 Min PRN, Candy Bailey MD    sodium chloride (PF) 0.9 % injection 10 mL, 10 mL, Intracatheter, Daily, Candy Bailey MD, 10 mL at 05/16/25 0820    sodium chloride (PF) 0.9 % injection 10 mL, 10 mL, Intracatheter, Daily, Candy Bailey MD, 10 mL at 05/16/25 0820

## 2025-05-16 NOTE — ASSESSMENT & PLAN NOTE
Malnutrition Findings:   Adult Malnutrition type: Chronic illness  Adult Degree of Malnutrition: Malnutrition of moderate degree  Malnutrition Characteristics: Inadequate energy, Weight loss                  360 Statement: Chronic moderate pro, diomedes malnutrition d/t catabolic illness, decreased appetite as evidence by significant weight loss, 12/16/24 177lbs, 2/13/25 172lbs, 4/17/25 161lbs, 5/12/25 159lbs, 18lbs/10% wt. loss x 6 months, 13lbs/7.5% wt. loss x 3 months, <75% energy intake > 1 month, treated with oral diet, oral nutrition supplements, discussed tips for increasing pro, diomedes intake at home.    BMI Findings:           Body mass index is 24.18 kg/m².

## 2025-05-16 NOTE — ASSESSMENT & PLAN NOTE
Tolerating high-dose atorvastatin.  Cholesterol 166, triglycerides 113, HDL 66 and LDL 77  Continue present medicines.

## 2025-05-16 NOTE — ASSESSMENT & PLAN NOTE
ARISCAT 42% of post op complications, high risk  Keene score 7.8%  His perioperative risk is related to his chronic conditions and age and not due to current pneumonia.   He may proceed to surgery with acceptable risk.  There is moderate to high risk of acute hypoxic and hypercapnic respiratory failure due to COPD, ALPHONSO and pulmonary hypertension.      -  In perioperative period I would utilize duonebs every TID in pre and post operative period      -  Monitor closely for the development of hypoxia      -  Minimize the use of benzodiazepines and narcotics as possible      -  Would encourage incentive spirometry to prevent atelectasis      -  Early ambulation as tolerated      -  Pharmacologic DVT prophylaxis.

## 2025-05-16 NOTE — PROGRESS NOTES
Progress Note - Cardiac Surgery   Kilo Mix 84 y.o. male MRN: [id number]  Unit/Bed#: PPHP-322-01 Encounter: [id number]      24 Hour Events: PFTs complete. Abx finish today. No complaints    Medications:   Scheduled Meds:  Current Facility-Administered Medications   Medication Dose Route Frequency Provider Last Rate    amLODIPine  5 mg Oral Daily Candy Bailey MD      aspirin  81 mg Oral Daily Candy Bailey MD      atorvastatin  40 mg Oral Daily Candy Bailey MD      cefTRIAXone  1,000 mg Intravenous Q24H Candy Bailey MD 1,000 mg (05/15/25 1030)    chlorhexidine  15 mL Mouth/Throat Q12H [facility] Candy Bailey MD      heparin (porcine)  3-20 Units/kg/hr (Order-Specific) Intravenous Titrated Analy Phan PA-C 16 Units/kg/hr (05/15/25 2041)    heparin (porcine)  2,000 Units Intravenous Q6H PRN Analy SOPHIA Phan PA-C      heparin (porcine)  4,000 Units Intravenous Q6H PRN Analy Phan PA-C      metoprolol tartrate  25 mg Oral Q12H [facility] Candy Bailey MD      mupirocin  1 Application Nasal Q12H [facility] Candy Bailey MD      nitroglycerin  0.4 mg Sublingual Q5 Min PRN Candy Bailey MD      sodium chloride (PF)  10 mL Intracatheter Daily Candy Bailey MD      sodium chloride (PF)  10 mL Intracatheter Daily Candy Bailey MD       Continuous Infusions:heparin (porcine), 3-20 Units/kg/hr (Order-Specific), Last Rate: 16 Units/kg/hr (05/15/25 2041)        Results:   Results from last 7 days   Lab Units 05/15/25  0544 05/12/25  0621 05/11/25  0324   WBC Thousand/uL 6.34 9.35 10.04   HEMOGLOBIN g/dL 12.1 11.0* 11.8*   HEMATOCRIT % 36.7 33.7* 35.2*   PLATELETS Thousands/uL 218 181 210     Results from last 7 days   Lab Units 05/15/25  0544 05/13/25  0529 05/12/25  0621   POTASSIUM mmol/L 3.8 3.8 3.8   CHLORIDE mmol/L 106 104 104   CO2 mmol/L 28 26 28   BUN mg/dL 23 22 19   CREATININE mg/dL 1.24 1.17 1.12   CALCIUM mg/dL 9.4 9.3 8.9     Results from last 7 days   Lab Units 05/16/25  0252  05/15/25  1939 05/15/25  1245 05/11/25  0314 05/10/25  2020   INR   --   --   --   --  0.96   PTT seconds 76* 91* 49*   < > 29    < > = values in this interval not displayed.     MRSA pending  A1C 5.6  Lipid panel completed      Studies:     Cardiac Cath: distal LM 70%, Mid LAD 80% and 70%, Diag2 80%, Ost Cx 90%, Mid RCA 85%, Dist RCA 60%, RPDA 70%      Echo: EF 60%, normal wall motion, mild MV annular calcification with mild MR, mild TR     CT chest 5/13: Persistent right middle lobe and right lower lobe pneumonia. Cholelithiasis. 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 Atrophic right kidney. Simple appearing renal cyst     Carotid artery duplex:  Right: There is <50% stenosis noted in the internal carotid artery. The common carotid artery/bulb stent is patent. Plaque is homogenous and smooth.   A severe stenosis was identified in the proximal external carotid artery.  Vertebral artery flow is antegrade.  There is no significant subclavian artery disease.       LEFT:   There is 50-69% stenosis noted in the internal carotid artery.  Plaque is heterogenous and irregular.  Vertebral artery flow is antegrade.  There is no significant subclavian artery disease.     Greater saphenous vein mapping: Bilateral GSV with small calibers for conduit. Possible segment in left thigh    Results Review Statement: I personally reviewed the following image studies in PACS and associated radiology reports: Carotid US, Vein mapping, cardiac cath, CT chest, and Echocardiogram. My interpretation of the radiology images/reports is: as noted above.    Vitals:   Vitals:    05/15/25 2047 05/15/25 2231 05/16/25 0256 05/16/25 0719   BP: 126/56 130/54 133/62 124/51   Pulse: 66 (!) 54 59 56   Resp:       Temp:  (!) 97.4 °F (36.3 °C) (!) 97.3 °F (36.3 °C) (!) 97.1 °F (36.2 °C)   TempSrc:   Oral    SpO2: 99% 98% 95% 98%   Weight:       Height:         Physical Exam:    Physical Exam:    General: No acute distress, Alert, and Normal  appearance  HEENT/NECK:  Normocephalic. Atraumatic.  no jugular venous distention.    Cardiac: Regular rate and rhythm  Pulmonary:  Breath sounds clear bilaterally  Neuro: Alert and oriented X 3, Sensation is grossly intact, and No focal deficits  Skin: Warm/Dry, without rashes or lesions.          Assessment:    Severe coronary artery disease; Ongoing CABG workup    Plan:  Patient agreeable to proceed with surgery; all questions and concerns addressed. Informed consent signed    Oncology evaluated for his stage 4 prostate CA. Specifically, his one year and 5 year survival. Spoke with oncologist - he has pretty extensive prostate cancer, 5 yr survival is less than 40-50% though 1 year survival is very good      Carotid ultrasound shows 50-69% left ICA stenosis and severe stenosis of right external carotid artery. Consulted vascular with recommendations to f/u as OP    Vein mapping completed; Findings show small caliber vein bilaterally- bedside U/S performed revealing b/l adequate thighs    Blood type and cross match ordered for 5/20    Discontinued Lisinopril in anticipation for surgery    MRSA negative    PFTs - complete, moderate restriction, FEV1 84%, Fev/%, DLCO 65% - Abx finish today for PNA, Pulmonary following    Appreciate Gerontology consult    Continue Mupirocin 2% nasal ointment q 12 hrs    Continue topical chlorhexidine bath and mouth rise    coronary artery bypass grafting scheduled for Wednesday 5/21 with [email or web address].    SIGNATURE: Anaid Williamson PA-C  DATE: May 16, 2025  TIME: 8:25 AM    * This note was completed in part utilizing [employer] direct voice recognition software.   Grammatical errors, random word insertion, spelling mistakes, and incomplete sentences may be an occasional consequence of the system secondary to software limitations, ambient noise and hardware issues. At the time of dictation, efforts were made to edit, clarify and /or correct errors. Please read  the chart carefully and recognize, using context, where substitutions have occurred.  If you have any questions or concerns about the context, text or information contained within the body of this dictation, please contact myself, the provider, for further clarification.

## 2025-05-16 NOTE — ASSESSMENT & PLAN NOTE
Former extensive smoking history > 3 packs per day for 40 years.  PFT without obstructive lung disease.  Mildly reduced DLCO  Start Xopenex and Atrovent TID  No need for steroids at this time.

## 2025-05-16 NOTE — ASSESSMENT & PLAN NOTE
Has remote history of CAD /MI s/p coronary stents X2  Presenting with intermittent chest pain over last several weeks mainly with activity, but progressively worsening. Most recent episode before calling EMS lasted longer and did not resolve with rest, was radiating to neck and bilateral arms, with diaphoresis and vomiting.   Elevated troponin, EKG was sinus tachycardia 105 with some nonspecific ST changes  Patient started on heparin drip in the ED  Monitor on telemetry  As needed submental length of blistering  Cardiology consulted for further management  Follow on cardiac echo  - has recent A1C, Lipid and TSH , all were unremarkable.   Add beta-blocker  Left heart cath today 5/12 shows three-vessel disease, plan for evaluation for potential CABG  Heparin drip was resumed after cardiac catheterization  CABG postponed to 5/21

## 2025-05-16 NOTE — ASSESSMENT & PLAN NOTE
History of MI (1995) with status post bare-metal stents to RCA  Status post PCI/LOUIE 2007  Clopidogrel on hold.  Surgical revascularization planned next week.

## 2025-05-16 NOTE — ASSESSMENT & PLAN NOTE
Chest x-ray with moderate right base pneumonia  Patient is afebrile, denied cough or shortness of breath  Upper normal white blood cells  Start IV ceftriaxone empirically- compleat  7-day course  Procalcitonin and remained negative x 2.  Pulmonology consultation appreciated.-Continue to finish the course of antibiotics  CT chest from 5/13 reviewed.  Persistent right middle lobe and right lower lobe pneumonia-it may takes 4 to 6 weeks for clearance of pneumonia on imaging

## 2025-05-16 NOTE — PROGRESS NOTES
"Progress Note - Hospitalist   Name: Kilo Mix 84 y.o. male I MRN: [id number]  Unit/Bed#: PPHP-322-01 I Date of Admission: 5/10/2025   Date of Service: 5/16/2025 I Hospital Day: 6    Assessment & Plan  NSTEMI (non-ST elevated myocardial infarction) (HCC)  Has remote history of CAD /MI s/p coronary stents X2  Presenting with intermittent chest pain over last several weeks mainly with activity, but progressively worsening. Most recent episode before calling EMS lasted longer and did not resolve with rest, was radiating to neck and bilateral arms, with diaphoresis and vomiting.   Elevated troponin, EKG was sinus tachycardia 105 with some nonspecific ST changes  Patient started on heparin drip in the ED  Monitor on telemetry  As needed submental length of blistering  Cardiology consulted for further management  Follow on cardiac echo  - has recent A1C, Lipid and TSH , all were unremarkable.   Add beta-blocker  Left heart cath today 5/12 shows three-vessel disease, plan for evaluation for potential CABG  Heparin drip was resumed after cardiac catheterization  CABG postponed to 5/21      Pneumonia involving right lung  Chest x-ray with moderate right base pneumonia  Patient is afebrile, denied cough or shortness of breath  Upper normal white blood cells  Start IV ceftriaxone empirically- compleat  7-day course  Procalcitonin and remained negative x 2.  Pulmonology consultation appreciated.-Continue to finish the course of antibiotics  CT chest from 5/13 reviewed.  Persistent right middle lobe and right lower lobe pneumonia-it may takes 4 to 6 weeks for clearance of pneumonia on imaging    Diabetes mellitus type 2 with neurological manifestations ([facility])  Lab Results   Component Value Date    HGBA1C 5.6 05/13/2025       No results for input(s): \"POCGLU\" in the last 72 hours.    Blood Sugar Average: Last 72 hrs:    Most recent A1C 5.8 ; hold home metformin while inpatient -   Continue with insulin sliding scale and low carb " diet.     Hypertension  Patient noted late April with low blood pressure he self discontinued off amlodipine then recheck to PCP and was advised to restart amlodipine and discontinue hydrochlorothiazide  Monitor blood pressure and reevaluate   Continue home lisinopril and amlodipine,  As needed Lopressor IV for systolic greater than 180  Lisinopril on hold hold in anticipation for CABG      Peripheral vascular disease (HCC)  History of carotid artery disease s/p stenting as well as coronary artery disease/MI s/p stenting more than 20 years ago  Continue aspirin Plavix and statin, increasing Lipitor to 40 mg-plavix on hold   Dyslipidemia  Patient is on only 20 mg of atorvastatin however he has significant history of coronary artery disease and carotid artery disease s/p stenting for both  He denies intolerance of high intensity statin and agreeable to increase to 40 mg Lipitor  Prostate cancer (HCC)  Stage IV prostate cancer with mets to the lymph nodes and complicated with urinary obstruction requiring bilateral nephrostomies    January 2025 he was started on ADT and Xtandi, complicated with dizziness for which Xtandi was discontinued in April 2025    Patient to continue follow-up with medical oncology established with Dr. Shankar  Cardiac surgery discussed with oncology regarding prognosis.  H/O insertion of nephrostomy tube  Has nephrostomies bilaterally due to obstruction by prostate cancer metastasis to lymph nodes   nephrostomy care while patient,   follow-up with urology, medical oncology and IR  Coronary artery disease involving native coronary artery    Asymptomatic stenosis of left carotid artery  Vascular surgery consultation appreciated.  Recommended outpatient follow-up  Preoperative clearance    ALPHONSO (obstructive sleep apnea)    Former smoker    At risk for delirium    Depression    Frailty    Cognitive impairment    Hx of acute poliomyelitis    Restrictive lung disease    Moderate protein-calorie  malnutrition (HCC)  Malnutrition Findings:   Adult Malnutrition type: Chronic illness  Adult Degree of Malnutrition: Malnutrition of moderate degree  Malnutrition Characteristics: Inadequate energy, Weight loss                  360 Statement: Chronic moderate pro, diomedes malnutrition d/t catabolic illness, decreased appetite as evidence by significant weight loss, 12/16/24 177lbs, 2/13/25 172lbs, 4/17/25 161lbs, 5/12/25 159lbs, 18lbs/10% wt. loss x 6 months, 13lbs/7.5% wt. loss x 3 months, <75% energy intake > 1 month, treated with oral diet, oral nutrition supplements, discussed tips for increasing pro, diomedes intake at home.    BMI Findings:           Body mass index is 24.18 kg/m².       VTE Pharmacologic Prophylaxis: VTE Score: 8 Moderate Risk (Score 3-4) - Pharmacological DVT Prophylaxis Ordered: heparin drip.    Mobility:   Basic Mobility Inpatient Raw Score: 23  JH-HLM Goal: 7: Walk 25 feet or more  JH-HLM Achieved: 6: Walk 10 steps or more  JH-HLM Goal achieved. Continue to encourage appropriate mobility.    Patient Centered Rounds: I performed bedside rounds with nursing staff today.   Discussions with Specialists or Other Care Team Provider:     Education and Discussions with Family / Patient: Patient declined call to .     Current Length of Stay: 6 day(s)  Current Patient Status: Inpatient   Certification Statement: The patient will continue to require additional inpatient hospital stay due to pending cabg   Discharge Plan: Anticipate discharge in >72 hrs to home with home services.    Code Status: Level 1 - Full Code    Subjective   Patient seen and examined.  Denies any chest pain or shortness of breath.  Waiting for CABG on Wednesday    Objective :  Temp:  [97.1 °F (36.2 °C)-97.6 °F (36.4 °C)] 97.6 °F (36.4 °C)  HR:  [49-66] 57  BP: (109-133)/(46-62) 109/51  Resp:  [18] 18  SpO2:  [95 %-99 %] 98 %  O2 Device: None (Room air)    Body mass index is 24.18 kg/m².     Input and Output Summary (last  24 hours):     Intake/Output Summary (Last 24 hours) at 5/16/2025 1618  Last data filed at 5/16/2025 1157  Gross per 24 hour   Intake 440 ml   Output 1920 ml   Net -1480 ml       Physical Exam  Constitutional:       General: He is not in acute distress.     Appearance: Normal appearance. He is not ill-appearing.   HENT:      Head: Normocephalic and atraumatic.      Nose: Nose normal.     Eyes:      General: No scleral icterus.      Cardiovascular:      Rate and Rhythm: Normal rate and regular rhythm.      Heart sounds: No murmur heard.  Pulmonary:      Effort: No respiratory distress.      Breath sounds: No wheezing.   Abdominal:      General: There is no distension.      Tenderness: There is no abdominal tenderness.     Skin:     General: Skin is warm.      Coloration: Skin is not jaundiced.     Neurological:      Mental Status: He is alert. Mental status is at baseline.           Lines/Drains:  Lines/Drains/Airways       Active Status       Name Placement date Placement time Site Days    Nephrostomy Left 10.2 Fr. 04/29/25  1109  Left  17    Nephrostomy Right 10.2 Fr. 04/29/25  1111  Right  17                      Telemetry:  Telemetry Orders (From admission, onward)               24 Hour Telemetry Monitoring  Continuous x 24 Hours (Telem)        Expiring   Question:  Reason for 24 Hour Telemetry  Answer:  PCI/EP study (including pacer and ICD implementation), Cardiac surgery, MI, abnormal cardiac cath, and chest pain- rule out MI                     Telemetry Reviewed: Normal Sinus Rhythm  Indication for Continued Telemetry Use: Awaiting PCI/EP Study/CABG               Lab Results: I have reviewed the following results:   Results from last 7 days   Lab Units 05/15/25  0544 05/12/25  0621   WBC Thousand/uL 6.34 9.35   HEMOGLOBIN g/dL 12.1 11.0*   HEMATOCRIT % 36.7 33.7*   PLATELETS Thousands/uL 218 181   SEGS PCT %  --  79*   LYMPHO PCT %  --  13*   MONO PCT %  --  6   EOS PCT %  --  2     Results from last 7 days    Lab Units 05/15/25  0544 05/12/25  0621 05/11/25  0405   SODIUM mmol/L 141   < > 142   POTASSIUM mmol/L 3.8   < > 3.6   CHLORIDE mmol/L 106   < > 105   CO2 mmol/L 28   < > 27   BUN mg/dL 23   < > 18   CREATININE mg/dL 1.24   < > 1.09   ANION GAP mmol/L 7   < > 10   CALCIUM mg/dL 9.4   < > 9.2   ALBUMIN g/dL  --   --  3.7   TOTAL BILIRUBIN mg/dL  --   --  0.84   ALK PHOS U/L  --   --  57   ALT U/L  --   --  7   AST U/L  --   --  23   GLUCOSE RANDOM mg/dL 110   < > 124    < > = values in this interval not displayed.     Results from last 7 days   Lab Units 05/10/25  2020   INR  0.96         Results from last 7 days   Lab Units 05/13/25  1253   HEMOGLOBIN A1C % 5.6     Results from last 7 days   Lab Units 05/12/25  0621 05/11/25  1146   PROCALCITONIN ng/ml 0.07 0.12       Recent Cultures (last 7 days):               Last 24 Hours Medication List:     Current Facility-Administered Medications:     amLODIPine (NORVASC) tablet 5 mg, Daily    aspirin chewable tablet 81 mg, Daily    atorvastatin (LIPITOR) tablet 40 mg, Daily    cefTRIAXone (ROCEPHIN) 1,000 mg in dextrose 5 % 50 mL IVPB, Q24H, Last Rate: 1,000 mg (05/16/25 0942)    chlorhexidine (PERIDEX) 0.12 % oral rinse 15 mL, Q12H AMADOU    heparin (porcine) 25,000 units in 0.45% NaCl 250 mL infusion (premix), Titrated, Last Rate: 16 Units/kg/hr (05/16/25 0943)    heparin (porcine) injection 2,000 Units, Q6H PRN    heparin (porcine) injection 4,000 Units, Q6H PRN    ipratropium (ATROVENT) 0.02 % inhalation solution 0.5 mg, TID    levalbuterol (XOPENEX) inhalation solution 0.63 mg, TID    metoprolol tartrate (LOPRESSOR) tablet 25 mg, Q12H AMADOU    mupirocin (BACTROBAN) 2 % nasal ointment 1 Application, Q12H AMADOU    nitroglycerin (NITROSTAT) SL tablet 0.4 mg, Q5 Min PRN    sodium chloride (PF) 0.9 % injection 10 mL, Daily    sodium chloride (PF) 0.9 % injection 10 mL, Daily    Administrative Statements   Today, Patient Was Seen By: Candy Bailey MD      **Please Note: This  note may have been constructed using a voice recognition system.**

## 2025-05-16 NOTE — ASSESSMENT & PLAN NOTE
Probable ALPHONSO, his son passed from complications around ALPHONSO.  His other son also stated he has been told he is difficult to intubate.  He has never had a sleep study but does have snoring and I do think it is likely to be a potential complicating factor.

## 2025-05-16 NOTE — PROGRESS NOTES
Progress Note - Pulmonology   Name: Klio Mix 84 y.o. male I MRN: [id number]  Unit/Bed#: PPHP-322-01 I Date of Admission: 5/10/2025   Date of Service: 5/16/2025 I Hospital Day: 6    Assessment & Plan  Preoperative clearance  ARISCAT 42% of post op complications, high risk  Keene score 7.8%  His perioperative risk is related to his chronic conditions and age and not due to current pneumonia.   He may proceed to surgery with acceptable risk.  There is moderate to high risk of acute hypoxic and hypercapnic respiratory failure due to COPD, ALPHONSO and pulmonary hypertension.      -  In perioperative period I would utilize duonebs every TID in pre and post operative period      -  Monitor closely for the development of hypoxia      -  Minimize the use of benzodiazepines and narcotics as possible      -  Would encourage incentive spirometry to prevent atelectasis      -  Early ambulation as tolerated      -  Pharmacologic DVT prophylaxis.      Pneumonia involving right lung  Continue Rocephin Daily.   Complete 7 day of antibiotics     In addition, the abnormal CT is likely due to timing and there was likely to be persistent changes as he is still early in the course of histreatment for pneumonia.  Hx of acute poliomyelitis  He had bulbar polio at 9 y/o.  Fortunately he did not require ventilation.    ALPHONSO (obstructive sleep apnea)  Probable ALPHONSO, his son passed from complications around ALPHONSO.  His other son also stated he has been told he is difficult to intubate.  He has never had a sleep study but does have snoring and I do think it is likely to be a potential complicating factor.    Restrictive lung disease  Multifactorial.  Likely related to obesity, possible relation to previously know polio as well.    Former smoker  Former extensive smoking history > 3 packs per day for 40 years.  PFT without obstructive lung disease.  Mildly reduced DLCO  Start Xopenex and Atrovent TID  No need for steroids at this time.    NSTEMI  "(non-ST elevated myocardial infarction) (HCC)  Continue heparin gtt.  Cardiology and cardiothoracic surgery following.    Coronary artery disease involving native coronary artery  Cardiothoracic surgery is following and considering CABG but are considering delaying until next week.    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    Blood Sugar Average: Last 72 hrs:      Frailty      Will see on Monday.     24 Hour Events : No new events overnight.  He denies shortness of breath, wheezing or chest tightness.        Objective :  Temp:  [97.1 °F (36.2 °C)-97.4 °F (36.3 °C)] 97.4 °F (36.3 °C)  HR:  [49-66] 49  BP: (110-133)/(46-62) 110/46  Resp:  [18] 18  SpO2:  [95 %-99 %] 95 %  O2 Device: None (Room air)    Physical Exam  General: Pleasant, Awake alert and oriented x 3, conversant without conversational dyspnea, NAD, normal affect  HEENT:  PERRL, Sclera noninjected, nonicteric OU, Nares patent,  no craniofacial abnormalities, Mucous membranes, moist, no oral lesions, normal dentition  NECK: Trachea midline, no accessory muscle use, no stridor, no cervical or supraclavicular adenopathy, JVP not elevated  CARDIAC: Reg, single s1/S2, no m/r/g  PULM: CTA bilaterally no wheezing, rhonchi or rales  ABD: Normoactive bowel sounds, soft nontender, nondistended, no rebound, no rigidity, no guarding  EXT: No cyanosis, no clubbing, no edema, normal capillary refill  NEURO: no focal neurologic deficits, AAOx3, moving all extremities appropriately      Lab Results: I have reviewed the following results:   .     05/16/25  1028   PTT 88*     ABG: No new results in last 24 hours.    Imaging Results Review: I personally reviewed the following image studies in PACS and associated radiology reports: CT chest. My interpretation of the radiology images/reports is: RML and RLL pneumonia.  CT " chest  IMPRESSION:  Persistent right middle lobe and right lower lobe pneumonia.     CXR  IMPRESSION:  Moderate right base pneumonia.     Other Study Results Review: EKG was reviewed.      Echo    Left Ventricle: Left ventricular cavity size is normal. Wall thickness is normal. There is mild concentric hypertrophy. The left ventricular ejection fraction is 60%. Systolic function is normal. Wall motion is normal. Diastolic function is mildly abnormal, consistent with grade I (abnormal) relaxation.    Right Ventricle: Right ventricular cavity size is normal. Systolic function is normal.    Left Atrium: The atrium is mildly dilated (35-41 mL/m2).    Mitral Valve: There is mild annular calcification. There is mild regurgitation.    Tricuspid Valve: There is mild regurgitation. The right ventricular systolic pressure is mildly elevated. The estimated right ventricular systolic pressure is 41.00 mmHg.        PFT Results Reviewed: show restriction and mild reduction in DLCO  Other Study Results Review: EKG was reviewed.

## 2025-05-16 NOTE — PLAN OF CARE
Problem: PAIN - ADULT  Goal: Verbalizes/displays adequate comfort level or baseline comfort level  Description: Interventions:- Encourage patient to monitor pain and request assistance- Assess pain using appropriate pain scale- Administer analgesics based on type and severity of pain and evaluate response- Implement non-pharmacological measures as appropriate and evaluate response- Consider cultural and social influences on pain and pain management- Notify physician/advanced practitioner if interventions unsuccessful or patient reports new pain  Outcome: Progressing     Problem: INFECTION - ADULT  Goal: Absence or prevention of progression during hospitalization  Description: INTERVENTIONS:- Assess and monitor for signs and symptoms of infection- Monitor lab/diagnostic results- Monitor all insertion sites, i.e. indwelling lines, tubes, and drains- Monitor endotracheal if appropriate and nasal secretions for changes in amount and color- [place] appropriate cooling/warming therapies per order- Administer medications as ordered- Instruct and encourage patient and family to use good hand hygiene technique- Identify and instruct in appropriate isolation precautions for identified infection/condition  Outcome: Progressing  Goal: Absence of fever/infection during neutropenic period  Description: INTERVENTIONS:- Monitor WBC  Outcome: Progressing     Problem: SAFETY ADULT  Goal: Patient will remain free of falls  Description: INTERVENTIONS:- Educate patient/family on patient safety including physical limitations- Instruct patient to call for assistance with activity - Consult OT/PT to assist with strengthening/mobility - Keep Call bell within reach- Keep bed low and locked with side rails adjusted as appropriate- Keep care items and personal belongings within reach- Initiate and maintain comfort rounds- Make Fall Risk Sign visible to staff- Offer Toileting every 2 Hours, in advance of need- Initiate/Maintain bed/chair  alarm- Obtain necessary fall risk management equipment: - Apply yellow socks and bracelet for high fall risk patients- Consider moving patient to room near nurses station  Outcome: Progressing  Goal: Maintain or return to baseline ADL function  Description: INTERVENTIONS:-  Assess patient's ability to carry out ADLs; assess patient's baseline for ADL function and identify physical deficits which impact ability to perform ADLs (bathing, care of mouth/teeth, toileting, grooming, dressing, etc.)- Assess/evaluate cause of self-care deficits - Assess range of motion- Assess patient's mobility; develop plan if impaired- Assess patient's need for assistive devices and provide as appropriate- Encourage maximum independence but intervene and supervise when necessary- Involve family in performance of ADLs- Assess for home care needs following discharge - Consider OT consult to assist with ADL evaluation and planning for discharge- Provide patient education as appropriate  Outcome: Progressing  Goal: Maintains/Returns to pre admission functional level  Description: INTERVENTIONS:- Perform AM-PAC 6 Click Basic Mobility/ Daily Activity assessment daily.- Set and communicate daily mobility goal to care team and patient/family/caregiver. - Collaborate with rehabilitation services on mobility goals if consulted- Perform Range of Motion 3 times a day.- Reposition patient every 2 hours.- Dangle patient 3 times a day- Stand patient 3 times a day- Ambulate patient 3 times a day- Out of bed to chair 3 times a day - Out of bed for meals 3 times a day- Out of bed for toileting- Record patient progress and toleration of activity level   Outcome: Progressing     Problem: DISCHARGE PLANNING  Goal: Discharge to home or other facility with appropriate resources  Description: INTERVENTIONS:- Identify barriers to discharge w/patient and caregiver- Arrange for needed discharge resources and transportation as appropriate- Identify discharge  learning needs (meds, wound care, etc.)- Arrange for interpretive services to assist at discharge as needed- Refer to Case Management Department for coordinating discharge planning if the patient needs post-hospital services based on physician/advanced practitioner order or complex needs related to functional status, cognitive ability, or social support system  Outcome: Progressing     Problem: Knowledge Deficit  Goal: Patient/family/caregiver demonstrates understanding of disease process, treatment plan, medications, and discharge instructions  Description: Complete learning assessment and assess knowledge base.Interventions:- Provide teaching at level of understanding- Provide teaching via preferred learning methods  Outcome: Progressing     Problem: CARDIOVASCULAR - ADULT  Goal: Maintains optimal cardiac output and hemodynamic stability  Description: INTERVENTIONS:- Monitor I/O, vital signs and rhythm- Monitor for S/S and trends of decreased cardiac output- Administer and titrate ordered vasoactive medications to optimize hemodynamic stability- Assess quality of pulses, skin color and temperature- Assess for signs of decreased coronary artery perfusion- Instruct patient to report change in severity of symptoms  INTERVENTIONS:- Monitor I/O, vital signs and rhythm- Monitor for S/S and trends of decreased cardiac output- Administer and titrate ordered vasoactive medications to optimize hemodynamic stability- Assess quality of pulses, skin color and temperature- Assess for signs of decreased coronary artery perfusion- Instruct patient to report change in severity of symptoms  Outcome: Progressing  Goal: Absence of cardiac dysrhythmias or at baseline rhythm  Description: INTERVENTIONS:- Continuous cardiac monitoring, vital signs, obtain 12 lead EKG if ordered- Administer antiarrhythmic and heart rate control medications as ordered- Monitor electrolytes and administer replacement therapy as ordered  INTERVENTIONS:-  Continuous cardiac monitoring, vital signs, obtain 12 lead EKG if ordered- Administer antiarrhythmic and heart rate control medications as ordered- Monitor electrolytes and administer replacement therapy as ordered  Outcome: Progressing     Problem: RESPIRATORY - ADULT  Goal: Achieves optimal ventilation and oxygenation  Description: INTERVENTIONS:- Assess for changes in respiratory status- Assess for changes in mentation and behavior- Position to facilitate oxygenation and minimize respiratory effort- Oxygen administered by appropriate delivery if ordered- Initiate smoking cessation education as indicated- Encourage broncho-pulmonary hygiene including cough, deep breathe, Incentive Spirometry- Assess the need for suctioning and aspirate as needed- Assess and instruct to report SOB or any respiratory difficulty- Respiratory Therapy support as indicated  Outcome: Progressing     Problem: GENITOURINARY - ADULT  Goal: Maintains or returns to baseline urinary function  Description: INTERVENTIONS:- Assess urinary function- Encourage oral fluids to ensure adequate hydration if ordered- Administer IV fluids as ordered to ensure adequate hydration- Administer ordered medications as needed- Offer frequent toileting- Follow urinary retention protocol if ordered  Outcome: Progressing  Goal: Absence of urinary retention  Description: INTERVENTIONS:- Assess patient’s ability to void and empty bladder- Monitor I/O- Bladder scan as needed- Discuss with physician/AP medications to alleviate retention as needed- Discuss catheterization for long term situations as appropriate  Outcome: Progressing  Goal: Urinary catheter remains patent  Description: INTERVENTIONS:- Assess patency of urinary catheter- If patient has a chronic lai, consider changing catheter if non-functioning- Follow guidelines for intermittent irrigation of non-functioning urinary catheter  Outcome: Progressing     Problem: METABOLIC, FLUID AND ELECTROLYTES -  ADULT  Goal: Electrolytes maintained within normal limits  Description: INTERVENTIONS:- Monitor labs and assess patient for signs and symptoms of electrolyte imbalances- Administer electrolyte replacement as ordered- Monitor response to electrolyte replacements, including repeat lab results as appropriate- Instruct patient on fluid and nutrition as appropriate  Outcome: Progressing  Goal: Fluid balance maintained  Description: INTERVENTIONS:- Monitor labs - Monitor I/O and WT- Instruct patient on fluid and nutrition as appropriate- Assess for signs & symptoms of volume excess or deficit  Outcome: Progressing  Goal: Glucose maintained within target range  Description: INTERVENTIONS:- Monitor Blood Glucose as ordered- Assess for signs and symptoms of hyperglycemia and hypoglycemia- Administer ordered medications to maintain glucose within target range- Assess nutritional intake and initiate nutrition service referral as needed  Outcome: Progressing     Problem: Prexisting or High Potential for Compromised Skin Integrity  Goal: Skin integrity is maintained or improved  Description: INTERVENTIONS:- Identify patients at risk for skin breakdown- Assess and monitor skin integrity- Assess and monitor nutrition and hydration status- Monitor labs - Assess for incontinence - Turn and reposition patient- Assist with mobility/ambulation- Relieve pressure over bony prominences- Avoid friction and shearing- Provide appropriate hygiene as needed including keeping skin clean and dry- Evaluate need for skin moisturizer/barrier cream- Collaborate with interdisciplinary team - Patient/family teaching- Consider wound care consult   Outcome: Progressing

## 2025-05-16 NOTE — MALNUTRITION/BMI
This medical record reflects one or more clinical indicators suggestive of malnutrition and/or morbid obesity.    Malnutrition Findings:   Adult Malnutrition type: Chronic illness  Adult Degree of Malnutrition: Malnutrition of moderate degree  Malnutrition Characteristics: Inadequate energy, Weight loss                360 Statement: Chronic moderate pro, diomedes malnutrition d/t catabolic illness, decreased appetite as evidence by significant weight loss, 12/16/24 177lbs, 2/13/25 172lbs, 4/17/25 161lbs, 5/12/25 159lbs, 18lbs/10% wt. loss x 6 months, 13lbs/7.5% wt. loss x 3 months, <75% energy intake > 1 month, treated with oral diet, oral nutrition supplements, discussed tips for increasing pro, diomedes intake at home.    BMI Findings:           Body mass index is 24.18 kg/m².     See Nutrition note dated 5/16/25 for additional details.  Completed nutrition assessment is viewable in the nutrition documentation.

## 2025-05-16 NOTE — ASSESSMENT & PLAN NOTE
History of carotid artery disease s/p stenting as well as coronary artery disease/MI s/p stenting more than 20 years ago  Continue aspirin Plavix and statin, increasing Lipitor to 40 mg-plavix on hold

## 2025-05-16 NOTE — ASSESSMENT & PLAN NOTE
Continue Rocephin Daily.   Complete 7 day of antibiotics     In addition, the abnormal CT is likely due to timing and there was likely to be persistent changes as he is still early in the course of histreatment for pneumonia.

## 2025-05-16 NOTE — ASSESSMENT & PLAN NOTE
5/10/2025 presentation with chest discomfort  5/10/2025 EKG: Sinus rhythm, nonspecific ST changes, no ST elevation  Peak troponin 2989  5/12/2025 LHC: Severe three-vessel disease including distal left main  Cardiac surgery consulted.  Cardiac surgery notes reviewed.  CABG planned during this admission.  Preop testing reviewed.  Mild to moderate carotid disease on Doppler which is asymptomatic.  Prior history of right CEA and restenosis requiring right side TCAR in 2018.  CT finding of residual pneumonia.  Plans for delaying surgery till antibiotics completed.  Currently on ceftriaxone for pneumonia.  No worsening angina.  Continue present medical therapy.   [FreeTextEntry1] : syphillis positive

## 2025-05-17 PROBLEM — T83.511A URINARY TRACT INFECTION ASSOCIATED WITH INDWELLING URETHRAL CATHETER  (HCC): Status: RESOLVED | Noted: 2025-04-17 | Resolved: 2025-05-17

## 2025-05-17 PROBLEM — N39.0 URINARY TRACT INFECTION ASSOCIATED WITH INDWELLING URETHRAL CATHETER  (HCC): Status: RESOLVED | Noted: 2025-04-17 | Resolved: 2025-05-17

## 2025-05-17 LAB — APTT PPP: 76 SECONDS (ref 23–34)

## 2025-05-17 PROCEDURE — 99232 SBSQ HOSP IP/OBS MODERATE 35: CPT | Performed by: FAMILY MEDICINE

## 2025-05-17 PROCEDURE — 99232 SBSQ HOSP IP/OBS MODERATE 35: CPT | Performed by: INTERNAL MEDICINE

## 2025-05-17 PROCEDURE — 94760 N-INVAS EAR/PLS OXIMETRY 1: CPT

## 2025-05-17 PROCEDURE — 85730 THROMBOPLASTIN TIME PARTIAL: CPT | Performed by: FAMILY MEDICINE

## 2025-05-17 PROCEDURE — NC001 PR NO CHARGE: Performed by: PHYSICIAN ASSISTANT

## 2025-05-17 PROCEDURE — 94640 AIRWAY INHALATION TREATMENT: CPT

## 2025-05-17 PROCEDURE — 94664 DEMO&/EVAL PT USE INHALER: CPT

## 2025-05-17 RX ADMIN — METOPROLOL TARTRATE 25 MG: 25 TABLET, FILM COATED ORAL at 20:20

## 2025-05-17 RX ADMIN — HEPARIN SODIUM 16 UNITS/KG/HR: 10000 INJECTION, SOLUTION INTRAVENOUS at 08:45

## 2025-05-17 RX ADMIN — SODIUM CHLORIDE, PRESERVATIVE FREE 10 ML: 5 INJECTION INTRAVENOUS at 08:54

## 2025-05-17 RX ADMIN — CHLORHEXIDINE GLUCONATE 15 ML: 1.2 SOLUTION ORAL at 08:53

## 2025-05-17 RX ADMIN — IPRATROPIUM BROMIDE 0.5 MG: 0.5 SOLUTION RESPIRATORY (INHALATION) at 08:37

## 2025-05-17 RX ADMIN — LEVALBUTEROL HYDROCHLORIDE 0.63 MG: 0.63 SOLUTION RESPIRATORY (INHALATION) at 19:10

## 2025-05-17 RX ADMIN — MUPIROCIN 1 APPLICATION: 20 OINTMENT TOPICAL at 20:20

## 2025-05-17 RX ADMIN — ASPIRIN 81 MG CHEWABLE TABLET 81 MG: 81 TABLET CHEWABLE at 08:52

## 2025-05-17 RX ADMIN — LEVALBUTEROL HYDROCHLORIDE 0.63 MG: 0.63 SOLUTION RESPIRATORY (INHALATION) at 08:37

## 2025-05-17 RX ADMIN — IPRATROPIUM BROMIDE 0.5 MG: 0.5 SOLUTION RESPIRATORY (INHALATION) at 13:56

## 2025-05-17 RX ADMIN — CHLORHEXIDINE GLUCONATE 15 ML: 1.2 SOLUTION ORAL at 20:20

## 2025-05-17 RX ADMIN — AMLODIPINE BESYLATE 5 MG: 5 TABLET ORAL at 08:52

## 2025-05-17 RX ADMIN — IPRATROPIUM BROMIDE 0.5 MG: 0.5 SOLUTION RESPIRATORY (INHALATION) at 19:10

## 2025-05-17 RX ADMIN — METOPROLOL TARTRATE 25 MG: 25 TABLET, FILM COATED ORAL at 08:52

## 2025-05-17 RX ADMIN — LEVALBUTEROL HYDROCHLORIDE 0.63 MG: 0.63 SOLUTION RESPIRATORY (INHALATION) at 13:56

## 2025-05-17 RX ADMIN — MUPIROCIN 1 APPLICATION: 20 OINTMENT TOPICAL at 08:53

## 2025-05-17 RX ADMIN — ATORVASTATIN CALCIUM 40 MG: 40 TABLET, FILM COATED ORAL at 08:52

## 2025-05-17 RX ADMIN — CEFTRIAXONE SODIUM 1000 MG: 10 INJECTION, POWDER, FOR SOLUTION INTRAVENOUS at 10:32

## 2025-05-17 NOTE — PROGRESS NOTES
"Progress Note - Hospitalist   Name: Kilo Mix 84 y.o. male I MRN: [id number]  Unit/Bed#: [facility]-322-01 I Date of Admission: 5/10/2025   Date of Service: 5/17/2025 I Hospital Day: 7    Assessment & Plan  NSTEMI (non-ST elevated myocardial infarction) (HCC)  Has remote history of CAD /MI s/p coronary stents X2  Presenting with intermittent chest pain over last several weeks mainly with activity, but progressively worsening. Most recent episode before calling EMS lasted longer and did not resolve with rest, was radiating to neck and bilateral arms, with diaphoresis and vomiting.   Elevated troponin, EKG was sinus tachycardia 105 with some nonspecific ST changes  Patient started on heparin drip in the ED  Monitor on telemetry  As needed submental length of blistering  Cardiology consulted for further management  Follow on cardiac echo  - has recent A1C, Lipid and TSH , all were unremarkable.   Add beta-blocker  Left heart cath today 5/12 shows three-vessel disease, plan for evaluation for potential CABG  Heparin drip was resumed after cardiac catheterization  CABG postponed to 5/21      Pneumonia involving right lung  Chest x-ray with moderate right base pneumonia  Patient is afebrile, denied cough or shortness of breath  Upper normal white blood cells  Start IV ceftriaxone empirically- compleat  7-day course-completed on saturday  Procalcitonin and remained negative x 2.  Pulmonology consultation appreciated.-Continue to finish the course of antibiotics  CT chest from 5/13 reviewed.  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    Blood Sugar Average: Last 72 hrs:    Most recent A1C 5.8 ; hold home metformin while inpatient -   Continue with insulin " sliding scale and low carb diet.     Hypertension  Patient noted late April with low blood pressure he self discontinued off amlodipine then recheck to PCP and was advised to restart amlodipine and discontinue hydrochlorothiazide  Monitor blood pressure and reevaluate   Continue home lisinopril and amlodipine,  As needed Lopressor IV for systolic greater than 180  Lisinopril on hold hold in anticipation for CABG      Peripheral vascular disease (HCC)  History of carotid artery disease s/p stenting as well as coronary artery disease/MI s/p stenting more than 20 years ago  Continue aspirin Plavix and statin, increasing Lipitor to 40 mg-plavix on hold   Dyslipidemia  Patient is on only 20 mg of atorvastatin however he has significant history of coronary artery disease and carotid artery disease s/p stenting for both  He denies intolerance of high intensity statin and agreeable to increase to 40 mg Lipitor  Prostate cancer (HCC)  Stage IV prostate cancer with mets to the lymph nodes and complicated with urinary obstruction requiring bilateral nephrostomies    January 2025 he was started on ADT and Xtandi, complicated with dizziness for which Xtandi was discontinued in April 2025    Patient to continue follow-up with medical oncology established with Dr. Shankar  Cardiac surgery discussed with oncology regarding prognosis.  H/O insertion of nephrostomy tube  Has nephrostomies bilaterally due to obstruction by prostate cancer metastasis to lymph nodes   nephrostomy care while patient,   follow-up with urology, medical oncology and IR  Coronary artery disease involving native coronary artery    Asymptomatic stenosis of left carotid artery  Vascular surgery consultation appreciated.  Recommended outpatient follow-up  Preoperative clearance    ALPHONSO (obstructive sleep apnea)    Former smoker    At risk for delirium    Depression    Frailty    Cognitive impairment    Hx of acute poliomyelitis    Restrictive lung  disease    Moderate protein-calorie malnutrition (HCC)  Malnutrition Findings:   Adult Malnutrition type: Chronic illness  Adult Degree of Malnutrition: Malnutrition of moderate degree  Malnutrition Characteristics: Inadequate energy, Weight loss                  360 Statement: Chronic moderate pro, diomedes malnutrition d/t catabolic illness, decreased appetite as evidence by significant weight loss, 12/16/24 177lbs, 2/13/25 172lbs, 4/17/25 161lbs, 5/12/25 159lbs, 18lbs/10% wt. loss x 6 months, 13lbs/7.5% wt. loss x 3 months, <75% energy intake > 1 month, treated with oral diet, oral nutrition supplements, discussed tips for increasing pro, diomedes intake at home.    BMI Findings:           Body mass index is 24.18 kg/m².       VTE Pharmacologic Prophylaxis: VTE Score: 8 Moderate Risk (Score 3-4) - Pharmacological DVT Prophylaxis Ordered: heparin drip.    Mobility:   Basic Mobility Inpatient Raw Score: 23  JH-HLM Goal: 7: Walk 25 feet or more  JH-HLM Achieved: 8: Walk 250 feet ot more  JH-HLM Goal achieved. Continue to encourage appropriate mobility.    Patient Centered Rounds: I performed bedside rounds with nursing staff today.   Discussions with Specialists or Other Care Team Provider:     Education and Discussions with Family / Patient: Patient declined call to .     Current Length of Stay: 7 day(s)  Current Patient Status: Inpatient   Certification Statement: The patient will continue to require additional inpatient hospital stay due to pending surgery on Wednesday  Discharge Plan: Anticipate discharge in >72 hrs to home.    Code Status: Level 1 - Full Code    Subjective   Patient seen and examined.  Denies any specific complaints.  Patient reported that he is doing well  Mild cough present    Objective :  Temp:  [97.1 °F (36.2 °C)-98.6 °F (37 °C)] 97.4 °F (36.3 °C)  HR:  [53-67] 61  BP: (109-154)/(49-65) 131/54  Resp:  [14-18] 14  SpO2:  [83 %-99 %] 83 %  O2 Device: None (Room air)    Body mass index is  24.18 kg/m².     Input and Output Summary (last 24 hours):     Intake/Output Summary (Last 24 hours) at 5/17/2025 1439  Last data filed at 5/17/2025 1214  Gross per 24 hour   Intake 658 ml   Output 1975 ml   Net -1317 ml       Physical Exam  Constitutional:       General: He is not in acute distress.     Appearance: He is not ill-appearing.   HENT:      Head: Normocephalic and atraumatic.      Nose: Nose normal. No congestion.     Eyes:      General: No scleral icterus.      Cardiovascular:      Rate and Rhythm: Normal rate and regular rhythm.      Heart sounds: No murmur heard.  Pulmonary:      Effort: Pulmonary effort is normal.      Comments: Decreased breath sounds bilateral  Abdominal:      General: There is no distension.      Tenderness: There is no abdominal tenderness.     Musculoskeletal:         General: No swelling.     Skin:     General: Skin is warm.      Coloration: Skin is not jaundiced.      Findings: No bruising.     Neurological:      Mental Status: He is alert. Mental status is at baseline.      Cranial Nerves: No cranial nerve deficit.           Lines/Drains:  Lines/Drains/Airways       Active Status       Name Placement date Placement time Site Days    Nephrostomy Left 10.2 Fr. 04/29/25  1109  Left  18    Nephrostomy Right 10.2 Fr. 04/29/25  1111  Right  18                      Telemetry:  Telemetry Orders (From admission, onward)               24 Hour Telemetry Monitoring  Continuous x 24 Hours (Telem)        Expiring   Question:  Reason for 24 Hour Telemetry  Answer:  PCI/EP study (including pacer and ICD implementation), Cardiac surgery, MI, abnormal cardiac cath, and chest pain- rule out MI                     Telemetry Reviewed: Normal Sinus Rhythm  Indication for Continued Telemetry Use: Awaiting PCI/EP Study/CABG               Lab Results: I have reviewed the following results:   Results from last 7 days   Lab Units 05/15/25  0544 05/12/25  0621   WBC Thousand/uL 6.34 9.35   HEMOGLOBIN  g/dL 12.1 11.0*   HEMATOCRIT % 36.7 33.7*   PLATELETS Thousands/uL 218 181   SEGS PCT %  --  79*   LYMPHO PCT %  --  13*   MONO PCT %  --  6   EOS PCT %  --  2     Results from last 7 days   Lab Units 05/15/25  0544 05/12/25  0621 05/11/25  0405   SODIUM mmol/L 141   < > 142   POTASSIUM mmol/L 3.8   < > 3.6   CHLORIDE mmol/L 106   < > 105   CO2 mmol/L 28   < > 27   BUN mg/dL 23   < > 18   CREATININE mg/dL 1.24   < > 1.09   ANION GAP mmol/L 7   < > 10   CALCIUM mg/dL 9.4   < > 9.2   ALBUMIN g/dL  --   --  3.7   TOTAL BILIRUBIN mg/dL  --   --  0.84   ALK PHOS U/L  --   --  57   ALT U/L  --   --  7   AST U/L  --   --  23   GLUCOSE RANDOM mg/dL 110   < > 124    < > = values in this interval not displayed.     Results from last 7 days   Lab Units 05/10/25  2020   INR  0.96         Results from last 7 days   Lab Units 05/13/25  1253   HEMOGLOBIN A1C % 5.6     Results from last 7 days   Lab Units 05/12/25  0621 05/11/25  1146   PROCALCITONIN ng/ml 0.07 0.12       Recent Cultures (last 7 days):               Last 24 Hours Medication List:     Current Facility-Administered Medications:     amLODIPine (NORVASC) tablet 5 mg, Daily    aspirin chewable tablet 81 mg, Daily    atorvastatin (LIPITOR) tablet 40 mg, Daily    chlorhexidine (PERIDEX) 0.12 % oral rinse 15 mL, Q12H AMADOU    heparin (porcine) 25,000 units in 0.45% NaCl 250 mL infusion (premix), Titrated, Last Rate: 16 Units/kg/hr (05/17/25 0845)    heparin (porcine) injection 2,000 Units, Q6H PRN    heparin (porcine) injection 4,000 Units, Q6H PRN    ipratropium (ATROVENT) 0.02 % inhalation solution 0.5 mg, TID    levalbuterol (XOPENEX) inhalation solution 0.63 mg, TID    metoprolol tartrate (LOPRESSOR) tablet 25 mg, Q12H AMADOU    mupirocin (BACTROBAN) 2 % nasal ointment 1 Application, Q12H AMADOU    nitroglycerin (NITROSTAT) SL tablet 0.4 mg, Q5 Min PRN    sodium chloride (PF) 0.9 % injection 10 mL, Daily    sodium chloride (PF) 0.9 % injection 10 mL, Daily    Administrative  Statements   Today, Patient Was Seen By: Candy Bailey MD      **Please Note: This note may have been constructed using a voice recognition system.**

## 2025-05-17 NOTE — ASSESSMENT & PLAN NOTE
Chest x-ray with moderate right base pneumonia  Patient is afebrile, denied cough or shortness of breath  Upper normal white blood cells  Start IV ceftriaxone empirically- compleat  7-day course-completed on saturday  Procalcitonin and remained negative x 2.  Pulmonology consultation appreciated.-Continue to finish the course of antibiotics  CT chest from 5/13 reviewed.  Persistent right middle lobe and right lower lobe pneumonia-it may takes 4 to 6 weeks for clearance of pneumonia on imaging

## 2025-05-17 NOTE — PROGRESS NOTES
Progress Note - Cardiac Surgery   Kilo Mix 84 y.o. male MRN: [id number]  Unit/Bed#: PPHP-322-01 Encounter: [id number]    24 Hour Events: No events overnight no complaints    Medications:   Scheduled Meds:  Current Facility-Administered Medications   Medication Dose Route Frequency Provider Last Rate    amLODIPine  5 mg Oral Daily Candy Bailey MD      aspirin  81 mg Oral Daily Candy Bailey MD      atorvastatin  40 mg Oral Daily Candy Bailey MD      cefTRIAXone  1,000 mg Intravenous Q24H Candy Bailey MD 1,000 mg (05/16/25 0942)    chlorhexidine  15 mL Mouth/Throat Q12H [facility] Candy Bailey MD      heparin (porcine)  3-20 Units/kg/hr (Order-Specific) Intravenous Titrated Analy SOPHIA Phan PA-C 16 Units/kg/hr (05/17/25 0845)    heparin (porcine)  2,000 Units Intravenous Q6H PRN Analy YOHANNES Farrell-SARINA      heparin (porcine)  4,000 Units Intravenous Q6H PRN Analy Phan PA-C      ipratropium  0.5 mg Nebulization TID Lee Guglielmello, DO      levalbuterol  0.63 mg Nebulization TID Lee Florentinoglielmello, DO      metoprolol tartrate  25 mg Oral Q12H [facility] Candy Bailey MD      mupirocin  1 Application Nasal Q12H [facility] Candy Bailey MD      nitroglycerin  0.4 mg Sublingual Q5 Min PRN Candy Bailey MD      sodium chloride (PF)  10 mL Intracatheter Daily Candy Bailey MD      sodium chloride (PF)  10 mL Intracatheter Daily Candy Bailey MD       Continuous Infusions:heparin (porcine), 3-20 Units/kg/hr (Order-Specific), Last Rate: 16 Units/kg/hr (05/17/25 0845)        Results:   Results from last 7 days   Lab Units 05/15/25  0544 05/12/25  0621 05/11/25  0324   WBC Thousand/uL 6.34 9.35 10.04   HEMOGLOBIN g/dL 12.1 11.0* 11.8*   HEMATOCRIT % 36.7 33.7* 35.2*   PLATELETS Thousands/uL 218 181 210     Results from last 7 days   Lab Units 05/15/25  0544 05/13/25  0529 05/12/25  0621   POTASSIUM mmol/L 3.8 3.8 3.8   CHLORIDE mmol/L 106 104 104   CO2 mmol/L 28 26 28   BUN mg/dL 23 22  19   CREATININE mg/dL 1.24 1.17 1.12   CALCIUM mg/dL 9.4 9.3 8.9     Results from last 7 days   Lab Units 05/17/25  0546 05/16/25  1028 05/16/25  0252 05/11/25  0314 05/10/25  2020   INR   --   --   --   --  0.96   PTT seconds 76* 88* 76*   < > 29    < > = values in this interval not displayed.       Studies:     Cardiac Catheterization:   Left Main   Dist LM lesion is 70% stenosed.      Left Anterior Descending   Mid LAD-1 lesion is 80% stenosed.   Mid LAD-2 lesion is 70% stenosed.      Second Diagonal Branch   2nd Diag lesion is 80% stenosed.      Left Circumflex   Ost Cx lesion is 90% stenosed.      Right Coronary Artery   Mid RCA lesion is 85% stenosed.   Dist RCA lesion is 60% stenosed.      Right Posterior Descending Artery   RPDA lesion is 70% stenosed.        Echocardiogram:     Interpretation Summary  Show Result Comparison     Left Ventricle: Left ventricular cavity size is normal. Wall thickness is normal. There is mild concentric hypertrophy. The left ventricular ejection fraction is 60%. Systolic function is normal. Wall motion is normal. Diastolic function is mildly abnormal, consistent with grade I (abnormal) relaxation.    Right Ventricle: Right ventricular cavity size is normal. Systolic function is normal.    Left Atrium: The atrium is mildly dilated (35-41 mL/m2).    Mitral Valve: There is mild annular calcification. There is mild regurgitation.    Tricuspid Valve: There is mild regurgitation. The right ventricular systolic pressure is mildly elevated. The estimated right ventricular systolic pressure is 41.00 mmHg.    Carotid artery duplex:  RIGHT:   There is <50% stenosis noted in the internal carotid artery. The common carotid artery/bulb stent is patent.   Plaque is homogenous and smooth.    A severe stenosis was identified in the proximal external carotid artery.   Vertebral artery flow is antegrade.  There is no significant subclavian artery disease.       LEFT:   There is 50-69% stenosis  noted in the internal carotid artery.  Plaque is heterogenous and irregular.    Vertebral artery flow is antegrade.  There is no significant subclavian artery disease.     Greater saphenous vein mapping: Bilateral GSV with small calibers for conduit. Possible segment in left thigh     Above imaging reviewed    Vitals:   Vitals:    05/17/25 0308 05/17/25 0658 05/17/25 0839 05/17/25 0851   BP: 135/60 119/60  114/51   BP Location:       Pulse: 59 56  (!) 54   Resp: 14      Temp: 97.6 °F (36.4 °C) (!) 97.4 °F (36.3 °C)     TempSrc:       SpO2: 96% 97% 98% 98%   Weight:       Height:           Physical Exam:    General: No acute distress, Alert, and Normal appearance  HEENT/NECK:  Normocephalic. Atraumatic.  No jugular venous distention.    Cardiac: Regular rate and rhythm  Pulmonary:  good mucus production with cough  Abdomen:  Non-tender, Non-distended, and Normal bowel sounds  Extremities: Extremities warm/dry  Neuro: Alert and oriented X 3  Skin: Warm/Dry, without rashes or lesions.      Assessment:      Plan:  Patient agreeable to proceed with surgery; Informed consent signed    Oncology evaluated for his stage 4 prostate CA. Specifically, his one year and 5 year survival. Spoke with oncologist - he has pretty extensive prostate cancer, 5 yr survival is less than 40-50% though 1 year survival is very good       Carotid ultrasound shows 50-69% left ICA stenosis and severe stenosis of right external carotid artery. Consulted vascular with recommendations to f/u as OP     Vein mapping completed; Findings show small caliber vein bilaterally- bedside U/S performed revealing b/l adequate thighs     Discontinued Lisinopril in anticipation for surgery     PFTs - complete, moderate restriction, FEV1 84%, Fev/%, DLCO 65% - Abx finish today for PNA, Pulmonary following     Appreciate Gerontology consult     Continue Mupirocin 2% nasal ointment q 12 hrs     Continue topical chlorhexidine bath and mouth rise     coronary  artery bypass grafting scheduled for Wednesday 5/21 with Dr. David Tomlin D.O.    SIGNATURE: Rose Tang PA-C  DATE: May 17, 2025  TIME: 9:34 AM    * This note was completed in part utilizing Line[employer] direct voice recognition software.   Grammatical errors, random word insertion, spelling mistakes, and incomplete sentences may be an occasional consequence of the system secondary to software limitations, ambient noise and hardware issues. At the time of dictation, efforts were made to edit, clarify and /or correct errors. Please read the chart carefully and recognize, using context, where substitutions have occurred.  If you have any questions or concerns about the context, text or information contained within the body of this dictation, please contact myself, the provider, for further clarification.

## 2025-05-17 NOTE — ASSESSMENT & PLAN NOTE
5/10/2025 presentation with chest discomfort  5/10/2025 EKG: Sinus rhythm, nonspecific ST changes, no ST elevation  Peak troponin 2989  5/12/2025 LHC: Severe three-vessel disease including distal left main  Cardiac surgery consulted.  Cardiac surgery notes reviewed.  CABG planned during this admission.  Preop testing reviewed.  Mild to moderate carotid disease on Doppler which is asymptomatic.  Prior history of right CEA and restenosis requiring right side TCAR in 2018.  CT finding of residual pneumonia.  Plans for delaying surgery till antibiotics completed.  Currently on ceftriaxone for pneumonia.  No worsening angina.  Continue present medical therapy.

## 2025-05-17 NOTE — PROGRESS NOTES
"Progress Note - Cardiology   Name: Kilo Mix 84 y.o. male I MRN: [id number]  Unit/Bed#: PPHP-322-01 I Date of Admission: 5/10/2025   Date of Service: 2025 I Hospital Day: 7     Assessment & Plan  NSTEMI (non-ST elevated myocardial infarction) (HCC)  5/10/2025 presentation with chest discomfort  5/10/2025 EKG: Sinus rhythm, nonspecific ST changes, no ST elevation  Peak troponin 2989  2025 LHC: Severe three-vessel disease including distal left main  Cardiac surgery consulted.  Cardiac surgery notes reviewed.  CABG planned during this admission.  Preop testing reviewed.  Mild to moderate carotid disease on Doppler which is asymptomatic.  Prior history of right CEA and restenosis requiring right side TCAR in 2018.  CT finding of residual pneumonia.  Plans for delaying surgery till antibiotics completed.  Currently on ceftriaxone for pneumonia.  No worsening angina.  Continue present medical therapy.  Coronary artery disease involving native coronary artery  History of MI () with status post bare-metal stents to RCA  Status post PCI/LOUIE   Clopidogrel on hold.  Surgical revascularization planned next week.  Dyslipidemia  Tolerating high-dose atorvastatin.  Cholesterol 166, triglycerides 113, HDL 66 and LDL 77  Continue present medicines.    Discussed with patient and family.    Subjective:     Overnight events reviewed.  Negative fluid balance.  No recent labs.  Telemetry reviewed.  Ambulatory without assistance.    Review of Systems   Constitutional:  Positive for fatigue.   Respiratory:  Negative for shortness of breath.    Cardiovascular:  Negative for chest pain.   Psychiatric/Behavioral:  Positive for sleep disturbance.         Current Medications[1]      Objective:     Vitals:   Blood pressure 119/60, pulse 56, temperature (!) 97.4 °F (36.3 °C), resp. rate 14, height 5' 8\" (1.727 m), weight 72.1 kg (159 lb), SpO2 98%.  Body mass index is 24.18 kg/m².  Systolic (24hrs), Av , Min:109 , " Max:154     Diastolic (24hrs), Av, Min:46, Max:65      Intake/Output Summary (Last 24 hours) at 2025 0845  Last data filed at 2025 0815  Gross per 24 hour   Intake 898 ml   Output 1525 ml   Net -627 ml     Weight (last 2 days)       None            Physical Exam  Vitals reviewed.   Constitutional:       General: He is not in acute distress.  HENT:      Head: Normocephalic.     Cardiovascular:      Rate and Rhythm: Normal rate and regular rhythm.      Heart sounds: No murmur heard.  Pulmonary:      Breath sounds: Decreased breath sounds present. No wheezing or rales.     Musculoskeletal:         General: No swelling.     Skin:     General: Skin is warm.     Neurological:      General: No focal deficit present.      Mental Status: He is alert.     Psychiatric:         Mood and Affect: Mood normal.         Labs & Results:    Lab Results   Component Value Date    SODIUM 141 05/15/2025    K 3.8 05/15/2025     05/15/2025    CO2 28 05/15/2025    BUN 23 05/15/2025    CREATININE 1.24 05/15/2025    GLUC 110 05/15/2025    CALCIUM 9.4 05/15/2025     Results from last 7 days   Lab Units 05/15/25  0544 25  0529 25  0621 25  0405 05/10/25  1954   POTASSIUM mmol/L 3.8 3.8 3.8 3.6 3.6   CHLORIDE mmol/L 106 104 104 105 105   CO2 mmol/L 28 26 28 27 24   BUN mg/dL 23 22 19 18 22   CREATININE mg/dL 1.24 1.17 1.12 1.09 1.14   CALCIUM mg/dL 9.4 9.3 8.9 9.2 9.1     Lab Results   Component Value Date    WBC 6.34 05/15/2025    WBC 6.24 2015    RBC 3.76 (L) 05/15/2025    RBC 4.96 2015    HGB 12.1 05/15/2025    HGB 15.2 2015    HCT 36.7 05/15/2025    HCT 44.8 2015    MCV 98 05/15/2025    MCV 90 2015    MCH 32.2 05/15/2025    MCH 30.6 2015    RDW 13.0 05/15/2025    RDW 13.9 2015     05/15/2025     2015     Results from last 7 days   Lab Units 25  0546 25  1028 25  0252 25  0314 05/10/25  2020   PTT seconds 76* 88* 76*   <  "> 29   INR   --   --   --   --  0.96    < > = values in this interval not displayed.       Available cardiology studies, imaging and lab results independently reviewed today.      This note was completed in part utilizing voice recognition software.   Grammatical errors, random word insertion, spelling mistakes, occasional wrong word or \"sound-alike\" substitutions and incomplete sentences may be an occasional consequence of the system secondary to software limitations, ambient noise and hardware issues. At the time of dictation, efforts were made to edit, clarify and /or correct errors.  Please read the chart carefully and recognize, using context, where substitutions have occurred.  If you have any questions or concerns about the context, text or information contained within the body of this dictation, please contact myself, the provider, for further clarification.           [1]   Current Facility-Administered Medications:     amLODIPine (NORVASC) tablet 5 mg, 5 mg, Oral, Daily, Candy Bailey MD, 5 mg at 05/16/25 0820    aspirin chewable tablet 81 mg, 81 mg, Oral, Daily, Candy Bailey MD, 81 mg at 05/16/25 0820    atorvastatin (LIPITOR) tablet 40 mg, 40 mg, Oral, Daily, Candy Bailey MD, 40 mg at 05/16/25 0820    cefTRIAXone (ROCEPHIN) 1,000 mg in dextrose 5 % 50 mL IVPB, 1,000 mg, Intravenous, Q24H, Candy Bailey MD, Last Rate: 100 mL/hr at 05/16/25 0942, 1,000 mg at 05/16/25 0942    chlorhexidine (PERIDEX) 0.12 % oral rinse 15 mL, 15 mL, Mouth/Throat, Q12H AMADOU, Candy Bailey MD, 15 mL at 05/16/25 2025    heparin (porcine) 25,000 units in 0.45% NaCl 250 mL infusion (premix), 3-20 Units/kg/hr (Order-Specific), Intravenous, Titrated, Analy Phan PA-C, Last Rate: 11.2 mL/hr at 05/16/25 0943, 16 Units/kg/hr at 05/16/25 0943    heparin (porcine) injection 2,000 Units, 2,000 Units, Intravenous, Q6H PRN, Analy Phan PA-C, 2,000 Units at 05/15/25 1328    heparin (porcine) injection 4,000 " Units, 4,000 Units, Intravenous, Q6H PRN, Analy Phan PA-C, 4,000 Units at 05/13/25 2230    ipratropium (ATROVENT) 0.02 % inhalation solution 0.5 mg, 0.5 mg, Nebulization, TID, Lee Guglielmello, DO, 0.5 mg at 05/17/25 0837    levalbuterol (XOPENEX) inhalation solution 0.63 mg, 0.63 mg, Nebulization, TID, Lee Guglielmello, DO, 0.63 mg at 05/17/25 0837    metoprolol tartrate (LOPRESSOR) tablet 25 mg, 25 mg, Oral, Q12H AMADOU, Candy Bailey MD, 25 mg at 05/16/25 2025    mupirocin (BACTROBAN) 2 % nasal ointment 1 Application, 1 Application, Nasal, Q12H AMADOU, Candy Bailey MD, 1 Application at 05/16/25 2026    nitroglycerin (NITROSTAT) SL tablet 0.4 mg, 0.4 mg, Sublingual, Q5 Min PRN, Candy Bailey MD    sodium chloride (PF) 0.9 % injection 10 mL, 10 mL, Intracatheter, Daily, Candy Bailey MD, 10 mL at 05/16/25 0820    sodium chloride (PF) 0.9 % injection 10 mL, 10 mL, Intracatheter, Daily, Candy Bailey MD, 10 mL at 05/16/25 0820

## 2025-05-18 LAB — APTT PPP: 85 SECONDS (ref 23–34)

## 2025-05-18 PROCEDURE — 94760 N-INVAS EAR/PLS OXIMETRY 1: CPT

## 2025-05-18 PROCEDURE — 85730 THROMBOPLASTIN TIME PARTIAL: CPT | Performed by: FAMILY MEDICINE

## 2025-05-18 PROCEDURE — 94640 AIRWAY INHALATION TREATMENT: CPT

## 2025-05-18 PROCEDURE — 94664 DEMO&/EVAL PT USE INHALER: CPT

## 2025-05-18 PROCEDURE — 99232 SBSQ HOSP IP/OBS MODERATE 35: CPT | Performed by: FAMILY MEDICINE

## 2025-05-18 PROCEDURE — NC001 PR NO CHARGE: Performed by: PHYSICIAN ASSISTANT

## 2025-05-18 PROCEDURE — 99232 SBSQ HOSP IP/OBS MODERATE 35: CPT | Performed by: INTERNAL MEDICINE

## 2025-05-18 RX ADMIN — SODIUM CHLORIDE, PRESERVATIVE FREE 10 ML: 5 INJECTION INTRAVENOUS at 08:38

## 2025-05-18 RX ADMIN — ASPIRIN 81 MG CHEWABLE TABLET 81 MG: 81 TABLET CHEWABLE at 08:26

## 2025-05-18 RX ADMIN — CHLORHEXIDINE GLUCONATE 15 ML: 1.2 SOLUTION ORAL at 08:26

## 2025-05-18 RX ADMIN — LEVALBUTEROL HYDROCHLORIDE 0.63 MG: 0.63 SOLUTION RESPIRATORY (INHALATION) at 19:01

## 2025-05-18 RX ADMIN — IPRATROPIUM BROMIDE 0.5 MG: 0.5 SOLUTION RESPIRATORY (INHALATION) at 13:57

## 2025-05-18 RX ADMIN — LEVALBUTEROL HYDROCHLORIDE 0.63 MG: 0.63 SOLUTION RESPIRATORY (INHALATION) at 07:51

## 2025-05-18 RX ADMIN — AMLODIPINE BESYLATE 5 MG: 5 TABLET ORAL at 08:26

## 2025-05-18 RX ADMIN — IPRATROPIUM BROMIDE 0.5 MG: 0.5 SOLUTION RESPIRATORY (INHALATION) at 07:51

## 2025-05-18 RX ADMIN — CHLORHEXIDINE GLUCONATE 15 ML: 1.2 SOLUTION ORAL at 20:43

## 2025-05-18 RX ADMIN — LEVALBUTEROL HYDROCHLORIDE 0.63 MG: 0.63 SOLUTION RESPIRATORY (INHALATION) at 13:57

## 2025-05-18 RX ADMIN — MUPIROCIN 1 APPLICATION: 20 OINTMENT TOPICAL at 08:26

## 2025-05-18 RX ADMIN — MUPIROCIN 1 APPLICATION: 20 OINTMENT TOPICAL at 20:43

## 2025-05-18 RX ADMIN — METOPROLOL TARTRATE 25 MG: 25 TABLET, FILM COATED ORAL at 20:43

## 2025-05-18 RX ADMIN — METOPROLOL TARTRATE 25 MG: 25 TABLET, FILM COATED ORAL at 08:26

## 2025-05-18 RX ADMIN — IPRATROPIUM BROMIDE 0.5 MG: 0.5 SOLUTION RESPIRATORY (INHALATION) at 19:01

## 2025-05-18 RX ADMIN — HEPARIN SODIUM 16 UNITS/KG/HR: 10000 INJECTION, SOLUTION INTRAVENOUS at 08:26

## 2025-05-18 RX ADMIN — ATORVASTATIN CALCIUM 40 MG: 40 TABLET, FILM COATED ORAL at 08:26

## 2025-05-18 NOTE — ASSESSMENT & PLAN NOTE
History of MI (1995) with status post bare-metal stents to RCA  Status post PCI/LOUIE 2007  Clopidogrel on hold in preparation for surgery..  Surgical revascularization planned next week as above.  All questions answered.

## 2025-05-18 NOTE — ASSESSMENT & PLAN NOTE
5/10/2025 presentation with chest discomfort  5/10/2025 EKG: Sinus rhythm, nonspecific ST changes, no ST elevation  Peak troponin 2989  5/12/2025 LHC: Severe three-vessel disease including distal left main  He remains chest pain-free with ambulation.  CABG planned for Wednesday, 5/21/2025.  Cardiac surgery notes reviewed.  Continue present medicines and telemetry.

## 2025-05-18 NOTE — PROGRESS NOTES
"Progress Note - Cardiology   Name: Kilo Mix 84 y.o. male I MRN: [id number]  Unit/Bed#: PPHP-322-01 I Date of Admission: 5/10/2025   Date of Service: 5/18/2025 I Hospital Day: 8     Assessment & Plan  NSTEMI (non-ST elevated myocardial infarction) (HCC)  5/10/2025 presentation with chest discomfort  5/10/2025 EKG: Sinus rhythm, nonspecific ST changes, no ST elevation  Peak troponin 2989  5/12/2025 LHC: Severe three-vessel disease including distal left main  He remains chest pain-free with ambulation.  CABG planned for Wednesday, 5/21/2025.  Cardiac surgery notes reviewed.  Continue present medicines and telemetry.    Coronary artery disease involving native coronary artery  History of MI (1995) with status post bare-metal stents to RCA  Status post PCI/LOUIE 2007  Clopidogrel on hold in preparation for surgery..  Surgical revascularization planned next week as above.  All questions answered.  Dyslipidemia  Tolerating high intensity statins.  Cholesterol 166, triglycerides 113, HDL 66 and LDL 77        Subjective:     Overnight events reviewed.  Telemetry reviewed, sinus rhythm with PVCs noted.  Intermittent sinus bradycardia noted.  Asymptomatic from this.  Negative fluid balance overnight.  No recent labs.  Reports poor sleep.  Denies palpitations.    Review of Systems   Constitutional:  Positive for fatigue.   HENT: Negative.     Eyes: Negative.    Respiratory:  Negative for shortness of breath.    Cardiovascular:  Negative for chest pain, palpitations and leg swelling.   Gastrointestinal: Negative.    Endocrine: Negative.    Neurological:  Negative for syncope.   Hematological: Negative.    Psychiatric/Behavioral:  Positive for sleep disturbance.    All other systems reviewed and are negative.       Current Medications[1]      Objective:     Vitals:   Blood pressure 129/63, pulse 55, temperature 97.5 °F (36.4 °C), resp. rate 14, height 5' 8\" (1.727 m), weight 72.1 kg (159 lb), SpO2 94%.  Body mass index is 24.18 " kg/m².  Systolic (24hrs), Av , Min:123 , Max:169     Diastolic (24hrs), Av, Min:54, Max:74      Intake/Output Summary (Last 24 hours) at 2025 0916  Last data filed at 2025 0813  Gross per 24 hour   Intake 1300.48 ml   Output 1725 ml   Net -424.52 ml     Weight (last 2 days)       None            Physical Exam  Vitals reviewed.   Constitutional:       General: He is not in acute distress.  HENT:      Head: Normocephalic.     Cardiovascular:      Rate and Rhythm: Normal rate and regular rhythm.      Heart sounds: No murmur heard.  Pulmonary:      Effort: No respiratory distress.      Breath sounds: Decreased breath sounds present. No wheezing or rales.     Musculoskeletal:         General: No swelling.     Skin:     General: Skin is warm.     Neurological:      Mental Status: He is alert.     Psychiatric:         Mood and Affect: Mood normal.         Labs & Results:    Lab Results   Component Value Date    SODIUM 141 05/15/2025    K 3.8 05/15/2025     05/15/2025    CO2 28 05/15/2025    BUN 23 05/15/2025    CREATININE 1.24 05/15/2025    GLUC 110 05/15/2025    CALCIUM 9.4 05/15/2025     Results from last 7 days   Lab Units 05/15/25  0544 25  0529 25  0621   POTASSIUM mmol/L 3.8 3.8 3.8   CHLORIDE mmol/L 106 104 104   CO2 mmol/L 28 26 28   BUN mg/dL 23 22 19   CREATININE mg/dL 1.24 1.17 1.12   CALCIUM mg/dL 9.4 9.3 8.9     Lab Results   Component Value Date    WBC 6.34 05/15/2025    WBC 6.24 2015    RBC 3.76 (L) 05/15/2025    RBC 4.96 2015    HGB 12.1 05/15/2025    HGB 15.2 2015    HCT 36.7 05/15/2025    HCT 44.8 2015    MCV 98 05/15/2025    MCV 90 2015    MCH 32.2 05/15/2025    MCH 30.6 2015    RDW 13.0 05/15/2025    RDW 13.9 2015     05/15/2025     2015     Results from last 7 days   Lab Units 25  0541 25  0546 25  1028   PTT seconds 85* 76* 88*       Available cardiology studies, imaging and lab results  "independently reviewed today.      This note was completed in part utilizing voice recognition software.   Grammatical errors, random word insertion, spelling mistakes, occasional wrong word or \"sound-alike\" substitutions and incomplete sentences may be an occasional consequence of the system secondary to software limitations, ambient noise and hardware issues. At the time of dictation, efforts were made to edit, clarify and /or correct errors.  Please read the chart carefully and recognize, using context, where substitutions have occurred.  If you have any questions or concerns about the context, text or information contained within the body of this dictation, please contact myself, the provider, for further clarification.           [1]   Current Facility-Administered Medications:     amLODIPine (NORVASC) tablet 5 mg, 5 mg, Oral, Daily, Candy Bailey MD, 5 mg at 05/18/25 0826    aspirin chewable tablet 81 mg, 81 mg, Oral, Daily, Candy Bailey MD, 81 mg at 05/18/25 0826    atorvastatin (LIPITOR) tablet 40 mg, 40 mg, Oral, Daily, Candy Bailey MD, 40 mg at 05/18/25 0826    chlorhexidine (PERIDEX) 0.12 % oral rinse 15 mL, 15 mL, Mouth/Throat, Q12H AMADOU, Candy Bailey MD, 15 mL at 05/18/25 0826    heparin (porcine) 25,000 units in 0.45% NaCl 250 mL infusion (premix), 3-20 Units/kg/hr (Order-Specific), Intravenous, Titrated, Analy SOPHIA Aparicioter PA-C, Last Rate: 11.2 mL/hr at 05/18/25 0826, 16 Units/kg/hr at 05/18/25 0826    heparin (porcine) injection 2,000 Units, 2,000 Units, Intravenous, Q6H PRN, Analy A Ritter, PA-C, 2,000 Units at 05/15/25 1328    heparin (porcine) injection 4,000 Units, 4,000 Units, Intravenous, Q6H PRN, Analy A Ritter, PA-C, 4,000 Units at 05/13/25 2230    ipratropium (ATROVENT) 0.02 % inhalation solution 0.5 mg, 0.5 mg, Nebulization, TID, Lee Gordon, , 0.5 mg at 05/18/25 0751    levalbuterol (XOPENEX) inhalation solution 0.63 mg, 0.63 mg, Nebulization, TID, Lee " DO Evan, 0.63 mg at 05/18/25 0751    metoprolol tartrate (LOPRESSOR) tablet 25 mg, 25 mg, Oral, Q12H AMADOU, Candy Bailey MD, 25 mg at 05/18/25 0826    mupirocin (BACTROBAN) 2 % nasal ointment 1 Application, 1 Application, Nasal, Q12H AMADOU, Candy Bailey MD, 1 Application at 05/18/25 0826    nitroglycerin (NITROSTAT) SL tablet 0.4 mg, 0.4 mg, Sublingual, Q5 Min PRN, Candy Bailey MD    sodium chloride (PF) 0.9 % injection 10 mL, 10 mL, Intracatheter, Daily, Candy Bailey MD, 10 mL at 05/18/25 0838    sodium chloride (PF) 0.9 % injection 10 mL, 10 mL, Intracatheter, Daily, Candy Bailey MD, 10 mL at 05/18/25 0838

## 2025-05-18 NOTE — PROGRESS NOTES
Progress Note - Cardiac Surgery   Kilo Mix 84 y.o. male MRN: [id number]  Unit/Bed#: PPHP-322-01 Encounter: [id number]      24 Hour Events: No events or complaints.     Medications:   Scheduled Meds:  Current Facility-Administered Medications   Medication Dose Route Frequency Provider Last Rate    amLODIPine  5 mg Oral Daily Candy Bailey MD      aspirin  81 mg Oral Daily Candy Bailey MD      atorvastatin  40 mg Oral Daily Candy Bailey MD      chlorhexidine  15 mL Mouth/Throat Q12H [facility] Candy Bailey MD      heparin (porcine)  3-20 Units/kg/hr (Order-Specific) Intravenous Titrated Analy A Ritter, PA-C 16 Units/kg/hr (05/18/25 0826)    heparin (porcine)  2,000 Units Intravenous Q6H PRN Analy A Ritter, PA-C      heparin (porcine)  4,000 Units Intravenous Q6H PRN Analy A Ritter, PA-C      ipratropium  0.5 mg Nebulization TID Ele Guglielmello, DO      levalbuterol  0.63 mg Nebulization TID Lee Guglielmello, DO      metoprolol tartrate  25 mg Oral Q12H [facility] Candy Bailey MD      mupirocin  1 Application Nasal Q12H [facility] Candy Bailey MD      nitroglycerin  0.4 mg Sublingual Q5 Min PRN Candy Bailey MD      sodium chloride (PF)  10 mL Intracatheter Daily Candy Bailey MD      sodium chloride (PF)  10 mL Intracatheter Daily Candy Bailey MD       Continuous Infusions:heparin (porcine), 3-20 Units/kg/hr (Order-Specific), Last Rate: 16 Units/kg/hr (05/18/25 0826)        Results:   Results from last 7 days   Lab Units 05/15/25  0544 05/12/25  0621   WBC Thousand/uL 6.34 9.35   HEMOGLOBIN g/dL 12.1 11.0*   HEMATOCRIT % 36.7 33.7*   PLATELETS Thousands/uL 218 181     Results from last 7 days   Lab Units 05/15/25  0544 05/13/25  0529 05/12/25  0621   POTASSIUM mmol/L 3.8 3.8 3.8   CHLORIDE mmol/L 106 104 104   CO2 mmol/L 28 26 28   BUN mg/dL 23 22 19   CREATININE mg/dL 1.24 1.17 1.12   CALCIUM mg/dL 9.4 9.3 8.9     Results from last 7 days   Lab Units 05/18/25  0581  05/17/25  0546 05/16/25  1028   PTT seconds 85* 76* 88*       Studies:   Studies:      Cardiac Catheterization:   Left Main   Dist LM lesion is 70% stenosed.      Left Anterior Descending   Mid LAD-1 lesion is 80% stenosed.   Mid LAD-2 lesion is 70% stenosed.      Second Diagonal Branch   2nd Diag lesion is 80% stenosed.      Left Circumflex   Ost Cx lesion is 90% stenosed.      Right Coronary Artery   Mid RCA lesion is 85% stenosed.   Dist RCA lesion is 60% stenosed.      Right Posterior Descending Artery   RPDA lesion is 70% stenosed.         Echocardiogram:     Interpretation Summary  Show Result Comparison     Left Ventricle: Left ventricular cavity size is normal. Wall thickness is normal. There is mild concentric hypertrophy. The left ventricular ejection fraction is 60%. Systolic function is normal. Wall motion is normal. Diastolic function is mildly abnormal, consistent with grade I (abnormal) relaxation.    Right Ventricle: Right ventricular cavity size is normal. Systolic function is normal.    Left Atrium: The atrium is mildly dilated (35-41 mL/m2).    Mitral Valve: There is mild annular calcification. There is mild regurgitation.    Tricuspid Valve: There is mild regurgitation. The right ventricular systolic pressure is mildly elevated. The estimated right ventricular systolic pressure is 41.00 mmHg.     Carotid artery duplex:  RIGHT:   There is <50% stenosis noted in the internal carotid artery. The common carotid artery/bulb stent is patent.   Plaque is homogenous and smooth.    A severe stenosis was identified in the proximal external carotid artery.   Vertebral artery flow is antegrade.  There is no significant subclavian artery disease.       LEFT:   There is 50-69% stenosis noted in the internal carotid artery.  Plaque is heterogenous and irregular.    Vertebral artery flow is antegrade.  There is no significant subclavian artery disease.      Greater saphenous vein mapping: Bilateral GSV with  small calibers for conduit. Possible segment in left thigh      Above imaging reviewed    Vitals:   Vitals:    05/18/25 0256 05/18/25 0704 05/18/25 1042 05/18/25 1042   BP: 151/65 129/63 114/57 114/57   Pulse: 61 55 62 61   Resp: 14      Temp: 97.6 °F (36.4 °C) 97.5 °F (36.4 °C) 97.5 °F (36.4 °C) 97.5 °F (36.4 °C)   TempSrc:       SpO2: 97% 94% 96% 97%   Weight:       Height:           Physical Exam:    General: No acute distress, Alert, and Normal appearance  HEENT/NECK:  Normocephalic. Atraumatic.  No jugular venous distention.    Cardiac: Regular rate and rhythm  Pulmonary:  Breath sounds clear bilaterally  Abdomen:  Non-tender and Non-distended  Extremities: Extremities warm/dry  Neuro: Alert and oriented X 3  Skin: Warm/Dry, without rashes or lesions.      Assessment:  NSTEMI, MVCAD  Pneumonia   Left ICA stenosis  Prostate CA with bilateral nephrotomies     Plan:  CABG on Wednesday with Dr. Tomlin - preoperative testing completed  Abx completed on Saturday for pneumonia  Outpatient follow up for ICA stenosis with vascular surgery   Oncology regarding his extensive prostate cancer, 5 yr survival is less than 40-50% though 1 year survival is very good     SIGNATURE: Rose Tang PA-C  DATE: May 18, 2025  TIME: 11:39 AM    * This note was completed in part utilizing [employer] direct voice recognition software.   Grammatical errors, random word insertion, spelling mistakes, and incomplete sentences may be an occasional consequence of the system secondary to software limitations, ambient noise and hardware issues. At the time of dictation, efforts were made to edit, clarify and /or correct errors. Please read the chart carefully and recognize, using context, where substitutions have occurred.  If you have any questions or concerns about the context, text or information contained within the body of this dictation, please contact myself, the provider, for further clarification.

## 2025-05-18 NOTE — ASSESSMENT & PLAN NOTE
History of carotid artery disease s/p stenting as well as coronary artery disease/MI s/p stenting more than 20 years ago  Continue aspirin Plavix and statin, increasing Lipitor to 40 mg-plavix on hold   Outpatient follow-up with vascular surgery

## 2025-05-18 NOTE — PLAN OF CARE
Problem: PAIN - ADULT  Goal: Verbalizes/displays adequate comfort level or baseline comfort level  Description: Interventions:- Encourage patient to monitor pain and request assistance- Assess pain using appropriate pain scale- Administer analgesics based on type and severity of pain and evaluate response- Implement non-pharmacological measures as appropriate and evaluate response- Consider cultural and social influences on pain and pain management- Notify physician/advanced practitioner if interventions unsuccessful or patient reports new pain  Outcome: Progressing     Problem: INFECTION - ADULT  Goal: Absence or prevention of progression during hospitalization  Description: INTERVENTIONS:- Assess and monitor for signs and symptoms of infection- Monitor lab/diagnostic results- Monitor all insertion sites, i.e. indwelling lines, tubes, and drains- Monitor endotracheal if appropriate and nasal secretions for changes in amount and color- [place] appropriate cooling/warming therapies per order- Administer medications as ordered- Instruct and encourage patient and family to use good hand hygiene technique- Identify and instruct in appropriate isolation precautions for identified infection/condition  Outcome: Progressing  Goal: Absence of fever/infection during neutropenic period  Description: INTERVENTIONS:- Monitor WBC  Outcome: Progressing     Problem: SAFETY ADULT  Goal: Maintain or return to baseline ADL function  Description: INTERVENTIONS:-  Assess patient's ability to carry out ADLs; assess patient's baseline for ADL function and identify physical deficits which impact ability to perform ADLs (bathing, care of mouth/teeth, toileting, grooming, dressing, etc.)- Assess/evaluate cause of self-care deficits - Assess range of motion- Assess patient's mobility; develop plan if impaired- Assess patient's need for assistive devices and provide as appropriate- Encourage maximum independence but intervene and supervise  when necessary- Involve family in performance of ADLs- Assess for home care needs following discharge - Consider OT consult to assist with ADL evaluation and planning for discharge- Provide patient education as appropriate  Outcome: Progressing     Problem: DISCHARGE PLANNING  Goal: Discharge to home or other facility with appropriate resources  Description: INTERVENTIONS:- Identify barriers to discharge w/patient and caregiver- Arrange for needed discharge resources and transportation as appropriate- Identify discharge learning needs (meds, wound care, etc.)- Arrange for interpretive services to assist at discharge as needed- Refer to Case Management Department for coordinating discharge planning if the patient needs post-hospital services based on physician/advanced practitioner order or complex needs related to functional status, cognitive ability, or social support system  Outcome: Progressing     Problem: Knowledge Deficit  Goal: Patient/family/caregiver demonstrates understanding of disease process, treatment plan, medications, and discharge instructions  Description: Complete learning assessment and assess knowledge base.Interventions:- Provide teaching at level of understanding- Provide teaching via preferred learning methods  Outcome: Progressing     Problem: CARDIOVASCULAR - ADULT  Goal: Maintains optimal cardiac output and hemodynamic stability  Description: INTERVENTIONS:- Monitor I/O, vital signs and rhythm- Monitor for S/S and trends of decreased cardiac output- Administer and titrate ordered vasoactive medications to optimize hemodynamic stability- Assess quality of pulses, skin color and temperature- Assess for signs of decreased coronary artery perfusion- Instruct patient to report change in severity of symptoms  INTERVENTIONS:- Monitor I/O, vital signs and rhythm- Monitor for S/S and trends of decreased cardiac output- Administer and titrate ordered vasoactive medications to optimize hemodynamic  stability- Assess quality of pulses, skin color and temperature- Assess for signs of decreased coronary artery perfusion- Instruct patient to report change in severity of symptoms  Outcome: Progressing  Goal: Absence of cardiac dysrhythmias or at baseline rhythm  Description: INTERVENTIONS:- Continuous cardiac monitoring, vital signs, obtain 12 lead EKG if ordered- Administer antiarrhythmic and heart rate control medications as ordered- Monitor electrolytes and administer replacement therapy as ordered  INTERVENTIONS:- Continuous cardiac monitoring, vital signs, obtain 12 lead EKG if ordered- Administer antiarrhythmic and heart rate control medications as ordered- Monitor electrolytes and administer replacement therapy as ordered  Outcome: Progressing     Problem: RESPIRATORY - ADULT  Goal: Achieves optimal ventilation and oxygenation  Description: INTERVENTIONS:- Assess for changes in respiratory status- Assess for changes in mentation and behavior- Position to facilitate oxygenation and minimize respiratory effort- Oxygen administered by appropriate delivery if ordered- Initiate smoking cessation education as indicated- Encourage broncho-pulmonary hygiene including cough, deep breathe, Incentive Spirometry- Assess the need for suctioning and aspirate as needed- Assess and instruct to report SOB or any respiratory difficulty- Respiratory Therapy support as indicated  Outcome: Progressing     Problem: GENITOURINARY - ADULT  Goal: Maintains or returns to baseline urinary function  Description: INTERVENTIONS:- Assess urinary function- Encourage oral fluids to ensure adequate hydration if ordered- Administer IV fluids as ordered to ensure adequate hydration- Administer ordered medications as needed- Offer frequent toileting- Follow urinary retention protocol if ordered  Outcome: Progressing  Goal: Absence of urinary retention  Description: INTERVENTIONS:- Assess patient’s ability to void and empty bladder- Monitor I/O-  Bladder scan as needed- Discuss with physician/AP medications to alleviate retention as needed- Discuss catheterization for long term situations as appropriate  Outcome: Progressing  Goal: Urinary catheter remains patent  Description: INTERVENTIONS:- Assess patency of urinary catheter- If patient has a chronic lai, consider changing catheter if non-functioning- Follow guidelines for intermittent irrigation of non-functioning urinary catheter  Outcome: Progressing     Problem: METABOLIC, FLUID AND ELECTROLYTES - ADULT  Goal: Electrolytes maintained within normal limits  Description: INTERVENTIONS:- Monitor labs and assess patient for signs and symptoms of electrolyte imbalances- Administer electrolyte replacement as ordered- Monitor response to electrolyte replacements, including repeat lab results as appropriate- Instruct patient on fluid and nutrition as appropriate  Outcome: Progressing  Goal: Fluid balance maintained  Description: INTERVENTIONS:- Monitor labs - Monitor I/O and WT- Instruct patient on fluid and nutrition as appropriate- Assess for signs & symptoms of volume excess or deficit  Outcome: Progressing  Goal: Glucose maintained within target range  Description: INTERVENTIONS:- Monitor Blood Glucose as ordered- Assess for signs and symptoms of hyperglycemia and hypoglycemia- Administer ordered medications to maintain glucose within target range- Assess nutritional intake and initiate nutrition service referral as needed  Outcome: Progressing     Problem: Prexisting or High Potential for Compromised Skin Integrity  Goal: Skin integrity is maintained or improved  Description: INTERVENTIONS:- Identify patients at risk for skin breakdown- Assess and monitor skin integrity- Assess and monitor nutrition and hydration status- Monitor labs - Assess for incontinence - Turn and reposition patient- Assist with mobility/ambulation- Relieve pressure over bony prominences- Avoid friction and shearing- Provide  appropriate hygiene as needed including keeping skin clean and dry- Evaluate need for skin moisturizer/barrier cream- Collaborate with interdisciplinary team - Patient/family teaching- Consider wound care consult   Outcome: Progressing     Problem: Nutrition/Hydration-ADULT  Goal: Nutrient/Hydration intake appropriate for improving, restoring or maintaining nutritional needs  Description: Monitor and assess patient's nutrition/hydration status for malnutrition. Collaborate with interdisciplinary team and initiate plan and interventions as ordered.  Monitor patient's weight and dietary intake as ordered or per policy. Utilize nutrition screening tool and intervene as necessary. Determine patient's food preferences and provide high-protein, high-caloric foods as appropriate.     INTERVENTIONS:  - Monitor oral intake, urinary output, labs, and treatment plans  - Assess nutrition and hydration status and recommend course of action  - Evaluate amount of meals eaten  - Assist patient with eating if necessary   - Allow adequate time for meals  - Recommend/ encourage appropriate diets, oral nutritional supplements, and vitamin/mineral supplements  - Order, calculate, and assess calorie counts as needed  - Recommend, monitor, and adjust tube feedings and TPN/PPN based on assessed needs  - Assess need for intravenous fluids  - Provide specific nutrition/hydration education as appropriate  - Include patient/family/caregiver in decisions related to nutrition  Outcome: Progressing

## 2025-05-18 NOTE — ASSESSMENT & PLAN NOTE
Chest x-ray with moderate right base pneumonia  Patient is afebrile, denied cough or shortness of breath  Upper normal white blood cells  Start IV ceftriaxone empirically- compleat  7-day course-completed on saturday  Procalcitonin and remained negative x 2.  Pulmonology consultation appreciated.-Continue to finish the course of antibiotics  CT chest from 5/13 reviewed.  Persistent right middle lobe and right lower lobe pneumonia-it may takes 4 to 6 weeks for clearance of pneumonia on imaging  Stable respiratory status

## 2025-05-18 NOTE — PLAN OF CARE
Problem: PAIN - ADULT  Goal: Verbalizes/displays adequate comfort level or baseline comfort level  Description: Interventions:- Encourage patient to monitor pain and request assistance- Assess pain using appropriate pain scale- Administer analgesics based on type and severity of pain and evaluate response- Implement non-pharmacological measures as appropriate and evaluate response- Consider cultural and social influences on pain and pain management- Notify physician/advanced practitioner if interventions unsuccessful or patient reports new pain  Outcome: Progressing     Problem: INFECTION - ADULT  Goal: Absence or prevention of progression during hospitalization  Description: INTERVENTIONS:- Assess and monitor for signs and symptoms of infection- Monitor lab/diagnostic results- Monitor all insertion sites, i.e. indwelling lines, tubes, and drains- Monitor endotracheal if appropriate and nasal secretions for changes in amount and color- [place] appropriate cooling/warming therapies per order- Administer medications as ordered- Instruct and encourage patient and family to use good hand hygiene technique- Identify and instruct in appropriate isolation precautions for identified infection/condition  Outcome: Progressing  Goal: Absence of fever/infection during neutropenic period  Description: INTERVENTIONS:- Monitor WBC  Outcome: Progressing     Problem: SAFETY ADULT  Goal: Patient will remain free of falls  Description: INTERVENTIONS:- Educate patient/family on patient safety including physical limitations- Instruct patient to call for assistance with activity - Consult OT/PT to assist with strengthening/mobility - Keep Call bell within reach- Keep bed low and locked with side rails adjusted as appropriate- Keep care items and personal belongings within reach- Initiate and maintain comfort rounds- Make Fall Risk Sign visible to staff- Offer Toileting every 2 Hours, in advance of need- Initiate/Maintain bed alarm-  Obtain necessary fall risk management equipment: - Apply yellow socks and bracelet for high fall risk patients- Consider moving patient to room near nurses station  Outcome: Progressing  Goal: Maintain or return to baseline ADL function  Description: INTERVENTIONS:-  Assess patient's ability to carry out ADLs; assess patient's baseline for ADL function and identify physical deficits which impact ability to perform ADLs (bathing, care of mouth/teeth, toileting, grooming, dressing, etc.)- Assess/evaluate cause of self-care deficits - Assess range of motion- Assess patient's mobility; develop plan if impaired- Assess patient's need for assistive devices and provide as appropriate- Encourage maximum independence but intervene and supervise when necessary- Involve family in performance of ADLs- Assess for home care needs following discharge - Consider OT consult to assist with ADL evaluation and planning for discharge- Provide patient education as appropriate  Outcome: Progressing  Goal: Maintains/Returns to pre admission functional level  Description: INTERVENTIONS:- Perform AM-PAC 6 Click Basic Mobility/ Daily Activity assessment daily.- Set and communicate daily mobility goal to care team and patient/family/caregiver. - Collaborate with rehabilitation services on mobility goals if consulted- Perform Range of Motion 3 times a day.- Reposition patient every 2 hours.- Dangle patient 3 times a day- Stand patient 2 times a day- Ambulate patient 2 times a day- Out of bed to chair 3 times a day - Out of bed for meals 3 times a day- Out of bed for toileting- Record patient progress and toleration of activity level   Outcome: Progressing     Problem: DISCHARGE PLANNING  Goal: Discharge to home or other facility with appropriate resources  Description: INTERVENTIONS:- Identify barriers to discharge w/patient and caregiver- Arrange for needed discharge resources and transportation as appropriate- Identify discharge learning  needs (meds, wound care, etc.)- Arrange for interpretive services to assist at discharge as needed- Refer to Case Management Department for coordinating discharge planning if the patient needs post-hospital services based on physician/advanced practitioner order or complex needs related to functional status, cognitive ability, or social support system  Outcome: Progressing     Problem: Knowledge Deficit  Goal: Patient/family/caregiver demonstrates understanding of disease process, treatment plan, medications, and discharge instructions  Description: Complete learning assessment and assess knowledge base.Interventions:- Provide teaching at level of understanding- Provide teaching via preferred learning methods  Outcome: Progressing     Problem: CARDIOVASCULAR - ADULT  Goal: Maintains optimal cardiac output and hemodynamic stability  Description: INTERVENTIONS:- Monitor I/O, vital signs and rhythm- Monitor for S/S and trends of decreased cardiac output- Administer and titrate ordered vasoactive medications to optimize hemodynamic stability- Assess quality of pulses, skin color and temperature- Assess for signs of decreased coronary artery perfusion- Instruct patient to report change in severity of symptoms  INTERVENTIONS:- Monitor I/O, vital signs and rhythm- Monitor for S/S and trends of decreased cardiac output- Administer and titrate ordered vasoactive medications to optimize hemodynamic stability- Assess quality of pulses, skin color and temperature- Assess for signs of decreased coronary artery perfusion- Instruct patient to report change in severity of symptoms  Outcome: Progressing  Goal: Absence of cardiac dysrhythmias or at baseline rhythm  Description: INTERVENTIONS:- Continuous cardiac monitoring, vital signs, obtain 12 lead EKG if ordered- Administer antiarrhythmic and heart rate control medications as ordered- Monitor electrolytes and administer replacement therapy as ordered  INTERVENTIONS:- Continuous  cardiac monitoring, vital signs, obtain 12 lead EKG if ordered- Administer antiarrhythmic and heart rate control medications as ordered- Monitor electrolytes and administer replacement therapy as ordered  Outcome: Progressing     Problem: RESPIRATORY - ADULT  Goal: Achieves optimal ventilation and oxygenation  Description: INTERVENTIONS:- Assess for changes in respiratory status- Assess for changes in mentation and behavior- Position to facilitate oxygenation and minimize respiratory effort- Oxygen administered by appropriate delivery if ordered- Initiate smoking cessation education as indicated- Encourage broncho-pulmonary hygiene including cough, deep breathe, Incentive Spirometry- Assess the need for suctioning and aspirate as needed- Assess and instruct to report SOB or any respiratory difficulty- Respiratory Therapy support as indicated  Outcome: Progressing     Problem: GENITOURINARY - ADULT  Goal: Maintains or returns to baseline urinary function  Description: INTERVENTIONS:- Assess urinary function- Encourage oral fluids to ensure adequate hydration if ordered- Administer IV fluids as ordered to ensure adequate hydration- Administer ordered medications as needed- Offer frequent toileting- Follow urinary retention protocol if ordered  Outcome: Progressing  Goal: Absence of urinary retention  Description: INTERVENTIONS:- Assess patient’s ability to void and empty bladder- Monitor I/O- Bladder scan as needed- Discuss with physician/AP medications to alleviate retention as needed- Discuss catheterization for long term situations as appropriate  Outcome: Progressing  Goal: Urinary catheter remains patent  Description: INTERVENTIONS:- Assess patency of urinary catheter- If patient has a chronic lai, consider changing catheter if non-functioning- Follow guidelines for intermittent irrigation of non-functioning urinary catheter  Outcome: Progressing     Problem: METABOLIC, FLUID AND ELECTROLYTES - ADULT  Goal:  Electrolytes maintained within normal limits  Description: INTERVENTIONS:- Monitor labs and assess patient for signs and symptoms of electrolyte imbalances- Administer electrolyte replacement as ordered- Monitor response to electrolyte replacements, including repeat lab results as appropriate- Instruct patient on fluid and nutrition as appropriate  Outcome: Progressing  Goal: Fluid balance maintained  Description: INTERVENTIONS:- Monitor labs - Monitor I/O and WT- Instruct patient on fluid and nutrition as appropriate- Assess for signs & symptoms of volume excess or deficit  Outcome: Progressing  Goal: Glucose maintained within target range  Description: INTERVENTIONS:- Monitor Blood Glucose as ordered- Assess for signs and symptoms of hyperglycemia and hypoglycemia- Administer ordered medications to maintain glucose within target range- Assess nutritional intake and initiate nutrition service referral as needed  Outcome: Progressing     Problem: METABOLIC, FLUID AND ELECTROLYTES - ADULT  Goal: Electrolytes maintained within normal limits  Description: INTERVENTIONS:- Monitor labs and assess patient for signs and symptoms of electrolyte imbalances- Administer electrolyte replacement as ordered- Monitor response to electrolyte replacements, including repeat lab results as appropriate- Instruct patient on fluid and nutrition as appropriate  Outcome: Progressing  Goal: Fluid balance maintained  Description: INTERVENTIONS:- Monitor labs - Monitor I/O and WT- Instruct patient on fluid and nutrition as appropriate- Assess for signs & symptoms of volume excess or deficit  Outcome: Progressing  Goal: Glucose maintained within target range  Description: INTERVENTIONS:- Monitor Blood Glucose as ordered- Assess for signs and symptoms of hyperglycemia and hypoglycemia- Administer ordered medications to maintain glucose within target range- Assess nutritional intake and initiate nutrition service referral as needed  Outcome:  Progressing     Problem: Prexisting or High Potential for Compromised Skin Integrity  Goal: Skin integrity is maintained or improved  Description: INTERVENTIONS:- Identify patients at risk for skin breakdown- Assess and monitor skin integrity- Assess and monitor nutrition and hydration status- Monitor labs - Assess for incontinence - Turn and reposition patient- Assist with mobility/ambulation- Relieve pressure over bony prominences- Avoid friction and shearing- Provide appropriate hygiene as needed including keeping skin clean and dry- Evaluate need for skin moisturizer/barrier cream- Collaborate with interdisciplinary team - Patient/family teaching- Consider wound care consult   Outcome: Progressing     Problem: Nutrition/Hydration-ADULT  Goal: Nutrient/Hydration intake appropriate for improving, restoring or maintaining nutritional needs  Description: Monitor and assess patient's nutrition/hydration status for malnutrition. Collaborate with interdisciplinary team and initiate plan and interventions as ordered.  Monitor patient's weight and dietary intake as ordered or per policy. Utilize nutrition screening tool and intervene as necessary. Determine patient's food preferences and provide high-protein, high-caloric foods as appropriate.     INTERVENTIONS:  - Monitor oral intake, urinary output, labs, and treatment plans  - Assess nutrition and hydration status and recommend course of action  - Evaluate amount of meals eaten  - Assist patient with eating if necessary   - Allow adequate time for meals  - Recommend/ encourage appropriate diets, oral nutritional supplements, and vitamin/mineral supplements  - Order, calculate, and assess calorie counts as needed  - Recommend, monitor, and adjust tube feedings and TPN/PPN based on assessed needs  - Assess need for intravenous fluids  - Provide specific nutrition/hydration education as appropriate  - Include patient/family/caregiver in decisions related to  nutrition  Outcome: Progressing

## 2025-05-18 NOTE — PROGRESS NOTES
"Progress Note - Hospitalist   Name: Kilo Mix 84 y.o. male I MRN: [id number]  Unit/Bed#: [facility]-322-01 I Date of Admission: 5/10/2025   Date of Service: 5/18/2025 I Hospital Day: 8    Assessment & Plan  NSTEMI (non-ST elevated myocardial infarction) (HCC)  Has remote history of CAD /MI s/p coronary stents X2  Presenting with intermittent chest pain over last several weeks mainly with activity, but progressively worsening. Most recent episode before calling EMS lasted longer and did not resolve with rest, was radiating to neck and bilateral arms, with diaphoresis and vomiting.   Elevated troponin, EKG was sinus tachycardia 105 with some nonspecific ST changes  Patient started on heparin drip in the ED  Monitor on telemetry  As needed submental length of blistering  Cardiology consulted for further management  Follow on cardiac echo  - has recent A1C, Lipid and TSH , all were unremarkable.   Add beta-blocker  Left heart cath today 5/12 shows three-vessel disease, plan for evaluation for potential CABG  Heparin drip was resumed after cardiac catheterization  CABG postponed to 5/21  Preop workup completed      Pneumonia involving right lung  Chest x-ray with moderate right base pneumonia  Patient is afebrile, denied cough or shortness of breath  Upper normal white blood cells  Start IV ceftriaxone empirically- compleat  7-day course-completed on saturday  Procalcitonin and remained negative x 2.  Pulmonology consultation appreciated.-Continue to finish the course of antibiotics  CT chest from 5/13 reviewed.  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    Blood Sugar Average: Last 72 hrs:    Most recent A1C 5.8 ; hold home " metformin while inpatient -   Continue with insulin sliding scale and low carb diet.     Hypertension  Patient noted late April with low blood pressure he self discontinued off amlodipine then recheck to PCP and was advised to restart amlodipine and discontinue hydrochlorothiazide  Monitor blood pressure and reevaluate   Continue home lisinopril and amlodipine,  As needed Lopressor IV for systolic greater than 180  Lisinopril on hold hold in anticipation for CABG      Peripheral vascular disease (HCC)  History of carotid artery disease s/p stenting as well as coronary artery disease/MI s/p stenting more than 20 years ago  Continue aspirin Plavix and statin, increasing Lipitor to 40 mg-plavix on hold   Outpatient follow-up with vascular surgery  Dyslipidemia  Patient is on only 20 mg of atorvastatin however he has significant history of coronary artery disease and carotid artery disease s/p stenting for both  He denies intolerance of high intensity statin and agreeable to increase to 40 mg Lipitor  Prostate cancer (HCC)  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 Shankar  Cardiac surgery discussed with oncology regarding prognosis.  H/O insertion of nephrostomy tube  Has nephrostomies bilaterally due to obstruction by prostate cancer metastasis to lymph nodes   nephrostomy care while patient,   follow-up with urology, medical oncology and IR  Coronary artery disease involving native coronary artery    Asymptomatic stenosis of left carotid artery  Vascular surgery consultation appreciated.  Recommended outpatient follow-up  Preoperative clearance    ALPHONSO (obstructive sleep apnea)    Former smoker    At risk for  delirium    Depression    Frailty    Cognitive impairment    Hx of acute poliomyelitis    Restrictive lung disease    Moderate protein-calorie malnutrition (HCC)  Malnutrition Findings:   Adult Malnutrition type: Chronic illness  Adult Degree of Malnutrition: Malnutrition of moderate degree  Malnutrition Characteristics: Inadequate energy, Weight loss    360 Statement: Chronic moderate pro, diomedes malnutrition d/t catabolic illness, decreased appetite as evidence by significant weight loss, 12/16/24 177lbs, 2/13/25 172lbs, 4/17/25 161lbs, 5/12/25 159lbs, 18lbs/10% wt. loss x 6 months, 13lbs/7.5% wt. loss x 3 months, <75% energy intake > 1 month, treated with oral diet, oral nutrition supplements, discussed tips for increasing pro, diomedes intake at home.    BMI Findings:       Body mass index is 24.18 kg/m².       VTE Pharmacologic Prophylaxis: VTE Score: 8 Moderate Risk (Score 3-4) - Pharmacological DVT Prophylaxis Ordered: heparin drip.    Mobility:   Basic Mobility Inpatient Raw Score: 23  JH-HLM Goal: 7: Walk 25 feet or more  JH-HLM Achieved: 8: Walk 250 feet ot more  JH-HLM Goal achieved. Continue to encourage appropriate mobility.    Patient Centered Rounds: I performed bedside rounds with nursing staff today.   Discussions with Specialists or Other Care Team Provider:     Education and Discussions with Family / Patient: Patient declined call to .     Current Length of Stay: 8 day(s)  Current Patient Status: Inpatient   Certification Statement: The patient will continue to require additional inpatient hospital stay due to pending CABG  Discharge Plan: Anticipate discharge in >72 hrs to home with home services.    Code Status: Level 1 - Full Code    Subjective   Patient seen and examined.  No specific complaints offered.  No chest pain.  Currently waiting for CABG on Wednesday    Objective :  Temp:  [97.5 °F (36.4 °C)-98 °F (36.7 °C)] 97.9 °F (36.6 °C)  HR:  [55-73] 61  BP: (114-169)/(54-74)  116/57  Resp:  [14] 14  SpO2:  [85 %-98 %] 98 %  O2 Device: None (Room air)    Body mass index is 24.18 kg/m².     Input and Output Summary (last 24 hours):     Intake/Output Summary (Last 24 hours) at 5/18/2025 1703  Last data filed at 5/18/2025 1213  Gross per 24 hour   Intake 1337.65 ml   Output 1275 ml   Net 62.65 ml       Physical Exam  Constitutional:       General: He is not in acute distress.     Appearance: He is not ill-appearing.   HENT:      Head: Normocephalic and atraumatic.      Nose: Nose normal.     Eyes:      General: No scleral icterus.      Cardiovascular:      Pulses: Normal pulses.   Pulmonary:      Effort: Pulmonary effort is normal. No respiratory distress.   Abdominal:      General: There is no distension.      Tenderness: There is no abdominal tenderness.     Musculoskeletal:         General: Normal range of motion.     Skin:     General: Skin is warm.      Coloration: Skin is not jaundiced.     Neurological:      Mental Status: He is alert. Mental status is at baseline.           Lines/Drains:  Lines/Drains/Airways       Active Status       Name Placement date Placement time Site Days    Nephrostomy Left 10.2 Fr. 04/29/25  1109  Left  19    Nephrostomy Right 10.2 Fr. 04/29/25  1111  Right  19                      Telemetry:  Telemetry Orders (From admission, onward)               24 Hour Telemetry Monitoring  Continuous x 24 Hours (Telem)        Expiring   Question:  Reason for 24 Hour Telemetry  Answer:  PCI/EP study (including pacer and ICD implementation), Cardiac surgery, MI, abnormal cardiac cath, and chest pain- rule out MI                     Telemetry Reviewed: Normal sinus rhythm  Indication for Continued Telemetry Use: Awaiting PCI/EP Study/CABG               Lab Results: I have reviewed the following results:   Results from last 7 days   Lab Units 05/15/25  0544 05/12/25  0621   WBC Thousand/uL 6.34 9.35   HEMOGLOBIN g/dL 12.1 11.0*   HEMATOCRIT % 36.7 33.7*   PLATELETS  Thousands/uL 218 181   SEGS PCT %  --  79*   LYMPHO PCT %  --  13*   MONO PCT %  --  6   EOS PCT %  --  2     Results from last 7 days   Lab Units 05/15/25  0544   SODIUM mmol/L 141   POTASSIUM mmol/L 3.8   CHLORIDE mmol/L 106   CO2 mmol/L 28   BUN mg/dL 23   CREATININE mg/dL 1.24   ANION GAP mmol/L 7   CALCIUM mg/dL 9.4   GLUCOSE RANDOM mg/dL 110             Results from last 7 days   Lab Units 05/13/25  1253   HEMOGLOBIN A1C % 5.6     Results from last 7 days   Lab Units 05/12/25  0621   PROCALCITONIN ng/ml 0.07       Recent Cultures (last 7 days):               Last 24 Hours Medication List:     Current Facility-Administered Medications:     amLODIPine (NORVASC) tablet 5 mg, Daily    aspirin chewable tablet 81 mg, Daily    atorvastatin (LIPITOR) tablet 40 mg, Daily    chlorhexidine (PERIDEX) 0.12 % oral rinse 15 mL, Q12H AMADOU    heparin (porcine) 25,000 units in 0.45% NaCl 250 mL infusion (premix), Titrated, Last Rate: 16 Units/kg/hr (05/18/25 0826)    heparin (porcine) injection 2,000 Units, Q6H PRN    heparin (porcine) injection 4,000 Units, Q6H PRN    ipratropium (ATROVENT) 0.02 % inhalation solution 0.5 mg, TID    levalbuterol (XOPENEX) inhalation solution 0.63 mg, TID    metoprolol tartrate (LOPRESSOR) tablet 25 mg, Q12H AMADOU    mupirocin (BACTROBAN) 2 % nasal ointment 1 Application, Q12H AMADOU    nitroglycerin (NITROSTAT) SL tablet 0.4 mg, Q5 Min PRN    sodium chloride (PF) 0.9 % injection 10 mL, Daily    sodium chloride (PF) 0.9 % injection 10 mL, Daily    Administrative Statements   Today, Patient Was Seen By: Candy Bailey MD      **Please Note: This note may have been constructed using a voice recognition system.**

## 2025-05-18 NOTE — ASSESSMENT & PLAN NOTE
Has remote history of CAD /MI s/p coronary stents X2  Presenting with intermittent chest pain over last several weeks mainly with activity, but progressively worsening. Most recent episode before calling EMS lasted longer and did not resolve with rest, was radiating to neck and bilateral arms, with diaphoresis and vomiting.   Elevated troponin, EKG was sinus tachycardia 105 with some nonspecific ST changes  Patient started on heparin drip in the ED  Monitor on telemetry  As needed submental length of blistering  Cardiology consulted for further management  Follow on cardiac echo  - has recent A1C, Lipid and TSH , all were unremarkable.   Add beta-blocker  Left heart cath today 5/12 shows three-vessel disease, plan for evaluation for potential CABG  Heparin drip was resumed after cardiac catheterization  CABG postponed to 5/21  Preop workup completed

## 2025-05-19 LAB — APTT PPP: 78 SECONDS (ref 23–34)

## 2025-05-19 PROCEDURE — 99232 SBSQ HOSP IP/OBS MODERATE 35: CPT | Performed by: INTERNAL MEDICINE

## 2025-05-19 PROCEDURE — 85730 THROMBOPLASTIN TIME PARTIAL: CPT | Performed by: FAMILY MEDICINE

## 2025-05-19 PROCEDURE — NC001 PR NO CHARGE: Performed by: PHYSICIAN ASSISTANT

## 2025-05-19 PROCEDURE — 94664 DEMO&/EVAL PT USE INHALER: CPT

## 2025-05-19 PROCEDURE — 94640 AIRWAY INHALATION TREATMENT: CPT

## 2025-05-19 PROCEDURE — 94760 N-INVAS EAR/PLS OXIMETRY 1: CPT

## 2025-05-19 PROCEDURE — 99232 SBSQ HOSP IP/OBS MODERATE 35: CPT | Performed by: FAMILY MEDICINE

## 2025-05-19 RX ORDER — IBUPROFEN 600 MG/1
600 TABLET, FILM COATED ORAL ONCE
Status: COMPLETED | OUTPATIENT
Start: 2025-05-19 | End: 2025-05-19

## 2025-05-19 RX ORDER — DOCUSATE SODIUM 100 MG/1
100 CAPSULE, LIQUID FILLED ORAL 2 TIMES DAILY
Status: DISCONTINUED | OUTPATIENT
Start: 2025-05-19 | End: 2025-05-21

## 2025-05-19 RX ORDER — POLYETHYLENE GLYCOL 3350 17 G/17G
17 POWDER, FOR SOLUTION ORAL DAILY
Status: DISCONTINUED | OUTPATIENT
Start: 2025-05-19 | End: 2025-05-21

## 2025-05-19 RX ORDER — OXYCODONE HYDROCHLORIDE 5 MG/1
5 TABLET ORAL EVERY 6 HOURS PRN
Refills: 0 | Status: DISCONTINUED | OUTPATIENT
Start: 2025-05-19 | End: 2025-05-20

## 2025-05-19 RX ORDER — ACETAMINOPHEN 325 MG/1
975 TABLET ORAL EVERY 8 HOURS PRN
Status: DISCONTINUED | OUTPATIENT
Start: 2025-05-19 | End: 2025-05-20

## 2025-05-19 RX ORDER — IPRATROPIUM BROMIDE AND ALBUTEROL SULFATE 2.5; .5 MG/3ML; MG/3ML
3 SOLUTION RESPIRATORY (INHALATION) EVERY 6 HOURS PRN
Status: DISCONTINUED | OUTPATIENT
Start: 2025-05-19 | End: 2025-05-20

## 2025-05-19 RX ADMIN — CHLORHEXIDINE GLUCONATE 15 ML: 1.2 SOLUTION ORAL at 08:13

## 2025-05-19 RX ADMIN — AMLODIPINE BESYLATE 5 MG: 5 TABLET ORAL at 08:13

## 2025-05-19 RX ADMIN — IPRATROPIUM BROMIDE 0.5 MG: 0.5 SOLUTION RESPIRATORY (INHALATION) at 13:21

## 2025-05-19 RX ADMIN — IBUPROFEN 600 MG: 600 TABLET, FILM COATED ORAL at 21:39

## 2025-05-19 RX ADMIN — MUPIROCIN 1 APPLICATION: 20 OINTMENT TOPICAL at 20:08

## 2025-05-19 RX ADMIN — POLYETHYLENE GLYCOL 3350 17 G: 17 POWDER, FOR SOLUTION ORAL at 08:12

## 2025-05-19 RX ADMIN — IPRATROPIUM BROMIDE 0.5 MG: 0.5 SOLUTION RESPIRATORY (INHALATION) at 07:00

## 2025-05-19 RX ADMIN — OXYCODONE HYDROCHLORIDE 5 MG: 5 TABLET ORAL at 19:30

## 2025-05-19 RX ADMIN — LEVALBUTEROL HYDROCHLORIDE 0.63 MG: 0.63 SOLUTION RESPIRATORY (INHALATION) at 07:00

## 2025-05-19 RX ADMIN — DOCUSATE SODIUM 100 MG: 100 CAPSULE, LIQUID FILLED ORAL at 17:30

## 2025-05-19 RX ADMIN — LEVALBUTEROL HYDROCHLORIDE 0.63 MG: 0.63 SOLUTION RESPIRATORY (INHALATION) at 13:21

## 2025-05-19 RX ADMIN — MUPIROCIN 1 APPLICATION: 20 OINTMENT TOPICAL at 08:12

## 2025-05-19 RX ADMIN — ACETAMINOPHEN 975 MG: 325 TABLET ORAL at 17:30

## 2025-05-19 RX ADMIN — DOCUSATE SODIUM 100 MG: 100 CAPSULE, LIQUID FILLED ORAL at 08:13

## 2025-05-19 RX ADMIN — METOPROLOL TARTRATE 25 MG: 25 TABLET, FILM COATED ORAL at 20:08

## 2025-05-19 RX ADMIN — CHLORHEXIDINE GLUCONATE 15 ML: 1.2 SOLUTION ORAL at 20:08

## 2025-05-19 RX ADMIN — ASPIRIN 81 MG CHEWABLE TABLET 81 MG: 81 TABLET CHEWABLE at 08:13

## 2025-05-19 RX ADMIN — SODIUM CHLORIDE, PRESERVATIVE FREE 10 ML: 5 INJECTION INTRAVENOUS at 08:20

## 2025-05-19 RX ADMIN — METOPROLOL TARTRATE 25 MG: 25 TABLET, FILM COATED ORAL at 08:13

## 2025-05-19 RX ADMIN — ATORVASTATIN CALCIUM 40 MG: 40 TABLET, FILM COATED ORAL at 08:13

## 2025-05-19 RX ADMIN — HEPARIN SODIUM 16 UNITS/KG/HR: 10000 INJECTION, SOLUTION INTRAVENOUS at 06:34

## 2025-05-19 NOTE — PROGRESS NOTES
Progress Note - Cardiac Surgery   Kilo Mix 84 y.o. male MRN: [id number]  Unit/Bed#: [facility]-322-01 Encounter: [id number]      24 Hour Events: No events. No cardiac/pulmonary complaints. Still agreeable to sx, all questions addressed during encounter.    Medications:   Scheduled Meds:  Current Facility-Administered Medications   Medication Dose Route Frequency Provider Last Rate    amLODIPine  5 mg Oral Daily Candy Bailey MD      aspirin  81 mg Oral Daily Candy Bailey MD      atorvastatin  40 mg Oral Daily Candy Bailey MD      chlorhexidine  15 mL Mouth/Throat Q12H [facility] Candy Bailey MD      heparin (porcine)  3-20 Units/kg/hr (Order-Specific) Intravenous Titrated Analy A Alessandrater PA-C 16 Units/kg/hr (05/19/25 0634)    heparin (porcine)  2,000 Units Intravenous Q6H PRN Analy A Ritter, PA-C      heparin (porcine)  4,000 Units Intravenous Q6H PRN Analy A YOHANNES Phan-C      ipratropium  0.5 mg Nebulization TID Lee Guglielmello, DO      levalbuterol  0.63 mg Nebulization TID Lee Guglielmello, DO      metoprolol tartrate  25 mg Oral Q12H [facility] Candy Bailey MD      mupirocin  1 Application Nasal Q12H [facility] Candy Bailey MD      nitroglycerin  0.4 mg Sublingual Q5 Min PRN Candy Bailey MD      sodium chloride (PF)  10 mL Intracatheter Daily Candy Bailey MD      sodium chloride (PF)  10 mL Intracatheter Daily Candy Bailey MD       Continuous Infusions:heparin (porcine), 3-20 Units/kg/hr (Order-Specific), Last Rate: 16 Units/kg/hr (05/19/25 0634)        Results:   Results from last 7 days   Lab Units 05/15/25  0544   WBC Thousand/uL 6.34   HEMOGLOBIN g/dL 12.1   HEMATOCRIT % 36.7   PLATELETS Thousands/uL 218     Results from last 7 days   Lab Units 05/15/25  0544 05/13/25  0529   POTASSIUM mmol/L 3.8 3.8   CHLORIDE mmol/L 106 104   CO2 mmol/L 28 26   BUN mg/dL 23 22   CREATININE mg/dL 1.24 1.17   CALCIUM mg/dL 9.4 9.3     Results from last 7 days   Lab Units 05/19/25  7882  05/18/25  0541 05/17/25  0546   PTT seconds 78* 85* 76*       Studies:     Cardiac Cath: distal LM 70%, Mid LAD 80% and 70%, Diag2 80%, Ost Cx 90%, Mid RCA 85%, Dist RCA 60%, RPDA 70%     Echo: EF 60%, normal wall motion, mild MV annular calcification with mild MR, mild TR     Carotid US: <50% right with severe stenosis in prox external carotid artery, 50-70% in left, VAFA and no subclavian dx b/l     Vein Mapping: Bedside U/S (DF) Done; Acceptable vein thighs B/L     CXR: Moderate right base pneumonia.     PET scan (Feb 2025): Previously noted PSMA avid lymphadenopathy in the chest, abdomen and pelvis has decreased in size from 12/30/2024 suggesting therapy response. Some of these demonstrate mild FDG uptake.     Med LN (dec 2024) Metastatic carcinoma , immunocytochemically consistent with metastatic prostatic adenocarcinoma     CT Chest: persistent RML and RLL PNA, dense CAD, cholelithiasis, atrophic right kidney     EKG: done     PFT: NL spirometry, Moderate restriction, FEV1 84%, FEV1/%, DLCOcor 65%     Results Review Statement: No pertinent imaging studies reviewed.    Vitals:   Vitals:    05/18/25 2250 05/19/25 0256 05/19/25 0702 05/19/25 0716   BP: 137/65 143/77  121/58   Pulse: 59 59  68   Resp: 17 13     Temp: 97.7 °F (36.5 °C) 97.6 °F (36.4 °C)  97.5 °F (36.4 °C)   TempSrc:       SpO2: 98% 98% 97% 97%   Weight:       Height:           Physical Exam:    General: No acute distress  HEENT/NECK:  Normocephalic. Atraumatic.   Cardiac: Regular rate and rhythm  Pulmonary:  on RA, no respiratory distress  Abdomen:  Non-distended  Extremities: Extremities warm/dry  Neuro: No focal deficits  Skin: Warm/Dry, without rashes or lesions.      Assessment:    Severe coronary artery disease; Ongoing CABG workup    Plan:  Patient agreeable to proceed with surgery;    Informed consent signed & on chart    Pre-op work-up underway    PNA   Afebrile   On RA   No new CBC, last WBC WNL on 5/15   Abx complete  "5/17    Incidental Finding of 50-69% <LICA stenosis on pre-operative carotid US   Asymptomatic   Vascular surgery consult complete 5/14 w/ recommendations for o/p f/u    Known stage IV prostate ca   Oncology eval pre-operatively complete w/ 1 year survival \"very good\" & 5 year <40-50%   B/l nephrostomy tubes in place due to urinary obstruction from prostate cancer    Add Colace/Miralax for constipation    Continue Mupirocin 2% nasal ointment q 12 hrs    Continue topical chlorhexidine bath and mouth rise    coronary artery bypass grafting scheduled for 5/21 with Dr. David Tomlin D.O. 2nd case    SIGNATURE: Lisa Worley PA-C  DATE: May 19, 2025  TIME: 7:21 AM    * This note was completed in part utilizing [employer] direct voice recognition software.   Grammatical errors, random word insertion, spelling mistakes, and incomplete sentences may be an occasional consequence of the system secondary to software limitations, ambient noise and hardware issues. At the time of dictation, efforts were made to edit, clarify and /or correct errors. Please read the chart carefully and recognize, using context, where substitutions have occurred.  If you have any questions or concerns about the context, text or information contained within the body of this dictation, please contact myself, the provider, for further clarification.     "

## 2025-05-19 NOTE — ASSESSMENT & PLAN NOTE
Probable ALPHONSO, his son passed from complications around LAPHONSO.  His other son also stated he has been told he is difficult to intubate.  He has never had a sleep study but does have snoring and I do think it is likely to be a potential complicating factor.

## 2025-05-19 NOTE — ASSESSMENT & PLAN NOTE
Multifactorial.  Likely restriction worsened by him being hospitalized.  He has no evidence of ILD on CT.

## 2025-05-19 NOTE — ASSESSMENT & PLAN NOTE
See prior risk stratification from Dr Gordon  He is at an elevated risk by risk calculator for pulmonary complications but his PFTs do not reveal COPD, he is now on room air, completed course of antibiotics for pneumonia and he has no respiratory distress so reasonable to proceed with CABG as planned with routine post-operative care to avoid pulmonary complications- incentive spirometry, pain control, early ambulation, DVT Px.   His nebs can be PRN.     Pulmonary will sign off. Call  back with questions- discussed with DAYAN Bailey via [employer] secure chat

## 2025-05-19 NOTE — CASE MANAGEMENT
Case Management Discharge Planning Note    Patient name Kilo Mix  Location [facility]01 MRN [id number]  : 1940 Date 2025       Current Admission Date: 5/10/2025  Current Admission Diagnosis:NSTEMI (non-ST elevated myocardial infarction) (Formerly [facility])   Patient Active Problem List    Diagnosis Date Noted    Hx of acute poliomyelitis 2025    Restrictive lung disease 2025    Moderate protein-calorie malnutrition (HCC) 2025    Preoperative clearance 2025    ALPHONSO (obstructive sleep apnea) 2025    Former smoker 2025    At risk for delirium 2025    Depression 2025    Frailty 2025    Cognitive impairment 2025    Pneumonia involving right lung 2025    NSTEMI (non-ST elevated myocardial infarction) (Formerly [facility]) 05/10/2025    H/O insertion of nephrostomy tube 05/10/2025    CKD stage 3 due to type 2 diabetes mellitus (Formerly [facility]) 2025    Hydronephrosis 2024    Acute kidney injury (Formerly [facility]) 2024    Primary osteoarthritis of left shoulder 2024    Traumatic complete tear of right rotator cuff 2024    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Formerly [facility]) 2021    Personal history of colonic polyps 2020    Schatzki's ring 2020    Dysphagia 2020    Antiplatelet or antithrombotic long-term use 2020    Plantar fasciitis of right foot 2020    AMD (age related macular degeneration) 2020    Prostate cancer (Formerly [facility]) 2019    Asymptomatic stenosis of left carotid artery 2018    Diabetes mellitus type 2 with neurological manifestations (Formerly [facility]) 2015    Peripheral vascular disease (Formerly [facility]) 2012    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    Diabetic polyneuropathy (Formerly [facility]) 2012    Diastolic dysfunction 2012    Dyslipidemia 2012    Lower back pain 2012    Sinus bradycardia 2012    Hypertension 2012    Recurrent stenosis of right carotid artery  01/26/2012    Coronary artery disease involving native coronary artery 02/21/2011      LOS (days): 9  Geometric Mean LOS (GMLOS) (days): 4.1  Days to GMLOS:-4.7     OBJECTIVE:  Risk of Unplanned Readmission Score: 11.35         Current admission status: Inpatient   Preferred Pharmacy:   [id number] - [place] - [address]  [address]  [place] [id number]  Phone: [phone] Fax: [phone]    [facility] - [place] - [address]  [address]  [address]  [place] [address]  Phone: [phone] Fax: [phone]    Primary Care Provider: Dolores Vizcarra DO    Primary Insurance: MEDICARE  Secondary Insurance: [employer]ET[employer]    DISCHARGE DETAILS:  Additional Comments: CM is following. Patient is scheduled for CABG x2-3 tomorrow. Pt does not have hx of STR or HHC, agreeable to referrals following postop pt/ot evaluations. Pt's son is POA, pt has daughter and daughter-in-law also listed for emergency contacts. CM following for DCP

## 2025-05-19 NOTE — PROGRESS NOTES
"Progress Note - Hospitalist   Name: Kilo Mix 84 y.o. male I MRN: [id number]  Unit/Bed#: [facility]-322-01 I Date of Admission: 5/10/2025   Date of Service: 5/19/2025 I Hospital Day: 9    Assessment & Plan  NSTEMI (non-ST elevated myocardial infarction) (HCC)  Has remote history of CAD /MI s/p coronary stents X2  Presenting with intermittent chest pain over last several weeks mainly with activity, but progressively worsening. Most recent episode before calling EMS lasted longer and did not resolve with rest, was radiating to neck and bilateral arms, with diaphoresis and vomiting.   Elevated troponin, EKG was sinus tachycardia 105 with some nonspecific ST changes  Patient started on heparin drip in the ED  Monitor on telemetry  As needed submental length of blistering  Cardiology consulted for further management  Follow on cardiac echo  - has recent A1C, Lipid and TSH , all were unremarkable.   Add beta-blocker  Left heart cath today 5/12 shows three-vessel disease, plan for evaluation for potential CABG  Heparin drip was resumed after cardiac catheterization  CABG postponed to 5/21  Preop workup completed      Pneumonia involving right lung  Chest x-ray with moderate right base pneumonia  Patient is afebrile, denied cough or shortness of breath  Upper normal white blood cells  Start IV ceftriaxone empirically- compleat  7-day course-completed on saturday  Procalcitonin and remained negative x 2.  Pulmonology consultation appreciated.-Continue to finish the course of antibiotics  CT chest from 5/13 reviewed.  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    Blood Sugar Average: Last 72 " hrs:    Most recent A1C 5.8 ; hold home metformin while inpatient -   Continue with insulin sliding scale and low carb diet.     Hypertension  Patient noted late April with low blood pressure he self discontinued off amlodipine then recheck to PCP and was advised to restart amlodipine and discontinue hydrochlorothiazide  Monitor blood pressure and reevaluate   Continue home lisinopril and amlodipine,  As needed Lopressor IV for systolic greater than 180  Lisinopril on hold hold in anticipation for CABG      Peripheral vascular disease (HCC)  History of carotid artery disease s/p stenting as well as coronary artery disease/MI s/p stenting more than 20 years ago  Continue aspirin Plavix and statin, increasing Lipitor to 40 mg-plavix on hold   Outpatient follow-up with vascular surgery  Dyslipidemia  Patient is on only 20 mg of atorvastatin however he has significant history of coronary artery disease and carotid artery disease s/p stenting for both  He denies intolerance of high intensity statin and agreeable to increase to 40 mg Lipitor  Prostate cancer (HCC)  Stage IV prostate cancer with mets to the lymph nodes and complicated with urinary obstruction requiring bilateral nephrostomies    January 2025 he was started on ADT and Xtandi, complicated with dizziness for which Xtandi was discontinued in April 2025    Patient to continue follow-up with medical oncology established with Dr. Shankar  Cardiac surgery discussed with oncology regarding prognosis.  H/O insertion of nephrostomy tube  Has nephrostomies bilaterally due to obstruction by prostate cancer metastasis to lymph nodes   nephrostomy care while patient,   follow-up with urology, medical oncology and IR  Coronary artery disease involving native coronary artery    Asymptomatic stenosis of left carotid artery  Vascular surgery consultation appreciated.  Recommended outpatient follow-up  Preoperative clearance    ALPHONSO (obstructive sleep apnea)    Former  smoker    At risk for delirium    Depression    Frailty    Cognitive impairment    Hx of acute poliomyelitis    Restrictive lung disease    Moderate protein-calorie malnutrition (HCC)  Malnutrition Findings:   Adult Malnutrition type: Chronic illness  Adult Degree of Malnutrition: Malnutrition of moderate degree  Malnutrition Characteristics: Inadequate energy, Weight loss    360 Statement: Chronic moderate pro, diomedes malnutrition d/t catabolic illness, decreased appetite as evidence by significant weight loss, 12/16/24 177lbs, 2/13/25 172lbs, 4/17/25 161lbs, 5/12/25 159lbs, 18lbs/10% wt. loss x 6 months, 13lbs/7.5% wt. loss x 3 months, <75% energy intake > 1 month, treated with oral diet, oral nutrition supplements, discussed tips for increasing pro, diomedes intake at home.    BMI Findings:       Body mass index is 24.18 kg/m².       VTE Pharmacologic Prophylaxis: VTE Score: 8-heparin drip    Mobility:   Basic Mobility Inpatient Raw Score: 23  JH-HLM Goal: 7: Walk 25 feet or more  JH-HLM Achieved: 8: Walk 250 feet ot more  JH-HLM Goal achieved. Continue to encourage appropriate mobility.    Patient Centered Rounds: I performed bedside rounds with nursing staff today.   Discussions with Specialists or Other Care Team Provider: Pulmonology    Education and Discussions with Family / Patient: Patient declined call to .     Current Length of Stay: 9 day(s)  Current Patient Status: Inpatient   Certification Statement: The patient will continue to require additional inpatient hospital stay due to pending cabg   Discharge Plan:    Code Status: Level 1 - Full Code    Subjective   Patient seen and examined.  No specific complaints offered.  No chest pain or shortness of breath.  Cough is improving.    Objective :  Temp:  [97.4 °F (36.3 °C)-97.9 °F (36.6 °C)] 97.4 °F (36.3 °C)  HR:  [44-69] 44  BP: (116-152)/(49-77) 141/65  Resp:  [13-17] 16  SpO2:  [94 %-99 %] 98 %  O2 Device: None (Room air)    Body mass index is  24.18 kg/m².     Input and Output Summary (last 24 hours):     Intake/Output Summary (Last 24 hours) at 5/19/2025 1552  Last data filed at 5/19/2025 1526  Gross per 24 hour   Intake 1496.8 ml   Output 2670 ml   Net -1173.2 ml       Physical Exam  Constitutional:       General: He is not in acute distress.  HENT:      Head: Normocephalic and atraumatic.      Nose: Nose normal. No congestion.     Eyes:      General: No scleral icterus.      Cardiovascular:      Pulses: Normal pulses.      Heart sounds: No murmur heard.  Pulmonary:      Effort: Pulmonary effort is normal.      Comments: Decreased breath bilateral  Abdominal:      General: Abdomen is flat. There is no distension.      Tenderness: There is no abdominal tenderness.     Musculoskeletal:         General: Normal range of motion.     Skin:     General: Skin is warm.      Coloration: Skin is not jaundiced.     Neurological:      Mental Status: He is alert.      Cranial Nerves: No cranial nerve deficit.           Lines/Drains:  Lines/Drains/Airways       Active Status       Name Placement date Placement time Site Days    Nephrostomy Left 10.2 Fr. 04/29/25  1109  Left  20    Nephrostomy Right 10.2 Fr. 04/29/25  1111  Right  20                      Telemetry:  Telemetry Orders (From admission, onward)               24 Hour Telemetry Monitoring  Continuous x 24 Hours (Telem)        Expiring   Question:  Reason for 24 Hour Telemetry  Answer:  PCI/EP study (including pacer and ICD implementation), Cardiac surgery, MI, abnormal cardiac cath, and chest pain- rule out MI                     Telemetry Reviewed: Normal Sinus Rhythm  Indication for Continued Telemetry Use: Awaiting PCI/EP Study/CABG               Lab Results: I have reviewed the following results:   Results from last 7 days   Lab Units 05/15/25  0544   WBC Thousand/uL 6.34   HEMOGLOBIN g/dL 12.1   HEMATOCRIT % 36.7   PLATELETS Thousands/uL 218     Results from last 7 days   Lab Units 05/15/25  0557    SODIUM mmol/L 141   POTASSIUM mmol/L 3.8   CHLORIDE mmol/L 106   CO2 mmol/L 28   BUN mg/dL 23   CREATININE mg/dL 1.24   ANION GAP mmol/L 7   CALCIUM mg/dL 9.4   GLUCOSE RANDOM mg/dL 110             Results from last 7 days   Lab Units 05/13/25  1253   HEMOGLOBIN A1C % 5.6           Recent Cultures (last 7 days):               Last 24 Hours Medication List:     Current Facility-Administered Medications:     amLODIPine (NORVASC) tablet 5 mg, Daily    aspirin chewable tablet 81 mg, Daily    atorvastatin (LIPITOR) tablet 40 mg, Daily    chlorhexidine (PERIDEX) 0.12 % oral rinse 15 mL, Q12H [facility]    docusate sodium (COLACE) capsule 100 mg, BID    heparin (porcine) 25,000 units in 0.45% NaCl 250 mL infusion (premix), Titrated, Last Rate: 16 Units/kg/hr (05/19/25 0634)    heparin (porcine) injection 2,000 Units, Q6H PRN    heparin (porcine) injection 4,000 Units, Q6H PRN    ipratropium-albuterol (DUO-NEB) 0.5-2.5 mg/3 mL inhalation solution 3 mL, Q6H PRN    metoprolol tartrate (LOPRESSOR) tablet 25 mg, Q12H AMAD[facility]    mupirocin (BACTROBAN) 2 % nasal ointment 1 Application, Q12H [facility]    nitroglycerin (NITROSTAT) SL tablet 0.4 mg, Q5 Min PRN    polyethylene glycol (MIRALAX) packet 17 g, Daily    sodium chloride (PF) 0.9 % injection 10 mL, Daily    sodium chloride (PF) 0.9 % injection 10 mL, Daily    Administrative Statements   Today, Patient Was Seen By: Candy Bailey MD      **Please Note: This note may have been constructed using a voice recognition system.**

## 2025-05-19 NOTE — PROGRESS NOTES
[facility]      Rooming information:  Approximate last use of full opioid agonist: Unk  Taking buprenorphine? Yes:  As prescribed? Yes:   Number of buprenorphine films/tablets remaining currently: out, took her last dose this morning  Side effects related to buprenorphine (constipation, dry mouth, sedation?) Yes: sweating and constipation   Narcan currently available: Yes  Other recent substance use:    THC daily and syntectic Adderall  NICOTINE-Yes: smokes cigarette  If using nicotine, ready to quit? No    Point of care urine drug screen positive for:  Lab Results   Component Value Date    BUP Screen Positive (A) 07/27/2023    BZO Negative 07/27/2023    BAR Negative 07/27/2023    NARCISO Negative 07/27/2023    MAMP Screen Positive (A) 07/27/2023    AMP Screen Positive (A) 07/27/2023    MDMA Negative 07/27/2023    MTD Negative 07/27/2023    FOX047 Negative 07/27/2023    OXY Negative 07/27/2023    PCP Negative 07/27/2023    THC Negative 07/27/2023    TEMP 92 F 07/27/2023    SGPOCT 1.025 07/27/2023       *POC urine drug screen does not screen for Fentanyl    Pregnancy Status   LMP: Negative HCG on 7/11/23  Birth control/barriers: yes, oral birth control  Interested in birth control if none currently? No  Urine pregnancy test specimen obtained and sent to lab:No        7/27/2023     2:00 PM   PHQ Assesment Total Score(s)   PHQ-9 Score 5       If PHQ-9 score of 15 or higher, has [facility] therapist or provider been notified? No    Any current suicidal ideation? No  If yes, has [facility] therapist or provider been notified? N/A    Primary care provider: Arti Mckee PA-C     Mental health provider: yes (follow up on MH referral if needed)    Insurance needs: active    Housing needs: stable    Contact information up to date? yes    3rd Party Involvement NA (please obtain KARLENE if pt would like to include)    Linda Zimmerman LPN  July 27, 2023  2:06 PM    "Progress Note - Pulmonology   Name: Kilo Mix 84 y.o. male I MRN: [id number]  Unit/Bed#: PPHP-322-01 I Date of Admission: 5/10/2025   Date of Service: 5/19/2025 I Hospital Day: 9    Assessment & Plan  Preoperative clearance  See prior risk stratification from Dr Gordon  He is at an elevated risk by risk calculator for pulmonary complications but his PFTs do not reveal COPD, he is now on room air, completed course of antibiotics for pneumonia and he has no respiratory distress so reasonable to proceed with CABG as planned with routine post-operative care to avoid pulmonary complications- incentive spirometry, pain control, early ambulation, DVT Px.   His nebs can be PRN.     Pulmonary will sign off. 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  Fortunately he did not require ventilation.    Restrictive lung disease  Multifactorial.  Likely restriction worsened by him being hospitalized.  He has no evidence of ILD on CT.     Former smoker  Former extensive smoking history > 3 packs per day for 40 years.  PFT without obstructive lung disease.  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    Blood Sugar Average: Last 72 hrs:      Moderate protein-calorie malnutrition (HCC)  Malnutrition Findings:   Adult Malnutrition type: Chronic illness  Adult Degree of Malnutrition: Malnutrition of moderate " degree  Malnutrition Characteristics: Inadequate energy, Weight loss                  360 Statement: Chronic moderate pro, diomedes malnutrition d/t catabolic illness, decreased appetite as evidence by significant weight loss, 12/16/24 177lbs, 2/13/25 172lbs, 4/17/25 161lbs, 5/12/25 159lbs, 18lbs/10% wt. loss x 6 months, 13lbs/7.5% wt. loss x 3 months, <75% energy intake > 1 month, treated with oral diet, oral nutrition supplements, discussed tips for increasing pro, diomedes intake at home.    BMI Findings:           Body mass index is 24.18 kg/m².       24 Hour Events : No acute events  Subjective : Feels OK. Does not think hte nebs are making a difference    Hospital course reviewed- admitted 5/10 with NSTEMI- had [facility]. Concirmed severe three vessel disease including distal left main. Plan is CABG 5/21.  Pulm was consulted 5/14 due to pneumonia and for pulmonary risk stratification  Extensive former smoker- no outpatient PFT- PFTs done 5/15 as admission- no obstruction. Moderate restriction. Over 120 pack-years.  Started on xopenex/Atrovent TID.  He got 7 days ceftiaxone ending 5/17  He is now on room air.      Objective :  Temp:  [97.5 °F (36.4 °C)-97.9 °F (36.6 °C)] 97.9 °F (36.6 °C)  HR:  [56-69] 56  BP: (116-152)/(49-77) 152/63  Resp:  [13-17] 16  SpO2:  [96 %-99 %] 99 %  O2 Device: None (Room air)    Physical Exam  Vitals and nursing note reviewed.   Constitutional:       General: He is not in acute distress.     Appearance: He is well-developed.   HENT:      Head: Normocephalic and atraumatic.     Eyes:      Conjunctiva/sclera: Conjunctivae normal.       Cardiovascular:      Rate and Rhythm: Normal rate and regular rhythm.      Heart sounds: No murmur heard.  Pulmonary:      Effort: Pulmonary effort is normal. No respiratory distress.      Breath sounds: Normal breath sounds.   Abdominal:      Palpations: Abdomen is soft.      Tenderness: There is no abdominal tenderness.     Musculoskeletal:         General: No  swelling.      Cervical back: Neck supple.      Right lower leg: No edema.      Left lower leg: No edema.     Skin:     General: Skin is warm and dry.      Capillary Refill: Capillary refill takes less than 2 seconds.     Neurological:      Mental Status: He is alert.     Psychiatric:         Mood and Affect: Mood normal.           Lab Results: I have reviewed the following results:   .     05/19/25  0454   PTT 78*     ABG: No new results in last 24 hours.    Imaging Results Review: I personally reviewed the following image studies in PACS and associated radiology reports: CT chest.     CT chest 5/13/25  R sided pneumonia  Other Study Results Review: Other studies reviewed include: echo  PFT Results Reviewed:PFT 5/15/25  Interpretation:  Normal spirometry.  No post bronchodilator response.  Moderate restriction as indicated by the lung volumes.  Mild decrease corrected diffusion capacity.  Flow-volume loop suggestive of a fixed obstruction, clinical correlation advised.

## 2025-05-19 NOTE — PROGRESS NOTES
"Cardiology Progress Note - Kilo Mix 84 y.o. 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  He has no chest pain or significant dyspnea.  Sinus bradycardia at rate of 59 noted on telemetry.  Remains on intravenous heparin.  For CABG on Wednesday.  Patient for outpatient follow-up with Vascular service in reference to stable L sided carotid disease.    Subjective:   Patient seen and examined.  No significant events overnight.  negative.    Objective:     Vitals: Blood pressure (!) 116/49, pulse 69, temperature 97.5 °F (36.4 °C), resp. rate 13, height 5' 8\" (1.727 m), weight 72.1 kg (159 lb), SpO2 97%., Body mass index is 24.18 kg/m².,   Orthostatic Blood Pressures      Flowsheet Row Most Recent Value   Blood Pressure 116/49 filed at 05/19/2025 0813   Patient Position - Orthostatic VS Lying filed at 05/17/2025 2020        ,      Intake/Output Summary (Last 24 hours) at 5/19/2025 0925  Last data filed at 5/19/2025 0820  Gross per 24 hour   Intake 1676.8 ml   Output 1950 ml   Net -273.2 ml       No significant arrhythmias seen on telemetry review.       Physical Exam:    GEN: Kilo Mix appears well, alert and oriented x 3, pleasant and cooperative   NECK: " supple, no carotid bruits, no JVD or HJR  HEART: normal rate, regular rhythm, normal S1 and S2, no murmurs, clicks, gallops or rubs   LUNGS: clear to auscultation bilaterally; no wheezes, rales, or rhonchi   ABDOMEN: no distention  EXTREMITIES: peripheral pulses normal; no clubbing, cyanosis, or edema  SKIN: warm and well perfused, no suspicious lesions on exposed skin    Labs & Results:    No results displayed because visit has over 200 results.          Complete PFT with post bronchodilator  Result Date: 5/16/2025  Narrative: Images from the original result were not included. Pulmonary Function Test Report Kilo Hand 84 y.o. male MRN: [id number] Date Performed: 5/15/2025 Date Interpretation: 5/15/2025 Interpretation: Normal spirometry. No post bronchodilator response. Moderate restriction as indicated by the lung volumes. Mild decrease corrected diffusion capacity. Flow-volume loop suggestive of a fixed obstruction, clinical correlation advised. Daria Bermudez MD [facility] Attestation I agree with interpretation as above.            Board Certified Pulmonary Physician    VAS carotid complete study  Result Date: 5/13/2025  Narrative: [facility] REPORT . KILO HAND is a 84 year old male who was referred by LIAT ARMSTRONG.      Indications:  Patient presents for surgical clearance and general health evaluation secondary to future open heart surgery.  Patient is S/P MI.  Operative History:  05/12/2025 Cardiac catheterization.  Right Common carotid artery/stent.  Coronary angioplasty/stent  Risk Factors:  The patient reports diabetes, CAD, hyperlipidemia, PVD, prostate cancer, bladder tumor, and prior smoking: quit >10yrs ago. Height: 68 inches. Weight: 159 lbs.   Clinical:  Right: IV site. Left BP: 143/66  FINDINGS: Main                                         Segment Impression  PSV  EDV  Ratio  Direction of Flow Right                                                                                             Prox CCA                                                  52   16                             Mid CCA                                                   78   24                             Right Distal Common Carotid Stent                         80   26                             Dist CCA                                                  80   26                             Prox. ICA                                                 93   22   1.18                      Mid. ICA                                                  84   24   1.08                      Dist. ICA                                                 83   24   1.06                      Ext Carotid                             Severe stenosis  499   31                             Prox Vert.                                                26    4                 Antegrade    Right Subclavian Artery                                   88    0                          Left                                                                                             Prox CCA                                                  60   14                             Mid CCA                                                   75   19                             Dist CCA                                                  73   17                             Carotid Bifurcation                                       75   24   1.00                      Prox. ICA                                                179   57   2.37                      Mid. ICA                                                  80   26   1.06                      Dist. ICA                                                 73   17   0.97                      Ext Carotid                                               96   16                             Prox Vert                                                 86   20                 Antegrade    Left Subclavian Artery                                    172    0                          CONCLUSION:  RIGHT: There is <50% stenosis noted in the internal carotid artery. The common carotid artery/bulb stent is patent. Plaque is homogenous and smooth.  A severe stenosis was identified in the proximal external carotid artery. Vertebral artery flow is antegrade.  There is no significant subclavian artery disease.   LEFT: There is 50-69% stenosis noted in the internal carotid artery.  Plaque is heterogenous and irregular.  Vertebral artery flow is antegrade.  There is no significant subclavian artery disease.   In comparison to the study of 11-, there is no significant B/L interval change in the disease process.  Recommend repeat testing in 6 months as per protocol unless otherwise indicated.  SIGNATURE Signed by LISANDRA QUINTEROS DO on 2025-05-13 17:01:05 [email or web address]    CT chest wo contrast  Result Date: 5/13/2025  Narrative: CT CHEST WITHOUT IV CONTRAST INDICATION: pre op CABG. COMPARISON: Two-view chest 5/10/2025. PET/CT 2/19/2025.l TECHNIQUE: CT examination of the chest was performed without intravenous contrast. Multiplanar 2D reformatted images were created from the source data. This examination, like all CT scans performed in the [facility], was performed utilizing techniques to minimize radiation dose exposure, including the use of iterative reconstruction and automated exposure control. Radiation dose length product (DLP) for this visit: 441.77 mGy-cm. FINDINGS: LUNGS: Persistent focal nodular consolidative and groundglass opacities throughout the right middle lobe and right lower lobe in keeping with multi lobar pneumonia. PLEURA: Unremarkable. HEART/GREAT VESSELS: Borderline heart size. Dense coronary artery calcifications. Aortic annulus calcifications. No thoracic aortic aneurysm. MEDIASTINUM AND ROVERTO:  Unremarkable. CHEST WALL AND LOWER NECK: Unremarkable. VISUALIZED STRUCTURES IN THE UPPER ABDOMEN: Cholelithiasis. 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 Atrophic right kidney. Simple appearing renal cyst. OSSEOUS STRUCTURES: No acute fracture or destructive osseous lesion.     Impression: Persistent right middle lobe and right lower lobe pneumonia. The study was marked in EPIC for immediate notification. Workstation performed: [id number]     VAS lower limb vein mapping bypass graft  Result Date: 5/13/2025  Narrative: [facility] REPORT . AMBER HAND is a 84 year old male who was referred by LIAT ARMSTRONG.      Indications:  Patient presents for surgical clearance and general health evaluation secondary to future open-heart surgery.  Patient is S/P MI. Evaluation for conduit. Operative History:  05/12/2025 Cardiac catheterization. Right Common carotid artery/stent. Coronary angioplasty/stent Risk Factors:  The patient reports diabetes, CAD, hyperlipidemia, PVD, prostate cancer, bladder tumor, and prior smoking: quit >10yrs ago. Height: 68 inches. Weight: 159 lbs. Clinical:  Left BP: 143/66 FINDINGS: Main                                          AP Diam (mm) Right Superficial Veins                                 Right Great Saphenous - Inguinal               6.7    Right Great Saphenous - Prox Thigh             2.1    Right Great Saphenous - Mid Thigh              1.0    Right Great Saphenous - Dist Thigh             0.9    Right Great Saphenous - Knee                   0.9    Right Great Saphenous - Mid Calf               0.8    Right Great Saphenous - Ankle                  1.1 Left Superficial Veins                                  Left Great Saphenous - Inguinal                5.7    Left Great Saphenous - Prox Thigh              2.3    Left Great Saphenous - Knee                    2.2    Left Great Saphenous - Mid Calf                0.9    Left Great Saphenous - Ankle                   1.7 CONCLUSION:    RIGHT LOWER LIMB:  The great saphenous vein is patent from the groin to the ankle.  The intraluminal diameter measurements range from 0.8 mm to 6.7 throughout.    LEFT LOWER LIMB:  The great saphenous vein is patent from the groin to the ankle.  Tech note: The great saphenous vein exits the fascia in the proximal thigh.  The intraluminal diameter measurements range from 0.9 mm to 5.7 mm throughout.   Technical findings electronically sent to patient's chart.      SIGNATURE Signed by LISSETTE SALAS DO, RPVI on 2025-05-13 14:08:28 [email or web address]    Echo complete w/ contrast if indicated  Result Date: 5/12/2025  Narrative:   Left Ventricle: Left ventricular cavity size is normal. Wall thickness is normal. There is mild concentric hypertrophy. The left ventricular ejection fraction is 60%. Systolic function is normal. Wall motion is normal. Diastolic function is mildly abnormal, consistent with grade I (abnormal) relaxation.   Right Ventricle: Right ventricular cavity size is normal. Systolic function is normal.   Left Atrium: The atrium is mildly dilated (35-41 mL/m2).   Mitral Valve: There is mild annular calcification. There is mild regurgitation.   Tricuspid Valve: There is mild regurgitation. The right ventricular systolic pressure is mildly elevated. The estimated right ventricular systolic pressure is 41.00 mmHg.     Cardiac catheterization  Result Date: 5/12/2025  Narrative:   Complex 3V CAD +LMCA     X-ray chest 1 view portable  Result Date: 5/11/2025  Narrative: XR CHEST PORTABLE INDICATION: chest pain. COMPARISON: CXR 2/2/2023. FINDINGS: Moderate consolidation in the right base. No pneumothorax or pleural effusion. Normal cardiomediastinal silhouette. Bones are unremarkable for age. Normal upper abdomen.     Impression: Moderate right base pneumonia. The study was marked in EPIC for immediate notification.  Workstation performed: [id number]     IR nephrostomy tube check/change/reposition/reinsertion/upsize  Result Date: 5/1/2025  Narrative: Bilateral nephrostogram and nephrostomy tube change Clinical History: Bladder cancer. Bilateral nephrostomy tube replacement. Contrast: 24 mL of iohexol Fluoro time: 5.9 MINUTES Number of Images: 16 Radiation dose: 28 mGy Concious sedation time: N/A Technique: The patient was brought to the interventional radiology suite and placed prone on the table. The existing bilateral nephrostomy tubes prepped and draped in the usual sterile fashion. After a  view was obtained, contrast was injected into the existing catheter and multiple images of the urinary tract were obtained to the bladder. Findings: Left renal collecting system demonstrates no filling defects or dilatation. Left ureter is dilated and opacified. Complete occlusion at the ureterovesical junction. Right nephrostogram demonstrates nondilated system with no filling defects. Complete occlusion at the distal ureter. Intervention: The left catheter was removed over a heavy-duty straight wire, and a Kumpe catheter was advanced to the distal ureter. Attempts to advance a wire through the ureterovesical junction was unsuccessful. New 10.2 Welsh nephrostomy tube was advanced, and the loop formed within the renal pelvis. The catheter was then injected, confirming satisfactory position. The right catheter was removed over a wire and exchanged for a Kumpe catheter which was advanced into the distal ureter. A Glidewire could be easily advanced through the ureterovesical junction into the bladder. Once the Kumpe catheter was in the bladder  contrast opacified nondistended bladder. Over the wire a new 10.2 Welsh APD catheter was placed and looped in the renal pelvis. Both catheter was secured with sutures and connected to drainage bag.     Impression: Impression: 1. Nephrostogram demonstrates nondilated system. Complete occlusion of  the distal left ureterovesical junction. Stenotic right ureterovesical junction could be bypassed with a wire and catheter. 2. Successful exchange of 10.2 Latvian nephrostomy tubes under fluoroscopic control. Findings discussed with Dr. Youssef. Workstation performed: [id number]       EKG personally reviewed by Juan Manuel Westfall MD.     Counseling / Coordination of Care  Total floor / unit time spent today 30 minutes.  Greater than 50% of total time was spent with the patient and / or family counseling and / or coordination of care.

## 2025-05-19 NOTE — ASSESSMENT & PLAN NOTE
Former extensive smoking history > 3 packs per day for 40 years.  PFT without obstructive lung disease.  Mildly reduced DLCO  PRN duonebs- do not need to be standing

## 2025-05-19 NOTE — ASSESSMENT & PLAN NOTE
Chest x-ray with moderate right base pneumonia  Patient is afebrile, denied cough or shortness of breath  Upper normal white blood cells  Start IV ceftriaxone empirically- compleat  7-day course-completed on saturday  Procalcitonin and remained negative x 2.  Pulmonology consultation appreciated.-Continue to finish the course of antibiotics  CT chest from 5/13 reviewed.  Persistent right middle lobe and right lower lobe pneumonia-it may takes 4 to 6 weeks for clearance of pneumonia on imaging  Stable respiratory status and cleared by pulmonology for CABG

## 2025-05-20 LAB
ABO GROUP BLD: NORMAL
APTT PPP: 76 SECONDS (ref 23–34)
BLD GP AB SCN SERPL QL: NEGATIVE
RH BLD: POSITIVE
SPECIMEN EXPIRATION DATE: NORMAL

## 2025-05-20 PROCEDURE — 86900 BLOOD TYPING SEROLOGIC ABO: CPT | Performed by: FAMILY MEDICINE

## 2025-05-20 PROCEDURE — 86923 COMPATIBILITY TEST ELECTRIC: CPT

## 2025-05-20 PROCEDURE — 99232 SBSQ HOSP IP/OBS MODERATE 35: CPT | Performed by: FAMILY MEDICINE

## 2025-05-20 PROCEDURE — NC001 PR NO CHARGE: Performed by: PHYSICIAN ASSISTANT

## 2025-05-20 PROCEDURE — 85730 THROMBOPLASTIN TIME PARTIAL: CPT | Performed by: FAMILY MEDICINE

## 2025-05-20 PROCEDURE — 86901 BLOOD TYPING SEROLOGIC RH(D): CPT | Performed by: FAMILY MEDICINE

## 2025-05-20 PROCEDURE — 86850 RBC ANTIBODY SCREEN: CPT | Performed by: FAMILY MEDICINE

## 2025-05-20 PROCEDURE — 99232 SBSQ HOSP IP/OBS MODERATE 35: CPT | Performed by: INTERNAL MEDICINE

## 2025-05-20 RX ORDER — OXYCODONE HYDROCHLORIDE 5 MG/1
5 TABLET ORAL EVERY 4 HOURS PRN
Refills: 0 | Status: DISCONTINUED | OUTPATIENT
Start: 2025-05-20 | End: 2025-05-21

## 2025-05-20 RX ORDER — PHENYLEPHRINE HCL IN 0.9% NACL 1 MG/10 ML
200-2000 SYRINGE (ML) INTRAVENOUS ONCE
Status: CANCELLED | OUTPATIENT
Start: 2025-05-21

## 2025-05-20 RX ORDER — DIAZEPAM 10 MG/2ML
2.5 INJECTION, SOLUTION INTRAMUSCULAR; INTRAVENOUS ONCE
Status: COMPLETED | OUTPATIENT
Start: 2025-05-20 | End: 2025-05-20

## 2025-05-20 RX ORDER — HYDROMORPHONE HCL IN WATER/PF 6 MG/30 ML
0.2 PATIENT CONTROLLED ANALGESIA SYRINGE INTRAVENOUS EVERY 4 HOURS PRN
Status: DISCONTINUED | OUTPATIENT
Start: 2025-05-20 | End: 2025-05-21

## 2025-05-20 RX ORDER — CHLORHEXIDINE GLUCONATE ORAL RINSE 1.2 MG/ML
15 SOLUTION DENTAL ONCE
Status: COMPLETED | OUTPATIENT
Start: 2025-05-21 | End: 2025-05-21

## 2025-05-20 RX ORDER — ACETAMINOPHEN 325 MG/1
975 TABLET ORAL EVERY 8 HOURS SCHEDULED
Status: DISCONTINUED | OUTPATIENT
Start: 2025-05-20 | End: 2025-05-21

## 2025-05-20 RX ORDER — LIDOCAINE 50 MG/G
1 PATCH TOPICAL DAILY
Status: DISCONTINUED | OUTPATIENT
Start: 2025-05-20 | End: 2025-05-21

## 2025-05-20 RX ADMIN — SODIUM CHLORIDE, PRESERVATIVE FREE 10 ML: 5 INJECTION INTRAVENOUS at 10:05

## 2025-05-20 RX ADMIN — CHLORHEXIDINE GLUCONATE 15 ML: 1.2 SOLUTION ORAL at 21:51

## 2025-05-20 RX ADMIN — LIDOCAINE 1 PATCH: 50 PATCH CUTANEOUS at 01:35

## 2025-05-20 RX ADMIN — METOPROLOL TARTRATE 25 MG: 25 TABLET, FILM COATED ORAL at 08:54

## 2025-05-20 RX ADMIN — METOPROLOL TARTRATE 25 MG: 25 TABLET, FILM COATED ORAL at 21:51

## 2025-05-20 RX ADMIN — HYDROMORPHONE HYDROCHLORIDE 0.2 MG: 0.2 INJECTION, SOLUTION INTRAMUSCULAR; INTRAVENOUS; SUBCUTANEOUS at 08:51

## 2025-05-20 RX ADMIN — DOCUSATE SODIUM 100 MG: 100 CAPSULE, LIQUID FILLED ORAL at 17:27

## 2025-05-20 RX ADMIN — AMLODIPINE BESYLATE 5 MG: 5 TABLET ORAL at 08:52

## 2025-05-20 RX ADMIN — CHLORHEXIDINE GLUCONATE 15 ML: 1.2 SOLUTION ORAL at 08:56

## 2025-05-20 RX ADMIN — MUPIROCIN 1 APPLICATION: 20 OINTMENT TOPICAL at 21:51

## 2025-05-20 RX ADMIN — HYDROMORPHONE HYDROCHLORIDE 0.2 MG: 0.2 INJECTION, SOLUTION INTRAMUSCULAR; INTRAVENOUS; SUBCUTANEOUS at 00:44

## 2025-05-20 RX ADMIN — ASPIRIN 81 MG CHEWABLE TABLET 81 MG: 81 TABLET CHEWABLE at 08:52

## 2025-05-20 RX ADMIN — HEPARIN SODIUM 16 UNITS/KG/HR: 10000 INJECTION, SOLUTION INTRAVENOUS at 07:39

## 2025-05-20 RX ADMIN — ATORVASTATIN CALCIUM 40 MG: 40 TABLET, FILM COATED ORAL at 08:51

## 2025-05-20 RX ADMIN — MUPIROCIN 1 APPLICATION: 20 OINTMENT TOPICAL at 09:01

## 2025-05-20 RX ADMIN — DIAZEPAM 2.5 MG: 10 INJECTION, SOLUTION INTRAMUSCULAR; INTRAVENOUS at 03:09

## 2025-05-20 RX ADMIN — ACETAMINOPHEN 975 MG: 325 TABLET ORAL at 21:51

## 2025-05-20 RX ADMIN — POLYETHYLENE GLYCOL 3350 17 G: 17 POWDER, FOR SOLUTION ORAL at 08:54

## 2025-05-20 RX ADMIN — ACETAMINOPHEN 975 MG: 325 TABLET ORAL at 02:19

## 2025-05-20 RX ADMIN — OXYCODONE HYDROCHLORIDE 5 MG: 5 TABLET ORAL at 19:58

## 2025-05-20 RX ADMIN — HYDROMORPHONE HYDROCHLORIDE 0.2 MG: 0.2 INJECTION, SOLUTION INTRAMUSCULAR; INTRAVENOUS; SUBCUTANEOUS at 21:51

## 2025-05-20 RX ADMIN — DOCUSATE SODIUM 100 MG: 100 CAPSULE, LIQUID FILLED ORAL at 08:54

## 2025-05-20 NOTE — ASSESSMENT & PLAN NOTE
Has remote history of CAD /MI s/p coronary stents X2  Presenting with intermittent chest pain over last several weeks mainly with activity, but progressively worsening. Most recent episode before calling EMS lasted longer and did not resolve with rest, was radiating to neck and bilateral arms, with diaphoresis and vomiting.   Elevated troponin, EKG was sinus tachycardia 105 with some nonspecific ST changes  Patient started on heparin drip in the ED  Monitor on telemetry  As needed submental length of blistering  Cardiology consulted for further management  Follow on cardiac echo  - has recent A1C, Lipid and TSH , all were unremarkable.   Add beta-blocker  Left heart cath today 5/12 shows three-vessel disease, plan for evaluation for potential CABG  Heparin drip was resumed after cardiac catheterization  CABG scheduled for May 21  Preop workup completed

## 2025-05-20 NOTE — PROGRESS NOTES
"Cardiology Progress Note - Kilo Mix 84 y.o. 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  Has history of right shoulder chronic rotator cuff tear and left shoulder osteoarthritis.  Follows with Dr. Christy at [facility].  He was not felt to be a candidate for repair of the rotator cuff tear.  Received a dose of ibuprofen last evening.  He has no chest pain or significant dyspnea.  NSR at rate of 64 with occasional PVCs noted on telemetry.  Remains on intravenous heparin.  For CABG tomorrow.     Subjective:   Patient seen and examined.  No significant events overnight.  negative.    Objective:     Vitals: Blood pressure 109/82, pulse 69, temperature (!) 97.3 °F (36.3 °C), resp. rate 16, height 5' 8\" (1.727 m), weight 72.1 kg (159 lb), SpO2 96%., Body mass index is 24.18 kg/m².,   Orthostatic Blood Pressures      Flowsheet Row Most Recent Value   Blood Pressure 109/82 filed at 05/20/2025 0855   Patient Position - Orthostatic VS Lying filed at 05/17/2025 2020        ,      Intake/Output Summary (Last 24 hours) at 5/20/2025 0951  Last data filed at 5/20/2025 0604  Gross per 24 hour   Intake 480 ml   Output 2445 ml   Net -1965 ml "       No significant arrhythmias seen on telemetry review.       Physical Exam:    GEN: Kilo Hand appears well, alert and oriented x 3, pleasant and cooperative   NECK: supple, no carotid bruits, no JVD or HJR  HEART: normal rate, regular rhythm, normal S1 and S2, no murmurs, clicks, gallops or rubs   LUNGS: clear to auscultation bilaterally; no wheezes, rales, or rhonchi   ABDOMEN: no distention  EXTREMITIES: peripheral pulses normal; no clubbing, cyanosis, or edema  SKIN: warm and well perfused, no suspicious lesions on exposed skin    Labs & Results:    No results displayed because visit has over 200 results.          Complete PFT with post bronchodilator  Result Date: 5/16/2025  Narrative: Images from the original result were not included. Pulmonary Function Test Report Kilo Hand 84 y.o. male MRN: [id number] Date Performed: 5/15/2025 Date Interpretation: 5/15/2025 Interpretation: Normal spirometry. No post bronchodilator response. Moderate restriction as indicated by the lung volumes. Mild decrease corrected diffusion capacity. Flow-volume loop suggestive of a fixed obstruction, clinical correlation advised. Daria Bermudez MD [facility] Attestation I agree with interpretation as above.            Board Certified Pulmonary Physician    VAS carotid complete study  Result Date: 5/13/2025  Narrative: [facility] . KILO HAND is a 84 year old male who was referred by LIAT ARMSTRONG.      Indications:  Patient presents for surgical clearance and general health evaluation secondary to future open heart surgery.  Patient is S/P MI.  Operative History:  05/12/2025 Cardiac catheterization.  Right Common carotid artery/stent.  Coronary angioplasty/stent  Risk Factors:  The patient reports diabetes, CAD, hyperlipidemia, PVD, prostate cancer, bladder tumor, and prior smoking: quit >10yrs ago. Height: 68 inches. Weight: 159 lbs.   Clinical:  Right: IV site. Left BP:  143/66  FINDINGS: Main                                         Segment Impression  PSV  EDV  Ratio  Direction of Flow Right                                                                                            Prox CCA                                                  52   16                             Mid CCA                                                   78   24                             Right Distal Common Carotid Stent                         80   26                             Dist CCA                                                  80   26                             Prox. ICA                                                 93   22   1.18                      Mid. ICA                                                  84   24   1.08                      Dist. ICA                                                 83   24   1.06                      Ext Carotid                             Severe stenosis  499   31                             Prox Vert.                                                26    4                 Antegrade    Right Subclavian Artery                                   88    0                          Left                                                                                             Prox CCA                                                  60   14                             Mid CCA                                                   75   19                             Dist CCA                                                  73   17                             Carotid Bifurcation                                       75   24   1.00                      Prox. ICA                                                179   57   2.37                      Mid. ICA                                                  80   26   1.06                      Dist. ICA                                                 73   17   0.97                      Ext Carotid                                                96   16                             Prox Vert                                                 86   20                 Antegrade    Left Subclavian Artery                                   172    0                          CONCLUSION:  RIGHT: There is <50% stenosis noted in the internal carotid artery. The common carotid artery/bulb stent is patent. Plaque is homogenous and smooth.  A severe stenosis was identified in the proximal external carotid artery. Vertebral artery flow is antegrade.  There is no significant subclavian artery disease.   LEFT: There is 50-69% stenosis noted in the internal carotid artery.  Plaque is heterogenous and irregular.  Vertebral artery flow is antegrade.  There is no significant subclavian artery disease.   In comparison to the study of 11-, there is no significant B/L interval change in the disease process.  Recommend repeat testing in 6 months as per protocol unless otherwise indicated.  SIGNATURE Signed by LISANDRA QUINTEROS DO on 2025-05-13 17:01:05 [email or web address]    CT chest wo contrast  Result Date: 5/13/2025  Narrative: CT CHEST WITHOUT IV CONTRAST INDICATION: pre op CABG. COMPARISON: Two-view chest 5/10/2025. PET/CT 2/19/2025.l TECHNIQUE: CT examination of the chest was performed without intravenous contrast. Multiplanar 2D reformatted images were created from the source data. This examination, like all CT scans performed in the [facility], was performed utilizing techniques to minimize radiation dose exposure, including the use of iterative reconstruction and automated exposure control. Radiation dose length product (DLP) for this visit: 441.77 mGy-cm. FINDINGS: LUNGS: Persistent focal nodular consolidative and groundglass opacities throughout the right middle lobe and right lower lobe in keeping with multi lobar pneumonia. PLEURA:  Unremarkable. HEART/GREAT VESSELS: Borderline heart size. Dense coronary artery calcifications. Aortic annulus calcifications. No thoracic aortic aneurysm. MEDIASTINUM AND ROVERTO: Unremarkable. CHEST WALL AND LOWER NECK: Unremarkable. VISUALIZED STRUCTURES IN THE UPPER ABDOMEN: Cholelithiasis. 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 Atrophic right kidney. Simple appearing renal cyst. OSSEOUS STRUCTURES: No acute fracture or destructive osseous lesion.     Impression: Persistent right middle lobe and right lower lobe pneumonia. The study was marked in EPIC for immediate notification. Workstation performed: [id number]     VAS lower limb vein mapping bypass graft  Result Date: 5/13/2025  Narrative: [facility]PORT . AMBER HAND is a 84 year old male who was referred by LIAT ARMSTRONG.      Indications:  Patient presents for surgical clearance and general health evaluation secondary to future open-heart surgery.  Patient is S/P MI. Evaluation for conduit. Operative History:  05/12/2025 Cardiac catheterization. Right Common carotid artery/stent. Coronary angioplasty/stent Risk Factors:  The patient reports diabetes, CAD, hyperlipidemia, PVD, prostate cancer, bladder tumor, and prior smoking: quit >10yrs ago. Height: 68 inches. Weight: 159 lbs. Clinical:  Left BP: 143/66 FINDINGS: Main                                          AP Diam (mm) Right Superficial Veins                                 Right Great Saphenous - Inguinal               6.7    Right Great Saphenous - Prox Thigh             2.1    Right Great Saphenous - Mid Thigh              1.0    Right Great Saphenous - Dist Thigh             0.9    Right Great Saphenous - Knee                   0.9    Right Great Saphenous - Mid Calf               0.8    Right Great Saphenous - Ankle                  1.1 Left Superficial Veins                                  Left Great Saphenous - Inguinal                5.7    Left Great Saphenous - Prox Thigh               2.3    Left Great Saphenous - Knee                    2.2    Left Great Saphenous - Mid Calf                0.9    Left Great Saphenous - Ankle                   1.7 CONCLUSION:   RIGHT LOWER LIMB:  The great saphenous vein is patent from the groin to the ankle.  The intraluminal diameter measurements range from 0.8 mm to 6.7 throughout.    LEFT LOWER LIMB:  The great saphenous vein is patent from the groin to the ankle.  Tech note: The great saphenous vein exits the fascia in the proximal thigh.  The intraluminal diameter measurements range from 0.9 mm to 5.7 mm throughout.   Technical findings electronically sent to patient's chart.      SIGNATURE Signed by LISSETTE SALAS DO, RPVI on 2025-05-13 14:08:28 [email or web address]    Echo complete w/ contrast if indicated  Result Date: 5/12/2025  Narrative:   Left Ventricle: Left ventricular cavity size is normal. Wall thickness is normal. There is mild concentric hypertrophy. The left ventricular ejection fraction is 60%. Systolic function is normal. Wall motion is normal. Diastolic function is mildly abnormal, consistent with grade I (abnormal) relaxation.   Right Ventricle: Right ventricular cavity size is normal. Systolic function is normal.   Left Atrium: The atrium is mildly dilated (35-41 mL/m2).   Mitral Valve: There is mild annular calcification. There is mild regurgitation.   Tricuspid Valve: There is mild regurgitation. The right ventricular systolic pressure is mildly elevated. The estimated right ventricular systolic pressure is 41.00 mmHg.     Cardiac catheterization  Result Date: 5/12/2025  Narrative:   Complex 3V CAD +LMCA     X-ray chest 1 view portable  Result Date: 5/11/2025  Narrative: XR CHEST PORTABLE INDICATION: chest pain. COMPARISON: CXR 2/2/2023. FINDINGS: Moderate consolidation in the right base. No pneumothorax or pleural effusion. Normal  cardiomediastinal silhouette. Bones are unremarkable for age. Normal upper abdomen.     Impression: Moderate right base pneumonia. The study was marked in EPIC for immediate notification. Workstation performed: [id number]     IR nephrostomy tube check/change/reposition/reinsertion/upsize  Result Date: 5/1/2025  Narrative: Bilateral nephrostogram and nephrostomy tube change Clinical History: Bladder cancer. Bilateral nephrostomy tube replacement. Contrast: 24 mL of iohexol Fluoro time: 5.9 MINUTES Number of Images: 16 Radiation dose: 28 mGy Concious sedation time: N/A Technique: The patient was brought to the interventional radiology suite and placed prone on the table. The existing bilateral nephrostomy tubes prepped and draped in the usual sterile fashion. After a  view was obtained, contrast was injected into the existing catheter and multiple images of the urinary tract were obtained to the bladder. Findings: Left renal collecting system demonstrates no filling defects or dilatation. Left ureter is dilated and opacified. Complete occlusion at the ureterovesical junction. Right nephrostogram demonstrates nondilated system with no filling defects. Complete occlusion at the distal ureter. Intervention: The left catheter was removed over a heavy-duty straight wire, and a Kumpe catheter was advanced to the distal ureter. Attempts to advance a wire through the ureterovesical junction was unsuccessful. New 10.2 Beninese nephrostomy tube was advanced, and the loop formed within the renal pelvis. The catheter was then injected, confirming satisfactory position. The right catheter was removed over a wire and exchanged for a Kumpe catheter which was advanced into the distal ureter. A Glidewire could be easily advanced through the ureterovesical junction into the bladder. Once the Kumpe catheter was in the bladder  contrast opacified nondistended bladder. Over the wire a new 10.2 Beninese APD catheter was placed and looped  in the renal pelvis. Both catheter was secured with sutures and connected to drainage bag.     Impression: Impression: 1. Nephrostogram demonstrates nondilated system. Complete occlusion of the distal left ureterovesical junction. Stenotic right ureterovesical junction could be bypassed with a wire and catheter. 2. Successful exchange of 10.2 [place]anian nephrostomy tubes under fluoroscopic control. Findings discussed with Dr. Youssef. Workstation performed: [id number]       EKG personally reviewed by Juan Manuel Westfall MD.     Counseling / Coordination of Care  Total floor / unit time spent today 30 minutes.  Greater than 50% of total time was spent with the patient and / or family counseling and / or coordination of care.

## 2025-05-20 NOTE — ASSESSMENT & PLAN NOTE
Chest x-ray with moderate right base pneumonia  Patient is afebrile, denied cough or shortness of breath  Upper normal white blood cells  IV ceftriaxone empirically- compleat  7-day course-completed on saturday  Procalcitonin and remained negative x 2.  Pulmonology consultation appreciated.-Continue to finish the course of antibiotics  CT chest from 5/13 reviewed.  Persistent right middle lobe and right lower lobe pneumonia-it may takes 4 to 6 weeks for clearance of pneumonia on imaging  Stable respiratory status and cleared by pulmonology for CABG

## 2025-05-20 NOTE — PROGRESS NOTES
"Progress Note - Hospitalist   Name: Kilo Mix 84 y.o. male I MRN: [id number]  Unit/Bed#: [facility]PHP-322-01 I Date of Admission: 5/10/2025   Date of Service: 5/20/2025 I Hospital Day: 10    Assessment & Plan  NSTEMI (non-ST elevated myocardial infarction) (HCC)  Has remote history of CAD /MI s/p coronary stents X2  Presenting with intermittent chest pain over last several weeks mainly with activity, but progressively worsening. Most recent episode before calling EMS lasted longer and did not resolve with rest, was radiating to neck and bilateral arms, with diaphoresis and vomiting.   Elevated troponin, EKG was sinus tachycardia 105 with some nonspecific ST changes  Patient started on heparin drip in the ED  Monitor on telemetry  As needed submental length of blistering  Cardiology consulted for further management  Follow on cardiac echo  - has recent A1C, Lipid and TSH , all were unremarkable.   Add beta-blocker  Left heart cath today 5/12 shows three-vessel disease, plan for evaluation for potential CABG  Heparin drip was resumed after cardiac catheterization  CABG scheduled for May 21  Preop workup completed  Pneumonia involving right lung  Chest x-ray with moderate right base pneumonia  Patient is afebrile, denied cough or shortness of breath  Upper normal white blood cells  IV ceftriaxone empirically- compleat  7-day course-completed on saturday  Procalcitonin and remained negative x 2.  Pulmonology consultation appreciated.-Continue to finish the course of antibiotics  CT chest from 5/13 reviewed.  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    Blood Sugar Average: Last 72 hrs:    Most " recent A1C 5.8 ; hold home metformin while inpatient -   Continue with insulin sliding scale and low carb diet.     Hypertension  Patient noted late April with low blood pressure he self discontinued off amlodipine then recheck to PCP and was advised to restart amlodipine and discontinue hydrochlorothiazide  Monitor blood pressure and reevaluate   Continue home lisinopril and amlodipine,  As needed Lopressor IV for systolic greater than 180  Lisinopril on hold hold in anticipation for CABG      Peripheral vascular disease (HCC)  History of carotid artery disease s/p stenting as well as coronary artery disease/MI s/p stenting more than 20 years ago  Continue aspirin Plavix and statin, increasing Lipitor to 40 mg-plavix on hold   Outpatient follow-up with vascular surgery  Dyslipidemia  Patient is on only 20 mg of atorvastatin however he has significant history of coronary artery disease and carotid artery disease s/p stenting for both  He denies intolerance of high intensity statin and agreeable to increase to 40 mg Lipitor  Prostate cancer (HCC)  Stage IV prostate cancer with mets to the lymph nodes and complicated with urinary obstruction requiring bilateral nephrostomies    January 2025 he was started on ADT and Xtandi, complicated with dizziness for which Xtandi was discontinued in April 2025    Patient to continue follow-up with medical oncology established with Dr. Shankar  Cardiac surgery discussed with oncology regarding prognosis.  H/O insertion of nephrostomy tube  Has nephrostomies bilaterally due to obstruction by prostate cancer metastasis to lymph nodes   nephrostomy care while patient,   follow-up with urology, medical oncology and IR  Coronary artery disease involving native coronary artery    Asymptomatic stenosis of left carotid artery  Vascular surgery consultation appreciated.  Recommended outpatient follow-up  Preoperative clearance    ALPHONSO (obstructive sleep apnea)    Former smoker    At risk for  delirium    Depression    Frailty    Cognitive impairment    Hx of acute poliomyelitis    Restrictive lung disease    Moderate protein-calorie malnutrition (HCC)  Malnutrition Findings:   Adult Malnutrition type: Chronic illness  Adult Degree of Malnutrition: Malnutrition of moderate degree  Malnutrition Characteristics: Inadequate energy, Weight loss    360 Statement: Chronic moderate pro, diomedes malnutrition d/t catabolic illness, decreased appetite as evidence by significant weight loss, 12/16/24 177lbs, 2/13/25 172lbs, 4/17/25 161lbs, 5/12/25 159lbs, 18lbs/10% wt. loss x 6 months, 13lbs/7.5% wt. loss x 3 months, <75% energy intake > 1 month, treated with oral diet, oral nutrition supplements, discussed tips for increasing pro, diomedes intake at home.    BMI Findings:       Body mass index is 24.18 kg/m².       VTE Pharmacologic Prophylaxis: VTE Score: 8 High Risk (Score >/= 5) - Pharmacological DVT Prophylaxis Ordered: heparin drip. Sequential Compression Devices Ordered.    Mobility:   Basic Mobility Inpatient Raw Score: 23  JH-HLM Goal: 7: Walk 25 feet or more  JH-HLM Achieved: 7: Walk 25 feet or more  JH-HLM Goal achieved. Continue to encourage appropriate mobility.    Patient Centered Rounds: I performed bedside rounds with nursing staff today.   Discussions with Specialists or Other Care Team Provider: Cardiology , Cardiac surgery     Education and Discussions with Family / Patient: Patient declined call to .     Current Length of Stay: 10 day(s)  Current Patient Status: Inpatient   Certification Statement: The patient will continue to require additional inpatient hospital stay due to PT will require CABG  Discharge Plan: Anticipate discharge in >72 hrs to pending clinical improvement    Code Status: Level 1 - Full Code    Subjective   Patient seen and evaluated today at bedside.  Patient denies any acute complaints at this time.    Objective :  Temp:  [97 °F (36.1 °C)-98.4 °F (36.9 °C)] 98.4 °F  (36.9 °C)  HR:  [44-87] 66  BP: (109-203)/() 138/70  Resp:  [14-20] 15  SpO2:  [90 %-98 %] 97 %  O2 Device: None (Room air)    Body mass index is 24.18 kg/m².     Input and Output Summary (last 24 hours):     Intake/Output Summary (Last 24 hours) at 5/20/2025 1511  Last data filed at 5/20/2025 1245  Gross per 24 hour   Intake 1271.39 ml   Output 3105 ml   Net -1833.61 ml       Physical Exam  Vitals reviewed.   HENT:      Head: Normocephalic.      Nose: No congestion.      Mouth/Throat:      Pharynx: No oropharyngeal exudate or posterior oropharyngeal erythema.     Eyes:      Conjunctiva/sclera: Conjunctivae normal.       Cardiovascular:      Rate and Rhythm: Normal rate and regular rhythm.      Heart sounds: No murmur heard.  Pulmonary:      Effort: Pulmonary effort is normal.   Abdominal:      General: Abdomen is flat.      Palpations: Abdomen is soft.     Skin:     General: Skin is warm and dry.     Neurological:      Mental Status: He is alert and oriented to person, place, and time. Mental status is at baseline.           Lines/Drains:  Lines/Drains/Airways       Active Status       Name Placement date Placement time Site Days    Nephrostomy Left 10.2 Fr. 04/29/25  1109  Left  21    Nephrostomy Right 10.2 Fr. 04/29/25  1111  Right  21                      Telemetry:  Telemetry Orders (From admission, onward)               24 Hour Telemetry Monitoring  Continuous x 24 Hours (Telem)        Expiring   Question:  Reason for 24 Hour Telemetry  Answer:  PCI/EP study (including pacer and ICD implementation), Cardiac surgery, MI, abnormal cardiac cath, and chest pain- rule out MI                     Telemetry Reviewed: Normal Sinus Rhythm  Indication for Continued Telemetry Use: Acute MI/Unstable Angina/Rule out ACS               Lab Results: I have reviewed the following results:   Results from last 7 days   Lab Units 05/15/25  0544   WBC Thousand/uL 6.34   HEMOGLOBIN g/dL 12.1   HEMATOCRIT % 36.7   PLATELETS  Thousands/uL 218     Results from last 7 days   Lab Units 05/15/25  0544   SODIUM mmol/L 141   POTASSIUM mmol/L 3.8   CHLORIDE mmol/L 106   CO2 mmol/L 28   BUN mg/dL 23   CREATININE mg/dL 1.24   ANION GAP mmol/L 7   CALCIUM mg/dL 9.4   GLUCOSE RANDOM mg/dL 110                       Recent Cultures (last 7 days):         Imaging Results Review: No pertinent imaging studies reviewed.  Other Study Results Review: No additional pertinent studies reviewed.    Last 24 Hours Medication List:     Current Facility-Administered Medications:     acetaminophen (TYLENOL) tablet 975 mg, Q8H AMADOU    amLODIPine (NORVASC) tablet 5 mg, Daily    aspirin chewable tablet 81 mg, Daily    atorvastatin (LIPITOR) tablet 40 mg, Daily    chlorhexidine (PERIDEX) 0.12 % oral rinse 15 mL, Q12H AMADOU    [START ON 5/21/2025] chlorhexidine (PERIDEX) 0.12 % oral rinse 15 mL, Once    docusate sodium (COLACE) capsule 100 mg, BID    heparin (porcine) 25,000 units in 0.45% NaCl 250 mL infusion (premix), Titrated, Last Rate: 16 Units/kg/hr (05/20/25 0739)    heparin (porcine) injection 2,000 Units, Q6H PRN    heparin (porcine) injection 4,000 Units, Q6H PRN    HYDROmorphone HCl (DILAUDID) injection 0.2 mg, Q4H PRN    ipratropium-albuterol (DUO-NEB) 0.5-2.5 mg/3 mL inhalation solution 3 mL, Q6H PRN    lidocaine (LIDODERM) 5 % patch 1 patch, Daily    [START ON 5/21/2025] metoprolol tartrate (LOPRESSOR) partial tablet 12.5 mg, Once    metoprolol tartrate (LOPRESSOR) tablet 25 mg, Q12H AMADOU    mupirocin (BACTROBAN) 2 % nasal ointment 1 Application, Q12H AMADOU    [START ON 5/21/2025] mupirocin (BACTROBAN) 2 % nasal ointment 1 Application, Once    nitroglycerin (NITROSTAT) SL tablet 0.4 mg, Q5 Min PRN    oxyCODONE (ROXICODONE) IR tablet 5 mg, Q4H PRN    polyethylene glycol (MIRALAX) packet 17 g, Daily    sodium chloride (PF) 0.9 % injection 10 mL, Daily    sodium chloride (PF) 0.9 % injection 10 mL, Daily    Administrative Statements   Today, Patient Was Seen By:  Walter Catalan MD  I have spent a total time of 45 minutes in caring for this patient on the day of the visit/encounter including Diagnostic results, Prognosis, Risks and benefits of tx options, Patient and family education, Risk factor reductions, Impressions, Documenting in the medical record, and Obtaining or reviewing history  .    **Please Note: This note may have been constructed using a voice recognition system.**

## 2025-05-20 NOTE — PROGRESS NOTES
Progress Note - Cardiac Surgery   Kilo Mix 84 y.o. male MRN: [id number]  Unit/Bed#: PPHP-322-01 Encounter: [id number]      24 Hour Events: No events overnight.     Medications:   Scheduled Meds:  Current Facility-Administered Medications   Medication Dose Route Frequency Provider Last Rate    acetaminophen  975 mg Oral Q8H [facility] GLEN Robison      amLODIPine  5 mg Oral Daily Candy Bailey MD      aspirin  81 mg Oral Daily Candy Bailey MD      atorvastatin  40 mg Oral Daily Candy Bailey MD      chlorhexidine  15 mL Mouth/Throat Q12H [facility] Candy Bailey MD      docusate sodium  100 mg Oral BID Lisa Worley PA-C      heparin (porcine)  3-20 Units/kg/hr (Order-Specific) Intravenous Titrated Analy Phan PA-C 16 Units/kg/hr (05/20/25 0739)    heparin (porcine)  2,000 Units Intravenous Q6H PRN Analy Phan PA-C      heparin (porcine)  4,000 Units Intravenous Q6H PRN Analy Phan PA-C      HYDROmorphone  0.2 mg Intravenous Q4H PRN GLEN Robison      ipratropium-albuterol  3 mL Nebulization Q6H PRN Allan Campa MD      lidocaine  1 patch Topical Daily GLEN Robison      metoprolol tartrate  25 mg Oral Q12H [facility] Candy Bailey MD      mupirocin  1 Application Nasal Q12H [facility] Candy Bailey MD      nitroglycerin  0.4 mg Sublingual Q5 Min PRN Candy Bailey MD      oxyCODONE  5 mg Oral Q4H PRN GLEN Robison      polyethylene glycol  17 g Oral Daily Lisa Worley PA-C      sodium chloride (PF)  10 mL Intracatheter Daily Candy Bailey MD      sodium chloride (PF)  10 mL Intracatheter Daily Candy Bailey MD       Continuous Infusions:heparin (porcine), 3-20 Units/kg/hr (Order-Specific), Last Rate: 16 Units/kg/hr (05/20/25 0739)        Results:   Results from last 7 days   Lab Units 05/15/25  0544   WBC Thousand/uL 6.34   HEMOGLOBIN g/dL 12.1   HEMATOCRIT % 36.7   PLATELETS Thousands/uL 218     Results from  last 7 days   Lab Units 05/15/25  0544   POTASSIUM mmol/L 3.8   CHLORIDE mmol/L 106   CO2 mmol/L 28   BUN mg/dL 23   CREATININE mg/dL 1.24   CALCIUM mg/dL 9.4     Results from last 7 days   Lab Units 05/20/25  0347 05/19/25  0454 05/18/25  0541   PTT seconds 76* 78* 85*       Studies:       Cardiac Cath: distal LM 70%, Mid LAD 80% and 70%, Diag2 80%, Ost Cx 90%, Mid RCA 85%, Dist RCA 60%, RPDA 70%     Echo: EF 60%, normal wall motion, mild MV annular calcification with mild MR, mild TR     Carotid US: <50% right with severe stenosis in prox external carotid artery, 50-70% in left, VAFA and no subclavian dx b/l     Vein Mapping: Bedside U/S (DF) Done; Acceptable vein thighs B/L       Results Review Statement: I personally reviewed the following image studies in PACS and associated radiology reports: procedure reports. My interpretation of the radiology images/reports is: Vein mapping is acceptable for CABG.    Vitals:   Vitals:    05/20/25 0225 05/20/25 0652 05/20/25 0851 05/20/25 0855   BP: (!) 135/101 158/80 110/81 109/82   Pulse: 67 70 71 69   Resp: 20 16     Temp: 97.8 °F (36.6 °C) (!) 97.3 °F (36.3 °C)     TempSrc:       SpO2: 97% 97%  96%   Weight:       Height:           Physical Exam:    General: No acute distress, Alert, and Normal appearance  HEENT/NECK:  Normocephalic. Atraumatic.  No jugular venous distention.    Extremities: Extremities warm/dry and Trace edema B/L  Neuro: Alert and oriented X 3  Skin: Warm/Dry, without rashes or lesions.      Assessment:    Severe coronary artery disease; Ongoing CABG workup    Plan:  Patient agreeable to proceed with surgery; Informed consent signed    Carotid ultrasound completed, and acceptable    Vein mapping completed, and acceptable    Blood type and cross match completed    Continue Mupirocin 2% nasal ointment q 12 hrs    Continue topical chlorhexidine bath and mouth rise    NPO after midnight  RBC prepared  Hold Heparin at 0500  Preoperative beta blocker,  insulin, and antibiotics ordered  coronary artery bypass grafting scheduled for 5/21 with Dr. David Tomlin D.O.    SIGNATURE: Juan Daniel Loyd PA-C  DATE: May 20, 2025  TIME: 9:34 AM    * This note was completed in part utilizing [employer] direct voice recognition software.   Grammatical errors, random word insertion, spelling mistakes, and incomplete sentences may be an occasional consequence of the system secondary to software limitations, ambient noise and hardware issues. At the time of dictation, efforts were made to edit, clarify and /or correct errors. Please read the chart carefully and recognize, using context, where substitutions have occurred.  If you have any questions or concerns about the context, text or information contained within the body of this dictation, please contact myself, the provider, for further clarification.

## 2025-05-21 ENCOUNTER — APPOINTMENT (INPATIENT)
Dept: RADIOLOGY | Facility: HOSPITAL | Age: 85
DRG: 233 | End: 2025-05-21
Attending: PHYSICIAN ASSISTANT
Payer: MEDICARE

## 2025-05-21 ENCOUNTER — APPOINTMENT (INPATIENT)
Dept: NON INVASIVE DIAGNOSTICS | Facility: HOSPITAL | Age: 85
DRG: 233 | End: 2025-05-21
Payer: MEDICARE

## 2025-05-21 PROBLEM — Z95.1 S/P CABG X 3: Status: ACTIVE | Noted: 2025-05-21

## 2025-05-21 PROBLEM — I45.10 RBBB: Status: ACTIVE | Noted: 2025-05-21

## 2025-05-21 LAB
ABO GROUP BLD BPU: NORMAL
ANION GAP SERPL CALCULATED.3IONS-SCNC: 10 MMOL/L (ref 4–13)
ANION GAP SERPL CALCULATED.3IONS-SCNC: 9 MMOL/L (ref 4–13)
ATRIAL RATE: 75 BPM
BASE EXCESS BLDA CALC-SCNC: -1 MMOL/L (ref -2–3)
BASE EXCESS BLDA CALC-SCNC: -3 MMOL/L (ref -2–3)
BASE EXCESS BLDA CALC-SCNC: -5 MMOL/L (ref -2–3)
BASE EXCESS BLDA CALC-SCNC: 0 MMOL/L (ref -2–3)
BASE EXCESS BLDA CALC-SCNC: 1 MMOL/L (ref -2–3)
BASE EXCESS BLDA CALC-SCNC: 2 MMOL/L (ref -2–3)
BASE EXCESS BLDA CALC-SCNC: 3 MMOL/L (ref -2–3)
BASOPHILS # BLD AUTO: 0.04 THOUSANDS/ÂΜL (ref 0–0.1)
BASOPHILS NFR BLD AUTO: 1 % (ref 0–1)
BPU ID: NORMAL
BUN SERPL-MCNC: 21 MG/DL (ref 5–25)
BUN SERPL-MCNC: 23 MG/DL (ref 5–25)
CA-I BLD-SCNC: 1.08 MMOL/L (ref 1.12–1.32)
CA-I BLD-SCNC: 1.1 MMOL/L (ref 1.12–1.32)
CA-I BLD-SCNC: 1.11 MMOL/L (ref 1.12–1.32)
CA-I BLD-SCNC: 1.16 MMOL/L (ref 1.12–1.32)
CA-I BLD-SCNC: 1.27 MMOL/L (ref 1.12–1.32)
CA-I BLD-SCNC: 1.31 MMOL/L (ref 1.12–1.32)
CA-I BLD-SCNC: 1.32 MMOL/L (ref 1.12–1.32)
CALCIUM SERPL-MCNC: 10.5 MG/DL (ref 8.4–10.2)
CALCIUM SERPL-MCNC: 8.4 MG/DL (ref 8.4–10.2)
CHLORIDE SERPL-SCNC: 100 MMOL/L (ref 96–108)
CHLORIDE SERPL-SCNC: 106 MMOL/L (ref 96–108)
CO2 SERPL-SCNC: 23 MMOL/L (ref 21–32)
CO2 SERPL-SCNC: 27 MMOL/L (ref 21–32)
CREAT SERPL-MCNC: 1.05 MG/DL (ref 0.6–1.3)
CREAT SERPL-MCNC: 1.23 MG/DL (ref 0.6–1.3)
CROSSMATCH: NORMAL
EOSINOPHIL # BLD AUTO: 0.13 THOUSAND/ÂΜL (ref 0–0.61)
EOSINOPHIL NFR BLD AUTO: 2 % (ref 0–6)
ERYTHROCYTE [DISTWIDTH] IN BLOOD BY AUTOMATED COUNT: 13.6 % (ref 11.6–15.1)
GFR SERPL CREATININE-BSD FRML MDRD: 53 ML/MIN/1.73SQ M
GFR SERPL CREATININE-BSD FRML MDRD: 65 ML/MIN/1.73SQ M
GLUCOSE SERPL-MCNC: 104 MG/DL (ref 65–140)
GLUCOSE SERPL-MCNC: 117 MG/DL (ref 65–140)
GLUCOSE SERPL-MCNC: 125 MG/DL (ref 65–140)
GLUCOSE SERPL-MCNC: 134 MG/DL (ref 65–140)
GLUCOSE SERPL-MCNC: 134 MG/DL (ref 65–140)
GLUCOSE SERPL-MCNC: 140 MG/DL (ref 65–140)
GLUCOSE SERPL-MCNC: 150 MG/DL (ref 65–140)
GLUCOSE SERPL-MCNC: 155 MG/DL (ref 65–140)
GLUCOSE SERPL-MCNC: 159 MG/DL (ref 65–140)
GLUCOSE SERPL-MCNC: 160 MG/DL (ref 65–140)
GLUCOSE SERPL-MCNC: 164 MG/DL (ref 65–140)
GLUCOSE SERPL-MCNC: 166 MG/DL (ref 65–140)
GLUCOSE SERPL-MCNC: 166 MG/DL (ref 65–140)
GLUCOSE SERPL-MCNC: 172 MG/DL (ref 65–140)
GLUCOSE SERPL-MCNC: 173 MG/DL (ref 65–140)
GLUCOSE SERPL-MCNC: 175 MG/DL (ref 65–140)
HCO3 BLDA-SCNC: 21.7 MMOL/L (ref 24–30)
HCO3 BLDA-SCNC: 22 MMOL/L (ref 24–30)
HCO3 BLDA-SCNC: 24.9 MMOL/L (ref 22–28)
HCO3 BLDA-SCNC: 26.4 MMOL/L (ref 22–28)
HCO3 BLDA-SCNC: 26.8 MMOL/L (ref 24–30)
HCO3 BLDA-SCNC: 28 MMOL/L (ref 22–28)
HCO3 BLDA-SCNC: 28.1 MMOL/L (ref 24–30)
HCT VFR BLD AUTO: 28.6 % (ref 36.5–49.3)
HCT VFR BLD AUTO: 30.3 % (ref 36.5–49.3)
HCT VFR BLD AUTO: 37.4 % (ref 36.5–49.3)
HCT VFR BLD CALC: 21 % (ref 36.5–49.3)
HCT VFR BLD CALC: 24 % (ref 36.5–49.3)
HCT VFR BLD CALC: 24 % (ref 36.5–49.3)
HCT VFR BLD CALC: 26 % (ref 36.5–49.3)
HCT VFR BLD CALC: 30 % (ref 36.5–49.3)
HCT VFR BLD CALC: 32 % (ref 36.5–49.3)
HCT VFR BLD CALC: 43 % (ref 36.5–49.3)
HGB BLD-MCNC: 10.2 G/DL (ref 12–17)
HGB BLD-MCNC: 12.6 G/DL (ref 12–17)
HGB BLD-MCNC: 9.7 G/DL (ref 12–17)
HGB BLDA-MCNC: 10.2 G/DL (ref 12–17)
HGB BLDA-MCNC: 10.9 G/DL (ref 12–17)
HGB BLDA-MCNC: 14.6 G/DL (ref 12–17)
HGB BLDA-MCNC: 7.1 G/DL (ref 12–17)
HGB BLDA-MCNC: 8.2 G/DL (ref 12–17)
HGB BLDA-MCNC: 8.2 G/DL (ref 12–17)
HGB BLDA-MCNC: 8.8 G/DL (ref 12–17)
IMM GRANULOCYTES # BLD AUTO: 0.08 THOUSAND/UL (ref 0–0.2)
IMM GRANULOCYTES NFR BLD AUTO: 1 % (ref 0–2)
KCT BLD-ACNC: 154 SEC (ref 89–137)
KCT BLD-ACNC: 453 SEC (ref 89–137)
KCT BLD-ACNC: 483 SEC (ref 89–137)
KCT BLD-ACNC: 496 SEC (ref 89–137)
KCT BLD-ACNC: 567 SEC (ref 89–137)
LYMPHOCYTES # BLD AUTO: 1.84 THOUSANDS/ÂΜL (ref 0.6–4.47)
LYMPHOCYTES NFR BLD AUTO: 21 % (ref 14–44)
MAGNESIUM SERPL-MCNC: 2.1 MG/DL (ref 1.9–2.7)
MCH RBC QN AUTO: 32.1 PG (ref 26.8–34.3)
MCHC RBC AUTO-ENTMCNC: 33.7 G/DL (ref 31.4–37.4)
MCV RBC AUTO: 95 FL (ref 82–98)
MONOCYTES # BLD AUTO: 0.49 THOUSAND/ÂΜL (ref 0.17–1.22)
MONOCYTES NFR BLD AUTO: 6 % (ref 4–12)
NEUTROPHILS # BLD AUTO: 6.13 THOUSANDS/ÂΜL (ref 1.85–7.62)
NEUTS SEG NFR BLD AUTO: 69 % (ref 43–75)
NRBC BLD AUTO-RTO: 0 /100 WBCS
P AXIS: 62 DEGREES
PCO2 BLD: 23 MMOL/L (ref 21–32)
PCO2 BLD: 23 MMOL/L (ref 21–32)
PCO2 BLD: 26 MMOL/L (ref 21–32)
PCO2 BLD: 28 MMOL/L (ref 21–32)
PCO2 BLD: 28 MMOL/L (ref 21–32)
PCO2 BLD: 29 MMOL/L (ref 21–32)
PCO2 BLD: 30 MMOL/L (ref 21–32)
PCO2 BLD: 34.6 MM HG (ref 42–50)
PCO2 BLD: 39.4 MM HG (ref 36–44)
PCO2 BLD: 43.4 MM HG (ref 36–44)
PCO2 BLD: 45.5 MM HG (ref 42–50)
PCO2 BLD: 48.1 MM HG (ref 42–50)
PCO2 BLD: 48.6 MM HG (ref 42–50)
PCO2 BLD: 53.8 MM HG (ref 36–44)
PH BLD: 7.26 [PH] (ref 7.3–7.4)
PH BLD: 7.3 [PH] (ref 7.35–7.45)
PH BLD: 7.38 [PH] (ref 7.3–7.4)
PH BLD: 7.38 [PH] (ref 7.3–7.4)
PH BLD: 7.41 [PH] (ref 7.35–7.45)
PH BLD: 7.41 [PH] (ref 7.3–7.4)
PH BLD: 7.42 [PH] (ref 7.35–7.45)
PLATELET # BLD AUTO: 162 THOUSANDS/UL (ref 149–390)
PLATELET # BLD AUTO: 237 THOUSANDS/UL (ref 149–390)
PLATELET # BLD AUTO: 294 THOUSANDS/UL (ref 149–390)
PMV BLD AUTO: 9.5 FL (ref 8.9–12.7)
PMV BLD AUTO: 9.6 FL (ref 8.9–12.7)
PMV BLD AUTO: 9.8 FL (ref 8.9–12.7)
PO2 BLD: 111 MM HG (ref 35–45)
PO2 BLD: 177 MM HG (ref 35–45)
PO2 BLD: 270 MM HG (ref 75–129)
PO2 BLD: 352 MM HG (ref 75–129)
PO2 BLD: 37 MM HG (ref 35–45)
PO2 BLD: 372 MM HG (ref 75–129)
PO2 BLD: 49 MM HG (ref 35–45)
POTASSIUM BLD-SCNC: 3.3 MMOL/L (ref 3.5–5.3)
POTASSIUM BLD-SCNC: 3.9 MMOL/L (ref 3.5–5.3)
POTASSIUM BLD-SCNC: 4.3 MMOL/L (ref 3.5–5.3)
POTASSIUM BLD-SCNC: 4.5 MMOL/L (ref 3.5–5.3)
POTASSIUM BLD-SCNC: 4.7 MMOL/L (ref 3.5–5.3)
POTASSIUM BLD-SCNC: 4.9 MMOL/L (ref 3.5–5.3)
POTASSIUM BLD-SCNC: 4.9 MMOL/L (ref 3.5–5.3)
POTASSIUM SERPL-SCNC: 3.9 MMOL/L (ref 3.5–5.3)
POTASSIUM SERPL-SCNC: 4 MMOL/L (ref 3.5–5.3)
POTASSIUM SERPL-SCNC: 4.2 MMOL/L (ref 3.5–5.3)
POTASSIUM SERPL-SCNC: 4.4 MMOL/L (ref 3.5–5.3)
PR INTERVAL: 296 MS
QRS AXIS: -83 DEGREES
QRSD INTERVAL: 140 MS
QT INTERVAL: 480 MS
QTC INTERVAL: 536 MS
RBC # BLD AUTO: 3.93 MILLION/UL (ref 3.88–5.62)
SAO2 % BLD FROM PO2: 100 % (ref 60–85)
SAO2 % BLD FROM PO2: 69 % (ref 60–85)
SAO2 % BLD FROM PO2: 83 % (ref 60–85)
SAO2 % BLD FROM PO2: 98 % (ref 60–85)
SODIUM BLD-SCNC: 135 MMOL/L (ref 136–145)
SODIUM BLD-SCNC: 136 MMOL/L (ref 136–145)
SODIUM BLD-SCNC: 137 MMOL/L (ref 136–145)
SODIUM BLD-SCNC: 141 MMOL/L (ref 136–145)
SODIUM SERPL-SCNC: 137 MMOL/L (ref 135–147)
SODIUM SERPL-SCNC: 138 MMOL/L (ref 135–147)
SPECIMEN SOURCE: ABNORMAL
T WAVE AXIS: 69 DEGREES
UNIT DISPENSE STATUS: NORMAL
UNIT PRODUCT CODE: NORMAL
UNIT PRODUCT VOLUME: 350 ML
UNIT RH: NORMAL
VENTRICULAR RATE: 75 BPM
WBC # BLD AUTO: 8.71 THOUSAND/UL (ref 4.31–10.16)

## 2025-05-21 PROCEDURE — 33533 CABG ARTERIAL SINGLE: CPT | Performed by: PHYSICIAN ASSISTANT

## 2025-05-21 PROCEDURE — 85018 HEMOGLOBIN: CPT | Performed by: PHYSICIAN ASSISTANT

## 2025-05-21 PROCEDURE — 93005 ELECTROCARDIOGRAM TRACING: CPT

## 2025-05-21 PROCEDURE — 85014 HEMATOCRIT: CPT | Performed by: PHYSICIAN ASSISTANT

## 2025-05-21 PROCEDURE — 84132 ASSAY OF SERUM POTASSIUM: CPT

## 2025-05-21 PROCEDURE — 33518 CABG ARTERY-VEIN TWO: CPT | Performed by: THORACIC SURGERY (CARDIOTHORACIC VASCULAR SURGERY)

## 2025-05-21 PROCEDURE — 82948 REAGENT STRIP/BLOOD GLUCOSE: CPT

## 2025-05-21 PROCEDURE — 99232 SBSQ HOSP IP/OBS MODERATE 35: CPT | Performed by: INTERNAL MEDICINE

## 2025-05-21 PROCEDURE — 82803 BLOOD GASES ANY COMBINATION: CPT

## 2025-05-21 PROCEDURE — 82947 ASSAY GLUCOSE BLOOD QUANT: CPT

## 2025-05-21 PROCEDURE — 80048 BASIC METABOLIC PNL TOTAL CA: CPT | Performed by: FAMILY MEDICINE

## 2025-05-21 PROCEDURE — 85049 AUTOMATED PLATELET COUNT: CPT | Performed by: THORACIC SURGERY (CARDIOTHORACIC VASCULAR SURGERY)

## 2025-05-21 PROCEDURE — 85014 HEMATOCRIT: CPT

## 2025-05-21 PROCEDURE — 83735 ASSAY OF MAGNESIUM: CPT | Performed by: FAMILY MEDICINE

## 2025-05-21 PROCEDURE — A7041 WATER SEAL DRAIN CONTAINER: HCPCS | Performed by: THORACIC SURGERY (CARDIOTHORACIC VASCULAR SURGERY)

## 2025-05-21 PROCEDURE — 33533 CABG ARTERIAL SINGLE: CPT | Performed by: THORACIC SURGERY (CARDIOTHORACIC VASCULAR SURGERY)

## 2025-05-21 PROCEDURE — 82330 ASSAY OF CALCIUM: CPT

## 2025-05-21 PROCEDURE — 33508 ENDOSCOPIC VEIN HARVEST: CPT | Performed by: PHYSICIAN ASSISTANT

## 2025-05-21 PROCEDURE — 85049 AUTOMATED PLATELET COUNT: CPT | Performed by: PHYSICIAN ASSISTANT

## 2025-05-21 PROCEDURE — 33518 CABG ARTERY-VEIN TWO: CPT | Performed by: PHYSICIAN ASSISTANT

## 2025-05-21 PROCEDURE — 85347 COAGULATION TIME ACTIVATED: CPT

## 2025-05-21 PROCEDURE — 94002 VENT MGMT INPAT INIT DAY: CPT

## 2025-05-21 PROCEDURE — 5A1223Z PERFORMANCE OF CARDIAC PACING, CONTINUOUS: ICD-10-PCS | Performed by: THORACIC SURGERY (CARDIOTHORACIC VASCULAR SURGERY)

## 2025-05-21 PROCEDURE — 93010 ELECTROCARDIOGRAM REPORT: CPT | Performed by: INTERNAL MEDICINE

## 2025-05-21 PROCEDURE — 94760 N-INVAS EAR/PLS OXIMETRY 1: CPT

## 2025-05-21 PROCEDURE — 84132 ASSAY OF SERUM POTASSIUM: CPT | Performed by: PHYSICIAN ASSISTANT

## 2025-05-21 PROCEDURE — 30233N0 TRANSFUSION OF AUTOLOGOUS RED BLOOD CELLS INTO PERIPHERAL VEIN, PERCUTANEOUS APPROACH: ICD-10-PCS | Performed by: THORACIC SURGERY (CARDIOTHORACIC VASCULAR SURGERY)

## 2025-05-21 PROCEDURE — 84295 ASSAY OF SERUM SODIUM: CPT

## 2025-05-21 PROCEDURE — 33508 ENDOSCOPIC VEIN HARVEST: CPT | Performed by: THORACIC SURGERY (CARDIOTHORACIC VASCULAR SURGERY)

## 2025-05-21 PROCEDURE — 06BQ4ZZ EXCISION OF LEFT SAPHENOUS VEIN, PERCUTANEOUS ENDOSCOPIC APPROACH: ICD-10-PCS | Performed by: THORACIC SURGERY (CARDIOTHORACIC VASCULAR SURGERY)

## 2025-05-21 PROCEDURE — 5A1221Z PERFORMANCE OF CARDIAC OUTPUT, CONTINUOUS: ICD-10-PCS | Performed by: THORACIC SURGERY (CARDIOTHORACIC VASCULAR SURGERY)

## 2025-05-21 PROCEDURE — 021109W BYPASS CORONARY ARTERY, TWO ARTERIES FROM AORTA WITH AUTOLOGOUS VENOUS TISSUE, OPEN APPROACH: ICD-10-PCS | Performed by: THORACIC SURGERY (CARDIOTHORACIC VASCULAR SURGERY)

## 2025-05-21 PROCEDURE — 02HV33Z INSERTION OF INFUSION DEVICE INTO SUPERIOR VENA CAVA, PERCUTANEOUS APPROACH: ICD-10-PCS | Performed by: ANESTHESIOLOGY

## 2025-05-21 PROCEDURE — 80048 BASIC METABOLIC PNL TOTAL CA: CPT | Performed by: PHYSICIAN ASSISTANT

## 2025-05-21 PROCEDURE — 02100A9 BYPASS CORONARY ARTERY, ONE ARTERY FROM LEFT INTERNAL MAMMARY WITH AUTOLOGOUS ARTERIAL TISSUE, OPEN APPROACH: ICD-10-PCS | Performed by: THORACIC SURGERY (CARDIOTHORACIC VASCULAR SURGERY)

## 2025-05-21 PROCEDURE — 93355 ECHO TRANSESOPHAGEAL (TEE): CPT

## 2025-05-21 PROCEDURE — 85025 COMPLETE CBC W/AUTO DIFF WBC: CPT | Performed by: FAMILY MEDICINE

## 2025-05-21 PROCEDURE — 99223 1ST HOSP IP/OBS HIGH 75: CPT | Performed by: ANESTHESIOLOGY

## 2025-05-21 PROCEDURE — B246ZZ4 ULTRASONOGRAPHY OF RIGHT AND LEFT HEART, TRANSESOPHAGEAL: ICD-10-PCS | Performed by: ANESTHESIOLOGY

## 2025-05-21 PROCEDURE — 99232 SBSQ HOSP IP/OBS MODERATE 35: CPT | Performed by: FAMILY MEDICINE

## 2025-05-21 PROCEDURE — 71045 X-RAY EXAM CHEST 1 VIEW: CPT

## 2025-05-21 DEVICE — MARKER CORONARY BYPASS VOSS GRAFT: Type: IMPLANTABLE DEVICE | Site: HEART | Status: FUNCTIONAL

## 2025-05-21 RX ORDER — PANTOPRAZOLE SODIUM 40 MG/1
40 TABLET, DELAYED RELEASE ORAL DAILY
Status: DISCONTINUED | OUTPATIENT
Start: 2025-05-22 | End: 2025-05-28 | Stop reason: HOSPADM

## 2025-05-21 RX ORDER — HEPARIN SODIUM 1000 [USP'U]/ML
INJECTION, SOLUTION INTRAVENOUS; SUBCUTANEOUS AS NEEDED
Status: DISCONTINUED | OUTPATIENT
Start: 2025-05-21 | End: 2025-05-21

## 2025-05-21 RX ORDER — CALCIUM GLUCONATE 20 MG/ML
2 INJECTION, SOLUTION INTRAVENOUS ONCE AS NEEDED
Status: DISCONTINUED | OUTPATIENT
Start: 2025-05-21 | End: 2025-05-22

## 2025-05-21 RX ORDER — VANCOMYCIN HYDROCHLORIDE 1 G/20ML
INJECTION, POWDER, LYOPHILIZED, FOR SOLUTION INTRAVENOUS AS NEEDED
Status: DISCONTINUED | OUTPATIENT
Start: 2025-05-21 | End: 2025-05-21 | Stop reason: HOSPADM

## 2025-05-21 RX ORDER — CALCIUM CHLORIDE 100 MG/ML
INJECTION INTRAVENOUS; INTRAVENTRICULAR AS NEEDED
Status: DISCONTINUED | OUTPATIENT
Start: 2025-05-21 | End: 2025-05-21

## 2025-05-21 RX ORDER — MIDAZOLAM HYDROCHLORIDE 2 MG/2ML
INJECTION, SOLUTION INTRAMUSCULAR; INTRAVENOUS AS NEEDED
Status: DISCONTINUED | OUTPATIENT
Start: 2025-05-21 | End: 2025-05-21

## 2025-05-21 RX ORDER — AMIODARONE HYDROCHLORIDE 200 MG/1
200 TABLET ORAL EVERY 8 HOURS SCHEDULED
Status: DISCONTINUED | OUTPATIENT
Start: 2025-05-21 | End: 2025-05-28 | Stop reason: HOSPADM

## 2025-05-21 RX ORDER — CHLORHEXIDINE GLUCONATE ORAL RINSE 1.2 MG/ML
15 SOLUTION DENTAL 2 TIMES DAILY
Status: DISCONTINUED | OUTPATIENT
Start: 2025-05-21 | End: 2025-05-28 | Stop reason: HOSPADM

## 2025-05-21 RX ORDER — HEPARIN SODIUM 1000 [USP'U]/ML
400 INJECTION, SOLUTION INTRAVENOUS; SUBCUTANEOUS ONCE
Status: DISCONTINUED | OUTPATIENT
Start: 2025-05-21 | End: 2025-05-21 | Stop reason: HOSPADM

## 2025-05-21 RX ORDER — AMIODARONE HYDROCHLORIDE 150 MG/3ML
INJECTION, SOLUTION INTRAVENOUS AS NEEDED
Status: DISCONTINUED | OUTPATIENT
Start: 2025-05-21 | End: 2025-05-21

## 2025-05-21 RX ORDER — CEFAZOLIN SODIUM 2 G/50ML
2000 SOLUTION INTRAVENOUS EVERY 8 HOURS
Status: COMPLETED | OUTPATIENT
Start: 2025-05-21 | End: 2025-05-22

## 2025-05-21 RX ORDER — POTASSIUM CHLORIDE 29.8 MG/ML
40 INJECTION INTRAVENOUS ONCE AS NEEDED
Status: DISCONTINUED | OUTPATIENT
Start: 2025-05-21 | End: 2025-05-22

## 2025-05-21 RX ORDER — INDOMETHACIN 25 MG/1
CAPSULE ORAL AS NEEDED
Status: DISCONTINUED | OUTPATIENT
Start: 2025-05-21 | End: 2025-05-21

## 2025-05-21 RX ORDER — ACETAMINOPHEN 325 MG/1
975 TABLET ORAL
Status: DISCONTINUED | OUTPATIENT
Start: 2025-05-21 | End: 2025-05-22 | Stop reason: SDUPTHER

## 2025-05-21 RX ORDER — ONDANSETRON 2 MG/ML
4 INJECTION INTRAMUSCULAR; INTRAVENOUS EVERY 6 HOURS PRN
Status: DISCONTINUED | OUTPATIENT
Start: 2025-05-21 | End: 2025-05-22

## 2025-05-21 RX ORDER — FENTANYL CITRATE 50 UG/ML
INJECTION, SOLUTION INTRAMUSCULAR; INTRAVENOUS AS NEEDED
Status: DISCONTINUED | OUTPATIENT
Start: 2025-05-21 | End: 2025-05-21

## 2025-05-21 RX ORDER — ATORVASTATIN CALCIUM 80 MG/1
80 TABLET, FILM COATED ORAL
Status: DISCONTINUED | OUTPATIENT
Start: 2025-05-21 | End: 2025-05-28 | Stop reason: HOSPADM

## 2025-05-21 RX ORDER — ROCURONIUM BROMIDE 10 MG/ML
INJECTION, SOLUTION INTRAVENOUS AS NEEDED
Status: DISCONTINUED | OUTPATIENT
Start: 2025-05-21 | End: 2025-05-21

## 2025-05-21 RX ORDER — MANNITOL 250 MG/ML
INJECTION, SOLUTION INTRAVENOUS AS NEEDED
Status: DISCONTINUED | OUTPATIENT
Start: 2025-05-21 | End: 2025-05-21

## 2025-05-21 RX ORDER — SODIUM CHLORIDE 450 MG/100ML
20 INJECTION, SOLUTION INTRAVENOUS CONTINUOUS
Status: DISCONTINUED | OUTPATIENT
Start: 2025-05-21 | End: 2025-05-22

## 2025-05-21 RX ORDER — HEPARIN SODIUM 1000 [USP'U]/ML
10000 INJECTION, SOLUTION INTRAVENOUS; SUBCUTANEOUS ONCE
Status: DISCONTINUED | OUTPATIENT
Start: 2025-05-21 | End: 2025-05-21 | Stop reason: HOSPADM

## 2025-05-21 RX ORDER — ALBUMIN, HUMAN INJ 5% 5 %
SOLUTION INTRAVENOUS AS NEEDED
Status: DISCONTINUED | OUTPATIENT
Start: 2025-05-21 | End: 2025-05-21

## 2025-05-21 RX ORDER — POLYETHYLENE GLYCOL 3350 17 G/17G
17 POWDER, FOR SOLUTION ORAL DAILY
Status: DISCONTINUED | OUTPATIENT
Start: 2025-05-22 | End: 2025-05-28 | Stop reason: HOSPADM

## 2025-05-21 RX ORDER — MAGNESIUM HYDROXIDE 1200 MG/15ML
LIQUID ORAL AS NEEDED
Status: DISCONTINUED | OUTPATIENT
Start: 2025-05-21 | End: 2025-05-21 | Stop reason: HOSPADM

## 2025-05-21 RX ORDER — PROPOFOL 10 MG/ML
INJECTION, EMULSION INTRAVENOUS AS NEEDED
Status: DISCONTINUED | OUTPATIENT
Start: 2025-05-21 | End: 2025-05-21

## 2025-05-21 RX ORDER — FENTANYL CITRATE 50 UG/ML
50 INJECTION, SOLUTION INTRAMUSCULAR; INTRAVENOUS ONCE
Status: DISCONTINUED | OUTPATIENT
Start: 2025-05-21 | End: 2025-05-21

## 2025-05-21 RX ORDER — ALBUMIN HUMAN 50 G/1000ML
12.5 SOLUTION INTRAVENOUS ONCE
Status: COMPLETED | OUTPATIENT
Start: 2025-05-21 | End: 2025-05-21

## 2025-05-21 RX ORDER — BISACODYL 10 MG
10 SUPPOSITORY, RECTAL RECTAL DAILY PRN
Status: DISCONTINUED | OUTPATIENT
Start: 2025-05-21 | End: 2025-05-28 | Stop reason: HOSPADM

## 2025-05-21 RX ORDER — FONDAPARINUX SODIUM 2.5 MG/.5ML
2.5 INJECTION SUBCUTANEOUS DAILY
Status: DISCONTINUED | OUTPATIENT
Start: 2025-05-22 | End: 2025-05-23

## 2025-05-21 RX ORDER — OXYCODONE HYDROCHLORIDE 5 MG/1
5 TABLET ORAL EVERY 4 HOURS PRN
Status: DISCONTINUED | OUTPATIENT
Start: 2025-05-21 | End: 2025-05-28 | Stop reason: HOSPADM

## 2025-05-21 RX ORDER — MAGNESIUM SULFATE HEPTAHYDRATE 40 MG/ML
2 INJECTION, SOLUTION INTRAVENOUS ONCE
Status: COMPLETED | OUTPATIENT
Start: 2025-05-21 | End: 2025-05-21

## 2025-05-21 RX ORDER — POTASSIUM CHLORIDE 14.9 MG/ML
20 INJECTION INTRAVENOUS ONCE AS NEEDED
Status: COMPLETED | OUTPATIENT
Start: 2025-05-21 | End: 2025-05-21

## 2025-05-21 RX ORDER — ASPIRIN 325 MG
325 TABLET ORAL DAILY
Status: DISCONTINUED | OUTPATIENT
Start: 2025-05-22 | End: 2025-05-28 | Stop reason: HOSPADM

## 2025-05-21 RX ORDER — LIDOCAINE HCL/PF 100 MG/5ML
SYRINGE (ML) INJECTION AS NEEDED
Status: DISCONTINUED | OUTPATIENT
Start: 2025-05-21 | End: 2025-05-21

## 2025-05-21 RX ORDER — ACETAMINOPHEN 650 MG/1
650 SUPPOSITORY RECTAL EVERY 4 HOURS PRN
Status: DISCONTINUED | OUTPATIENT
Start: 2025-05-21 | End: 2025-05-28 | Stop reason: HOSPADM

## 2025-05-21 RX ORDER — FENTANYL CITRATE 50 UG/ML
50 INJECTION, SOLUTION INTRAMUSCULAR; INTRAVENOUS
Status: DISCONTINUED | OUTPATIENT
Start: 2025-05-21 | End: 2025-05-21

## 2025-05-21 RX ORDER — HYDROMORPHONE HCL/PF 1 MG/ML
0.5 SYRINGE (ML) INJECTION EVERY 2 HOUR PRN
Status: DISCONTINUED | OUTPATIENT
Start: 2025-05-21 | End: 2025-05-22

## 2025-05-21 RX ORDER — SODIUM CHLORIDE 9 MG/ML
INJECTION, SOLUTION INTRAVENOUS CONTINUOUS PRN
Status: DISCONTINUED | OUTPATIENT
Start: 2025-05-21 | End: 2025-05-21

## 2025-05-21 RX ORDER — LIDOCAINE HYDROCHLORIDE 10 MG/ML
INJECTION, SOLUTION INFILTRATION; PERINEURAL AS NEEDED
Status: DISCONTINUED | OUTPATIENT
Start: 2025-05-21 | End: 2025-05-21

## 2025-05-21 RX ORDER — SODIUM CHLORIDE, SODIUM GLUCONATE, SODIUM ACETATE, POTASSIUM CHLORIDE, MAGNESIUM CHLORIDE, SODIUM PHOSPHATE, DIBASIC, AND POTASSIUM PHOSPHATE .53; .5; .37; .037; .03; .012; .00082 G/100ML; G/100ML; G/100ML; G/100ML; G/100ML; G/100ML; G/100ML
INJECTION, SOLUTION INTRAVENOUS AS NEEDED
Status: DISCONTINUED | OUTPATIENT
Start: 2025-05-21 | End: 2025-05-21

## 2025-05-21 RX ORDER — CEFAZOLIN SODIUM 2 G/50ML
2000 SOLUTION INTRAVENOUS ONCE
Status: COMPLETED | OUTPATIENT
Start: 2025-05-21 | End: 2025-05-21

## 2025-05-21 RX ADMIN — ALBUMIN (HUMAN) 12.5 G: 12.5 INJECTION, SOLUTION INTRAVENOUS at 14:00

## 2025-05-21 RX ADMIN — PHENYLEPHRINE HYDROCHLORIDE 250 MCG: 10 INJECTION INTRAVENOUS at 12:04

## 2025-05-21 RX ADMIN — CALCIUM CHLORIDE 1 G: 100 INJECTION INTRAVENOUS; INTRAVENTRICULAR at 12:23

## 2025-05-21 RX ADMIN — HEPARIN SODIUM 4000 UNITS: 1000 INJECTION, SOLUTION INTRAVENOUS; SUBCUTANEOUS at 11:48

## 2025-05-21 RX ADMIN — Medication 1 EACH: at 06:24

## 2025-05-21 RX ADMIN — SODIUM CHLORIDE: 0.9 INJECTION, SOLUTION INTRAVENOUS at 08:18

## 2025-05-21 RX ADMIN — PROPOFOL 150 MG: 10 INJECTION, EMULSION INTRAVENOUS at 08:39

## 2025-05-21 RX ADMIN — NICARDIPINE HYDROCHLORIDE 2.5 MG/HR: 2.5 INJECTION, SOLUTION INTRAVENOUS at 14:16

## 2025-05-21 RX ADMIN — MUPIROCIN 1 APPLICATION: 20 OINTMENT TOPICAL at 06:07

## 2025-05-21 RX ADMIN — ROCURONIUM BROMIDE 80 MG: 10 INJECTION, SOLUTION INTRAVENOUS at 08:39

## 2025-05-21 RX ADMIN — MANNITOL 25 G: 12.5 INJECTION, SOLUTION INTRAVENOUS at 11:11

## 2025-05-21 RX ADMIN — SODIUM CHLORIDE 3 UNITS/HR: 9 INJECTION, SOLUTION INTRAVENOUS at 13:44

## 2025-05-21 RX ADMIN — HEPARIN SODIUM 23800 UNITS: 1000 INJECTION, SOLUTION INTRAVENOUS; SUBCUTANEOUS at 10:26

## 2025-05-21 RX ADMIN — AMIODARONE HYDROCHLORIDE 200 MG: 200 TABLET ORAL at 21:15

## 2025-05-21 RX ADMIN — LIDOCAINE HYDROCHLORIDE 3 ML: 10 INJECTION, SOLUTION INFILTRATION; PERINEURAL at 08:38

## 2025-05-21 RX ADMIN — SODIUM CHLORIDE 20 ML/HR: 0.45 INJECTION, SOLUTION INTRAVENOUS at 13:45

## 2025-05-21 RX ADMIN — HEPARIN SODIUM 4000 UNITS: 1000 INJECTION, SOLUTION INTRAVENOUS; SUBCUTANEOUS at 11:04

## 2025-05-21 RX ADMIN — ALBUMIN, HUMAN INJ 5% 12.5 G: 5 SOLUTION at 12:13

## 2025-05-21 RX ADMIN — OXYCODONE HYDROCHLORIDE 5 MG: 5 TABLET ORAL at 16:19

## 2025-05-21 RX ADMIN — ALBUMIN (HUMAN) 12.5 G: 12.5 INJECTION, SOLUTION INTRAVENOUS at 15:06

## 2025-05-21 RX ADMIN — HEPARIN SODIUM 5000 UNITS: 1000 INJECTION, SOLUTION INTRAVENOUS; SUBCUTANEOUS at 11:19

## 2025-05-21 RX ADMIN — FENTANYL CITRATE 250 MCG: 50 INJECTION INTRAMUSCULAR; INTRAVENOUS at 09:40

## 2025-05-21 RX ADMIN — CEFAZOLIN SODIUM 2000 MG: 2 SOLUTION INTRAVENOUS at 08:42

## 2025-05-21 RX ADMIN — CEFAZOLIN SODIUM 2000 MG: 2 SOLUTION INTRAVENOUS at 16:19

## 2025-05-21 RX ADMIN — PHENYLEPHRINE HYDROCHLORIDE 250 MCG: 10 INJECTION INTRAVENOUS at 12:17

## 2025-05-21 RX ADMIN — FENTANYL CITRATE 250 MCG: 50 INJECTION INTRAMUSCULAR; INTRAVENOUS at 13:18

## 2025-05-21 RX ADMIN — PHENYLEPHRINE HYDROCHLORIDE 50 MCG/MIN: 50 INJECTION INTRAVENOUS at 13:30

## 2025-05-21 RX ADMIN — PHENYLEPHRINE HYDROCHLORIDE 250 MCG: 10 INJECTION INTRAVENOUS at 11:11

## 2025-05-21 RX ADMIN — ACETAMINOPHEN 975 MG: 325 TABLET ORAL at 06:07

## 2025-05-21 RX ADMIN — LIDOCAINE HYDROCHLORIDE 100 MG: 20 INJECTION INTRAVENOUS at 12:17

## 2025-05-21 RX ADMIN — MUPIROCIN 1 APPLICATION: 20 OINTMENT TOPICAL at 21:15

## 2025-05-21 RX ADMIN — MAGNESIUM SULFATE HEPTAHYDRATE 2 G: 40 INJECTION, SOLUTION INTRAVENOUS at 13:46

## 2025-05-21 RX ADMIN — AMINOCAPROIC ACID 5 G: 250 INJECTION, SOLUTION INTRAVENOUS at 10:36

## 2025-05-21 RX ADMIN — FENTANYL CITRATE 250 MCG: 50 INJECTION INTRAMUSCULAR; INTRAVENOUS at 08:39

## 2025-05-21 RX ADMIN — PHENYLEPHRINE HYDROCHLORIDE 250 MCG: 10 INJECTION INTRAVENOUS at 11:16

## 2025-05-21 RX ADMIN — MIDAZOLAM 2 MG: 1 INJECTION INTRAMUSCULAR; INTRAVENOUS at 08:30

## 2025-05-21 RX ADMIN — OXYCODONE HYDROCHLORIDE 5 MG: 5 TABLET ORAL at 20:31

## 2025-05-21 RX ADMIN — SODIUM BICARBONATE 50 MEQ: 84 INJECTION, SOLUTION INTRAVENOUS at 11:03

## 2025-05-21 RX ADMIN — AMIODARONE HYDROCHLORIDE 150 MG: 50 INJECTION, SOLUTION INTRAVENOUS at 12:00

## 2025-05-21 RX ADMIN — ACETAMINOPHEN 975 MG: 325 TABLET ORAL at 21:15

## 2025-05-21 RX ADMIN — Medication 12.5 MG: at 06:07

## 2025-05-21 RX ADMIN — SODIUM CHLORIDE, SODIUM LACTATE, POTASSIUM CHLORIDE, AND CALCIUM CHLORIDE 500 ML: .6; .31; .03; .02 INJECTION, SOLUTION INTRAVENOUS at 13:40

## 2025-05-21 RX ADMIN — SODIUM CHLORIDE, SODIUM GLUCONATE, SODIUM ACETATE, POTASSIUM CHLORIDE, MAGNESIUM CHLORIDE, SODIUM PHOSPHATE, DIBASIC, AND POTASSIUM PHOSPHATE 500 ML: .53; .5; .37; .037; .03; .012; .00082 INJECTION, SOLUTION INTRAVENOUS at 06:24

## 2025-05-21 RX ADMIN — POTASSIUM CHLORIDE 20 MEQ: 14.9 INJECTION, SOLUTION INTRAVENOUS at 17:49

## 2025-05-21 RX ADMIN — HEPARIN SODIUM 5000 UNITS: 1000 INJECTION, SOLUTION INTRAVENOUS; SUBCUTANEOUS at 10:14

## 2025-05-21 RX ADMIN — CHLORHEXIDINE GLUCONATE 15 ML: 1.2 SOLUTION ORAL at 06:07

## 2025-05-21 RX ADMIN — Medication 750 ML: at 11:06

## 2025-05-21 NOTE — PROGRESS NOTES
Progress Note - Hospitalist   Name: Kilo Mix 84 y.o. male I MRN: [id number]  Unit/Bed#: [facility]-312-01 I Date of Admission: 5/10/2025   Date of Service: 5/21/2025 I Hospital Day: 11    Assessment & Plan  NSTEMI (non-ST elevated myocardial infarction) (HCC)  Has remote history of CAD /MI s/p coronary stents X2  Presenting with intermittent chest pain over last several weeks mainly with activity, but progressively worsening. Most recent episode before calling EMS lasted longer and did not resolve with rest, was radiating to neck and bilateral arms, with diaphoresis and vomiting.   Elevated troponin, EKG was sinus tachycardia 105 with some nonspecific ST changes  Patient started on heparin drip in the ED  Monitor on telemetry  As needed submental length of blistering  Cardiology consulted for further management  Follow on cardiac echo  - has recent A1C, Lipid and TSH , all were unremarkable.   Add beta-blocker  Left heart cath today 5/12 shows three-vessel disease, plan for evaluation for potential CABG  Heparin drip was resumed after cardiac catheterization  PT going to OR for CABG 5/21  Preop workup completed  Pneumonia involving right lung  Chest x-ray with moderate right base pneumonia  Patient is afebrile, denied cough or shortness of breath  Upper normal white blood cells  IV ceftriaxone empirically- compleat  7-day course-completed on saturday  Procalcitonin and remained negative x 2.  Pulmonology consultation appreciated.-Continue to finish the course of antibiotics  CT chest from 5/13 reviewed.  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     Hypertension  Patient noted late April with low blood pressure he self discontinued off amlodipine then recheck to PCP and was advised to restart amlodipine and discontinue hydrochlorothiazide  Monitor blood pressure and reevaluate   Continue home lisinopril and amlodipine,  As needed Lopressor IV for systolic greater than 180  Lisinopril on hold hold in anticipation for CABG  Peripheral vascular disease (HCC)  History of carotid artery disease s/p stenting as well as coronary artery disease/MI s/p stenting more than 20 years ago  Continue aspirin Plavix and statin, increasing Lipitor to 40 mg-plavix on hold   Outpatient follow-up with vascular surgery  Dyslipidemia  Patient is on only 20 mg of atorvastatin however he has significant history of coronary artery disease and carotid artery disease s/p stenting for both  He denies intolerance of high intensity statin and agreeable to increase to 40 mg Lipitor  Prostate cancer (HCC)  Stage IV prostate cancer with mets to the lymph nodes and complicated with urinary obstruction requiring bilateral nephrostomies    January 2025 he was started on ADT and Xtandi, complicated with dizziness for which Xtandi was discontinued in April 2025    Patient to continue follow-up with medical oncology established with Dr. Shankar  Cardiac surgery discussed with oncology regarding prognosis.  H/O insertion of nephrostomy tube  Has nephrostomies bilaterally due to obstruction by prostate cancer metastasis to lymph nodes   nephrostomy care while patient,   follow-up with urology, medical oncology and IR  Asymptomatic stenosis of left carotid artery  Vascular surgery consultation appreciated.  Recommended outpatient follow-up    VTE Pharmacologic Prophylaxis: VTE Score: 8 Moderate Risk (Score 3-4) - Pharmacological DVT Prophylaxis Ordered: heparin  drip.    Mobility:   Basic Mobility Inpatient Raw Score: 23  JH-HLM Goal: 7: Walk 25 feet or more  JH-HLM Achieved: 7: Walk 25 feet or more  JH-HLM Goal achieved. Continue to encourage appropriate mobility.    Patient Centered Rounds: I performed bedside rounds with nursing staff today.   Discussions with Specialists or Other Care Team Provider: Cardiology     Education and Discussions with Family / Patient: Patient declined call to .     Current Length of Stay: 11 day(s)  Current Patient Status: Inpatient   Certification Statement: The patient will continue to require additional inpatient hospital stay due to CABG  Discharge Plan: Anticipate discharge in >72 hrs to Pending clinical improvement     Code Status: Level 1 - Full Code    Subjective   This is a very pleasant 84 y.o. male who was seen today at bedside. Patient has no new complaints. Patient is not in any acute distress.       Objective :  Temp:  [97 °F (36.1 °C)-98.7 °F (37.1 °C)] 97 °F (36.1 °C)  HR:  [56-81] 63  BP: (116-148)/(51-73) 123/51  Resp:  [14-20] 20  SpO2:  [92 %-100 %] 92 %  O2 Device: Nasal cannula  Nasal Cannula O2 Flow Rate (L/min):  [4 L/min] 4 L/min  FiO2 (%):  [40] 40    Body mass index is 23.5 kg/m².     Input and Output Summary (last 24 hours):     Intake/Output Summary (Last 24 hours) at 5/21/2025 1539  Last data filed at 5/21/2025 1500  Gross per 24 hour   Intake 3567.27 ml   Output 3930 ml   Net -362.73 ml       Physical Exam  Vitals reviewed.   HENT:      Head: Normocephalic.      Nose: No congestion.      Mouth/Throat:      Pharynx: No oropharyngeal exudate or posterior oropharyngeal erythema.     Eyes:      Conjunctiva/sclera: Conjunctivae normal.       Cardiovascular:      Rate and Rhythm: Normal rate and regular rhythm.   Pulmonary:      Effort: Pulmonary effort is normal.   Abdominal:      General: Abdomen is flat.      Palpations: Abdomen is soft.     Skin:     General: Skin is warm and dry.     Neurological:       Mental Status: He is alert and oriented to person, place, and time. Mental status is at baseline.           Lines/Drains:  Lines/Drains/Airways       Active Status       Name Placement date Placement time Site Days    Introducer 05/21/25 05/21/25  0913  -- less than 1    CVC Central Lines 05/21/25 05/21/25  0903  --  less than 1    Arterial Line 05/21/25 Right Radial 05/21/25  0839  Radial  less than 1    ETT  Hi-Lo 8 mm 05/21/25  0943  -- less than 1    Chest Tube 1 Left Pleural 32 Fr. 05/21/25  1241  Pleural  less than 1    Chest Tube 2 Mediastinal;Posterior 24 Fr. 05/21/25  1242  Mediastinal;Posterior  less than 1    Chest Tube 3 Mediastinal;Anterior 32 Fr. 05/21/25  1242  Mediastinal;Anterior  less than 1    Nephrostomy Left 10.2 Fr. 04/29/25  1109  Left  22    Nephrostomy Right 10.2 Fr. 04/29/25  1111  Right  22                    Central Line:  Goal for removal: Will discontinue when meds requiring line are completed.               Lab Results: I have reviewed the following results:   Results from last 7 days   Lab Units 05/21/25  1345 05/21/25  1342 05/21/25  0905 05/21/25  0455   WBC Thousand/uL  --   --   --  8.71   HEMOGLOBIN g/dL  --  9.7*  --  12.6   I STAT HEMOGLOBIN g/dl 8.8*  --    < >  --    HEMATOCRIT %  --  28.6*  --  37.4   HEMATOCRIT, ISTAT % 26*  --    < >  --    PLATELETS Thousands/uL  --  162   < > 294   SEGS PCT %  --   --   --  69   LYMPHO PCT %  --   --   --  21   MONO PCT %  --   --   --  6   EOS PCT %  --   --   --  2    < > = values in this interval not displayed.     Results from last 7 days   Lab Units 05/21/25  1345 05/21/25  1342   SODIUM mmol/L  --  138   POTASSIUM mmol/L  --  4.0   CHLORIDE mmol/L  --  106   CO2 mmol/L  --  23   CO2, I-STAT mmol/L 23  --    BUN mg/dL  --  21   CREATININE mg/dL  --  1.05   ANION GAP mmol/L  --  9   CALCIUM mg/dL  --  8.4   GLUCOSE RANDOM mg/dL  --  164*         Results from last 7 days   Lab Units 05/21/25  1341 05/21/25  0529   POC GLUCOSE  mg/dl 175* 134               Recent Cultures (last 7 days):         Imaging Results Review: No pertinent imaging studies reviewed.  Other Study Results Review: No additional pertinent studies reviewed.    Last 24 Hours Medication List:     Current Facility-Administered Medications:     acetaminophen (TYLENOL) rectal suppository 650 mg, Q4H PRN    acetaminophen (TYLENOL) tablet 975 mg, Q6H While awake    amiodarone tablet 200 mg, Q8H AMADOU    [START ON 5/22/2025] aspirin tablet 325 mg, Daily    atorvastatin (LIPITOR) tablet 80 mg, Daily With Dinner    bisacodyl (DULCOLAX) rectal suppository 10 mg, Daily PRN    calcium gluconate 2 g in sodium chloride 0.9% 100 mL (premix), Once PRN    ceFAZolin (ANCEF) IVPB (premix in dextrose) 2,000 mg 50 mL, Q8H    chlorhexidine (PERIDEX) 0.12 % oral rinse 15 mL, BID    fentaNYL injection 50 mcg, Once    fentaNYL injection 50 mcg, Q1H PRN    [START ON 5/22/2025] fondaparinux (ARIXTRA) subcutaneous injection 2.5 mg, Daily    HYDROmorphone (DILAUDID) injection 0.5 mg, Q2H PRN    insulin regular (HumuLIN R,NovoLIN R) 1 Units/mL in sodium chloride 0.9 % 100 mL infusion, Titrated, Last Rate: 3 Units/hr (05/21/25 1344)    lactated ringers bolus 500 mL, Once PRN, Last Rate: 500 mL (05/21/25 1340)    mupirocin (BACTROBAN) 2 % nasal ointment 1 Application, Q12H AMADOU    niCARdipine (CARDENE) 25 mg (STANDARD CONCENTRATION) in sodium chloride 0.9% 250 mL, Titrated, Last Rate: Stopped (05/21/25 1446)    ondansetron (ZOFRAN) injection 4 mg, Q6H PRN    oxyCODONE (ROXICODONE) split tablet 2.5 mg, Q4H PRN **OR** oxyCODONE (ROXICODONE) IR tablet 5 mg, Q4H PRN    [START ON 5/22/2025] pantoprazole (PROTONIX) EC tablet 40 mg, Daily    phenylephrine (IDA-SYNEPHRINE) 50 mg (STANDARD CONCENTRATION) in sodium chloride 0.9% 250 mL, Titrated, Last Rate: Stopped (05/21/25 1349)    [START ON 5/22/2025] polyethylene glycol (MIRALAX) packet 17 g, Daily    potassium chloride 20 mEq IVPB (premix), Once PRN     potassium chloride 40 mEq IVPB (premix), Once PRN    potassium chloride 40 mEq IVPB (premix), Once PRN    sodium chloride infusion 0.45 %, Continuous, Last Rate: 20 mL/hr (05/21/25 4075)    Administrative Statements   Today, Patient Was Seen By: Walter Catalan MD  I have spent a total time of 45 minutes in caring for this patient on the day of the visit/encounter including Diagnostic results, Risks and benefits of tx options, Importance of tx compliance, Impressions, Reviewing/placing orders in the medical record (including tests, medications, and/or procedures), and Obtaining or reviewing history  .    **Please Note: This note may have been constructed using a voice recognition system.**

## 2025-05-21 NOTE — INTERVAL H&P NOTE
H&P reviewed. After examining the patient I find no changes in the patients condition since the H&P had been written.    Anticipated Length of Stay:  Patient will be admitted on an Inpatient basis with an anticipated length of stay of  greater than 2 midnights.       Justification for Hospital Stay:  Post surgical recovery following open heart surgery.      Vitals:    05/21/25 0606   BP: 135/69   Pulse: 67   Resp:    Temp:    SpO2: 97%

## 2025-05-21 NOTE — ANESTHESIA PROCEDURE NOTES
Procedure Performed: CHAPO Anesthesia  Start Time:  5/21/2025 9:43 AM        Preanesthesia Checklist    Patient identified, IV assessed, risks and benefits discussed, monitors and equipment assessed, procedure being performed at surgeon's request and anesthesia consent obtained.      Procedure    Diagnostic Indications for CHAPO:  assessment of surgical repair. Type of CHAPO: interventional CHAPO with real time guidance of intracardiac procedure. Images Saved: ultrasound permanent image saved. Physician Requesting Echo: David Tomlin DO.  Location performed: OR. Intubated. Bite block not placed.   Heart visualized. Insertion of CHAPO Probe:  Atraumatic. Probe Type:  Multiplane and epiaortic. Modalities:  2D only, 3D, color flow mapping, continuous wave Doppler and pulse wave Doppler.      Echocardiographic and Doppler Measurements    PREPROCEDURE    LEFT VENTRICLE:  Systolic Function: normal.                                           POSTPROCEDURE

## 2025-05-21 NOTE — PLAN OF CARE
Problem: PAIN - ADULT  Goal: Verbalizes/displays adequate comfort level or baseline comfort level  Description: Interventions:- Encourage patient to monitor pain and request assistance- Assess pain using appropriate pain scale- Administer analgesics based on type and severity of pain and evaluate response- Implement non-pharmacological measures as appropriate and evaluate response- Consider cultural and social influences on pain and pain management- Notify physician/advanced practitioner if interventions unsuccessful or patient reports new pain  Outcome: Progressing     Problem: INFECTION - ADULT  Goal: Absence or prevention of progression during hospitalization  Description: INTERVENTIONS:- Assess and monitor for signs and symptoms of infection- Monitor lab/diagnostic results- Monitor all insertion sites, i.e. indwelling lines, tubes, and drains- Monitor endotracheal if appropriate and nasal secretions for changes in amount and color- [place] appropriate cooling/warming therapies per order- Administer medications as ordered- Instruct and encourage patient and family to use good hand hygiene technique- Identify and instruct in appropriate isolation precautions for identified infection/condition  Outcome: Progressing  Goal: Absence of fever/infection during neutropenic period  Description: INTERVENTIONS:- Monitor WBC  Outcome: Progressing    Problem: DISCHARGE PLANNING  Goal: Discharge to home or other facility with appropriate resources  Description: INTERVENTIONS:- Identify barriers to discharge w/patient and caregiver- Arrange for needed discharge resources and transportation as appropriate- Identify discharge learning needs (meds, wound care, etc.)- Arrange for interpretive services to assist at discharge as needed- Refer to Case Management Department for coordinating discharge planning if the patient needs post-hospital services based on physician/advanced practitioner order or complex needs related to  functional status, cognitive ability, or social support system  Outcome: Progressing     Problem: CARDIOVASCULAR - ADULT  Goal: Maintains optimal cardiac output and hemodynamic stability  Description: INTERVENTIONS:- Monitor I/O, vital signs and rhythm- Monitor for S/S and trends of decreased cardiac output- Administer and titrate ordered vasoactive medications to optimize hemodynamic stability- Assess quality of pulses, skin color and temperature- Assess for signs of decreased coronary artery perfusion- Instruct patient to report change in severity of symptoms  INTERVENTIONS:- Monitor I/O, vital signs and rhythm- Monitor for S/S and trends of decreased cardiac output- Administer and titrate ordered vasoactive medications to optimize hemodynamic stability- Assess quality of pulses, skin color and temperature- Assess for signs of decreased coronary artery perfusion- Instruct patient to report change in severity of symptoms  Outcome: Progressing  Goal: Absence of cardiac dysrhythmias or at baseline rhythm  Description: INTERVENTIONS:- Continuous cardiac monitoring, vital signs, obtain 12 lead EKG if ordered- Administer antiarrhythmic and heart rate control medications as ordered- Monitor electrolytes and administer replacement therapy as ordered  INTERVENTIONS:- Continuous cardiac monitoring, vital signs, obtain 12 lead EKG if ordered- Administer antiarrhythmic and heart rate control medications as ordered- Monitor electrolytes and administer replacement therapy as ordered  Outcome: Progressing     Problem: RESPIRATORY - ADULT  Goal: Achieves optimal ventilation and oxygenation  Description: INTERVENTIONS:- Assess for changes in respiratory status- Assess for changes in mentation and behavior- Position to facilitate oxygenation and minimize respiratory effort- Oxygen administered by appropriate delivery if ordered- Initiate smoking cessation education as indicated- Encourage broncho-pulmonary hygiene including cough,  deep breathe, Incentive Spirometry- Assess the need for suctioning and aspirate as needed- Assess and instruct to report SOB or any respiratory difficulty- Respiratory Therapy support as indicated  Outcome: Progressing

## 2025-05-21 NOTE — CONSULTS
Consultation - Critical Care/ICU   Name: Kilo Mix 84 y.o. male I MRN: [id number]  Unit/Bed#: PPHP-312-01 I Date of Admission: 5/10/2025   Date of Service: 5/21/2025 I Hospital Day: 11   Consults  Physician Requesting Evaluation: David Tomlin DO   Reason for Evaluation / Principal Problem: CABG x 3 (LIMA > LAD, SVG to PDA, SVG > OM1)      Assessment & Plan   Impressions:  MVCAD s/p CABG x 3 (LIMA > LAD, SVG to PDA, SVG > OM1)  NSTEMI (S/p PCI in 1995 and 2007)  T2DM  PAD s/p carotid stenting on plavix  HTN  HLD  COPD  ALPHONSO  PNA (s/p tx with 5 days of abx)   Metastatic prostate cancer (s/p lateral nephrostomy tubes L>R output)    Neuro:   Discontinue continuous sedation to facilitate extubation  ATC tylenol, PRN oxycodone for pain  Trend neuro exam  Delirium precautions    CV:   Goals:   Cardiac Surgery Hemodynamic Monitoring goals: MAP goal >65, CI >2.2 Continue pulmonary artery catheter for hemodynamic monitoring  Continue central line due to vasoactive medications Continue arterial line for blood pressure monitoring and/or frequent blood gas draws  Volume resuscitation as needed.   Epicardial pacing wire plan : Epicardial pacing wires in place. Will pace as needed for bradycardia or cardiac output  Maintain for pacing needs  Monitor rhythm on telemetry.    LR bolus 500 for CI 1.9, low MAP/CVP      Lung:   Check STAT post-op ABG and CXR  Wean vent with spontaneous breathing trial with goal to extubate today  Incentive spirometry     GI:   GI prophylaxis with PPI  Bowel regimen  Zofran PRN for nausea.     FEN:   NPO Replenish K >4.0, mag >2.0 and calcium >7.0.     :   Check STAT post-op BMP  Chao in place.   Monitor UOP with goal >0.5cc/kg/hour.  Lasix versus volume resuscitate as needed depending on hemodynamics and volume status.    ID:   Prophylactic post-op abx. Maintain normothermia. Trend temps.     Heme:   Check STAT post-op H/H and platelets.  Monitor incision site, invasive lines, and chest tube  outputs for bleeding. Send coag panel if needed.    Endo:   Insulin gtt for blood sugar control.     Disposition: ICU Care     History of Present Illness   HPI: Kilo Mix is a 83 y/o male with a PMHx of MVCAD/NSTEMI (S/p PCI in 1995 and 2007), T2DM, PAD s/p carotid stenting on plavix, HTN, HLD, COPD, ALPHONSO, PNA (s/p tx with 5 days of abx) and newly diagnosed metastatic prostate cancer (s/p bilateral nephrostomy tubes L>R output) who presented to [facility] with intermittent but worsening chest pain radiating to his neck and b/l arms. Mild troponin elevation and non-specific ST changes on EKG present so patient was treated as an NSTEMI. [facility] 5/12 demonstrated MVCAD and CT surgery was consulted for CABG evaluation. After discussion with patient's oncologist regarding long term survival rates, patient and CT surgery team were in agreement to proceed with surgical revascularization.     Regarding his prostate cancer, the patient was initially diagnosed in 1999 and underwent a prostatectomy. He was found to recently have elevated PSA levels with extensive mediastinal, abdominal, and pelvic lymphadenopathy. He was subsequently started on lupron and Xtandi with a subsequent reduction in his PSA. Xtandi is currently on hold but continued on Lupron up until admission.     He presents to the ICU s/p CABG x 3 (LIMA > LAD, SVG to PDA, SVG > OM1)    Intra-op CHAPO LVEF pending, but grossly normal per report  Post-op medications: Critical Care Infusions: Cardene    ROS unable to be obtained due to patient being intubated and sedated.   History obtained from chart review due to patient being intubated and sedated.   Historical Information   Past Medical History:  No date: Bladder tumor  No date: Cancer (HCC)      Comment:  Prostate cancer  5/14/2025: Depression  No date: Diabetes mellitus (HCC)  No date: Diverticulitis of colon  No date: History of common carotid artery stent placement      Comment:  RIGHT stent - per pts son - RIGHT stent   in  carotid                artery  No date: History of heart artery stent      Comment:  x2 stents in heart per pts son  No date: Hyperlipidemia  No date: Hypertension  No date: Migraines  No date: Myocardial infarction (HCC)      Comment:  about 30yrs ago  No date: Nephrostomy present (HCC)      Comment:  2/25/25 Per pts son Ed- pt has two nephrostomy tubes  No date: Prostate cancer (HCC)  No date: Seasonal allergies Past Surgical History:  5/12/2025: CARDIAC CATHETERIZATION; N/A      Comment:  Procedure: Cardiac Catheterization;  Surgeon:                Daniel Mabry DO;  Location:  CARDIAC CATH LAB;               Service: Cardiology  No date: CARDIAC SURGERY  No date: CAROTID ENDARTARECTOMY; Right      Comment:  thromboendarterectomy  No date: CAROTID STENT; Right  No date: CATARACT EXTRACTION; Left  No date: COLONOSCOPY      Comment:  fiberoptic screening 2/6/08 5 yr / complete 3/15/13 5 yr  No date: CORONARY ANGIOPLASTY WITH STENT PLACEMENT  No date: ELBOW SURGERY  No date: EYE SURGERY  12/24/2024: IR BIOPSY LYMPH NODE  2/25/2025: IR NEPHROSTOMY TUBE CHECK/CHANGE/REPOSITION/REINSERTION/  UPSIZE  4/29/2025: IR NEPHROSTOMY TUBE CHECK/CHANGE/REPOSITION/REINSERTION/  UPSIZE  12/24/2024: IR NEPHROSTOMY TUBE PLACEMENT  No date: KNEE SURGERY  1/21/2025: PA CYSTO W/REMOVAL OF LESIONS SMALL; N/A      Comment:  Procedure: TRANSURETHRAL RESECTION OF BLADDER TUMOR                (TURBT);  Surgeon: Jordan Youssef MD;  Location:                 MAIN OR;  Service: Urology  3/6/2025: PA CYSTO W/REMOVAL OF LESIONS SMALL; N/A      Comment:  Procedure: TRANSURETHRAL RESECTION OF BLADDER TUMOR                (TURBT);  Surgeon: Jordan Youssef MD;  Location:                 MAIN OR;  Service: Urology  No date: PROSTATE SURGERY  12/06/1999: SUPRAPUBIC PROSTATECTOMY  No date: UPPER GASTROINTESTINAL ENDOSCOPY   Current Outpatient Medications   Medication Instructions    amLODIPine (NORVASC) 5 mg, Oral, Daily     ascorbic acid (VITAMIN C) 500 mg, Daily    aspirin 81 MG tablet 1 tablet, Daily    atorvastatin (LIPITOR) 20 mg, Oral, Daily    B Complex Vitamins (VITAMIN B COMPLEX PO) 1 tablet, Daily    Cholecalciferol 1000 units CHEW 1 tablet, Daily    clopidogrel (PLAVIX) 75 mg, Oral, Daily    ibuprofen (MOTRIN) 600 mg, Oral, Every 6 hours PRN    lisinopril (ZESTRIL) 10 mg tablet Take 1 tablet by mouth once daily    metFORMIN (GLUCOPHAGE-XR) 500 mg 24 hr tablet Take 1 tablet by mouth once daily    sodium chloride, PF, 0.9 % 10 mL, Intracatheter, Daily, Intracatheter flushing daily. May substitute prefilled syringe with normal saline 10 mL vials, 10 mL syringes, and 18 g blunt needles    sodium chloride, PF, 0.9 % 10 mL, Intracatheter, Daily, Intracatheter flushing daily. May substitute prefilled syringe with normal saline 10 mL vials, 10 mL syringes, and 18 g blunt needles    vitamin E, tocopherol, 400 units capsule 1 capsule, Daily    Allergies[1]   Social History[2] Family History   Problem Relation Age of Onset    Alcohol abuse Mother     No Known Problems Father     No Known Problems Sister     Substance Abuse Son     Heart attack Family         MI    Breast cancer Family     Colon cancer Neg Hx     Colon polyps Neg Hx     Anesthesia problems Neg Hx             Objective  :     Vitals: Invasive Monitoring      /69   Pulse 64   Resp 16   O2 Sat 97 %   O2 Device None (Room air)   Temp 97.5 °F (36.4 °C)    Arterial Line  Damari BP    No data recorded   MAP    No data recorded     PA Catheter   Most Recent  Min/Max in 24hrs    PAP   No data recorded   CVP   No data recorded   CI    No data recorded   SVR   No data recorded          Physical Exam   Physical Exam  Eyes:      Pupils: Pupils are equal, round, and reactive to light.   Skin:     General: Skin is warm and dry.      Capillary Refill: Capillary refill takes less than 2 seconds.   Cardiovascular:      Rate and Rhythm: Normal rate and regular rhythm.      Heart  sounds: Normal heart sounds.   Musculoskeletal:      Right lower leg: No edema.      Left lower leg: No edema.   Abdominal: General: There is no distension.      Palpations: Abdomen is soft.   Constitutional:       Appearance: He is well-developed and well-nourished.   Pulmonary:      Comments: Mechanical breath sounds, clear b/l  Neurological:      Mental Status: He is alert and oriented to person, place and time. He is calm.      Comments: Follows commands in all 4 extremities   Genitourinary/Anorectal:  Chao present.        Diagnostic Studies      05/21/25 CXR: RLL infiltrate diminished from previous, lines and tubes appropriately positioned, no effusion, no PTX  This was personally reviewed by myself in PACS.  05/21/25 EKG: NSR, first degree AV block, RBBB   This was personally reviewed by myself.     Medications:  Scheduled PRN   acetaminophen, 975 mg, Q6H While awake  amiodarone, 200 mg, Q8H AMADOU  [START ON 5/22/2025] aspirin, 325 mg, Daily  atorvastatin, 80 mg, Daily With Dinner  cefazolin, 2,000 mg, Q8H  chlorhexidine, 15 mL, BID  fentaNYL, 50 mcg, Once  [START ON 5/22/2025] fondaparinux, 2.5 mg, Daily  magnesium sulfate, 2 g, Once  mupirocin, 1 Application, Q12H AMADOU  [START ON 5/22/2025] pantoprazole, 40 mg, Daily  [START ON 5/22/2025] polyethylene glycol, 17 g, Daily      acetaminophen, 650 mg, Q4H PRN  bisacodyl, 10 mg, Daily PRN  calcium gluconate, 2 g, Once PRN  fentaNYL, 50 mcg, Q1H PRN  HYDROmorphone, 0.5 mg, Q2H PRN  lactated ringers, 500 mL, Once PRN  ondansetron, 4 mg, Q6H PRN  oxyCODONE, 2.5 mg, Q4H PRN   Or  oxyCODONE, 5 mg, Q4H PRN  potassium chloride, 20 mEq, Once PRN  potassium chloride, 40 mEq, Once PRN  potassium chloride, 40 mEq, Once PRN       Continuous    insulin regular (HumuLIN R,NovoLIN R) 1 Units/mL in sodium chloride 0.9 % 100 mL infusion, 0.3-21 Units/hr, Last Rate: 3 Units/hr (05/21/25 1344)  niCARdipine, 2.5-15 mg/hr  phenylephine,  mcg/min  sodium chloride, 20 mL/hr          Labs:  CBC  Recent Labs     05/21/25  0455 05/21/25  0905 05/21/25  0952 05/21/25  1036 05/21/25  1204 05/21/25  1330   WBC 8.71  --   --   --   --   --    HGB 12.6   < >  --    < > 8.2* 14.6   HCT 37.4   < >  --    < > 24* 43     --  237  --   --   --     < > = values in this interval not displayed.     BMP  Recent Labs     05/21/25  0455 05/21/25  0905 05/21/25  1204 05/21/25  1330   SODIUM 137  --   --   --    K 4.2  --   --   --      --   --   --    CO2 27   < > 26 23   AGAP 10  --   --   --    BUN 23  --   --   --    CREATININE 1.23  --   --   --    CALCIUM 10.5*  --   --   --     < > = values in this interval not displayed.       Coags  Recent Labs     05/20/25  0347   PTT 76*        Additional Electrolytes  Recent Labs     05/21/25  0455 05/21/25  0905 05/21/25  1204 05/21/25  1330   MG 2.1  --   --   --    CAIONIZED  --    < > 1.10* 1.11*    < > = values in this interval not displayed.          Blood Gas  No recent results  No recent results LFTs  No recent results    Infectious  No recent results     No recent results Glucose  Recent Labs     05/21/25  0455   GLUC 125        Invasive lines and devices:  Invasive Devices       Central Venous Catheter Line  Duration             CVC Central Lines 05/21/25 <1 day    Introducer 05/21/25 <1 day              Peripheral Intravenous Line  Duration             Peripheral IV 05/19/25 Dorsal (posterior);Left Forearm 2 days              Arterial Line  Duration             Arterial Line 05/21/25 Right Radial <1 day              Line  Duration             Pacer Wires <1 day    Pacer Wires <1 day              Drain  Duration             Nephrostomy Left 10.2 Fr. 22 days    Nephrostomy Right 10.2 Fr. 22 days    Chest Tube 1 Left Pleural 32 Fr. <1 day    Chest Tube 2 Mediastinal;Posterior 24 Fr. <1 day    Chest Tube 3 Mediastinal;Anterior 32 Fr. <1 day              Airway  Duration             ETT  Hi-Lo 8 mm <1 day                       [1]    Allergies  Allergen Reactions    Losartan Syncope     Reaction Date: 07Jun2011;     Penicillins Other (See Comments)     As a child so does not recall reaction   [2]   Social History  Tobacco Use    Smoking status: Former     Current packs/day: 0.00     Average packs/day: 3.0 packs/day for 10.0 years (30.0 ttl pk-yrs)     Types: Cigarettes     Start date: 1/1/2000     Quit date: 1/1/2010     Years since quitting: 15.3    Smokeless tobacco: Never    Tobacco comments:     Former smoker - quit 2010   Vaping Use    Vaping status: Never Used   Substance Use Topics    Alcohol use: Yes     Comment: rare glass of wine    Drug use: Never     Comment: Denies any drug use per pt

## 2025-05-21 NOTE — ANESTHESIA POSTPROCEDURE EVALUATION
Post-Op Assessment Note    CV Status:  Stable  Pain Score: 2    Pain management: adequate       Mental Status:  Alert   Hydration Status:  Stable   PONV Controlled:  None   Airway Patency:  Patent  Two or more mitigation strategies used for obstructive sleep apnea   Post Op Vitals Reviewed: Yes    No anethesia notable event occurred.    Staff: Anesthesiologist           Last Filed PACU Vitals:  Vitals Value Taken Time   Temp     Pulse 63 05/21/25 14:04   BP     Resp 10 05/21/25 14:04   SpO2 99 % 05/21/25 14:04   Vitals shown include unfiled device data.

## 2025-05-21 NOTE — ASSESSMENT & PLAN NOTE
Lab Results   Component Value Date    HGBA1C 5.6 05/13/2025       Recent Labs     05/21/25  0529 05/21/25  1341   POCGLU 134 175*       Blood Sugar Average: Last 72 hrs:  (P) 154.5  Most recent A1C 5.8 ; hold home metformin while inpatient -   Continue with insulin sliding scale and low carb diet.

## 2025-05-21 NOTE — OCCUPATIONAL THERAPY NOTE
Occupational Therapy Cancel Note        Patient Name: Kilo Mix  Today's Date: 5/21/2025 05/21/25 0755   OT Last Visit   OT Visit Date 05/21/25   Note Type   Note Type Cancelled Session   Cancel Reasons Patient to operating room       Chart reviewed, pt currently off the floor at OR for OHS. Will continue to follow as medically appropriate post-op.      Sheri Llanos, DAMIAN, OTR/L

## 2025-05-21 NOTE — ANESTHESIA PROCEDURE NOTES
"Central Line Insertion    Performed by: Allan Padron MD  Authorized by: Allan Padron MD    Date/Time: 5/21/2025 9:03 AM  Catheter Type:  triple lumen  Consent: Verbal consent obtained. Written consent obtained  Risks and benefits: risks, benefits and alternatives were discussed  Consent given by: patient  Patient understanding: patient states understanding of the procedure being performed  Patient consent: the patient's understanding of the procedure matches consent given  Procedure consent: procedure consent matches procedure scheduled  Relevant documents: relevant documents present and verified  Test results: test results available and properly labeled  Site marked: the operative site was marked  Radiology Images: Radiology Images displayed and confirmed. If images not available, report reviewed  Required items: required blood products, implants, devices, and special equipment available  Patient identity confirmed: verbally with patient, arm band, provided demographic data and hospital-assigned identification number  Time out: Immediately prior to procedure a \"time out\" was called to verify the correct patient, procedure, equipment, support staff and site/side marked as required.  Indications: vascular access  Catheter size: 7 Fr  Patient position: Trendelenburg  Assessment: blood return through all ports and free fluid flow  Preparation: skin prepped with ChloraPrep  Skin prep agent dried: skin prep agent completely dried prior to procedure  Sterile barriers: all five maximum sterile barriers used - cap, mask, sterile gown, sterile gloves, and large sterile sheet  Hand hygiene: hand hygiene performed prior to central venous catheter insertion  sterile gel and probe cover used in ultrasound-guided central venous catheter insertionultrasound permanent image saved  Pre-procedure: landmarks identified  Number of attempts: 1  Successful placement: yes  Post-procedure: chlorhexidine patch applied, dressing " applied and line sutured  Patient tolerance: patient tolerated the procedure well with no immediate complications

## 2025-05-21 NOTE — ASSESSMENT & PLAN NOTE
Has remote history of CAD /MI s/p coronary stents X2  Presenting with intermittent chest pain over last several weeks mainly with activity, but progressively worsening. Most recent episode before calling EMS lasted longer and did not resolve with rest, was radiating to neck and bilateral arms, with diaphoresis and vomiting.   Elevated troponin, EKG was sinus tachycardia 105 with some nonspecific ST changes  Patient started on heparin drip in the ED  Monitor on telemetry  As needed submental length of blistering  Cardiology consulted for further management  Follow on cardiac echo  - has recent A1C, Lipid and TSH , all were unremarkable.   Add beta-blocker  Left heart cath today 5/12 shows three-vessel disease, plan for evaluation for potential CABG  Heparin drip was resumed after cardiac catheterization  PT going to OR for CABG 5/21  Preop workup completed

## 2025-05-21 NOTE — ANESTHESIA PROCEDURE NOTES
"Introducer/[place]    Performed by: Allan Padron MD  Authorized by: Allan Padron MD    Date/Time: 5/21/2025 9:13 AM  Consent: Verbal consent obtained. Written consent obtained  Risks and benefits: risks, benefits and alternatives were discussed  Consent given by: patient  Patient understanding: patient states understanding of the procedure being performed  Patient consent: the patient's understanding of the procedure matches consent given  Procedure consent: procedure consent matches procedure scheduled  Relevant documents: relevant documents present and verified  Test results: test results available and properly labeled  Site marked: the operative site was marked  Radiology Images: Radiology Images displayed and confirmed. If images not available, report reviewed  Required items: required blood products, implants, devices, and special equipment available  Patient identity confirmed: verbally with patient, arm band, provided demographic data and hospital-assigned identification number  Time out: Immediately prior to procedure a \"time out\" was called to verify the correct patient, procedure, equipment, support staff and site/side marked as required.  Indications: central pressure monitoring  Location details: right internal jugular  Catheter size: 9 Fr  Patient position: Trendelenburg  Assessment: blood return through all ports and free fluid flow  Preparation: skin prepped with ChloraPrep  Skin prep agent dried: skin prep agent completely dried prior to procedure  Sterile barriers: all five maximum sterile barriers used - cap, mask, sterile gown, sterile gloves, and large sterile sheet  Hand hygiene: hand hygiene performed prior to central venous catheter insertion  Ultrasound guidance: yes  ultrasound permanent image saved  Pre-procedure: landmarks identified  Number of attempts: 1  Successful placement: yes  Post-procedure: line sutured and dressing applied  Patient tolerance: patient tolerated the " procedure well with no immediate complications

## 2025-05-21 NOTE — ANESTHESIA PROCEDURE NOTES
"Arterial Line Insertion    Performed by: Allan Padron MD  Authorized by: Allan Padron MD  Consent: Verbal consent obtained. Written consent obtained  Risks and benefits: risks, benefits and alternatives were discussed  Consent given by: patient  Patient understanding: patient states understanding of the procedure being performed  Patient consent: the patient's understanding of the procedure matches consent given  Procedure consent: procedure consent matches procedure scheduled  Relevant documents: relevant documents present and verified  Test results: test results available and properly labeled  Site marked: the operative site was marked  Radiology Images: Radiology Images displayed and confirmed. If images not available, report reviewed  Required items: required blood products, implants, devices, and special equipment available  Patient identity confirmed: verbally with patient, arm band, provided demographic data and hospital-assigned identification number  Time out: Immediately prior to procedure a \"time out\" was called to verify the correct patient, procedure, equipment, support staff and site/side marked as required.  Preparation: Patient was prepped and draped in the usual sterile fashion.  Indications: multiple ABGs and hemodynamic monitoring  Orientation:  Right  Location: radial artery  Procedure Details:      Line Type: Arterial Line  Giovanny's test normal: yes  Needle gauge: 20  Placement technique:  Anatomical landmarks, palpation and Seldinger  Number of attempts: 1    Post-procedure:  Post-procedure: chlorhexidine patch applied and dressing applied  Waveform: good waveform  Post-procedure CNS: normal  Patient tolerance: patient tolerated the procedure well with no immediate complications          "

## 2025-05-21 NOTE — PROGRESS NOTES
"Cardiology Progress Note - Kilo Mix 84 y.o. male MRN: [id number]    Unit/Bed#: OR [place] Encounter: [id number]      Assessment:  Principal Problem:    NSTEMI (non-ST elevated myocardial infarction) ([facility])  Active Problems:    Coronary artery disease involving native coronary artery    Diabetes mellitus type 2 with neurological manifestations ([facility])    Dyslipidemia    Hypertension    Peripheral vascular disease (HCC)    Asymptomatic stenosis of left carotid artery    Prostate cancer ([facility])    H/O insertion of nephrostomy tube    Pneumonia involving right lung    Preoperative clearance    ALPHONSO (obstructive sleep apnea)    Former smoker    At risk for delirium    Depression    Frailty    Cognitive impairment    Hx of acute poliomyelitis    Restrictive lung disease    Moderate protein-calorie malnutrition (HCC)    S/P CABG x 3      Plan:  Patient with no issue overnight.  He had no chest pain or significant dyspnea.  His VS are stable.  He is in sinus rhythm on telemetry with occasional PVCs.  BMP with potassium of 4.2 and creatinine of 1.23.  Hemoglobin 12.6.  Patient for CABG today.  Will follow postoperatively.    Subjective:   Patient seen and examined.  No significant events overnight.  negative.    Objective:     Vitals: Blood pressure 116/69, pulse 64, temperature 97.5 °F (36.4 °C), resp. rate 16, height 5' 8\" (1.727 m), weight 70.1 kg (154 lb 8.7 oz), SpO2 97%., Body mass index is 23.5 kg/m².,   Orthostatic Blood Pressures      Flowsheet Row Most Recent Value   Blood Pressure 116/69 filed at 05/21/2025 0655   Patient Position - Orthostatic VS Lying filed at 05/17/2025 2020        ,      Intake/Output Summary (Last 24 hours) at 5/21/2025 0915  Last data filed at 5/21/2025 0601  Gross per 24 hour   Intake 551.39 ml   Output 3030 ml   Net -2478.61 ml       No significant arrhythmias seen on telemetry review.       Physical Exam:    GEN: Kilo Mix appears well, alert and oriented x 3, pleasant and cooperative "   NECK: supple, no carotid bruits, no JVD or HJR  HEART: normal rate, regular rhythm, normal S1 and S2, no murmurs, clicks, gallops or rubs   LUNGS: clear to auscultation bilaterally; no wheezes, rales, or rhonchi   ABDOMEN: normal bowel sounds, soft, no tenderness, no distention  EXTREMITIES: peripheral pulses normal; no clubbing, cyanosis, or edema  SKIN: warm and well perfused, no suspicious lesions on exposed skin    Labs & Results:    No results displayed because visit has over 200 results.          Complete PFT with post bronchodilator  Result Date: 5/16/2025  Narrative: Images from the original result were not included. Pulmonary Function Test Report Kilo Hand 84 y.o. male MRN: [id number] Date Performed: 5/15/2025 Date Interpretation: 5/15/2025 Interpretation: Normal spirometry. No post bronchodilator response. Moderate restriction as indicated by the lung volumes. Mild decrease corrected diffusion capacity. Flow-volume loop suggestive of a fixed obstruction, clinical correlation advised. Daria Bermudez MD [facility] Attestation I agree with interpretation as above.            Board Certified Pulmonary Physician    VAS carotid complete study  Result Date: 5/13/2025  Narrative: [facility] . KILO HAND is a 84 year old male who was referred by LIAT ARMSTRONG.      Indications:  Patient presents for surgical clearance and general health evaluation secondary to future open heart surgery.  Patient is S/P MI.  Operative History:  05/12/2025 Cardiac catheterization.  Right Common carotid artery/stent.  Coronary angioplasty/stent  Risk Factors:  The patient reports diabetes, CAD, hyperlipidemia, PVD, prostate cancer, bladder tumor, and prior smoking: quit >10yrs ago. Height: 68 inches. Weight: 159 lbs.   Clinical:  Right: IV site. Left BP: 143/66  FINDINGS: Main                                         Segment Impression  PSV  EDV  Ratio  Direction of Flow  Right                                                                                            Prox CCA                                                  52   16                             Mid CCA                                                   78   24                             Right Distal Common Carotid Stent                         80   26                             Dist CCA                                                  80   26                             Prox. ICA                                                 93   22   1.18                      Mid. ICA                                                  84   24   1.08                      Dist. ICA                                                 83   24   1.06                      Ext Carotid                             Severe stenosis  499   31                             Prox Vert.                                                26    4                 Antegrade    Right Subclavian Artery                                   88    0                          Left                                                                                             Prox CCA                                                  60   14                             Mid CCA                                                   75   19                             Dist CCA                                                  73   17                             Carotid Bifurcation                                       75   24   1.00                      Prox. ICA                                                179   57   2.37                      Mid. ICA                                                  80   26   1.06                      Dist. ICA                                                 73   17   0.97                      Ext Carotid                                               96   16                             Prox Vert                                                 86   20                  Antegrade    Left Subclavian Artery                                   172    0                          CONCLUSION:  RIGHT: There is <50% stenosis noted in the internal carotid artery. The common carotid artery/bulb stent is patent. Plaque is homogenous and smooth.  A severe stenosis was identified in the proximal external carotid artery. Vertebral artery flow is antegrade.  There is no significant subclavian artery disease.   LEFT: There is 50-69% stenosis noted in the internal carotid artery.  Plaque is heterogenous and irregular.  Vertebral artery flow is antegrade.  There is no significant subclavian artery disease.   In comparison to the study of 11-, there is no significant B/L interval change in the disease process.  Recommend repeat testing in 6 months as per protocol unless otherwise indicated.  SIGNATURE Signed by LISANDRA QUINTEROS DO on 2025-05-13 17:01:05 [email or web address]#Images    CT chest wo contrast  Result Date: 5/13/2025  Narrative: CT CHEST WITHOUT IV CONTRAST INDICATION: pre op CABG. COMPARISON: Two-view chest 5/10/2025. PET/CT 2/19/2025.l TECHNIQUE: CT examination of the chest was performed without intravenous contrast. Multiplanar 2D reformatted images were created from the source data. This examination, like all CT scans performed in the [facility], was performed utilizing techniques to minimize radiation dose exposure, including the use of iterative reconstruction and automated exposure control. Radiation dose length product (DLP) for this visit: 441.77 mGy-cm. FINDINGS: LUNGS: Persistent focal nodular consolidative and groundglass opacities throughout the right middle lobe and right lower lobe in keeping with multi lobar pneumonia. PLEURA: Unremarkable. HEART/GREAT VESSELS: Borderline heart size. Dense coronary artery calcifications. Aortic annulus calcifications.  No thoracic aortic aneurysm. MEDIASTINUM AND ROVERTO: Unremarkable. CHEST WALL AND LOWER NECK: Unremarkable. VISUALIZED STRUCTURES IN THE UPPER ABDOMEN: Cholelithiasis. 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 Atrophic right kidney. Simple appearing renal cyst. OSSEOUS STRUCTURES: No acute fracture or destructive osseous lesion.     Impression: Persistent right middle lobe and right lower lobe pneumonia. The study was marked in EPIC for immediate notification. Workstation performed: [id number]     VAS lower limb vein mapping bypass graft  Result Date: 5/13/2025  Narrative: [facility] REPORT . AMBER HAND is a 84 year old male who was referred by LIAT ARMSTRONG.      Indications:  Patient presents for surgical clearance and general health evaluation secondary to future open-heart surgery.  Patient is S/P MI. Evaluation for conduit. Operative History:  05/12/2025 Cardiac catheterization. Right Common carotid artery/stent. Coronary angioplasty/stent Risk Factors:  The patient reports diabetes, CAD, hyperlipidemia, PVD, prostate cancer, bladder tumor, and prior smoking: quit >10yrs ago. Height: 68 inches. Weight: 159 lbs. Clinical:  Left BP: 143/66 FINDINGS: Main                                          AP Diam (mm) Right Superficial Veins                                 Right Great Saphenous - Inguinal               6.7    Right Great Saphenous - Prox Thigh             2.1    Right Great Saphenous - Mid Thigh              1.0    Right Great Saphenous - Dist Thigh             0.9    Right Great Saphenous - Knee                   0.9    Right Great Saphenous - Mid Calf               0.8    Right Great Saphenous - Ankle                  1.1 Left Superficial Veins                                  Left Great Saphenous - Inguinal                5.7    Left Great Saphenous - Prox Thigh              2.3    Left Great Saphenous - Knee                    2.2    Left Great Saphenous - Mid Calf                0.9    Left Great  Saphenous - Ankle                   1.7 CONCLUSION:   RIGHT LOWER LIMB:  The great saphenous vein is patent from the groin to the ankle.  The intraluminal diameter measurements range from 0.8 mm to 6.7 throughout.    LEFT LOWER LIMB:  The great saphenous vein is patent from the groin to the ankle.  Tech note: The great saphenous vein exits the fascia in the proximal thigh.  The intraluminal diameter measurements range from 0.9 mm to 5.7 mm throughout.   Technical findings electronically sent to patient's chart.      SIGNATURE Signed by LISSETTE SALAS DO, RPVI on 2025-05-13 14:08:28 [email or web address]    Echo complete w/ contrast if indicated  Result Date: 5/12/2025  Narrative:   Left Ventricle: Left ventricular cavity size is normal. Wall thickness is normal. There is mild concentric hypertrophy. The left ventricular ejection fraction is 60%. Systolic function is normal. Wall motion is normal. Diastolic function is mildly abnormal, consistent with grade I (abnormal) relaxation.   Right Ventricle: Right ventricular cavity size is normal. Systolic function is normal.   Left Atrium: The atrium is mildly dilated (35-41 mL/m2).   Mitral Valve: There is mild annular calcification. There is mild regurgitation.   Tricuspid Valve: There is mild regurgitation. The right ventricular systolic pressure is mildly elevated. The estimated right ventricular systolic pressure is 41.00 mmHg.     Cardiac catheterization  Result Date: 5/12/2025  Narrative:   Complex 3V CAD +LMCA     X-ray chest 1 view portable  Result Date: 5/11/2025  Narrative: XR CHEST PORTABLE INDICATION: chest pain. COMPARISON: CXR 2/2/2023. FINDINGS: Moderate consolidation in the right base. No pneumothorax or pleural effusion. Normal cardiomediastinal silhouette. Bones are unremarkable for age. Normal upper abdomen.     Impression: Moderate right base pneumonia.  The study was marked in EPIC for immediate notification. Workstation performed: [id number]     IR nephrostomy tube check/change/reposition/reinsertion/upsize  Result Date: 5/1/2025  Narrative: Bilateral nephrostogram and nephrostomy tube change Clinical History: Bladder cancer. Bilateral nephrostomy tube replacement. Contrast: 24 mL of iohexol Fluoro time: 5.9 MINUTES Number of Images: 16 Radiation dose: 28 mGy Concious sedation time: N/A Technique: The patient was brought to the interventional radiology suite and placed prone on the table. The existing bilateral nephrostomy tubes prepped and draped in the usual sterile fashion. After a  view was obtained, contrast was injected into the existing catheter and multiple images of the urinary tract were obtained to the bladder. Findings: Left renal collecting system demonstrates no filling defects or dilatation. Left ureter is dilated and opacified. Complete occlusion at the ureterovesical junction. Right nephrostogram demonstrates nondilated system with no filling defects. Complete occlusion at the distal ureter. Intervention: The left catheter was removed over a heavy-duty straight wire, and a Kumpe catheter was advanced to the distal ureter. Attempts to advance a wire through the ureterovesical junction was unsuccessful. New 10.2 Chinese nephrostomy tube was advanced, and the loop formed within the renal pelvis. The catheter was then injected, confirming satisfactory position. The right catheter was removed over a wire and exchanged for a Kumpe catheter which was advanced into the distal ureter. A Glidewire could be easily advanced through the ureterovesical junction into the bladder. Once the Kumpe catheter was in the bladder  contrast opacified nondistended bladder. Over the wire a new 10.2 Chinese APD catheter was placed and looped in the renal pelvis. Both catheter was secured with sutures and connected to drainage bag.     Impression: Impression: 1.  Nephrostogram demonstrates nondilated system. Complete occlusion of the distal left ureterovesical junction. Stenotic right ureterovesical junction could be bypassed with a wire and catheter. 2. Successful exchange of 10.2 Luxembourger nephrostomy tubes under fluoroscopic control. Findings discussed with Dr. Youssef. Workstation performed: [id number]       EKG personally reviewed by Juan Manuel Westfall MD.     Counseling / Coordination of Care  Total floor / unit time spent today 30 minutes.  Greater than 50% of total time was spent with the patient and / or family counseling and / or coordination of care.

## 2025-05-21 NOTE — ANESTHESIA PREPROCEDURE EVALUATION
Procedure:  CORONARY ARTERY BYPASS GRAFT (CABG) 2-3 VESSELS (Chest)    Relevant Problems   CARDIO   (+) Asymptomatic stenosis of left carotid artery   (+) Coronary artery disease involving native coronary artery   (+) 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[facility])   (+) Hypertension   (+) NSTEMI (non-ST elevated myocardial infarction) (HCC)   (+) Peripheral vascular disease (HCC)   (+) Recurrent stenosis of right carotid artery   (+) Sinus bradycardia      GI/HEPATIC   (+) Dysphagia      /RENAL   (+) Acute kidney injury (HCC)   (+) CKD stage 3 due to type 2 diabetes mellitus (HCC)   (+) Hydronephrosis   (+) Prostate cancer (HCC)      MUSCULOSKELETAL   (+) Lower back pain   (+) Primary osteoarthritis of left shoulder      NEURO/PSYCH   (+) Depression   (+) Diabetic polyneuropathy (HCC)      PULMONARY   (+) ALPHONSO (obstructive sleep apnea)   (+) Pneumonia involving right lung        Physical Exam    Airway     Mallampati score: II  TM Distance: >3 FB  Neck ROM: full  Upper bite lip test: I  Mouth opening: >= 4 cm      Cardiovascular  Rhythm: regular, Rate: normal, Pulse is palpable. No peripheral edema, No JVD    Dental   No notable dental hx     Pulmonary   Breath sounds clear to auscultation    Neurological    He appears awake, alert and oriented x3.      Other Findings        Anesthesia Plan  ASA Score- 4     Anesthesia Type- general with ASA Monitors.         Additional Monitors: arterial line, central veinous line and pulmonary artery catheter.    Airway Plan: Oral ETT.           Plan Factors-Exercise tolerance (METS): >4 METS.    Chart reviewed. EKG reviewed. Imaging results reviewed. Existing labs reviewed. Patient summary reviewed.    Patient is not a current smoker.  Patient instructed to abstain from smoking on day of procedure. Patient did not smoke on day of surgery.    Obstructive sleep apnea risk education given perioperatively.        Induction- intravenous.    Postoperative Plan-  Plan for postoperative opioid use.   Monitoring Plan - Monitoring plan - standard ASA monitoring, CHAPO, pulmonary artery catheter, cerebral oximetry, arterial line kit and central line kit  Post Operative Pain Plan - plan for postoperative opioid use    Perioperative Resuscitation Plan - Level 1 - Full Code.       Informed Consent- Anesthetic plan and risks discussed with patient and son.        NPO Status:  Vitals Value Taken Time   Date of last liquid 05/21/25 05/21/25 07:35   Time of last liquid 0607 05/21/25 07:35   Date of last solid 05/20/25 05/21/25 07:35   Time of last solid 2300 05/21/25 07:35

## 2025-05-21 NOTE — OP NOTE
OPERATIVE REPORT  PATIENT NAME: Kilo Mix    :  1940  MRN: [id number]  Pt Location:  OR ROOM 16    SURGERY DATE: 2025    SURGEON: David Tomlin DO    ASSISTANT: Lisa Worley PA-C    PREOPERATIVE DIAGNOSIS:  Multivessel coronary artery disease, Left main coronary artery disease, s/p Acute NSTEMI    POSTOPERATIVE DIAGNOSIS:  Multivessel coronary artery disease, Left main coronary artery disease, s/p Acute NSTEMI    NYHA Class: 2    CCS Class: 3    PROCEDURE: Coronary artery bypass grafting x 3 with left internal mammary artery to left anterior descending, saphenous vein graft to right posterior descending artery, saphenous vein graft to obtuse marginal 1.     ANESTHESIA: General endotracheal anesthesia with transesophageal echocardiogram guidance, Dr. Espinoza Blank     CARDIOPULMONARY BYPASS TIME: 85 minutes.     CROSSCLAMP TIME: 77 minutes.    PACKS/TUBES/DRAINS: Chest tubes x 3.     MATERIALS: Pacing wires: A x1. V x1.     TRANSFUSION: None.     SPECIMENS: None.     ESTIMATED BLOOD LOSS: 250 mL    OPERATIVE TECHNIQUE:    The patient was taken to the operating room and placed supine on the operating table. Following the satisfactory induction of general anesthesia and placement of monitoring lines, the patient was prepped and draped in the usual sterile fashion. A time-out procedure was performed.    The patient underwent median sternotomy, LIMA harvest, endoscopic left greater saphenous vein harvest, systemic heparinization and conduit preparation. The patient underwent pericardiotomy and epiaortic ultrasound was used to evaluate the ascending aorta, which was found to be free of significant atheromatous disease. The patient underwent aortic and right atrial cannulation and was initiated on bypass. The ascending aorta was crossclamped. Antegrade [place] cardioplegia was delivered with an excellent arrest.    The saphenous vein was anastomosed to the right posterior descending artery in  end-to-side fashion using running 7-0 Prolene suture. The flow was good at 90ml/min. The saphenous vein was anastomosed to the  obtuse marginal 1 in end-to-side fashion using running 7-0 Prolene suture. The flow was good at 120ml/min. The left internal mammary artery was anastomosed to the left anterior descending in end-to-side fashion using running 7-0 Prolene suture. The flow was excellent. A total of two proximal anastomoses were completed on the ascending aorta in end-to-side fashion using running 6-0 Prolene suture. The heart was de-aired and the crossclamp was removed. Atrial and ventricular pacing wires were placed. Following a period of reperfusion, the patient was weaned from cardiopulmonary bypass and decannulated. Protamine was administered with normalization of the ACT. Hemostasis was confirmed in all fields. Thoracostomy tubes were placed. The sternum was closed with fiber tapes. The fascia, subdermis and skin were closed with multiple layers of running absorbable suture.    As the attending surgeon, I was present and scrubbed for all critical portions of this procedure. Sponge, needle and instrument counts were reported as correct by the nursing staff. There is no cardiac residency program at this facility and for complex procedures such as this, it is vital to have a physician assistant experienced in cardiac surgery.  The assistance of Lisa Worley PA-C was necessary for endoscopic saphenous vein harvesting, retraction of the heart and coronary conduit with proper tissue handling techniques, and following of the suture with correct tension during construction of the proximal and distal coronary anastomoses.    Final transesophageal echocardiogram demonstrated normal biventricular function.        SIGNATURE: David Tomlin DO  DATE: May 21, 2025  TIME: 1:02 PM

## 2025-05-22 ENCOUNTER — APPOINTMENT (INPATIENT)
Dept: RADIOLOGY | Facility: HOSPITAL | Age: 85
DRG: 233 | End: 2025-05-22
Payer: MEDICARE

## 2025-05-22 ENCOUNTER — APPOINTMENT (INPATIENT)
Dept: RADIOLOGY | Facility: HOSPITAL | Age: 85
DRG: 233 | End: 2025-05-22
Attending: PHYSICIAN ASSISTANT
Payer: MEDICARE

## 2025-05-22 PROBLEM — D72.829 LEUKOCYTOSIS: Status: ACTIVE | Noted: 2025-05-22

## 2025-05-22 LAB
ANION GAP SERPL CALCULATED.3IONS-SCNC: 9 MMOL/L (ref 4–13)
ATRIAL RATE: 82 BPM
BASE EX.OXY STD BLDV CALC-SCNC: 57.2 % (ref 60–80)
BASE EXCESS BLDV CALC-SCNC: 0.4 MMOL/L
BUN SERPL-MCNC: 22 MG/DL (ref 5–25)
CALCIUM SERPL-MCNC: 9.1 MG/DL (ref 8.4–10.2)
CHLORIDE SERPL-SCNC: 106 MMOL/L (ref 96–108)
CO2 SERPL-SCNC: 24 MMOL/L (ref 21–32)
CREAT SERPL-MCNC: 1.12 MG/DL (ref 0.6–1.3)
ERYTHROCYTE [DISTWIDTH] IN BLOOD BY AUTOMATED COUNT: 14.2 % (ref 11.6–15.1)
GFR SERPL CREATININE-BSD FRML MDRD: 60 ML/MIN/1.73SQ M
GLUCOSE SERPL-MCNC: 134 MG/DL (ref 65–140)
GLUCOSE SERPL-MCNC: 145 MG/DL (ref 65–140)
GLUCOSE SERPL-MCNC: 146 MG/DL (ref 65–140)
GLUCOSE SERPL-MCNC: 147 MG/DL (ref 65–140)
GLUCOSE SERPL-MCNC: 152 MG/DL (ref 65–140)
GLUCOSE SERPL-MCNC: 156 MG/DL (ref 65–140)
GLUCOSE SERPL-MCNC: 156 MG/DL (ref 65–140)
HCO3 BLDV-SCNC: 25.4 MMOL/L (ref 24–30)
HCT VFR BLD AUTO: 30.2 % (ref 36.5–49.3)
HGB BLD-MCNC: 11.8 G/DL (ref 12–17)
MAGNESIUM SERPL-MCNC: 2.6 MG/DL (ref 1.9–2.7)
MCH RBC QN AUTO: 37.3 PG (ref 26.8–34.3)
MCHC RBC AUTO-ENTMCNC: 33.9 G/DL (ref 31.4–37.4)
MCV RBC AUTO: 96 FL (ref 82–98)
NASAL CANNULA: 2
O2 CT BLDV-SCNC: 8.7 ML/DL
P AXIS: 37 DEGREES
PCO2 BLDV: 43 MM HG (ref 42–50)
PH BLDV: 7.39 [PH] (ref 7.3–7.4)
PLATELET # BLD AUTO: 365 THOUSANDS/UL (ref 149–390)
PMV BLD AUTO: 12.6 FL (ref 8.9–12.7)
PO2 BLDV: 31.6 MM HG (ref 35–45)
POTASSIUM SERPL-SCNC: 4.1 MMOL/L (ref 3.5–5.3)
PR INTERVAL: 226 MS
QRS AXIS: -68 DEGREES
QRSD INTERVAL: 138 MS
QT INTERVAL: 436 MS
QTC INTERVAL: 509 MS
RBC # BLD AUTO: 3.16 MILLION/UL (ref 3.88–5.62)
SODIUM SERPL-SCNC: 139 MMOL/L (ref 135–147)
T WAVE AXIS: 54 DEGREES
VENTRICULAR RATE: 82 BPM
WBC # BLD AUTO: 17.51 THOUSAND/UL (ref 4.31–10.16)

## 2025-05-22 PROCEDURE — 97163 PT EVAL HIGH COMPLEX 45 MIN: CPT

## 2025-05-22 PROCEDURE — 82948 REAGENT STRIP/BLOOD GLUCOSE: CPT

## 2025-05-22 PROCEDURE — 93005 ELECTROCARDIOGRAM TRACING: CPT

## 2025-05-22 PROCEDURE — 85027 COMPLETE CBC AUTOMATED: CPT | Performed by: PHYSICIAN ASSISTANT

## 2025-05-22 PROCEDURE — 80048 BASIC METABOLIC PNL TOTAL CA: CPT | Performed by: PHYSICIAN ASSISTANT

## 2025-05-22 PROCEDURE — 97168 OT RE-EVAL EST PLAN CARE: CPT

## 2025-05-22 PROCEDURE — 99024 POSTOP FOLLOW-UP VISIT: CPT | Performed by: PHYSICIAN ASSISTANT

## 2025-05-22 PROCEDURE — 82805 BLOOD GASES W/O2 SATURATION: CPT | Performed by: NURSE PRACTITIONER

## 2025-05-22 PROCEDURE — 83735 ASSAY OF MAGNESIUM: CPT | Performed by: PHYSICIAN ASSISTANT

## 2025-05-22 PROCEDURE — 71045 X-RAY EXAM CHEST 1 VIEW: CPT

## 2025-05-22 PROCEDURE — 99232 SBSQ HOSP IP/OBS MODERATE 35: CPT | Performed by: INTERNAL MEDICINE

## 2025-05-22 PROCEDURE — 93010 ELECTROCARDIOGRAM REPORT: CPT | Performed by: INTERNAL MEDICINE

## 2025-05-22 RX ORDER — INSULIN LISPRO 100 [IU]/ML
1-5 INJECTION, SOLUTION INTRAVENOUS; SUBCUTANEOUS
Status: DISCONTINUED | OUTPATIENT
Start: 2025-05-22 | End: 2025-05-27

## 2025-05-22 RX ORDER — PANTOPRAZOLE SODIUM 40 MG/1
40 TABLET, DELAYED RELEASE ORAL DAILY
Status: DISCONTINUED | OUTPATIENT
Start: 2025-05-22 | End: 2025-05-22 | Stop reason: SDUPTHER

## 2025-05-22 RX ORDER — FUROSEMIDE 10 MG/ML
40 INJECTION INTRAMUSCULAR; INTRAVENOUS DAILY
Status: DISCONTINUED | OUTPATIENT
Start: 2025-05-22 | End: 2025-05-23

## 2025-05-22 RX ORDER — AMLODIPINE BESYLATE 10 MG/1
10 TABLET ORAL DAILY
Status: DISCONTINUED | OUTPATIENT
Start: 2025-05-22 | End: 2025-05-23

## 2025-05-22 RX ORDER — FONDAPARINUX SODIUM 2.5 MG/.5ML
2.5 INJECTION SUBCUTANEOUS DAILY
Status: DISCONTINUED | OUTPATIENT
Start: 2025-05-22 | End: 2025-05-22 | Stop reason: SDUPTHER

## 2025-05-22 RX ORDER — DOCUSATE SODIUM 100 MG/1
100 CAPSULE, LIQUID FILLED ORAL 2 TIMES DAILY
Status: DISCONTINUED | OUTPATIENT
Start: 2025-05-22 | End: 2025-05-28 | Stop reason: HOSPADM

## 2025-05-22 RX ORDER — TEMAZEPAM 15 MG/1
15 CAPSULE ORAL
Status: DISCONTINUED | OUTPATIENT
Start: 2025-05-22 | End: 2025-05-28 | Stop reason: HOSPADM

## 2025-05-22 RX ORDER — ACETAMINOPHEN 325 MG/1
975 TABLET ORAL EVERY 8 HOURS
Status: DISCONTINUED | OUTPATIENT
Start: 2025-05-22 | End: 2025-05-22

## 2025-05-22 RX ORDER — ONDANSETRON 2 MG/ML
4 INJECTION INTRAMUSCULAR; INTRAVENOUS EVERY 6 HOURS PRN
Status: DISCONTINUED | OUTPATIENT
Start: 2025-05-22 | End: 2025-05-22 | Stop reason: SDUPTHER

## 2025-05-22 RX ORDER — POTASSIUM CHLORIDE 1500 MG/1
20 TABLET, EXTENDED RELEASE ORAL DAILY
Status: DISCONTINUED | OUTPATIENT
Start: 2025-05-22 | End: 2025-05-25

## 2025-05-22 RX ADMIN — INSULIN LISPRO 1 UNITS: 100 INJECTION, SOLUTION INTRAVENOUS; SUBCUTANEOUS at 12:08

## 2025-05-22 RX ADMIN — FUROSEMIDE 40 MG: 10 INJECTION, SOLUTION INTRAMUSCULAR; INTRAVENOUS at 08:05

## 2025-05-22 RX ADMIN — ASPIRIN 325 MG ORAL TABLET 325 MG: 325 PILL ORAL at 08:00

## 2025-05-22 RX ADMIN — MUPIROCIN 1 APPLICATION: 20 OINTMENT TOPICAL at 20:18

## 2025-05-22 RX ADMIN — CHLORHEXIDINE GLUCONATE 15 ML: 1.2 SOLUTION ORAL at 17:36

## 2025-05-22 RX ADMIN — POLYETHYLENE GLYCOL 3350 17 G: 17 POWDER, FOR SOLUTION ORAL at 08:00

## 2025-05-22 RX ADMIN — CEFAZOLIN SODIUM 2000 MG: 2 SOLUTION INTRAVENOUS at 09:35

## 2025-05-22 RX ADMIN — Medication 12.5 MG: at 08:04

## 2025-05-22 RX ADMIN — POTASSIUM CHLORIDE 20 MEQ: 1500 TABLET, EXTENDED RELEASE ORAL at 08:05

## 2025-05-22 RX ADMIN — ATORVASTATIN CALCIUM 80 MG: 80 TABLET, FILM COATED ORAL at 16:02

## 2025-05-22 RX ADMIN — DOCUSATE SODIUM 100 MG: 100 CAPSULE, LIQUID FILLED ORAL at 08:05

## 2025-05-22 RX ADMIN — PANTOPRAZOLE SODIUM 40 MG: 40 TABLET, DELAYED RELEASE ORAL at 08:00

## 2025-05-22 RX ADMIN — INSULIN LISPRO 1 UNITS: 100 INJECTION, SOLUTION INTRAVENOUS; SUBCUTANEOUS at 16:03

## 2025-05-22 RX ADMIN — DOCUSATE SODIUM 100 MG: 100 CAPSULE, LIQUID FILLED ORAL at 17:36

## 2025-05-22 RX ADMIN — CEFAZOLIN SODIUM 2000 MG: 2 SOLUTION INTRAVENOUS at 01:54

## 2025-05-22 RX ADMIN — MUPIROCIN 1 APPLICATION: 20 OINTMENT TOPICAL at 08:00

## 2025-05-22 RX ADMIN — AMIODARONE HYDROCHLORIDE 200 MG: 200 TABLET ORAL at 07:59

## 2025-05-22 RX ADMIN — FONDAPARINUX SODIUM 2.5 MG: 2.5 INJECTION, SOLUTION SUBCUTANEOUS at 08:00

## 2025-05-22 RX ADMIN — NICARDIPINE HYDROCHLORIDE 5 MG/HR: 2.5 INJECTION, SOLUTION INTRAVENOUS at 01:52

## 2025-05-22 RX ADMIN — CHLORHEXIDINE GLUCONATE 15 ML: 1.2 SOLUTION ORAL at 08:00

## 2025-05-22 RX ADMIN — OXYCODONE HYDROCHLORIDE 5 MG: 5 TABLET ORAL at 07:59

## 2025-05-22 RX ADMIN — AMIODARONE HYDROCHLORIDE 200 MG: 200 TABLET ORAL at 14:18

## 2025-05-22 RX ADMIN — Medication 12.5 MG: at 20:18

## 2025-05-22 NOTE — PROGRESS NOTES
Progress Note - Cardiac Surgery   Kilo Mix 84 y.o. male MRN: [id number]  Unit/Bed#: PPHP-312-01 Encounter: [id number]    Coronary artery disease. S/P coronary artery bypass grafting x 3; POD # 1    24 Hour Events: Given 500ml LR and 500ml Albumin overnight for decreased filling pressures. Delined. [place] removed due to malfunction. Remains on Cardene 2.5mg. Pt reports feeling well overall. Denies CP or SOB.    Medications:   Scheduled Meds:  Current Facility-Administered Medications   Medication Dose Route Frequency Provider Last Rate    acetaminophen  650 mg Rectal Q4H PRN Lisa Worley PA-C      acetaminophen  975 mg Oral Q6H While awake Lisa oWrley PA-C      amiodarone  200 mg Oral Q8H [facility] Lisa Worley PA-C      aspirin  325 mg Oral Daily Lisa Worley PA-C      atorvastatin  80 mg Oral Daily With Dinner Lisa Worley PA-C      bisacodyl  10 mg Rectal Daily PRN Lisa Worley PA-C      calcium gluconate  2 g Intravenous Once PRN Lisa Worley PA-C      cefazolin  2,000 mg Intravenous Q8H Lisa Worley PA-C 2,000 mg (05/22/25 0154)    chlorhexidine  15 mL Mouth/Throat BID Lisa Worley PA-C      fondaparinux  2.5 mg Subcutaneous Daily Lisa Worley PA-C      HYDROmorphone  0.5 mg Intravenous Q2H PRN Lisa Worley PA-C      insulin regular (HumuLIN R,NovoLIN R) 1 Units/mL in sodium chloride 0.9 % 100 mL infusion  0.3-21 Units/hr Intravenous Titrated Lisa Worley PA-C 0.5 Units/hr (05/22/25 0635)    mupirocin  1 Application Nasal Q12H [facility] Lisa Worley PA-C      niCARdipine  2.5-15 mg/hr Intravenous Titrated YOHANNES Johnson-SARINA 2.5 mg/hr (05/22/25 0615)    ondansetron  4 mg Intravenous Q6H PRN Lisa Worley PA-C      oxyCODONE  2.5 mg Oral Q4H PRN Lisa Worley PA-C      Or    oxyCODONE  5 mg Oral Q4H PRN Lisa Worley PA-C      pantoprazole  40 mg Oral Daily Lisa Worley PA-C      phenylephine   mcg/min Intravenous Titrated Lisa Worley PA-C Stopped (05/21/25 0456)     polyethylene glycol  17 g Oral Daily YOHANNES Johnson-C      potassium chloride  40 mEq Intravenous Once PRN Lisa Tiptonf, PA-C      potassium chloride  40 mEq Intravenous Once PRN Lisa Worley, PA-C      sodium chloride  20 mL/hr Intravenous Continuous Lisa Danutaf, PA-C 20 mL/hr (05/21/25 1345)     Continuous Infusions:insulin regular (HumuLIN R,NovoLIN R) 1 Units/mL in sodium chloride 0.9 % 100 mL infusion, 0.3-21 Units/hr, Last Rate: 0.5 Units/hr (05/22/25 0635)  niCARdipine, 2.5-15 mg/hr, Last Rate: 2.5 mg/hr (05/22/25 0615)  phenylephine,  mcg/min, Last Rate: Stopped (05/21/25 1349)  sodium chloride, 20 mL/hr, Last Rate: 20 mL/hr (05/21/25 1345)      PRN Meds:.  acetaminophen    bisacodyl    calcium gluconate    HYDROmorphone    ondansetron    oxyCODONE **OR** oxyCODONE    potassium chloride    potassium chloride    Vitals:   Vitals:    05/22/25 0200 05/22/25 0300 05/22/25 0338 05/22/25 0543   BP: 102/52 108/55     BP Location:       Pulse: 79 76     Resp: 20 (!) 26     Temp: 98.8 °F (37.1 °C)      TempSrc:       SpO2: 92% 90% 96%    Weight:    73.3 kg (161 lb 9.6 oz)   Height:           Hemodynamics:  PAP: (17-33)/(6-17) 19/8  CO:  [3.2 L/min-5 L/min] 4.8 L/min  CI:  [1.8 L/min/m2-2.8 L/min/m2] 2.6 L/min/m2    Respiratory:   SpO2: SpO2: 96 %; 3 LPM    Intake/Output:   I/O         05/20 0701 05/21 0700 05/21 0701  05/22 0700 05/22 0701 05/23 0700    P.O. 1020 240     I.V. (mL/kg) 291.4 (4.2) 1733.4 (23.6)     Other 20 0     IV Piggyback  1400     Cell Saver  675     Total Intake(mL/kg) 1331.4 (19) 4048.4 (55.2)     Urine (mL/kg/hr) 3030 (1.8) 3755 (2.1)     Chest Tube  310     Total Output 3030 4065     Net -1698.6 -16.6                    Weights:   Weight (last 2 days)       Date/Time Weight    05/22/25 0543 73.3 (161.6)    05/21/25 0536 70.1 (154.54)          Admit Weight: 72.3 kg    Chest tube Output:    Mediastinal tubes: 90 mL/8 hours  270 mL/24 hours   Pleural tubes: 40 mL/8 hours  40  mL/24 hours     Results:   SVO2 57.2%    Results from last 7 days   Lab Units 05/22/25 0408 05/21/25  2230 05/21/25  1345 05/21/25  1342 05/21/25  1036 05/21/25  0952 05/21/25  0905 05/21/25  0455   WBC Thousand/uL 17.51*  --   --   --   --   --   --  8.71   HEMOGLOBIN g/dL 11.8* 10.2*  --  9.7*  --   --   --  12.6   I STAT HEMOGLOBIN g/dl  --   --  8.8*  --    < >  --    < >  --    HEMATOCRIT % 30.2* 30.3*  --  28.6*  --   --   --  37.4   HEMATOCRIT, ISTAT %  --   --  26*  --    < >  --    < >  --    PLATELETS Thousands/uL 365  --   --  162  --  237  --  294    < > = values in this interval not displayed.     Results from last 7 days   Lab Units 05/22/25 0408 05/21/25 2230 05/21/25  1708 05/21/25  1345 05/21/25  1342 05/21/25  0905 05/21/25  0455   SODIUM mmol/L 139  --   --   --  138  --  137   POTASSIUM mmol/L 4.1 4.4 3.9  --  4.0  --  4.2   CHLORIDE mmol/L 106  --   --   --  106  --  100   CO2 mmol/L 24  --   --   --  23  --  27   CO2, I-STAT mmol/L  --   --   --  23  --    < >  --    BUN mg/dL 22  --   --   --  21  --  23   CREATININE mg/dL 1.12  --   --   --  1.05  --  1.23   GLUCOSE, ISTAT mg/dl  --   --   --  166*  --    < >  --    CALCIUM mg/dL 9.1  --   --   --  8.4  --  10.5*    < > = values in this interval not displayed.     Recent Labs     05/22/25  0408   MG 2.6     Results from last 7 days   Lab Units 05/20/25  0347 05/19/25 0454 05/18/25  0541   PTT seconds 76* 78* 85*       Point of care glucose:  - 159    Studies:    CXR 5/22: pending    EKG 5/22: NSR with 1st degree block and PVCs, RBBB; rate 82    Results Review Statement: I personally reviewed the following image studies in PACS and associated radiology reports: EKG and chest xray. My interpretation of the radiology images/reports is: as noted above.    Invasive Lines/Tubes:  Invasive Devices       Central Venous Catheter Line  Duration             CVC Central Lines 05/21/25 <1 day    Introducer 05/21/25 <1 day              Peripheral  Intravenous Line  Duration             Peripheral IV 05/19/25 Dorsal (posterior);Left Forearm 3 days              Arterial Line  Duration             Arterial Line 05/21/25 Right Radial <1 day              Line  Duration             Pacer Wires <1 day    Pacer Wires <1 day              Drain  Duration             Nephrostomy Left 10.2 Fr. 22 days    Nephrostomy Right 10.2 Fr. 22 days    Chest Tube 1 Left Pleural 32 Fr. <1 day    Chest Tube 2 Mediastinal;Posterior 24 Fr. <1 day    Chest Tube 3 Mediastinal;Anterior 32 Fr. <1 day                    Physical Exam:    General: No acute distress, Alert, and Normal appearance  HEENT/NECK:  Normocephalic. Atraumatic.  No jugular venous distention.    Cardiac: Regular rate and rhythm and Pericardial friction rub  Pulmonary:  Breath sounds clear bilaterally and No rales/rhonchi/wheezes  Abdomen:  Non-tender, Non-distended, and Hypo-active bowel sounds  Incisions: Sternum is stable.  Incision dressed with Acticoat.  No erythema or drainage and Saphenectomy incision dressed with Acticoat.  No erythema or drainage  Extremities: Extremities warm/dry and Trace edema B/L  Neuro: Alert and oriented X 3, Sensation is grossly intact, and No focal deficits  Skin: Warm/Dry, without rashes or lesions.    Assessment:  Principal Problem:    NSTEMI (non-ST elevated myocardial infarction) ([facility])  Active Problems:    Coronary artery disease involving native coronary artery    Diabetes mellitus type 2 with neurological manifestations ([facility])    Dyslipidemia    Hypertension    Peripheral vascular disease (HCC)    Asymptomatic stenosis of left carotid artery    Prostate cancer (HCC)    H/O insertion of nephrostomy tube    Pneumonia involving right lung    Preoperative clearance    ALPHONSO (obstructive sleep apnea)    Former smoker    At risk for delirium    Depression    Frailty    Cognitive impairment    Hx of acute poliomyelitis    Restrictive lung disease    Moderate protein-calorie malnutrition  (HCC)    S/P CABG x 3    RBBB       Coronary artery disease. S/P coronary artery bypass grafting x 3; POD # 1    Plan:    Cardiac:     S/P acute preoperative NSTEMI  Normal ventricular systolic function, EF 60%    Cardiac infusions: Cardene, 2.5 mg/hour    Cardiac index > 2.2  Mutual Philip catheter and Arterial line removed    NSR with 1st degree block and RBBB (new postop); HR well-controlled  BP well-controlled  Start Lopressor, 12.5mg PO BID  Hold ACE inhibitor/ARB secondary to up titration of beta blocker    AF prophylaxis  Continue Amiodarone, 200 mg PO TID      Continue ASA and Statin therapy    Maintain epicardial pacing wires for beta blocker initiation    Maintain central IV access today for lack of peripheral access    Continue Arixtra for DVT prophylaxis    Consult cardiology for postoperative medical management    Pulmonary:     Extubated    CXR findings: pending    Acute post-op pulmonary insufficiency; Requiring 4 liters via nasal cannla, secondary to atelectasis and poor inspiratory effort secondary to pain. Continue incentive spirometry/coughing/deep breathing exercises.  Wean supplemental oxygen as tolerated for saturation > 90%    Chest tube output remains persistently high; Continue chest tubes to suction today    Renal:     Normal preoperative renal function    Intake/Output net: -224 mL/24 hours    Diuretic Regimen:  Start IV Lasix, 40 mg QD  Start Potassium Chloride 20 mEq PO QD    Discontinue potassium replacement scales    No lai due to bilateral nephrostomy tubes    Neuro:    Neurologically intact; No active issues     Incisional pain well controlled     Continue tylenol, 975 mg PO q 8, standing dose   Continue oxycodone, 2.5 to 5 mg PO q 4 hours prn pain    GI:    Cardiac diet, with 1800 mL fluid restriction    Tolerating diet without complaint    Continue stool softeners and prn suppository    Continue GI prophylaxis    Endo:     Glucose well-controlled. Discontinue continuous insulin  infusion and add Insulin sliding scale coverage    Hematology:     Post-operative blood count acceptable; Trend prn  Leukocytosis; Afebrile with no signs of infection; Trend CBC prn    Disposition:    Transfer from ICU to telemetry today  PT/OT consulted for recommendations regarding home vs. rehab, when medically cleared for discharge      VTE Pharmacologic Prophylaxis: Fondaparinux (Arixtra)  VTE Mechanical Prophylaxis: sequential compression device    Collaborative rounds completed with supervising physician and with the bedside nurse    SIGNATURE: Alicia Bender PA-C  DATE: May 22, 2025  TIME: 7:06 AM

## 2025-05-22 NOTE — OCCUPATIONAL THERAPY NOTE
Occupational Therapy Re-Evaluation     Patient Name: Kilo Mix  Today's Date: 5/22/2025  Problem List  Principal Problem:    NSTEMI (non-ST elevated myocardial infarction) ([facility])  Active Problems:    Coronary artery disease involving native coronary artery    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)    Dyslipidemia    Hypertension    Peripheral vascular disease (HCC)    Asymptomatic stenosis of left carotid artery    Prostate cancer (HCC)    H/O insertion of nephrostomy tube    Pneumonia involving right lung    Preoperative clearance    ALPHONSO (obstructive sleep apnea)    Former smoker    At risk for delirium    Depression    Frailty    Cognitive impairment    Hx of acute poliomyelitis    Restrictive lung disease    Moderate protein-calorie malnutrition (HCC)    S/P CABG x 3    RBBB    Leukocytosis    Past Medical History  Past Medical History[1]  Past Surgical History  Past Surgical History[2]          05/22/25 0901   OT Last Visit   OT Visit Date 05/22/25   Note Type   Note type Re-Evaluation   Pain Assessment   Pain Assessment Tool 0-10   Pain Score 5   Pain Location/Orientation Orientation: Mid;Location: Chest;Location: Incision   Patient's Stated Pain Goal No pain   Hospital Pain Intervention(s) Repositioned;Ambulation/increased activity   Restrictions/Precautions   Weight Bearing Precautions Per Order No   Other Precautions Cardiac/sternal;Cognitive;Chair Alarm;Multiple lines;O2;Telemetry;Fall Risk;Pain  (CT)   Home Living   Type of Home House   Home Layout One level;Stairs to enter without rails  (8 KEN)   Bathroom Shower/Tub Tub/shower unit   Bathroom Toilet Raised   Bathroom Equipment Grab bars in shower;Shower chair   Additional Comments Please see IE for home setup and PLOF.   Prior Function   Comments Please see IE for home setup and PLOF.   Lifestyle   Autonomy PTA, Pt reports I w/ ADL, IADL, FM w/out AD. (+) .   Reciprocal Relationships Spouse, son, DIL, daughter,  grandchildren   Service to Others Retired   ADL   Where Assessed Chair   Eating Assistance 6  Modified independent   Grooming Assistance 5  Supervision/Setup   UB Bathing Assistance 4  Minimal Assistance   LB Bathing Assistance 3  Moderate Assistance   UB Dressing Assistance 4  Minimal Assistance   LB Dressing Assistance 3  Moderate Assistance   Toileting Assistance  3  Moderate Assistance   Functional Assistance 4  Minimal Assistance   Bed Mobility   Additional Comments Pt greeted and left OOB in chair with alarm on and all needs within reach.   Transfers   Sit to Stand 4  Minimal assistance   Additional items Assist x 1;Increased time required;Verbal cues   Stand to Sit 4  Minimal assistance   Additional items Assist x 1;Increased time required;Verbal cues   Additional Comments with RW   Functional Mobility   Functional Mobility 4  Minimal assistance   Additional Comments Pt performs household distance mobility with MIN A x 1 with RW.   Additional items Rolling walker   Balance   Static Sitting Fair   Dynamic Sitting Fair -   Static Standing Poor +   Dynamic Standing Poor +   Ambulatory Poor +   Activity Tolerance   Activity Tolerance Patient limited by fatigue;Patient limited by pain   Medical Staff Made Aware Co-eval with DPT due to high medical complexity.   Nurse Made Aware RN cleared for therapy   RUE Assessment   RUE Assessment X  (reported difficulty and occasional pain with R shoulder)   LUE Assessment   LUE Assessment WFL   Hand Function   Gross Motor Coordination Functional   Fine Motor Coordination Functional   Sensation   Light Touch No apparent deficits   Vision-Basic Assessment   Current Vision Wears glasses only for reading   Cognition   Overall Cognitive Status Impaired   Arousal/Participation Cooperative;Alert   Attention Attends with cues to redirect   Orientation Level Oriented X4   Memory Decreased recall of precautions;Decreased short term memory   Following Commands Follows one step commands  with increased time or repetition   Comments Pt pleasant and cooperative, motivated to improve although with some decreased recall of precautions. Scored a 22/30 on MOCA during pre-op OT session on 5/15/25, indicating MILD cognitive impairment, will continue to assess.   Assessment   Limitation Decreased ADL status;Decreased Safe judgement during ADL;Decreased cognition;Decreased endurance;Decreased self-care trans;Decreased high-level ADLs   Prognosis Fair   Assessment Pt is a 84 y.o. male who was admitted to [facility] on 5/10/2025 with NSTEMI (non-ST elevated myocardial infarction) ([facility]), now s/p CABG x 3. Pt seen for an OT re-evaluation per active OT orders, CT in place.  Pt  has a past medical history of Bladder tumor, Cancer (HCC), Depression, Diabetes mellitus (HCC), Diverticulitis of colon, History of common carotid artery stent placement, History of heart artery stent, Hyperlipidemia, Hypertension, Migraines, Myocardial infarction (HCC), Nephrostomy present (HCC), Prostate cancer (HCC), and Seasonal allergies. Please see IE for home setup and PLOF. Currently, pt is Min Ax1 for UB ADL, Mod Ax1 for LB ADL, and completed transfers/FM w Min Ax1 with RW. Pt currently presents with impairments in the following categories -steps to enter environment, difficulty performing ADLS, and limited insight into deficits activity tolerance, endurance, memory, insight, safety , judgement , and attention . These impairments, as well as pt's fatigue, SOB, FUNES, cardiac/sternal precautions, and risk for falls  limit pt's ability to safely engage in all baseline areas of occupation, includinggrooming, bathing, dressing, toileting, and functional mobility/transfers.  The patient's raw score on the AM-PAC Daily Activity Inpatient Short Form is 16. A raw score of less than 19 suggests the patient may benefit from discharge to post-acute rehabilitation services. Please refer to the recommendation of the Occupational  Therapist for safe discharge planning. Pt would benefit from continued acute OT services throughout hospital course and following D/C. Plan for OT interventions 2-3x per week. From OT standpoint, recommend level II services vs level III services pending progress and support at home upon D/C. Pt was left seated in bedside chair with chair alarm on and all needs within reach.   Goals   Patient Goals to get better   LTG Time Frame 10-14   Long Term Goal #1 see below   Plan   Treatment Interventions ADL retraining;Functional transfer training;Endurance training;Equipment evaluation/education;Continued evaluation;Cardiac education;Energy conservation   Goal Expiration Date 06/05/25   OT Treatment Day 1   OT Frequency 2-3x/wk   Discharge Recommendation   Rehab Resource Intensity Level, OT II (Moderate Resource Intensity)  (vs level III pending progress and level of support at home)   AM-PAC Daily Activity Inpatient   Lower Body Dressing 2   Bathing 2   Toileting 2   Upper Body Dressing 3   Grooming 3   Eating 4   Daily Activity Raw Score 16   Daily Activity Standardized Score (Calc for Raw Score >=11) 35.96   AM-PAC Applied Cognition Inpatient   Following a Speech/Presentation 4   Understanding Ordinary Conversation 4   Taking Medications 3   Remembering Where Things Are Placed or Put Away 3   Remembering List of 4-5 Errands 2   Taking Care of Complicated Tasks 2   Applied Cognition Raw Score 18   Applied Cognition Standardized Score 38.07   Modified [place] Scale   Modified [place] Scale 4   End of Consult   Education Provided Yes   Patient Position at End of Consult Bedside chair;Bed/Chair alarm activated;All needs within reach   Nurse Communication Nurse aware of consult       OT Goals    - Pt will be Supervision with LB ADL by end of hospital course.    - Pt will be Supervision with UB ADL by end of hospital course.    - Pt will be Supervision with all functional transfers required for patient safety by end of hospital  course.    - Pt will be Supervision with functional mobility to/from bathroom for increased independence with toileting tasks.    - Pt activity tolerance will increase to 30 minutes in order to safely engage in ADL and transfers.     - Pt standing tolerance will increase to 5 minutes  in order to promote sink side ADLs and IADL activities.    - Pt will consistently follow one-step directions with min to no vc or prompting.     - Pt will attend to functional tasks for 10 minutes with min to no vc for attention/redirection.    - Pt will attend to and complete ongoing cognitive assessment in order to inform safe discharge planning.    - Pt will recall all cardiac precautions during OT sessions to promote safety following hospital stay.    - Pt will verbalize and demonstrate understanding to cardiac packet following education in order to promote safety following hospital stay.             Sheri Llanos, DAMIAN, OTR/L         [1]   Past Medical History:  Diagnosis Date    Bladder tumor     Cancer (HCC)     Prostate cancer    Depression 5/14/2025    Diabetes mellitus (HCC)     Diverticulitis of colon     History of common carotid artery stent placement     RIGHT stent - per pts son - RIGHT stent  in  carotid artery    History of heart artery stent     x2 stents in heart per pts son    Hyperlipidemia     Hypertension     Migraines     Myocardial infarction (HCC)     about 30yrs ago    Nephrostomy present (HCC)     2/25/25 Per pts son Ed- pt has two nephrostomy tubes    Prostate cancer (HCC)     Seasonal allergies    [2]   Past Surgical History:  Procedure Laterality Date    CARDIAC CATHETERIZATION N/A 5/12/2025    Procedure: Cardiac Catheterization;  Surgeon: Daniel Mabry DO;  Location: [facility];  Service: Cardiology    CARDIAC SURGERY      CAROTID ENDARTARECTOMY Right     thromboendarterectomy    CAROTID STENT Right     CATARACT EXTRACTION Left     COLONOSCOPY      fiberoptic screening 2/6/08 6 yr /  complete 3/15/13 6 yr    CORONARY ANGIOPLASTY WITH STENT PLACEMENT      ELBOW SURGERY      EYE SURGERY      IR BIOPSY LYMPH NODE  12/24/2024    IR NEPHROSTOMY TUBE CHECK/CHANGE/REPOSITION/REINSERTION/UPSIZE  2/25/2025    IR NEPHROSTOMY TUBE CHECK/CHANGE/REPOSITION/REINSERTION/UPSIZE  4/29/2025    IR NEPHROSTOMY TUBE PLACEMENT  12/24/2024    KNEE SURGERY      ID CORONARY ARTERY BYP W/VEIN & ARTERY GRAFT 3 VEIN N/A 5/21/2025    Procedure: CORONARY ARTERY BYPASS GRAFT (CABG) X3 VESSELS, LIMA - LAD, LEFT LEG EVH/SVG - PDA AND OM, W/CHAPO;  Surgeon: David Tomlin DO;  Location:  MAIN OR;  Service: Cardiac Surgery    ID CYSTO W/REMOVAL OF LESIONS SMALL N/A 1/21/2025    Procedure: TRANSURETHRAL RESECTION OF BLADDER TUMOR (TURBT);  Surgeon: Jordan Youssef MD;  Location:  MAIN OR;  Service: Urology    ID CYSTO W/REMOVAL OF LESIONS SMALL N/A 3/6/2025    Procedure: TRANSURETHRAL RESECTION OF BLADDER TUMOR (TURBT);  Surgeon: Jordan Youssef MD;  Location:  MAIN OR;  Service: Urology    PROSTATE SURGERY      SUPRAPUBIC PROSTATECTOMY  12/06/1999    UPPER GASTROINTESTINAL ENDOSCOPY

## 2025-05-22 NOTE — PLAN OF CARE
Problem: PAIN - ADULT  Goal: Verbalizes/displays adequate comfort level or baseline comfort level  Description: Interventions:- Encourage patient to monitor pain and request assistance- Assess pain using appropriate pain scale- Administer analgesics based on type and severity of pain and evaluate response- Implement non-pharmacological measures as appropriate and evaluate response- Consider cultural and social influences on pain and pain management- Notify physician/advanced practitioner if interventions unsuccessful or patient reports new pain  Outcome: Progressing     Problem: INFECTION - ADULT  Goal: Absence or prevention of progression during hospitalization  Description: INTERVENTIONS:- Assess and monitor for signs and symptoms of infection- Monitor lab/diagnostic results- Monitor all insertion sites, i.e. indwelling lines, tubes, and drains- Monitor endotracheal if appropriate and nasal secretions for changes in amount and color- [place] appropriate cooling/warming therapies per order- Administer medications as ordered- Instruct and encourage patient and family to use good hand hygiene technique- Identify and instruct in appropriate isolation precautions for identified infection/condition  Outcome: Progressing  Goal: Absence of fever/infection during neutropenic period  Description: INTERVENTIONS:- Monitor WBC  Outcome: Progressing     Problem: SAFETY ADULT  Goal: Patient will remain free of falls  Description: INTERVENTIONS:- Educate patient/family on patient safety including physical limitations- Instruct patient to call for assistance with activity - Consult OT/PT to assist with strengthening/mobility - Keep Call bell within reach- Keep bed low and locked with side rails adjusted as appropriate- Keep care items and personal belongings within reach- Initiate and maintain comfort rounds- Make Fall Risk Sign visible to staff- Offer Toileting every 2 Hours, in advance of need- Initiate/Maintain bed alarm-  Obtain necessary fall risk management equipment: - Apply yellow socks and bracelet for high fall risk patients- Consider moving patient to room near nurses station  Outcome: Progressing  Goal: Maintain or return to baseline ADL function  Description: INTERVENTIONS:-  Assess patient's ability to carry out ADLs; assess patient's baseline for ADL function and identify physical deficits which impact ability to perform ADLs (bathing, care of mouth/teeth, toileting, grooming, dressing, etc.)- Assess/evaluate cause of self-care deficits - Assess range of motion- Assess patient's mobility; develop plan if impaired- Assess patient's need for assistive devices and provide as appropriate- Encourage maximum independence but intervene and supervise when necessary- Involve family in performance of ADLs- Assess for home care needs following discharge - Consider OT consult to assist with ADL evaluation and planning for discharge- Provide patient education as appropriate  Outcome: Progressing  Goal: Maintains/Returns to pre admission functional level  Description: INTERVENTIONS:- Perform AM-PAC 6 Click Basic Mobility/ Daily Activity assessment daily.- Set and communicate daily mobility goal to care team and patient/family/caregiver. - Collaborate with rehabilitation services on mobility goals if consulted- Perform Range of Motion 3  times a day.- Reposition patient every 2 hours.- Dangle patient 3 times a day- Stand patient 2 times a day- Ambulate patient 2 times a day- Out of bed to chair 3 times a day - Out of bed for meals 3 times a day- Out of bed for toileting- Record patient progress and toleration of activity level   Outcome: Progressing     Problem: DISCHARGE PLANNING  Goal: Discharge to home or other facility with appropriate resources  Description: INTERVENTIONS:- Identify barriers to discharge w/patient and caregiver- Arrange for needed discharge resources and transportation as appropriate- Identify discharge learning  needs (meds, wound care, etc.)- Arrange for interpretive services to assist at discharge as needed- Refer to Case Management Department for coordinating discharge planning if the patient needs post-hospital services based on physician/advanced practitioner order or complex needs related to functional status, cognitive ability, or social support system  Outcome: Progressing     Problem: Knowledge Deficit  Goal: Patient/family/caregiver demonstrates understanding of disease process, treatment plan, medications, and discharge instructions  Description: Complete learning assessment and assess knowledge base.Interventions:- Provide teaching at level of understanding- Provide teaching via preferred learning methods  Outcome: Progressing     Problem: CARDIOVASCULAR - ADULT  Goal: Maintains optimal cardiac output and hemodynamic stability  Description: INTERVENTIONS:- Monitor I/O, vital signs and rhythm- Monitor for S/S and trends of decreased cardiac output- Administer and titrate ordered vasoactive medications to optimize hemodynamic stability- Assess quality of pulses, skin color and temperature- Assess for signs of decreased coronary artery perfusion- Instruct patient to report change in severity of symptoms  INTERVENTIONS:- Monitor I/O, vital signs and rhythm- Monitor for S/S and trends of decreased cardiac output- Administer and titrate ordered vasoactive medications to optimize hemodynamic stability- Assess quality of pulses, skin color and temperature- Assess for signs of decreased coronary artery perfusion- Instruct patient to report change in severity of symptoms  Outcome: Progressing  Goal: Absence of cardiac dysrhythmias or at baseline rhythm  Description: INTERVENTIONS:- Continuous cardiac monitoring, vital signs, obtain 12 lead EKG if ordered- Administer antiarrhythmic and heart rate control medications as ordered- Monitor electrolytes and administer replacement therapy as ordered  INTERVENTIONS:- Continuous  cardiac monitoring, vital signs, obtain 12 lead EKG if ordered- Administer antiarrhythmic and heart rate control medications as ordered- Monitor electrolytes and administer replacement therapy as ordered  Outcome: Progressing     Problem: RESPIRATORY - ADULT  Goal: Achieves optimal ventilation and oxygenation  Description: INTERVENTIONS:- Assess for changes in respiratory status- Assess for changes in mentation and behavior- Position to facilitate oxygenation and minimize respiratory effort- Oxygen administered by appropriate delivery if ordered- Initiate smoking cessation education as indicated- Encourage broncho-pulmonary hygiene including cough, deep breathe, Incentive Spirometry- Assess the need for suctioning and aspirate as needed- Assess and instruct to report SOB or any respiratory difficulty- Respiratory Therapy support as indicated  Outcome: Progressing     Problem: GENITOURINARY - ADULT  Goal: Maintains or returns to baseline urinary function  Description: INTERVENTIONS:- Assess urinary function- Encourage oral fluids to ensure adequate hydration if ordered- Administer IV fluids as ordered to ensure adequate hydration- Administer ordered medications as needed- Offer frequent toileting- Follow urinary retention protocol if ordered  Outcome: Progressing  Goal: Absence of urinary retention  Description: INTERVENTIONS:- Assess patient’s ability to void and empty bladder- Monitor I/O- Bladder scan as needed- Discuss with physician/AP medications to alleviate retention as needed- Discuss catheterization for long term situations as appropriate  Outcome: Progressing  Goal: Urinary catheter remains patent  Description: INTERVENTIONS:- Assess patency of urinary catheter- If patient has a chronic lai, consider changing catheter if non-functioning- Follow guidelines for intermittent irrigation of non-functioning urinary catheter  Outcome: Progressing     Problem: METABOLIC, FLUID AND ELECTROLYTES - ADULT  Goal:  Electrolytes maintained within normal limits  Description: INTERVENTIONS:- Monitor labs and assess patient for signs and symptoms of electrolyte imbalances- Administer electrolyte replacement as ordered- Monitor response to electrolyte replacements, including repeat lab results as appropriate- Instruct patient on fluid and nutrition as appropriate  Outcome: Progressing  Goal: Fluid balance maintained  Description: INTERVENTIONS:- Monitor labs - Monitor I/O and WT- Instruct patient on fluid and nutrition as appropriate- Assess for signs & symptoms of volume excess or deficit  Outcome: Progressing  Goal: Glucose maintained within target range  Description: INTERVENTIONS:- Monitor Blood Glucose as ordered- Assess for signs and symptoms of hyperglycemia and hypoglycemia- Administer ordered medications to maintain glucose within target range- Assess nutritional intake and initiate nutrition service referral as needed  Outcome: Progressing     Problem: Prexisting or High Potential for Compromised Skin Integrity  Goal: Skin integrity is maintained or improved  Description: INTERVENTIONS:- Identify patients at risk for skin breakdown- Assess and monitor skin integrity- Assess and monitor nutrition and hydration status- Monitor labs - Assess for incontinence - Turn and reposition patient- Assist with mobility/ambulation- Relieve pressure over bony prominences- Avoid friction and shearing- Provide appropriate hygiene as needed including keeping skin clean and dry- Evaluate need for skin moisturizer/barrier cream- Collaborate with interdisciplinary team - Patient/family teaching- Consider wound care consult   Outcome: Progressing     Problem: Nutrition/Hydration-ADULT  Goal: Nutrient/Hydration intake appropriate for improving, restoring or maintaining nutritional needs  Description: Monitor and assess patient's nutrition/hydration status for malnutrition. Collaborate with interdisciplinary team and initiate plan and  interventions as ordered.  Monitor patient's weight and dietary intake as ordered or per policy. Utilize nutrition screening tool and intervene as necessary. Determine patient's food preferences and provide high-protein, high-caloric foods as appropriate.     INTERVENTIONS:  - Monitor oral intake, urinary output, labs, and treatment plans  - Assess nutrition and hydration status and recommend course of action  - Evaluate amount of meals eaten  - Assist patient with eating if necessary   - Allow adequate time for meals  - Recommend/ encourage appropriate diets, oral nutritional supplements, and vitamin/mineral supplements  - Order, calculate, and assess calorie counts as needed  - Recommend, monitor, and adjust tube feedings and TPN/PPN based on assessed needs  - Assess need for intravenous fluids  - Provide specific nutrition/hydration education as appropriate  - Include patient/family/caregiver in decisions related to nutrition  Outcome: Progressing

## 2025-05-22 NOTE — RESTORATIVE TECHNICIAN NOTE
Restorative Technician Note      Patient Name: Kilo Mix     Restorative Tech Visit Date: 05/22/25  Note Type: Mobility  Patient Position Upon Consult: Bedside chair  Activity Performed: Ambulated  Assistive Device: Roller walker  Patient Position at End of Consult: Bed/Chair alarm activated; All needs within reach; Bedside chair               Bi-Rhombic Flap Text: The defect edges were debeveled with a #15 scalpel blade.  Given the location of the defect and the proximity to free margins a bi-rhombic flap was deemed most appropriate.  Using a sterile surgical marker, an appropriate rhombic flap was drawn incorporating the defect. The area thus outlined was incised deep to adipose tissue with a #15 scalpel blade.  The skin margins were undermined to an appropriate distance in all directions utilizing iris scissors.

## 2025-05-22 NOTE — PLAN OF CARE
Problem: PHYSICAL THERAPY ADULT  Goal: Performs mobility at highest level of function for planned discharge setting.  See evaluation for individualized goals.  Description: Treatment/Interventions: Functional transfer training, LE strengthening/ROM, Therapeutic exercise, Endurance training, Elevations, Bed mobility, Gait training, Equipment eval/education, Patient/family training  Equipment Recommended: Walker       See flowsheet documentation for full assessment, interventions and recommendations.  Note: Prognosis: Good  Problem List: Decreased strength, Decreased endurance, Impaired balance, Decreased mobility, Decreased coordination, Decreased cognition, Impaired judgement, Decreased safety awareness, Pain  Assessment: Pt seen for physical therapy evaluation, portion of which was performed as a co-evaluation w/ OT due to concerns re: medical stability.  PT portion of evaluation focused on mobility assessment where OT portion focused more on ADLs, self care.  Pt is an 83 y/o male w/ history/comorbidities of DM II, HTN, PVD, prostate CA, bladder tumor, MI who is now admitted w/ chest pain, worse w/ activity.  Dx include NSTEMI, R pneumonia, CAD.  Pt underwent CABG x 3, performed 5/21/25.  Due to acute medical issues, acute procedure, need for ICU monitoring on P3, need for supplimental O2, pain, fall risk, note unstable clinical picture.  PT consulted to assess post op mobility, d/c needs.  Pt presents w/ decreased functional mob, standing balance, endurance, B LE strength, barriers at home.  Pt will benefit from skilled PT to correct for the above problems.  Family has expressed interest in rehab at d/c- consider Level II (moderate) post acute care therapy needs at d/c vs progression to home w/ Level III (minimal) based on progress.        Rehab Resource Intensity Level, PT: (S)  (Level II vs Level III based on progress)    See flowsheet documentation for full assessment.

## 2025-05-22 NOTE — PHYSICAL THERAPY NOTE
Physical Therapy Evaluation    Patient's Name: Kilo Mix    Admitting Diagnosis  Chest pain [R07.9]  NSTEMI (non-ST elevated myocardial infarction) ([facility]) [I21.4]    Problem List  Problem List[1]    Past Medical History  Past Medical History[2]    Past Surgical History  Past Surgical History[3]       05/22/25 0900   PT Last Visit   PT Visit Date 05/22/25   Note Type   Note type Evaluation   Pain Assessment   Pain Assessment Tool 0-10   Pain Score 5   Pain Location/Orientation Location: Chest   Patient's Stated Pain Goal No pain   Hospital Pain Intervention(s) Ambulation/increased activity   Restrictions/Precautions   Weight Bearing Precautions Per Order No   Other Precautions Cognitive;Chair Alarm;Bed Alarm;Multiple lines;Telemetry;Fall Risk;Pain;O2  (2 L O2.  chair alarm re-armed post session)   Home Living   Type of Home House   Additional Comments Resides w/ wife in 1 story home, 8-[address].  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  ambulates w/ out device.   Prior Function   Level of [place] Independent with functional mobility;Independent with ADLs   Falls in the last 6 months 0   General   Family/Caregiver Present No   Cognition   Overall Cognitive Status Impaired   Arousal/Participation Responsive   Attention Attends with cues to redirect   Orientation Level Oriented X4   Memory Unable to assess   Following Commands Follows one step commands with increased time or repetition   Subjective   Subjective states he feels OK.  some pain, fatigue   RLE Assessment   RLE Assessment   (strength grossly 4-/5- assessed w/ mobility)   LLE Assessment   LLE Assessment   (strength grossly 4-/5- assessed w/ mobility)   Coordination   Movements are Fluid and Coordinated 0   Coordination and Movement Description mild ataxia   Transfers   Sit to Stand 4  Minimal assistance   Additional items Assist x 1;Increased time required;Verbal cues;Impulsive   Stand to Sit 4  Minimal assistance   Additional items Assist x 1;Increased time  required;Impulsive;Verbal cues   Ambulation/Elevation   Gait pattern   (slow, wide CASH, forward flexion, mild ataxia)   Gait Assistance 4  Minimal assist   Additional items Assist x 1  (w/ 2 others for lines, chair follow)   Assistive Device Rolling walker   Distance 40'x2 w/ standing rest   Balance   Static Sitting Fair   Dynamic Sitting Fair -   Static Standing Poor +   Dynamic Standing Poor +   Ambulatory Poor +   Endurance Deficit   Endurance Deficit Yes   Endurance Deficit Description fatigue, weakness, pain   Activity Tolerance   Activity Tolerance Patient tolerated treatment well;Patient limited by fatigue;Patient limited by pain   Nurse Made Aware yes   Assessment   Prognosis Good   Problem List Decreased strength;Decreased endurance;Impaired balance;Decreased mobility;Decreased coordination;Decreased cognition;Impaired judgement;Decreased safety awareness;Pain   Assessment Pt seen for physical therapy evaluation, portion of which was performed as a co-evaluation w/ OT due to concerns re: medical stability.  PT portion of evaluation focused on mobility assessment where OT portion focused more on ADLs, self care.  Pt is an 83 y/o male w/ history/comorbidities of DM II, HTN, PVD, prostate CA, bladder tumor, MI who is now admitted w/ chest pain, worse w/ activity.  Dx include NSTEMI, R pneumonia, CAD.  Pt underwent CABG x 3, performed 5/21/25.  Due to acute medical issues, acute procedure, need for ICU monitoring on P3, need for supplimental O2, pain, fall risk, note unstable clinical picture.  PT consulted to assess post op mobility, d/c needs.  Pt presents w/ decreased functional mob, standing balance, endurance, B LE strength, barriers at home.  Pt will benefit from skilled PT to correct for the above problems.  Family has expressed interest in rehab at d/c- consider Level II (moderate) post acute care therapy needs at d/c vs progression to home w/ Level III (minimal) based on progress.   Goals   Patient  Goals to go home   STG Expiration Date 06/05/25   Short Term Goal #1 1-2 wks: bed mob and transfers w/ indep, standing balance to good/normal w/ device, ambulate 200-300 ft w/ RW and S/mod I, increase B LE strength by 1/2 -1 grade, ambulate 5-9 stairs w/ S   PT Treatment Day 0   Plan   Treatment/Interventions Functional transfer training;LE strengthening/ROM;Therapeutic exercise;Endurance training;Elevations;Bed mobility;Gait training;Equipment eval/education;Patient/family training   PT Frequency 4-6x/wk   Discharge Recommendation   Rehab Resource Intensity Level, PT (S)    (Level II vs Level III based on progress)   Equipment Recommended Walker   Walker Package Recommended Wheeled walker   Change/add to Walker Package? No   AM-PAC Basic Mobility Inpatient   Turning in Flat Bed Without Bedrails 3   Lying on Back to Sitting on Edge of Flat Bed Without Bedrails 3   Moving Bed to Chair 3   Standing Up From Chair Using Arms 3   Walk in Room 3   Climb 3-5 Stairs With Railing 3   Basic Mobility Inpatient Raw Score 18   Basic Mobility Standardized Score 41.05   [facility] Highest Level Of Mobility   -HLM Goal 6: Walk 10 steps or more   -HLM Achieved 7: Walk 25 feet or more         Yoel Harris PT, DPT, GCS, CSRS        [1]   Patient Active Problem List  Diagnosis    Adrenal cortical adenoma    Coronary artery disease involving native coronary artery    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)    Diabetic polyneuropathy (HCC)    Diastolic dysfunction    Dyslipidemia    Recurrent stenosis of right carotid artery    Hypertension    Lower back pain    Peripheral vascular disease (HCC)    Sinus bradycardia    Asymptomatic stenosis of left carotid artery    Prostate cancer (HCC)    Plantar fasciitis of right foot    Personal history of colonic polyps    Schatzki's ring    Dysphagia    Antiplatelet or antithrombotic long-term use    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HCC)     Primary osteoarthritis of left shoulder    Traumatic complete tear of right rotator cuff    AMD (age related macular degeneration)    Hydronephrosis    Acute kidney injury (HCC)    CKD stage 3 due to type 2 diabetes mellitus (HCC)    NSTEMI (non-ST elevated myocardial infarction) (HCC)    H/O insertion of nephrostomy tube    Pneumonia involving right lung    Preoperative clearance    ALPHONSO (obstructive sleep apnea)    Former smoker    At risk for delirium    Depression    Frailty    Cognitive impairment    Hx of acute poliomyelitis    Restrictive lung disease    Moderate protein-calorie malnutrition (HCC)    S/P CABG x 3    RBBB    Leukocytosis   [2]   Past Medical History:  Diagnosis Date    Bladder tumor     Cancer (HCC)     Prostate cancer    Depression 5/14/2025    Diabetes mellitus (HCC)     Diverticulitis of colon     History of common carotid artery stent placement     RIGHT stent - per pts son - RIGHT stent  in  carotid artery    History of heart artery stent     x2 stents in heart per pts son    Hyperlipidemia     Hypertension     Migraines     Myocardial infarction (HCC)     about 30yrs ago    Nephrostomy present (HCC)     2/25/25 Per pts son Ed- pt has two nephrostomy tubes    Prostate cancer (HCC)     Seasonal allergies    [3]   Past Surgical History:  Procedure Laterality Date    CARDIAC CATHETERIZATION N/A 5/12/2025    Procedure: Cardiac Catheterization;  Surgeon: Daniel Mabry DO;  Location: [facility];  Service: Cardiology    CARDIAC SURGERY      CAROTID ENDARTARECTOMY Right     thromboendarterectomy    CAROTID STENT Right     CATARACT EXTRACTION Left     COLONOSCOPY      fiberoptic screening 2/6/08 5 yr / complete 3/15/13 5 yr    CORONARY ANGIOPLASTY WITH STENT PLACEMENT      ELBOW SURGERY      EYE SURGERY      IR BIOPSY LYMPH NODE  12/24/2024    IR NEPHROSTOMY TUBE CHECK/CHANGE/REPOSITION/REINSERTION/UPSIZE  2/25/2025    IR NEPHROSTOMY TUBE  CHECK/CHANGE/REPOSITION/REINSERTION/UPSIZE  4/29/2025    IR NEPHROSTOMY TUBE PLACEMENT  12/24/2024    KNEE SURGERY      UT CYSTO W/REMOVAL OF LESIONS SMALL N/A 1/21/2025    Procedure: TRANSURETHRAL RESECTION OF BLADDER TUMOR (TURBT);  Surgeon: Jordan Youssef MD;  Location:  MAIN OR;  Service: Urology    UT CYSTO W/REMOVAL OF LESIONS SMALL N/A 3/6/2025    Procedure: TRANSURETHRAL RESECTION OF BLADDER TUMOR (TURBT);  Surgeon: Jordan Youssef MD;  Location:  MAIN OR;  Service: Urology    PROSTATE SURGERY      SUPRAPUBIC PROSTATECTOMY  12/06/1999    UPPER GASTROINTESTINAL ENDOSCOPY

## 2025-05-22 NOTE — PROGRESS NOTES
Progress Note - Cardiac Surgery   Kilo Mix 84 y.o. male MRN: [id number]  [facility]: [id number]    Coronary artery disease. S/P coronary artery bypass grafting; POD # 2      24 Hour Events: Transferred from ICU to telemetry. Has been transitioned to SC insulin. C/O dysphagia.     Medications:   Scheduled Meds:  Current Facility-Administered Medications   Medication Dose Route Frequency Provider Last Rate    acetaminophen  650 mg Rectal Q4H PRN Alicia Bender PA-C      amiodarone  200 mg Oral Q8H [facility] Alicia Bender PA-C      amLODIPine  10 mg Oral Daily Ramy Vallejo MD      aspirin  325 mg Oral Daily Alicia Bender PA-C      atorvastatin  80 mg Oral Daily With Dinner Alicia Bender PA-C      bisacodyl  10 mg Rectal Daily PRN Alicia Bender PA-C      chlorhexidine  15 mL Mouth/Throat BID Alicia Bender PA-C      docusate sodium  100 mg Oral BID Alicia Bender PA-C      fondaparinux  2.5 mg Subcutaneous Daily Alicia Bender PA-C      furosemide  40 mg Intravenous Daily Alicia Bender PA-C      insulin lispro  1-5 Units Subcutaneous HS Alicia Bender PA-C      insulin lispro  1-5 Units Subcutaneous TID  Alicia Bender PA-C      metoprolol tartrate  12.5 mg Oral Q12H [facility] Alicia Bender PA-C      mupirocin  1 Application Nasal Q12H [facility] Alicia Bender PA-C      oxyCODONE  2.5 mg Oral Q4H PRN Alicia Bender PA-C      Or    oxyCODONE  5 mg Oral Q4H PRN Alicia Bender PA-C      pantoprazole  40 mg Oral Daily Alicia Bender PA-C      polyethylene glycol  17 g Oral Daily Alicia Bender PA-C      potassium chloride  20 mEq Oral Daily Alicia Bender PA-C      temazepam  15 mg Oral HS PRN Alicia Bender PA-C       Continuous Infusions:   PRN Meds:.  acetaminophen    bisacodyl    oxyCODONE **OR** oxyCODONE    temazepam    Vitals:   Vitals:    05/23/25 0000 05/23/25 0410 05/23/25 0620 05/23/25 0705   BP: 111/57 131/70  125/72   BP Location:  Right arm  Right  arm   Pulse: 92 88     Resp: 20 20     Temp: 97.9 °F (36.6 °C)   97.7 °F (36.5 °C)   TempSrc: Oral   Oral   SpO2: 94% 91%     Weight:   72.2 kg (159 lb 2.8 oz)    Height:           Telemetry: NSR; Heart Rate: 82    Respiratory:   SpO2: SpO2: 91 %, SpO2 Activity: SpO2 Activity: At Rest; 2 LPM    Intake/Output:     Intake/Output Summary (Last 24 hours) at 5/23/2025 0715  Last data filed at 5/23/2025 0437  Gross per 24 hour   Intake 312.71 ml   Output 1220 ml   Net -907.29 ml        Weights:   Weight (last 2 days)       Date/Time Weight    05/23/25 0620 72.2 (159.17)    05/22/25 0543 73.3 (161.6)    05/21/25 0536 70.1 (154.54)            Chest tube Output:    Mediastinal tubes: 60  mL/8 hours  180 mL/24 hours   Pleural tubes: 20 mL/8 hours  65 mL/24 hours       Results:   Results from last 7 days   Lab Units 05/23/25 0429 05/22/25 0408 05/21/25 2230 05/21/25  1345 05/21/25  1342 05/21/25  0905 05/21/25  0455   WBC Thousand/uL 18.44* 17.51*  --   --   --   --  8.71   HEMOGLOBIN g/dL 10.0* 11.8* 10.2*  --  9.7*  --  12.6   I STAT HEMOGLOBIN   --   --   --    < >  --    < >  --    HEMATOCRIT % 30.4* 30.2* 30.3*  --  28.6*  --  37.4   HEMATOCRIT, ISTAT   --   --   --    < >  --    < >  --    PLATELETS Thousands/uL 185 365  --   --  162   < > 294    < > = values in this interval not displayed.     Results from last 7 days   Lab Units 05/23/25 0429 05/22/25 0408 05/21/25 2230 05/21/25  1708 05/21/25  1345 05/21/25  1342   SODIUM mmol/L 140 139  --   --   --  138   POTASSIUM mmol/L 4.2 4.1 4.4   < >  --  4.0   CHLORIDE mmol/L 103 106  --   --   --  106   CO2 mmol/L 25 24  --   --   --  23   CO2, I-STAT mmol/L  --   --   --   --  23  --    BUN mg/dL 28* 22  --   --   --  21   CREATININE mg/dL 1.20 1.12  --   --   --  1.05   GLUCOSE, ISTAT mg/dl  --   --   --   --  166*  --    CALCIUM mg/dL 9.6 9.1  --   --   --  8.4    < > = values in this interval not displayed.     Recent Labs     05/23/25  0429   MG 2.3     Results  from last 7 days   Lab Units 05/20/25  0347 05/19/25  0454 05/18/25  0541   PTT seconds 76* 78* 85*         Point of care glucose: 155-166    Studies:  No new studies    Invasive Lines/Tubes:  Invasive Devices       Central Venous Catheter Line  Duration             CVC Central Lines 05/21/25 1 day              Line  Duration             Pacer Wires 1 day    Pacer Wires 1 day              Drain  Duration             Nephrostomy Left 10.2 Fr. 23 days    Nephrostomy Right 10.2 Fr. 23 days    Chest Tube 1 Left Pleural 32 Fr. 1 day    Chest Tube 2 Mediastinal;Posterior 24 Fr. 1 day    Chest Tube 3 Mediastinal;Anterior 32 Fr. 1 day                    Physical Exam:    General: No acute distress, Alert, and Normal appearance  HEENT/NECK:  Normocephalic. Atraumatic.  No jugular venous distention.    Cardiac: Regular rate and rhythm and No murmurs/rubs/gallops  Pulmonary:  Breath sounds diminished in the bases bilaterally  and No rales/rhonchi/wheezes  Abdomen:  Non-tender, Non-distended, and Normal bowel sounds  Incisions: Sternum is stable.  Incision dressed with Acticoat.  No erythema or drainage and Saphenectomy incision dressed with Acticoat.  No erythema or drainage  Extremities: Extremities warm/dry and 1+ edema B/L  Neuro: Alert and oriented X 3, Sensation is grossly intact, and No focal deficits  Skin: Warm/Dry, without rashes or lesions.    Assessment:  Principal Problem:    NSTEMI (non-ST elevated myocardial infarction) ([facility])  Active Problems:    Coronary artery disease involving native coronary artery    Diabetes mellitus type 2 with neurological manifestations ([facility])    Dyslipidemia    Hypertension    Peripheral vascular disease ([facility])    Asymptomatic stenosis of left carotid artery    Prostate cancer ([facility])    H/O insertion of nephrostomy tube    Pneumonia involving right lung    Preoperative clearance    ALPHONSO (obstructive sleep apnea)    Former smoker    At risk for delirium    Depression    Frailty    Cognitive  impairment    Hx of acute poliomyelitis    Restrictive lung disease    Moderate protein-calorie malnutrition (HCC)    S/P CABG x 3    RBBB    Leukocytosis       Coronary artery disease. S/P coronary artery bypass grafting; POD # 2    Plan:    Cardiac:     S/P acute preoperative NSTEMI  Normal ventricular systolic function, EF 65%    Carotid stenosis (s/p R CEA, 50-69% LICA stenosis)    NSR; BP well-controlled  Tachycardic    Increase to Lopressor, 25mg PO BID  D/C Norvasc    Hold ACE inhibitor/ARB secondary to up titration of beta blocker    AF prophylaxis  Continue Amiodarone, 200 mg PO TID    Continue ASA and Statin therapy    Epicardial pacing wires no longer required.  Remove today    Maintain central IV access today for lack of peripheral access    Continue Subcutaneous Heparin for DVT prophylaxis    Pulmonary:     Acute post-op pulmonary insufficiency; Requiring 2 Liters via nasal cannula, secondary to poor inspiratory effort secondary to pain. Continue incentive spirometry/coughing/deep breathing exercises.  Wean supplemental oxygen as tolerated for saturation > 90%    Chest tube drainage diminished; D/C today    Renal:     Normal preoperative renal function  Creatinine 1.2 today, from 1.12  S/P b/l nephrostomy tubes (2/2 tumor obstruction)    Intake/Output net: -  1000 mL/24 hours    Wt today 159, from 161   Pre-op Wt: 159    Diuretic Regimen:  Transition to  PO Torsemide, 20 mg QD  Continue Potassium Chloride 20 mEq PO QD    Neuro:    Neurologically intact; No active issues     Incisional pain well controlled   Continue oxycodone, 2.5 to 5 mg PO q 4 hours prn pain    GI:    Cardiac diet, with 1800 mL fluid restriction    Post-operative dysphagia,  Questionable history of esophageal stricture (with Dilation) Continue NPO, pending speech therapy recommendations for advancement    Tolerating diet without complaint    Continue stool softeners and prn suppository    Continue GI prophylaxis    Endo:     Pre-Op  Hgb A1C: 5.6    Patient has been transitioned from continuous insulin infusion to intermittent subcutaneous dosing  Serum glucose well controlled on insulin sliding scale coverage    7    Hematology:   H/O prostate ca (s/p prostatectomy, recurrent mets to lymph nodes, stage IV    Post-operative blood count acceptable; Trend prn  Leukocytosis; Afebrile with no signs of infection;     CBC in AM    8.   Disposition:        Following daily PT/OT recommendations regarding home vs. rehab when medically cleared for discharge  Routine postoperative recovery to this point; Anticipated discharge date: 5/25      VTE Pharmacologic Prophylaxis: Heparin  VTE Mechanical Prophylaxis: sequential compression device    Collaborative rounds completed with supervising physician  Plan of care discussed with bedside nurse    SIGNATURE: Juan Daniel Loyd PA-C  DATE: May 23, 2025  TIME: 7:15 AM

## 2025-05-22 NOTE — PLAN OF CARE
Problem: OCCUPATIONAL THERAPY ADULT  Goal: Performs self-care activities at highest level of function for planned discharge setting.  See evaluation for individualized goals.  Description: Treatment Interventions: ADL retraining, Functional transfer training, Endurance training, Cognitive reorientation, Patient/family training, Continued evaluation, Energy conservation, Activityengagement          See flowsheet documentation for full assessment, interventions and recommendations.   Outcome: Progressing  Note: Limitation: Decreased ADL status, Decreased Safe judgement during ADL, Decreased cognition, Decreased endurance, Decreased self-care trans, Decreased high-level ADLs  Prognosis: Fair  Assessment: Pt is a 84 y.o. male who was admitted to [facility] on 5/10/2025 with NSTEMI (non-ST elevated myocardial infarction) (HCC), now s/p CABG x 3. Pt seen for an OT re-evaluation per active OT orders, CT in place.  Pt  has a past medical history of Bladder tumor, Cancer (HCC), Depression, Diabetes mellitus (HCC), Diverticulitis of colon, History of common carotid artery stent placement, History of heart artery stent, Hyperlipidemia, Hypertension, Migraines, Myocardial infarction (HCC), Nephrostomy present (HCC), Prostate cancer (HCC), and Seasonal allergies. Please see IE for home setup and PLOF. Currently, pt is Min Ax1 for UB ADL, Mod Ax1 for LB ADL, and completed transfers/FM w Min Ax1 with RW. Pt currently presents with impairments in the following categories -steps to enter environment, difficulty performing ADLS, and limited insight into deficits activity tolerance, endurance, memory, insight, safety , judgement , and attention . These impairments, as well as pt's fatigue, SOB, FUNES, cardiac/sternal precautions, and risk for falls  limit pt's ability to safely engage in all baseline areas of occupation, includinggrooming, bathing, dressing, toileting, and functional mobility/transfers.  The patient's raw  score on the AM-PAC Daily Activity Inpatient Short Form is 16. A raw score of less than 19 suggests the patient may benefit from discharge to post-acute rehabilitation services. Please refer to the recommendation of the Occupational Therapist for safe discharge planning. Pt would benefit from continued acute OT services throughout hospital course and following D/C. Plan for OT interventions 2-3x per week. From OT standpoint, recommend level II services vs level III services pending progress and support at home upon D/C. Pt was left seated in bedside chair with chair alarm on and all needs within reach.     Rehab Resource Intensity Level, OT: II (Moderate Resource Intensity) (vs level III pending progress and level of support at home)

## 2025-05-22 NOTE — PLAN OF CARE
Problem: PAIN - ADULT  Goal: Verbalizes/displays adequate comfort level or baseline comfort level  Description: Interventions:- Encourage patient to monitor pain and request assistance- Assess pain using appropriate pain scale- Administer analgesics based on type and severity of pain and evaluate response- Implement non-pharmacological measures as appropriate and evaluate response- Consider cultural and social influences on pain and pain management- Notify physician/advanced practitioner if interventions unsuccessful or patient reports new pain  Outcome: Progressing     Problem: INFECTION - ADULT  Goal: Absence or prevention of progression during hospitalization  Description: INTERVENTIONS:- Assess and monitor for signs and symptoms of infection- Monitor lab/diagnostic results- Monitor all insertion sites, i.e. indwelling lines, tubes, and drains- Monitor endotracheal if appropriate and nasal secretions for changes in amount and color- [place] appropriate cooling/warming therapies per order- Administer medications as ordered- Instruct and encourage patient and family to use good hand hygiene technique- Identify and instruct in appropriate isolation precautions for identified infection/condition  Outcome: Progressing  Goal: Absence of fever/infection during neutropenic period  Description: INTERVENTIONS:- Monitor WBC  Outcome: Progressing     Goal: Maintain or return to baseline ADL function  Description: INTERVENTIONS:-  Assess patient's ability to carry out ADLs; assess patient's baseline for ADL function and identify physical deficits which impact ability to perform ADLs (bathing, care of mouth/teeth, toileting, grooming, dressing, etc.)- Assess/evaluate cause of self-care deficits - Assess range of motion- Assess patient's mobility; develop plan if impaired- Assess patient's need for assistive devices and provide as appropriate- Encourage maximum independence but intervene and supervise when necessary-  Involve family in performance of ADLs- Assess for home care needs following discharge - Consider OT consult to assist with ADL evaluation and planning for discharge- Provide patient education as appropriate  Outcome: Progressing      Problem: DISCHARGE PLANNING  Goal: Discharge to home or other facility with appropriate resources  Description: INTERVENTIONS:- Identify barriers to discharge w/patient and caregiver- Arrange for needed discharge resources and transportation as appropriate- Identify discharge learning needs (meds, wound care, etc.)- Arrange for interpretive services to assist at discharge as needed- Refer to Case Management Department for coordinating discharge planning if the patient needs post-hospital services based on physician/advanced practitioner order or complex needs related to functional status, cognitive ability, or social support system  Outcome: Progressing     Problem: Prexisting or High Potential for Compromised Skin Integrity  Goal: Skin integrity is maintained or improved  Description: INTERVENTIONS:- Identify patients at risk for skin breakdown- Assess and monitor skin integrity- Assess and monitor nutrition and hydration status- Monitor labs - Assess for incontinence - Turn and reposition patient- Assist with mobility/ambulation- Relieve pressure over bony prominences- Avoid friction and shearing- Provide appropriate hygiene as needed including keeping skin clean and dry- Evaluate need for skin moisturizer/barrier cream- Collaborate with interdisciplinary team - Patient/family teaching- Consider wound care consult   Outcome: Progressing

## 2025-05-22 NOTE — PROGRESS NOTES
"Cardiology Progress Note - Kilo Mix 84 y.o. male MRN: [id number]    Unit/Bed#: PPHP-312-01 Encounter: [id number]      Assessment:  Principal Problem:    NSTEMI (non-ST elevated myocardial infarction) (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  He is out of bed to a chair.  He is in sinus rhythm on telemetry.  He is on IV nicardipine and insulin.  He is on supplemental oxygen.  Chest tubes in place.  He has no complaint.  He is on intravenous diuretic.  He continues with epicardial wires in place.  BMP with potassium of 4.1 and creatinine of 1.12.  Hemoglobin 11.8.  Patient's VS are stable.    Subjective:   Patient seen and examined.  No significant events overnight.  negative.    Objective:     Vitals: Blood pressure 111/56, pulse 80, temperature 97.8 °F (36.6 °C), temperature source Oral, resp. rate (!) 27, height 5' 8\" (1.727 m), weight 73.3 kg (161 lb 9.6 oz), SpO2 98%., Body mass index is 24.57 kg/m².,   Orthostatic Blood Pressures      Flowsheet Row Most Recent Value   Blood Pressure 111/56 filed at 05/22/2025 0804   Patient Position - Orthostatic VS Lying filed at 05/22/2025 0400        ,      Intake/Output Summary (Last 24 hours) at 5/22/2025 0912  Last data filed at 5/22/2025 0636  Gross per 24 hour   Intake 4230.45 ml   Output 4455 ml   Net -224.55 ml "       No significant arrhythmias seen on telemetry review.       Physical Exam:    GEN: Kilo Mix appears well, alert and oriented x 3, pleasant and cooperative   NECK: supple, no JVD or HJR  HEART: normal rate, regular rhythm, normal S1 and S2, no murmurs, clicks, gallops or rubs   LUNGS: clear to auscultation bilaterally   ABDOMEN: no distention  EXTREMITIES: edema  SKIN: warm and well perfused, no suspicious lesions on exposed skin    Labs & Results:    No results displayed because visit has over 200 results.          XR chest portable ICU  Result Date: 5/21/2025  Narrative: XR CHEST PORTABLE ICU INDICATION: S/P open heart. COMPARISON: 5/10/2025 FINDINGS: Tip of endotracheal tube is 3.5 cm above donna. Olpe-Philip line tip in the region of main pulmonary artery. Right IJ central venous catheter tip projects over the SVC. Left chest tube and mediastinal drains noted. There is some patchy infiltrate in the right infrahilar area. This is less pronounced than on the prior study. Somewhat low lung volumes. No pneumothorax or pleural effusion. Evidence of CABG. Heart size normal. Aorta is atherosclerotic Bones are unremarkable for age. Normal upper abdomen.     Impression: Diminished infiltrate pattern in the right lung base compared with the prior study. Expected postop changes as noted above with lines and tubes appearing satisfactory Workstation performed: [id number]      bedside procedure  Result Date: 5/21/2025  Narrative: [id number]    Complete PFT with post bronchodilator  Result Date: 5/16/2025  Narrative: Images from the original result were not included. Pulmonary Function Test Report Kilo Mix 84 y.o. male MRN: [id number] Date Performed: 5/15/2025 Date Interpretation: 5/15/2025 Interpretation: Normal spirometry. No post bronchodilator response. Moderate restriction as indicated by the lung volumes. Mild decrease corrected diffusion capacity. Flow-volume loop suggestive of  a fixed obstruction, clinical correlation advised. Daria Bermudez MD [facility] Attestation I agree with interpretation as above.            Board Certified Pulmonary Physician    VAS carotid complete study  Result Date: 5/13/2025  Narrative: [facility] . AMBER HAND is a 84 year old male who was referred by LIAT ARMSTRONG.      Indications:  Patient presents for surgical clearance and general health evaluation secondary to future open heart surgery.  Patient is S/P MI.  Operative History:  05/12/2025 Cardiac catheterization.  Right Common carotid artery/stent.  Coronary angioplasty/stent  Risk Factors:  The patient reports diabetes, CAD, hyperlipidemia, PVD, prostate cancer, bladder tumor, and prior smoking: quit >10yrs ago. Height: 68 inches. Weight: 159 lbs.   Clinical:  Right: IV site. Left BP: 143/66  FINDINGS: Main                                         Segment Impression  PSV  EDV  Ratio  Direction of Flow Right                                                                                            Prox CCA                                                  52   16                             Mid CCA                                                   78   24                             Right Distal Common Carotid Stent                         80   26                             Dist CCA                                                  80   26                             Prox. ICA                                                 93   22   1.18                      Mid. ICA                                                  84   24   1.08                      Dist. ICA                                                 83   24   1.06                      Ext Carotid                             Severe stenosis  499   31                             Prox Vert.                                                26    4                 Antegrade    Right Subclavian  Artery                                   88    0                          Left                                                                                             Prox CCA                                                  60   14                             Mid CCA                                                   75   19                             Dist CCA                                                  73   17                             Carotid Bifurcation                                       75   24   1.00                      Prox. ICA                                                179   57   2.37                      Mid. ICA                                                  80   26   1.06                      Dist. ICA                                                 73   17   0.97                      Ext Carotid                                               96   16                             Prox Vert                                                 86   20                 Antegrade    Left Subclavian Artery                                   172    0                          CONCLUSION:  RIGHT: There is <50% stenosis noted in the internal carotid artery. The common carotid artery/bulb stent is patent. Plaque is homogenous and smooth.  A severe stenosis was identified in the proximal external carotid artery. Vertebral artery flow is antegrade.  There is no significant subclavian artery disease.   LEFT: There is 50-69% stenosis noted in the internal carotid artery.  Plaque is heterogenous and irregular.  Vertebral artery flow is antegrade.  There is no significant subclavian artery disease.   In comparison to the study of 11-, there is no significant B/L interval change in the disease process.  Recommend repeat testing in 6 months as per protocol unless otherwise indicated.  SIGNATURE Signed by LISANDRA QUINTEROS DO on 2025-05-13 17:01:05  [email or web address]    CT chest wo contrast  Result Date: 5/13/2025  Narrative: CT CHEST WITHOUT IV CONTRAST INDICATION: pre op CABG. COMPARISON: Two-view chest 5/10/2025. PET/CT 2/19/2025.l TECHNIQUE: CT examination of the chest was performed without intravenous contrast. Multiplanar 2D reformatted images were created from the source data. This examination, like all CT scans performed in the [facility], was performed utilizing techniques to minimize radiation dose exposure, including the use of iterative reconstruction and automated exposure control. Radiation dose length product (DLP) for this visit: 441.77 mGy-cm. FINDINGS: LUNGS: Persistent focal nodular consolidative and groundglass opacities throughout the right middle lobe and right lower lobe in keeping with multi lobar pneumonia. PLEURA: Unremarkable. HEART/GREAT VESSELS: Borderline heart size. Dense coronary artery calcifications. Aortic annulus calcifications. No thoracic aortic aneurysm. MEDIASTINUM AND ROVERTO: Unremarkable. CHEST WALL AND LOWER NECK: Unremarkable. VISUALIZED STRUCTURES IN THE UPPER ABDOMEN: Cholelithiasis. 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 Atrophic right kidney. Simple appearing renal cyst. OSSEOUS STRUCTURES: No acute fracture or destructive osseous lesion.     Impression: Persistent right middle lobe and right lower lobe pneumonia. The study was marked in EPIC for immediate notification. Workstation performed: [id number]     VAS lower limb vein mapping bypass graft  Result Date: 5/13/2025  Narrative: [facility] . AMBER HAND is a 84 year old male who was referred by LIAT ARMSTRONG.      Indications:  Patient presents for surgical clearance and general health evaluation secondary to future open-heart surgery.  Patient is S/P MI. Evaluation for conduit. Operative History:  05/12/2025 Cardiac catheterization.  Right Common carotid artery/stent. Coronary angioplasty/stent Risk Factors:  The patient reports diabetes, CAD, hyperlipidemia, PVD, prostate cancer, bladder tumor, and prior smoking: quit >10yrs ago. Height: 68 inches. Weight: 159 lbs. Clinical:  Left BP: 143/66 FINDINGS: Main                                          AP Diam (mm) Right Superficial Veins                                 Right Great Saphenous - Inguinal               6.7    Right Great Saphenous - Prox Thigh             2.1    Right Great Saphenous - Mid Thigh              1.0    Right Great Saphenous - Dist Thigh             0.9    Right Great Saphenous - Knee                   0.9    Right Great Saphenous - Mid Calf               0.8    Right Great Saphenous - Ankle                  1.1 Left Superficial Veins                                  Left Great Saphenous - Inguinal                5.7    Left Great Saphenous - Prox Thigh              2.3    Left Great Saphenous - Knee                    2.2    Left Great Saphenous - Mid Calf                0.9    Left Great Saphenous - Ankle                   1.7 CONCLUSION:   RIGHT LOWER LIMB:  The great saphenous vein is patent from the groin to the ankle.  The intraluminal diameter measurements range from 0.8 mm to 6.7 throughout.    LEFT LOWER LIMB:  The great saphenous vein is patent from the groin to the ankle.  Tech note: The great saphenous vein exits the fascia in the proximal thigh.  The intraluminal diameter measurements range from 0.9 mm to 5.7 mm throughout.   Technical findings electronically sent to patient's chart.      SIGNATURE Signed by LISSETTE SALAS DO, RPVI on 2025-05-13 14:08:28 [email or web address]#Images    Echo complete w/ contrast if indicated  Result Date: 5/12/2025  Narrative:   Left Ventricle: Left ventricular cavity size is normal. Wall thickness is normal. There is mild concentric  hypertrophy. The left ventricular ejection fraction is 60%. Systolic function is normal. Wall motion is normal. Diastolic function is mildly abnormal, consistent with grade I (abnormal) relaxation.   Right Ventricle: Right ventricular cavity size is normal. Systolic function is normal.   Left Atrium: The atrium is mildly dilated (35-41 mL/m2).   Mitral Valve: There is mild annular calcification. There is mild regurgitation.   Tricuspid Valve: There is mild regurgitation. The right ventricular systolic pressure is mildly elevated. The estimated right ventricular systolic pressure is 41.00 mmHg.     Cardiac catheterization  Result Date: 5/12/2025  Narrative:   Complex 3V CAD +LMCA     X-ray chest 1 view portable  Result Date: 5/11/2025  Narrative: XR CHEST PORTABLE INDICATION: chest pain. COMPARISON: CXR 2/2/2023. FINDINGS: Moderate consolidation in the right base. No pneumothorax or pleural effusion. Normal cardiomediastinal silhouette. Bones are unremarkable for age. Normal upper abdomen.     Impression: Moderate right base pneumonia. The study was marked in EPIC for immediate notification. Workstation performed: [id number]      nephrostomy tube check/change/reposition/reinsertion/upsize  Result Date: 5/1/2025  Narrative: Bilateral nephrostogram and nephrostomy tube change Clinical History: Bladder cancer. Bilateral nephrostomy tube replacement. Contrast: 24 mL of iohexol Fluoro time: 5.9 MINUTES Number of Images: 16 Radiation dose: 28 mGy Concious sedation time: N/A Technique: The patient was brought to the interventional radiology suite and placed prone on the table. The existing bilateral nephrostomy tubes prepped and draped in the usual sterile fashion. After a  view was obtained, contrast was injected into the existing catheter and multiple images of the urinary tract were obtained to the bladder. Findings: Left renal collecting system demonstrates no filling defects or dilatation. Left ureter is dilated  and opacified. Complete occlusion at the ureterovesical junction. Right nephrostogram demonstrates nondilated system with no filling defects. Complete occlusion at the distal ureter. Intervention: The left catheter was removed over a heavy-duty straight wire, and a Kumpe catheter was advanced to the distal ureter. Attempts to advance a wire through the ureterovesical junction was unsuccessful. New 10.2 Syriac nephrostomy tube was advanced, and the loop formed within the renal pelvis. The catheter was then injected, confirming satisfactory position. The right catheter was removed over a wire and exchanged for a Kumpe catheter which was advanced into the distal ureter. A Glidewire could be easily advanced through the ureterovesical junction into the bladder. Once the Kumpe catheter was in the bladder  contrast opacified nondistended bladder. Over the wire a new 10.2 Syriac APD catheter was placed and looped in the renal pelvis. Both catheter was secured with sutures and connected to drainage bag.     Impression: Impression: 1. Nephrostogram demonstrates nondilated system. Complete occlusion of the distal left ureterovesical junction. Stenotic right ureterovesical junction could be bypassed with a wire and catheter. 2. Successful exchange of 10.2 Syriac nephrostomy tubes under fluoroscopic control. Findings discussed with Dr. Youssef. Workstation performed: [id number]       EKG personally reviewed by Juan Manuel Westfall MD.     Counseling / Coordination of Care  Total floor / unit time spent today 30 minutes.  Greater than 50% of total time was spent with the patient and / or family counseling and / or coordination of care.

## 2025-05-23 ENCOUNTER — APPOINTMENT (INPATIENT)
Dept: RADIOLOGY | Facility: HOSPITAL | Age: 85
DRG: 233 | End: 2025-05-23
Payer: MEDICARE

## 2025-05-23 LAB
ANION GAP SERPL CALCULATED.3IONS-SCNC: 12 MMOL/L (ref 4–13)
BUN SERPL-MCNC: 28 MG/DL (ref 5–25)
CALCIUM SERPL-MCNC: 9.6 MG/DL (ref 8.4–10.2)
CHLORIDE SERPL-SCNC: 103 MMOL/L (ref 96–108)
CO2 SERPL-SCNC: 25 MMOL/L (ref 21–32)
CREAT SERPL-MCNC: 1.2 MG/DL (ref 0.6–1.3)
ERYTHROCYTE [DISTWIDTH] IN BLOOD BY AUTOMATED COUNT: 14.3 % (ref 11.6–15.1)
GFR SERPL CREATININE-BSD FRML MDRD: 55 ML/MIN/1.73SQ M
GLUCOSE SERPL-MCNC: 141 MG/DL (ref 65–140)
GLUCOSE SERPL-MCNC: 147 MG/DL (ref 65–140)
GLUCOSE SERPL-MCNC: 152 MG/DL (ref 65–140)
GLUCOSE SERPL-MCNC: 155 MG/DL (ref 65–140)
GLUCOSE SERPL-MCNC: 166 MG/DL (ref 65–140)
HCT VFR BLD AUTO: 30.4 % (ref 36.5–49.3)
HGB BLD-MCNC: 10 G/DL (ref 12–17)
MAGNESIUM SERPL-MCNC: 2.3 MG/DL (ref 1.9–2.7)
MCH RBC QN AUTO: 32.8 PG (ref 26.8–34.3)
MCHC RBC AUTO-ENTMCNC: 32.9 G/DL (ref 31.4–37.4)
MCV RBC AUTO: 100 FL (ref 82–98)
PLATELET # BLD AUTO: 185 THOUSANDS/UL (ref 149–390)
PMV BLD AUTO: 10.1 FL (ref 8.9–12.7)
POTASSIUM SERPL-SCNC: 4.2 MMOL/L (ref 3.5–5.3)
RBC # BLD AUTO: 3.05 MILLION/UL (ref 3.88–5.62)
SODIUM SERPL-SCNC: 140 MMOL/L (ref 135–147)
WBC # BLD AUTO: 18.44 THOUSAND/UL (ref 4.31–10.16)

## 2025-05-23 PROCEDURE — 80048 BASIC METABOLIC PNL TOTAL CA: CPT | Performed by: PHYSICIAN ASSISTANT

## 2025-05-23 PROCEDURE — 82948 REAGENT STRIP/BLOOD GLUCOSE: CPT

## 2025-05-23 PROCEDURE — 92610 EVALUATE SWALLOWING FUNCTION: CPT

## 2025-05-23 PROCEDURE — 99024 POSTOP FOLLOW-UP VISIT: CPT | Performed by: PHYSICIAN ASSISTANT

## 2025-05-23 PROCEDURE — 83735 ASSAY OF MAGNESIUM: CPT | Performed by: PHYSICIAN ASSISTANT

## 2025-05-23 PROCEDURE — 74230 X-RAY XM SWLNG FUNCJ C+: CPT

## 2025-05-23 PROCEDURE — 92611 MOTION FLUOROSCOPY/SWALLOW: CPT

## 2025-05-23 PROCEDURE — 85027 COMPLETE CBC AUTOMATED: CPT | Performed by: PHYSICIAN ASSISTANT

## 2025-05-23 PROCEDURE — 99232 SBSQ HOSP IP/OBS MODERATE 35: CPT | Performed by: INTERNAL MEDICINE

## 2025-05-23 RX ORDER — HEPARIN SODIUM 5000 [USP'U]/ML
5000 INJECTION, SOLUTION INTRAVENOUS; SUBCUTANEOUS EVERY 8 HOURS SCHEDULED
Status: DISCONTINUED | OUTPATIENT
Start: 2025-05-23 | End: 2025-05-28 | Stop reason: HOSPADM

## 2025-05-23 RX ORDER — FUROSEMIDE 10 MG/ML
20 INJECTION INTRAMUSCULAR; INTRAVENOUS
Status: DISCONTINUED | OUTPATIENT
Start: 2025-05-23 | End: 2025-05-23

## 2025-05-23 RX ORDER — TORSEMIDE 20 MG/1
20 TABLET ORAL DAILY
Status: DISCONTINUED | OUTPATIENT
Start: 2025-05-23 | End: 2025-05-25

## 2025-05-23 RX ORDER — METOPROLOL TARTRATE 25 MG/1
25 TABLET, FILM COATED ORAL EVERY 12 HOURS SCHEDULED
Status: DISCONTINUED | OUTPATIENT
Start: 2025-05-23 | End: 2025-05-28

## 2025-05-23 RX ORDER — FUROSEMIDE 10 MG/ML
20 INJECTION INTRAMUSCULAR; INTRAVENOUS
Status: DISCONTINUED | OUTPATIENT
Start: 2025-05-23 | End: 2025-05-25

## 2025-05-23 RX ORDER — METOPROLOL TARTRATE 1 MG/ML
5 INJECTION, SOLUTION INTRAVENOUS EVERY 6 HOURS
Status: DISCONTINUED | OUTPATIENT
Start: 2025-05-23 | End: 2025-05-26

## 2025-05-23 RX ORDER — POTASSIUM CHLORIDE 14.9 MG/ML
20 INJECTION INTRAVENOUS DAILY
Status: DISCONTINUED | OUTPATIENT
Start: 2025-05-23 | End: 2025-05-25

## 2025-05-23 RX ADMIN — POTASSIUM CHLORIDE 20 MEQ: 14.9 INJECTION, SOLUTION INTRAVENOUS at 11:46

## 2025-05-23 RX ADMIN — INSULIN LISPRO 1 UNITS: 100 INJECTION, SOLUTION INTRAVENOUS; SUBCUTANEOUS at 06:23

## 2025-05-23 RX ADMIN — HEPARIN SODIUM 5000 UNITS: 5000 INJECTION INTRAVENOUS; SUBCUTANEOUS at 17:26

## 2025-05-23 RX ADMIN — HEPARIN SODIUM 5000 UNITS: 5000 INJECTION INTRAVENOUS; SUBCUTANEOUS at 08:54

## 2025-05-23 RX ADMIN — CHLORHEXIDINE GLUCONATE 15 ML: 1.2 SOLUTION ORAL at 17:32

## 2025-05-23 RX ADMIN — FUROSEMIDE 20 MG: 10 INJECTION, SOLUTION INTRAMUSCULAR; INTRAVENOUS at 11:46

## 2025-05-23 RX ADMIN — METOROPROLOL TARTRATE 5 MG: 5 INJECTION, SOLUTION INTRAVENOUS at 11:46

## 2025-05-23 RX ADMIN — METOROPROLOL TARTRATE 5 MG: 5 INJECTION, SOLUTION INTRAVENOUS at 22:36

## 2025-05-23 RX ADMIN — METOROPROLOL TARTRATE 5 MG: 5 INJECTION, SOLUTION INTRAVENOUS at 17:26

## 2025-05-23 RX ADMIN — FUROSEMIDE 20 MG: 10 INJECTION, SOLUTION INTRAMUSCULAR; INTRAVENOUS at 17:25

## 2025-05-23 RX ADMIN — MUPIROCIN 1 APPLICATION: 20 OINTMENT TOPICAL at 22:36

## 2025-05-23 NOTE — PROCEDURES
05/23/25    Procedure: Epicardial Pacing Wire removal    Kilo Mix was returned to bed and informed of mandatory one hour post-procedure bed rest.  The assigned nurse was notified.  Epicardial pacing wires removed in routine fashion, without incident.  The patient tolerated the procedure well.  Vital signs ordered  q 15 minutes for one hour, as per protocol.    SIGNATURE: Anaid Williamson PA-C  DATE: May 23, 2025  TIME: 9:56 AM     05/23/25    Procedure: Chest tube removal    Chest tubes removed in routine fashion without incident.  Insertion site dressed with Acticoat.  Kilo Mix tolerated the procedure well.  Nurse notified.    SIGNATURE: Anaid Williamson PA-C  DATE: May 23, 2025  TIME: 9:57 AM

## 2025-05-23 NOTE — PLAN OF CARE
Problem: PAIN - ADULT  Goal: Verbalizes/displays adequate comfort level or baseline comfort level  Description: Interventions:- Encourage patient to monitor pain and request assistance- Assess pain using appropriate pain scale- Administer analgesics based on type and severity of pain and evaluate response- Implement non-pharmacological measures as appropriate and evaluate response- Consider cultural and social influences on pain and pain management- Notify physician/advanced practitioner if interventions unsuccessful or patient reports new pain  Outcome: Progressing     Problem: INFECTION - ADULT  Goal: Absence or prevention of progression during hospitalization  Description: INTERVENTIONS:- Assess and monitor for signs and symptoms of infection- Monitor lab/diagnostic results- Monitor all insertion sites, i.e. indwelling lines, tubes, and drains- Monitor endotracheal if appropriate and nasal secretions for changes in amount and color- [place] appropriate cooling/warming therapies per order- Administer medications as ordered- Instruct and encourage patient and family to use good hand hygiene technique- Identify and instruct in appropriate isolation precautions for identified infection/condition  Outcome: Progressing  Goal: Absence of fever/infection during neutropenic period  Description: INTERVENTIONS:- Monitor WBC  Outcome: Progressing     Problem: SAFETY ADULT  Goal: Patient will remain free of falls  Description: INTERVENTIONS:- Educate patient/family on patient safety including physical limitations- Instruct patient to call for assistance with activity - Consult OT/PT to assist with strengthening/mobility - Keep Call bell within reach- Keep bed low and locked with side rails adjusted as appropriate- Keep care items and personal belongings within reach- Initiate and maintain comfort rounds- Make Fall Risk Sign visible to staff- Offer Toileting every 2 Hours, in advance of need- Initiate/Maintain alarm-  Obtain necessary fall risk management equipment: - Apply yellow socks and bracelet for high fall risk patients- Consider moving patient to room near nurses station  Outcome: Progressing  Goal: Maintain or return to baseline ADL function  Description: INTERVENTIONS:-  Assess patient's ability to carry out ADLs; assess patient's baseline for ADL function and identify physical deficits which impact ability to perform ADLs (bathing, care of mouth/teeth, toileting, grooming, dressing, etc.)- Assess/evaluate cause of self-care deficits - Assess range of motion- Assess patient's mobility; develop plan if impaired- Assess patient's need for assistive devices and provide as appropriate- Encourage maximum independence but intervene and supervise when necessary- Involve family in performance of ADLs- Assess for home care needs following discharge - Consider OT consult to assist with ADL evaluation and planning for discharge- Provide patient education as appropriate  Outcome: Progressing  Goal: Maintains/Returns to pre admission functional level  Description: INTERVENTIONS:- Perform AM-PAC 6 Click Basic Mobility/ Daily Activity assessment daily.- Set and communicate daily mobility goal to care team and patient/family/caregiver. - Collaborate with rehabilitation services on mobility goals if consulted- Perform Range of Motion 4 times a day.- Reposition patient every 2 hours.- Dangle patient 4 times a day- Stand patient 4 times a day- Ambulate patient 4 times a day- Out of bed to chair 3 times a day - Out of bed for meals 3 times a day- Out of bed for toileting- Record patient progress and toleration of activity level   Outcome: Progressing     Problem: DISCHARGE PLANNING  Goal: Discharge to home or other facility with appropriate resources  Description: INTERVENTIONS:- Identify barriers to discharge w/patient and caregiver- Arrange for needed discharge resources and transportation as appropriate- Identify discharge learning  needs (meds, wound care, etc.)- Arrange for interpretive services to assist at discharge as needed- Refer to Case Management Department for coordinating discharge planning if the patient needs post-hospital services based on physician/advanced practitioner order or complex needs related to functional status, cognitive ability, or social support system  Outcome: Progressing     Problem: Knowledge Deficit  Goal: Patient/family/caregiver demonstrates understanding of disease process, treatment plan, medications, and discharge instructions  Description: Complete learning assessment and assess knowledge base.Interventions:- Provide teaching at level of understanding- Provide teaching via preferred learning methods  Outcome: Progressing     Problem: CARDIOVASCULAR - ADULT  Goal: Maintains optimal cardiac output and hemodynamic stability  Description: INTERVENTIONS:- Monitor I/O, vital signs and rhythm- Monitor for S/S and trends of decreased cardiac output- Administer and titrate ordered vasoactive medications to optimize hemodynamic stability- Assess quality of pulses, skin color and temperature- Assess for signs of decreased coronary artery perfusion- Instruct patient to report change in severity of symptoms  INTERVENTIONS:- Monitor I/O, vital signs and rhythm- Monitor for S/S and trends of decreased cardiac output- Administer and titrate ordered vasoactive medications to optimize hemodynamic stability- Assess quality of pulses, skin color and temperature- Assess for signs of decreased coronary artery perfusion- Instruct patient to report change in severity of symptoms  Outcome: Progressing  Goal: Absence of cardiac dysrhythmias or at baseline rhythm  Description: INTERVENTIONS:- Continuous cardiac monitoring, vital signs, obtain 12 lead EKG if ordered- Administer antiarrhythmic and heart rate control medications as ordered- Monitor electrolytes and administer replacement therapy as ordered  INTERVENTIONS:- Continuous  cardiac monitoring, vital signs, obtain 12 lead EKG if ordered- Administer antiarrhythmic and heart rate control medications as ordered- Monitor electrolytes and administer replacement therapy as ordered  Outcome: Progressing     Problem: RESPIRATORY - ADULT  Goal: Achieves optimal ventilation and oxygenation  Description: INTERVENTIONS:- Assess for changes in respiratory status- Assess for changes in mentation and behavior- Position to facilitate oxygenation and minimize respiratory effort- Oxygen administered by appropriate delivery if ordered- Initiate smoking cessation education as indicated- Encourage broncho-pulmonary hygiene including cough, deep breathe, Incentive Spirometry- Assess the need for suctioning and aspirate as needed- Assess and instruct to report SOB or any respiratory difficulty- Respiratory Therapy support as indicated  Outcome: Progressing     Problem: GENITOURINARY - ADULT  Goal: Maintains or returns to baseline urinary function  Description: INTERVENTIONS:- Assess urinary function- Encourage oral fluids to ensure adequate hydration if ordered- Administer IV fluids as ordered to ensure adequate hydration- Administer ordered medications as needed- Offer frequent toileting- Follow urinary retention protocol if ordered  Outcome: Progressing  Goal: Absence of urinary retention  Description: INTERVENTIONS:- Assess patient’s ability to void and empty bladder- Monitor I/O- Bladder scan as needed- Discuss with physician/AP medications to alleviate retention as needed- Discuss catheterization for long term situations as appropriate  Outcome: Progressing  Goal: Urinary catheter remains patent  Description: INTERVENTIONS:- Assess patency of urinary catheter- If patient has a chronic lai, consider changing catheter if non-functioning- Follow guidelines for intermittent irrigation of non-functioning urinary catheter  Outcome: Progressing     Problem: METABOLIC, FLUID AND ELECTROLYTES - ADULT  Goal:  Electrolytes maintained within normal limits  Description: INTERVENTIONS:- Monitor labs and assess patient for signs and symptoms of electrolyte imbalances- Administer electrolyte replacement as ordered- Monitor response to electrolyte replacements, including repeat lab results as appropriate- Instruct patient on fluid and nutrition as appropriate  Outcome: Progressing  Goal: Fluid balance maintained  Description: INTERVENTIONS:- Monitor labs - Monitor I/O and WT- Instruct patient on fluid and nutrition as appropriate- Assess for signs & symptoms of volume excess or deficit  Outcome: Progressing  Goal: Glucose maintained within target range  Description: INTERVENTIONS:- Monitor Blood Glucose as ordered- Assess for signs and symptoms of hyperglycemia and hypoglycemia- Administer ordered medications to maintain glucose within target range- Assess nutritional intake and initiate nutrition service referral as needed  Outcome: Progressing     Problem: Prexisting or High Potential for Compromised Skin Integrity  Goal: Skin integrity is maintained or improved  Description: INTERVENTIONS:- Identify patients at risk for skin breakdown- Assess and monitor skin integrity- Assess and monitor nutrition and hydration status- Monitor labs - Assess for incontinence - Turn and reposition patient- Assist with mobility/ambulation- Relieve pressure over bony prominences- Avoid friction and shearing- Provide appropriate hygiene as needed including keeping skin clean and dry- Evaluate need for skin moisturizer/barrier cream- Collaborate with interdisciplinary team - Patient/family teaching- Consider wound care consult   Outcome: Progressing     Problem: Nutrition/Hydration-ADULT  Goal: Nutrient/Hydration intake appropriate for improving, restoring or maintaining nutritional needs  Description: Monitor and assess patient's nutrition/hydration status for malnutrition. Collaborate with interdisciplinary team and initiate plan and  interventions as ordered.  Monitor patient's weight and dietary intake as ordered or per policy. Utilize nutrition screening tool and intervene as necessary. Determine patient's food preferences and provide high-protein, high-caloric foods as appropriate.     INTERVENTIONS:  - Monitor oral intake, urinary output, labs, and treatment plans  - Assess nutrition and hydration status and recommend course of action  - Evaluate amount of meals eaten  - Assist patient with eating if necessary   - Allow adequate time for meals  - Recommend/ encourage appropriate diets, oral nutritional supplements, and vitamin/mineral supplements  - Order, calculate, and assess calorie counts as needed  - Recommend, monitor, and adjust tube feedings and TPN/PPN based on assessed needs  - Assess need for intravenous fluids  - Provide specific nutrition/hydration education as appropriate  - Include patient/family/caregiver in decisions related to nutrition  Outcome: Progressing

## 2025-05-23 NOTE — PROCEDURES
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)    Dyslipidemia    Hypertension    Peripheral vascular disease (HCC)    Asymptomatic stenosis of left carotid artery    Prostate cancer (HCC)    H/O insertion of nephrostomy tube    Pneumonia involving right lung    Preoperative clearance    ALPHONSO (obstructive sleep apnea)    Former smoker    At risk for delirium    Depression    Frailty    Cognitive impairment    Hx of acute poliomyelitis    Restrictive lung disease    Moderate protein-calorie malnutrition (HCC)    S/P CABG x 3    RBBB    Leukocytosis      Past Medical History  Past Medical History[1]    Past Surgical History  Past Surgical History[2]    Assessment Summary:    Pt presents with severe oropharyngeal dysphagia characterized. The patient has decreased bolus propulsion and drive. Decreased oral control observed with spillage over BOT. Epiglottic inversion is absent. Pharyngeal constriction is absent. The patient has poor anterior hyoid movement and laryngeal rise. Significant valleculae and pyriform retention is observed, with spillage into airway. Aspiration occurs with all consistencies. The patient does have cough response which clears airway, but he is observed with regurgitation of barium into oral cavity with each trial. Brief esophageal scan appears adequate.  Note: Images are available for review in [employer] as desired.    Recommendations:   Recommended Diet: NPO except ice chips   Recommended Form of Medications: non oral    Consider referral to: none at this time    SLP Dysphagia therapy recommended: f/u ST with repeat VBS    Results Reviewed with: patient, RN, and MD   Pt/Family Education: initiated. Pt and  caregivers would benefit from/require continued education.    Patient Stated Goal: none stated     Dysphagia Goals per SLP: pt will participate in repeat VBS    General Information;  Current concerns for dysphagia include coughing and regurgitation at bedside.  MBS was recommended to assess oropharyngeal stage swallowing skills at this time.     Prior MBS: none  EGD 11/24/20:   FINDINGS:  Small hiatal hernia  Dilated in the esophagus with Solis dilator  The esophagus and duodenum appeared normal.    Oral Mechanism Exam  Respiratory Status: on NC L O2      Pt was viewed sitting upright in the lateral and AP positions. Due to concerns for patient safety / patient refusal, trials provided deviated from the MBSImP Validated Protocol. Pt was given honey thick, nectar thick and thin liquids via tsp.     Initial view observations/comments: Clear view of the upper airway      8-Point Penetration-Aspiration Scale   Thin liquid 6 - Material enters the airway, passes below the vocal folds and is ejected into the larynx or out of the airway     Nectar thick liquid 6 - Material enters the airway, passes below the vocal folds and is ejected into the larynx or out of the airway     Honey thick liquid 6 - Material enters the airway, passes below the vocal folds and is ejected into the larynx or out of the airway     Puree (pudding) N/a   Solid N/a     Strategies and Efficacy: n/a unable to complete chin tuck    Aspiration Response and Efficacy: cough response that helped clear airway         [1]   Past Medical History:  Diagnosis Date    Bladder tumor     Cancer (HCC)     Prostate cancer    Depression 5/14/2025    Diabetes mellitus (HCC)     Diverticulitis of colon     History of common carotid artery stent placement     RIGHT stent - per pts son - RIGHT stent  in  carotid artery    History of heart artery stent     x2 stents in heart per pts son    Hyperlipidemia     Hypertension     Migraines     Myocardial infarction (HCC)      about 30yrs ago    Nephrostomy present (HCC)     2/25/25 Per pts son Ed- pt has two nephrostomy tubes    Prostate cancer (HCC)     Seasonal allergies    [2]   Past Surgical History:  Procedure Laterality Date    CARDIAC CATHETERIZATION N/A 5/12/2025    Procedure: Cardiac Catheterization;  Surgeon: Daniel Mabry DO;  Location:  CARDIAC CATH LAB;  Service: Cardiology    CARDIAC SURGERY      CAROTID ENDARTARECTOMY Right     thromboendarterectomy    CAROTID STENT Right     CATARACT EXTRACTION Left     COLONOSCOPY      fiberoptic screening 2/6/08 5 yr / complete 3/15/13 5 yr    CORONARY ANGIOPLASTY WITH STENT PLACEMENT      ELBOW SURGERY      EYE SURGERY      IR BIOPSY LYMPH NODE  12/24/2024    IR NEPHROSTOMY TUBE CHECK/CHANGE/REPOSITION/REINSERTION/UPSIZE  2/25/2025    IR NEPHROSTOMY TUBE CHECK/CHANGE/REPOSITION/REINSERTION/UPSIZE  4/29/2025    IR NEPHROSTOMY TUBE PLACEMENT  12/24/2024    KNEE SURGERY      OK CORONARY ARTERY BYP W/VEIN & ARTERY GRAFT 3 VEIN N/A 5/21/2025    Procedure: CORONARY ARTERY BYPASS GRAFT (CABG) X3 VESSELS, LIMA - LAD, LEFT LEG EVH/SVG - PDA AND OM, W/CHAPO;  Surgeon: David Tomlin DO;  Location:  MAIN OR;  Service: Cardiac Surgery    OK CYSTO W/REMOVAL OF LESIONS SMALL N/A 1/21/2025    Procedure: TRANSURETHRAL RESECTION OF BLADDER TUMOR (TURBT);  Surgeon: Jordan Youssef MD;  Location:  MAIN OR;  Service: Urology    OK CYSTO W/REMOVAL OF LESIONS SMALL N/A 3/6/2025    Procedure: TRANSURETHRAL RESECTION OF BLADDER TUMOR (TURBT);  Surgeon: Jordan Youssef MD;  Location:  MAIN OR;  Service: Urology    PROSTATE SURGERY      SUPRAPUBIC PROSTATECTOMY  12/06/1999    UPPER GASTROINTESTINAL ENDOSCOPY

## 2025-05-23 NOTE — RESTORATIVE TECHNICIAN NOTE
Restorative Technician Note      Patient Name: Kilo Mix     Restorative Tech Visit Date: 05/23/25  Note Type: Mobility  Patient Position Upon Consult: Bedside chair  Activity Performed: Ambulated  Assistive Device: Roller walker  Patient Position at End of Consult: Bed/Chair alarm activated; All needs within reach; Bedside chair

## 2025-05-23 NOTE — PHYSICAL THERAPY NOTE
Physical Therapy Cancellation Note             05/23/25 0483   Note Type   Note Type Cancelled Session   Cancel Reasons Refusal     Chart reviewed; attempted to see pt in AM; pt is observed resting in the chair; arousable; reports having some discomfort/issues when swallowing and being frustrated about it --> adamantly declines mobilization or therex; RN informed; will follow.    Earl Horowitz

## 2025-05-23 NOTE — PROGRESS NOTES
"Cardiology Progress Note - Kilo Mix 84 y.o. 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  He is out of bed to a chair.  He is in sinus rhythm with occasional PVCs on telemetry.  Epicardial pacing wires removed yesterday.  Patient admits to difficulty with swallowing.  Patient for speech therapy.  Chest tubes in place.  He is on torsemide.  BMP with potassium of 4.2 and creatinine of 1.2.  Hemoglobin 10.  Patient's VS are stable.  VS are stable.  Will continue current cardiac management.    Subjective:   Patient seen and examined.  No significant events overnight.  negative.    Objective:     Vitals: Blood pressure 125/72, pulse 88, temperature 97.7 °F (36.5 °C), temperature source Oral, resp. rate 20, height 5' 8\" (1.727 m), weight 72.2 kg (159 lb 2.8 oz), SpO2 91%., Body mass index is 24.2 kg/m².,   Orthostatic Blood Pressures      Flowsheet Row Most Recent Value   Blood Pressure 125/72 filed at 05/23/2025 0705   Patient Position - Orthostatic VS Sitting filed at 05/23/2025 0705        ,      Intake/Output Summary (Last 24 hours) at 5/23/2025 0910  Last data filed at 5/23/2025 0431  Gross per 24 hour   Intake " 120 ml   Output 1145 ml   Net -1025 ml       No significant arrhythmias seen on telemetry review.       Physical Exam:    GEN: Kilo Mix appears well, alert and oriented x 3, pleasant and cooperative   NECK: supple, no JVD or HJR  HEART: normal rate, regular rhythm, normal S1 and S2, no murmurs, clicks, gallops or rubs   LUNGS: clear to auscultation bilaterally; no wheezes, rales, or rhonchi   ABDOMEN: no distention  EXTREMITIES: edema  SKIN: warm and well perfused, no suspicious lesions on exposed skin    Labs & Results:    No results displayed because visit has over 200 results.          XR chest portable  Result Date: 5/22/2025  Narrative: XR CHEST PORTABLE INDICATION: Post Open Heart Surgey. COMPARISON: 5/21/2025 FINDINGS: ET tube and [place]-Philip catheter removed. Stable position of right-sided IJ catheter and mediastinal/thoracostomy tubes. New left lung base patchy airspace opacity, atelectasis versus pneumonitis. Stable minimal right lung base opacity. No pneumothorax. Unchanged cardiomediastinal silhouette. No acute osseous abnormality within the limitations of portable radiography. Normal upper abdomen.     Impression: New left lung base opacity, atelectasis versus pneumonitis. Stable minimal right lung base opacity. Workstation performed: [id number]     XR chest portable ICU  Result Date: 5/21/2025  Narrative: XR CHEST PORTABLE ICU INDICATION: S/P open heart. COMPARISON: 5/10/2025 FINDINGS: Tip of endotracheal tube is 3.5 cm above donna. [place]-Philip line tip in the region of main pulmonary artery. Right IJ central venous catheter tip projects over the SVC. Left chest tube and mediastinal drains noted. There is some patchy infiltrate in the right infrahilar area. This is less pronounced than on the prior study. Somewhat low lung volumes. No pneumothorax or pleural effusion. Evidence of CABG. Heart size normal. Aorta is atherosclerotic Bones are unremarkable for age. Normal upper abdomen.     Impression:  Diminished infiltrate pattern in the right lung base compared with the prior study. Expected postop changes as noted above with lines and tubes appearing satisfactory Workstation performed: [id number]     US bedside procedure  Result Date: 5/21/2025  Narrative: [id number]    Complete PFT with post bronchodilator  Result Date: 5/16/2025  Narrative: Images from the original result were not included. Pulmonary Function Test Report Kilo Hand 84 y.o. male MRN: [id number] Date Performed: 5/15/2025 Date Interpretation: 5/15/2025 Interpretation: Normal spirometry. No post bronchodilator response. Moderate restriction as indicated by the lung volumes. Mild decrease corrected diffusion capacity. Flow-volume loop suggestive of a fixed obstruction, clinical correlation advised. Daria Bermudez MD [facility] Attestation I agree with interpretation as above.            Board Certified Pulmonary Physician    VAS carotid complete study  Result Date: 5/13/2025  Narrative: [facility] . KILO HAND is a 84 year old male who was referred by LIAT ARMSTRONG.      Indications:  Patient presents for surgical clearance and general health evaluation secondary to future open heart surgery.  Patient is S/P MI.  Operative History:  05/12/2025 Cardiac catheterization.  Right Common carotid artery/stent.  Coronary angioplasty/stent  Risk Factors:  The patient reports diabetes, CAD, hyperlipidemia, PVD, prostate cancer, bladder tumor, and prior smoking: quit >10yrs ago. Height: 68 inches. Weight: 159 lbs.   Clinical:  Right: IV site. Left BP: 143/66  FINDINGS: Main                                         Segment Impression  PSV  EDV  Ratio  Direction of Flow Right                                                                                            Prox CCA                                                  52   16                             Mid CCA                                                    78   24                             Right Distal Common Carotid Stent                         80   26                             Dist CCA                                                  80   26                             Prox. ICA                                                 93   22   1.18                      Mid. ICA                                                  84   24   1.08                      Dist. ICA                                                 83   24   1.06                      Ext Carotid                             Severe stenosis  499   31                             Prox Vert.                                                26    4                 Antegrade    Right Subclavian Artery                                   88    0                          Left                                                                                             Prox CCA                                                  60   14                             Mid CCA                                                   75   19                             Dist CCA                                                  73   17                             Carotid Bifurcation                                       75   24   1.00                      Prox. ICA                                                179   57   2.37                      Mid. ICA                                                  80   26   1.06                      Dist. ICA                                                 73   17   0.97                      Ext Carotid                                               96   16                             Prox Vert                                                 86   20                 Antegrade    Left Subclavian Artery                                   172    0                          CONCLUSION:  RIGHT: There is <50% stenosis noted in the internal carotid artery. The  common carotid artery/bulb stent is patent. Plaque is homogenous and smooth.  A severe stenosis was identified in the proximal external carotid artery. Vertebral artery flow is antegrade.  There is no significant subclavian artery disease.   LEFT: There is 50-69% stenosis noted in the internal carotid artery.  Plaque is heterogenous and irregular.  Vertebral artery flow is antegrade.  There is no significant subclavian artery disease.   In comparison to the study of 11-, there is no significant B/L interval change in the disease process.  Recommend repeat testing in 6 months as per protocol unless otherwise indicated.  SIGNATURE Signed by LISANDRA QUINTEROS DO on 2025-05-13 17:01:05 [email or web address]#Images    CT chest wo contrast  Result Date: 5/13/2025  Narrative: CT CHEST WITHOUT IV CONTRAST INDICATION: pre op CABG. COMPARISON: Two-view chest 5/10/2025. PET/CT 2/19/2025.l TECHNIQUE: CT examination of the chest was performed without intravenous contrast. Multiplanar 2D reformatted images were created from the source data. This examination, like all CT scans performed in the [facility], was performed utilizing techniques to minimize radiation dose exposure, including the use of iterative reconstruction and automated exposure control. Radiation dose length product (DLP) for this visit: 441.77 mGy-cm. FINDINGS: LUNGS: Persistent focal nodular consolidative and groundglass opacities throughout the right middle lobe and right lower lobe in keeping with multi lobar pneumonia. PLEURA: Unremarkable. HEART/GREAT VESSELS: Borderline heart size. Dense coronary artery calcifications. Aortic annulus calcifications. No thoracic aortic aneurysm. MEDIASTINUM AND ROVERTO: Unremarkable. CHEST WALL AND LOWER NECK: Unremarkable. VISUALIZED STRUCTURES IN THE UPPER ABDOMEN: Cholelithiasis. 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  Atrophic right kidney. Simple appearing renal cyst. OSSEOUS STRUCTURES: No acute fracture or destructive osseous lesion.     Impression: Persistent right middle lobe and right lower lobe pneumonia. The study was marked in EPIC for immediate notification. Workstation performed: [id number]     VAS lower limb vein mapping bypass graft  Result Date: 5/13/2025  Narrative: [facility] REPORT . AMBER HAND is a 84 year old male who was referred by LIAT ARMSTRONG.      Indications:  Patient presents for surgical clearance and general health evaluation secondary to future open-heart surgery.  Patient is S/P MI. Evaluation for conduit. Operative History:  05/12/2025 Cardiac catheterization. Right Common carotid artery/stent. Coronary angioplasty/stent Risk Factors:  The patient reports diabetes, CAD, hyperlipidemia, PVD, prostate cancer, bladder tumor, and prior smoking: quit >10yrs ago. Height: 68 inches. Weight: 159 lbs. Clinical:  Left BP: 143/66 FINDINGS: Main                                          AP Diam (mm) Right Superficial Veins                                 Right Great Saphenous - Inguinal               6.7    Right Great Saphenous - Prox Thigh             2.1    Right Great Saphenous - Mid Thigh              1.0    Right Great Saphenous - Dist Thigh             0.9    Right Great Saphenous - Knee                   0.9    Right Great Saphenous - Mid Calf               0.8    Right Great Saphenous - Ankle                  1.1 Left Superficial Veins                                  Left Great Saphenous - Inguinal                5.7    Left Great Saphenous - Prox Thigh              2.3    Left Great Saphenous - Knee                    2.2    Left Great Saphenous - Mid Calf                0.9    Left Great Saphenous - Ankle                   1.7 CONCLUSION:   RIGHT LOWER LIMB:  The great saphenous vein is patent from the groin to the ankle.  The intraluminal diameter measurements range from 0.8 mm to 6.7  throughout.    LEFT LOWER LIMB:  The great saphenous vein is patent from the groin to the ankle.  Tech note: The great saphenous vein exits the fascia in the proximal thigh.  The intraluminal diameter measurements range from 0.9 mm to 5.7 mm throughout.   Technical findings electronically sent to patient's chart.      SIGNATURE Signed by LISSETTE SALAS DO, RPVI on 2025-05-13 14:08:28 [email or web address]#Images    Echo complete w/ contrast if indicated  Result Date: 5/12/2025  Narrative:   Left Ventricle: Left ventricular cavity size is normal. Wall thickness is normal. There is mild concentric hypertrophy. The left ventricular ejection fraction is 60%. Systolic function is normal. Wall motion is normal. Diastolic function is mildly abnormal, consistent with grade I (abnormal) relaxation.   Right Ventricle: Right ventricular cavity size is normal. Systolic function is normal.   Left Atrium: The atrium is mildly dilated (35-41 mL/m2).   Mitral Valve: There is mild annular calcification. There is mild regurgitation.   Tricuspid Valve: There is mild regurgitation. The right ventricular systolic pressure is mildly elevated. The estimated right ventricular systolic pressure is 41.00 mmHg.     Cardiac catheterization  Result Date: 5/12/2025  Narrative:   Complex 3V CAD +LMCA     X-ray chest 1 view portable  Result Date: 5/11/2025  Narrative: XR CHEST PORTABLE INDICATION: chest pain. COMPARISON: CXR 2/2/2023. FINDINGS: Moderate consolidation in the right base. No pneumothorax or pleural effusion. Normal cardiomediastinal silhouette. Bones are unremarkable for age. Normal upper abdomen.     Impression: Moderate right base pneumonia. The study was marked in EPIC for immediate notification. Workstation performed: [id number]     IR nephrostomy tube check/change/reposition/reinsertion/upsize  Result Date: 5/1/2025  Narrative: Bilateral  nephrostogram and nephrostomy tube change Clinical History: Bladder cancer. Bilateral nephrostomy tube replacement. Contrast: 24 mL of iohexol Fluoro time: 5.9 MINUTES Number of Images: 16 Radiation dose: 28 mGy Concious sedation time: N/A Technique: The patient was brought to the interventional radiology suite and placed prone on the table. The existing bilateral nephrostomy tubes prepped and draped in the usual sterile fashion. After a  view was obtained, contrast was injected into the existing catheter and multiple images of the urinary tract were obtained to the bladder. Findings: Left renal collecting system demonstrates no filling defects or dilatation. Left ureter is dilated and opacified. Complete occlusion at the ureterovesical junction. Right nephrostogram demonstrates nondilated system with no filling defects. Complete occlusion at the distal ureter. Intervention: The left catheter was removed over a heavy-duty straight wire, and a Kumpe catheter was advanced to the distal ureter. Attempts to advance a wire through the ureterovesical junction was unsuccessful. New 10.2 [place] nephrostomy tube was advanced, and the loop formed within the renal pelvis. The catheter was then injected, confirming satisfactory position. The right catheter was removed over a wire and exchanged for a Kumpe catheter which was advanced into the distal ureter. A Glidewire could be easily advanced through the ureterovesical junction into the bladder. Once the Kumpe catheter was in the bladder  contrast opacified nondistended bladder. Over the wire a new 10.2 [place] APD catheter was placed and looped in the renal pelvis. Both catheter was secured with sutures and connected to drainage bag.     Impression: Impression: 1. Nephrostogram demonstrates nondilated system. Complete occlusion of the distal left ureterovesical junction. Stenotic right ureterovesical junction could be bypassed with a wire and catheter. 2. Successful  exchange of 10.2 [place] nephrostomy tubes under fluoroscopic control. Findings discussed with Dr. Youssef. Workstation performed: [id number]       EKG personally reviewed by Juan Manuel Westfall MD.     Counseling / Coordination of Care  Total floor / unit time spent today 30 minutes.  Greater than 50% of total time was spent with the patient and / or family counseling and / or coordination of care.

## 2025-05-23 NOTE — SPEECH THERAPY NOTE
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)    Dyslipidemia    Hypertension    Peripheral vascular disease (HCC)    Asymptomatic stenosis of left carotid artery    Prostate cancer (HCC)    H/O insertion of nephrostomy tube    Pneumonia involving right lung    Preoperative clearance    ALPHONSO (obstructive sleep apnea)    Former smoker    At risk for delirium    Depression    Frailty    Cognitive impairment    Hx of acute poliomyelitis    Restrictive lung disease    Moderate protein-calorie malnutrition (HCC)    S/P CABG x 3    RBBB    Leukocytosis      Past Medical History  Past Medical History[1]    Past Surgical History  Past Surgical History[2]    Summary   Pt presented with functional appearing oral stage of swallowing with s/s suggestive of mild pharyngeal vs possible esophageal dysphagia. The patient is assessed with ice chips and pudding. Retrieval, transfer and bolus control appear adequate. Laryngeal rise is palpated and wfl. The patient presents with cough and expectoration of thick, tan secretions after each swallow.     Risk/s for Aspiration: high     Recommended Diet: NPO   Recommended Form of Meds: non oral for now    Other Recommendations: Continue frequent oral care, VBS    Current Medical Status  HPI: Kilo Mix is a 85 y/o male with a PMHx of MVCAD/NSTEMI (S/p PCI in 1995 and 2007), T2DM, PAD s/p carotid stenting on plavix, HTN, HLD, COPD, ALPHONSO, PNA (s/p tx with 5 days of abx) and newly diagnosed metastatic prostate cancer (s/p bilateral nephrostomy tubes L>R output) who presented to \[facility]"" with intermittent but worsening chest pain radiating to his neck and b/l arms. Mild troponin elevation and  non-specific ST changes on EKG present so patient was treated as an NSTEMI. [facility] 5/12 demonstrated MVCAD and CT surgery was consulted for CABG evaluation. After discussion with patient's oncologist regarding long term survival rates, patient and CT surgery team were in agreement to proceed with surgical revascularization.    Regarding his prostate cancer, the patient was initially diagnosed in 1999 and underwent a prostatectomy. He was found to recently have elevated PSA levels with extensive mediastinal, abdominal, and pelvic lymphadenopathy. He was subsequently started on lupron and Xtandi with a subsequent reduction in his PSA. Xtandi is currently on hold but continued on Lupron up until admission.     Current Precautions:  Fall  Aspiration    Allergies:  No known food allergies  Past medical history:  Please see H&P for details    Special Studies:  Chest xray 5/22/25:   New left lung base opacity, atelectasis versus pneumonitis.   Stable minimal right lung base opacity.    EGD 11/24/20:   FINDINGS:  Small hiatal hernia  Dilated in the esophagus with Solis dilator  The esophagus and duodenum appeared normal.     Social/Education/Vocational Hx:  Pt lives with family    Swallow Information   Current Risks for Dysphagia & Aspiration: known history of dysphagia, recent surgery, and recent intubation  Current Symptoms/Concerns: coughing with po  Current Diet: regular diet and thin liquids   Baseline Diet: regular diet and thin liquids    Baseline Assessment   Behavior/Cognition: alert  Speech/Language Status: able to participate in conversation and able to follow commands  Patient Positioning: upright in bed  Pain Status/Interventions/Response to Interventions: No report of or nonverbal indications of pain.     Swallow Mechanism Exam  Facial: symmetrical  Labial: WFL  Lingual: WFL  Velum: unable to visualize  Mandible: adequate ROM  Dentition: adequate  Vocal quality:gurgly   Respiratory Status: on RA    "    Consistencies Assessed and Performance   Consistencies Administered: ice chips and puree  Materials administered included ice chips and pudding    Oral Stage: WFL  Bolus formation and transfer were functional with no significant oral residue noted.  No overt s/s reduced oral control.    Pharyngeal Stage: mild  Swallow Mechanics:  Swallowing initiation appeared prompt.  Laryngeal rise was palpated and judged to be within functional limits.  Coughing observed with expectoration of thick, tan secretions    Esophageal Concerns: possible as evidenced by mucous expectoration     Summary and Recommendations (see above)    Results Reviewed with: patient, RN, and PA     Treatment Recommended: dysphagia therapy      Frequency of treatment: 3-5x week     Patient Stated Goal: \"I don't want to choke\"     Dysphagia LTG  -Patient will demonstrate safe and effective oral intake (without overt s/s significant oral/pharyngeal dysphagia including s/s penetration or aspiration) for the highest appropriate diet level.     Short Term Goals:  -Patient will comply with a Video/Modified Barium Swallow study for more complete assessment of swallowing anatomy/physiology/aspiration risk and to assess efficacy of treatment techniques so as to best guide treatment plan    Diet TBD    Speech Therapy Prognosis   Prognosis: fair    Prognosis Considerations: medical status and prior medical history             [1]   Past Medical History:  Diagnosis Date    Bladder tumor     Cancer (HCC)     Prostate cancer    Depression 5/14/2025    Diabetes mellitus (HCC)     Diverticulitis of colon     History of common carotid artery stent placement     RIGHT stent - per pts son - RIGHT stent  in  carotid artery    History of heart artery stent     x2 stents in heart per pts son    Hyperlipidemia     Hypertension     Migraines     Myocardial infarction (HCC)     about 30yrs ago    Nephrostomy present (HCC)     2/25/25 Per pts son Ed- pt has two nephrostomy " tubes    Prostate cancer (HCC)     Seasonal allergies    [2]   Past Surgical History:  Procedure Laterality Date    CARDIAC CATHETERIZATION N/A 5/12/2025    Procedure: Cardiac Catheterization;  Surgeon: Daniel Mabry DO;  Location:  CARDIAC CATH LAB;  Service: Cardiology    CARDIAC SURGERY      CAROTID ENDARTARECTOMY Right     thromboendarterectomy    CAROTID STENT Right     CATARACT EXTRACTION Left     COLONOSCOPY      fiberoptic screening 2/6/08 5 yr / complete 3/15/13 5 yr    CORONARY ANGIOPLASTY WITH STENT PLACEMENT      ELBOW SURGERY      EYE SURGERY      IR BIOPSY LYMPH NODE  12/24/2024    IR NEPHROSTOMY TUBE CHECK/CHANGE/REPOSITION/REINSERTION/UPSIZE  2/25/2025    IR NEPHROSTOMY TUBE CHECK/CHANGE/REPOSITION/REINSERTION/UPSIZE  4/29/2025    IR NEPHROSTOMY TUBE PLACEMENT  12/24/2024    KNEE SURGERY      IN CORONARY ARTERY BYP W/VEIN & ARTERY GRAFT 3 VEIN N/A 5/21/2025    Procedure: CORONARY ARTERY BYPASS GRAFT (CABG) X3 VESSELS, LIMA - LAD, LEFT LEG EVH/SVG - PDA AND OM, W/CHAPO;  Surgeon: David Tomlin DO;  Location:  MAIN OR;  Service: Cardiac Surgery    IN CYSTO W/REMOVAL OF LESIONS SMALL N/A 1/21/2025    Procedure: TRANSURETHRAL RESECTION OF BLADDER TUMOR (TURBT);  Surgeon: Jordan Youssef MD;  Location:  MAIN OR;  Service: Urology    IN CYSTO W/REMOVAL OF LESIONS SMALL N/A 3/6/2025    Procedure: TRANSURETHRAL RESECTION OF BLADDER TUMOR (TURBT);  Surgeon: Jordan Youssef MD;  Location:  MAIN OR;  Service: Urology    PROSTATE SURGERY      SUPRAPUBIC PROSTATECTOMY  12/06/1999    UPPER GASTROINTESTINAL ENDOSCOPY

## 2025-05-23 NOTE — RESTORATIVE TECHNICIAN NOTE
Restorative Technician Note      Patient Name: Kilo Mix     Restorative Tech Visit Date: 05/23/25  Note Type: Mobility  Patient Position Upon Consult: Supine  Activity Performed: Ambulated; Other (Comment) (to/from the bathroom)  Assistive Device: Roller walker  Patient Position at End of Consult: Bed/Chair alarm activated; All needs within reach; Bedside chair

## 2025-05-23 NOTE — PLAN OF CARE
Problem: PAIN - ADULT  Goal: Verbalizes/displays adequate comfort level or baseline comfort level  Description: Interventions:- Encourage patient to monitor pain and request assistance- Assess pain using appropriate pain scale- Administer analgesics based on type and severity of pain and evaluate response- Implement non-pharmacological measures as appropriate and evaluate response- Consider cultural and social influences on pain and pain management- Notify physician/advanced practitioner if interventions unsuccessful or patient reports new pain  Outcome: Progressing     Problem: INFECTION - ADULT  Goal: Absence or prevention of progression during hospitalization  Description: INTERVENTIONS:- Assess and monitor for signs and symptoms of infection- Monitor lab/diagnostic results- Monitor all insertion sites, i.e. indwelling lines, tubes, and drains- Monitor endotracheal if appropriate and nasal secretions for changes in amount and color- [place] appropriate cooling/warming therapies per order- Administer medications as ordered- Instruct and encourage patient and family to use good hand hygiene technique- Identify and instruct in appropriate isolation precautions for identified infection/condition  Outcome: Progressing  Goal: Absence of fever/infection during neutropenic period  Description: INTERVENTIONS:- Monitor WBC  Outcome: Progressing     Problem: SAFETY ADULT  Goal: Patient will remain free of falls  Description: INTERVENTIONS:- Educate patient/family on patient safety including physical limitations- Instruct patient to call for assistance with activity - Consult OT/PT to assist with strengthening/mobility - Keep Call bell within reach- Keep bed low and locked with side rails adjusted as appropriate- Keep care items and personal belongings within reach- Initiate and maintain comfort rounds- Make Fall Risk Sign visible to staff- Offer Toileting every 2 Hours, in advance of need- Initiate/Maintain bed alarm-  Obtain necessary fall risk management equipment: - Apply yellow socks and bracelet for high fall risk patients- Consider moving patient to room near nurses station  Outcome: Progressing  Goal: Maintain or return to baseline ADL function  Description: INTERVENTIONS:-  Assess patient's ability to carry out ADLs; assess patient's baseline for ADL function and identify physical deficits which impact ability to perform ADLs (bathing, care of mouth/teeth, toileting, grooming, dressing, etc.)- Assess/evaluate cause of self-care deficits - Assess range of motion- Assess patient's mobility; develop plan if impaired- Assess patient's need for assistive devices and provide as appropriate- Encourage maximum independence but intervene and supervise when necessary- Involve family in performance of ADLs- Assess for home care needs following discharge - Consider OT consult to assist with ADL evaluation and planning for discharge- Provide patient education as appropriate  Outcome: Progressing  Goal: Maintains/Returns to pre admission functional level  Description: INTERVENTIONS:- Perform AM-PAC 6 Click Basic Mobility/ Daily Activity assessment daily.- Set and communicate daily mobility goal to care team and patient/family/caregiver. - Collaborate with rehabilitation services on mobility goals if consulted- Perform Range of Motion 3 times a day.- Reposition patient every 2 hours.- Dangle patient 3 times a day- Stand patient 2 times a day- Ambulate patient 2 times a day- Out of bed to chair 3 times a day - Out of bed for meals 3 times a day- Out of bed for toileting- Record patient progress and toleration of activity level   Outcome: Progressing     Problem: DISCHARGE PLANNING  Goal: Discharge to home or other facility with appropriate resources  Description: INTERVENTIONS:- Identify barriers to discharge w/patient and caregiver- Arrange for needed discharge resources and transportation as appropriate- Identify discharge learning  needs (meds, wound care, etc.)- Arrange for interpretive services to assist at discharge as needed- Refer to Case Management Department for coordinating discharge planning if the patient needs post-hospital services based on physician/advanced practitioner order or complex needs related to functional status, cognitive ability, or social support system  Outcome: Progressing     Problem: Knowledge Deficit  Goal: Patient/family/caregiver demonstrates understanding of disease process, treatment plan, medications, and discharge instructions  Description: Complete learning assessment and assess knowledge base.Interventions:- Provide teaching at level of understanding- Provide teaching via preferred learning methods  Outcome: Progressing     Problem: CARDIOVASCULAR - ADULT  Goal: Maintains optimal cardiac output and hemodynamic stability  Description: INTERVENTIONS:- Monitor I/O, vital signs and rhythm- Monitor for S/S and trends of decreased cardiac output- Administer and titrate ordered vasoactive medications to optimize hemodynamic stability- Assess quality of pulses, skin color and temperature- Assess for signs of decreased coronary artery perfusion- Instruct patient to report change in severity of symptoms  INTERVENTIONS:- Monitor I/O, vital signs and rhythm- Monitor for S/S and trends of decreased cardiac output- Administer and titrate ordered vasoactive medications to optimize hemodynamic stability- Assess quality of pulses, skin color and temperature- Assess for signs of decreased coronary artery perfusion- Instruct patient to report change in severity of symptoms  Outcome: Progressing  Goal: Absence of cardiac dysrhythmias or at baseline rhythm  Description: INTERVENTIONS:- Continuous cardiac monitoring, vital signs, obtain 12 lead EKG if ordered- Administer antiarrhythmic and heart rate control medications as ordered- Monitor electrolytes and administer replacement therapy as ordered  INTERVENTIONS:- Continuous  cardiac monitoring, vital signs, obtain 12 lead EKG if ordered- Administer antiarrhythmic and heart rate control medications as ordered- Monitor electrolytes and administer replacement therapy as ordered  Outcome: Progressing     Problem: RESPIRATORY - ADULT  Goal: Achieves optimal ventilation and oxygenation  Description: INTERVENTIONS:- Assess for changes in respiratory status- Assess for changes in mentation and behavior- Position to facilitate oxygenation and minimize respiratory effort- Oxygen administered by appropriate delivery if ordered- Initiate smoking cessation education as indicated- Encourage broncho-pulmonary hygiene including cough, deep breathe, Incentive Spirometry- Assess the need for suctioning and aspirate as needed- Assess and instruct to report SOB or any respiratory difficulty- Respiratory Therapy support as indicated  Outcome: Progressing     Problem: GENITOURINARY - ADULT  Goal: Maintains or returns to baseline urinary function  Description: INTERVENTIONS:- Assess urinary function- Encourage oral fluids to ensure adequate hydration if ordered- Administer IV fluids as ordered to ensure adequate hydration- Administer ordered medications as needed- Offer frequent toileting- Follow urinary retention protocol if ordered  Outcome: Progressing  Goal: Absence of urinary retention  Description: INTERVENTIONS:- Assess patient’s ability to void and empty bladder- Monitor I/O- Bladder scan as needed- Discuss with physician/AP medications to alleviate retention as needed- Discuss catheterization for long term situations as appropriate  Outcome: Progressing  Goal: Urinary catheter remains patent  Description: INTERVENTIONS:- Assess patency of urinary catheter- If patient has a chronic lai, consider changing catheter if non-functioning- Follow guidelines for intermittent irrigation of non-functioning urinary catheter  Outcome: Progressing     Problem: METABOLIC, FLUID AND ELECTROLYTES - ADULT  Goal:  Electrolytes maintained within normal limits  Description: INTERVENTIONS:- Monitor labs and assess patient for signs and symptoms of electrolyte imbalances- Administer electrolyte replacement as ordered- Monitor response to electrolyte replacements, including repeat lab results as appropriate- Instruct patient on fluid and nutrition as appropriate  Outcome: Progressing  Goal: Fluid balance maintained  Description: INTERVENTIONS:- Monitor labs - Monitor I/O and WT- Instruct patient on fluid and nutrition as appropriate- Assess for signs & symptoms of volume excess or deficit  Outcome: Progressing  Goal: Glucose maintained within target range  Description: INTERVENTIONS:- Monitor Blood Glucose as ordered- Assess for signs and symptoms of hyperglycemia and hypoglycemia- Administer ordered medications to maintain glucose within target range- Assess nutritional intake and initiate nutrition service referral as needed  Outcome: Progressing     Problem: Prexisting or High Potential for Compromised Skin Integrity  Goal: Skin integrity is maintained or improved  Description: INTERVENTIONS:- Identify patients at risk for skin breakdown- Assess and monitor skin integrity- Assess and monitor nutrition and hydration status- Monitor labs - Assess for incontinence - Turn and reposition patient- Assist with mobility/ambulation- Relieve pressure over bony prominences- Avoid friction and shearing- Provide appropriate hygiene as needed including keeping skin clean and dry- Evaluate need for skin moisturizer/barrier cream- Collaborate with interdisciplinary team - Patient/family teaching- Consider wound care consult   Outcome: Progressing     Problem: Nutrition/Hydration-ADULT  Goal: Nutrient/Hydration intake appropriate for improving, restoring or maintaining nutritional needs  Description: Monitor and assess patient's nutrition/hydration status for malnutrition. Collaborate with interdisciplinary team and initiate plan and  interventions as ordered.  Monitor patient's weight and dietary intake as ordered or per policy. Utilize nutrition screening tool and intervene as necessary. Determine patient's food preferences and provide high-protein, high-caloric foods as appropriate.     INTERVENTIONS:  - Monitor oral intake, urinary output, labs, and treatment plans  - Assess nutrition and hydration status and recommend course of action  - Evaluate amount of meals eaten  - Assist patient with eating if necessary   - Allow adequate time for meals  - Recommend/ encourage appropriate diets, oral nutritional supplements, and vitamin/mineral supplements  - Order, calculate, and assess calorie counts as needed  - Recommend, monitor, and adjust tube feedings and TPN/PPN based on assessed needs  - Assess need for intravenous fluids  - Provide specific nutrition/hydration education as appropriate  - Include patient/family/caregiver in decisions related to nutrition  Outcome: Progressing

## 2025-05-24 ENCOUNTER — APPOINTMENT (INPATIENT)
Dept: RADIOLOGY | Facility: HOSPITAL | Age: 85
DRG: 233 | End: 2025-05-24
Payer: MEDICARE

## 2025-05-24 LAB
ANION GAP SERPL CALCULATED.3IONS-SCNC: 10 MMOL/L (ref 4–13)
BUN SERPL-MCNC: 31 MG/DL (ref 5–25)
CALCIUM SERPL-MCNC: 9.4 MG/DL (ref 8.4–10.2)
CHLORIDE SERPL-SCNC: 107 MMOL/L (ref 96–108)
CO2 SERPL-SCNC: 28 MMOL/L (ref 21–32)
CREAT SERPL-MCNC: 1.07 MG/DL (ref 0.6–1.3)
ERYTHROCYTE [DISTWIDTH] IN BLOOD BY AUTOMATED COUNT: 13.9 % (ref 11.6–15.1)
GFR SERPL CREATININE-BSD FRML MDRD: 63 ML/MIN/1.73SQ M
GLUCOSE SERPL-MCNC: 117 MG/DL (ref 65–140)
GLUCOSE SERPL-MCNC: 134 MG/DL (ref 65–140)
GLUCOSE SERPL-MCNC: 149 MG/DL (ref 65–140)
GLUCOSE SERPL-MCNC: 151 MG/DL (ref 65–140)
GLUCOSE SERPL-MCNC: 165 MG/DL (ref 65–140)
HCT VFR BLD AUTO: 27.7 % (ref 36.5–49.3)
HGB BLD-MCNC: 9 G/DL (ref 12–17)
MCH RBC QN AUTO: 32.4 PG (ref 26.8–34.3)
MCHC RBC AUTO-ENTMCNC: 32.5 G/DL (ref 31.4–37.4)
MCV RBC AUTO: 100 FL (ref 82–98)
PLATELET # BLD AUTO: 139 THOUSANDS/UL (ref 149–390)
PMV BLD AUTO: 10.2 FL (ref 8.9–12.7)
POTASSIUM SERPL-SCNC: 3.4 MMOL/L (ref 3.5–5.3)
RBC # BLD AUTO: 2.78 MILLION/UL (ref 3.88–5.62)
SODIUM SERPL-SCNC: 145 MMOL/L (ref 135–147)
WBC # BLD AUTO: 9.92 THOUSAND/UL (ref 4.31–10.16)

## 2025-05-24 PROCEDURE — 99232 SBSQ HOSP IP/OBS MODERATE 35: CPT | Performed by: INTERNAL MEDICINE

## 2025-05-24 PROCEDURE — 74018 RADEX ABDOMEN 1 VIEW: CPT

## 2025-05-24 PROCEDURE — 71045 X-RAY EXAM CHEST 1 VIEW: CPT

## 2025-05-24 PROCEDURE — 99024 POSTOP FOLLOW-UP VISIT: CPT | Performed by: THORACIC SURGERY (CARDIOTHORACIC VASCULAR SURGERY)

## 2025-05-24 PROCEDURE — 92526 ORAL FUNCTION THERAPY: CPT

## 2025-05-24 PROCEDURE — 80048 BASIC METABOLIC PNL TOTAL CA: CPT | Performed by: PHYSICIAN ASSISTANT

## 2025-05-24 PROCEDURE — 85027 COMPLETE CBC AUTOMATED: CPT | Performed by: PHYSICIAN ASSISTANT

## 2025-05-24 PROCEDURE — 82948 REAGENT STRIP/BLOOD GLUCOSE: CPT

## 2025-05-24 RX ORDER — DEXTROSE MONOHYDRATE AND SODIUM CHLORIDE 5; .45 G/100ML; G/100ML
50 INJECTION, SOLUTION INTRAVENOUS CONTINUOUS
Status: DISCONTINUED | OUTPATIENT
Start: 2025-05-24 | End: 2025-05-24

## 2025-05-24 RX ADMIN — CHLORHEXIDINE GLUCONATE 15 ML: 1.2 SOLUTION ORAL at 08:28

## 2025-05-24 RX ADMIN — FUROSEMIDE 20 MG: 10 INJECTION, SOLUTION INTRAMUSCULAR; INTRAVENOUS at 17:06

## 2025-05-24 RX ADMIN — FUROSEMIDE 20 MG: 10 INJECTION, SOLUTION INTRAMUSCULAR; INTRAVENOUS at 08:28

## 2025-05-24 RX ADMIN — CHLORHEXIDINE GLUCONATE 15 ML: 1.2 SOLUTION ORAL at 17:06

## 2025-05-24 RX ADMIN — METOROPROLOL TARTRATE 5 MG: 5 INJECTION, SOLUTION INTRAVENOUS at 05:36

## 2025-05-24 RX ADMIN — HEPARIN SODIUM 5000 UNITS: 5000 INJECTION INTRAVENOUS; SUBCUTANEOUS at 08:28

## 2025-05-24 RX ADMIN — HEPARIN SODIUM 5000 UNITS: 5000 INJECTION INTRAVENOUS; SUBCUTANEOUS at 17:06

## 2025-05-24 RX ADMIN — INSULIN LISPRO 1 UNITS: 100 INJECTION, SOLUTION INTRAVENOUS; SUBCUTANEOUS at 22:24

## 2025-05-24 RX ADMIN — DEXTROSE AND SODIUM CHLORIDE 50 ML/HR: 5; .45 INJECTION, SOLUTION INTRAVENOUS at 07:14

## 2025-05-24 RX ADMIN — POTASSIUM CHLORIDE 20 MEQ: 14.9 INJECTION, SOLUTION INTRAVENOUS at 08:28

## 2025-05-24 RX ADMIN — HEPARIN SODIUM 5000 UNITS: 5000 INJECTION INTRAVENOUS; SUBCUTANEOUS at 00:10

## 2025-05-24 RX ADMIN — METOROPROLOL TARTRATE 5 MG: 5 INJECTION, SOLUTION INTRAVENOUS at 11:42

## 2025-05-24 RX ADMIN — METOROPROLOL TARTRATE 5 MG: 5 INJECTION, SOLUTION INTRAVENOUS at 22:20

## 2025-05-24 RX ADMIN — METOROPROLOL TARTRATE 5 MG: 5 INJECTION, SOLUTION INTRAVENOUS at 17:06

## 2025-05-24 NOTE — PROGRESS NOTES
"Cardiology Progress Note - Kilo Mix 84 y.o. 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  He is out of bed to a chair.  He is in sinus rhythm on telemetry.  Chest tubes in place.  BMP with potassium of 3.4 and creatinine of 1.07.  Hemoglobin 9.  Patient's VS are stable.  Patient was swallowing issues.  Video barium swallow demonstrated limited pharyngeal movement with retention and aspiration across small amounts.  Diet to be advanced cautiously.  Receiving intravenous metoprolol.  Patient on intravenous furosemide.  Will monitor renal function and lites.   Will continue current cardiac management.    Subjective:   Patient seen and examined.     Objective:     Vitals: Blood pressure 143/55, pulse 61, temperature 97.9 °F (36.6 °C), resp. rate 18, height 5' 8\" (1.727 m), weight 69.1 kg (152 lb 5.4 oz), SpO2 98%., Body mass index is 23.16 kg/m².,   Orthostatic Blood Pressures      Flowsheet Row Most Recent Value   Blood Pressure 143/55 filed at 05/24/2025 0545   Patient Position - Orthostatic VS Lying filed at 05/24/2025 0405        ,      Intake/Output Summary (Last 24 hours) at 5/24/2025 0814  Last data filed at " 5/24/2025 0540  Gross per 24 hour   Intake 0 ml   Output 2110 ml   Net -2110 ml       No significant arrhythmias seen on telemetry review.       Physical Exam:    GEN: Kilo E Voce    NECK: supple, no JVD or HJR  HEART: normal rate, regular rhythm, normal S1 and S2, no murmurs, clicks, gallops or rubs   LUNGS: clear to auscultation bilaterally; no wheezes, rales, or rhonchi   EXTREMITIES: edema  SKIN: warm and well perfused, no suspicious lesions on exposed skin    Labs & Results:    No results displayed because visit has over 200 results.          FL barium swallow video w speech  Result Date: 5/23/2025  Narrative: A video barium swallow study was performed by the Department of Speech Pathology. Please refer to the report for the official interpretation. The images are stored for archival purposes only. Study images were not formally reviewed by the Radiology Department.    XR chest portable  Result Date: 5/22/2025  Narrative: XR CHEST PORTABLE INDICATION: Post Open Heart Surgey. COMPARISON: 5/21/2025 FINDINGS: ET tube and [place] Breeze-Philip catheter removed. Stable position of right-sided IJ catheter and mediastinal/thoracostomy tubes. New left lung base patchy airspace opacity, atelectasis versus pneumonitis. Stable minimal right lung base opacity. No pneumothorax. Unchanged cardiomediastinal silhouette. No acute osseous abnormality within the limitations of portable radiography. Normal upper abdomen.     Impression: New left lung base opacity, atelectasis versus pneumonitis. Stable minimal right lung base opacity. Workstation performed: [id number]     XR chest portable ICU  Result Date: 5/21/2025  Narrative: XR CHEST PORTABLE ICU INDICATION: S/P open heart. COMPARISON: 5/10/2025 FINDINGS: Tip of endotracheal tube is 3.5 cm above donna. Gulf Breeze-Philip line tip in the region of main pulmonary artery. Right IJ central venous catheter tip projects over the SVC. Left chest tube and mediastinal drains noted. There is some patchy  infiltrate in the right infrahilar area. This is less pronounced than on the prior study. Somewhat low lung volumes. No pneumothorax or pleural effusion. Evidence of CABG. Heart size normal. Aorta is atherosclerotic Bones are unremarkable for age. Normal upper abdomen.     Impression: Diminished infiltrate pattern in the right lung base compared with the prior study. Expected postop changes as noted above with lines and tubes appearing satisfactory Workstation performed: [id number]      bedside procedure  Result Date: 5/21/2025  Narrative: [id number]    Complete PFT with post bronchodilator  Result Date: 5/16/2025  Narrative: Images from the original result were not included. Pulmonary Function Test Report Kilo Hand 84 y.o. male MRN: [id number] Date Performed: 5/15/2025 Date Interpretation: 5/15/2025 Interpretation: Normal spirometry. No post bronchodilator response. Moderate restriction as indicated by the lung volumes. Mild decrease corrected diffusion capacity. Flow-volume loop suggestive of a fixed obstruction, clinical correlation advised. Daria Bermudez MD [facility] Attestation I agree with interpretation as above.            Board Certified Pulmonary Physician    CAMERON carotid complete study  Result Date: 5/13/2025  Narrative: [facility] . KILO HAND is a 84 year old male who was referred by LIAT ARMSTRONG.      Indications:  Patient presents for surgical clearance and general health evaluation secondary to future open heart surgery.  Patient is S/P MI.  Operative History:  05/12/2025 Cardiac catheterization.  Right Common carotid artery/stent.  Coronary angioplasty/stent  Risk Factors:  The patient reports diabetes, CAD, hyperlipidemia, PVD, prostate cancer, bladder tumor, and prior smoking: quit >10yrs ago. Height: 68 inches. Weight: 159 lbs.   Clinical:  Right: IV site. Left BP: 143/66  FINDINGS: Main                                          Segment Impression  PSV  EDV  Ratio  Direction of Flow Right                                                                                            Prox CCA                                                  52   16                             Mid CCA                                                   78   24                             Right Distal Common Carotid Stent                         80   26                             Dist CCA                                                  80   26                             Prox. ICA                                                 93   22   1.18                      Mid. ICA                                                  84   24   1.08                      Dist. ICA                                                 83   24   1.06                      Ext Carotid                             Severe stenosis  499   31                             Prox Vert.                                                26    4                 Antegrade    Right Subclavian Artery                                   88    0                          Left                                                                                             Prox CCA                                                  60   14                             Mid CCA                                                   75   19                             Dist CCA                                                  73   17                             Carotid Bifurcation                                       75   24   1.00                      Prox. ICA                                                179   57   2.37                      Mid. ICA                                                  80   26   1.06                      Dist. ICA                                                 73   17   0.97                      Ext Carotid                                               96   16                              Prox Vert                                                 86   20                 Antegrade    Left Subclavian Artery                                   172    0                          CONCLUSION:  RIGHT: There is <50% stenosis noted in the internal carotid artery. The common carotid artery/bulb stent is patent. Plaque is homogenous and smooth.  A severe stenosis was identified in the proximal external carotid artery. Vertebral artery flow is antegrade.  There is no significant subclavian artery disease.   LEFT: There is 50-69% stenosis noted in the internal carotid artery.  Plaque is heterogenous and irregular.  Vertebral artery flow is antegrade.  There is no significant subclavian artery disease.   In comparison to the study of 11-, there is no significant B/L interval change in the disease process.  Recommend repeat testing in 6 months as per protocol unless otherwise indicated.  SIGNATURE Signed by LISANDRA QUINTEROS DO on 2025-05-13 17:01:05 [email or web address]    CT chest wo contrast  Result Date: 5/13/2025  Narrative: CT CHEST WITHOUT IV CONTRAST INDICATION: pre op CABG. COMPARISON: Two-view chest 5/10/2025. PET/CT 2/19/2025.l TECHNIQUE: CT examination of the chest was performed without intravenous contrast. Multiplanar 2D reformatted images were created from the source data. This examination, like all CT scans performed in the [facility], was performed utilizing techniques to minimize radiation dose exposure, including the use of iterative reconstruction and automated exposure control. Radiation dose length product (DLP) for this visit: 441.77 mGy-cm. FINDINGS: LUNGS: Persistent focal nodular consolidative and groundglass opacities throughout the right middle lobe and right lower lobe in keeping with multi lobar pneumonia. PLEURA: Unremarkable. HEART/GREAT VESSELS: Borderline heart  size. Dense coronary artery calcifications. Aortic annulus calcifications. No thoracic aortic aneurysm. MEDIASTINUM AND ROVERTO: Unremarkable. CHEST WALL AND LOWER NECK: Unremarkable. VISUALIZED STRUCTURES IN THE UPPER ABDOMEN: Cholelithiasis. 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 Atrophic right kidney. Simple appearing renal cyst. OSSEOUS STRUCTURES: No acute fracture or destructive osseous lesion.     Impression: Persistent right middle lobe and right lower lobe pneumonia. The study was marked in EPIC for immediate notification. Workstation performed: [id number]     VAS lower limb vein mapping bypass graft  Result Date: 5/13/2025  Narrative: [facility]PORT . AMBER HAND is a 84 year old male who was referred by LIAT ARMSTRONG.      Indications:  Patient presents for surgical clearance and general health evaluation secondary to future open-heart surgery.  Patient is S/P MI. Evaluation for conduit. Operative History:  05/12/2025 Cardiac catheterization. Right Common carotid artery/stent. Coronary angioplasty/stent Risk Factors:  The patient reports diabetes, CAD, hyperlipidemia, PVD, prostate cancer, bladder tumor, and prior smoking: quit >10yrs ago. Height: 68 inches. Weight: 159 lbs. Clinical:  Left BP: 143/66 FINDINGS: Main                                          AP Diam (mm) Right Superficial Veins                                 Right Great Saphenous - Inguinal               6.7    Right Great Saphenous - Prox Thigh             2.1    Right Great Saphenous - Mid Thigh              1.0    Right Great Saphenous - Dist Thigh             0.9    Right Great Saphenous - Knee                   0.9    Right Great Saphenous - Mid Calf               0.8    Right Great Saphenous - Ankle                  1.1 Left Superficial Veins                                  Left Great Saphenous - Inguinal                5.7    Left Great Saphenous - Prox Thigh              2.3    Left Great Saphenous - Knee                     2.2    Left Great Saphenous - Mid Calf                0.9    Left Great Saphenous - Ankle                   1.7 CONCLUSION:   RIGHT LOWER LIMB:  The great saphenous vein is patent from the groin to the ankle.  The intraluminal diameter measurements range from 0.8 mm to 6.7 throughout.    LEFT LOWER LIMB:  The great saphenous vein is patent from the groin to the ankle.  Tech note: The great saphenous vein exits the fascia in the proximal thigh.  The intraluminal diameter measurements range from 0.9 mm to 5.7 mm throughout.   Technical findings electronically sent to patient's chart.      SIGNATURE Signed by LISSETTE SALAS DO, RPVI on 2025-05-13 14:08:28 [email or web address]    Echo complete w/ contrast if indicated  Result Date: 5/12/2025  Narrative:   Left Ventricle: Left ventricular cavity size is normal. Wall thickness is normal. There is mild concentric hypertrophy. The left ventricular ejection fraction is 60%. Systolic function is normal. Wall motion is normal. Diastolic function is mildly abnormal, consistent with grade I (abnormal) relaxation.   Right Ventricle: Right ventricular cavity size is normal. Systolic function is normal.   Left Atrium: The atrium is mildly dilated (35-41 mL/m2).   Mitral Valve: There is mild annular calcification. There is mild regurgitation.   Tricuspid Valve: There is mild regurgitation. The right ventricular systolic pressure is mildly elevated. The estimated right ventricular systolic pressure is 41.00 mmHg.     Cardiac catheterization  Result Date: 5/12/2025  Narrative:   Complex 3V CAD +LMCA     X-ray chest 1 view portable  Result Date: 5/11/2025  Narrative: XR CHEST PORTABLE INDICATION: chest pain. COMPARISON: CXR 2/2/2023. FINDINGS: Moderate consolidation in the right base. No pneumothorax or pleural effusion. Normal cardiomediastinal silhouette. Bones are unremarkable for age.  Normal upper abdomen.     Impression: Moderate right base pneumonia. The study was marked in EPIC for immediate notification. Workstation performed: [id number]     IR nephrostomy tube check/change/reposition/reinsertion/upsize  Result Date: 5/1/2025  Narrative: Bilateral nephrostogram and nephrostomy tube change Clinical History: Bladder cancer. Bilateral nephrostomy tube replacement. Contrast: 24 mL of iohexol Fluoro time: 5.9 MINUTES Number of Images: 16 Radiation dose: 28 mGy Concious sedation time: N/A Technique: The patient was brought to the interventional radiology suite and placed prone on the table. The existing bilateral nephrostomy tubes prepped and draped in the usual sterile fashion. After a  view was obtained, contrast was injected into the existing catheter and multiple images of the urinary tract were obtained to the bladder. Findings: Left renal collecting system demonstrates no filling defects or dilatation. Left ureter is dilated and opacified. Complete occlusion at the ureterovesical junction. Right nephrostogram demonstrates nondilated system with no filling defects. Complete occlusion at the distal ureter. Intervention: The left catheter was removed over a heavy-duty straight wire, and a Kumpe catheter was advanced to the distal ureter. Attempts to advance a wire through the ureterovesical junction was unsuccessful. New 10.2 Belgian nephrostomy tube was advanced, and the loop formed within the renal pelvis. The catheter was then injected, confirming satisfactory position. The right catheter was removed over a wire and exchanged for a Kumpe catheter which was advanced into the distal ureter. A Glidewire could be easily advanced through the ureterovesical junction into the bladder. Once the Kumpe catheter was in the bladder  contrast opacified nondistended bladder. Over the wire a new 10.2 Belgian APD catheter was placed and looped in the renal pelvis. Both catheter was secured with sutures and  connected to drainage bag.     Impression: Impression: 1. Nephrostogram demonstrates nondilated system. Complete occlusion of the distal left ureterovesical junction. Stenotic right ureterovesical junction could be bypassed with a wire and catheter. 2. Successful exchange of 10.2 Romanian nephrostomy tubes under fluoroscopic control. Findings discussed with Dr. Youssef. Workstation performed: [id number]       EKG personally reviewed by Juan Manuel Westfall MD.     Counseling / Coordination of Care  Total floor / unit time spent today 30 minutes.  Greater than 50% of total time was spent with the patient and / or family counseling and / or coordination of care.

## 2025-05-24 NOTE — CASE MANAGEMENT
Case Management Discharge Planning Note    Patient name Kilo Mix  Location [facility] MRN [id number]  : 1940 Date 2025       Current Admission Date: 5/10/2025  Current Admission Diagnosis:NSTEMI (non-ST elevated myocardial infarction) ([facility])   Patient Active Problem List    Diagnosis Date Noted    Leukocytosis 2025    S/P CABG x 3 2025    RBBB 2025    Hx of acute poliomyelitis 2025    Restrictive lung disease 2025    Moderate protein-calorie malnutrition (HCC) 2025    Preoperative clearance 2025    ALPHONSO (obstructive sleep apnea) 2025    Former smoker 2025    At risk for delirium 2025    Depression 2025    Frailty 2025    Cognitive impairment 2025    Pneumonia involving right lung 2025    NSTEMI (non-ST elevated myocardial infarction) ([facility]) 05/10/2025    H/O insertion of nephrostomy tube 05/10/2025    CKD stage 3 due to type 2 diabetes mellitus ([facility]) 2025    Hydronephrosis 2024    Acute kidney injury ([facility]) 2024    Primary osteoarthritis of left shoulder 2024    Traumatic complete tear of right rotator cuff 2024    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    Personal history of colonic polyps 2020    Schatzki's ring 2020    Dysphagia 2020    Antiplatelet or antithrombotic long-term use 2020    Plantar fasciitis of right foot 2020    AMD (age related macular degeneration) 2020    Prostate cancer ([facility]) 2019    Asymptomatic stenosis of left carotid artery 2018    Diabetes mellitus type 2 with neurological manifestations ([facility]) 2015    Peripheral vascular disease ([facility]) 2012    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    Diabetic polyneuropathy ([facility]) 2012    Diastolic dysfunction 2012    Dyslipidemia 2012    Lower back pain 2012    Sinus bradycardia 2012     Hypertension 05/07/2012    Recurrent stenosis of right carotid artery 01/26/2012    Coronary artery disease involving native coronary artery 02/21/2011      LOS (days): 14  Geometric Mean LOS (GMLOS) (days): 11.3  Days to GMLOS:-2.2     OBJECTIVE:  Risk of Unplanned Readmission Score: 16.77         Current admission status: Inpatient   Preferred Pharmacy:   [facility] - [place] - [address]  [address]  [place] [address]  Phone: [phone] Fax: [phone]    [facility] - [place], [place] - [address] [facility]  [address]  [address]  [place] [address]  Phone: [phone] Fax: [phone]    Primary Care Provider: Dolores Vizcarra DO    Primary Insurance: MEDICARE  Secondary Insurance: AETNA    DISCHARGE DETAILS:    Per chart review, patient not medically cleared at this time. CM team will continue to follow for discharge planning needs.

## 2025-05-24 NOTE — PROGRESS NOTES
Progress Note - Cardiac Surgery   Kilo Mix 84 y.o. male MRN: [id number]  Unit/Bed#: PPHP-316-01 Encounter: [id number]    Coronary artery disease. S/P coronary artery bypass grafting; POD # 3      24 Hour Events: Video barium swallow completed; Liminted pharyngeal movement with retention and aspiration across small amounts. Diet advanced to ice chips with supervision. Speech follow up and repeat the video swallow in a few days.     Medications:   Scheduled Meds:  Current Facility-Administered Medications   Medication Dose Route Frequency Provider Last Rate    acetaminophen  650 mg Rectal Q4H PRN Alicia Bender PA-C      amiodarone  200 mg Oral Q8H [facility] Alicia Bender PA-C      aspirin  325 mg Oral Daily Alicia Bender PA-C      atorvastatin  80 mg Oral Daily With Dinner Alicia Bender PA-C      bisacodyl  10 mg Rectal Daily PRN Alicia Bender PA-C      chlorhexidine  15 mL Mouth/Throat BID Alicia Bender PA-C      docusate sodium  100 mg Oral BID Alicia Bender PA-C      furosemide  20 mg Intravenous BID (diuretic) Juan Daniel Loyd PA-C      heparin (porcine)  5,000 Units Subcutaneous Q8H [facility] Juan Daniel Loyd PA-C      insulin lispro  1-5 Units Subcutaneous HS Alicia Bender PA-C      insulin lispro  1-5 Units Subcutaneous TID AC Alicia Bender PA-C      metoprolol  5 mg Intravenous Q6H Juan Daniel Loyd PA-C      [Held by provider] metoprolol tartrate  25 mg Oral Q12H [facility] Juan Daniel Loyd PA-C      mupirocin  1 Application Nasal Q12H [facility] Alicia Bender PA-C      oxyCODONE  2.5 mg Oral Q4H PRN Alicia Bender PA-C      Or    oxyCODONE  5 mg Oral Q4H PRN Alicia Bender PA-C      pantoprazole  40 mg Oral Daily Alicia Bender PA-C      polyethylene glycol  17 g Oral Daily Alicia Bender PA-C      [Held by provider] potassium chloride  20 mEq Oral Daily Alicia Bender PA-C      potassium chloride  20 mEq Intravenous Daily Juan Daniel Loyd PA-C Stopped (05/23/25 1346)    temazepam  15 mg Oral HS  PRN Alicia Bender PA-C      [Held by provider] torsemide  20 mg Oral Daily Juan Daniel Loyd PA-C       Continuous Infusions:   PRN Meds:.  acetaminophen    bisacodyl    oxyCODONE **OR** oxyCODONE    temazepam    Vitals:   Vitals:    05/24/25 0405 05/24/25 0500 05/24/25 0537 05/24/25 0545   BP: 147/81  153/72 143/55   BP Location: Right arm      Pulse: 76  78 61   Resp: 18      Temp: 98.4 °F (36.9 °C)      TempSrc: Oral      SpO2: 98% 100% 98% 98%   Weight:       Height:           Telemetry: NSR; Heart Rate: 68    Respiratory:   SpO2: SpO2: 98 %, SpO2 Activity: SpO2 Activity: At Rest; 2 LPM    Intake/Output:     Intake/Output Summary (Last 24 hours) at 5/24/2025 0626  Last data filed at 5/24/2025 0540  Gross per 24 hour   Intake 0 ml   Output 2110 ml   Net -2110 ml        Weights:   Weight (last 2 days)       Date/Time Weight    05/23/25 0620 72.2 (159.17)    05/22/25 0543 73.3 (161.6)                Results:   Results from last 7 days   Lab Units 05/24/25  0407 05/23/25  0429 05/22/25  0408   WBC Thousand/uL 9.92 18.44* 17.51*   HEMOGLOBIN g/dL 9.0* 10.0* 11.8*   HEMATOCRIT % 27.7* 30.4* 30.2*   PLATELETS Thousands/uL 139* 185 365     Results from last 7 days   Lab Units 05/24/25  0407 05/23/25  0429 05/22/25  0408 05/21/25  1708 05/21/25  1345   SODIUM mmol/L 145 140 139  --   --    POTASSIUM mmol/L 3.4* 4.2 4.1   < >  --    CHLORIDE mmol/L 107 103 106  --   --    CO2 mmol/L 28 25 24  --   --    CO2, I-STAT mmol/L  --   --   --   --  23   BUN mg/dL 31* 28* 22  --   --    CREATININE mg/dL 1.07 1.20 1.12  --   --    GLUCOSE, ISTAT mg/dl  --   --   --   --  166*   CALCIUM mg/dL 9.4 9.6 9.1  --   --     < > = values in this interval not displayed.     Recent Labs     05/23/25  0429   MG 2.3     Results from last 7 days   Lab Units 05/20/25  0347 05/19/25  0454 05/18/25  0541   PTT seconds 76* 78* 85*         Point of care glucose: 117-134    Studies:  No new studies    Invasive Lines/Tubes:  Invasive Devices        Central Venous Catheter Line  Duration             CVC Central Lines 05/21/25 2 days              Drain  Duration             Nephrostomy Left 10.2 Fr. 24 days    Nephrostomy Right 10.2 Fr. 24 days                  Physical Exam:    General: No acute distress, Alert, and Normal appearance  HEENT/NECK:  Normocephalic. Atraumatic.  No jugular venous distention.    Cardiac: Regular rate and rhythm and No murmurs/rubs/gallops  Pulmonary:  Breath sounds clear bilaterally and No rales/rhonchi/wheezes  Abdomen:  Non-tender, Non-distended, and Normal bowel sounds  Incisions: Sternum is stable.  Incision dressed with Acticoat.  No erythema or drainage and Saphenectomy incision dressed with Acticoat.  No erythema or drainage  Extremities: Extremities warm/dry  Neuro: Alert and oriented X 3  Skin: Warm/Dry, without rashes or lesions.      Assessment:  Principal Problem:    NSTEMI (non-ST elevated myocardial infarction) ([facility])  Active Problems:    Coronary artery disease involving native coronary artery    Diabetes mellitus type 2 with neurological manifestations ([facility])    Dyslipidemia    Hypertension    Peripheral vascular disease ([facility])    Asymptomatic stenosis of left carotid artery    Prostate cancer ([facility])    H/O insertion of nephrostomy tube    Pneumonia involving right lung    Preoperative clearance    ALPHONSO (obstructive sleep apnea)    Former smoker    At risk for delirium    Depression    Frailty    Cognitive impairment    Hx of acute poliomyelitis    Restrictive lung disease    Moderate protein-calorie malnutrition (HCC)    S/P CABG x 3    RBBB    Leukocytosis       Coronary artery disease. S/P coronary artery bypass grafting; POD # 3    Plan:    Cardiac:     S/P acute preoperative NSTEMI  Normal ventricular systolic function, EF 65%    Carotid stenosis (s/p R CEA, 50-69% LICA stenosis)    NSR; BP/HRwell-controlled   Continue Lopressor 5 mg IV q 6 hours    AF prophylaxis  PO Amiodarone on hold 2/2 dysphagia    ASA and  Statin therapy on hold 2/2 dysphagia    Maintain central IV access today for lack of peripheral access    Continue Subcutaneous Heparin for DVT prophylaxis    Pulmonary:      Wean oxygen as able    Renal:     Normal preoperative renal function  Creatinine 1.07, from 1.2 today, from 1.12  S/P b/l nephrostomy tubes (2/2 tumor obstruction)    Intake/Output net: -  2000  mL/24 hours      Wt today 159, from 161   Pre-op Wt: 159    Diuretic Regimen:  Start D5 1/2 NSS for NPO  Continue Lasix 20 mg IV BID      Neuro:    Neurologically intact; No active issues     Incisional pain well controlled   Continue oxycodone, 2.5 to 5 mg PO q 4 hours prn pain    GI:    Cardiac diet, with 1800 mL fluid restriction    Post-operative dysphagia   Speech eval/VBS completed  Rec NPO with ice chips  Start D5 1/2 NSS for NPO  T/C TPN    Tolerating diet without complaint    Continue stool softeners and prn suppository    Continue GI prophylaxis    Endo:     Pre-Op Hgb A1C: 5.6    Patient has been transitioned from continuous insulin infusion to intermittent subcutaneous dosing  Serum glucose well controlled on insulin sliding scale coverage    7    Hematology:   H/O prostate ca (s/p prostatectomy, recurrent mets to lymph nodes, stage IV    Post-operative blood count acceptable; Trend prn  Leukocytosis; Afebrile with no signs of infection;     CBC in AM    8.   Disposition:        Following daily PT/OT recommendations regarding home vs. rehab when medically cleared for discharge  Routine postoperative recovery to this point; Anticipated discharge date: 5/25      VTE Pharmacologic Prophylaxis: Heparin  VTE Mechanical Prophylaxis: sequential compression device    Collaborative rounds completed with supervising physician  Plan of care discussed with bedside nurse    SIGNATURE: Juan Daniel Loyd PA-C  DATE: May 24, 2025  TIME: 6:26 AM

## 2025-05-24 NOTE — SPEECH THERAPY NOTE
Speech Language/Pathology    Speech/Language Pathology Progress Note    Patient Name: Kilo Mix  Today's Date: 5/24/2025     Problem List  Principal Problem:    NSTEMI (non-ST elevated myocardial infarction) ([facility])  Active Problems:    Coronary artery disease involving native coronary artery    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)    Dyslipidemia    Hypertension    Peripheral vascular disease (HCC)    Asymptomatic stenosis of left carotid artery    Prostate cancer (HCC)    H/O insertion of nephrostomy tube    Pneumonia involving right lung    Preoperative clearance    ALPHONSO (obstructive sleep apnea)    Former smoker    At risk for delirium    Depression    Frailty    Cognitive impairment    Hx of acute poliomyelitis    Restrictive lung disease    Moderate protein-calorie malnutrition (HCC)    S/P CABG x 3    RBBB    Leukocytosis       Past Medical History  Past Medical History[1]     Past Surgical History  Past Surgical History[2]      Subjective:  Pt seen for dysphagia tx. Pt alert, sitting upright in chair. Family at bedside.     Objective:  Educated pt and family regarding results of MBS, using description and use of an anatomy drawing. Discussed plan to repeat MBS in a few days. Educated pt/family on importance of oral care to reduce oral bacteria/risk of aspiration related pneumonia. All verbalized understanding. Pt stated he has not yet brushed his teeth today. Retrieved toothbrush and paste, and assisted pt with brushing his teeth, rinsing and spitting. He was able to follow directions to not swallow the water.   Pt currently has an NGT for nutrition. Vocal quality was wet/gurgly upon entering the room, and for most of the session. Pt cued to cough/throat clear when vocal quality is wet; throat clear was briefly effective. Pt had a cup of ice chips on his table. Daughter reports he does have intermittent strong coughing with the ice chips. Pt took single ice chips x2. He was cued to  initiate an effortful swallow when the chips melted. There was a delayed strong wet cough after the first ice chip, and slightly wet vocal quality after the second ice chip. Pt used the yankauer to orally suction several times during the session, including with just saliva.     Educated pt/family that oral care should be completed prior to having ice chips, and to take the chips slowly and sparingly, one at a time, with an effortful/strong swallow when the chip melts. Pt demonstrated understanding.     Assessment:  Pt/family verbalized and demonstrated understanding of education regarding results of MBS, importance of oral care, NPO recommendation, aspiration precautions, etc.     Plan/Recommendations:  Continue NPO with alternate feeding. Frequent oral care. Ice chips one at a time, intermittently, after oral care. Encourage effortful swallows with the ice chips. Repeat MBS in a few days. Continue with dysphagia tx.           [1]   Past Medical History:  Diagnosis Date    Bladder tumor     Cancer (HCC)     Prostate cancer    Depression 5/14/2025    Diabetes mellitus (HCC)     Diverticulitis of colon     History of common carotid artery stent placement     RIGHT stent - per pts son - RIGHT stent  in  carotid artery    History of heart artery stent     x2 stents in heart per pts son    Hyperlipidemia     Hypertension     Migraines     Myocardial infarction (HCC)     about 30yrs ago    Nephrostomy present (HCC)     2/25/25 Per pts son Ed- pt has two nephrostomy tubes    Prostate cancer (HCC)     Seasonal allergies    [2]   Past Surgical History:  Procedure Laterality Date    CARDIAC CATHETERIZATION N/A 5/12/2025    Procedure: Cardiac Catheterization;  Surgeon: Daniel Mabry DO;  Location: [facility];  Service: Cardiology    CARDIAC SURGERY      CAROTID ENDARTARECTOMY Right     thromboendarterectomy    CAROTID STENT Right     CATARACT EXTRACTION Left     COLONOSCOPY      fiberoptic screening 2/6/08 6  yr / complete 3/15/13 6 yr    CORONARY ANGIOPLASTY WITH STENT PLACEMENT      ELBOW SURGERY      EYE SURGERY      IR BIOPSY LYMPH NODE  12/24/2024    IR NEPHROSTOMY TUBE CHECK/CHANGE/REPOSITION/REINSERTION/UPSIZE  2/25/2025    IR NEPHROSTOMY TUBE CHECK/CHANGE/REPOSITION/REINSERTION/UPSIZE  4/29/2025    IR NEPHROSTOMY TUBE PLACEMENT  12/24/2024    KNEE SURGERY      KS CORONARY ARTERY BYP W/VEIN & ARTERY GRAFT 3 VEIN N/A 5/21/2025    Procedure: CORONARY ARTERY BYPASS GRAFT (CABG) X3 VESSELS, LIMA - LAD, LEFT LEG EVH/SVG - PDA AND OM, W/CHAPO;  Surgeon: David oTmlin DO;  Location:  MAIN OR;  Service: Cardiac Surgery    KS CYSTO W/REMOVAL OF LESIONS SMALL N/A 1/21/2025    Procedure: TRANSURETHRAL RESECTION OF BLADDER TUMOR (TURBT);  Surgeon: Jordan Youssef MD;  Location:  MAIN OR;  Service: Urology    KS CYSTO W/REMOVAL OF LESIONS SMALL N/A 3/6/2025    Procedure: TRANSURETHRAL RESECTION OF BLADDER TUMOR (TURBT);  Surgeon: Jordan Youssef MD;  Location:  MAIN OR;  Service: Urology    PROSTATE SURGERY      SUPRAPUBIC PROSTATECTOMY  12/06/1999    UPPER GASTROINTESTINAL ENDOSCOPY

## 2025-05-25 LAB
ANION GAP SERPL CALCULATED.3IONS-SCNC: 9 MMOL/L (ref 4–13)
BUN SERPL-MCNC: 38 MG/DL (ref 5–25)
CALCIUM SERPL-MCNC: 9.5 MG/DL (ref 8.4–10.2)
CHLORIDE SERPL-SCNC: 108 MMOL/L (ref 96–108)
CO2 SERPL-SCNC: 30 MMOL/L (ref 21–32)
CREAT SERPL-MCNC: 1.03 MG/DL (ref 0.6–1.3)
GFR SERPL CREATININE-BSD FRML MDRD: 66 ML/MIN/1.73SQ M
GLUCOSE SERPL-MCNC: 156 MG/DL (ref 65–140)
GLUCOSE SERPL-MCNC: 161 MG/DL (ref 65–140)
GLUCOSE SERPL-MCNC: 169 MG/DL (ref 65–140)
GLUCOSE SERPL-MCNC: 171 MG/DL (ref 65–140)
GLUCOSE SERPL-MCNC: 179 MG/DL (ref 65–140)
POTASSIUM SERPL-SCNC: 3.4 MMOL/L (ref 3.5–5.3)
SODIUM SERPL-SCNC: 147 MMOL/L (ref 135–147)

## 2025-05-25 PROCEDURE — 99232 SBSQ HOSP IP/OBS MODERATE 35: CPT | Performed by: INTERNAL MEDICINE

## 2025-05-25 PROCEDURE — 82948 REAGENT STRIP/BLOOD GLUCOSE: CPT

## 2025-05-25 PROCEDURE — 99024 POSTOP FOLLOW-UP VISIT: CPT | Performed by: THORACIC SURGERY (CARDIOTHORACIC VASCULAR SURGERY)

## 2025-05-25 PROCEDURE — 80048 BASIC METABOLIC PNL TOTAL CA: CPT | Performed by: THORACIC SURGERY (CARDIOTHORACIC VASCULAR SURGERY)

## 2025-05-25 RX ORDER — POTASSIUM CHLORIDE 14.9 MG/ML
20 INJECTION INTRAVENOUS ONCE
Status: COMPLETED | OUTPATIENT
Start: 2025-05-25 | End: 2025-05-25

## 2025-05-25 RX ADMIN — HEPARIN SODIUM 5000 UNITS: 5000 INJECTION INTRAVENOUS; SUBCUTANEOUS at 08:47

## 2025-05-25 RX ADMIN — METOROPROLOL TARTRATE 5 MG: 5 INJECTION, SOLUTION INTRAVENOUS at 17:10

## 2025-05-25 RX ADMIN — INSULIN LISPRO 1 UNITS: 100 INJECTION, SOLUTION INTRAVENOUS; SUBCUTANEOUS at 21:33

## 2025-05-25 RX ADMIN — METOROPROLOL TARTRATE 5 MG: 5 INJECTION, SOLUTION INTRAVENOUS at 12:25

## 2025-05-25 RX ADMIN — POTASSIUM CHLORIDE 20 MEQ: 14.9 INJECTION, SOLUTION INTRAVENOUS at 08:47

## 2025-05-25 RX ADMIN — INSULIN LISPRO 1 UNITS: 100 INJECTION, SOLUTION INTRAVENOUS; SUBCUTANEOUS at 12:25

## 2025-05-25 RX ADMIN — CHLORHEXIDINE GLUCONATE 15 ML: 1.2 SOLUTION ORAL at 08:48

## 2025-05-25 RX ADMIN — INSULIN LISPRO 1 UNITS: 100 INJECTION, SOLUTION INTRAVENOUS; SUBCUTANEOUS at 06:16

## 2025-05-25 RX ADMIN — HEPARIN SODIUM 5000 UNITS: 5000 INJECTION INTRAVENOUS; SUBCUTANEOUS at 17:10

## 2025-05-25 RX ADMIN — HEPARIN SODIUM 5000 UNITS: 5000 INJECTION INTRAVENOUS; SUBCUTANEOUS at 00:23

## 2025-05-25 RX ADMIN — METOROPROLOL TARTRATE 5 MG: 5 INJECTION, SOLUTION INTRAVENOUS at 23:45

## 2025-05-25 RX ADMIN — INSULIN LISPRO 1 UNITS: 100 INJECTION, SOLUTION INTRAVENOUS; SUBCUTANEOUS at 17:10

## 2025-05-25 RX ADMIN — METOROPROLOL TARTRATE 5 MG: 5 INJECTION, SOLUTION INTRAVENOUS at 06:14

## 2025-05-25 NOTE — PROGRESS NOTES
Progress Note - Cardiac Surgery   Kilo Mix 84 y.o. male MRN: [id number]  Unit/Bed#: PPHP-316-01 Encounter: [id number]    Coronary artery disease. S/P coronary artery bypass grafting; POD # 4      24 Hour Events: Nasogastric feeding tube inserted yesterday; Jevity tube feeds started and advanced to goal; Tolerating without significant residuals.      Medications:   Scheduled Meds:  Current Facility-Administered Medications   Medication Dose Route Frequency Provider Last Rate    acetaminophen  650 mg Rectal Q4H PRN Alicia Bender PA-C      amiodarone  200 mg Oral Q8H [facility] Alicia Bender PA-C      aspirin  325 mg Oral Daily Alicia Bender PA-C      atorvastatin  80 mg Oral Daily With Dinner Alicia Bender PA-C      bisacodyl  10 mg Rectal Daily PRN Alicia Bender PA-C      chlorhexidine  15 mL Mouth/Throat BID Alicia Bender PA-C      docusate sodium  100 mg Oral BID Alicia Bender PA-C      furosemide  20 mg Intravenous BID (diuretic) Juan Daniel Loyd PA-C      heparin (porcine)  5,000 Units Subcutaneous Q8H [facility] Juan Daniel Loyd PA-C      insulin lispro  1-5 Units Subcutaneous HS Alicia Bender PA-C      insulin lispro  1-5 Units Subcutaneous TID AC Alicia Bender PA-C      metoprolol  5 mg Intravenous Q6H Juan Daniel Loyd PA-C      [Held by provider] metoprolol tartrate  25 mg Oral Q12H [facility] Juan Daniel Loyd PA-C      oxyCODONE  2.5 mg Oral Q4H PRN Alicia Bender PA-C      Or    oxyCODONE  5 mg Oral Q4H PRN Alicia Bender PA-C      pantoprazole  40 mg Oral Daily Alicia Bender PA-C      polyethylene glycol  17 g Oral Daily Alicia Bender PA-C      [Held by provider] potassium chloride  20 mEq Oral Daily Alicia Bender PA-C      potassium chloride  20 mEq Intravenous Daily Juan Daniel Loyd PA-C 20 mEq (05/24/25 0828)    temazepam  15 mg Oral HS PRN Alicia Bender PA-C      [Held by provider] torsemide  20 mg Oral Daily Juan Daniel Loyd PA-C       Continuous Infusions:   PRN Meds:.   acetaminophen    bisacodyl    oxyCODONE **OR** oxyCODONE    temazepam    Vitals:   Vitals:    05/24/25 2300 05/25/25 0224 05/25/25 0613 05/25/25 0702   BP: 142/63 146/75  125/56   BP Location:       Pulse: 76 79  68   Resp:  14     Temp:  98 °F (36.7 °C)  97.8 °F (36.6 °C)   TempSrc:       SpO2: 96% 95%  99%   Weight:   68.3 kg (150 lb 9.2 oz)    Height:           Telemetry: NSR; Heart Rate: 68    Respiratory:   SpO2: SpO2: 99 %, SpO2 Activity: SpO2 Activity: At Rest; 2 LPM    Intake/Output:     Intake/Output Summary (Last 24 hours) at 5/25/2025 0712  Last data filed at 5/25/2025 0500  Gross per 24 hour   Intake 1275.83 ml   Output 1525 ml   Net -249.17 ml        Weights:   Weight (last 2 days)       Date/Time Weight    05/25/25 0613 68.3 (150.57)    05/24/25 05:45:55 69.1 (152.34)    05/23/25 0620 72.2 (159.17)                Results:   Results from last 7 days   Lab Units 05/24/25  0407 05/23/25  0429 05/22/25  0408   WBC Thousand/uL 9.92 18.44* 17.51*   HEMOGLOBIN g/dL 9.0* 10.0* 11.8*   HEMATOCRIT % 27.7* 30.4* 30.2*   PLATELETS Thousands/uL 139* 185 365     Results from last 7 days   Lab Units 05/25/25  0404 05/24/25  0407 05/23/25  0429 05/21/25  1708 05/21/25  1345   SODIUM mmol/L 147 145 140   < >  --    POTASSIUM mmol/L 3.4* 3.4* 4.2   < >  --    CHLORIDE mmol/L 108 107 103   < >  --    CO2 mmol/L 30 28 25   < >  --    CO2, I-STAT mmol/L  --   --   --   --  23   BUN mg/dL 38* 31* 28*   < >  --    CREATININE mg/dL 1.03 1.07 1.20   < >  --    GLUCOSE, ISTAT mg/dl  --   --   --   --  166*   CALCIUM mg/dL 9.5 9.4 9.6   < >  --     < > = values in this interval not displayed.     Recent Labs     05/23/25  0429   MG 2.3     Results from last 7 days   Lab Units 05/20/25  0347 05/19/25  0454   PTT seconds 76* 78*         Point of care glucose: 169-179    Studies:  CXR 5/24: small left pleural effusion    Invasive Lines/Tubes:  Invasive Devices       Central Venous Catheter Line  Duration             CVC Central  Lines 05/21/25 3 days              Drain  Duration             Nephrostomy Left 10.2 Fr. 25 days    Nephrostomy Right 10.2 Fr. 25 days    NG/OG/Enteral Tube Small Bore Feeding Tube 12 Fr Left nare <1 day                  Physical Exam:    General: No acute distress, Alert, and Normal appearance  HEENT/NECK:  Normocephalic. Atraumatic.  No jugular venous distention.    Cardiac: Regular rate and rhythm and No murmurs/rubs/gallops  Pulmonary:  Breath sounds diminished in the bases bilaterally   Abdomen:  Non-tender, Non-distended, and Normal bowel sounds  Incisions: Sternum is stable.  Incision is clean, dry, and intact.  and Saphenectomy incison is clean, dry, and intact.   Extremities: Extremities warm/dry and 1+ edema B/L  Neuro: Alert and oriented X 3, Sensation is grossly intact, and No focal deficits  Skin: Warm/Dry, without rashes or lesions.    Assessment:  Principal Problem:    NSTEMI (non-ST elevated myocardial infarction) ([facility])  Active Problems:    Coronary artery disease involving native coronary artery    Diabetes mellitus type 2 with neurological manifestations ([facility])    Dyslipidemia    Hypertension    Peripheral vascular disease (HCC)    Asymptomatic stenosis of left carotid artery    Prostate cancer (HCC)    H/O insertion of nephrostomy tube    Pneumonia involving right lung    Preoperative clearance    ALPHONSO (obstructive sleep apnea)    Former smoker    At risk for delirium    Depression    Frailty    Cognitive impairment    Hx of acute poliomyelitis    Restrictive lung disease    Moderate protein-calorie malnutrition (HCC)    S/P CABG x 3    RBBB    Leukocytosis       Coronary artery disease. S/P coronary artery bypass grafting; POD # 4    Plan:    Cardiac:     S/P acute preoperative NSTEMI  Normal ventricular systolic function, EF 65%    Carotid stenosis (s/p R CEA, 50-69% LICA stenosis)    NSR; BP/HRwell-controlled   Continue Lopressor 5 mg IV q 6 hours    AF prophylaxis  PO Amiodarone on hold 2/2  dysphagia    ASA and Statin therapy on hold 2/2 dysphagia    Maintain central IV access today for lack of peripheral access    Continue Subcutaneous Heparin for DVT prophylaxis    Pulmonary:      Wean oxygen as able    Renal:     Normal preoperative renal function  Creatinine 1.03, from  1.07, from 1.2 today, from 1.12  S/P b/l nephrostomy tubes (2/2 tumor obstruction)  - Continue daily flushes    Intake/Output net: -  249  mL/24 hours      Wt today 150, from  159, from 161  - Pre-op Wt: 159    Diuretic Regimen:  - D/C Lasix 20   - Hypokalemic; GIve 20 mEq X 1 now      Neuro:    Neurologically intact; No active issues     Incisional pain well controlled   Continue oxycodone, 2.5 to 5 mg PO q 4 hours prn pain    GI:    Cardiac diet, with 1800 mL fluid restriction    Post-operative dysphagia  - Speech eval/VBS completed  - Rec NPO with ice chips  - NG feeding tube inserted 5/24  - Jevity running at goal; Continue  - Await speech re eval for consideration of diet resumption    Tolerating diet without complaint    Continue stool softeners and prn suppository    Continue GI prophylaxis    Endo:     Pre-Op Hgb A1C: 5.6    Patient has been transitioned from continuous insulin infusion to intermittent subcutaneous dosing  Serum glucose well controlled on insulin sliding scale coverage    7    Hematology:   H/O prostate ca (s/p prostatectomy, recurrent mets to lymph nodes, stage IV    Post-operative blood count acceptable; Trend prn  Post op leukocytosis resolved      8.   Disposition:        Following daily PT/OT recommendations regarding home vs. rehab when medically cleared for discharge  Discharge planning, pending resumption of diet      VTE Pharmacologic Prophylaxis: Heparin  VTE Mechanical Prophylaxis: sequential compression device    Collaborative rounds completed with supervising physician  Plan of care discussed with bedside nurse    SIGNATURE: Juan Daniel Loyd PA-C  DATE: May 25, 2025  TIME: 7:12 AM

## 2025-05-25 NOTE — ASSESSMENT & PLAN NOTE
5/10/2025 presentation with chest discomfort  5/10/2025 EKG: Sinus rhythm, nonspecific ST changes, no ST elevation  Peak troponin 2989  5/12/2025 LHC: Severe three-vessel disease including distal left main  Patient treated for pneumonia prior to proceeding with bypass surgery  5/21/2025 CABG x 3: LIMA-LAD, SVG-RPDA, SVG-OM1  Postoperatively has had issues with dysphagia and failed swallow evaluation for which he had NG tube placed on May 24, 2025

## 2025-05-25 NOTE — PROGRESS NOTES
Progress Note - Cardiology   Name: Kilo Mix 84 y.o. male I MRN: [id number]  Unit/Bed#: PPHP-316-01 I Date of Admission: 5/10/2025   Date of Service: 5/25/2025 I Hospital Day: 15     Assessment & Plan  NSTEMI (non-ST elevated myocardial infarction) (HCC)  5/10/2025 presentation with chest discomfort  5/10/2025 EKG: Sinus rhythm, nonspecific ST changes, no ST elevation  Peak troponin 2989  5/12/2025 LHC: Severe three-vessel disease including distal left main  Patient treated for pneumonia prior to proceeding with bypass surgery  5/21/2025 CABG x 3: LIMA-LAD, SVG-RPDA, SVG-OM1  Postoperatively has had issues with dysphagia and failed swallow evaluation for which he had NG tube placed on May 24, 2025    Coronary artery disease involving native coronary artery  History of MI (1995) with status post bare-metal stents to RCA  Status post PCI/LOUIE 2007 5/12/2025 echo: EF 60%, grade 1 diastolic dysfunction, normal RV, mild MR  5/21/2025 CABG x 3: LIMA-LAD, SVG-RPDA, SVG-OM1  Dyslipidemia  Tolerating high intensity statins.  Cholesterol 166, triglycerides 113, HDL 66 and LDL 77      Plan:  Prophylactic amiodarone  Daily aspirin and statin  IV metoprolol (using IV as currently n.p.o. with NG tube for dysphagia)  Speech therapy evaluation later this week  Will need eventual cardiac rehab after discharge    Subjective   Chief Complaint:   Pain is controlled  Frustrated at still being in the hospital  Breathing is fine    Objective :  Temp:  [97.6 °F (36.4 °C)-98.3 °F (36.8 °C)] 97.8 °F (36.6 °C)  HR:  [68-90] 82  BP: (116-146)/(56-75) 116/63  Resp:  [14-18] 14  SpO2:  [91 %-100 %] 97 %  O2 Device: None (Room air)  FiO2 (%):  [2] 2  Orthostatic Blood Pressures      Flowsheet Row Most Recent Value   Blood Pressure 116/63 filed at 05/25/2025 1054   Patient Position - Orthostatic VS Lying filed at 05/24/2025 2254          First Weight: Weight - Scale: 72.3 kg (159 lb 6.3 oz) (05/11/25 0041)  Vitals:    05/24/25 0545 05/25/25  0613   Weight: 69.1 kg (152 lb 5.4 oz) 68.3 kg (150 lb 9.2 oz)     Physical Exam  Vitals and nursing note reviewed.   Constitutional:       General: He is not in acute distress.     Appearance: He is well-developed.      Comments: NG tube in situ with tube feeds   HENT:      Head: Normocephalic and atraumatic.     Eyes:      Conjunctiva/sclera: Conjunctivae normal.       Cardiovascular:      Rate and Rhythm: Normal rate and regular rhythm.      Heart sounds: No murmur heard.  Pulmonary:      Effort: Pulmonary effort is normal. No respiratory distress.      Breath sounds: Normal breath sounds.   Abdominal:      Palpations: Abdomen is soft.      Tenderness: There is no abdominal tenderness.     Musculoskeletal:         General: No swelling.      Cervical back: Neck supple.      Comments: Midline sternotomy clean dry and intact     Skin:     General: Skin is warm and dry.      Capillary Refill: Capillary refill takes less than 2 seconds.     Neurological:      Mental Status: He is alert.     Psychiatric:         Mood and Affect: Mood normal.           Lab Results: I have reviewed the following results:  Results from last 7 days   Lab Units 05/24/25  0407 05/23/25  0429 05/22/25  0408   WBC Thousand/uL 9.92 18.44* 17.51*   HEMOGLOBIN g/dL 9.0* 10.0* 11.8*   HEMATOCRIT % 27.7* 30.4* 30.2*   PLATELETS Thousands/uL 139* 185 365     Results from last 7 days   Lab Units 05/25/25  0404 05/24/25  0407 05/23/25  0429 05/21/25  1708 05/21/25  1345   POTASSIUM mmol/L 3.4* 3.4* 4.2   < >  --    CHLORIDE mmol/L 108 107 103   < >  --    CO2 mmol/L 30 28 25   < >  --    CO2, I-STAT mmol/L  --   --   --   --  23   BUN mg/dL 38* 31* 28*   < >  --    CREATININE mg/dL 1.03 1.07 1.20   < >  --    GLUCOSE, ISTAT mg/dl  --   --   --   --  166*   CALCIUM mg/dL 9.5 9.4 9.6   < >  --     < > = values in this interval not displayed.     Results from last 7 days   Lab Units 05/20/25  0347 05/19/25  0454   PTT seconds 76* 78*     Lab Results    Component Value Date    HGBA1C 5.6 05/13/2025     Lab Results   Component Value Date    TROPONINI <0.02 04/17/2018             VTE Pharmacologic Prophylaxis:   VTE Mechanical Prophylaxis:

## 2025-05-25 NOTE — ASSESSMENT & PLAN NOTE
History of MI (1995) with status post bare-metal stents to RCA  Status post PCI/LOUIE 2007 5/12/2025 echo: EF 60%, grade 1 diastolic dysfunction, normal RV, mild MR  5/21/2025 CABG x 3: LIMA-LAD, SVG-RPDA, SVG-OM1

## 2025-05-26 ENCOUNTER — APPOINTMENT (INPATIENT)
Dept: RADIOLOGY | Facility: HOSPITAL | Age: 85
DRG: 233 | End: 2025-05-26
Payer: MEDICARE

## 2025-05-26 LAB
ANION GAP SERPL CALCULATED.3IONS-SCNC: 8 MMOL/L (ref 4–13)
BASOPHILS # BLD AUTO: 0.02 THOUSANDS/ÂΜL (ref 0–0.1)
BASOPHILS NFR BLD AUTO: 0 % (ref 0–1)
BUN SERPL-MCNC: 35 MG/DL (ref 5–25)
CALCIUM SERPL-MCNC: 9.2 MG/DL (ref 8.4–10.2)
CHLORIDE SERPL-SCNC: 110 MMOL/L (ref 96–108)
CO2 SERPL-SCNC: 32 MMOL/L (ref 21–32)
CREAT SERPL-MCNC: 0.88 MG/DL (ref 0.6–1.3)
EOSINOPHIL # BLD AUTO: 0.32 THOUSAND/ÂΜL (ref 0–0.61)
EOSINOPHIL NFR BLD AUTO: 5 % (ref 0–6)
ERYTHROCYTE [DISTWIDTH] IN BLOOD BY AUTOMATED COUNT: 13.9 % (ref 11.6–15.1)
GFR SERPL CREATININE-BSD FRML MDRD: 79 ML/MIN/1.73SQ M
GLUCOSE SERPL-MCNC: 149 MG/DL (ref 65–140)
GLUCOSE SERPL-MCNC: 162 MG/DL (ref 65–140)
GLUCOSE SERPL-MCNC: 178 MG/DL (ref 65–140)
GLUCOSE SERPL-MCNC: 198 MG/DL (ref 65–140)
GLUCOSE SERPL-MCNC: 203 MG/DL (ref 65–140)
HCT VFR BLD AUTO: 27.6 % (ref 36.5–49.3)
HGB BLD-MCNC: 8.7 G/DL (ref 12–17)
IMM GRANULOCYTES # BLD AUTO: 0.04 THOUSAND/UL (ref 0–0.2)
IMM GRANULOCYTES NFR BLD AUTO: 1 % (ref 0–2)
LYMPHOCYTES # BLD AUTO: 0.75 THOUSANDS/ÂΜL (ref 0.6–4.47)
LYMPHOCYTES NFR BLD AUTO: 11 % (ref 14–44)
MCH RBC QN AUTO: 32 PG (ref 26.8–34.3)
MCHC RBC AUTO-ENTMCNC: 31.5 G/DL (ref 31.4–37.4)
MCV RBC AUTO: 102 FL (ref 82–98)
MONOCYTES # BLD AUTO: 0.62 THOUSAND/ÂΜL (ref 0.17–1.22)
MONOCYTES NFR BLD AUTO: 9 % (ref 4–12)
NEUTROPHILS # BLD AUTO: 4.97 THOUSANDS/ÂΜL (ref 1.85–7.62)
NEUTS SEG NFR BLD AUTO: 74 % (ref 43–75)
NRBC BLD AUTO-RTO: 0 /100 WBCS
PLATELET # BLD AUTO: 209 THOUSANDS/UL (ref 149–390)
PMV BLD AUTO: 10.7 FL (ref 8.9–12.7)
POTASSIUM SERPL-SCNC: 3.7 MMOL/L (ref 3.5–5.3)
RBC # BLD AUTO: 2.72 MILLION/UL (ref 3.88–5.62)
SODIUM SERPL-SCNC: 150 MMOL/L (ref 135–147)
WBC # BLD AUTO: 6.72 THOUSAND/UL (ref 4.31–10.16)

## 2025-05-26 PROCEDURE — 80048 BASIC METABOLIC PNL TOTAL CA: CPT | Performed by: THORACIC SURGERY (CARDIOTHORACIC VASCULAR SURGERY)

## 2025-05-26 PROCEDURE — 99024 POSTOP FOLLOW-UP VISIT: CPT | Performed by: THORACIC SURGERY (CARDIOTHORACIC VASCULAR SURGERY)

## 2025-05-26 PROCEDURE — 74018 RADEX ABDOMEN 1 VIEW: CPT

## 2025-05-26 PROCEDURE — 85025 COMPLETE CBC W/AUTO DIFF WBC: CPT | Performed by: THORACIC SURGERY (CARDIOTHORACIC VASCULAR SURGERY)

## 2025-05-26 PROCEDURE — 82948 REAGENT STRIP/BLOOD GLUCOSE: CPT

## 2025-05-26 PROCEDURE — 99232 SBSQ HOSP IP/OBS MODERATE 35: CPT | Performed by: INTERNAL MEDICINE

## 2025-05-26 RX ORDER — ACETAMINOPHEN 160 MG/5ML
650 SUSPENSION ORAL 3 TIMES DAILY
Status: DISCONTINUED | OUTPATIENT
Start: 2025-05-26 | End: 2025-05-27

## 2025-05-26 RX ORDER — PANTOPRAZOLE SODIUM 40 MG/10ML
40 INJECTION, POWDER, LYOPHILIZED, FOR SOLUTION INTRAVENOUS DAILY
Status: DISCONTINUED | OUTPATIENT
Start: 2025-05-26 | End: 2025-05-27

## 2025-05-26 RX ORDER — ASPIRIN 300 MG/1
300 SUPPOSITORY RECTAL DAILY
Status: DISCONTINUED | OUTPATIENT
Start: 2025-05-26 | End: 2025-05-27

## 2025-05-26 RX ORDER — METOPROLOL TARTRATE 1 MG/ML
5 INJECTION, SOLUTION INTRAVENOUS EVERY 4 HOURS
Status: DISCONTINUED | OUTPATIENT
Start: 2025-05-26 | End: 2025-05-27

## 2025-05-26 RX ADMIN — METOROPROLOL TARTRATE 5 MG: 5 INJECTION, SOLUTION INTRAVENOUS at 08:40

## 2025-05-26 RX ADMIN — METOROPROLOL TARTRATE 5 MG: 5 INJECTION, SOLUTION INTRAVENOUS at 13:41

## 2025-05-26 RX ADMIN — HEPARIN SODIUM 5000 UNITS: 5000 INJECTION INTRAVENOUS; SUBCUTANEOUS at 17:08

## 2025-05-26 RX ADMIN — METOROPROLOL TARTRATE 5 MG: 5 INJECTION, SOLUTION INTRAVENOUS at 17:09

## 2025-05-26 RX ADMIN — AMIODARONE HYDROCHLORIDE 200 MG: 200 TABLET ORAL at 05:09

## 2025-05-26 RX ADMIN — ASPIRIN 300 MG: 300 SUPPOSITORY RECTAL at 08:50

## 2025-05-26 RX ADMIN — HEPARIN SODIUM 5000 UNITS: 5000 INJECTION INTRAVENOUS; SUBCUTANEOUS at 08:40

## 2025-05-26 RX ADMIN — INSULIN LISPRO 1 UNITS: 100 INJECTION, SOLUTION INTRAVENOUS; SUBCUTANEOUS at 11:46

## 2025-05-26 RX ADMIN — HEPARIN SODIUM 5000 UNITS: 5000 INJECTION INTRAVENOUS; SUBCUTANEOUS at 00:34

## 2025-05-26 RX ADMIN — METOROPROLOL TARTRATE 5 MG: 5 INJECTION, SOLUTION INTRAVENOUS at 04:30

## 2025-05-26 RX ADMIN — CHLORHEXIDINE GLUCONATE 15 ML: 1.2 SOLUTION ORAL at 17:08

## 2025-05-26 RX ADMIN — INSULIN LISPRO 1 UNITS: 100 INJECTION, SOLUTION INTRAVENOUS; SUBCUTANEOUS at 21:28

## 2025-05-26 RX ADMIN — PANTOPRAZOLE SODIUM 40 MG: 40 INJECTION, POWDER, FOR SOLUTION INTRAVENOUS at 08:41

## 2025-05-26 RX ADMIN — METOROPROLOL TARTRATE 5 MG: 5 INJECTION, SOLUTION INTRAVENOUS at 21:28

## 2025-05-26 RX ADMIN — CHLORHEXIDINE GLUCONATE 15 ML: 1.2 SOLUTION ORAL at 08:40

## 2025-05-26 RX ADMIN — ACETAMINOPHEN 650 MG: 650 SUSPENSION ORAL at 21:28

## 2025-05-26 RX ADMIN — INSULIN LISPRO 1 UNITS: 100 INJECTION, SOLUTION INTRAVENOUS; SUBCUTANEOUS at 06:07

## 2025-05-26 NOTE — ASSESSMENT & PLAN NOTE
5/10/2025 presentation with chest discomfort  5/10/2025 EKG: Sinus rhythm, nonspecific ST changes, no ST elevation  Peak troponin 2989  5/12/2025 LHC: Severe three-vessel disease including distal left main  Patient treated for pneumonia prior to proceeding with bypass surgery  5/21/2025 CABG x 3: LIMA-LAD, SVG-RPDA, SVG-OM1  Postoperatively has had issues with dysphagia and failed swallow evaluation for which he had NG tube placed on May 24, 2025 and has been strict NPO.  Plan is for repeat speech evaluation early this week.  Remains on aspirin (rectally) and metoprolol (IV) with hopeful plans to change back to parenteral medications at which time statin can be reinitiated.  Eventual cardiac rehab recommended after discharge

## 2025-05-26 NOTE — PROGRESS NOTES
Progress Note - Cardiology   Name: Kilo Mix 84 y.o. male I MRN: [id number]  Unit/Bed#: [facility]PHP-316-01 I Date of Admission: 5/10/2025   Date of Service: 5/26/2025 I Hospital Day: 16     Assessment & Plan  NSTEMI (non-ST elevated myocardial infarction) (HCC)  5/10/2025 presentation with chest discomfort  5/10/2025 EKG: Sinus rhythm, nonspecific ST changes, no ST elevation  Peak troponin 2989  5/12/2025 LHC: Severe three-vessel disease including distal left main  Patient treated for pneumonia prior to proceeding with bypass surgery  5/21/2025 CABG x 3: LIMA-LAD, SVG-RPDA, SVG-OM1  Postoperatively has had issues with dysphagia and failed swallow evaluation for which he had NG tube placed on May 24, 2025 and has been strict NPO.  Plan is for repeat speech evaluation early this week.  Remains on aspirin (rectally) and metoprolol (IV) with hopeful plans to change back to parenteral medications at which time statin can be reinitiated.  Eventual cardiac rehab recommended after discharge    Coronary artery disease involving native coronary artery  History of MI (1995) with status post bare-metal stents to RCA  Status post PCI/LOUIE 2007 5/12/2025 echo: EF 60%, grade 1 diastolic dysfunction, normal RV, mild MR  5/21/2025 CABG x 3: LIMA-LAD, SVG-RPDA, SVG-OM1  Dyslipidemia  Statin on hold due to n.p.o. status, restart when able  Cholesterol 166, triglycerides 113, HDL 66 and LDL 77      Subjective   Chief Complaint:   Feeling somewhat better today compared to yesterday  Seen and examined ambulating the halls without difficulty  Pain is controlled    Objective :  Temp:  [97.5 °F (36.4 °C)-98.2 °F (36.8 °C)] 97.5 °F (36.4 °C)  HR:  [68-93] 79  BP: ()/(43-71) 134/70  Resp:  [16-21] 21  SpO2:  [86 %-100 %] 100 %  Orthostatic Blood Pressures      Flowsheet Row Most Recent Value   Blood Pressure 134/70 filed at 05/26/2025 0650   Patient Position - Orthostatic VS Lying filed at 05/25/2025 1536          First Weight: Weight -  Scale: 72.3 kg (159 lb 6.3 oz) (05/11/25 0041)  Vitals:    05/25/25 0613 05/26/25 0600   Weight: 68.3 kg (150 lb 9.2 oz) 69.2 kg (152 lb 8.9 oz)     Physical Exam  Constitutional:       General: He is not in acute distress.     Appearance: He is well-developed. He is not ill-appearing.      Comments: NG tube in situ     HENT:      Head: Normocephalic and atraumatic.     Eyes:      Pupils: Pupils are equal, round, and reactive to light.       Cardiovascular:      Rate and Rhythm: Normal rate and regular rhythm.      Heart sounds: No murmur heard.     No friction rub. No gallop.   Pulmonary:      Effort: Pulmonary effort is normal. No respiratory distress.      Breath sounds: Normal breath sounds. No wheezing or rales.   Abdominal:      General: Bowel sounds are normal. There is no distension.      Palpations: Abdomen is soft.      Tenderness: There is no abdominal tenderness.     Musculoskeletal:         General: No deformity. Normal range of motion.      Cervical back: Normal range of motion and neck supple.      Comments: Midline sternotomy site appears clean dry and intact     Skin:     General: Skin is warm and dry.     Neurological:      Mental Status: He is alert and oriented to person, place, and time.     Psychiatric:         Behavior: Behavior normal.           Lab Results: I have reviewed the following results:  Results from last 7 days   Lab Units 05/26/25  0425 05/24/25  0407 05/23/25  0429   WBC Thousand/uL 6.72 9.92 18.44*   HEMOGLOBIN g/dL 8.7* 9.0* 10.0*   HEMATOCRIT % 27.6* 27.7* 30.4*   PLATELETS Thousands/uL 209 139* 185     Results from last 7 days   Lab Units 05/26/25  0425 05/25/25  0404 05/24/25  0407 05/21/25  1708 05/21/25  1345   POTASSIUM mmol/L 3.7 3.4* 3.4*   < >  --    CHLORIDE mmol/L 110* 108 107   < >  --    CO2 mmol/L 32 30 28   < >  --    CO2, I-STAT mmol/L  --   --   --   --  23   BUN mg/dL 35* 38* 31*   < >  --    CREATININE mg/dL 0.88 1.03 1.07   < >  --    GLUCOSE, ISTAT mg/dl   --   --   --   --  166*   CALCIUM mg/dL 9.2 9.5 9.4   < >  --     < > = values in this interval not displayed.     Results from last 7 days   Lab Units 05/20/25  0347   PTT seconds 76*     Lab Results   Component Value Date    HGBA1C 5.6 05/13/2025     Lab Results   Component Value Date    TROPONINI <0.02 04/17/2018             VTE Pharmacologic Prophylaxis:   VTE Mechanical Prophylaxis:

## 2025-05-26 NOTE — PROGRESS NOTES
Progress Note - Cardiac Surgery   Kilo Mix 84 y.o. male MRN: [id number]  Unit/Bed#: PPHP-316-01 Encounter: [id number]    Coronary artery disease. S/P coronary artery bypass grafting;   POD # 5      24 Hour Events: Jevity tube feeds started and advanced to goal; Tolerating without significant residuals. No telemetry events overnight. Noted aspiration with ice chips; Strict NPO ordered.       Medications:   Scheduled Meds:  Current Facility-Administered Medications   Medication Dose Route Frequency Provider Last Rate    acetaminophen  650 mg Rectal Q4H PRN Alicia Bender PA-C      amiodarone  200 mg Oral Q8H [facility] Alicia Bender PA-C      aspirin  325 mg Oral Daily Alicia Bender PA-C      atorvastatin  80 mg Oral Daily With Dinner Alicia Bender PA-C      bisacodyl  10 mg Rectal Daily PRN Alicia Bender PA-C      chlorhexidine  15 mL Mouth/Throat BID Alicia Bender PA-C      docusate sodium  100 mg Oral BID Alicia Bender PA-C      heparin (porcine)  5,000 Units Subcutaneous Q8H [facility] Juan Daniel Loyd PA-C      insulin lispro  1-5 Units Subcutaneous HS Alicia Bender PA-C      insulin lispro  1-5 Units Subcutaneous TID AC Alicia Bender PA-C      metoprolol  5 mg Intravenous Q6H Juan Daniel Loyd PA-C      [Held by provider] metoprolol tartrate  25 mg Oral Q12H [facility] Juan Daniel Loyd PA-C      oxyCODONE  2.5 mg Oral Q4H PRN Alicia Bender PA-C      Or    oxyCODONE  5 mg Oral Q4H PRN Alicia Bender PA-C      pantoprazole  40 mg Oral Daily Alicia Bender PA-C      polyethylene glycol  17 g Oral Daily Alicia Bender PA-C      temazepam  15 mg Oral HS PRN Alicia Bender PA-C       Continuous Infusions:   PRN Meds:.  acetaminophen    bisacodyl    oxyCODONE **OR** oxyCODONE    temazepam    Vitals:   Vitals:    05/25/25 1858 05/25/25 2232 05/26/25 0239 05/26/25 0600   BP: (!) 116/43 152/66 140/71    BP Location:       Pulse: 83 74 87    Resp: 16 20 16    Temp: 98.2 °F (36.8 °C) 97.6 °F (36.4 °C) 98.1  "°F (36.7 °C)    TempSrc:       SpO2: 97% 100% 96%    Weight:    69.2 kg (152 lb 8.9 oz)   Height:           Telemetry: NSR; Heart Rate: 76    Respiratory:   SpO2: SpO2: 96 %, SpO2 Activity: SpO2 Activity: At Rest; 2 LPM    Intake/Output:     Intake/Output Summary (Last 24 hours) at 5/26/2025 0647  Last data filed at 5/26/2025 0401  Gross per 24 hour   Intake 1925 ml   Output 500 ml   Net 1425 ml        Weights:   Weight (last 2 days)       Date/Time Weight    05/26/25 0600 69.2 (152.56)    05/25/25 0613 68.3 (150.57)    05/24/25 05:45:55 69.1 (152.34)                Results:   Results from last 7 days   Lab Units 05/26/25  0425 05/24/25  0407 05/23/25  0429   WBC Thousand/uL 6.72 9.92 18.44*   HEMOGLOBIN g/dL 8.7* 9.0* 10.0*   HEMATOCRIT % 27.6* 27.7* 30.4*   PLATELETS Thousands/uL 209 139* 185     Results from last 7 days   Lab Units 05/26/25  0425 05/25/25  0404 05/24/25  0407 05/21/25  1708 05/21/25  1345   SODIUM mmol/L 150* 147 145   < >  --    POTASSIUM mmol/L 3.7 3.4* 3.4*   < >  --    CHLORIDE mmol/L 110* 108 107   < >  --    CO2 mmol/L 32 30 28   < >  --    CO2, I-STAT mmol/L  --   --   --   --  23   BUN mg/dL 35* 38* 31*   < >  --    CREATININE mg/dL 0.88 1.03 1.07   < >  --    GLUCOSE, ISTAT mg/dl  --   --   --   --  166*   CALCIUM mg/dL 9.2 9.5 9.4   < >  --     < > = values in this interval not displayed.     No results for input(s): \"MG\" in the last 72 hours.    Results from last 7 days   Lab Units 05/20/25  0347   PTT seconds 76*         Point of care glucose: 178-203    Studies:  No new images    Invasive Lines/Tubes:  Invasive Devices       Central Venous Catheter Line  Duration             CVC Central Lines 05/21/25 4 days              Drain  Duration             Nephrostomy Left 10.2 Fr. 26 days    Nephrostomy Right 10.2 Fr. 26 days    NG/OG/Enteral Tube Small Bore Feeding Tube 12 Fr Left nare 1 day                  Physical Exam:    General: No acute distress, Alert, and Normal " appearance  HEENT/NECK:  Normocephalic. Atraumatic.  No jugular venous distention.    Cardiac: Regular rate and rhythm and No murmurs/rubs/gallops  Pulmonary:  Breath sounds clear bilaterally and Crackles bilaterally  Abdomen:  Non-tender, Non-distended, and Normal bowel sounds  Incisions: Sternum is stable.  Incision is clean, dry, and intact.  and Saphenectomy incison is clean, dry, and intact.   Extremities: Extremities warm/dry and Trace edema B/L  Neuro: Alert and oriented X 3, Sensation is grossly intact, and No focal deficits  Skin: Warm/Dry, without rashes or lesions.      Assessment:  Principal Problem:    NSTEMI (non-ST elevated myocardial infarction) ([facility])  Active Problems:    Coronary artery disease involving native coronary artery    Diabetes mellitus type 2 with neurological manifestations ([facility])    Dyslipidemia    Hypertension    Peripheral vascular disease ([facility])    Asymptomatic stenosis of left carotid artery    Prostate cancer ([facility])    H/O insertion of nephrostomy tube    Pneumonia involving right lung    Preoperative clearance    ALPHONSO (obstructive sleep apnea)    Former smoker    At risk for delirium    Depression    Frailty    Cognitive impairment    Hx of acute poliomyelitis    Restrictive lung disease    Moderate protein-calorie malnutrition ([facility])    S/P CABG x 3    RBBB    Leukocytosis       Coronary artery disease. S/P coronary artery bypass grafting;   POD # 5    Plan:    Cardiac:     S/P acute preoperative NSTEMI  Normal ventricular systolic function, EF 65%    Carotid stenosis (s/p R CEA, 50-69% LICA stenosis)    NSR; Hypertensive   Increase Lopressor 5 mg IV q 4 hours    AF prophylaxis  PO Amiodarone on hold 2/2 dysphagia    Rectal ASA 2/2 dysphagia  Statin therapy on hold 2/2 dysphagia    Maintain central IV access today for lack of peripheral access    Continue Subcutaneous Heparin for DVT prophylaxis    Pulmonary:      Wean oxygen as able    Renal:     Normal preoperative renal  function  - Creatinine 0.88, from 1.03  - S/P b/l nephrostomy tubes (2/2 tumor obstruction)  - Continue daily flushes    Intake/Output net: + 1400 mL/24 hours    Wt today 152, from 150  - Pre-op Wt: 159    Diuretics D/C post op     Neuro:    Neurologically intact; No active issues     Incisional pain well controlled   Continue oxycodone, 2.5 to 5 mg PO q 4 hours prn pain    GI:    Cardiac diet, with 1800 mL fluid restriction    Post-operative dysphagia  - Speech eval/VBS completed  - Rec NPO   - NG feeding tube inserted 5/24  - Jevity running at goal; Continue  - Await speech re eval for consideration of diet resumption  - Meds transitioned to IV/liquid NGT    Tolerating diet without complaint    Continue stool softeners and prn suppository    Continue GI prophylaxis    Endo:     Pre-Op Hgb A1C: 5.6    Patient has been transitioned from continuous insulin infusion to intermittent subcutaneous dosing  Serum glucose well controlled on insulin sliding scale coverage    7    Hematology:   H/O prostate ca (s/p prostatectomy, recurrent mets to lymph nodes, stage IV    Post-operative blood count acceptable; Trend prn  Post op leukocytosis resolved      8.   Disposition:        Following daily PT/OT recommendations regarding home vs. rehab when medically cleared for discharge  Discharge planning, pending repeat swallow eval      VTE Pharmacologic Prophylaxis: Heparin  VTE Mechanical Prophylaxis: sequential compression device    Collaborative rounds completed with supervising physician  Plan of care discussed with bedside nurse    SIGNATURE: Juan Daniel Loyd PA-C  DATE: May 26, 2025  TIME: 6:47 AM

## 2025-05-26 NOTE — ASSESSMENT & PLAN NOTE
Statin on hold due to n.p.o. status, restart when able  Cholesterol 166, triglycerides 113, HDL 66 and LDL 77

## 2025-05-27 ENCOUNTER — APPOINTMENT (INPATIENT)
Dept: RADIOLOGY | Facility: HOSPITAL | Age: 85
DRG: 233 | End: 2025-05-27
Payer: MEDICARE

## 2025-05-27 LAB
GLUCOSE SERPL-MCNC: 106 MG/DL (ref 65–140)
GLUCOSE SERPL-MCNC: 146 MG/DL (ref 65–140)
GLUCOSE SERPL-MCNC: 167 MG/DL (ref 65–140)
GLUCOSE SERPL-MCNC: 206 MG/DL (ref 65–140)

## 2025-05-27 PROCEDURE — 74230 X-RAY XM SWLNG FUNCJ C+: CPT

## 2025-05-27 PROCEDURE — 99024 POSTOP FOLLOW-UP VISIT: CPT | Performed by: THORACIC SURGERY (CARDIOTHORACIC VASCULAR SURGERY)

## 2025-05-27 PROCEDURE — 97535 SELF CARE MNGMENT TRAINING: CPT

## 2025-05-27 PROCEDURE — 97530 THERAPEUTIC ACTIVITIES: CPT

## 2025-05-27 PROCEDURE — 99222 1ST HOSP IP/OBS MODERATE 55: CPT | Performed by: INTERNAL MEDICINE

## 2025-05-27 PROCEDURE — 92526 ORAL FUNCTION THERAPY: CPT

## 2025-05-27 PROCEDURE — 82948 REAGENT STRIP/BLOOD GLUCOSE: CPT

## 2025-05-27 PROCEDURE — 99232 SBSQ HOSP IP/OBS MODERATE 35: CPT | Performed by: INTERNAL MEDICINE

## 2025-05-27 PROCEDURE — 92611 MOTION FLUOROSCOPY/SWALLOW: CPT

## 2025-05-27 RX ORDER — ACETAMINOPHEN 325 MG/1
650 TABLET ORAL EVERY 6 HOURS PRN
Status: DISCONTINUED | OUTPATIENT
Start: 2025-05-27 | End: 2025-05-28 | Stop reason: HOSPADM

## 2025-05-27 RX ORDER — INSULIN LISPRO 100 [IU]/ML
1-5 INJECTION, SOLUTION INTRAVENOUS; SUBCUTANEOUS EVERY 6 HOURS SCHEDULED
Status: DISCONTINUED | OUTPATIENT
Start: 2025-05-27 | End: 2025-05-27

## 2025-05-27 RX ORDER — INSULIN LISPRO 100 [IU]/ML
1-5 INJECTION, SOLUTION INTRAVENOUS; SUBCUTANEOUS
Status: DISCONTINUED | OUTPATIENT
Start: 2025-05-27 | End: 2025-05-28 | Stop reason: HOSPADM

## 2025-05-27 RX ADMIN — INSULIN LISPRO 1 UNITS: 100 INJECTION, SOLUTION INTRAVENOUS; SUBCUTANEOUS at 05:47

## 2025-05-27 RX ADMIN — CHLORHEXIDINE GLUCONATE 15 ML: 1.2 SOLUTION ORAL at 17:44

## 2025-05-27 RX ADMIN — METOROPROLOL TARTRATE 5 MG: 5 INJECTION, SOLUTION INTRAVENOUS at 05:26

## 2025-05-27 RX ADMIN — METOROPROLOL TARTRATE 5 MG: 5 INJECTION, SOLUTION INTRAVENOUS at 00:56

## 2025-05-27 RX ADMIN — TEMAZEPAM 15 MG: 15 CAPSULE ORAL at 22:45

## 2025-05-27 RX ADMIN — AMIODARONE HYDROCHLORIDE 200 MG: 200 TABLET ORAL at 21:34

## 2025-05-27 RX ADMIN — HEPARIN SODIUM 5000 UNITS: 5000 INJECTION INTRAVENOUS; SUBCUTANEOUS at 00:56

## 2025-05-27 RX ADMIN — METOPROLOL TARTRATE 25 MG: 25 TABLET, FILM COATED ORAL at 21:34

## 2025-05-27 RX ADMIN — PANTOPRAZOLE SODIUM 40 MG: 40 INJECTION, POWDER, FOR SOLUTION INTRAVENOUS at 08:56

## 2025-05-27 RX ADMIN — METOROPROLOL TARTRATE 5 MG: 5 INJECTION, SOLUTION INTRAVENOUS at 13:51

## 2025-05-27 RX ADMIN — METOROPROLOL TARTRATE 5 MG: 5 INJECTION, SOLUTION INTRAVENOUS at 08:55

## 2025-05-27 RX ADMIN — HEPARIN SODIUM 5000 UNITS: 5000 INJECTION INTRAVENOUS; SUBCUTANEOUS at 23:12

## 2025-05-27 RX ADMIN — HEPARIN SODIUM 5000 UNITS: 5000 INJECTION INTRAVENOUS; SUBCUTANEOUS at 16:34

## 2025-05-27 RX ADMIN — ATORVASTATIN CALCIUM 80 MG: 80 TABLET, FILM COATED ORAL at 16:34

## 2025-05-27 RX ADMIN — HEPARIN SODIUM 5000 UNITS: 5000 INJECTION INTRAVENOUS; SUBCUTANEOUS at 07:40

## 2025-05-27 RX ADMIN — DOCUSATE SODIUM 100 MG: 100 CAPSULE, LIQUID FILLED ORAL at 17:44

## 2025-05-27 RX ADMIN — INSULIN HUMAN 1 UNITS: 100 INJECTION, SOLUTION PARENTERAL at 13:51

## 2025-05-27 RX ADMIN — CHLORHEXIDINE GLUCONATE 15 ML: 1.2 SOLUTION ORAL at 08:55

## 2025-05-27 NOTE — ASSESSMENT & PLAN NOTE
5/10/2025 presentation with chest discomfort  5/10/2025 EKG: Sinus rhythm, nonspecific ST changes, no ST elevation  Peak troponin 2989  5/12/2025 LHC: Severe three-vessel disease including distal left main  Patient treated for pneumonia prior to proceeding with bypass surgery  5/21/2025 CABG x 3: LIMA-LAD, SVG-RPDA, SVG-OM1  Postoperatively has had issues with dysphagia and failed swallow evaluation for which he had NG tube placed on May 24, 2025 and has been strict NPO.  Plan is for repeat speech evaluation today  Remains on aspirin (rectally) and metoprolol (IV) with hopeful plans to change back to parenteral medications at which time statin can be reinitiated.  Eventual cardiac rehab recommended after discharge

## 2025-05-27 NOTE — CONSULTS
Consultation - Endocrinology   Name: Kilo Mix 84 y.o. 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  Blood sugars are for the most part well-controlled on above regimen, occasionally noted to have glycemic excursions over 200 mg/dL, particularly when he has not gotten insulin for more than 6 hours.  Plan for VBS later today.    Plan:  Recommend starting regular insulin 1 unit every 6 hours  Recommend switching correctional scale insulin to algorithm 1 every 6 hours   Continue Accu-Cheks every 6 hours while on tube feeds  Depending on VBS results today may transition patient to mealtime insulin tomorrow  Monitor for hypoglycemia, treat per protocol  Goal blood sugars while in the [id number] mg/dL  Discharge recommendations pending clinical course-anticipate patient can go home on metformin and potentially SGLT2 inhibitor if renal function allows  Endocrinology  will continue following  S/P CABG x 3  Management per primary team  Adrenal cortical adenoma  Noted to have a 2.1X 1.8 cm adrenal adenoma on CT abdomen pelvis in 2024 with 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  No prior workup has been performed  Consider additional workup as an outpatient.      History of Present Illness   Kilo Mix is a 84 y.o. male with a past medical history of type 2 diabetes mellitus, adrenal adenoma hypertension, hyperlipidemia, CAD, PVD, right artery stenosis, prostate cancer, ALPHONSO who is seen today in consultation for the management of type 2 diabetes mellitus.  Patient originally presented on 5/10/2025 with progressively worsening exertional chest pain over the prior 2 weeks and somewhat resolving with rest.  Noted to have elevated troponin, admitted for NSTEMI.  Underwent LHC on 5/12/2025 which demonstrated severe three-vessel disease.  CT surgery was consulted for evaluation and patient underwent CABGx3 on 5/21/2025.  Postoperatively, was noted to have coughing and regurgitation, assessed by speech pathology and noted to have severe oropharyngeal dysphagia on 5/23/2025.  Had NG tube inserted on 5/24/2025 and started on Jevity tube feeds and advance to goal.  He was reevaluated today with plan to repeat VBS and potentially transition back to p.o. diet pending results.    In terms of diabetes patient is currently on correctional scale insulin algorithm 2 before meals and 1 at bedtime.  At home he takes metformin 500 mg daily.  Patient does not follow-up with endocrinology, diabetes is managed by primary care provider.  [facility].  Diagnosed in 2016.        Review of Systems   Constitutional:  Negative for appetite change and unexpected weight change.   HENT:  Negative for congestion.    Eyes:  Negative for visual disturbance.   Respiratory:  Negative for cough and shortness of breath.    Cardiovascular:  Negative for chest pain, palpitations and  leg swelling.   Gastrointestinal:  Negative for abdominal pain, constipation, diarrhea, nausea and vomiting.   Endocrine: Negative for polydipsia and polyuria.   Genitourinary:  Negative for frequency.   Musculoskeletal:  Negative for myalgias.   Skin:  Negative for rash.   Neurological:  Negative for dizziness, weakness, light-headedness, numbness and headaches.   Psychiatric/Behavioral:  Negative for sleep disturbance.    All other systems reviewed and are negative.    Medical History Review: I have reviewed the patient's PMH, PSH, Social History, Family History, Meds, and Allergies   Historical Information   Past Medical History[1]  Past Surgical History[2]  Social History[3]  E-Cigarette/Vaping    E-Cigarette Use Never User     Comments Denies any use per pt      E-Cigarette/Vaping Substances    Nicotine No     THC No     CBD No     Flavoring No      Family History[4]  Social History[5]    Current Facility-Administered Medications:     acetaminophen (TYLENOL) oral suspension 650 mg, TID    acetaminophen (TYLENOL) rectal suppository 650 mg, Q4H PRN    [Held by provider] amiodarone tablet 200 mg, Q8H AMADOU    aspirin rectal suppository 300 mg, Daily    [Held by provider] aspirin tablet 325 mg, Daily    [Held by provider] atorvastatin (LIPITOR) tablet 80 mg, Daily With Dinner    bisacodyl (DULCOLAX) rectal suppository 10 mg, Daily PRN    chlorhexidine (PERIDEX) 0.12 % oral rinse 15 mL, BID    [Held by provider] docusate sodium (COLACE) capsule 100 mg, BID    heparin (porcine) subcutaneous injection 5,000 Units, Q8H AMADOU    insulin lispro (HumALOG/ADMELOG) 100 units/mL subcutaneous injection 1-5 Units, HS    insulin lispro (HumALOG/ADMELOG) 100 units/mL subcutaneous injection 1-5 Units, TID AC **AND** Fingerstick Glucose (POCT), TID AC    metoprolol (LOPRESSOR) injection 5 mg, Q4H    [Held by provider] metoprolol tartrate (LOPRESSOR) tablet 25 mg, Q12H AMADOU    oxyCODONE (ROXICODONE) split tablet 2.5 mg, Q4H PRN **OR**  oxyCODONE (ROXICODONE) IR tablet 5 mg, Q4H PRN    [Held by provider] pantoprazole (PROTONIX) EC tablet 40 mg, Daily    pantoprazole (PROTONIX) injection 40 mg, Daily    [Held by provider] polyethylene glycol (MIRALAX) packet 17 g, Daily    temazepam (RESTORIL) capsule 15 mg, HS PRN  Prior to Admission Medications   Prescriptions Last Dose Informant Patient Reported? Taking?   B Complex Vitamins (VITAMIN B COMPLEX PO)  Self Yes No   Sig: Take 1 tablet by mouth daily   Cholecalciferol 1000 units CHEW  Self Yes No   Sig: Chew 1 tablet daily   amLODIPine (NORVASC) 5 mg tablet 5/10/2025 Self No Yes   Sig: Take 1 tablet (5 mg total) by mouth daily   ascorbic acid (VITAMIN C) 500 mg tablet 5/10/2025 Self Yes Yes   Sig: Take 500 mg by mouth daily   aspirin 81 MG tablet  Self Yes No   Sig: Take 1 tablet by mouth daily 2/25/25 per pts son - pt was to hold ASA 5 day hold - will follow same instructions that were given for January surgery   atorvastatin (LIPITOR) 20 mg tablet  Self No No   Sig: Take 1 tablet by mouth once daily   clopidogrel (PLAVIX) 75 mg tablet 5/10/2025 Self No Yes   Sig: Take 1 tablet by mouth once daily   ibuprofen (MOTRIN) 600 mg tablet Past Month Self No Yes   Sig: Take 1 tablet (600 mg total) by mouth every 6 (six) hours as needed for mild pain   lisinopril (ZESTRIL) 10 mg tablet  Self No No   Sig: Take 1 tablet by mouth once daily   metFORMIN (GLUCOPHAGE-XR) 500 mg 24 hr tablet 5/10/2025 Self No Yes   Sig: Take 1 tablet by mouth once daily   sodium chloride, PF, 0.9 %   No No   Sig: 10 mL by Intracatheter route daily Intracatheter flushing daily. May substitute prefilled syringe with normal saline 10 mL vials, 10 mL syringes, and 18 g blunt needles   sodium chloride, PF, 0.9 %   No No   Sig: 10 mL by Intracatheter route daily Intracatheter flushing daily. May substitute prefilled syringe with normal saline 10 mL vials, 10 mL syringes, and 18 g blunt needles   vitamin E, tocopherol, 400 units capsule   Self Yes No   Sig: Take 1 capsule by mouth daily      Facility-Administered Medications: None     Losartan and Penicillins    Objective :  Temp:  [97 °F (36.1 °C)-98.2 °F (36.8 °C)] 97 °F (36.1 °C)  HR:  [64-84] 68  BP: (112-140)/(46-83) 114/61  Resp:  [16-18] 18  SpO2:  [92 %-99 %] 99 %  O2 Device: None (Room air)    Physical Exam  Vitals and nursing note reviewed.   Constitutional:       General: He is not in acute distress.     Appearance: Normal appearance. He is not ill-appearing, toxic-appearing or diaphoretic.   HENT:      Head: Normocephalic and atraumatic.      Nose: Nose normal.      Mouth/Throat:      Pharynx: Oropharynx is clear.     Eyes:      General: No scleral icterus.        Right eye: No discharge.         Left eye: No discharge.      Extraocular Movements: Extraocular movements intact.      Conjunctiva/sclera: Conjunctivae normal.       Cardiovascular:      Rate and Rhythm: Normal rate and regular rhythm.   Pulmonary:      Effort: Pulmonary effort is normal. No respiratory distress.   Abdominal:      General: There is no distension.     Musculoskeletal:         General: Normal range of motion.      Cervical back: Normal range of motion and neck supple.     Skin:     General: Skin is warm and dry.      Coloration: Skin is not jaundiced or pale.     Neurological:      Mental Status: He is alert and oriented to person, place, and time. Mental status is at baseline.     Psychiatric:         Mood and Affect: Mood normal.         Behavior: Behavior normal.         Thought Content: Thought content normal.         Judgment: Judgment normal.         Lab Results: I have reviewed the following results:CBC/BMP: No new results in last 24 hours. , Creatinine Clearance: Estimated Creatinine Clearance: 61.5 mL/min (by C-G formula based on SCr of 0.88 mg/dL)., LFTs: No new results in last 24 hours. , TSH:     Recent Labs     05/26/25  0425 05/26/25  0534 05/26/25  2020 05/27/25  0546 05/27/25  1048   POCGLU  --     < > 198* 206* 146*   GLUC 203*  --   --   --   --     < > = values in this interval not displayed.       Imaging Results Review: I reviewed radiology reports from this admission including: chest xray, CT chest, and CT abdomen/pelvis.  Other Study Results Review: No additional pertinent studies reviewed.             [1]   Past Medical History:  Diagnosis Date    Bladder tumor     Cancer (HCC)     Prostate cancer    Depression 5/14/2025    Diabetes mellitus (HCC)     Diverticulitis of colon     History of common carotid artery stent placement     RIGHT stent - per pts son - RIGHT stent  in  carotid artery    History of heart artery stent     x2 stents in heart per pts son    Hyperlipidemia     Hypertension     Migraines     Myocardial infarction (HCC)     about 30yrs ago    Nephrostomy present (HCC)     2/25/25 Per pts son Ed- pt has two nephrostomy tubes    Prostate cancer (HCC)     Seasonal allergies    [2]   Past Surgical History:  Procedure Laterality Date    CARDIAC CATHETERIZATION N/A 5/12/2025    Procedure: Cardiac Catheterization;  Surgeon: Daniel Mabry DO;  Location: [facility];  Service: Cardiology    CARDIAC SURGERY      CAROTID ENDARTARECTOMY Right     thromboendarterectomy    CAROTID STENT Right     CATARACT EXTRACTION Left     COLONOSCOPY      fiberoptic screening 2/6/08 5 yr / complete 3/15/13 5 yr    CORONARY ANGIOPLASTY WITH STENT PLACEMENT      ELBOW SURGERY      EYE SURGERY      IR BIOPSY LYMPH NODE  12/24/2024    IR NEPHROSTOMY TUBE CHECK/CHANGE/REPOSITION/REINSERTION/UPSIZE  2/25/2025    IR NEPHROSTOMY TUBE CHECK/CHANGE/REPOSITION/REINSERTION/UPSIZE  4/29/2025    IR NEPHROSTOMY TUBE PLACEMENT  12/24/2024    KNEE SURGERY      [place] CORONARY ARTERY BYP W/VEIN & ARTERY GRAFT 3 VEIN N/A 5/21/2025    Procedure: CORONARY ARTERY BYPASS GRAFT (CABG) X3 VESSELS, LIMA - LAD, LEFT LEG EVH/SVG - PDA AND OM, W/CHAPO;  Surgeon: David Tomlin DO;  Location: [facility];  Service: Cardiac Surgery     MT CYSTO W/REMOVAL OF LESIONS SMALL N/A 1/21/2025    Procedure: TRANSURETHRAL RESECTION OF BLADDER TUMOR (TURBT);  Surgeon: Jordan Youssef MD;  Location:  MAIN OR;  Service: Urology    MT CYSTO W/REMOVAL OF LESIONS SMALL N/A 3/6/2025    Procedure: TRANSURETHRAL RESECTION OF BLADDER TUMOR (TURBT);  Surgeon: Jordan Youssef MD;  Location:  MAIN OR;  Service: Urology    PROSTATE SURGERY      SUPRAPUBIC PROSTATECTOMY  12/06/1999    UPPER GASTROINTESTINAL ENDOSCOPY     [3]   Social History  Tobacco Use    Smoking status: Former     Current packs/day: 0.00     Average packs/day: 3.0 packs/day for 10.0 years (30.0 ttl pk-yrs)     Types: Cigarettes     Start date: 1/1/2000     Quit date: 1/1/2010     Years since quitting: 15.4    Smokeless tobacco: Never    Tobacco comments:     Former smoker - quit 2010   Vaping Use    Vaping status: Never Used   Substance and Sexual Activity    Alcohol use: Yes     Comment: rare glass of wine    Drug use: Never     Comment: Denies any drug use per pt    Sexual activity: Not Currently     Comment: defer   [4]   Family History  Problem Relation Name Age of Onset    Alcohol abuse Mother Ale Rodriguez Voce     No Known Problems Father      No Known Problems Sister      Substance Abuse Son Stewart CORONADO Vocsammy     Heart attack Family          MI    Breast cancer Family      Colon cancer Neg Hx      Colon polyps Neg Hx      Anesthesia problems Neg Hx     [5]   Social History  Tobacco Use    Smoking status: Former     Current packs/day: 0.00     Average packs/day: 3.0 packs/day for 10.0 years (30.0 ttl pk-yrs)     Types: Cigarettes     Start date: 1/1/2000     Quit date: 1/1/2010     Years since quitting: 15.4    Smokeless tobacco: Never    Tobacco comments:     Former smoker - quit 2010   Vaping Use    Vaping status: Never Used   Substance and Sexual Activity    Alcohol use: Yes     Comment: rare glass of wine    Drug use: Never     Comment: Denies any drug use per pt    Sexual activity: Not  Currently     Comment: defer

## 2025-05-27 NOTE — ASSESSMENT & PLAN NOTE
Scented with exertional chest pain X 2 weeks  Admitted for NSTEMI, [facility] CAD  Status post CABG X3 on 5/21/2025  Management per primary team

## 2025-05-27 NOTE — PROGRESS NOTES
Progress Note - Cardiology   Name: Kilo Mix 84 y.o. male I MRN: [id number]  Unit/Bed#: PPHP-316-01 I Date of Admission: 5/10/2025   Date of Service: 5/27/2025 I Hospital Day: 17     Assessment & Plan  NSTEMI (non-ST elevated myocardial infarction) (HCC)  5/10/2025 presentation with chest discomfort  5/10/2025 EKG: Sinus rhythm, nonspecific ST changes, no ST elevation  Peak troponin 2989  5/12/2025 LHC: Severe three-vessel disease including distal left main  Patient treated for pneumonia prior to proceeding with bypass surgery  5/21/2025 CABG x 3: LIMA-LAD, SVG-RPDA, SVG-OM1  Postoperatively has had issues with dysphagia and failed swallow evaluation for which he had NG tube placed on May 24, 2025 and has been strict NPO.  Plan is for repeat speech evaluation today  Remains on aspirin (rectally) and metoprolol (IV) with hopeful plans to change back to parenteral medications at which time statin can be reinitiated.  Eventual cardiac rehab recommended after discharge    Coronary artery disease involving native coronary artery  History of MI (1995) with status post bare-metal stents to RCA  Status post PCI/LOUIE 2007 5/12/2025 echo: EF 60%, grade 1 diastolic dysfunction, normal RV, mild MR  5/21/2025 CABG x 3: LIMA-LAD, SVG-RPDA, SVG-OM1  Dyslipidemia  Statin on hold due to n.p.o. status, restart when able  Cholesterol 166, triglycerides 113, HDL 66 and LDL 77      Subjective   Chief Complaint:   Feeling fine today  Eager to undergo video barium swallow    Objective :  Temp:  [97 °F (36.1 °C)-98.2 °F (36.8 °C)] 97 °F (36.1 °C)  HR:  [64-84] 66  BP: (112-140)/(46-83) 130/65  Resp:  [16-18] 18  SpO2:  [92 %-99 %] 96 %  O2 Device: None (Room air)  Orthostatic Blood Pressures      Flowsheet Row Most Recent Value   Blood Pressure 130/65 filed at 05/27/2025 1053   Patient Position - Orthostatic VS Lying filed at 05/25/2025 1536          First Weight: Weight - Scale: 72.3 kg (159 lb 6.3 oz) (05/11/25 0041)  Vitals:     05/26/25 0600 05/27/25 0538   Weight: 69.2 kg (152 lb 8.9 oz) 68.9 kg (151 lb 14.4 oz)     Physical Exam  Constitutional:       Appearance: He is well-developed.      Comments: NG tube in situ   HENT:      Head: Normocephalic and atraumatic.     Eyes:      Pupils: Pupils are equal, round, and reactive to light.       Cardiovascular:      Rate and Rhythm: Normal rate and regular rhythm.      Heart sounds: No murmur heard.     No friction rub. No gallop.   Pulmonary:      Effort: Pulmonary effort is normal. No respiratory distress.      Breath sounds: Normal breath sounds. No wheezing or rales.   Abdominal:      General: Bowel sounds are normal. There is no distension.      Palpations: Abdomen is soft.      Tenderness: There is no abdominal tenderness.     Musculoskeletal:         General: No deformity. Normal range of motion.      Cervical back: Normal range of motion and neck supple.      Comments: Midline sternotomy clean dry and intact     Skin:     General: Skin is warm and dry.     Neurological:      Mental Status: He is alert and oriented to person, place, and time.     Psychiatric:         Behavior: Behavior normal.           Lab Results: I have reviewed the following results:  Results from last 7 days   Lab Units 05/26/25  0425 05/24/25  0407 05/23/25  0429   WBC Thousand/uL 6.72 9.92 18.44*   HEMOGLOBIN g/dL 8.7* 9.0* 10.0*   HEMATOCRIT % 27.6* 27.7* 30.4*   PLATELETS Thousands/uL 209 139* 185     Results from last 7 days   Lab Units 05/26/25  0425 05/25/25  0404 05/24/25  0407 05/21/25  1708 05/21/25  1345   POTASSIUM mmol/L 3.7 3.4* 3.4*   < >  --    CHLORIDE mmol/L 110* 108 107   < >  --    CO2 mmol/L 32 30 28   < >  --    CO2, I-STAT mmol/L  --   --   --   --  23   BUN mg/dL 35* 38* 31*   < >  --    CREATININE mg/dL 0.88 1.03 1.07   < >  --    GLUCOSE, ISTAT mg/dl  --   --   --   --  166*   CALCIUM mg/dL 9.2 9.5 9.4   < >  --     < > = values in this interval not displayed.         Lab Results    Component Value Date    HGBA1C 5.6 05/13/2025     Lab Results   Component Value Date    TROPONINI <0.02 04/17/2018             VTE Pharmacologic Prophylaxis:   VTE Mechanical Prophylaxis:

## 2025-05-27 NOTE — ASSESSMENT & PLAN NOTE
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  No prior workup has been performed  Consider additional workup as an outpatient.

## 2025-05-27 NOTE — PLAN OF CARE
Problem: PAIN - ADULT  Goal: Verbalizes/displays adequate comfort level or baseline comfort level  Description: Interventions:- Encourage patient to monitor pain and request assistance- Assess pain using appropriate pain scale- Administer analgesics based on type and severity of pain and evaluate response- Implement non-pharmacological measures as appropriate and evaluate response- Consider cultural and social influences on pain and pain management- Notify physician/advanced practitioner if interventions unsuccessful or patient reports new pain  5/27/2025 1908 by Ute Cazares  Outcome: Progressing  5/27/2025 1832 by Ute Cazares  Outcome: Progressing     Problem: SAFETY ADULT  Goal: Maintain or return to baseline ADL function  Description: INTERVENTIONS:-  Assess patient's ability to carry out ADLs; assess patient's baseline for ADL function and identify physical deficits which impact ability to perform ADLs (bathing, care of mouth/teeth, toileting, grooming, dressing, etc.)- Assess/evaluate cause of self-care deficits - Assess range of motion- Assess patient's mobility; develop plan if impaired- Assess patient's need for assistive devices and provide as appropriate- Encourage maximum independence but intervene and supervise when necessary- Involve family in performance of ADLs- Assess for home care needs following discharge - Consider OT consult to assist with ADL evaluation and planning for discharge- Provide patient education as appropriate  5/27/2025 1908 by Ute Cazares  Outcome: Progressing  5/27/2025 1832 by Ute Cazares  Outcome: Progressing

## 2025-05-27 NOTE — PLAN OF CARE
Problem: PHYSICAL THERAPY ADULT  Goal: Performs mobility at highest level of function for planned discharge setting.  See evaluation for individualized goals.  Description: Treatment/Interventions: Functional transfer training, LE strengthening/ROM, Therapeutic exercise, Endurance training, Elevations, Bed mobility, Gait training, Equipment eval/education, Patient/family training  Equipment Recommended: Walker       See flowsheet documentation for full assessment, interventions and recommendations.  Outcome: Progressing  Note: Prognosis: Good  Problem List: Decreased strength, Decreased endurance, Impaired balance, Decreased mobility, Decreased cognition, Impaired judgement, Decreased safety awareness  Assessment: Pt was able to progress to (S) level w/ transfers and amb w/o AD (declines SPC or rw) w/ ongoing verbal instructions for safety, posture and pacing provided; pt is still limited by general endurance and overall strength w/ inconsistent safety patterns and ? general insight; will cont to follow pt in PT for graded mobilization to max functional (I), endurance and safety (incl on the steps); D/C recommendations are listed below  Barriers to Discharge: Inaccessible home environment     Rehab Resource Intensity Level, PT: II (Moderate Resource Intensity) (If however pt chooses to return home upon D/C, home PT follow up is recommended;)    See flowsheet documentation for full assessment.

## 2025-05-27 NOTE — PHYSICAL THERAPY NOTE
PHYSICAL THERAPY NOTE          Patient Name: Kilo Mix  Today's Date: 5/27/2025 05/27/25 0945   PT Last Visit   PT Visit Date 05/27/25   Note Type   Note Type Treatment   Pain Assessment   Pain Assessment Tool 0-10   Pain Score No Pain   Restrictions/Precautions   Other Precautions Cardiac/sternal;Cognitive;Chair Alarm;Multiple lines;Telemetry;Fall Risk;Fluid restriction;Aspiration  ((B) nephrostomy tubes; NGT; NPO)   General   Chart Reviewed Yes   Additional Pertinent History cleared for Tx session by nsg   Response to Previous Treatment Patient with no complaints from previous session.   Cognition   Overall Cognitive Status Impaired   Arousal/Participation Alert   Attention Attends with cues to redirect   Orientation Level Oriented to person;Oriented to place;Oriented to situation   Memory Decreased recall of recent events;Decreased recall of precautions;Decreased short term memory   Following Commands Follows one step commands with increased time or repetition   Subjective   Subjective Alert; in the chair; expresses frustration about not being able to get ice chips (currently NPO pending speech eval); eventually agreeable to mobilize after encouragement and education provided (also by nsg); declines an AD   Transfers   Sit to Stand 5  Supervision   Additional items Verbal cues   Stand to Sit 5  Supervision   Additional items Verbal cues   Ambulation/Elevation   Gait pattern Inconsistent khushbu;Short stride;Foward flexed;Excessively slow   Gait Assistance 5  Supervision   Additional items Assist x 1;Verbal cues;Tactile cues   Assistive Device None  (declines SPC despite education and encouragement)   Distance 140 ft + 180 ft w/ extended seated rest period in between   Stair Management Assistance Not tested   Balance   Static Sitting Fair +   Dynamic Sitting Fair   Static Standing Fair -   Dynamic Standing Fair -    Ambulatory Fair -   Activity Tolerance   Activity Tolerance Patient limited by fatigue;Other (Comment)  (cog status)   Medical Staff Made Aware Co-Tx performed w/ OTR due to complexity of medical status; spoke to LEA Amaral w/ CT sx   Nurse Made Aware spoke to BLANCA Calvo   Exercises   Knee AROM Long Arc Quad Sitting;15 reps;AROM;Bilateral   Ankle Pumps Sitting;15 reps;AROM;Bilateral   Assessment   Prognosis Good   Problem List Decreased strength;Decreased endurance;Impaired balance;Decreased mobility;Decreased cognition;Impaired judgement;Decreased safety awareness   Assessment Pt was able to progress to (S) level w/ transfers and amb w/o AD (declines SPC or rw) w/ ongoing verbal instructions for safety, posture and pacing provided; pt is still limited by general endurance and overall strength w/ inconsistent safety patterns and ? general insight; will cont to follow pt in PT for graded mobilization to max functional (I), endurance and safety (incl on the steps); D/C recommendations are listed below   Barriers to Discharge Inaccessible home environment   Goals   Patient Goals to go home   STG Expiration Date 06/05/25   PT Treatment Day 1   Plan   Treatment/Interventions Functional transfer training;Elevations;LE strengthening/ROM;Therapeutic exercise;Endurance training;Cognitive reorientation;Bed mobility;Gait training;Spoke to nursing;Spoke to advanced practitioner;OT   Progress Progressing toward goals   PT Frequency 4-6x/wk   Discharge Recommendation   Rehab Resource Intensity Level, PT II (Moderate Resource Intensity)  (If however pt chooses to return home upon D/C, home PT follow up is recommended;)   Equipment Recommended Other (Comment)  (pt declines using any AD)   AM-PAC Basic Mobility Inpatient   Turning in Flat Bed Without Bedrails 4   Lying on Back to Sitting on Edge of Flat Bed Without Bedrails 3   Moving Bed to Chair 3   Standing Up From Chair Using Arms 3   Walk in Room 3   Climb 3-5 Stairs With  Chacha 2   Basic Mobility Inpatient Raw Score 18   Basic Mobility Standardized Score 41.05   [facility] Highest Level Of Mobility   -HLM Goal 6: Walk 10 steps or more   -HLM Achieved 8: Walk 250 feet ot more   Education   Education Provided Mobility training   Patient Demonstrates verbal understanding;Reinforcement needed   End of Consult   Patient Position at End of Consult Bedside chair;Bed/Chair alarm activated;All needs within reach       Earl Horowitz

## 2025-05-27 NOTE — RESTORATIVE TECHNICIAN NOTE
Restorative Technician Note      Patient Name: Kilo Mix     Restorative Tech Visit Date: 05/27/25  Note Type: Mobility  Patient Position Upon Consult: Bedside chair  Activity Performed: Transferred  Assistive Device: Other (Comment) (none)  Patient Position at End of Consult: Bed/Chair alarm activated; All needs within reach; Supine

## 2025-05-27 NOTE — ASSESSMENT & PLAN NOTE
Lab Results   Component Value Date    HGBA1C 5.6 05/13/2025     Recent Labs     05/26/25  1058 05/26/25  1556 05/26/25 2020 05/27/25  0546   POCGLU 162* 149* 198* 206*     Blood Sugar Average: Last 72 hrs:  (P) 164  Controlled type 2 diabetes mellitus as evidenced by A1c of 5.6%  Home regimen-metformin 500 mg daily  Current regimen-correctional scale insulin algorithm 2 before meals and 1 at bedtime  Patient is currently on continuous tube feeds at goal since 5/20/2025.  Blood sugars are for the most part well-controlled on above regimen, occasionally noted to have glycemic excursions over 200 mg/dL, particularly when he has not gotten insulin for more than 6 hours.  Plan for VBS later today.    Plan:  Recommend starting regular insulin 1 unit every 6 hours  Recommend switching correctional scale insulin to algorithm 1 every 6 hours   Continue Accu-Cheks every 6 hours while on tube feeds  Depending on VBS results today may transition patient to mealtime insulin tomorrow  Monitor for hypoglycemia, treat per protocol  Goal blood sugars while in the nqyptxny-339-473 mg/dL  Discharge recommendations pending clinical course-anticipate patient can go home on metformin and potentially SGLT2 inhibitor if renal function allows  Endocrinology will continue following

## 2025-05-27 NOTE — PROCEDURES
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)    Dyslipidemia    Hypertension    Peripheral vascular disease (HCC)    Asymptomatic stenosis of left carotid artery    Prostate cancer (HCC)    H/O insertion of nephrostomy tube    Pneumonia involving right lung    Preoperative clearance    ALPHONSO (obstructive sleep apnea)    Former smoker    At risk for delirium    Depression    Frailty    Cognitive impairment    Hx of acute poliomyelitis    Restrictive lung disease    Moderate protein-calorie malnutrition (HCC)    S/P CABG x 3    RBBB    Leukocytosis      Past Medical History  Past Medical History[1]    Past Surgical History  Past Surgical History[2]    Assessment Summary:    Pt presents with mild oropharyngeal dysphagia characterized by lingual residue with all requiring secondary swallow. The patient has decreased hyolaryngeal rise and anterior movement, with partial epiglottic inversion. Pharyngeal peristalsis is reduced. The patient presents with a significant amount of pyriform residue after first tsp of thin liquids, which is not able to be cleared throughout study. (Found to be right side pyriform residue on AP view). Airway protection is reduced, with intermittent episodes of penetration to the cords, and transient, trace aspiration events. Most aspiration events are cleared by secondary swallow. Attempted strategies including chin tuck and head turn left and right to clear residue but none were successful. Brief scan of the esophagus reveals some stasis.  Note: Images are available for review in [employer] as desired.     Recommendations:   Recommended Diet: puree/level 1 diet and thin liquids   Recommended Form of  "Medications: crushed with puree   Aspiration precautions and compensatory swallowing strategies: upright posture and small bites/sips  Consider referral to: none at this time     SLP Dysphagia therapy recommended: yes    Results Reviewed with: patient, RN, and PA   Pt/Family Education: initiated. Pt and caregivers would benefit from/require continued education.    Patient Stated Goal: \"I can have apple juice?!\"     Dysphagia Goals per SLP: pt will tolerate puree solids with thin liquids without s/s of aspiration x1-3 sessions. Patient will tolerate upgraded trials without overt s/s aspiration.     General Information;  Repeat MBS was recommended to assess oropharyngeal stage swallowing skills at this time.     Prior MBS:  5/23/25:   Assessment Summary:    Pt presents with severe oropharyngeal dysphagia characterized. The patient has decreased bolus propulsion and drive. Decreased oral control observed with spillage over BOT. Epiglottic inversion is absent. Pharyngeal constriction is absent. The patient has poor anterior hyoid movement and laryngeal rise. Significant valleculae and pyriform retention is observed, with spillage into airway. Aspiration occurs with all consistencies. The patient does have cough response which clears airway, but he is observed with regurgitation of barium into oral cavity with each trial. Brief esophageal scan appears adequate.  Note: Images are available for review in [employer] as desired.   Recommendations:   Recommended Diet: NPO except ice chips   Recommended Form of Medications: non oral    Consider referral to: none at this time    SLP Dysphagia therapy recommended: f/u ST with repeat VBS    Pt was viewed sitting upright in the lateral and AP positions. Due to concerns for patient safety / patient refusal, trials provided deviated from the MBSImP Validated Protocol. Pt was given tsp, cup and straw sips thin liquids, tsp and cup nectar thick liquids, tsp honey thick liquids and " pudding.     Initial view observations/comments: NGT in place      8-Point Penetration-Aspiration Scale   Thin liquid 6 - Material enters the airway, passes below the vocal folds and is ejected into the larynx or out of the airway     Nectar thick liquid 6 - Material enters the airway, passes below the vocal folds and is ejected into the larynx or out of the airway     Honey thick liquid 1 - Material does not enter the airway   Puree (pudding) 6 - Material enters the airway, passes below the vocal folds and is ejected into the larynx or out of the airway     Solid N/a     Strategies and Efficacy: chin tuck, head turn left and right did not help clear pyriform residue    Aspiration Response and Efficacy: trace, transient aspiration events with no cough response    MBS IMP Rating    ORAL Impairment  Compinent 1--Lip Closure  Judged at any point during the swallow.  0 - No labial escape    Component 2--Tongue Control During Bolus Hold  Judged on held liquid boluses only and prior to productive tongue movement.   0 - Cohesive bolus between tongue to palatal seal    Component 3--Bolus Preparation/Mastication  Judged only during presentation of 1/2 shortbread cookie coated in pudding.   N/a cookie not trialed    Component 4--Bolus Transport/Lingual Motion  Judged after first productive tongue movement for oral bolus transport.  0 - Brisk tongue motion    Component 5--Oral Residue  Judged after first swallow or after the last swallow of the sequential swallow task.  2 - Residue collection on oral structures   Location   C - Tongue    Component 6--Initiation of Pharyngeal Swallow  Judged at first movement of the brisk superior-anterior hyoid trajectory.  3 - Bolus head in pyriforms      PHARYNGEAL Impairment  Component 7--Soft Palate Elevation  Judged during maximum displacement of soft palate.  1 - Trace column of contrast or air between the SP and PW    Component 8--Laryngeal Elevation  Judged when epiglottis is in its  most horizontal position.  1 - Partial superior movement of thyroid cartilage/partial approximation of arytenoids to epiglottic petiole    Component 9--Anterior Hyoid Excursion  Judged at height of swallow/maximal anterior hyoid displacement.  1 - Partial anterior movement    Component 10--Epiglottic Movement  Judged at height of swallow/maximal anterior hyoid displacement.  1 - Partial inversion    Component 11--Laryngeal Vestibular Closure  Judged at height of swallow/maximal anterior hyoid displacement.  1 - Incomplete; narrow column air/contrast in laryngeal vestibule    Component 12--Pharyngeal Stripping Wave  Judged during the full duration of the pharyngeal swallow.  1 - Present - diminished    Component 13--Pharyngeal Contraction  Judged in AP view at rest and throughout maximum movement of structures.  2 - Unilateral Bulging    Component 14--Pharyngoesophageal Segment Opening  Judged during maximum distension of PES and throughout opening and closure.  1 - Partial distension/partial duration; partial obstruction to flow    Component 15--Tongue Base (TB) Retraction  Judged during maximum retraction of the tongue base.  0 - No contrast between TB and posterior pharyngeal wall (PW)    Component 16--Pharyngeal Residue  Judged after first swallow or after the last swallow of the sequential swallow task.  2 - Collection of residue within or on pharyngeal structures   Location   E - Pyriform sinuses      ESOPHAGEAL Impairment  Component 17--Esophageal Clearance Upright Position  Judged in AP view during bolus transit through the oral cavity to the LES  1 - Esophageal retention                 [1]   Past Medical History:  Diagnosis Date    Bladder tumor     Cancer (HCC)     Prostate cancer    Depression 5/14/2025    Diabetes mellitus (HCC)     Diverticulitis of colon     History of common carotid artery stent placement     RIGHT stent - per pts son - RIGHT stent  in  carotid artery    History of heart artery stent      x2 stents in heart per pts son    Hyperlipidemia     Hypertension     Migraines     Myocardial infarction (HCC)     about 30yrs ago    Nephrostomy present (HCC)     2/25/25 Per pts son Ed- pt has two nephrostomy tubes    Prostate cancer (HCC)     Seasonal allergies    [2]   Past Surgical History:  Procedure Laterality Date    CARDIAC CATHETERIZATION N/A 5/12/2025    Procedure: Cardiac Catheterization;  Surgeon: Daniel Mabry DO;  Location:  CARDIAC CATH LAB;  Service: Cardiology    CARDIAC SURGERY      CAROTID ENDARTARECTOMY Right     thromboendarterectomy    CAROTID STENT Right     CATARACT EXTRACTION Left     COLONOSCOPY      fiberoptic screening 2/6/08 5 yr / complete 3/15/13 5 yr    CORONARY ANGIOPLASTY WITH STENT PLACEMENT      ELBOW SURGERY      EYE SURGERY      IR BIOPSY LYMPH NODE  12/24/2024    IR NEPHROSTOMY TUBE CHECK/CHANGE/REPOSITION/REINSERTION/UPSIZE  2/25/2025    IR NEPHROSTOMY TUBE CHECK/CHANGE/REPOSITION/REINSERTION/UPSIZE  4/29/2025    IR NEPHROSTOMY TUBE PLACEMENT  12/24/2024    KNEE SURGERY      MO CORONARY ARTERY BYP W/VEIN & ARTERY GRAFT 3 VEIN N/A 5/21/2025    Procedure: CORONARY ARTERY BYPASS GRAFT (CABG) X3 VESSELS, LIMA - LAD, LEFT LEG EVH/SVG - PDA AND OM, W/CHAPO;  Surgeon: David Tomlin DO;  Location:  MAIN OR;  Service: Cardiac Surgery    MO CYSTO W/REMOVAL OF LESIONS SMALL N/A 1/21/2025    Procedure: TRANSURETHRAL RESECTION OF BLADDER TUMOR (TURBT);  Surgeon: Jordan Youssef MD;  Location:  MAIN OR;  Service: Urology    MO CYSTO W/REMOVAL OF LESIONS SMALL N/A 3/6/2025    Procedure: TRANSURETHRAL RESECTION OF BLADDER TUMOR (TURBT);  Surgeon: Jordan Youssef MD;  Location:  MAIN OR;  Service: Urology    PROSTATE SURGERY      SUPRAPUBIC PROSTATECTOMY  12/06/1999    UPPER GASTROINTESTINAL ENDOSCOPY

## 2025-05-27 NOTE — OCCUPATIONAL THERAPY NOTE
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)    Dyslipidemia    Hypertension    Peripheral vascular disease (HCC)    Asymptomatic stenosis of left carotid artery    Prostate cancer (HCC)    H/O insertion of nephrostomy tube    Pneumonia involving right lung    Preoperative clearance    ALPHONSO (obstructive sleep apnea)    Former smoker    At risk for delirium    Depression    Frailty    Cognitive impairment    Hx of acute poliomyelitis    Restrictive lung disease    Moderate protein-calorie malnutrition (HCC)    S/P CABG x 3    RBBB    Leukocytosis              05/27/25 0946   OT Last Visit   OT Visit Date 05/27/25   Note Type   Note Type Treatment   Pain Assessment   Pain Assessment Tool 0-10   Pain Score No Pain   Restrictions/Precautions   Weight Bearing Precautions Per Order No   Other Precautions Cardiac/sternal;Cognitive;Multiple lines;Telemetry;Fall Risk  (NGT clamped, B nephrostomy tubes, NPO)   Lifestyle   Autonomy PTA, Pt reports I w/ ADL, IADL, FM w/out AD. (+) .   Reciprocal Relationships Spouse, son, DIL, daughter, grandchildren   Service to Others Retired   ADL   Where Assessed Chair   Grooming Comments Pt declines ADL reporting he already completed.   LB Dressing Assistance 5  Supervision/Setup   LB Dressing Deficit Don/doff R sock;Don/doff L sock   LB Dressing Comments uses seated figure 4 technique to doff/don socks   Bed Mobility   Additional Comments Pt greeted and left OOB in chair with all needs within reach.   Transfers   Sit to Stand 5  Supervision   Additional items Verbal cues   Stand to Sit 5  Supervision   Additional items Verbal cues   Additional Comments without AD   Functional Mobility   Functional Mobility  5  Supervision   Additional Comments Pt performs household distance mobility with supervision without AD, vc for safety awareness.   Additional items   (no AD)   Cognition   Overall Cognitive Status Impaired   Arousal/Participation Cooperative   Attention Attends with cues to redirect   Orientation Level Oriented to person;Oriented to place;Oriented to situation   Memory Decreased recall of precautions;Decreased recall of recent events;Decreased short term memory   Following Commands Follows one step commands with increased time or repetition   Comments Pt cooperative although with decreased insight and safety awareness, requiring vc for higher level cognitive tasks, requiring redirect frequently throughout education although verbalizes understanding, will continue to assess.   Additional Activities   Additional Activities Other (Comment)  (Recovering After Cardiac Surgery education packet)   Additional Activities Comments Pt and son provided s/p cardiac sx education packet, reviewed w/ OT, discussed cardiac/sternal precautions, lifestyle modifications, post hospitalization IADL management, environmental temperature control, emotional regulation and management, lifting/driving restrictions, energy conservation techniques, mobility schedule, incisional management, VNA, and cardiac rehabilitation initiation upon clearance per physician at follow up. Pt and son reviewed education packet and acknowledged reception of such.   Activity Tolerance   Activity Tolerance Patient limited by fatigue   Medical Staff Made Aware RN cleared, co-tx with DPT due to medical complexity, OTS present.   Assessment   Assessment Pt seen for OT treatment session day 2 on this date focused on ADL retraining, functional transfers and mobility, energy conservation, functional endurance, recall of safety precautions, and patient education. Pt was greeted in chair and was cooperative throughout session. Following session, pt was left in chair  with chair alarm on and all needs within reach. Pt continues to be limited by functional status related to ADLs and transfers requiring supervision for transfers and functional mobility, continues to be limited by safety awareness and cognition, although demonstrates improvements in seated LB dressing performing with supervision to don socks.   The patient's raw score on the AM-PAC Daily Activity Inpatient Short Form is 18. A raw score of less than 19 suggests the patient may benefit from discharge to post-acute rehabilitation services. Please refer to the recommendation of the Occupational Therapist for safe discharge planning. Pt would benefit from continued acute OT intervention with plan to continue OT treatment sessions 2-3x per week. Recommend d/c to level II services pending progress and support at home.   Plan   Treatment Interventions ADL retraining;Functional transfer training;Endurance training;Continued evaluation;Energy conservation;Cardiac education   Goal Expiration Date 06/05/25   OT Treatment Day 2   OT Frequency 2-3x/wk   Discharge Recommendation   Rehab Resource Intensity Level, OT II (Moderate Resource Intensity)  (pending progress and support at home)   AM-PAC Daily Activity Inpatient   Lower Body Dressing 3   Bathing 2   Toileting 3   Upper Body Dressing 3   Grooming 3   Eating 4   Daily Activity Raw Score 18   Daily Activity Standardized Score (Calc for Raw Score >=11) 38.66   AM-PAC Applied Cognition Inpatient   Following a Speech/Presentation 4   Understanding Ordinary Conversation 4   Taking Medications 3   Remembering Where Things Are Placed or Put Away 3   Remembering List of 4-5 Errands 2   Taking Care of Complicated Tasks 2   Applied Cognition Raw Score 18   Applied Cognition Standardized Score 38.07   End of Consult   Education Provided Yes;Family or social support of family present for education by provider   Patient Position at End of Consult Bedside chair;All needs within reach    Nurse Communication Nurse aware of consult         Sheri Llanos, OTDAMARI, OTR/L

## 2025-05-27 NOTE — PLAN OF CARE
Problem: OCCUPATIONAL THERAPY ADULT  Goal: Performs self-care activities at highest level of function for planned discharge setting.  See evaluation for individualized goals.  Description: Treatment Interventions: ADL retraining, Functional transfer training, Endurance training, Cognitive reorientation, Patient/family training, Continued evaluation, Energy conservation, Activityengagement          See flowsheet documentation for full assessment, interventions and recommendations.   Outcome: Progressing  Note: Limitation: Decreased ADL status, Decreased Safe judgement during ADL, Decreased cognition, Decreased endurance, Decreased self-care trans, Decreased high-level ADLs  Prognosis: Fair  Assessment: Pt seen for OT treatment session day 2 on this date focused on ADL retraining, functional transfers and mobility, energy conservation, functional endurance, recall of safety precautions, and patient education. Pt was greeted in chair and was cooperative throughout session. Following session, pt was left in chair with chair alarm on and all needs within reach. Pt continues to be limited by functional status related to ADLs and transfers requiring supervision for transfers and functional mobility, continues to be limited by safety awareness and cognition, although demonstrates improvements in seated LB dressing performing with supervision to don socks.   The patient's raw score on the AM-PAC Daily Activity Inpatient Short Form is 18. A raw score of less than 19 suggests the patient may benefit from discharge to post-acute rehabilitation services. Please refer to the recommendation of the Occupational Therapist for safe discharge planning. Pt would benefit from continued acute OT intervention with plan to continue OT treatment sessions 2-3x per week. Recommend d/c to level II services pending progress and support at home.     Rehab Resource Intensity Level, OT: II (Moderate Resource Intensity) (pending progress and  support at home)

## 2025-05-27 NOTE — RESTORATIVE TECHNICIAN NOTE
Restorative Technician Note      Patient Name: Kilo Mix     Restorative Tech Visit Date: 05/27/25  Note Type: Mobility  Patient Position Upon Consult: Bedside chair  Activity Performed: Ambulated  Assistive Device: Other (Comment) (pt pushed IV pole)  Patient Position at End of Consult: Bed/Chair alarm activated; All needs within reach; Bedside chair

## 2025-05-27 NOTE — SPEECH THERAPY NOTE
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)    Dyslipidemia    Hypertension    Peripheral vascular disease (HCC)    Asymptomatic stenosis of left carotid artery    Prostate cancer (HCC)    H/O insertion of nephrostomy tube    Pneumonia involving right lung    Preoperative clearance    ALPHONSO (obstructive sleep apnea)    Former smoker    At risk for delirium    Depression    Frailty    Cognitive impairment    Hx of acute poliomyelitis    Restrictive lung disease    Moderate protein-calorie malnutrition (HCC)    S/P CABG x 3    RBBB    Leukocytosis       Past Medical History  Past Medical History[1]     Past Surgical History  Past Surgical History[2]      Subjective:  \"I think I'm doing better\" Patient awake and alert.     Objective:  NGT placed this weekend. Patient out of critical care and is sitting upright in recliner. Patient reports less secretions. He is assessed with applesauce, ice chips and sips of thin liquids. Oral stage appears adequate. Laryngeal rise is palpated and continues to feed reduced, with double swallows to clear. However, no coughing observed. Intermittent throat clearing with intermittent wet vocal quality observed. No expectoration or regurgitation today. Discussed plan with patient and son at bedside. Plan to repeat VBS today and hopefully begin PO diet pending results.     Assessment:  The patient has decreased coughing with trials. Intermittent throat clearing observed.    Plan/Recommendations:  Continue NPO with NGT. VBS this afternoon, or as soon as available. Continue ST.          [1]   Past Medical History:  Diagnosis Date    Bladder tumor     Cancer (HCC)     Prostate cancer "    Depression 5/14/2025    Diabetes mellitus (HCC)     Diverticulitis of colon     History of common carotid artery stent placement     RIGHT stent - per pts son - RIGHT stent  in  carotid artery    History of heart artery stent     x2 stents in heart per pts son    Hyperlipidemia     Hypertension     Migraines     Myocardial infarction (HCC)     about 30yrs ago    Nephrostomy present (HCC)     2/25/25 Per pts son Ed- pt has two nephrostomy tubes    Prostate cancer (HCC)     Seasonal allergies    [2]   Past Surgical History:  Procedure Laterality Date    CARDIAC CATHETERIZATION N/A 5/12/2025    Procedure: Cardiac Catheterization;  Surgeon: Daniel Mabry DO;  Location:  CARDIAC CATH LAB;  Service: Cardiology    CARDIAC SURGERY      CAROTID ENDARTARECTOMY Right     thromboendarterectomy    CAROTID STENT Right     CATARACT EXTRACTION Left     COLONOSCOPY      fiberoptic screening 2/6/08 5 yr / complete 3/15/13 5 yr    CORONARY ANGIOPLASTY WITH STENT PLACEMENT      ELBOW SURGERY      EYE SURGERY      IR BIOPSY LYMPH NODE  12/24/2024    IR NEPHROSTOMY TUBE CHECK/CHANGE/REPOSITION/REINSERTION/UPSIZE  2/25/2025    IR NEPHROSTOMY TUBE CHECK/CHANGE/REPOSITION/REINSERTION/UPSIZE  4/29/2025    IR NEPHROSTOMY TUBE PLACEMENT  12/24/2024    KNEE SURGERY      ND CORONARY ARTERY BYP W/VEIN & ARTERY GRAFT 3 VEIN N/A 5/21/2025    Procedure: CORONARY ARTERY BYPASS GRAFT (CABG) X3 VESSELS, LIMA - LAD, LEFT LEG EVH/SVG - PDA AND OM, W/CHAPO;  Surgeon: David Tomlin DO;  Location:  MAIN OR;  Service: Cardiac Surgery    ND CYSTO W/REMOVAL OF LESIONS SMALL N/A 1/21/2025    Procedure: TRANSURETHRAL RESECTION OF BLADDER TUMOR (TURBT);  Surgeon: Jordan Youssef MD;  Location:  MAIN OR;  Service: Urology    ND CYSTO W/REMOVAL OF LESIONS SMALL N/A 3/6/2025    Procedure: TRANSURETHRAL RESECTION OF BLADDER TUMOR (TURBT);  Surgeon: Jordan Youssef MD;  Location:  MAIN OR;  Service: Urology    PROSTATE SURGERY      SUPRAPUBIC  PROSTATECTOMY  12/06/1999    UPPER GASTROINTESTINAL ENDOSCOPY

## 2025-05-27 NOTE — PROGRESS NOTES
"Progress Note - Cardiac Surgery   Kilo Mix 84 y.o. male MRN: [id number]  Unit/Bed#: [facility]-316-01 Encounter: [id number]    Coronary artery disease. S/P coronary artery bypass grafting;   POD # 6      24 Hour Events: TF remains @ goal. Tolerating without significant residuals.Noted aspiration with ice chips on 5/25; Strict NPO ordered. No events on telemetry (amiodarone on hold). Only complaint \"thirsty\".     Pain controlled, using IS (750). (+) BM 5/27, ambulating well.      Medications:   Scheduled Meds:  Current Facility-Administered Medications   Medication Dose Route Frequency Provider Last Rate    acetaminophen  650 mg Per NG Tube TID Juan Daniel Loyd PA-C      acetaminophen  650 mg Rectal Q4H PRN Alicia Bender PA-C      [Held by provider] amiodarone  200 mg Oral Q8H [facility] Alicia Bender PA-C      aspirin  300 mg Rectal Daily Juan Daniel Loyd PA-C      [Held by provider] aspirin  325 mg Oral Daily Alicia Bender PA-C      [Held by provider] atorvastatin  80 mg Oral Daily With Dinner Alicia Bender PA-C      bisacodyl  10 mg Rectal Daily PRN Alicia Bender PA-C      chlorhexidine  15 mL Mouth/Throat BID Alicia Bender PA-C      [Held by provider] docusate sodium  100 mg Oral BID Alicia Bender PA-C      heparin (porcine)  5,000 Units Subcutaneous Q8H [facility] Juan Daniel Loyd PA-C      insulin lispro  1-5 Units Subcutaneous HS Alicia Bender PA-C      insulin lispro  1-5 Units Subcutaneous TID AC Alicia Bender PA-C      metoprolol  5 mg Intravenous Q4H Juan Daniel Loyd PA-C      [Held by provider] metoprolol tartrate  25 mg Oral Q12H [facility] Juan Daniel Loyd PA-C      oxyCODONE  2.5 mg Oral Q4H PRN Alicia Bender PA-C      Or    oxyCODONE  5 mg Oral Q4H PRN Alicia Bender PA-C      [Held by provider] pantoprazole  40 mg Oral Daily Alicia Bender PA-C      pantoprazole  40 mg Intravenous Daily Juan Daneil Evert, PA-C      [Held by provider] polyethylene glycol  17 g Oral Daily Alicia Bender PA-C      " "temazepam  15 mg Oral HS PRN Alicia Bender PA-C       Continuous Infusions:   PRN Meds:.  acetaminophen    bisacodyl    oxyCODONE **OR** oxyCODONE    temazepam    Vitals:   Vitals:    05/27/25 0400 05/27/25 0500 05/27/25 0538 05/27/25 0600   BP:       Pulse: 68 70  68   Resp:       Temp:       TempSrc:       SpO2: 98% 99%  98%   Weight:   68.9 kg (151 lb 14.4 oz)    Height:           Telemetry: NSR; Heart Rate: 76    Respiratory:   SpO2: SpO2: 98 %, SpO2 Activity: SpO2 Activity: At Rest; RA    Intake/Output:     Intake/Output Summary (Last 24 hours) at 5/27/2025 0651  Last data filed at 5/27/2025 0600  Gross per 24 hour   Intake 2053 ml   Output 1525 ml   Net 528 ml        Weights:   Weight (last 2 days)       Date/Time Weight    05/27/25 0538 68.9 (151.9)    05/26/25 0600 69.2 (152.56)    05/25/25 0613 68.3 (150.57)          Preop weight 154 lb      Results:   Results from last 7 days   Lab Units 05/26/25  0425 05/24/25  0407 05/23/25  0429   WBC Thousand/uL 6.72 9.92 18.44*   HEMOGLOBIN g/dL 8.7* 9.0* 10.0*   HEMATOCRIT % 27.6* 27.7* 30.4*   PLATELETS Thousands/uL 209 139* 185     No hx of anemia    Results from last 7 days   Lab Units 05/26/25  0425 05/25/25  0404 05/24/25  0407 05/21/25  1708 05/21/25  1345   SODIUM mmol/L 150* 147 145   < >  --    POTASSIUM mmol/L 3.7 3.4* 3.4*   < >  --    CHLORIDE mmol/L 110* 108 107   < >  --    CO2 mmol/L 32 30 28   < >  --    CO2, I-STAT mmol/L  --   --   --   --  23   BUN mg/dL 35* 38* 31*   < >  --    CREATININE mg/dL 0.88 1.03 1.07   < >  --    GLUCOSE, ISTAT mg/dl  --   --   --   --  166*   CALCIUM mg/dL 9.2 9.5 9.4   < >  --     < > = values in this interval not displayed.     No results for input(s): \"MG\" in the last 72 hours.            Point of care glucose: 149 - 206 (NIDDM2- A1c 5.6)- endocrine not following    Studies:  No new images    Invasive Lines/Tubes:  Invasive Devices       Peripheral Intravenous Line  Duration             Peripheral IV 05/26/25 " Right;Ventral (anterior) Forearm <1 day              Drain  Duration             Nephrostomy Left 10.2 Fr. 27 days    Nephrostomy Right 10.2 Fr. 27 days    NG/OG/Enteral Tube Small Bore Feeding Tube 12 Fr Left nare 2 days                  Physical Exam:    General: No acute distress and Normal appearance  HEENT/NECK:  Normocephalic. Atraumatic.  No jugular venous distention.    Cardiac: Regular rate and rhythm and No murmurs/rubs/gallops  Pulmonary:  Breath sounds clear bilaterally and No rales/rhonchi/wheezes  Abdomen:  Non-tender, Non-distended, and Normal bowel sounds  Incisions: Sternum is stable.  Incision is clean, dry, and intact.  and Saphenectomy incison is clean, dry, and intact.   Extremities: Extremities warm/dry and No edema B/L  Neuro: Alert and oriented X 3 and No focal deficits  Skin: Warm/Dry, without rashes or lesions.         Assessment:  Principal Problem:    NSTEMI (non-ST elevated myocardial infarction) ([facility])  Active Problems:    Coronary artery disease involving native coronary artery    Diabetes mellitus type 2 with neurological manifestations ([facility])    Dyslipidemia    Hypertension    Peripheral vascular disease (HCC)    Asymptomatic stenosis of left carotid artery    Prostate cancer (HCC)    H/O insertion of nephrostomy tube    Pneumonia involving right lung    Preoperative clearance    ALPHONSO (obstructive sleep apnea)    Former smoker    At risk for delirium    Depression    Frailty    Cognitive impairment    Hx of acute poliomyelitis    Restrictive lung disease    Moderate protein-calorie malnutrition (HCC)    S/P CABG x 3    RBBB    Leukocytosis       Coronary artery disease. S/P coronary artery bypass grafting;   POD # 6    Plan:    Cardiac:     S/P acute preoperative NSTEMI  Normal ventricular systolic function, EF 65%    Carotid stenosis (s/p R CEA, 50-69% LICA stenosis)    NSR; HR 60s, -130 (yesterday received x 5)   Continue Lopressor 5 mg IV q 4 hours    AF prophylaxis  PO  Amiodarone on hold 2/2 dysphagia    Rectal ASA 2/2 dysphagia  Statin therapy on hold 2/2 dysphagia    D/C central IV access today     Continue Subcutaneous Heparin for DVT prophylaxis    Pulmonary:      On RA    Renal:     Normal preoperative renal function (Cr 0.88, from 1.03)  - S/P b/l nephrostomy tubes (2/2 tumor obstruction)  - Continue daily flushes    Intake/Output net: + 528 mL/24 hours (UOP 1.5 L, since admit -8 L, down 3 lbs, hypernatremic on 5/26)    Diuretics D/C post op (last dose 20 mg x 2 on 5/24)    Neuro:    Neurologically intact; No active issues     Incisional pain well controlled   Continue oxycodone, 2.5 to 5 mg PO q 4 hours prn pain    GI:      Post-operative dysphagia  - Speech eval/VBS completed  - Rec NPO   - NG feeding tube inserted 5/24  - Jevity running at goal; Continue  - Await speech re eval for consideration of diet resumption  - Meds transitioned to IV/liquid NGT    Tolerating diet without complaint    Continue stool softeners and prn suppository    Continue GI prophylaxis    Endo:     Pre-Op Hgb A1C: 5.6    Patient has been transitioned from continuous insulin infusion to intermittent subcutaneous dosing  Hx of NIDDM2- consult endocrine due to elevated BG    7    Hematology:   H/O prostate ca (s/p prostatectomy, recurrent mets to lymph nodes, stage IV    Post-operative blood count acceptable; Trend prn  Post op leukocytosis resolved      8.   Disposition:        Following daily PT/OT recommendations regarding home vs. rehab when medically cleared for discharge    PT/OT: II (Moderate Resource Intensity) (vs level III pending progress and level of support at home)     Discharge planning, pending repeat swallow eval today      VTE Pharmacologic Prophylaxis: Heparin  VTE Mechanical Prophylaxis: sequential compression device    Collaborative rounds completed with supervising physician  Plan of care discussed with bedside nurse    SIGNATURE: Cristin Rodriges PA-C  DATE: May 27, 2025  TIME:  6:51 AM

## 2025-05-28 VITALS
HEIGHT: 68 IN | RESPIRATION RATE: 16 BRPM | WEIGHT: 153.66 LBS | HEART RATE: 68 BPM | OXYGEN SATURATION: 100 % | BODY MASS INDEX: 23.29 KG/M2 | SYSTOLIC BLOOD PRESSURE: 120 MMHG | TEMPERATURE: 97.7 F | DIASTOLIC BLOOD PRESSURE: 59 MMHG

## 2025-05-28 LAB
ANION GAP SERPL CALCULATED.3IONS-SCNC: 9 MMOL/L (ref 4–13)
BASOPHILS # BLD AUTO: 0.04 THOUSANDS/ÂΜL (ref 0–0.1)
BASOPHILS NFR BLD AUTO: 0 % (ref 0–1)
BUN SERPL-MCNC: 27 MG/DL (ref 5–25)
CALCIUM SERPL-MCNC: 9.2 MG/DL (ref 8.4–10.2)
CHLORIDE SERPL-SCNC: 106 MMOL/L (ref 96–108)
CO2 SERPL-SCNC: 30 MMOL/L (ref 21–32)
CREAT SERPL-MCNC: 0.97 MG/DL (ref 0.6–1.3)
EOSINOPHIL # BLD AUTO: 0.68 THOUSAND/ÂΜL (ref 0–0.61)
EOSINOPHIL NFR BLD AUTO: 6 % (ref 0–6)
ERYTHROCYTE [DISTWIDTH] IN BLOOD BY AUTOMATED COUNT: 13.4 % (ref 11.6–15.1)
GFR SERPL CREATININE-BSD FRML MDRD: 71 ML/MIN/1.73SQ M
GLUCOSE SERPL-MCNC: 128 MG/DL (ref 65–140)
GLUCOSE SERPL-MCNC: 140 MG/DL (ref 65–140)
GLUCOSE SERPL-MCNC: 143 MG/DL (ref 65–140)
GLUCOSE SERPL-MCNC: 151 MG/DL (ref 65–140)
HCT VFR BLD AUTO: 29.5 % (ref 36.5–49.3)
HGB BLD-MCNC: 9.4 G/DL (ref 12–17)
IMM GRANULOCYTES # BLD AUTO: 0.11 THOUSAND/UL (ref 0–0.2)
IMM GRANULOCYTES NFR BLD AUTO: 1 % (ref 0–2)
LYMPHOCYTES # BLD AUTO: 1.52 THOUSANDS/ÂΜL (ref 0.6–4.47)
LYMPHOCYTES NFR BLD AUTO: 14 % (ref 14–44)
MCH RBC QN AUTO: 32.2 PG (ref 26.8–34.3)
MCHC RBC AUTO-ENTMCNC: 31.9 G/DL (ref 31.4–37.4)
MCV RBC AUTO: 101 FL (ref 82–98)
MONOCYTES # BLD AUTO: 0.78 THOUSAND/ÂΜL (ref 0.17–1.22)
MONOCYTES NFR BLD AUTO: 7 % (ref 4–12)
NEUTROPHILS # BLD AUTO: 8.03 THOUSANDS/ÂΜL (ref 1.85–7.62)
NEUTS SEG NFR BLD AUTO: 72 % (ref 43–75)
NRBC BLD AUTO-RTO: 0 /100 WBCS
PLATELET # BLD AUTO: 288 THOUSANDS/UL (ref 149–390)
PMV BLD AUTO: 11 FL (ref 8.9–12.7)
POTASSIUM SERPL-SCNC: 4.6 MMOL/L (ref 3.5–5.3)
RBC # BLD AUTO: 2.92 MILLION/UL (ref 3.88–5.62)
SODIUM SERPL-SCNC: 145 MMOL/L (ref 135–147)
WBC # BLD AUTO: 11.16 THOUSAND/UL (ref 4.31–10.16)

## 2025-05-28 PROCEDURE — 80048 BASIC METABOLIC PNL TOTAL CA: CPT | Performed by: PHYSICIAN ASSISTANT

## 2025-05-28 PROCEDURE — 85025 COMPLETE CBC W/AUTO DIFF WBC: CPT | Performed by: PHYSICIAN ASSISTANT

## 2025-05-28 PROCEDURE — 97116 GAIT TRAINING THERAPY: CPT

## 2025-05-28 PROCEDURE — 97530 THERAPEUTIC ACTIVITIES: CPT

## 2025-05-28 PROCEDURE — 99024 POSTOP FOLLOW-UP VISIT: CPT | Performed by: PHYSICIAN ASSISTANT

## 2025-05-28 PROCEDURE — 92526 ORAL FUNCTION THERAPY: CPT

## 2025-05-28 PROCEDURE — 82948 REAGENT STRIP/BLOOD GLUCOSE: CPT

## 2025-05-28 RX ORDER — DOCUSATE SODIUM 100 MG/1
100 CAPSULE, LIQUID FILLED ORAL 2 TIMES DAILY
Start: 2025-05-28

## 2025-05-28 RX ORDER — CHLORHEXIDINE GLUCONATE ORAL RINSE 1.2 MG/ML
15 SOLUTION DENTAL 2 TIMES DAILY
Start: 2025-05-28

## 2025-05-28 RX ORDER — METOPROLOL TARTRATE 50 MG
50 TABLET ORAL EVERY 12 HOURS SCHEDULED
Status: DISCONTINUED | OUTPATIENT
Start: 2025-05-28 | End: 2025-05-28 | Stop reason: HOSPADM

## 2025-05-28 RX ORDER — POLYETHYLENE GLYCOL 3350 17 G/17G
17 POWDER, FOR SOLUTION ORAL DAILY
Start: 2025-05-29

## 2025-05-28 RX ORDER — ACETAMINOPHEN 325 MG/1
650 TABLET ORAL EVERY 6 HOURS PRN
Start: 2025-05-28

## 2025-05-28 RX ORDER — PANTOPRAZOLE SODIUM 40 MG/1
40 TABLET, DELAYED RELEASE ORAL DAILY
Start: 2025-05-29

## 2025-05-28 RX ORDER — ATORVASTATIN CALCIUM 80 MG/1
80 TABLET, FILM COATED ORAL
Start: 2025-05-28 | End: 2025-06-05 | Stop reason: SDUPTHER

## 2025-05-28 RX ORDER — ASPIRIN 325 MG
325 TABLET ORAL DAILY
Start: 2025-05-29

## 2025-05-28 RX ORDER — METOPROLOL TARTRATE 50 MG
50 TABLET ORAL EVERY 12 HOURS SCHEDULED
Start: 2025-05-28 | End: 2025-06-09 | Stop reason: ALTCHOICE

## 2025-05-28 RX ADMIN — ASPIRIN 325 MG ORAL TABLET 325 MG: 325 PILL ORAL at 08:45

## 2025-05-28 RX ADMIN — HEPARIN SODIUM 5000 UNITS: 5000 INJECTION INTRAVENOUS; SUBCUTANEOUS at 08:45

## 2025-05-28 RX ADMIN — AMIODARONE HYDROCHLORIDE 200 MG: 200 TABLET ORAL at 05:09

## 2025-05-28 RX ADMIN — INSULIN LISPRO 1 UNITS: 100 INJECTION, SOLUTION INTRAVENOUS; SUBCUTANEOUS at 06:50

## 2025-05-28 RX ADMIN — ATORVASTATIN CALCIUM 80 MG: 80 TABLET, FILM COATED ORAL at 16:36

## 2025-05-28 RX ADMIN — PANTOPRAZOLE SODIUM 40 MG: 40 TABLET, DELAYED RELEASE ORAL at 05:09

## 2025-05-28 RX ADMIN — HEPARIN SODIUM 5000 UNITS: 5000 INJECTION INTRAVENOUS; SUBCUTANEOUS at 16:36

## 2025-05-28 RX ADMIN — DOCUSATE SODIUM 100 MG: 100 CAPSULE, LIQUID FILLED ORAL at 08:45

## 2025-05-28 RX ADMIN — METOPROLOL TARTRATE 50 MG: 50 TABLET, FILM COATED ORAL at 08:45

## 2025-05-28 RX ADMIN — CHLORHEXIDINE GLUCONATE 15 ML: 1.2 SOLUTION ORAL at 08:45

## 2025-05-28 RX ADMIN — AMIODARONE HYDROCHLORIDE 200 MG: 200 TABLET ORAL at 14:01

## 2025-05-28 RX ADMIN — CHLORHEXIDINE GLUCONATE 15 ML: 1.2 SOLUTION ORAL at 17:19

## 2025-05-28 RX ADMIN — DOCUSATE SODIUM 100 MG: 100 CAPSULE, LIQUID FILLED ORAL at 17:19

## 2025-05-28 NOTE — QUICK NOTE
Patient not seen in person.  Chart thoroughly reviewed.  Recent Labs     05/27/25 2044 05/28/25  0426 05/28/25  0649 05/28/25  1058   POCGLU 106  --  151* 143*   GLUC  --  128  --   --    Blood glucose in target range on the current regimen of sliding scale insulin algorithm 2 before meals and 1 at bedtime.  He has had minimal insulin requirements.  Was notified by RN patient potentially leaving later today.  If so, recommend discharge on his prior to admission metformin 500 mg daily.

## 2025-05-28 NOTE — PROGRESS NOTES
Progress Note - Cardiac Surgery   Kilo Mix 84 y.o. male MRN: [id number]  Unit/Bed#: PPHP-316-01 Encounter: [id number]    Coronary artery disease. S/P coronary artery bypass grafting;   POD # 7      24 Hour Events: VBS completed yesterday- speech with recommendations for dysphagia 1 diet (thin liquids), meds now adjusted from IV. Endocrine consulted yesterday, with adjustments.     No complaints, reports tolerating thin liquid diet well. Pain controlled, using IS (750). (+) BM 5/27, ambulating well.      Medications:   Scheduled Meds:  Current Facility-Administered Medications   Medication Dose Route Frequency Provider Last Rate    acetaminophen  650 mg Rectal Q4H PRN Alicia Bender PA-C      acetaminophen  650 mg Oral Q6H PRN Alicia Bender PA-C      amiodarone  200 mg Oral Q8H [facility] Alicia Bender PA-C      aspirin  325 mg Oral Daily Alicia Bender PA-C      atorvastatin  80 mg Oral Daily With Dinner Alicia Bender PA-C      bisacodyl  10 mg Rectal Daily PRN Alicia Bender PA-C      chlorhexidine  15 mL Mouth/Throat BID Alicia Bender PA-C      docusate sodium  100 mg Oral BID Alicia Bender PA-C      heparin (porcine)  5,000 Units Subcutaneous Q8H AMADOU Loyd PA-C      insulin lispro  1-5 Units Subcutaneous TID AC Kurt He MD      insulin lispro  1-5 Units Subcutaneous HS Kurt He MD      metoprolol tartrate  25 mg Oral Q12H [facility] Alicia Bender PA-C      oxyCODONE  2.5 mg Oral Q4H PRN Alicia Bender PA-C      Or    oxyCODONE  5 mg Oral Q4H PRN Alicia Bender PA-C      pantoprazole  40 mg Oral Daily Alicia Bender PA-C      polyethylene glycol  17 g Oral Daily Alicia Bender PA-C      temazepam  15 mg Oral HS PRN Alicia Bender PA-C       Continuous Infusions:   PRN Meds:.  acetaminophen    acetaminophen    bisacodyl    oxyCODONE **OR** oxyCODONE    temazepam    Vitals:   Vitals:    05/27/25 2135 05/27/25 2246 05/28/25 0339 05/28/25 0404   BP: 150/70 109/61  "130/56    Pulse: 80 72 74    Resp:   16    Temp:  98.3 °F (36.8 °C) 98.5 °F (36.9 °C)    TempSrc:   Oral    SpO2: 97% 97% 98%    Weight:    69.7 kg (153 lb 10.6 oz)   Height:           Telemetry: NSR; Heart Rate: 74 (5/27 @ 1900 3 run Vtach)    Respiratory:   SpO2: SpO2: 98 %, SpO2 Activity: SpO2 Activity: At Rest; RA    Intake/Output:     Intake/Output Summary (Last 24 hours) at 5/28/2025 0611  Last data filed at 5/28/2025 0355  Gross per 24 hour   Intake 1088 ml   Output 1200 ml   Net -112 ml        Weights:   Weight (last 2 days)       Date/Time Weight    05/28/25 0404 69.7 (153.66)    05/27/25 0538 68.9 (151.9)    05/26/25 0600 69.2 (152.56)          Preop weight 154 lb      Results:   Results from last 7 days   Lab Units 05/28/25  0426 05/26/25  0425 05/24/25  0407   WBC Thousand/uL 11.16* 6.72 9.92   HEMOGLOBIN g/dL 9.4* 8.7* 9.0*   HEMATOCRIT % 29.5* 27.6* 27.7*   PLATELETS Thousands/uL 288 209 139*     No hx of anemia    Results from last 7 days   Lab Units 05/28/25  0426 05/26/25  0425 05/25/25  0404 05/21/25  1708 05/21/25  1345   SODIUM mmol/L 145 150* 147   < >  --    POTASSIUM mmol/L 4.6 3.7 3.4*   < >  --    CHLORIDE mmol/L 106 110* 108   < >  --    CO2 mmol/L 30 32 30   < >  --    CO2, I-STAT mmol/L  --   --   --   --  23   BUN mg/dL 27* 35* 38*   < >  --    CREATININE mg/dL 0.97 0.88 1.03   < >  --    GLUCOSE, ISTAT mg/dl  --   --   --   --  166*   CALCIUM mg/dL 9.2 9.2 9.5   < >  --     < > = values in this interval not displayed.     No results for input(s): \"MG\" in the last 72 hours.          Point of care glucose: 146 - 206 (NIDDM2- A1c 5.6)- endocrine following    Studies:  No new images    Invasive Lines/Tubes:  Invasive Devices       Peripheral Intravenous Line  Duration             Peripheral IV 05/26/25 Right;Ventral (anterior) Forearm 1 day              Drain  Duration             Nephrostomy Left 10.2 Fr. 28 days    Nephrostomy Right 10.2 Fr. 28 days                  Physical " Exam:    General: No acute distress and Normal appearance  HEENT/NECK:  Normocephalic. Atraumatic.  No jugular venous distention.    Cardiac: Regular rate and rhythm and No murmurs/rubs/gallops  Pulmonary:  Breath sounds clear bilaterally and No rales/rhonchi/wheezes  Abdomen:  Non-tender, Non-distended, and Normal bowel sounds  Incisions: Sternum is stable.  Incision is clean, dry, and intact.  and Saphenectomy incison is clean, dry, and intact.   Extremities: Extremities warm/dry and No edema B/L  Neuro: Alert and oriented X 3 and No focal deficits  Skin: Warm/Dry, without rashes or lesions.       Assessment:  Principal Problem:    NSTEMI (non-ST elevated myocardial infarction) ([facility])  Active Problems:    Adrenal cortical adenoma    Coronary artery disease involving native coronary artery    Diabetes mellitus type 2 with neurological manifestations ([facility])    Dyslipidemia    Hypertension    Peripheral vascular disease ([facility])    Asymptomatic stenosis of left carotid artery    Prostate cancer ([facility])    H/O insertion of nephrostomy tube    Pneumonia involving right lung    Preoperative clearance    ALPHONSO (obstructive sleep apnea)    Former smoker    At risk for delirium    Depression    Frailty    Cognitive impairment    Hx of acute poliomyelitis    Restrictive lung disease    Moderate protein-calorie malnutrition ([facility])    S/P CABG x 3    RBBB    Leukocytosis       Coronary artery disease. S/P coronary artery bypass grafting;   POD # 7    Plan:    Cardiac:     S/P acute preoperative NSTEMI  Normal ventricular systolic function, EF 65%    Carotid stenosis (s/p R CEA, 50-69% LICA stenosis)    NSR; HR 70-80s, -150    Increase Lopressor to 50 mg BID    AF prophylaxis  Restarted amiodarone on 5/27    Continue ASA/statin    D/C'd central IV access    Continue Subcutaneous Heparin for DVT prophylaxis    Pulmonary:      On RA    Renal:     Normal preoperative renal function (Cr 0.97, 0.88, 1.03)  - S/P b/l nephrostomy tubes  (2/2 tumor obstruction)  - Continue daily flushes    Intake/Output net: -100 mL/24 hours (UOP 1.2 L, since admit -8.9 L, down 1 lb from admit)  -hypernatremic on 5/26- resolved  -Diuretics d/c'd (last dose 20 mg x 2 on 5/24)    Neuro:    Neurologically intact; No active issues     Incisional pain well controlled   Continue oxycodone, 2.5 to 5 mg PO q 4 hours prn pain    GI:      Post-operative dysphagia  - Speech eval/VBS completed  - Rec dysphagia 1 diet (thin liquids), meds crushed  - NG feeding tube inserted 5/24-5/27  - Meds transitioned back to PO    Tolerating diet without complaint    Continue stool softeners and prn suppository    Continue GI prophylaxis    Endo:     Pre-Op Hgb A1C: 5.6    Patient has been transitioned from continuous insulin infusion to intermittent subcutaneous dosing  Hx of NIDDM2- endocrine now following, BG improving    7    Hematology:   H/O prostate ca (s/p prostatectomy, recurrent mets to lymph nodes, stage IV    Post-operative blood count acceptable; Trend prn  Post op leukocytosis- monitor      8.   Disposition:          OT: II (Moderate Resource Intensity) (vs level III pending progress and level of support at home)   PT: II (If however pt chooses to return home upon D/C, home PT follow up is recommended;)       Discharge planning, pending repeat swallow eval today      VTE Pharmacologic Prophylaxis: Heparin  VTE Mechanical Prophylaxis: sequential compression device    Collaborative rounds completed with supervising physician  Plan of care discussed with bedside nurse    SIGNATURE: Cristin Rodriges PA-C  DATE: May 28, 2025  TIME: 6:11 AM

## 2025-05-28 NOTE — RESTORATIVE TECHNICIAN NOTE
Restorative Technician Note      Patient Name: Kilo Mix     Restorative Tech Visit Date: 05/28/25  Note Type: Mobility  Patient Position Upon Consult: Bedside chair  Activity Performed: Ambulated  Assistive Device: Other (Comment) (none)  Patient Position at End of Consult: Bed/Chair alarm activated; All needs within reach; Bedside chair

## 2025-05-28 NOTE — SPEECH THERAPY NOTE
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)    Dyslipidemia    Hypertension    Peripheral vascular disease (HCC)    Asymptomatic stenosis of left carotid artery    Prostate cancer (HCC)    H/O insertion of nephrostomy tube    Pneumonia involving right lung    Preoperative clearance    ALPHONSO (obstructive sleep apnea)    Former smoker    At risk for delirium    Depression    Frailty    Cognitive impairment    Hx of acute poliomyelitis    Restrictive lung disease    Moderate protein-calorie malnutrition (HCC)    S/P CABG x 3    RBBB    Leukocytosis       Past Medical History  Past Medical History[1]     Past Surgical History  Past Surgical History[2]      Subjective:  \"I'm leaving today\" Patient awake and alert.     Objective:  NTG removed. Patient sitting bedside in recliner. He is eager to advance diet and be discharged home. Patient is assessed with ice cream and toast with peanut butter. Bite strength is adequate. Mastication time is min prolonged, but efficient. The patient takes sips of thin liquids via straw. Intermittent throat clear observed, but otherwise no overt s/s aspiration. Patient denies concerns with swallowing or the sensation of food sticking. He is educated to alternate liquids and solids and eater softer items as needed at home. Patient given written material re: aspiration s/s.     Assessment:  The patient tolerated upgraded trials well.     Plan/Recommendations:  Recommend diet change to regular and thin liquids. Continue ST in acute care for f/u as needed. Recommend home care SLP visit 1-2x to ensure safety at home.           [1]   Past Medical " History:  Diagnosis Date    Bladder tumor     Cancer (HCC)     Prostate cancer    Depression 5/14/2025    Diabetes mellitus (HCC)     Diverticulitis of colon     History of common carotid artery stent placement     RIGHT stent - per pts son - RIGHT stent  in  carotid artery    History of heart artery stent     x2 stents in heart per pts son    Hyperlipidemia     Hypertension     Migraines     Myocardial infarction (HCC)     about 30yrs ago    Nephrostomy present (HCC)     2/25/25 Per pts son Ed- pt has two nephrostomy tubes    Prostate cancer (HCC)     Seasonal allergies    [2]   Past Surgical History:  Procedure Laterality Date    CARDIAC CATHETERIZATION N/A 5/12/2025    Procedure: Cardiac Catheterization;  Surgeon: Daniel Mabry DO;  Location:  CARDIAC CATH LAB;  Service: Cardiology    CARDIAC SURGERY      CAROTID ENDARTARECTOMY Right     thromboendarterectomy    CAROTID STENT Right     CATARACT EXTRACTION Left     COLONOSCOPY      fiberoptic screening 2/6/08 5 yr / complete 3/15/13 5 yr    CORONARY ANGIOPLASTY WITH STENT PLACEMENT      ELBOW SURGERY      EYE SURGERY      IR BIOPSY LYMPH NODE  12/24/2024    IR NEPHROSTOMY TUBE CHECK/CHANGE/REPOSITION/REINSERTION/UPSIZE  2/25/2025    IR NEPHROSTOMY TUBE CHECK/CHANGE/REPOSITION/REINSERTION/UPSIZE  4/29/2025    IR NEPHROSTOMY TUBE PLACEMENT  12/24/2024    KNEE SURGERY      AL CORONARY ARTERY BYP W/VEIN & ARTERY GRAFT 3 VEIN N/A 5/21/2025    Procedure: CORONARY ARTERY BYPASS GRAFT (CABG) X3 VESSELS, LIMA - LAD, LEFT LEG EVH/SVG - PDA AND OM, W/CHAPO;  Surgeon: David Tomlin DO;  Location:  MAIN OR;  Service: Cardiac Surgery    AL CYSTO W/REMOVAL OF LESIONS SMALL N/A 1/21/2025    Procedure: TRANSURETHRAL RESECTION OF BLADDER TUMOR (TURBT);  Surgeon: Jordan Youssef MD;  Location:  MAIN OR;  Service: Urology    AL CYSTO W/REMOVAL OF LESIONS SMALL N/A 3/6/2025    Procedure: TRANSURETHRAL RESECTION OF BLADDER TUMOR (TURBT);  Surgeon: Jordan Youssef MD;   Location:  MAIN OR;  Service: Urology    PROSTATE SURGERY      SUPRAPUBIC PROSTATECTOMY  12/06/1999    UPPER GASTROINTESTINAL ENDOSCOPY

## 2025-05-28 NOTE — PROGRESS NOTES
Patient given one package of disposable washclothes, two bottles of CHG soap, gauze, and paper tape.  Patient instructed to shower daily using one washcloth to cleanse body first with CHG soap then a separate, clean cloth to cleanse chest and leg wounds lastly.  Use a clean towel everyday to dry body and don clean clothes as well as to change bed sheets daily after showering.  Cover chest tube sites on lower chest wound daily after showering and washing wounds in shower.  Change gauze daily after showering and as needed if soiled.  Patient aware to not apply creams, lotions, ointments, or powders.  Avoid swimming in pools, hot tubs, or bathing in a bath tub.

## 2025-05-28 NOTE — DISCHARGE SUMMARY
Discharge Summary - Cardiac Surgery   Kilo Mix 84 y.o. male MRN: [id number]  Unit/Bed#: [facility]-316-01 Encounter: [id number]    Admission Date: 5/10/2025     Discharge Date: 05/28/25    Admitting Diagnosis: Chest pain [R07.9]  NSTEMI (non-ST elevated myocardial infarction) (HCC) [I21.4]    Primary Discharge Diagnosis:   Multivessel coronary artery disease, Left main coronary artery disease, s/p Acute NSTEMI S/P   Coronary artery bypass grafting x 3 with left internal mammary artery to left anterior descending, saphenous vein graft to right posterior descending artery, saphenous vein graft to obtuse marginal 1.     Secondary Discharge Diagnosis:   CAD (s/p BMS x2), HTN, HL, PVD, h/o prostate ca (s/p prostatectomy, recurrent mets to lymph nodes, stage IV, s/p b/l nephrostomy tubes, on chemo), NIDDM2, h/o migraines, seasonal allergies, carotid stenosis (s/p R CEA, 50-69% LICA stenosis), former tobacco use, cholelithiasis, adrenal myelolipoma     Attending: David Tomlin D.O.    Consulting Physician(s):   Cardiology  Endocrinology  Medical critical care  pulmonary  Geriatrics     Procedures Performed:   Procedure(s):  CORONARY ARTERY BYPASS GRAFT (CABG) X3 VESSELS, LIMA - LAD, LEFT LEG EVH/SVG - PDA AND OM, W/CHAPO     Hospital Course:   5/12: 85 y/o male with cardiovascular past medical history significant for coronary artery disease.  He initially underwent cardiac catheterization and 1995.  He had subsequent repeat coronary angiography completed in 2001.  At both instances he had percutaneous intervention with bare-metal stent implanted in 1995.  Initially he was managed by his primary cardiologist, Dr. Gray; however since his long-term, he has not been seen in several years. His current clinical course began several weeks ago.  At that time he began to note exertional dyspnea and angina.  He ultimately presented for evaluation treatment at [facility].  NSTEMI was identified.  He underwent  "echocardiogram and cardiac catheterization.  Multivessel coronary disease was identified.  Cardiac surgery consultation was obtained at this time. Of note, he was recently diagnosed several months ago with metastatic prostate cancer.  He initially underwent prostatectomy for cancer in 1999.  Recurrence of his disease recently was associated with metastatic disease, stage IV.  He is currently receiving oral chemotherapy with intramuscular injection every 6 months.  He follows with his primary oncologist, Dr. Shankar. Pre-operative testing ordered.   5/13: No events. Spoke with oncologist - he has pretty extensive prostate cancer, 5 yr survival is less than 40-50% though 1 year survival is very good. Rest of testing pending.   5/14: Pt states he \"does not want to have open heart surgery, but knows he needs to have it done, as there is no other choice.\" CT chest w/ RML PNA, on Ceftriaxone. Vascular studies w/ questionable vein - per PA bedside read on b/l thighs. LICA stenosis noted, vascular consulted. Discontinue Lisinopril.   5/15: No events. OR deferred til 5/21 to allow full PNA treatment, PFTs ordered for once PNA tx complete.   5/16: Abx complete, PFTs for today w/ moderate restriction. Gerontology consulted for per-operative optimization given age/comorbidities.   5/17: No events.   5/18: No events.   5/19: No events. Add bowel regimen for constipation.   5/20: No events. Pre-op orders placed. Ready for OR tomorrow.   5/21: CABGx3. No significant postoperative bleeding.  Transferred to ICU without inotropic or pressor support. EKG w/ NSR & new RBBB. Wean towards extubation. No lai placed due to b/l nephrostomy tubes.   5/22: Received fluids overnight. CI 2.6, delined. Cardene 2.5 this morning. NSR with new RBBB. 3L NC. CXR pending this am.-224ml. Cr 1.12. Hgb 11.8. Start Lopressor 12.5, wean cardene. Lasix 40 Daily. No lai, has nephrostomy tubes in place. SSI. Tx to tele    5/23: Transferred from ICU to " "telemetry. Has been transitioned to SC insulin. C/O dysphagia. Speech therapy consult ordered. Continue NPO, pending speech therapy recommendations for advancement. Tachycardic; Increase to Lopressor, 25mg PO BID and D/C Norvasc. D/C chest tubes  and pacing wires. Intake/Output net: -  1000 mL/24 hours. Wt today 159, from 161; Transition to  PO Torsemide, 20 mg QD. Leukocytosis; Afebrile with no signs of infection; CBC in AM. PM: Speech eval completed; VBS ordered. Lopressor, Diuretic, and potassium changed to IV, pending diet resumption. Speech recommending ice chips only with supervision - considerable aspiration with pharyngeal retention - will repeat VBS in a few days.   5/24: Video barium swallow completed; Liminted pharyngeal movement with retention and aspiration across small amounts. Diet advanced to ice chips with supervision. Speech follow up and repeat the video swallow in a few days. Intake/Output net: -  2000  mL/24 hours. Wt today 159, from 161.  Continue Lasix 20 mg IV BID. Nasogastric feeding tube inserted; Position confirmed. Jevity tube feeds started.    5/25: Jevity tube feeds started and advanced to goal; Tolerating without significant residuals. CXR 5/24 (for NG tube placement) shows small pleural effusion. Intake/Output net: -  249  mL/24 hours. Wt today 150, from  159 (Pre-op Wt: 159);  D/C Lasix.    5/26: Jevity tube feeds maintained at goal rate; Tolerating without significant residuals. No telemetry events overnight. Noted aspiration with ice chips 5/25); Strict NPO ordered. Hypertensive; Increase Lopressor 5 mg IV q 4 hours. Transition ASA to rectal administration, 2/2 dysphagia. Meds transitioned to IV/liquid NGT. Speech re-evaluation 5/27 for consideration of diet advancement.    5/27: TF remains @ goal. Tolerating without significant residuals. Noted aspiration with ice chips on 5/25; Strict NPO ordered. No events on telemetry (amiodarone on hold). Only complaint \"thirsty\", F/U speech " recommendations this AM. NSR in 60s, -130, continue IV BB. D/C central line. 528 mL/24 hours (UOP 1.5 L, since admit -8 L, down 3 lbs, hypernatremic on 5/26), continue to hold diuretics. BG elevated, consult endocrine. F/U PT/OT recs- recommend SNF, patient refusing including walker/cane. Speech with recommendations for video barium swallow today. Cleared for pureed diet with thin liquids. D/C NG tube.   5/28: Endocrine consulted yesterday with adjustments. Patient reports tolerating thin liquid diet. SBP increasing- 130-150, HR 70-80s, increase Lopressor to 50 mg BID. On RA, -100 mL/24 hours (UOP 1.2 L, since admit -8.9 L, down 1 lb from admit, hypernatremic resolved) Mild leukocytosis, likely atelectasis, poor effort with IS, continue to monitor. Speech rec for regular diet.     Condition at Discharge:   good     Discharge Physical Exam:    Please see the documented physical exam from this morning's progress note for details.    Discharge Data:  Results from last 7 days   Lab Units 05/28/25  0426 05/26/25  0425 05/24/25  0407   WBC Thousand/uL 11.16* 6.72 9.92   HEMOGLOBIN g/dL 9.4* 8.7* 9.0*   HEMATOCRIT % 29.5* 27.6* 27.7*   PLATELETS Thousands/uL 288 209 139*     Results from last 7 days   Lab Units 05/28/25  0426 05/26/25  0425 05/25/25  0404   POTASSIUM mmol/L 4.6 3.7 3.4*   CHLORIDE mmol/L 106 110* 108   CO2 mmol/L 30 32 30   BUN mg/dL 27* 35* 38*   CREATININE mg/dL 0.97 0.88 1.03   CALCIUM mg/dL 9.2 9.2 9.5           Discharge instructions/Information to patient and family:   See after visit summary for information provided to patient and family.      Kilo Mix was educated on restrictions regarding driving and lifting, and techniques of proper incisional care.  They were specifically counselled on signs and symptoms of an incisional infection, and advised to contact our service immediately should they develop fevers, sweats, chill, redness or drainage at the site of any incisions.    Provisions  for Follow-Up Care:  See after visit summary for information related to follow-up care and any pertinent home health orders.      Disposition:  Skilled nursing facility at [facility]    Planned Readmission:   No    Discharge Medications:  See after visit summary for reconciled discharge medications provided to patient and family.      Kilo Mix was provided contact information and scheduled a follow up appointment with David Tomlin D.O.  Additionally, follow up appointments have been scheduled for their primary care physician and primary cardiologist.  Contact information was provided.    Kilo Mix was counseled on the importance of avoiding tobacco products.  As with all patients whom have undergone open heart surgery, tobacco cessation medication was contraindicated at the time of discharge.     ACE/ARB was Contraindicated secondary to renal dysfunction    Beta Blocker was Prescribed at discharge    Aspirin was Prescribed at discharge    Statin was Prescribed at discharge      No diuretics at discharge.   No oxycodone given at discharge, no narcotic need for over 4 days.    The patient was informed that following their postoperative surgical evaluation, they will be referred to outpatient cardiac rehabilitation.  They were counseled that this program is run by specialists who will help them safely strengthen their heart and prevent more heart disease.  Cardiac rehabilitation will include exercise, relaxation, stress management, and heart-healthy nutrition.  Caregivers will also check to make sure their medication regimen is working.    During this admission, the patient was questioned on their use of tobacco, alcohol, and illicit/non-prescription drug use in the  previous 24 months. Kilo TELLES Luis Fernandosammy states that they have not used any of these substances in this time frame.    I spent 30 minutes discharging the patient. This time was spent on the day of discharge. I had direct contact with the patient on  the day of discharge. Additional documentation is required if more than 30 minutes were spent on discharge.     SIGNATURE: Rose Tang PA-C  DATE: May 28, 2025  TIME: 1:50 PM

## 2025-05-28 NOTE — CASE MANAGEMENT
Case Management Discharge Planning Note    Patient name Kilo Mix  Location [facility]01 MRN [id number]  : 1940 Date 2025       Current Admission Date: 5/10/2025  Current Admission Diagnosis:NSTEMI (non-ST elevated myocardial infarction) ([facility])   Patient Active Problem List    Diagnosis Date Noted    Leukocytosis 2025    S/P CABG x 3 2025    RBBB 2025    Hx of acute poliomyelitis 2025    Restrictive lung disease 2025    Moderate protein-calorie malnutrition (HCC) 2025    Preoperative clearance 2025    ALPHONSO (obstructive sleep apnea) 2025    Former smoker 2025    At risk for delirium 2025    Depression 2025    Frailty 2025    Cognitive impairment 2025    Pneumonia involving right lung 2025    NSTEMI (non-ST elevated myocardial infarction) ([facility]) 05/10/2025    H/O insertion of nephrostomy tube 05/10/2025    CKD stage 3 due to type 2 diabetes mellitus ([facility]) 2025    Hydronephrosis 2024    Acute kidney injury ([facility]) 2024    Primary osteoarthritis of left shoulder 2024    Traumatic complete tear of right rotator cuff 2024    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[facility]) 2021    Personal history of colonic polyps 2020    Schatzki's ring 2020    Dysphagia 2020    Antiplatelet or antithrombotic long-term use 2020    Plantar fasciitis of right foot 2020    AMD (age related macular degeneration) 2020    Prostate cancer ([facility]) 2019    Asymptomatic stenosis of left carotid artery 2018    Diabetes mellitus type 2 with neurological manifestations ([facility]) 2015    Peripheral vascular disease ([facility]) 2012    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    Diabetic polyneuropathy ([facility]) 2012    Diastolic dysfunction 2012    Dyslipidemia 2012    Lower back pain 2012    Sinus bradycardia 2012     Hypertension 05/07/2012    Recurrent stenosis of right carotid artery 01/26/2012    Coronary artery disease involving native coronary artery 02/21/2011      LOS (days): 18  Geometric Mean LOS (GMLOS) (days): 11.3  Days to GMLOS:-6.4     OBJECTIVE:  Risk of Unplanned Readmission Score: 16.74         Current admission status: Inpatient   Preferred Pharmacy:   [id number] - [place] - [address]  [address]  [place] [address]  Phone: [phone] Fax: [phone]    [facility] - [place], [place] - [address] [facility]  [address]  [address]  [place] [address]  Phone: [phone] Fax: [phone]    Primary Care Provider: Dolores Vizcarra DO    Primary Insurance: MEDICARE  Secondary Insurance: AETNA    DISCHARGE DETAILS:  Additional Comments: CM notified by CTS that patient is medically cleared for DC, pending repeat swallow eval. CM met with patient & patient son Ed bedside to discuss DCP. FOC reviewed. Pt and son agreeable with patient going to [facility] & would like [facility], agreeable to referral.    CM sent referral via [employer] & was notified they are able to accept patient today if cleared. Following speech eval today, patient cleared for normal diet. Patient NG tube removed. Facility aware.     CM discussed DCP with patient & son Ed bedside regarding patient's Medicare A&B, bed availability at [facility], and this CM arranging transportation. Pt & son in agreement. Transportation arranged through [employer], bedside RN & facility notified & aware, bedside RN phone # listed for notifications, DC envelope given.     Transport time confirmed for 1800, TC placed to pt son Ed ([phone]) CM left VM requesting CB. CM notified pt bedside, facility via [employer] & bedside RN.

## 2025-05-28 NOTE — CASE MANAGEMENT
Case Management Discharge Planning Note    Patient name Kilo Mix  Location [facility]-316/[facility]316-01 MRN [id number]  : 1940 Date 2025       Current Admission Date: 5/10/2025  Current Admission Diagnosis:NSTEMI (non-ST elevated myocardial infarction) ([facility])   Patient Active Problem List    Diagnosis Date Noted    Leukocytosis 2025    S/P CABG x 3 2025    RBBB 2025    Hx of acute poliomyelitis 2025    Restrictive lung disease 2025    Moderate protein-calorie malnutrition (HCC) 2025    Preoperative clearance 2025    ALPHONSO (obstructive sleep apnea) 2025    Former smoker 2025    At risk for delirium 2025    Depression 2025    Frailty 2025    Cognitive impairment 2025    Pneumonia involving right lung 2025    NSTEMI (non-ST elevated myocardial infarction) ([facility]) 05/10/2025    H/O insertion of nephrostomy tube 05/10/2025    CKD stage 3 due to type 2 diabetes mellitus ([facility]) 2025    Hydronephrosis 2024    Acute kidney injury ([facility]) 2024    Primary osteoarthritis of left shoulder 2024    Traumatic complete tear of right rotator cuff 2024    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[facility]) 2021    Personal history of colonic polyps 2020    Schatzki's ring 2020    Dysphagia 2020    Antiplatelet or antithrombotic long-term use 2020    Plantar fasciitis of right foot 2020    AMD (age related macular degeneration) 2020    Prostate cancer ([facility]) 2019    Asymptomatic stenosis of left carotid artery 2018    Diabetes mellitus type 2 with neurological manifestations ([facility]) 2015    Peripheral vascular disease ([facility]) 2012    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    Diabetic polyneuropathy ([facility]) 2012    Diastolic dysfunction 2012    Dyslipidemia 2012    Lower back pain 2012    Sinus bradycardia 2012     Hypertension 05/07/2012    Recurrent stenosis of right carotid artery 01/26/2012    Coronary artery disease involving native coronary artery 02/21/2011      LOS (days): 18  Geometric Mean LOS (GMLOS) (days): 11.3  Days to GMLOS:-6.4     OBJECTIVE:  Risk of Unplanned Readmission Score: 16.74         Current admission status: Inpatient   Preferred Pharmacy:   [facility] - [place] - [address]  [address]  [place] [address]  Phone: [phone] Fax: [phone]    [facility] - [place] - [address]  [address]  [address]  [place] [address]  Phone: [phone] Fax: [phone]    Primary Care Provider: Dolores Vizcarra DO    Primary Insurance: MEDICARE  Secondary Insurance: AETNA      Accepting Facility Name, City & State : [address] [address]  Receiving Facility/Agency Phone Number: Nurse [phone]  Facility/Agency Fax Number: [phone]

## 2025-05-28 NOTE — PHYSICAL THERAPY NOTE
PHYSICAL THERAPY NOTE          Patient Name: Kilo Mix  Today's Date: 5/28/2025 05/28/25 1055   PT Last Visit   PT Visit Date 05/28/25   Note Type   Note Type Treatment   Pain Assessment   Pain Assessment Tool 0-10   Pain Score No Pain   Restrictions/Precautions   Other Precautions Cardiac/sternal;Cognitive;Chair Alarm;Telemetry;Fall Risk;Aspiration;Multiple lines  ((B) nephrostomy)   General   Chart Reviewed Yes   Additional Pertinent History cleared for Tx session by nsg   Response to Previous Treatment Patient with no complaints from previous session.   Cognition   Overall Cognitive Status Impaired   Arousal/Participation Alert   Attention Difficulty dividing attention   Orientation Level Oriented to person;Oriented to place;Oriented to situation   Memory Decreased recall of recent events;Decreased recall of precautions   Following Commands Follows one step commands without difficulty   Subjective   Subjective Alert; in the chair; agreeable to amb and try steps   Transfers   Sit to Stand 6  Modified independent   Stand to Sit 6  Modified independent   Ambulation/Elevation   Gait pattern Excessively slow;Short stride;Foward flexed;Inconsistent khushbu;Shuffling  (One episode of lateral self corrected LOB; still declines a cane)   Gait Assistance 5  Supervision   Additional items Assist x 1;Verbal cues   Assistive Device None  (declines a sylwia)   Distance 110 ft + 2 x 10 ft + 140 ft + 120 ft w/ seated rest periods and steps negotiation in between   Stair Management Assistance 4  Minimal assist   Additional items Assist x 1;Verbal cues;Tactile cues  (reviewed sequencing, UE precaution on the hand rail and technique)   Stair Management Technique One rail L;Step to pattern;Foreward;Nonreciprocal   Number of Stairs 7   Balance   Static Sitting Fair +   Dynamic Sitting Fair   Static Standing Fair   Dynamic Standing Fair -    Ambulatory Fair -   Activity Tolerance   Activity Tolerance Patient limited by fatigue   Nurse Made Aware spoke to BLANCA Calvo   Assessment   Prognosis Good   Problem List Decreased strength;Decreased endurance;Impaired balance;Decreased mobility;Decreased cognition;Impaired judgement;Decreased safety awareness   Assessment Pt cont to gradually improve in overall strength and endurance achieving mod (I) level w/ transfers, (S) w/ amb and min (A) on the steps; pt still demonstrates inconsistent safety awareness and carryover of instructions w/ inconsistent gait patterns/stability (still declines a cane) requiring frequent rest periods to avoid excessive fatigue; D/C recommendations are listed below; will follow   Goals   Patient Goals to return home   STG Expiration Date 06/05/25   PT Treatment Day 2   Plan   Treatment/Interventions LE strengthening/ROM;Elevations;Therapeutic exercise;Endurance training;Cognitive reorientation;Bed mobility;Gait training;Spoke to nursing;Spoke to case management   Progress Progressing toward goals   PT Frequency 4-6x/wk   Discharge Recommendation   Rehab Resource Intensity Level, PT II (Moderate Resource Intensity)  (vs level III depending on level of support available vs required at home)   Equipment Recommended Other (Comment)  (denies the need for an AD)   AM-PAC Basic Mobility Inpatient   Turning in Flat Bed Without Bedrails 4   Lying on Back to Sitting on Edge of Flat Bed Without Bedrails 4   Moving Bed to Chair 4   Standing Up From Chair Using Arms 4   Walk in Room 3   Climb 3-5 Stairs With Railing 3   Basic Mobility Inpatient Raw Score 22   Basic Mobility Standardized Score 47.4   [facility] Highest Level Of Mobility   JH-HLM Goal 7: Walk 25 feet or more   JH-HLM Achieved 8: Walk 250 feet ot more   Education   Education Provided Mobility training   Patient Demonstrates verbal understanding;Reinforcement needed   End of Consult   Patient Position at End of Consult Bedside  chair;Bed/Chair alarm activated;All needs within reach     Earl Horowitz

## 2025-05-28 NOTE — DISCHARGE INSTR - AVS FIRST PAGE
Instructions Following Open Heart Surgery      Protect your sternum (breast bone). Do not lift anything heavier than 10 pounds for the first four weeks after surgery. (For example, a gallon of milk weighs 8 pounds) When you are seen for a routine postop check, you will be cleared to lift up to 25 lbs. Following three months from the time of surgery, you may lift without any restrictions.     Hug a pillow to your chest or cross your arms over your chest when you laugh, sneeze, or cough. You may resume sexual activity when you feel ready. Do not put any excessive pressure on your chest.    Be careful when you get into or out of a chair or bed.  Hug a pillow or cross your arms when you stand or sit. Do not twist as you move. Use only your legs to sit and stand. You may need a raised toilet seat if you have trouble standing up without using your arms.     No sleeping on side for the first 4 weeks. Limit daytime naps, to prevent difficulty sleeping at night.    Women: Wear a clean surgical bra every day.     Do not play sports that use your shoulder.  Examples include tennis and golf.    Do not drive until cleared by surgeon. Typically cleared to drive after one month, determination will be made at routine postop appointment. When a passenger in the car, wear a seat belt.    Continue you breathing exercises throughout the day with incentive spirometry    Activity: Your goal is to go on frequent walks, slowly increasing your activity level. Walking will help prevent leg swelling and prevent blood clots. When you start outpatient cardiac rehab in one month, you will be given additional instructions and a formal exercise plan. It is OK to go up steps, with help.     You may resume sexual activity when you are comfortable. Do not put excessive pressure on your chest.     Weigh yourself daily in the morning and keep of log of your weight. If your weight increases more than 2 lbs per day or more than 5 lbs in a week,  call your surgeon's office.    Keep your legs elevated when sitting. Pressure stockings may be worn to prevent significant leg swelling.     You may get routine vaccines any time after open heart surgery    Do not smoke:  Nicotine can damage blood vessels and make it more difficult to heal. Do not use e-cigarettes or smokeless tobacco in place of cigarettes or to help you quit. They still contain nicotine. Ask your primary care provider for information if you currently smoke and need help quitting.     Incision/Wound Care:    Shower daily. Gently wash your incision with warm water and mild soap. Do not scrub the area. Gently pat the area dry with a clean towel. If you have a bandage, dry the area and put on a new, clean bandage. Change your bandage at least daily, or if it gets wet or dirty. Always wash your hands before you care for your wound. Do not apply ointments, creams, lotions, powders, etc. If you develop signs of an infection (fever > 101, redness, warm skin, or drainage) please call your surgeon's office.     Do not pick at or touch puncture sites or incisions. Allow and scabs to come off on their own.     It is normal to have some drainage from the chest tube sites. Apply clean/dry dressings, until this resolves.    You may have discomfort or numbness along the incision for 3-4 weeks. It is normal to occasionally experience brief sharp shooing pains, tightness, or pulling sensations.    Do not take a bath or swim until cleared by surgeon.    As your incision heals, sometimes small tender lumps or pimple-like bumps develop which is due to your body attempting to “dissolve” the suture (stiches).     Call your local emergency number (911 in the [place]) or have someone call if:   You have squeezing, pressure, or pain in your chest or back, lightheadedness or sudden cold sweat  Short of breath that does not go away with rest  You cough up blood.  You have a fast heartbeat that flutters. Or palpitations  You  faint.  Signs of a stroke:  Numbness or drooping on one side of your face. Weakness in an arm or leg. Confusion or difficulty speaking. Dizziness, a severe headache, or vision loss    Call your surgeons office if:   You have bleeding that does not stop even after you apply pressure for 5 minutes.  You have redness, tenderness, or signs of infection at incision (pain, swelling, odor, or green/yellow discharge from incision site)  You have a severe headache.  You have a fever higher than 101°F (38.4°C).  You urinate less, or not at all.  You have persistent nausea, vomiting, or diarrhea  You hear persistent or worsening crunching or grinding in your sternum.  You have questions or concerns about your condition or care.      Diet:    Eat heart-healthy foods, low in unhealthy fats and sodium (salt). A heart healthy diet helps improve your cholesterol levels and lowers your risk for heart disease and stroke. You will receive formal education on healthy eating and label reading during your cardiac rehab in one month.   Choose foods that contain healthy fats, such as soybean, canola, olive, corn, and sunflower oils.  Limit unhealthy fats. Examples include:  Cholesterol  is found in animal foods, such as eggs and lobster, and in dairy products made from whole milk.    Saturated fat  is found in meats, such as lorenzo and hamburger. It is also found in chicken or turkey skin, whole milk, and butter.     Trans fat  is found in packaged foods, such as potato chips and cookies. It is also in some fried foods, and shortening. Do not eat foods that contain trans fats.  Get 20 to 30 grams of fiber each day.  Fruits, vegetables, whole-grain foods, and legumes (cooked beans) are good sources of fiber.  Low Sodium: Limit to 2,000 mg or less each day, unless otherwise directed. Choose low-sodium or no-salt-added foods. Add little or no salt to food you prepare. Use herbs and spices in place of salt.  Foods high in sodium include frozen  dinners, macaroni and cheese, instant potatoes, canned vegetables, cured or smoked meats, hot dogs, lorenzo and sausage, ketchup, barbecue sauce, salad dressing, pickles, olives, soy sauce, and miso.     Fluid Restriction: Fluid restriction after hospital discharge is advised. Limit your fluid intake to no more than 8 cups of liquid (8oz = 1cup) or 2000 mL per day.    Limit/Do not drink alcohol. Alcohol can damage heart and raise your blood pressure. The general recommended limit is 1 drink a day for women 21 or older and for men 65 or older. Do not have more than 3 drinks within 24 hours or 7 within a week. A drink of alcohol is 12 oz of beer, 5 oz of wine, or 1½ oz of liquor.        Narcotic Safety     What do I need to know about narcotic safety?    A narcotic is a type of medicine used to treat pain. When you are discharged from the hospital, you will be prescribed a narcotic called oxycodone. Pain control and management may help you rest, heal, and return to your daily activities. Do not take more than the recommended amount. Too much can cause a life-threatening overdose. Do not continue to take it after your pain stops.     How do I use narcotics safely?   Take prescribed narcotics exactly as directed.  You may develop tolerance. This means you keep needing higher doses to get the same effect. You may also develop narcotic use disorder. This means you are not able to control your narcotic use.  Do not give narcotics to others or take narcotics that belong to someone else.  Do not mix narcotics with other medicines or alcohol.  The combination can cause an overdose, or cause you to stop breathing.   Do not drive or operate heavy machinery after you use a narcotic.    Talk to your healthcare provider if you have any side effects.  Side effects include nausea, sleepiness, itching, and trouble thinking clearly.     What can I do to manage constipation?  Constipation is the most common side effect of narcotic  medicine. Constipation is when you have hard, dry bowel movements, or you go longer than usual between bowel movements. The following are ways you can prevent or relieve constipation:  Drink liquids as allowed.  Drinking extra liquids will help soften and move your bowels. You should drink up to your maximum fluid allotment of 2 liters.    Eat high-fiber foods.  This may help decrease constipation by adding bulk to your bowel movements. High-fiber foods include fruits, vegetables, whole-grain breads and cereals, and beans  Supplements. You have been prescribed a fiber supplement called polyethylene glycol (Little lax) and a stool softener called docusate sodium (Colace). You should take these for as long as you continue narcotic medications.  Exercise regularly.  Regular physical activity can help stimulate your intestines. Walking is a good exercise to prevent or relieve constipation. Ask which exercises are best for you.    How do I store narcotics safely?   Store narcotics where others cannot easily get them.  Keep them in a locked cabinet or secure area. Do not  keep them in a purse or other bag you carry with you. A person may be looking for something else and find the narcotics.    Make sure narcotics are stored out of the reach of children.  A child can easily overdose on narcotics. Narcotics may look like candy to a small child.

## 2025-05-28 NOTE — PLAN OF CARE
Problem: PHYSICAL THERAPY ADULT  Goal: Performs mobility at highest level of function for planned discharge setting.  See evaluation for individualized goals.  Description: Treatment/Interventions: Functional transfer training, LE strengthening/ROM, Therapeutic exercise, Endurance training, Elevations, Bed mobility, Gait training, Equipment eval/education, Patient/family training  Equipment Recommended: Walker       See flowsheet documentation for full assessment, interventions and recommendations.  Outcome: Progressing  Note: Prognosis: Good  Problem List: Decreased strength, Decreased endurance, Impaired balance, Decreased mobility, Decreased cognition, Impaired judgement, Decreased safety awareness  Assessment: Pt cont to gradually improve in overall strength and endurance achieving mod (I) level w/ transfers, (S) w/ amb and min (A) on the steps; pt still demonstrates inconsistent safety awareness and carryover of instructions w/ inconsistent gait patterns/stability (still declines a cane) requiring frequent rest periods to avoid excessive fatigue; D/C recommendations are listed below; will follow  Barriers to Discharge: Inaccessible home environment     Rehab Resource Intensity Level, PT: II (Moderate Resource Intensity) (vs level III depending on level of support available vs required at home)    See flowsheet documentation for full assessment.

## 2025-05-29 ENCOUNTER — NURSING HOME VISIT (OUTPATIENT)
Dept: FAMILY MEDICINE CLINIC | Facility: CLINIC | Age: 85
End: 2025-05-29
Payer: MEDICARE

## 2025-05-29 DIAGNOSIS — E11.49 DIABETES MELLITUS TYPE 2 WITH NEUROLOGICAL MANIFESTATIONS (HCC): ICD-10-CM

## 2025-05-29 DIAGNOSIS — I21.4 NSTEMI (NON-ST ELEVATED MYOCARDIAL INFARCTION) (HCC): ICD-10-CM

## 2025-05-29 DIAGNOSIS — C61 PROSTATE CANCER (HCC): ICD-10-CM

## 2025-05-29 DIAGNOSIS — Z95.1 S/P CABG X 3: ICD-10-CM

## 2025-05-29 DIAGNOSIS — I65.22 ASYMPTOMATIC STENOSIS OF LEFT CAROTID ARTERY: ICD-10-CM

## 2025-05-29 DIAGNOSIS — G47.33 OSA (OBSTRUCTIVE SLEEP APNEA): ICD-10-CM

## 2025-05-29 DIAGNOSIS — I73.9 PERIPHERAL VASCULAR DISEASE (HCC): ICD-10-CM

## 2025-05-29 DIAGNOSIS — I25.119 CORONARY ARTERY DISEASE INVOLVING NATIVE CORONARY ARTERY OF NATIVE HEART WITH ANGINA PECTORIS (HCC): Primary | ICD-10-CM

## 2025-05-29 DIAGNOSIS — Z98.890 H/O INSERTION OF NEPHROSTOMY TUBE: ICD-10-CM

## 2025-05-29 PROCEDURE — 99306 1ST NF CARE HIGH MDM 50: CPT | Performed by: FAMILY MEDICINE

## 2025-05-29 NOTE — PROGRESS NOTES
[facility]  [address] [place] [address]  Facility: [facility]    NAME: Kilo Mix  AGE: 84 y.o. SEX: male    DATE OF ENCOUNTER: 5/29/2025    Code status:  Full Code    Assessment and Plan     1. Coronary artery disease involving native coronary artery of native heart with angina pectoris (HCC)  2. NSTEMI (non-ST elevated myocardial infarction) ([facility])  3. Asymptomatic stenosis of left carotid artery  4. Peripheral vascular disease (HCC)  5. ALPHONSO (obstructive sleep apnea)  6. 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 Prostate cancer (HCC)  8. H/O insertion of nephrostomy tube  9. S/P CABG x 3      All medications and routine orders were reviewed and updated as needed.    Plan discussed with: Patient    Chief Complaint     Seen for admission at Nursing Home    History of Present Illness     84-year-old male known to me from his wife's admissions here previously.  He recently had an NSTEMI and underwent cardiac catheterization and subsequent CABG x 3.  He has a history of vasculopathy that includes peripheral vascular disease as well as carotid artery disease.  He has a history of poliomyelitis earlier in his life.  He has previously been treated for prostate cancer and has a recurrence.  He has bilateral nephrostomy tubes in place draining his urine.  He notes that his bowels have moved.  Normally independent with activities of daily living and has never previously used a walker.  He denies pain in his chest currently.  He is able to lift himself up from the bed.  His appetite is returning.  He notes no dyspnea.    HISTORY:  Past Medical History[1]  Past Surgical History[2]  Family History[3]  Social History[4]    Allergies:  Allergies[5]    Review of Systems     Review of Systems   Constitutional:  Negative for activity change, appetite change, chills, diaphoresis, fatigue and unexpected weight change.   HENT:  Negative for congestion, ear discharge, ear pain,  hearing loss, nosebleeds and rhinorrhea.    Eyes:  Negative for pain, redness, itching and visual disturbance.   Respiratory:  Negative for cough, choking, chest tightness and shortness of breath.    Cardiovascular:  Positive for chest pain. Negative for leg swelling.   Gastrointestinal:  Negative for abdominal pain, blood in stool, constipation, diarrhea and nausea.   Endocrine: Negative for cold intolerance, polydipsia and polyphagia.   Genitourinary:  Positive for difficulty urinating. Negative for dysuria, frequency, hematuria and urgency.   Musculoskeletal:  Positive for gait problem. Negative for arthralgias, back pain, joint swelling, neck pain and neck stiffness.   Skin:  Negative for color change and rash.   Allergic/Immunologic: Negative for environmental allergies and food allergies.   Neurological:  Positive for weakness. Negative for dizziness, tremors, seizures, speech difficulty, numbness and headaches.   Hematological:  Negative for adenopathy. Does not bruise/bleed easily.   Psychiatric/Behavioral:  Negative for behavioral problems, dysphoric mood, hallucinations and self-injury.        Medications and orders     All medications reviewed and updated in [facility] EMR.      Objective     Vitals: per nursing home record    Physical Exam  Constitutional:       General: He is not in acute distress.     Appearance: He is well-developed. He is not diaphoretic.   HENT:      Head: Normocephalic and atraumatic.      Right Ear: External ear normal.      Left Ear: External ear normal.      Nose: Nose normal.      Mouth/Throat:      Pharynx: No oropharyngeal exudate.     Eyes:      General: No scleral icterus.        Right eye: No discharge.         Left eye: No discharge.      Conjunctiva/sclera: Conjunctivae normal.      Pupils: Pupils are equal, round, and reactive to light.     Neck:      Thyroid: No thyromegaly.     Cardiovascular:      Rate and Rhythm: Normal rate. Rhythm irregular.      Heart sounds:  Normal heart sounds. No murmur heard.  Pulmonary:      Effort: Pulmonary effort is normal.      Breath sounds: Normal breath sounds. No wheezing or rales.   Abdominal:      General: Bowel sounds are normal.      Palpations: Abdomen is soft. There is no mass.      Tenderness: There is no abdominal tenderness. There is no guarding.     Musculoskeletal:         General: No tenderness. Normal range of motion.      Cervical back: Normal range of motion and neck supple.   Lymphadenopathy:      Cervical: No cervical adenopathy.     Skin:     General: Skin is warm and dry.     Neurological:      Mental Status: He is alert and oriented to person, place, and time.      Motor: Weakness present.      Deep Tendon Reflexes: Reflexes are normal and symmetric.     Psychiatric:         Thought Content: Thought content normal.         Judgment: Judgment normal.         Pertinent Laboratory/Diagnostic Studies:   The following labs/studies were reviewed please see chart or hospital paperwork for details.  Diagnostic studies from the hospital were reviewed    - Admit for PT OT and medical therapy.  He will be maintained on his medical therapy with a statin ACE inhibitor and beta-blocker.  Continue with daily aspirin.  He will benefit from therapy to improve his muscular conditioning safety awareness and balance.    Camron Hyman,   5/29/2025 12:01 PM           [1]   Past Medical History:  Diagnosis Date    Bladder tumor     Cancer (HCC)     Prostate cancer    Depression 5/14/2025    Diabetes mellitus (HCC)     Diverticulitis of colon     History of common carotid artery stent placement     RIGHT stent - per pts son - RIGHT stent  in  carotid artery    History of heart artery stent     x2 stents in heart per pts son    Hyperlipidemia     Hypertension     Migraines     Myocardial infarction (HCC)     about 30yrs ago    Nephrostomy present (HCC)     2/25/25 Per pts son Ed- pt has two nephrostomy tubes    Prostate cancer (HCC)      Seasonal allergies    [2]   Past Surgical History:  Procedure Laterality Date    CARDIAC CATHETERIZATION N/A 5/12/2025    Procedure: Cardiac Catheterization;  Surgeon: Daniel Mabry DO;  Location:  CARDIAC CATH LAB;  Service: Cardiology    CARDIAC SURGERY      CAROTID ENDARTARECTOMY Right     thromboendarterectomy    CAROTID STENT Right     CATARACT EXTRACTION Left     COLONOSCOPY      fiberoptic screening 2/6/08 5 yr / complete 3/15/13 5 yr    CORONARY ANGIOPLASTY WITH STENT PLACEMENT      ELBOW SURGERY      EYE SURGERY      IR BIOPSY LYMPH NODE  12/24/2024    IR NEPHROSTOMY TUBE CHECK/CHANGE/REPOSITION/REINSERTION/UPSIZE  2/25/2025    IR NEPHROSTOMY TUBE CHECK/CHANGE/REPOSITION/REINSERTION/UPSIZE  4/29/2025    IR NEPHROSTOMY TUBE PLACEMENT  12/24/2024    KNEE SURGERY      NH CORONARY ARTERY BYP W/VEIN & ARTERY GRAFT 3 VEIN N/A 5/21/2025    Procedure: CORONARY ARTERY BYPASS GRAFT (CABG) X3 VESSELS, LIMA - LAD, LEFT LEG EVH/SVG - PDA AND OM, W/CHAPO;  Surgeon: David Tomlin DO;  Location:  MAIN OR;  Service: Cardiac Surgery    NH CYSTO W/REMOVAL OF LESIONS SMALL N/A 1/21/2025    Procedure: TRANSURETHRAL RESECTION OF BLADDER TUMOR (TURBT);  Surgeon: Jordan Youssef MD;  Location:  MAIN OR;  Service: Urology    NH CYSTO W/REMOVAL OF LESIONS SMALL N/A 3/6/2025    Procedure: TRANSURETHRAL RESECTION OF BLADDER TUMOR (TURBT);  Surgeon: Jordan Youssef MD;  Location:  MAIN OR;  Service: Urology    PROSTATE SURGERY      SUPRAPUBIC PROSTATECTOMY  12/06/1999    UPPER GASTROINTESTINAL ENDOSCOPY     [3]   Family History  Problem Relation Name Age of Onset    Alcohol abuse Mother Ale LOAIZAMaiden Rock Voce     No Known Problems Father      No Known Problems Sister      Substance Abuse Son Edandre F Voce     Heart attack Family          MI    Breast cancer Family      Colon cancer Neg Hx      Colon polyps Neg Hx      Anesthesia problems Neg Hx     [4]   Social History  Socioeconomic History    Marital status:  /Civil Union   Occupational History    Occupation: fulltime   Tobacco Use    Smoking status: Former     Current packs/day: 0.00     Average packs/day: 3.0 packs/day for 10.0 years (30.0 ttl pk-yrs)     Types: Cigarettes     Start date: 2000     Quit date: 2010     Years since quitting: 15.4    Smokeless tobacco: Never    Tobacco comments:     Former smoker - quit    Vaping Use    Vaping status: Never Used   Substance and Sexual Activity    Alcohol use: Yes     Comment: rare glass of wine    Drug use: Never     Comment: Denies any drug use per pt    Sexual activity: Not Currently     Comment: defer   Social History Narrative    Daily coffee consumption: 4 cp/day    Daily tea consumption: 1 cp/day     Social Drivers of Health     Financial Resource Strain: Medium Risk (2023)    Overall Financial Resource Strain (CARDIA)     Difficulty of Paying Living Expenses: Somewhat hard   Food Insecurity: No Food Insecurity (2025)    Nursing - Inadequate Food Risk Classification     Worried About Running Out of Food in the Last Year: Never true     Ran Out of Food in the Last Year: Never true     Ran Out of Food in the Last Year: Never true   Transportation Needs: No Transportation Needs (2025)    Nursing - Transportation Risk Classification     Lack of Transportation: No   Intimate Partner Violence: Unknown (2025)    Nursing IPS     Physically Hurt by Someone: No     Hurt or Threatened by Someone: No   Housing Stability: Unknown (2025)    Nursing: Inadequate Housing Risk Classification     Unable to Pay for Housing in the Last Year: No     Has Housin   [5]   Allergies  Allergen Reactions    Losartan Syncope     Reaction Date: 2011;     Penicillins Other (See Comments)     As a child so does not recall reaction

## 2025-06-02 ENCOUNTER — NURSING HOME VISIT (OUTPATIENT)
Dept: FAMILY MEDICINE CLINIC | Facility: CLINIC | Age: 85
End: 2025-06-02
Payer: MEDICARE

## 2025-06-02 DIAGNOSIS — E11.49 DIABETES MELLITUS TYPE 2 WITH NEUROLOGICAL MANIFESTATIONS (HCC): ICD-10-CM

## 2025-06-02 DIAGNOSIS — N18.30 CKD STAGE 3 DUE TO TYPE 2 DIABETES MELLITUS (HCC): ICD-10-CM

## 2025-06-02 DIAGNOSIS — I73.9 PERIPHERAL VASCULAR DISEASE (HCC): ICD-10-CM

## 2025-06-02 DIAGNOSIS — C61 PROSTATE CANCER (HCC): ICD-10-CM

## 2025-06-02 DIAGNOSIS — Z98.890 H/O INSERTION OF NEPHROSTOMY TUBE: ICD-10-CM

## 2025-06-02 DIAGNOSIS — I25.119 CORONARY ARTERY DISEASE INVOLVING NATIVE CORONARY ARTERY OF NATIVE HEART WITH ANGINA PECTORIS (HCC): Primary | ICD-10-CM

## 2025-06-02 DIAGNOSIS — I21.4 NSTEMI (NON-ST ELEVATED MYOCARDIAL INFARCTION) (HCC): ICD-10-CM

## 2025-06-02 DIAGNOSIS — E11.22 CKD STAGE 3 DUE TO TYPE 2 DIABETES MELLITUS (HCC): ICD-10-CM

## 2025-06-02 PROCEDURE — 99308 SBSQ NF CARE LOW MDM 20: CPT | Performed by: FAMILY MEDICINE

## 2025-06-02 NOTE — PROGRESS NOTES
[facility]  [address] [place] [address]  Facility: [facility]    NAME: Kilo Mix  AGE: 84 y.o. SEX: male    DATE OF ENCOUNTER: 6/2/2025    Code status:  Full Code    Assessment and Plan     1. Coronary artery disease involving native coronary artery of native heart with angina pectoris (HCC)  2. NSTEMI (non-ST elevated myocardial infarction) (HCC)  3. Peripheral vascular disease (HCC)  4. 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 CKD stage 3 due to type 2 diabetes mellitus (HCC)  6. Prostate cancer (HCC)  7. H/O insertion of nephrostomy tube      All medications and routine orders were reviewed and updated as needed.    Plan discussed with: Patient    Chief Complaint     Interim evaluation    History of Present Illness     The patient is seen for interim evaluation.  He is performing well in therapy.  He feels he be better off going home in the next couple days.  He is independent with most ADLs.  Denies any recurrent chest pain.  Gets no dyspnea.  Bowels are moving    The following portions of the patient's history were reviewed and updated as appropriate: current medications, past family history, past medical history, past social history, past surgical history and problem list.    Allergies:  Allergies[1]    Review of Systems     Review of Systems   Constitutional:  Negative for activity change, appetite change, chills, diaphoresis, fatigue and unexpected weight change.   HENT:  Negative for congestion, ear discharge, ear pain, hearing loss, nosebleeds and rhinorrhea.    Eyes:  Negative for pain, redness, itching and visual disturbance.   Respiratory:  Negative for cough, choking, chest tightness and shortness of breath.    Cardiovascular:  Negative for chest pain and leg swelling.   Gastrointestinal:  Negative for abdominal pain, blood in stool, constipation, diarrhea and nausea.   Endocrine: Negative for cold intolerance, polydipsia and  polyphagia.   Genitourinary:  Negative for dysuria, frequency, hematuria and urgency.   Musculoskeletal:  Positive for gait problem. Negative for arthralgias, back pain, joint swelling, neck pain and neck stiffness.   Skin:  Negative for color change and rash.   Allergic/Immunologic: Negative for environmental allergies and food allergies.   Neurological:  Positive for weakness. Negative for dizziness, tremors, seizures, speech difficulty, numbness and headaches.   Hematological:  Negative for adenopathy. Does not bruise/bleed easily.   Psychiatric/Behavioral:  Negative for behavioral problems, dysphoric mood, hallucinations and self-injury.        Medications and orders     All medications reviewed and updated in long term EMR.      Objective     Vitals: per nursing home records    Physical Exam  Constitutional:       General: He is not in acute distress.     Appearance: He is well-developed. He is not diaphoretic.   HENT:      Head: Normocephalic and atraumatic.      Right Ear: External ear normal.      Left Ear: External ear normal.      Nose: Nose normal.      Mouth/Throat:      Pharynx: No oropharyngeal exudate.     Eyes:      General: No scleral icterus.        Right eye: No discharge.         Left eye: No discharge.      Conjunctiva/sclera: Conjunctivae normal.      Pupils: Pupils are equal, round, and reactive to light.     Neck:      Thyroid: No thyromegaly.     Cardiovascular:      Rate and Rhythm: Normal rate and regular rhythm.      Heart sounds: Murmur heard.   Pulmonary:      Effort: Pulmonary effort is normal.      Breath sounds: Normal breath sounds. No wheezing or rales.   Abdominal:      General: Bowel sounds are normal.      Palpations: Abdomen is soft. There is no mass.      Tenderness: There is no abdominal tenderness. There is no guarding.     Musculoskeletal:         General: No tenderness. Normal range of motion.      Cervical back: Normal range of motion and neck supple.    Lymphadenopathy:      Cervical: No cervical adenopathy.     Skin:     General: Skin is warm and dry.     Neurological:      Mental Status: He is alert and oriented to person, place, and time.      Motor: Weakness present.      Deep Tendon Reflexes: Reflexes are normal and symmetric.     Psychiatric:         Thought Content: Thought content normal.         Judgment: Judgment normal.         Pertinent Laboratory/Diagnostic Studies:     The following studies were reviewed please see chart or hospital paperwork for details.    Space for lab dictation diagnostics    - Keep the current regimen    Camron Hyman DO  6/2/2025 12:35 PM           [1]   Allergies  Allergen Reactions    Losartan Syncope     Reaction Date: 07Jun2011;     Penicillins Other (See Comments)     As a child so does not recall reaction

## 2025-06-03 ENCOUNTER — TELEPHONE (OUTPATIENT)
Age: 85
End: 2025-06-03

## 2025-06-03 NOTE — TELEPHONE ENCOUNTER
Pt will be DC from [facility] on 6/4/25. Pt will be seen by Harry, documentation/orders will be faxed to PCP.

## 2025-06-03 NOTE — TELEPHONE ENCOUNTER
Juhi from [facility] called regarding medical records request - she stated she will send records once the patient has been discharged.

## 2025-06-05 DIAGNOSIS — Z95.1 S/P CABG X 3: ICD-10-CM

## 2025-06-05 RX ORDER — ATORVASTATIN CALCIUM 80 MG/1
80 TABLET, FILM COATED ORAL
Qty: 90 TABLET | Refills: 2 | Status: SHIPPED | OUTPATIENT
Start: 2025-06-05

## 2025-06-05 NOTE — TELEPHONE ENCOUNTER
Reason for call:   [x] Refill   [] Prior Auth  [] Other:     Office:   [x] PCP/Provider - Dolores Vizcarra,   [] Specialty/Provider -     Medication: atorvastatin (LIPITOR) 80 mg     Dose/Frequency: 1 daily    Quantity: 90    Pharmacy: [facility]   Does the patient have enough for 3 days?   [] Yes   [x] No - Send as HP to POD    Mail Away Pharmacy   Does the patient have enough for 10 days?   [] Yes   [] No - Send as HP to POD

## 2025-06-08 NOTE — PROGRESS NOTES
Cardiology   Hospital Follow Up   Office Visit Note  Kilo Mix   84 y.o.   male   MRN: [id number]  [facility] [place]  [address]    [place] [address] [phone]    PCP: Dolores Vizcarra DO  Cardiologist: Will be Dr. Tate                Summary of Plan:  Heart healthy diet: Mediterranean or DASH.  Education provided  We will switch his beta-blocker to Toprol 50 mg once daily  Add Lasix 20 mg daily for volume overload  BMP 1 week  Encouraged him to abide by the postoperative restrictions per CTS  Follow-up with Dr. Tomlin 6/23/2025  Cardiac rehab has been prescribed and recommended; scheduled 6/24/2025  Follow-up with cardiologist Dr. Tate in the   office 3 months          Assessment/Plan  Volume overload, likely due to sodium dietary indiscretion.  He was also previously on HCTZ which was discontinued  A heart healthy diet was recommended, low-salt.  Educated how to read food labels to facilitate adherence  Start Lasix 20 mg daily  Nonfasting BMP 1 week  MV CAD  He presented with a non-STEMI- ADM 5/11 - 5/28/2025  Peak troponin 2989  5/12/2025 LHC: Severe three-vessel disease including distal left main.  Echo: Preserved LV function 5/12/2025  CABG x 3, LIMA to the LAD, SVG to RPDA, SVG to OM1.  ADM 5/11 - 5/28/2025  S/p MI 1995 with BMS to RCA, and restenting in 2001 secondary to ISR, PCI 2007  Prior to his bypass, he was maintained on a home regimen of Plavix 75 daily  On aspirin 325 mg daily, atorvastatin 80, metoprolol tartrate 50 every 12.  Will switch to Toprol and make this once daily  He was discharged to a SNF rehab x 7 days and then discharged  Cardiac rehab has been prescribed and recommended  carotid artery stenosis, asymptomatic  s/p R CEA ( 2011) and TCAR ( 4/2018)   50-69% LICA stenosis  On aspirin, Plavix  Will be following with [facility] cardiology  HTN  /60  On metoprolol tartrate 50 mg every 12  Prior to bypass: He was on lisinopril  10 daily, amlodipine 5 daily.  In April due to low blood pressures he is subsequently stopped amlodipine on his own  Periodic home BP monitoring  DASH diet  Dyslipidemia  On atorvastatin 80 mg daily, previously atorvastatin 20 daily prior to bypass  LDL 77.  Heart healthy diet  Reassess 4-12 weeks  CKD stage II  Baseline creatinine 0.9-1.1  ALPHONSO  Metastatic prostate cancer to the lymph nodes   status post prostatectomy 1999  Recent recurrence:PET-CT there is extensive lymphadenopathy including mediastinal, abdomen and pelvis.  Stage IV disease.   on lupron (6-mo) on 1/6/2025 and  Xtandi in Jan 2025-stopped since April 2025  Bladder mass  S/p TURBT on March 6, 2025,  Status post bilateral nephrostomy tubes   Follow-up with urology  Type 2 diabetes.  Hemoglobin A1c 5.8  Former tobacco use, quitting in 2010.  Macular degeneration, receiving injections  Cardiac testing  TTE 8/25/2020.  EF 55 to 60%.  Grade 1 DD.  Stress echo 11/13/2013: Questionable mid inferior septal hypokinesis on echocardiogram.  TTE 5/12/25.  EF 60%.  Mild LVH.  Wall motion normal.  Grade 1 DD.  RV normal size and function.  Mild LAE.  Mild MR.  Mild TR.  C 5/12/2025  LM: 70% stenosed  LAD lesion 180% stenosis, lesion 2: 70% stenosed  D2 80% stenosed  LCx: Ostial LCx 90% stenosed  Mid RCA 85% stenosed  Distal RCA 60% stenosed  RPDA 70% stenosed                      HPI  Kilo Mix is a 84 y.o.year old male with a history of CAD, s/p MI, s/p BMS to RCA n 1995 and 2001, PCI 2007,and PAD; forme tobacco use.  In the past he followed with Dr. Forte from [facility].  He has not had cardiology follow-up in many years since his cardiologist retired    He has metastatic prostate cancer         Adm  5/11-5/28/25  EMS-->SLB  CC: Exertional angina, radiating to his arm and throat over a few weeks.  EKG: NSR.  Left axis deviation.  Non specific ST-T wave change.  Ruled in for non-STEMI  Underwent LHC demonstrating multivessel CAD  Echo: Preserved LV  function  CT surgery was consulted  After testing was complete and discussion with his oncologist, he underwent CABG x 3  5/21/2025  Postop EKG showed new right bundle branch block  At baseline he has bilateral nephrostomy tubes given an obstructing bladder mass  Postop course complicated by aspiration confirmed by VBS; he had an NG tube for period of time  Discharged to SNF      [facility]        6/9/2025  Cardiology follow-up.  He presents with his son.  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  He resides in [place].  He was discharged from [facility] home after 1 week  He has had no trouble swallowing.  He tells me he is eating just about anything.  He does tell me recently he made a lorenzo cheese and egg sandwich for him and his wife.  His wife had sepsis a few years ago.  The children are helping both parents    He is sleeping in a bed.  He denies any incisional discomfort.  He he does admit to some FUNES.  He does have some lower extremity edema.  He did not have this edema prior to his bypass.  He does still have the nephrostomy tubes with the leg bags in place.    On exam he has some coarse breath sounds.  His incisions are healing nicely.  No erythema or drainage.  He does have 1+ bilateral lower extremity edema.    I encouraged him to abide by a salt restricted diet.  I reinforced this.  Will start Lasix 20 mg once daily, and obtain a BMP in 1 week  /60 heart rate 60  Will switch his beta-blocker from the Toprol tartrate 50 twice daily to Toprol 50 mg once daily to obtain blood pressure room    Discharge weight was 153 pounds.  His office weight today is 163 pounds.  Before surgery his dry weight was 161 pounds.  He is scheduled for cardiac rehab in the [place] office      He will follow-up with cardiology in the [place] office in 3 months          Review of Systems   Constitutional: Positive for weight gain. Negative for chills.   Cardiovascular:   Positive for dyspnea on exertion and leg swelling. Negative for chest pain, claudication, cyanosis, irregular heartbeat, near-syncope, orthopnea, palpitations, paroxysmal nocturnal dyspnea and syncope.   Respiratory:  Negative for cough and shortness of breath.    Gastrointestinal:  Negative for heartburn and nausea.   Neurological:  Negative for dizziness, focal weakness, headaches, light-headedness and weakness.   All other systems reviewed and are negative.            Assessment  Diagnoses and all orders for this visit:    Hospital discharge follow-up    Coronary artery disease involving native coronary artery of native heart with angina pectoris ([facility])    NSTEMI (non-ST elevated myocardial infarction) ([facility])    S/P CABG x 3    Dyslipidemia    Primary hypertension    Asymptomatic stenosis of left carotid artery    Peripheral vascular disease ([facility])    ALPHONSO (obstructive sleep apnea)    CKD stage 3 due to type 2 diabetes mellitus ([facility])    Diabetes mellitus type 2 with neurological manifestations ([facility])    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[facility])    Hypervolemia, unspecified hypervolemia type  -     metoprolol succinate (TOPROL-XL) 50 mg 24 hr tablet; Take 1 tablet (50 mg total) by mouth daily  -     furosemide (LASIX) 20 mg tablet; Take 1 tablet (20 mg total) by mouth daily  -     Basic metabolic panel; Future        Past Medical History[1]    Past Surgical History[2]        Allergies  Allergies[3]      Medications  Current Medications[4]      Social History     Socioeconomic History    Marital status: /Civil Union     Spouse name: Not on file    Number of children: Not on file    Years of education: Not on file    Highest education level: Not on file   Occupational History    Occupation: fulltime   Tobacco Use    Smoking status: Former     Current packs/day: 0.00     Average packs/day: 3.0 packs/day for 10.0 years (30.0 ttl pk-yrs)     Types: Cigarettes     Start date: 1/1/2000     Quit  date: 2010     Years since quitting: 15.4    Smokeless tobacco: Never    Tobacco comments:     Former smoker - quit    Vaping Use    Vaping status: Never Used   Substance and Sexual Activity    Alcohol use: Yes     Comment: rare glass of wine    Drug use: Never     Comment: Denies any drug use per pt    Sexual activity: Not Currently     Comment: defer   Other Topics Concern    Not on file   Social History Narrative    Daily coffee consumption: 4 cp/day    Daily tea consumption: 1 cp/day     Social Drivers of Health     Financial Resource Strain: Medium Risk (2023)    Overall Financial Resource Strain (CARDIA)     Difficulty of Paying Living Expenses: Somewhat hard   Food Insecurity: No Food Insecurity (2025)    Nursing - Inadequate Food Risk Classification     Worried About Running Out of Food in the Last Year: Never true     Ran Out of Food in the Last Year: Never true     Ran Out of Food in the Last Year: Never true   Transportation Needs: No Transportation Needs (2025)    Nursing - Transportation Risk Classification     Lack of Transportation: Not on file     Lack of Transportation: No   Physical Activity: Not on file   Stress: Not on file   Social Connections: Not on file   Intimate Partner Violence: Unknown (2025)    Nursing IPS     Feels Physically and Emotionally Safe: Not on file     Physically Hurt by Someone: Not on file     Humiliated or Emotionally Abused by Someone: Not on file     Physically Hurt by Someone: No     Hurt or Threatened by Someone: No   Housing Stability: Unknown (2025)    Nursing: Inadequate Housing Risk Classification     Has Housing: Not on file     Worried About Losing Housing: Not on file     Unable to Get Utilities: Not on file     Unable to Pay for Housing in the Last Year: No     Has Housin       Family History[5]    Physical Exam  Vitals and nursing note reviewed.   HENT:      Head: Normocephalic and atraumatic.     Eyes:      Extraocular  "Movements: Extraocular movements intact.      Conjunctiva/sclera: Conjunctivae normal.       Cardiovascular:      Rate and Rhythm: Normal rate and regular rhythm.      Pulses: Intact distal pulses.      Heart sounds: Normal heart sounds.   Pulmonary:      Effort: Pulmonary effort is normal.      Breath sounds: Decreased breath sounds present.   Abdominal:      General: Bowel sounds are normal.      Palpations: Abdomen is soft.     Musculoskeletal:         General: Normal range of motion.      Cervical back: Normal range of motion and neck supple.      Right lower leg: Edema present.      Left lower leg: Edema present.     Skin:     General: Skin is warm and dry.      Comments: Incisions are healing nicely.  Skin edges well-approximated no erythema or drainage.     Neurological:      Mental Status: He is alert and oriented to person, place, and time.     Psychiatric:         Mood and Affect: Mood normal.         Vitals: Blood pressure 114/60, pulse 60, height 5' 8\" (1.727 m), weight 74.3 kg (163 lb 12.8 oz), SpO2 98%.   Wt Readings from Last 3 Encounters:   06/09/25 74.3 kg (163 lb 12.8 oz)   05/28/25 69.7 kg (153 lb 10.6 oz)   05/06/25 73 kg (161 lb)           Labs & Results:  Lab Results   Component Value Date    WBC 11.16 (H) 05/28/2025    HGB 9.4 (L) 05/28/2025    HCT 29.5 (L) 05/28/2025     (H) 05/28/2025     05/28/2025     No results found for: \"BNP\"  No components found for: \"CHEM\"  Troponin I   Date Value Ref Range Status   04/17/2018 <0.02 <0.05 ng/mL Final   04/17/2018 <0.02 <0.05 ng/mL Final   04/17/2018 0.03 <0.05 ng/mL Final     No results found for this or any previous visit.    No results found for this or any previous visit.    No valid procedures specified.  No results found for this or any previous visit.                This note was completed in part utilizing m-modal fluency direct voice recognition software.   Grammatical errors, random word insertion, spelling mistakes, and " incomplete sentences may be an occasional consequence of the system secondary to software limitations, ambient noise and hardware issues. At the time of dictation, efforts were made to edit, clarify and /or correct errors.  Please read the chart carefully and recognize, using context, where substitutions have occurred.  If you have any questions or concerns about the context, text or information contained within the body of this dictation, please contact myself, the provider, for further clarification           [1]   Past Medical History:  Diagnosis Date    Bladder tumor     Cancer (HCC)     Prostate cancer    Depression 5/14/2025    Diabetes mellitus (HCC)     Diverticulitis of colon     History of common carotid artery stent placement     RIGHT stent - per pts son - RIGHT stent  in  carotid artery    History of heart artery stent     x2 stents in heart per pts son    Hyperlipidemia     Hypertension     Migraines     Myocardial infarction (HCC)     about 30yrs ago    Nephrostomy present (HCC)     2/25/25 Per pts son Ed- pt has two nephrostomy tubes    Prostate cancer (HCC)     Seasonal allergies    [2]   Past Surgical History:  Procedure Laterality Date    CARDIAC CATHETERIZATION N/A 5/12/2025    Procedure: Cardiac Catheterization;  Surgeon: Daniel Mabry DO;  [facility]: [facility];  Service: Cardiology    CARDIAC SURGERY      CAROTID ENDARTARECTOMY Right     thromboendarterectomy    CAROTID STENT Right     CATARACT EXTRACTION Left     COLONOSCOPY      fiberoptic screening 2/6/08 5 yr / complete 3/15/13 5 yr    CORONARY ANGIOPLASTY WITH STENT PLACEMENT      ELBOW SURGERY      EYE SURGERY      IR BIOPSY LYMPH NODE  12/24/2024    IR NEPHROSTOMY TUBE CHECK/CHANGE/REPOSITION/REINSERTION/UPSIZE  2/25/2025    IR NEPHROSTOMY TUBE CHECK/CHANGE/REPOSITION/REINSERTION/UPSIZE  4/29/2025    IR NEPHROSTOMY TUBE PLACEMENT  12/24/2024    KNEE SURGERY      CA CORONARY ARTERY BYP W/VEIN & ARTERY GRAFT 3 VEIN N/A  5/21/2025    Procedure: CORONARY ARTERY BYPASS GRAFT (CABG) X3 VESSELS, LIMA - LAD, LEFT LEG EVH/SVG - PDA AND OM, W/CHAPO;  Surgeon: David Tomlin DO;  Location:  MAIN OR;  Service: Cardiac Surgery    ME CYSTO W/REMOVAL OF LESIONS SMALL N/A 1/21/2025    Procedure: TRANSURETHRAL RESECTION OF BLADDER TUMOR (TURBT);  Surgeon: Jordan Youssef MD;  Location:  MAIN OR;  Service: Urology    ME CYSTO W/REMOVAL OF LESIONS SMALL N/A 3/6/2025    Procedure: TRANSURETHRAL RESECTION OF BLADDER TUMOR (TURBT);  Surgeon: Jordan Youssef MD;  Location:  MAIN OR;  Service: Urology    PROSTATE SURGERY      SUPRAPUBIC PROSTATECTOMY  12/06/1999    UPPER GASTROINTESTINAL ENDOSCOPY     [3]   Allergies  Allergen Reactions    Losartan Syncope     Reaction Date: 07Jun2011;     Penicillins Other (See Comments)     As a child so does not recall reaction   [4]   Current Outpatient Medications:     acetaminophen (TYLENOL) 325 mg tablet, Take 2 tablets (650 mg total) by mouth every 6 (six) hours as needed for mild pain, Disp: , Rfl:     ascorbic acid (VITAMIN C) 500 mg tablet, Take 500 mg by mouth in the morning., Disp: , Rfl:     aspirin 325 mg tablet, Take 1 tablet (325 mg total) by mouth daily, Disp: , Rfl:     atorvastatin (LIPITOR) 80 mg tablet, Take 1 tablet (80 mg total) by mouth daily with dinner, Disp: 90 tablet, Rfl: 2    B Complex Vitamins (VITAMIN B COMPLEX PO), Take 1 tablet by mouth in the morning., Disp: , Rfl:     chlorhexidine (PERIDEX) 0.12 % solution, Apply 15 mL to the mouth or throat 2 (two) times a day, Disp: , Rfl:     Cholecalciferol 1000 units CHEW, Chew 1 tablet in the morning., Disp: , Rfl:     docusate sodium (COLACE) 100 mg capsule, Take 1 capsule (100 mg total) by mouth 2 (two) times a day, Disp: , Rfl:     furosemide (LASIX) 20 mg tablet, Take 1 tablet (20 mg total) by mouth daily, Disp: 30 tablet, Rfl: 5    metFORMIN (GLUCOPHAGE-XR) 500 mg 24 hr tablet, Take 1 tablet by mouth once daily, Disp: 90 tablet,  Rfl: 3    metoprolol succinate (TOPROL-XL) 50 mg 24 hr tablet, Take 1 tablet (50 mg total) by mouth daily, Disp: 30 tablet, Rfl: 5    pantoprazole (PROTONIX) 40 mg tablet, Take 1 tablet (40 mg total) by mouth daily, Disp: , Rfl:     polyethylene glycol (MIRALAX) 17 g packet, Take 17 g by mouth daily, Disp: , Rfl:   [5]   Family History  Problem Relation Name Age of Onset    Alcohol abuse Mother Ale Rodriguez Voce     No Known Problems Father      No Known Problems Sister      Substance Abuse Son Stewart CORONADO Vocsammy     Heart attack Family          MI    Breast cancer Family      Colon cancer Neg Hx      Colon polyps Neg Hx      Anesthesia problems Neg Hx

## 2025-06-09 ENCOUNTER — OFFICE VISIT (OUTPATIENT)
Dept: CARDIOLOGY CLINIC | Facility: CLINIC | Age: 85
End: 2025-06-09
Payer: MEDICARE

## 2025-06-09 VITALS
SYSTOLIC BLOOD PRESSURE: 114 MMHG | WEIGHT: 163.8 LBS | DIASTOLIC BLOOD PRESSURE: 60 MMHG | HEART RATE: 60 BPM | HEIGHT: 68 IN | BODY MASS INDEX: 24.83 KG/M2 | OXYGEN SATURATION: 98 %

## 2025-06-09 DIAGNOSIS — G47.33 OSA (OBSTRUCTIVE SLEEP APNEA): ICD-10-CM

## 2025-06-09 DIAGNOSIS — E11.22 CKD STAGE 3 DUE TO TYPE 2 DIABETES MELLITUS (HCC): ICD-10-CM

## 2025-06-09 DIAGNOSIS — E78.5 DYSLIPIDEMIA: ICD-10-CM

## 2025-06-09 DIAGNOSIS — E11.49 DIABETES MELLITUS TYPE 2 WITH NEUROLOGICAL MANIFESTATIONS (HCC): ICD-10-CM

## 2025-06-09 DIAGNOSIS — I21.4 NSTEMI (NON-ST ELEVATED MYOCARDIAL INFARCTION) (HCC): ICD-10-CM

## 2025-06-09 DIAGNOSIS — I65.22 ASYMPTOMATIC STENOSIS OF LEFT CAROTID ARTERY: ICD-10-CM

## 2025-06-09 DIAGNOSIS — Z09 HOSPITAL DISCHARGE FOLLOW-UP: Primary | ICD-10-CM

## 2025-06-09 DIAGNOSIS — I74.3 EMBOLISM AND THROMBOSIS OF ARTERIES OF THE LOWER EXTREMITIES (HCC): ICD-10-CM

## 2025-06-09 DIAGNOSIS — I25.119 CORONARY ARTERY DISEASE INVOLVING NATIVE CORONARY ARTERY OF NATIVE HEART WITH ANGINA PECTORIS (HCC): ICD-10-CM

## 2025-06-09 DIAGNOSIS — I73.9 PERIPHERAL VASCULAR DISEASE (HCC): ICD-10-CM

## 2025-06-09 DIAGNOSIS — Z95.1 S/P CABG X 3: ICD-10-CM

## 2025-06-09 DIAGNOSIS — N18.30 CKD STAGE 3 DUE TO TYPE 2 DIABETES MELLITUS (HCC): ICD-10-CM

## 2025-06-09 DIAGNOSIS — I10 PRIMARY HYPERTENSION: ICD-10-CM

## 2025-06-09 DIAGNOSIS — E87.70 HYPERVOLEMIA, UNSPECIFIED HYPERVOLEMIA TYPE: ICD-10-CM

## 2025-06-09 PROCEDURE — 99215 OFFICE O/P EST HI 40 MIN: CPT | Performed by: NURSE PRACTITIONER

## 2025-06-09 RX ORDER — FUROSEMIDE 20 MG/1
20 TABLET ORAL DAILY
Qty: 30 TABLET | Refills: 5 | Status: SHIPPED | OUTPATIENT
Start: 2025-06-09

## 2025-06-09 RX ORDER — METOPROLOL SUCCINATE 50 MG/1
50 TABLET, EXTENDED RELEASE ORAL DAILY
Qty: 30 TABLET | Refills: 5 | Status: SHIPPED | OUTPATIENT
Start: 2025-06-09

## 2025-06-09 NOTE — ASSESSMENT & PLAN NOTE
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 Shankar  Cardiac surgery discussed with oncology regarding prognosis.   verbal cues/1 person assist

## 2025-06-10 PROBLEM — J18.9 PNEUMONIA INVOLVING RIGHT LUNG: Status: RESOLVED | Noted: 2025-05-11 | Resolved: 2025-06-10

## 2025-06-11 ENCOUNTER — HOSPITAL ENCOUNTER (OUTPATIENT)
Dept: RADIOLOGY | Facility: HOSPITAL | Age: 85
Discharge: HOME | End: 2025-06-11
Payer: MEDICARE

## 2025-06-11 DIAGNOSIS — C67.9 HYDRONEPHROSIS DUE TO OBSTRUCTIVE MALIGNANT NEOPLASM OF BLADDER (HCC): ICD-10-CM

## 2025-06-11 DIAGNOSIS — N13.30 HYDRONEPHROSIS DUE TO OBSTRUCTIVE MALIGNANT NEOPLASM OF BLADDER (HCC): ICD-10-CM

## 2025-06-11 DIAGNOSIS — N13.30 HYDRONEPHROSIS, BILATERAL: ICD-10-CM

## 2025-06-11 DIAGNOSIS — N13.30 HYDRONEPHROSIS, BILATERAL: Primary | ICD-10-CM

## 2025-06-11 PROCEDURE — C1729 CATH, DRAINAGE: HCPCS

## 2025-06-11 PROCEDURE — C1769 GUIDE WIRE: HCPCS

## 2025-06-11 PROCEDURE — 50435 EXCHANGE NEPHROSTOMY CATH: CPT | Performed by: RADIOLOGY

## 2025-06-11 PROCEDURE — 50435 EXCHANGE NEPHROSTOMY CATH: CPT

## 2025-06-11 PROCEDURE — 50431 NJX PX NFROSGRM &/URTRGRM: CPT

## 2025-06-11 RX ORDER — LIDOCAINE WITH 8.4% SOD BICARB 0.9%(10ML)
SYRINGE (ML) INJECTION AS NEEDED
Status: COMPLETED | OUTPATIENT
Start: 2025-06-11 | End: 2025-06-11

## 2025-06-11 RX ADMIN — IOHEXOL 20 ML: 350 INJECTION, SOLUTION INTRAVENOUS at 13:36

## 2025-06-11 RX ADMIN — Medication 5 ML: at 13:07

## 2025-06-11 RX ADMIN — Medication 5 ML: at 13:21

## 2025-06-11 NOTE — SEDATION DOCUMENTATION
Interventional Radiology Procedure Nursing Note    Procedure: Bilateral PCN exchange  Performing Provider: Dr. Preston    Access site: bilateral lower back  Closure:Drains sutured    Medications administered:  Local - [place]    Events:  Patient tolerated procedure well. Discharge instructions provided. All questions answered.

## 2025-06-11 NOTE — H&P
"Interventional Radiology  History and Physical 6/11/2025     Kilo Mix   1940   [id number]    Assessment/Plan:  84 year old male with history of bladder cancer and chronic bilateral PCNs returns due to right PCN leakage and pain with flushing.    Problem List Items Addressed This Visit    None  Visit Diagnoses         Hydronephrosis, bilateral        Relevant Orders    IR nephrostomy tube check/change/reposition/reinsertion/upsize      Hydronephrosis due to obstructive malignant neoplasm of bladder (HCC)        Relevant Orders    IR nephrostomy tube check/change/reposition/reinsertion/upsize               Subjective:     Patient ID: iKlo Mix is a 84 y.o. male.    History of Present Illness  Patient with history of bladder cancer and chronic bilateral PCNs returns due to right PCN leakage and pain with flushing.    Review of Systems      Past Medical History[1]     Past Surgical History[2]     Tobacco Use History[3]     Social History     Substance and Sexual Activity   Alcohol Use Yes    Comment: rare glass of wine        Social History     Substance and Sexual Activity   Drug Use Never    Comment: Denies any drug use per pt        Allergies[4]    Current Medications[5]       Objective:    There were no vitals filed for this visit.     Physical Exam  Constitutional:       Appearance: Normal appearance.   Pulmonary:      Effort: Pulmonary effort is normal.   Genitourinary:     Comments: Bilateral PCNs in place.          No results found for: \"BNP\"   Lab Results   Component Value Date    WBC 11.16 (H) 05/28/2025    HGB 9.4 (L) 05/28/2025    HCT 29.5 (L) 05/28/2025     (H) 05/28/2025     05/28/2025     Lab Results   Component Value Date    INR 0.96 05/10/2025    INR 1.03 12/24/2024    INR 1.1 04/17/2018    PROTIME 13.1 05/10/2025    PROTIME 14.0 12/24/2024     Lab Results   Component Value Date    PTT 76 (H) 05/20/2025         I have personally reviewed pertinent imaging and laboratory " results.     Code Status: Prior  Advance Directive and Living Will:      Power of : Yes  POLST:      This text is generated with voice recognition software. There may be translation, syntax,  or grammatical errors. If you have any questions, please contact the dictating provider.        [1]   Past Medical History:  Diagnosis Date    Bladder tumor     Cancer (HCC)     Prostate cancer    Depression 5/14/2025    Diabetes mellitus (HCC)     Diverticulitis of colon     History of common carotid artery stent placement     RIGHT stent - per pts son - RIGHT stent  in  carotid artery    History of heart artery stent     x2 stents in heart per pts son    Hyperlipidemia     Hypertension     Migraines     Myocardial infarction (HCC)     about 30yrs ago    Nephrostomy present (HCC)     2/25/25 Per pts son Ed- pt has two nephrostomy tubes    Prostate cancer (HCC)     Seasonal allergies    [2]   Past Surgical History:  Procedure Laterality Date    CARDIAC CATHETERIZATION N/A 5/12/2025    Procedure: Cardiac Catheterization;  Surgeon: Daniel Mabry DO;  [facility];  Service: Cardiology    CARDIAC SURGERY      CAROTID ENDARTARECTOMY Right     thromboendarterectomy    CAROTID STENT Right     CATARACT EXTRACTION Left     COLONOSCOPY      fiberoptic screening 2/6/08 5 yr / complete 3/15/13 5 yr    CORONARY ANGIOPLASTY WITH STENT PLACEMENT      ELBOW SURGERY      EYE SURGERY      IR BIOPSY LYMPH NODE  12/24/2024    IR NEPHROSTOMY TUBE CHECK/CHANGE/REPOSITION/REINSERTION/UPSIZE  2/25/2025    IR NEPHROSTOMY TUBE CHECK/CHANGE/REPOSITION/REINSERTION/UPSIZE  4/29/2025    IR NEPHROSTOMY TUBE PLACEMENT  12/24/2024    KNEE SURGERY      NM CORONARY ARTERY BYP W/VEIN & ARTERY GRAFT 3 VEIN N/A 5/21/2025    Procedure: CORONARY ARTERY BYPASS GRAFT (CABG) X3 VESSELS, LIMA - LAD, LEFT LEG EVH/SVG - PDA AND OM, W/CHAPO;  Surgeon: David Tomlin DO;  Location: [facility];  Service: Cardiac Surgery    NM CYSTO W/REMOVAL OF  LESIONS SMALL N/A 1/21/2025    Procedure: TRANSURETHRAL RESECTION OF BLADDER TUMOR (TURBT);  Surgeon: Jordan Youssef MD;  Location:  MAIN OR;  Service: Urology    OR CYSTO W/REMOVAL OF LESIONS SMALL N/A 3/6/2025    Procedure: TRANSURETHRAL RESECTION OF BLADDER TUMOR (TURBT);  Surgeon: Jordan Youssef MD;  Location:  MAIN OR;  Service: Urology    PROSTATE SURGERY      SUPRAPUBIC PROSTATECTOMY  12/06/1999    UPPER GASTROINTESTINAL ENDOSCOPY     [3]   Social History  Tobacco Use   Smoking Status Former    Current packs/day: 0.00    Average packs/day: 3.0 packs/day for 10.0 years (30.0 ttl pk-yrs)    Types: Cigarettes    Start date: 1/1/2000    Quit date: 1/1/2010    Years since quitting: 15.4   Smokeless Tobacco Never   Tobacco Comments    Former smoker - quit 2010   [4]   Allergies  Allergen Reactions    Losartan Syncope     Reaction Date: 07Jun2011;     Penicillins Other (See Comments)     As a child so does not recall reaction   [5]   Current Outpatient Medications   Medication Sig Dispense Refill    acetaminophen (TYLENOL) 325 mg tablet Take 2 tablets (650 mg total) by mouth every 6 (six) hours as needed for mild pain      ascorbic acid (VITAMIN C) 500 mg tablet Take 500 mg by mouth in the morning.      aspirin 325 mg tablet Take 1 tablet (325 mg total) by mouth daily      atorvastatin (LIPITOR) 80 mg tablet Take 1 tablet (80 mg total) by mouth daily with dinner 90 tablet 2    B Complex Vitamins (VITAMIN B COMPLEX PO) Take 1 tablet by mouth in the morning.      chlorhexidine (PERIDEX) 0.12 % solution Apply 15 mL to the mouth or throat 2 (two) times a day      Cholecalciferol 1000 units CHEW Chew 1 tablet in the morning.      docusate sodium (COLACE) 100 mg capsule Take 1 capsule (100 mg total) by mouth 2 (two) times a day      furosemide (LASIX) 20 mg tablet Take 1 tablet (20 mg total) by mouth daily 30 tablet 5    metFORMIN (GLUCOPHAGE-XR) 500 mg 24 hr tablet Take 1 tablet by mouth once daily 90 tablet 3     metoprolol succinate (TOPROL-XL) 50 mg 24 hr tablet Take 1 tablet (50 mg total) by mouth daily 30 tablet 5    pantoprazole (PROTONIX) 40 mg tablet Take 1 tablet (40 mg total) by mouth daily      polyethylene glycol (MIRALAX) 17 g packet Take 17 g by mouth daily       No current facility-administered medications for this encounter.

## 2025-06-11 NOTE — DISCHARGE INSTRUCTIONS
"     TUBE CARE INSTRUCTIONS    Care after your procedure:    Resume your normal diet. Small sips of flat soda will help with nausea.    1. The properly functioning catheter should be forward flushed once (1x) daily with 10ml of normal saline using clean technique. You will be given a prescription for flushes.   To flush the tube, clean both connections with alcohol swab.Twist off the drainage bag/ bulb  tubing and twist the saline syringe into the drainage tube and flush. Remove the syringe and twist the drainage bag / bulb tubing tubing back on.    2. The drainage bag/bulb may be emptied as necessary. Keep a record of the amount of fluid you drain from your tube. This should be done with clean technique as well.     3. A fresh dressing should be applied daily over the tube insertion site.     4. As the tube is secured to the skin with only a suture,try not to pull on your tube. Tub baths are not permitted. Showers are permitted if the patient's skin entry site is prevented from getting wet. Similarly, washcloth \"baths\" are acceptable.     Contact Interventional Radiology at [phone] ([place] PATIENTS: Contact Interventional Radiology at [phone]) ([facility] PATIENTS: Contact Interventional Radiology at [phone]) if:    1. Leakage or large amounts of liquid around the catheter.    2. Fever of 101 degrees lasting several hours without other obvious cause (such as sore throat, flu, etc).    3. Persistent nausea or vomiting.    4. Diminished drainage, which may be associated with pressure or pain. Or when the     drainage from your tube is less than 10mls for 48 hours.    5. Catheter pulled back or falls out.      The following pharmacies carry the flush syringes.       [facility]                     [facility] [address]                     [address] [place] [address]                         [phone]  Darian Merchant " PA  Phone [phone]            Phone [phone]                                                                                                       Florence Barnes   [facility]             [facility]                                [phone]  [address] [place] [address] YOHANNES SHEFFIELD  Phone [phone]            Phone [phone]                      [facility]                                                                                                          [phone]  [facility]  [address]  Darian SHEFFIELD                                                                               [facility]  Phone [phone]

## 2025-06-11 NOTE — BRIEF OP NOTE (RAD/CATH)
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 Hydronephrosis, bilateral    2. 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    Surgeon: Dejon Preston MD     Assistant: Dr. Hines    Blood loss: None    Specimens: None     Findings:   Right PCN was partially dislodged with partial closure of existing tract.  New 10 Fr catheter advanced through existing tract.  Left PCN exchange using 10 Fr catheter.    Complications: None immediate.    Anesthesia: local

## 2025-06-23 ENCOUNTER — OFFICE VISIT (OUTPATIENT)
Dept: CARDIAC SURGERY | Facility: CLINIC | Age: 85
End: 2025-06-23

## 2025-06-23 ENCOUNTER — HOSPITAL ENCOUNTER (OUTPATIENT)
Dept: INFUSION CENTER | Facility: HOSPITAL | Age: 85
Discharge: HOME | End: 2025-06-23
Attending: INTERNAL MEDICINE
Payer: MEDICARE

## 2025-06-23 ENCOUNTER — APPOINTMENT (OUTPATIENT)
Dept: LAB | Facility: HOSPITAL | Age: 85
End: 2025-06-23
Payer: MEDICARE

## 2025-06-23 VITALS
OXYGEN SATURATION: 99 % | HEIGHT: 68 IN | HEART RATE: 70 BPM | TEMPERATURE: 97.6 F | BODY MASS INDEX: 23.95 KG/M2 | DIASTOLIC BLOOD PRESSURE: 72 MMHG | WEIGHT: 158 LBS | SYSTOLIC BLOOD PRESSURE: 100 MMHG

## 2025-06-23 VITALS
RESPIRATION RATE: 16 BRPM | TEMPERATURE: 96.6 F | OXYGEN SATURATION: 98 % | DIASTOLIC BLOOD PRESSURE: 69 MMHG | HEART RATE: 66 BPM | SYSTOLIC BLOOD PRESSURE: 128 MMHG

## 2025-06-23 DIAGNOSIS — Z95.1 S/P CABG X 3: Primary | ICD-10-CM

## 2025-06-23 DIAGNOSIS — C61 PROSTATE CANCER (HCC): Primary | ICD-10-CM

## 2025-06-23 DIAGNOSIS — E87.70 HYPERVOLEMIA, UNSPECIFIED HYPERVOLEMIA TYPE: ICD-10-CM

## 2025-06-23 PROCEDURE — 99024 POSTOP FOLLOW-UP VISIT: CPT | Performed by: THORACIC SURGERY (CARDIOTHORACIC VASCULAR SURGERY)

## 2025-06-23 PROCEDURE — 96402 CHEMO HORMON ANTINEOPL SQ/IM: CPT

## 2025-06-23 RX ADMIN — LEUPROLIDE ACETATE 45 MG: KIT at 08:12

## 2025-06-23 NOTE — PROGRESS NOTES
Kilo Mix  tolerated treatment well with no complications.      Kilo Mix is aware of future appt on 12/8/25 at 0800.     AVS printed and given to Kilo Mix:  Yes

## 2025-06-23 NOTE — PROGRESS NOTES
"Procedure: S/P coronary artery bypass grafting, performed on 5/21/25    History: Kilo Mix is a 84 y.o. year old male who presents to our office today for routine post-surgical follow up care. His postop course was complicated by dysphagia, and after evaluation by speech therapy, he was noted to be aspirating, and an NGT was inserted and tube feedings started. After several days, patient was re-evaluated with a barium swallow, and was cleared for a puree diet with thin liquids and NGT was removed. He was discharged to [facility] on 5/28. He remained there for about a week, and was discharged home. Since that time, patient has followed up with his PCP and cardiologist. He is back to eating regular food textures, and is attempting to reduce his sodium intake.     Patient is accompanied today by his son. He has been having some dietary indiscretions, and his son states that he is trying to encourage him to eat healthier, lower sodium foods, but patient states that he wont eat food if he doesn't like it. He has mowed his lawn (this past weekend), and has been doing laundry (states that he is pulling the basket down the stairs). He has been weighing himself daily, and weights have been relatively stable. He has been getting physical therapy at home, about once a week, and has a cardiac rehab evaluation tomorrow.       Vital Signs:   Vitals:    06/23/25 1416 06/23/25 1433   BP: 116/70 100/72   BP Location: Left arm Right arm   Patient Position: Sitting Sitting   Cuff Size: Standard Standard   Pulse: 70    Temp: 97.6 °F (36.4 °C)    TempSrc: Tympanic    SpO2: 99%    Weight: 71.7 kg (158 lb)    Height: 5' 8\" (1.727 m)        Home Medications:   Prior to Admission medications    Medication Sig Start Date End Date Taking? Authorizing Provider   acetaminophen (TYLENOL) 325 mg tablet Take 2 tablets (650 mg total) by mouth every 6 (six) hours as needed for mild pain 5/28/25  Yes Rose Tang PA-C   ascorbic acid " (VITAMIN C) 500 mg tablet Take 500 mg by mouth in the morning. 2/22/11  Yes Historical Provider, MD   aspirin 325 mg tablet Take 1 tablet (325 mg total) by mouth daily 5/29/25  Yes Rose Tang PA-C   atorvastatin (LIPITOR) 80 mg tablet Take 1 tablet (80 mg total) by mouth daily with dinner 6/5/25  Yes Dolores Vizcarra DO   B Complex Vitamins (VITAMIN B COMPLEX PO) Take 1 tablet by mouth in the morning. 2/22/11  Yes Historical Provider, MD   chlorhexidine (PERIDEX) 0.12 % solution Apply 15 mL to the mouth or throat 2 (two) times a day 5/28/25  Yes Rose Tang PA-C   Cholecalciferol 1000 units CHEW Chew 1 tablet in the morning.   Yes Historical Provider, MD   docusate sodium (COLACE) 100 mg capsule Take 1 capsule (100 mg total) by mouth 2 (two) times a day 5/28/25  Yes Rose Tang PA-C   furosemide (LASIX) 20 mg tablet Take 1 tablet (20 mg total) by mouth daily 6/9/25  Yes GLEN Shankar   metFORMIN (GLUCOPHAGE-XR) 500 mg 24 hr tablet Take 1 tablet by mouth once daily 7/13/24  Yes Dolores Vizcarra DO   metoprolol succinate (TOPROL-XL) 50 mg 24 hr tablet Take 1 tablet (50 mg total) by mouth daily 6/9/25  Yes GLEN Shankar   pantoprazole (PROTONIX) 40 mg tablet Take 1 tablet (40 mg total) by mouth daily 5/29/25  Yes Rose Tang PA-C   polyethylene glycol (MIRALAX) 17 g packet Take 17 g by mouth daily  Patient not taking: Reported on 6/23/2025 5/29/25   Rose Tang PA-C       Physical Exam:    HEENT/NECK:  Normocephalic. Atraumatic.  No jugular venous distention.    Cardiac: Regular rate and rhythm  Pulmonary:  Breath sounds clear bilaterally  Abdomen:  Non-tender, Non-distended, and Normal bowel sounds  Incisions: Sternum is stable.  Incision is clean, dry, and intact.  and Saphenectomy incison is clean, dry, and intact.   Extremities: Extremities warm/dry  Neuro: Alert and oriented X 3.  Sensation is grossly intact.  No focal deficits.  Skin: Warm/Dry,  "without rashes or lesions.    Lab Results:               Invalid input(s): \"LABGLOM\"      Lab Results   Component Value Date    HGBA1C 5.6 05/13/2025     Lab Results   Component Value Date    TROPONINI <0.02 04/17/2018       Imaging Studies:     None since discharge.     Results Review Statement: No pertinent imaging studies reviewed.    Assessment: Coronary artery disease. S/P coronary artery bypass grafting;    Plan:     Kilo Mix continues to recover well following surgery.  To date they have made progressive improvements with their physical rehabilitation. At this point I have cleared them to begin outpatient cardiac rehabilitation and have encouraged them to to do so.  Kilo Mix has also been cleared to resume driving. I asked that them do so cautiously, in progressive increments. I did remind them of their ongoing lifting restrictions of 25 pounds for an additional 2 months.    Kilo Mix has already been evaluated by their primary care physician cardiologist for ongoing medical care. At this point we will not schedule Kilo Mix  for routine followup care with our office. Future medical management will be directed by their outpatient cardiologist.. I have advised them to call with any new concerns that may arise. Kilo Mix was comfortable with our recommendations and their questions were answered to their satisfaction.    Routine referral to gastroenterology for colonoscopy screening was not indicated, as the patient is over 75 years old    Michaela Mart PA-C  06/23/25    * This note was completed in part utilizing Lucibel direct voice recognition software.   Grammatical errors, random word insertion, spelling mistakes, and incomplete sentences may be an occasional consequence of the system secondary to software limitations, ambient noise and hardware issues. At the time of dictation, efforts were made to edit, clarify and /or correct errors. Please read the chart carefully and " recognize, using context, where substitutions have occurred.  If you have any questions or concerns about the context, text or information contained within the body of this dictation, please contact myself, the provider, for further clarification.

## 2025-06-24 ENCOUNTER — CLINICAL SUPPORT (OUTPATIENT)
Dept: CARDIAC REHAB | Facility: HOSPITAL | Age: 85
End: 2025-06-24
Attending: PHYSICIAN ASSISTANT
Payer: MEDICARE

## 2025-06-24 DIAGNOSIS — Z95.1 S/P CABG X 3: Primary | ICD-10-CM

## 2025-06-24 PROCEDURE — 93797 PHYS/QHP OP CAR RHAB WO ECG: CPT

## 2025-06-24 NOTE — PROGRESS NOTES
CARDIAC REHABILITATION   ASSESSMENT AND INDIVIDUALIZED TREATMENT PLAN  INITIAL           Today's date: 2025   # of Exercise Sessions Completed: 1 - Initial Evaluation Today  Patient name: Kilo Mix      : 1940  Age: 84 y.o.       MRN: [id number]  Referring Physician: Dr. Tomlin  Cardiologist: Dr. Tate  Provider: Ruby  Clinician: Tori Rodarte MS, CEP        Treatment is tailored to this patient's individual needs.  The ITP was reviewed with the patient and all questions were answered to their satisfaction.  Additional ITP documentation can be found electronically including daily and monthly exercise summaries, daily session notes with ECG summaries, education notes, daily medication reconciliation, and daily physician supervision.      INITIAL EVALUATION SUMMARY DATE:  2025      Patient's subjective report of progress/symptoms since event:   Kilo reports that since his surgery, he has had little to no pain. Denies SOB or lightheadedness.   Current functional status:  home exercise/ADLs/recreational hobbies:  Able to complete light to moderate ADLs within restrictions  No formal home exercise  Work Status:   retired  Clinical Comments:   Kilo completed an initial ETT today. He was able to complete 6 minutes reaching 3.1 METs. Appropriate hemodynamic response observed.     Initial Fitness Assessment:   Submaximal TM ETT:  Resting:  BP: 124/74  HR: 73  Exercise:  BP: 136/74  HR: 85  ECG Summary: NSR w/ rare PVCs  Test terminated at:  RPE 6 and leg fatigue      Dx:   Encounter Diagnosis   Name Primary?    S/P CABG x 3        Description of Diagnosis: CORONARY ARTERY BYPASS GRAFT (CABG) X3 VESSELS, LIMA - LAD, LEFT LEG EVH/SVG - PDA AND OM, W/CHAPO   Date of onset: 2025      ASSESSMENT    Medical History:   Past Medical History[1]    Family History:  Family History[2]    Allergies:   Losartan and Penicillins    Current Medications:   Current Medications[3]    Medication compliance:  Pt  "reports to be compliant with medications    Physical Limitations: no reported limitations    Fall Risk: Low   Comments: Ambulates with a steady gait with no assist device      CARDIAC RISK FACTOR MODIFICATION:  Cardiac risk factor modification, including education, counseling, and   behavioral intervention will be provided tailored to the patients' needs.    Cholesterol: Yes  HTN: Yes  DM: Type 2   Obesity: No   Inactivity: Yes      EXERCISE ASSESSMENT:      SMART Exercise Goals:   improvement of 0.5 to 1.0 MET in the fitness assessment  improved DASI score by 10%  increased peak METs tolerated in rehab exercise session  attend rehab regularly  maintain > 150 minutes per week of moderate intensity exercise    Patient Specific EXERCISE GOALS:       Return to previous functional status  Establish regular exercise routine he will be able to maintain following rehab    Functional Capacity Screening Tool:  [facility] Activity Status Index:  6.27 METs    NUTRITION ASSESSMENT:    Initial Weight:  160.4 lbs  Current Weight:     Height:   Ht Readings from Last 1 Encounters:   06/23/25 5' 8\" (1.727 m)       Rate Your Plate Score: 49/81    Diabetes: T2D  A1c: 5.6    last measured: 5/13/2025    Lipid management: Last lipid profile 5/13/2025  Chol 154    HDL 56  LDL 77    Current Dietary Habits:  Reports limiting sodium intake but not using salt shaker at table and reducing intake of canned foods.    SMART Nutrition Goals:   eat 6 or more servings of grain products per day, eat 2 or more servings of low fat milk or yogurt a day, eat meatless meals twice a week or more, rarely eat cheese or choose reduced fat or skim, choose light or fat-free salad dressings or paz, and drink less than 8oz of soda and sweetened drinks per day    Patient Specific NUTRITION GOALS:     1. Reduce sodium intake   2. Receive education on heart healthy eating and label reading    Drug/Alcohol Use:   N/A      PSYCHOSOCIAL ASSESSMENT:    Date of last " Assessment:  6/24/2025  Depression screening:  PHQ-9 = 5    Interpretation:  5-9 = Mild Depression  Anxiety screening:  CECI-7 = 2    Interpretation: 0-4  = Not anxious    Pt self-report of depression and anxiety   Patient reports they are coping well with good social support and denies depression or anxiety    Self-reported stress level:  3   Stressors:  current health condition  Stress Management Tools: keep a positive mindset  spend time with family    SMART Psychosocial Goals:     Physical Fitness in [facility] Score < 3  Overall Health in [facility] Score < 3  Change in Health in [facility] Score < 3   improved sleeping habits    Patient Specific PSYCHOCOSOCIAL GOALS:    Return to previous functional status to help improve overall sense of well being    Quality of Life Screen:  (Higher score indicates disease impact on QOL)  [facility] COOP score: 19/45     Social Support:   children  Community/Social Activities: spending time with family     Psychosocial Assessment as it relates to rehabilitation:   Patient denies issues with his/her family or home life that may affect their rehabilitation efforts.       OTHER CORE COMPONENT ASSESSMENT:    Tobacco Use:     N/A: Pt has a remote history of smoking    Anginal Symptoms:  None   NTG use: No prescription    SMART Goals:   consistent, controlled resting BP < 130/80 and medication compliance    Patient Specific CORE COMPONENT GOALS:    Continue to monitor BP at home and report any abnormal changes  Continue to weigh self daily at home and report any abnormal changes      INDIVIDUALIZED TREATMENT PLAN      EXERCISE GOALS and PLAN      Progress toward Exercise goals:   Reviewed Pt goals and determined plan of care, Will continue to educate and progress as tolerated.    Exercise Plan:    patient will attend rehab 2-3 times per week to complete 36 exercise sessions  progress workloads as tolerated to maintain RPE 4-6/10  patient will add home exercise 30-45 mins 1-2 days to  supplement cardiac rehab  introduce resistance training in rehab session  patient will add home walking increasing distance as tolerated  class: Risk Factors for Heart Disease  Patient education handout:   Exercise After Cardiac Rehabilitation    The patient was counseled on exercise guidelines to achieve a minimum of 150 mins/wk of moderate intensity (RPE 4-6)   exercise and encouraged to add 1-2 days of exercise on opposite days of cardiac rehab as tolerated.       PHYSICIAN PRESCRIBED EXERCISE:    Current Aerobic Exercise Prescription:      Frequency: 3 days/week   Supplement with home exercise 2+ days/wk as tolerated       Minutes: 20 - 40         METS: 2.0-2.5            HR: RHR +30-40bpm   RPE: 4-6         Modalities: Treadmill, UBE, NuStep, and Recumbent bike     Exercise workloads will be progressed gradually as tolerated, within limits of patient's ability, and according to the patient's   response to the exercise program.      Aerobic Exercise Prescription Plan for Progression   Frequency: 3 days/week of cardiac rehab       Supplement with home exercise 2+ days/wk as tolerated    Minutes: 40       >150 mins/wk of moderate intensity exercise   METS: 2.3-2.8   HR: RHR +30-40bpm     RPE: 4-6   Modalities: Treadmill, UBE, NuStep, and Recumbent bike    Strength trainin-3 days / week  12-15 repetitions  1-2 sets per modality   Will be added following at least 8 weeks post surgery and 8-10 monitored sessions   Modalities: Pull Downs, Lateral Raise, Arm Extension, Arm Curl, and Sit to Stands    Home Exercise: none    Exercise Education: benefit of exercise for CAD risk factors, home exercise guidelines, AHA guidelines to achieve >150 mins/wk of moderate exercise, and RPE scale     Readiness to change: Preparation:  (Getting ready to change)       NUTRITION GOALS AND PLAN      Nutritional   Reviewed patient's Rate your Plate. Discussed key elements of heart healthy eating. Reviewed patient goals for dietary  modifications and their clinical implications.  Reviewed most recent lipid profile.     Patient's progress toward Nutrition goals:    Reviewed Pt goals and determined plan of care, Will continue to educate and progress as tolerated.      Nutrition Plan:   group class: Reading Food Labels  group Class: Heart Healthy Eating    Measurable goals were based Rate Your Plate Dietary Self-Assessment. These are the areas in which the patient could score higher on the assessment.  Goals include recommendations for a heart healthy diet based on American Heart Association.    Nutrition Education:   low sodium diet  maintaining hydration  healthy choices while dining out  portion control    Readiness to change: Preparation:  (Getting ready to change)       PSYCHOSOCIAL GOALS AND PLAN    Psychosocial Assessment as it relates to rehabilitation:   Patient denies issues with his/her family or home life that may affect their rehabilitation efforts.     Patient's progress toward Psychosocial goals:    Reviewed Pt goals and determined plan of care, Will continue to educate and progress as tolerated.    Psychosocial Plan:   Class: Stress and Your Health, Class: Relaxation, and enjoy family    Psychosocial Education: signs and symptoms of depression  benefits of a positive support system  stress management techniques  benefits of enrolling in [facility]  depression and heart disease    Information to utilize [employer] was provided as well as contact information for counseling through  Behavioral Health and group psychotherapy groups available.    Readiness to change: Preparation:  (Getting ready to change)       OTHER CORE COMPONENTS GOALS and PLAN      Blood Pressure will be monitored throughout the program and cardiologist will be notified of elevated trends.    Pt will be encouraged to monitor home BP if advised by cardiologist.    Tobacco Plan:   N/A:  Pt is a non-smoker    Progress toward Core Component goals:   Reviewed Pt  goals and determined plan of care, Will continue to educate and progress as tolerated.    Other Core Components Plan:   monitor home BP  medication compliance  monitor daily body weight  follow prescribed sodium restriction  check labels for sodium content  eliminate salt shaker at the table  use salt substitutes  avoid processed foods  class: Understanding Heart Disease  class: Common Heart Medications    Group and Individual Education:  definition of  hypertension, components of blood pressure management, maintaining hydration for blood pressure control, low sodium diet and heart failure, reinforce measurement of daily body weight and reporting to the cardiologist an increase of more than 3lbs in a day or 5lbs in a week., and review of recent lipid profile and implications for disease risk     Readiness to change: Preparation:  (Getting ready to change)        [1]   Past Medical History:  Diagnosis Date    Bladder tumor     Cancer (HCC)     Prostate cancer    Depression 5/14/2025    Diabetes mellitus (HCC)     Diverticulitis of colon     History of common carotid artery stent placement     RIGHT stent - per pts son - RIGHT stent  in  carotid artery    History of heart artery stent     x2 stents in heart per pts son    Hyperlipidemia     Hypertension     Migraines     Myocardial infarction (HCC)     about 30yrs ago    Nephrostomy present (HCC)     2/25/25 Per pts son Ed- pt has two nephrostomy tubes    Prostate cancer (HCC)     Seasonal allergies    [2]   Family History  Problem Relation Name Age of Onset    Alcohol abuse Mother Ale [place] Voce     No Known Problems Father      No Known Problems Sister      Substance Abuse Son Stewart [place] Voce     Heart attack Family          MI    Breast cancer Family      Colon cancer Neg Hx      Colon polyps Neg Hx      Anesthesia problems Neg Hx     [3]   Current Outpatient Medications   Medication Sig Dispense Refill    acetaminophen (TYLENOL) 325 mg tablet Take 2 tablets  (650 mg total) by mouth every 6 (six) hours as needed for mild pain      ascorbic acid (VITAMIN C) 500 mg tablet Take 500 mg by mouth in the morning.      aspirin 325 mg tablet Take 1 tablet (325 mg total) by mouth daily      atorvastatin (LIPITOR) 80 mg tablet Take 1 tablet (80 mg total) by mouth daily with dinner 90 tablet 2    B Complex Vitamins (VITAMIN B COMPLEX PO) Take 1 tablet by mouth in the morning.      chlorhexidine (PERIDEX) 0.12 % solution Apply 15 mL to the mouth or throat 2 (two) times a day      Cholecalciferol 1000 units CHEW Chew 1 tablet in the morning.      docusate sodium (COLACE) 100 mg capsule Take 1 capsule (100 mg total) by mouth 2 (two) times a day      furosemide (LASIX) 20 mg tablet Take 1 tablet (20 mg total) by mouth daily 30 tablet 5    metFORMIN (GLUCOPHAGE-XR) 500 mg 24 hr tablet Take 1 tablet by mouth once daily 90 tablet 3    metoprolol succinate (TOPROL-XL) 50 mg 24 hr tablet Take 1 tablet (50 mg total) by mouth daily 30 tablet 5    pantoprazole (PROTONIX) 40 mg tablet Take 1 tablet (40 mg total) by mouth daily      polyethylene glycol (MIRALAX) 17 g packet Take 17 g by mouth daily (Patient not taking: Reported on 6/23/2025)       No current facility-administered medications for this visit.

## 2025-06-25 ENCOUNTER — OFFICE VISIT (OUTPATIENT)
Dept: UROLOGY | Facility: MEDICAL CENTER | Age: 85
End: 2025-06-25
Payer: MEDICARE

## 2025-06-25 VITALS
BODY MASS INDEX: 24.31 KG/M2 | HEIGHT: 68 IN | HEART RATE: 66 BPM | OXYGEN SATURATION: 97 % | DIASTOLIC BLOOD PRESSURE: 72 MMHG | SYSTOLIC BLOOD PRESSURE: 118 MMHG | WEIGHT: 160.4 LBS

## 2025-06-25 DIAGNOSIS — N13.39 OTHER HYDRONEPHROSIS: ICD-10-CM

## 2025-06-25 DIAGNOSIS — C61 PROSTATE CANCER (HCC): Primary | ICD-10-CM

## 2025-06-25 PROCEDURE — 99214 OFFICE O/P EST MOD 30 MIN: CPT | Performed by: UROLOGY

## 2025-06-25 NOTE — PROGRESS NOTES
Name: Kilo Mix      : 1940      MRN: [id number]  Encounter Provider: Jordan Youssef MD  Encounter Date: 2025   Encounter department: [facility]  :  Assessment & Plan  Prostate cancer (HCC)  Metastatic prostate cancer with local invasion of bladder s/p TURBT x 2    on 12/10/2024  Started Lupron at infusion center on 2025  Most recent PSA 0.21 on 4/15/2025  Got most recent Lupron injection at the infusion center on 2025  Has discontinued Xtandi due to dizziness since 2025  - continue on ADT with oncology       Other hydronephrosis  Bilateral hydronephrosis secondary to prostate cancer invasion of bladder s/p bilateral PCN    Antegrade nephrostogram in 2025 showed complete occlusion of left UVJ and stenotic right UVJ that could potentially be bypassed with wire and catheter    Continue bilateral PCN for now given findings on antegrade nephrostogram    Discussed that major surgery would be required to create urinary diversion given complete occlusion of left ureter, and increased risk of stent failure with malignant extrinsic compression    Would not recommend this type of surgical intervention at this time              History of Present Illness   Kilo Mix is a 84 y.o. male who presents for follow up    Had CABG x 3 on 2025 after presenting to the ED with NSTEMI on 5/10/2025    Antegrade nephrostogram in 2025 showed complete occlusion of left UVJ and stenotic right UVJ that could potentially be bypassed with wire and catheter    Review of Systems   All other systems reviewed and are negative.    Past Medical History   Past Medical History[1]  Past Surgical History[2]  Family History[3]   reports that he quit smoking about 15 years ago. His smoking use included cigarettes. He started smoking about 25 years ago. He has a 30 pack-year smoking history. He has never used smokeless tobacco. He reports current alcohol use. He reports that he  "does not use drugs.  Current Outpatient Medications   Medication Instructions    acetaminophen (TYLENOL) 650 mg, Oral, Every 6 hours PRN    ascorbic acid (VITAMIN C) 500 mg, Daily    aspirin 325 mg, Oral, Daily    atorvastatin (LIPITOR) 80 mg, Oral, Daily with dinner    B Complex Vitamins (VITAMIN B COMPLEX PO) 1 tablet, Daily    BD PosiFlush 0.9 % SOLN FLUSH WITH 10 ML BY INTRACATHETER ONCE DAILY    chlorhexidine (PERIDEX) 0.12 % solution 15 mL, Mouth/Throat, 2 times daily    Cholecalciferol 1000 units CHEW 1 tablet, Daily    docusate sodium (COLACE) 100 mg, Oral, 2 times daily    furosemide (LASIX) 20 mg, Oral, Daily    metFORMIN (GLUCOPHAGE-XR) 500 mg 24 hr tablet Take 1 tablet by mouth once daily    metoprolol succinate (TOPROL-XL) 50 mg, Oral, Daily    pantoprazole (PROTONIX) 40 mg, Oral, Daily    polyethylene glycol (MIRALAX) 17 g, Oral, Daily   Allergies[4]      Objective   /72 (BP Location: Left arm, Patient Position: Sitting, Cuff Size: Adult)   Pulse 66   Ht 5' 8\" (1.727 m)   Wt 72.8 kg (160 lb 6.4 oz)   SpO2 97%   BMI 24.39 kg/m²     Physical Exam  Vitals and nursing note reviewed.   Constitutional:       General: He is not in acute distress.     Appearance: He is well-developed.   HENT:      Head: Normocephalic and atraumatic.     Eyes:      Conjunctiva/sclera: Conjunctivae normal.       Cardiovascular:      Rate and Rhythm: Normal rate and regular rhythm.      Heart sounds: No murmur heard.  Pulmonary:      Effort: Pulmonary effort is normal. No respiratory distress.      Breath sounds: Normal breath sounds.   Abdominal:      Palpations: Abdomen is soft.      Tenderness: There is no abdominal tenderness.     Musculoskeletal:         General: No swelling.      Cervical back: Neck supple.     Skin:     General: Skin is warm and dry.      Capillary Refill: Capillary refill takes less than 2 seconds.     Neurological:      General: No focal deficit present.      Mental Status: He is alert and " oriented to person, place, and time.     Psychiatric:         Mood and Affect: Mood normal.          Results   Lab Results   Component Value Date    PSA 0.210 04/15/2025    .880 (H) 12/10/2024     Lab Results   Component Value Date    GLUCOSE 166 (H) 05/21/2025    CALCIUM 9.2 05/28/2025     08/03/2015    K 4.6 05/28/2025    CO2 30 05/28/2025     05/28/2025    BUN 27 (H) 05/28/2025    CREATININE 0.97 05/28/2025     Lab Results   Component Value Date    WBC 11.16 (H) 05/28/2025    HGB 9.4 (L) 05/28/2025    HCT 29.5 (L) 05/28/2025     (H) 05/28/2025     05/28/2025       Office Urine Dip  No results found for this or any previous visit (from the past hour).               [1]   Past Medical History:  Diagnosis Date    Bladder tumor     Cancer (HCC)     Prostate cancer    Depression 5/14/2025    Diabetes mellitus (HCC)     Diverticulitis of colon     History of common carotid artery stent placement     RIGHT stent - per pts son - RIGHT stent  in  carotid artery    History of heart artery stent     x2 stents in heart per pts son    Hyperlipidemia     Hypertension     Migraines     Myocardial infarction (HCC)     about 30yrs ago    Nephrostomy present (HCC)     2/25/25 Per pts son Ed- pt has two nephrostomy tubes    Prostate cancer (HCC)     Seasonal allergies    [2]   Past Surgical History:  Procedure Laterality Date    CARDIAC CATHETERIZATION N/A 5/12/2025    Procedure: Cardiac Catheterization;  Surgeon: Daniel Mabry DO;  Location: [facility];  Service: Cardiology    CARDIAC SURGERY      CAROTID ENDARTARECTOMY Right     thromboendarterectomy    CAROTID STENT Right     CATARACT EXTRACTION Left     COLONOSCOPY      fiberoptic screening 2/6/08 6 yr / complete 3/15/13 6 yr    CORONARY ANGIOPLASTY WITH STENT PLACEMENT      ELBOW SURGERY      EYE SURGERY      IR BIOPSY LYMPH NODE  12/24/2024    IR NEPHROSTOMY TUBE CHECK/CHANGE/REPOSITION/REINSERTION/UPSIZE  2/25/2025    IR  NEPHROSTOMY TUBE CHECK/CHANGE/REPOSITION/REINSERTION/UPSIZE  4/29/2025    IR NEPHROSTOMY TUBE CHECK/CHANGE/REPOSITION/REINSERTION/UPSIZE  6/11/2025    IR NEPHROSTOMY TUBE PLACEMENT  12/24/2024    KNEE SURGERY      FL CORONARY ARTERY BYP W/VEIN & ARTERY GRAFT 3 VEIN N/A 5/21/2025    Procedure: CORONARY ARTERY BYPASS GRAFT (CABG) X3 VESSELS, LIMA - LAD, LEFT LEG EVH/SVG - PDA AND OM, W/CHAPO;  Surgeon: David Tomlin DO;  Location:  MAIN OR;  Service: Cardiac Surgery    FL CYSTO W/REMOVAL OF LESIONS SMALL N/A 1/21/2025    Procedure: TRANSURETHRAL RESECTION OF BLADDER TUMOR (TURBT);  Surgeon: Jordan Youssef MD;  Location:  MAIN OR;  Service: Urology    FL CYSTO W/REMOVAL OF LESIONS SMALL N/A 3/6/2025    Procedure: TRANSURETHRAL RESECTION OF BLADDER TUMOR (TURBT);  Surgeon: Jordan Youssef MD;  Location:  MAIN OR;  Service: Urology    PROSTATE SURGERY      SUPRAPUBIC PROSTATECTOMY  12/06/1999    UPPER GASTROINTESTINAL ENDOSCOPY     [3]   Family History  Problem Relation Name Age of Onset    Alcohol abuse Mother Ale LOAIZAShallotte Voce     No Known Problems Father      No Known Problems Sister      Substance Abuse Son Stewart CORONADO Vocsammy     Heart attack Family          MI    Breast cancer Family      Colon cancer Neg Hx      Colon polyps Neg Hx      Anesthesia problems Neg Hx     [4]   Allergies  Allergen Reactions    Losartan Syncope     Reaction Date: [id number];     Penicillins Other (See Comments)     As a child so does not recall reaction

## 2025-06-25 NOTE — ASSESSMENT & PLAN NOTE
Metastatic prostate cancer with local invasion of bladder s/p TURBT x 2    on 12/10/2024  Started Lupron at infusion center on 1/6/2025  Most recent PSA 0.21 on 4/15/2025  Got most recent Lupron injection at the infusion center on 6/23/2025  Has discontinued Xtandi due to dizziness since 4/2025  - continue on ADT with oncology

## 2025-06-25 NOTE — ASSESSMENT & PLAN NOTE
Bilateral hydronephrosis secondary to prostate cancer invasion of bladder s/p bilateral PCN    Antegrade nephrostogram in 4/2025 showed complete occlusion of left UVJ and stenotic right UVJ that could potentially be bypassed with wire and catheter    Continue bilateral PCN for now given findings on antegrade nephrostogram    Discussed that major surgery would be required to create urinary diversion given complete occlusion of left ureter, and increased risk of stent failure with malignant extrinsic compression    Would not recommend this type of surgical intervention at this time

## 2025-06-27 ENCOUNTER — CLINICAL SUPPORT (OUTPATIENT)
Dept: CARDIAC REHAB | Facility: HOSPITAL | Age: 85
End: 2025-06-27
Attending: PHYSICIAN ASSISTANT
Payer: MEDICARE

## 2025-06-27 DIAGNOSIS — Z95.1 S/P CABG X 3: Primary | ICD-10-CM

## 2025-06-27 PROCEDURE — 93798 PHYS/QHP OP CAR RHAB W/ECG: CPT

## 2025-06-30 ENCOUNTER — CLINICAL SUPPORT (OUTPATIENT)
Dept: CARDIAC REHAB | Facility: HOSPITAL | Age: 85
End: 2025-06-30
Attending: PHYSICIAN ASSISTANT
Payer: MEDICARE

## 2025-06-30 DIAGNOSIS — Z95.1 S/P CABG X 3: Primary | ICD-10-CM

## 2025-06-30 PROCEDURE — 93798 PHYS/QHP OP CAR RHAB W/ECG: CPT

## 2025-07-02 ENCOUNTER — APPOINTMENT (OUTPATIENT)
Dept: LAB | Facility: HOSPITAL | Age: 85
End: 2025-07-02
Payer: MEDICARE

## 2025-07-02 ENCOUNTER — CLINICAL SUPPORT (OUTPATIENT)
Dept: CARDIAC REHAB | Facility: HOSPITAL | Age: 85
End: 2025-07-02
Attending: PHYSICIAN ASSISTANT
Payer: MEDICARE

## 2025-07-02 DIAGNOSIS — C61 PROSTATE CANCER (HCC): ICD-10-CM

## 2025-07-02 DIAGNOSIS — Z95.1 S/P CABG X 3: Primary | ICD-10-CM

## 2025-07-02 LAB
ALBUMIN SERPL BCG-MCNC: 4.1 G/DL (ref 3.5–5)
ALP SERPL-CCNC: 90 U/L (ref 34–104)
ALT SERPL W P-5'-P-CCNC: 11 U/L (ref 7–52)
ANION GAP SERPL CALCULATED.3IONS-SCNC: 9 MMOL/L (ref 4–13)
AST SERPL W P-5'-P-CCNC: 16 U/L (ref 13–39)
BASOPHILS # BLD AUTO: 0.04 THOUSANDS/ÂΜL (ref 0–0.1)
BASOPHILS NFR BLD AUTO: 1 % (ref 0–1)
BILIRUB SERPL-MCNC: 0.47 MG/DL (ref 0.2–1)
BUN SERPL-MCNC: 19 MG/DL (ref 5–25)
CALCIUM SERPL-MCNC: 9.6 MG/DL (ref 8.4–10.2)
CHLORIDE SERPL-SCNC: 101 MMOL/L (ref 96–108)
CO2 SERPL-SCNC: 30 MMOL/L (ref 21–32)
CREAT SERPL-MCNC: 1.09 MG/DL (ref 0.6–1.3)
EOSINOPHIL # BLD AUTO: 0.22 THOUSAND/ÂΜL (ref 0–0.61)
EOSINOPHIL NFR BLD AUTO: 3 % (ref 0–6)
ERYTHROCYTE [DISTWIDTH] IN BLOOD BY AUTOMATED COUNT: 13.5 % (ref 11.6–15.1)
GFR SERPL CREATININE-BSD FRML MDRD: 62 ML/MIN/1.73SQ M
GLUCOSE P FAST SERPL-MCNC: 111 MG/DL (ref 65–99)
HCT VFR BLD AUTO: 37.6 % (ref 36.5–49.3)
HGB BLD-MCNC: 12 G/DL (ref 12–17)
IMM GRANULOCYTES # BLD AUTO: 0.02 THOUSAND/UL (ref 0–0.2)
IMM GRANULOCYTES NFR BLD AUTO: 0 % (ref 0–2)
LYMPHOCYTES # BLD AUTO: 1.19 THOUSANDS/ÂΜL (ref 0.6–4.47)
LYMPHOCYTES NFR BLD AUTO: 16 % (ref 14–44)
MCH RBC QN AUTO: 30.5 PG (ref 26.8–34.3)
MCHC RBC AUTO-ENTMCNC: 31.9 G/DL (ref 31.4–37.4)
MCV RBC AUTO: 96 FL (ref 82–98)
MONOCYTES # BLD AUTO: 0.47 THOUSAND/ÂΜL (ref 0.17–1.22)
MONOCYTES NFR BLD AUTO: 6 % (ref 4–12)
NEUTROPHILS # BLD AUTO: 5.53 THOUSANDS/ÂΜL (ref 1.85–7.62)
NEUTS SEG NFR BLD AUTO: 74 % (ref 43–75)
NRBC BLD AUTO-RTO: 0 /100 WBCS
PLATELET # BLD AUTO: 280 THOUSANDS/UL (ref 149–390)
PMV BLD AUTO: 9.7 FL (ref 8.9–12.7)
POTASSIUM SERPL-SCNC: 3.9 MMOL/L (ref 3.5–5.3)
PROT SERPL-MCNC: 6.6 G/DL (ref 6.4–8.4)
PSA SERPL-MCNC: 0.02 NG/ML (ref 0–4)
RBC # BLD AUTO: 3.93 MILLION/UL (ref 3.88–5.62)
SODIUM SERPL-SCNC: 140 MMOL/L (ref 135–147)
WBC # BLD AUTO: 7.47 THOUSAND/UL (ref 4.31–10.16)

## 2025-07-02 PROCEDURE — 80053 COMPREHEN METABOLIC PANEL: CPT

## 2025-07-02 PROCEDURE — 85025 COMPLETE CBC W/AUTO DIFF WBC: CPT

## 2025-07-02 PROCEDURE — 84153 ASSAY OF PSA TOTAL: CPT

## 2025-07-02 PROCEDURE — 36415 COLL VENOUS BLD VENIPUNCTURE: CPT

## 2025-07-02 PROCEDURE — 93798 PHYS/QHP OP CAR RHAB W/ECG: CPT

## 2025-07-07 ENCOUNTER — CLINICAL SUPPORT (OUTPATIENT)
Dept: CARDIAC REHAB | Facility: HOSPITAL | Age: 85
End: 2025-07-07
Attending: PHYSICIAN ASSISTANT
Payer: MEDICARE

## 2025-07-07 DIAGNOSIS — Z95.1 S/P CABG X 3: Primary | ICD-10-CM

## 2025-07-07 PROCEDURE — 93798 PHYS/QHP OP CAR RHAB W/ECG: CPT

## 2025-07-09 ENCOUNTER — CLINICAL SUPPORT (OUTPATIENT)
Dept: CARDIAC REHAB | Facility: HOSPITAL | Age: 85
End: 2025-07-09
Attending: PHYSICIAN ASSISTANT
Payer: MEDICARE

## 2025-07-09 DIAGNOSIS — Z95.1 S/P CABG X 3: Primary | ICD-10-CM

## 2025-07-09 PROCEDURE — 93798 PHYS/QHP OP CAR RHAB W/ECG: CPT

## 2025-07-10 ENCOUNTER — APPOINTMENT (EMERGENCY)
Dept: CT IMAGING | Facility: HOSPITAL | Age: 85
End: 2025-07-10
Payer: MEDICARE

## 2025-07-10 ENCOUNTER — HOSPITAL ENCOUNTER (EMERGENCY)
Facility: HOSPITAL | Age: 85
Discharge: HOME | End: 2025-07-10
Attending: EMERGENCY MEDICINE
Payer: MEDICARE

## 2025-07-10 VITALS
HEIGHT: 68 IN | BODY MASS INDEX: 23.79 KG/M2 | HEART RATE: 83 BPM | DIASTOLIC BLOOD PRESSURE: 62 MMHG | WEIGHT: 157 LBS | SYSTOLIC BLOOD PRESSURE: 127 MMHG | OXYGEN SATURATION: 98 % | RESPIRATION RATE: 18 BRPM | TEMPERATURE: 99.2 F

## 2025-07-10 DIAGNOSIS — N13.30 HYDRONEPHROSIS, BILATERAL: Primary | ICD-10-CM

## 2025-07-10 DIAGNOSIS — Z98.890 H/O INSERTION OF NEPHROSTOMY TUBE: ICD-10-CM

## 2025-07-10 DIAGNOSIS — N28.89 URETERITIS: ICD-10-CM

## 2025-07-10 DIAGNOSIS — N32.89 MASS OF URINARY BLADDER: ICD-10-CM

## 2025-07-10 DIAGNOSIS — R50.9 FEVER: ICD-10-CM

## 2025-07-10 DIAGNOSIS — N13.30 HYDRONEPHROSIS DUE TO OBSTRUCTIVE MALIGNANT NEOPLASM OF BLADDER (HCC): ICD-10-CM

## 2025-07-10 DIAGNOSIS — N39.0 UTI (URINARY TRACT INFECTION): Primary | ICD-10-CM

## 2025-07-10 DIAGNOSIS — C67.9 HYDRONEPHROSIS DUE TO OBSTRUCTIVE MALIGNANT NEOPLASM OF BLADDER (HCC): ICD-10-CM

## 2025-07-10 DIAGNOSIS — Z93.6 NEPHROSTOMY PRESENT (HCC): ICD-10-CM

## 2025-07-10 DIAGNOSIS — C67.0 MALIGNANT NEOPLASM OF TRIGONE OF URINARY BLADDER (HCC): ICD-10-CM

## 2025-07-10 LAB
ALBUMIN SERPL BCG-MCNC: 3.9 G/DL (ref 3.5–5)
ALP SERPL-CCNC: 80 U/L (ref 34–104)
ALT SERPL W P-5'-P-CCNC: 9 U/L (ref 7–52)
ANION GAP SERPL CALCULATED.3IONS-SCNC: 8 MMOL/L (ref 4–13)
AST SERPL W P-5'-P-CCNC: 14 U/L (ref 13–39)
BACTERIA UR QL AUTO: ABNORMAL /HPF
BACTERIA UR QL AUTO: ABNORMAL /HPF
BASOPHILS # BLD AUTO: 0.03 THOUSANDS/ÂΜL (ref 0–0.1)
BASOPHILS NFR BLD AUTO: 0 % (ref 0–1)
BILIRUB SERPL-MCNC: 0.57 MG/DL (ref 0.2–1)
BILIRUB UR QL STRIP: NEGATIVE
BILIRUB UR QL STRIP: NEGATIVE
BUN SERPL-MCNC: 21 MG/DL (ref 5–25)
CALCIUM SERPL-MCNC: 9.5 MG/DL (ref 8.4–10.2)
CHLORIDE SERPL-SCNC: 100 MMOL/L (ref 96–108)
CLARITY UR: ABNORMAL
CLARITY UR: ABNORMAL
CO2 SERPL-SCNC: 28 MMOL/L (ref 21–32)
COLOR UR: ABNORMAL
COLOR UR: YELLOW
CREAT SERPL-MCNC: 1.15 MG/DL (ref 0.6–1.3)
EOSINOPHIL # BLD AUTO: 0.07 THOUSAND/ÂΜL (ref 0–0.61)
EOSINOPHIL NFR BLD AUTO: 1 % (ref 0–6)
ERYTHROCYTE [DISTWIDTH] IN BLOOD BY AUTOMATED COUNT: 13.2 % (ref 11.6–15.1)
GFR SERPL CREATININE-BSD FRML MDRD: 58 ML/MIN/1.73SQ M
GLUCOSE SERPL-MCNC: 180 MG/DL (ref 65–140)
GLUCOSE UR STRIP-MCNC: NEGATIVE MG/DL
GLUCOSE UR STRIP-MCNC: NEGATIVE MG/DL
HCT VFR BLD AUTO: 36.6 % (ref 36.5–49.3)
HGB BLD-MCNC: 12 G/DL (ref 12–17)
HGB UR QL STRIP.AUTO: ABNORMAL
HGB UR QL STRIP.AUTO: ABNORMAL
IMM GRANULOCYTES # BLD AUTO: 0.05 THOUSAND/UL (ref 0–0.2)
IMM GRANULOCYTES NFR BLD AUTO: 0 % (ref 0–2)
KETONES UR STRIP-MCNC: NEGATIVE MG/DL
KETONES UR STRIP-MCNC: NEGATIVE MG/DL
LEUKOCYTE ESTERASE UR QL STRIP: ABNORMAL
LEUKOCYTE ESTERASE UR QL STRIP: ABNORMAL
LYMPHOCYTES # BLD AUTO: 0.76 THOUSANDS/ÂΜL (ref 0.6–4.47)
LYMPHOCYTES NFR BLD AUTO: 6 % (ref 14–44)
MCH RBC QN AUTO: 30.7 PG (ref 26.8–34.3)
MCHC RBC AUTO-ENTMCNC: 32.8 G/DL (ref 31.4–37.4)
MCV RBC AUTO: 94 FL (ref 82–98)
MONOCYTES # BLD AUTO: 0.67 THOUSAND/ÂΜL (ref 0.17–1.22)
MONOCYTES NFR BLD AUTO: 5 % (ref 4–12)
NEUTROPHILS # BLD AUTO: 10.85 THOUSANDS/ÂΜL (ref 1.85–7.62)
NEUTS SEG NFR BLD AUTO: 88 % (ref 43–75)
NITRITE UR QL STRIP: POSITIVE
NITRITE UR QL STRIP: POSITIVE
NON-SQ EPI CELLS URNS QL MICRO: ABNORMAL /HPF
NON-SQ EPI CELLS URNS QL MICRO: ABNORMAL /HPF
NRBC BLD AUTO-RTO: 0 /100 WBCS
PH UR STRIP.AUTO: 5.5 [PH]
PH UR STRIP.AUTO: 7.5 [PH]
PLATELET # BLD AUTO: 226 THOUSANDS/UL (ref 149–390)
PMV BLD AUTO: 9.6 FL (ref 8.9–12.7)
POTASSIUM SERPL-SCNC: 3.6 MMOL/L (ref 3.5–5.3)
PROT SERPL-MCNC: 6.7 G/DL (ref 6.4–8.4)
PROT UR STRIP-MCNC: ABNORMAL MG/DL
PROT UR STRIP-MCNC: ABNORMAL MG/DL
RBC # BLD AUTO: 3.91 MILLION/UL (ref 3.88–5.62)
RBC #/AREA URNS AUTO: ABNORMAL /HPF
RBC #/AREA URNS AUTO: ABNORMAL /HPF
SODIUM SERPL-SCNC: 136 MMOL/L (ref 135–147)
SP GR UR STRIP.AUTO: 1.01 (ref 1–1.03)
SP GR UR STRIP.AUTO: 1.01 (ref 1–1.03)
UROBILINOGEN UR STRIP-ACNC: <2 MG/DL
UROBILINOGEN UR STRIP-ACNC: <2 MG/DL
WBC # BLD AUTO: 12.43 THOUSAND/UL (ref 4.31–10.16)
WBC #/AREA URNS AUTO: ABNORMAL /HPF
WBC #/AREA URNS AUTO: ABNORMAL /HPF

## 2025-07-10 PROCEDURE — 87086 URINE CULTURE/COLONY COUNT: CPT

## 2025-07-10 PROCEDURE — 96375 TX/PRO/DX INJ NEW DRUG ADDON: CPT

## 2025-07-10 PROCEDURE — 99283 EMERGENCY DEPT VISIT LOW MDM: CPT

## 2025-07-10 PROCEDURE — 80053 COMPREHEN METABOLIC PANEL: CPT

## 2025-07-10 PROCEDURE — 99284 EMERGENCY DEPT VISIT MOD MDM: CPT

## 2025-07-10 PROCEDURE — 36415 COLL VENOUS BLD VENIPUNCTURE: CPT

## 2025-07-10 PROCEDURE — 87186 SC STD MICRODIL/AGAR DIL: CPT

## 2025-07-10 PROCEDURE — 81001 URINALYSIS AUTO W/SCOPE: CPT

## 2025-07-10 PROCEDURE — 85025 COMPLETE CBC W/AUTO DIFF WBC: CPT

## 2025-07-10 PROCEDURE — 87077 CULTURE AEROBIC IDENTIFY: CPT

## 2025-07-10 PROCEDURE — 96365 THER/PROPH/DIAG IV INF INIT: CPT

## 2025-07-10 PROCEDURE — 74177 CT ABD & PELVIS W/CONTRAST: CPT

## 2025-07-10 RX ORDER — FENTANYL CITRATE 50 UG/ML
50 INJECTION, SOLUTION INTRAMUSCULAR; INTRAVENOUS ONCE
Refills: 0 | Status: COMPLETED | OUTPATIENT
Start: 2025-07-10 | End: 2025-07-10

## 2025-07-10 RX ORDER — CEFTRIAXONE 1 G/50ML
1000 INJECTION, SOLUTION INTRAVENOUS ONCE
Status: COMPLETED | OUTPATIENT
Start: 2025-07-10 | End: 2025-07-10

## 2025-07-10 RX ORDER — OXYCODONE HYDROCHLORIDE 5 MG/1
2.5 TABLET ORAL EVERY 6 HOURS PRN
Qty: 10 TABLET | Refills: 0 | Status: SHIPPED | OUTPATIENT
Start: 2025-07-10 | End: 2025-07-20

## 2025-07-10 RX ORDER — CEFPODOXIME PROXETIL 100 MG/1
100 TABLET, FILM COATED ORAL 2 TIMES DAILY
Qty: 20 TABLET | Refills: 0 | Status: SHIPPED | OUTPATIENT
Start: 2025-07-10 | End: 2025-07-14 | Stop reason: ALTCHOICE

## 2025-07-10 RX ORDER — SODIUM CHLORIDE 9 MG/ML
10 INJECTION, SOLUTION INTRAMUSCULAR; INTRAVENOUS; SUBCUTANEOUS DAILY
Qty: 300 ML | Refills: 5 | Status: SHIPPED | OUTPATIENT
Start: 2025-07-10 | End: 2026-01-06

## 2025-07-10 RX ADMIN — IOHEXOL 100 ML: 350 INJECTION, SOLUTION INTRAVENOUS at 21:06

## 2025-07-10 RX ADMIN — CEFTRIAXONE 1000 MG: 1 INJECTION, SOLUTION INTRAVENOUS at 21:13

## 2025-07-10 RX ADMIN — FENTANYL CITRATE 50 MCG: 50 INJECTION INTRAMUSCULAR; INTRAVENOUS at 20:45

## 2025-07-11 ENCOUNTER — APPOINTMENT (OUTPATIENT)
Dept: CARDIAC REHAB | Facility: HOSPITAL | Age: 85
End: 2025-07-11
Attending: PHYSICIAN ASSISTANT
Payer: MEDICARE

## 2025-07-11 ENCOUNTER — VBI (OUTPATIENT)
Dept: FAMILY MEDICINE CLINIC | Facility: HOSPITAL | Age: 85
End: 2025-07-11

## 2025-07-11 ENCOUNTER — HOSPITAL ENCOUNTER (OUTPATIENT)
Dept: INTERVENTIONAL RADIOLOGY/VASCULAR | Facility: HOSPITAL | Age: 85
Discharge: HOME | End: 2025-07-11
Payer: MEDICARE

## 2025-07-11 DIAGNOSIS — N13.30 HYDRONEPHROSIS, BILATERAL: ICD-10-CM

## 2025-07-11 DIAGNOSIS — Z93.6 NEPHROSTOMY PRESENT (HCC): ICD-10-CM

## 2025-07-11 DIAGNOSIS — C67.0 MALIGNANT NEOPLASM OF TRIGONE OF URINARY BLADDER (HCC): ICD-10-CM

## 2025-07-11 DIAGNOSIS — N32.89 MASS OF URINARY BLADDER: ICD-10-CM

## 2025-07-11 DIAGNOSIS — C67.9 HYDRONEPHROSIS DUE TO OBSTRUCTIVE MALIGNANT NEOPLASM OF BLADDER (HCC): ICD-10-CM

## 2025-07-11 DIAGNOSIS — N13.30 HYDRONEPHROSIS DUE TO OBSTRUCTIVE MALIGNANT NEOPLASM OF BLADDER (HCC): ICD-10-CM

## 2025-07-11 RX ORDER — LIDOCAINE HYDROCHLORIDE 10 MG/ML
INJECTION, SOLUTION EPIDURAL; INFILTRATION; INTRACAUDAL; PERINEURAL AS NEEDED
Status: COMPLETED | OUTPATIENT
Start: 2025-07-11 | End: 2025-07-11

## 2025-07-11 RX ADMIN — LIDOCAINE HYDROCHLORIDE 4 ML: 10 INJECTION, SOLUTION EPIDURAL; INFILTRATION; INTRACAUDAL at 13:31

## 2025-07-11 NOTE — TELEPHONE ENCOUNTER
07/11/25 10:12 AM    Patient contacted post ED visit, outreach attempt made but message could not be left. Additional outreach attempt will be made.     Thank you.  Claudia Hinson MA  [facility]

## 2025-07-11 NOTE — ED PROVIDER NOTES
Time reflects when diagnosis was documented in both MDM as applicable and the Disposition within this note       Time User Action Codes Description Comment    7/10/2025  9:18 PM Júnior Tolliver [N39.0] UTI (urinary tract infection)     7/10/2025  9:18 PM Júnior Tolliver [R50.9] Fever     7/10/2025  9:18 PM Júnior Tolliver [Z98.890] H/O insertion of nephrostomy tube           ED Disposition       None          Assessment & Plan   {Hyperlinks  Risk Stratification - NIHSS - HEART SCORE - Fill out sepsis note and make sure you call [id number] if severe or septic shock:[id number]}    Medical Decision Making  Amount and/or Complexity of Data Reviewed  Labs: ordered. Decision-making details documented in ED Course.  Radiology: ordered.    Risk  Prescription drug management.        ED Course as of 07/10/25 2118   Thu Jul 10, 2025   2051 Creatinine: 1.15   2051 GFR, Calculated: 58   2051 Total Bilirubin: 0.57   2051 GLUCOSE(!): 180   2051 Sodium: 136   2051 Potassium: 3.6   2051 Chloride: 100   2051 Carbon Dioxide: 28   2107 Nitrite, UA(!): Positive   2107 Leukocytes, UA(!): Large   2109 Per previous cultures, will give ceftriaxone   2111 Bacteria, UA(!): Innumerable       Medications   cefTRIAXone (ROCEPHIN) IVPB (premix in dextrose) 1,000 mg 50 mL (1,000 mg Intravenous New Bag 7/10/25 2113)   fentaNYL injection 50 mcg (50 mcg Intravenous Given 7/10/25 2045)   iohexol (OMNIPAQUE) 350 MG/ML injection (MULTI-DOSE) 100 mL (100 mL Intravenous Given 7/10/25 2106)       ED Risk Strat Scores                    (ISAR) Identification of Seniors at Risk  Before the illness or injury that brought you to the Emergency, did you need someone to help you on a regular basis?: 0  In the last 24 hours, have you needed more help than usual?: 0  Have you been hospitalized for one or more nights during the past 6 months?: 0  In general, do you see well?: 0  In general, do you have serious problems with your memory?: 0  Do you take more than  three different medications every day?: 0  ISAR Score: 0            SBIRT 20yo+      Flowsheet Row Most Recent Value   Initial Alcohol Screen: US AUDIT-C     1. How often do you have a drink containing alcohol? 0 Filed at: 07/10/2025 2020   2. How many drinks containing alcohol do you have on a typical day you are drinking?  0 Filed at: 07/10/2025 2020   3a. Male UNDER 65: How often do you have five or more drinks on one occasion? 0 Filed at: 07/10/2025 2020   3b. FEMALE Any Age, or MALE 65+: How often do you have 4 or more drinks on one occassion? 0 Filed at: 07/10/2025 2020   Audit-C Score 0 Filed at: 07/10/2025 2020   SANGEETA: How many times in the past year have you...    Used an illegal drug or used a prescription medication for non-medical reasons? Never Filed at: 07/10/2025 2020                            History of Present Illness   {Hyperlinks  History (Med, Surg, Fam, Social) - Current Medications - Allergies  :[id number]}    Chief Complaint   Patient presents with    Medical Problem     Bilateral nephrostomy tubes.  Recent left tube replacement.  Started with fevers and fatigue today       Past Medical History[1]   Past Surgical History[2]   Family History[3]   Social History[4]   E-Cigarette/Vaping    E-Cigarette Use Never User     Comments Denies any use per pt       E-Cigarette/Vaping Substances    Nicotine No     THC No     CBD No     Flavoring No       I have reviewed and agree with the history as documented.     This is a 84 y.o. male presenting today with concerns of fever, left nephrostomy tube insertion site pain, possible UTI. Fever today. Recent history of CABG. Has these nephrostomy tubes bilaterally because of obstructive uropathy, secondary to obstructive mass.     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)     Diabetic polyneuropathy (HCC)      Diastolic dysfunction     Dyslipidemia     Recurrent stenosis of right carotid artery     Hypertension     Lower back pain     Peripheral vascular disease (HCC)     Sinus bradycardia     Asymptomatic stenosis of left carotid artery     Prostate cancer (HCC)     Plantar fasciitis of right foot     Personal history of colonic polyps     Schatzki's ring     Dysphagia     Antiplatelet or antithrombotic long-term use     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HCC)     Primary osteoarthritis of left shoulder     Traumatic complete tear of right rotator cuff     AMD (age related macular degeneration)     Hydronephrosis     Acute kidney injury (HCC)     CKD stage 3 due to type 2 diabetes mellitus ([facility])     NSTEMI (non-ST elevated myocardial infarction) ([facility])     H/O insertion of nephrostomy tube     Preoperative clearance     ALPHONSO (obstructive sleep apnea)     Former smoker     At risk for delirium     Depression     Frailty     Cognitive impairment     Hx of acute poliomyelitis     Restrictive lung disease     Moderate protein-calorie malnutrition (HCC)     S/P CABG x 3     RBBB     Leukocytosis      Allergies include:    -- Losartan -- Syncope    --  Reaction Date: 07Jun2011;   -- Penicillins -- Other (See Comments)    --  As a child so does not recall reaction            Review of Systems        Objective   {Hyperlinks  Historical Vitals - Historical Labs - Chart Review/Microbiology - Last Echo - Code Status  :[id number]}    ED Triage Vitals   Temperature Pulse Blood Pressure Respirations SpO2 Patient Position - Orthostatic VS   07/10/25 2004 07/10/25 2004 07/10/25 2004 07/10/25 2004 07/10/25 2004 07/10/25 2004   99.2 °F (37.3 °C) 83 146/65 18 97 % Sitting      Temp Source Heart Rate Source BP Location FiO2 (%) Pain Score    07/10/25 2004 07/10/25 2004 07/10/25 2004 -- 07/10/25 2045    Oral Monitor Right arm  5      Vitals      Date and Time Temp Pulse SpO2 Resp BP Pain Score FACES Pain Rating User    07/10/25 2115 -- 82 96 % 17 145/70 -- -- SS   07/10/25 2045 -- 84 96 % -- 106/46 5 -- SS   07/10/25 2004 99.2 °F (37.3 °C) 83 97 % 18 146/65 -- -- SV            Physical Exam    Results Reviewed       Procedure Component Value Units Date/Time    Urine Microscopic [[id number]]  (Abnormal) Collected: 07/10/25 2051    Lab Status: Final result Specimen: Urine, Left Nephrostomy Updated: 07/10/25 2111     RBC, UA 2-4 /hpf      WBC, UA 20-30 /hpf      Epithelial Cells Occasional /hpf      Bacteria, UA Innumerable /hpf     Urine culture [[id number]] Collected: 07/10/25 2051    Lab Status: In process Specimen: Urine, Left Nephrostomy Updated: 07/10/25 2110    Urine Microscopic [[id number]]  (Abnormal) Collected: 07/10/25 2031    Lab Status: Final result Specimen: Urine, Right Nephrostomy Updated: 07/10/25 2110     RBC, UA 1-2 /hpf      WBC, UA 10-20 /hpf      Epithelial Cells Occasional /hpf      Bacteria, UA Innumerable /hpf     Urine culture [[id number]] Collected: 07/10/25 2031    Lab Status: In process Specimen: Urine, Right Nephrostomy Updated: 07/10/25 2110    UA w Reflex to Microscopic w Reflex to Culture [[id number]]  (Abnormal) Collected: 07/10/25 2051    Lab Status: Final result Specimen: Urine, Left Nephrostomy Updated: 07/10/25 2056     Color, UA Yellow     Clarity, UA Hazy     Specific Gravity, UA 1.015     pH, UA 5.5     Leukocytes, UA Large     Nitrite, UA Positive     Protein, UA 30 (1+) mg/dl      Glucose, UA Negative mg/dl      Ketones, UA Negative mg/dl      Urobilinogen, UA <2.0 mg/dl      Bilirubin, UA Negative     Occult Blood, UA Moderate    UA w Reflex to Microscopic w Reflex to Culture [[id number]]  (Abnormal) Collected: 07/10/25 2031    Lab Status: Final result Specimen: Urine, Right Nephrostomy Updated: 07/10/25 2055     Color, UA Light Yellow     Clarity, UA Slightly Cloudy     Specific Gravity, UA 1.010     pH, UA 7.5     Leukocytes, UA Large     Nitrite, UA Positive     Protein,  (2+) mg/dl       Glucose, UA Negative mg/dl      Ketones, UA Negative mg/dl      Urobilinogen, UA <2.0 mg/dl      Bilirubin, UA Negative     Occult Blood, UA Small    Comprehensive metabolic panel [[id number]]  (Abnormal) Collected: 07/10/25 2031    Lab Status: Final result Specimen: Blood from Arm, Right Updated: 07/10/25 2050     Sodium 136 mmol/L      Potassium 3.6 mmol/L      Chloride 100 mmol/L      CO2 28 mmol/L      ANION GAP 8 mmol/L      BUN 21 mg/dL      Creatinine 1.15 mg/dL      Glucose 180 mg/dL      Calcium 9.5 mg/dL      AST 14 U/L      ALT 9 U/L      Alkaline Phosphatase 80 U/L      Total Protein 6.7 g/dL      Albumin 3.9 g/dL      Total Bilirubin 0.57 mg/dL      eGFR 58 ml/min/1.73sq m     Narrative:      National Kid[employer] Foundation guidelines for Chronic Kidney Disease (CKD):     Stage 1 with normal or high GFR (GFR > 90 mL/min/1.73 square meters)    Stage 2 Mild CKD (GFR = 60-89 mL/min/1.73 square meters)    Stage 3A Moderate CKD (GFR = 45-59 mL/min/1.73 square meters)    Stage 3B Moderate CKD (GFR = 30-44 mL/min/1.73 square meters)    Stage 4 Severe CKD (GFR = 15-29 mL/min/1.73 square meters)    Stage 5 End Stage CKD (GFR <15 mL/min/1.73 square meters)  Note: GFR calculation is accurate only with a steady state creatinine    CBC and differential [[id number]]  (Abnormal) Collected: 07/10/25 2031    Lab Status: Final result Specimen: Blood from Arm, Right Updated: 07/10/25 2037     WBC 12.43 Thousand/uL      RBC 3.91 Million/uL      Hemoglobin 12.0 g/dL      Hematocrit 36.6 %      MCV 94 fL      MCH 30.7 pg      MCHC 32.8 g/dL      RDW 13.2 %      MPV 9.6 fL      Platelets 226 Thousands/uL      nRBC 0 /100 WBCs      Segmented % 88 %      Immature Grans % 0 %      Lymphocytes % 6 %      Monocytes % 5 %      Eosinophils Relative 1 %      Basophils Relative 0 %      Absolute Neutrophils 10.85 Thousands/µL      Absolute Immature Grans 0.05 Thousand/uL      Absolute Lymphocytes 0.76 Thousands/µL      Absolute  Monocytes 0.67 Thousand/µL      Eosinophils Absolute 0.07 Thousand/µL      Basophils Absolute 0.03 Thousands/µL             CT abdomen pelvis with contrast    (Results Pending)       Procedures    ED Medication and Procedure Management   Prior to Admission Medications   Prescriptions Last Dose Informant Patient Reported? Taking?   B Complex Vitamins (VITAMIN B COMPLEX PO)  Self Yes No   Sig: Take 1 tablet by mouth in the morning.   BD PosiFlush 0.9 % SOLN   Yes No   Sig: FLUSH WITH 10 ML BY INTRACATHETER ONCE DAILY   Cholecalciferol 1000 units CHEW  Self Yes No   Sig: Chew 1 tablet in the morning.   acetaminophen (TYLENOL) 325 mg tablet  Self No No   Sig: Take 2 tablets (650 mg total) by mouth every 6 (six) hours as needed for mild pain   ascorbic acid (VITAMIN C) 500 mg tablet  Self Yes No   Sig: Take 500 mg by mouth in the morning.   aspirin 325 mg tablet  Self No No   Sig: Take 1 tablet (325 mg total) by mouth daily   atorvastatin (LIPITOR) 80 mg tablet  Self No No   Sig: Take 1 tablet (80 mg total) by mouth daily with dinner   chlorhexidine (PERIDEX) 0.12 % solution  Self No No   Sig: Apply 15 mL to the mouth or throat 2 (two) times a day   docusate sodium (COLACE) 100 mg capsule  Self No No   Sig: Take 1 capsule (100 mg total) by mouth 2 (two) times a day   furosemide (LASIX) 20 mg tablet  Self No No   Sig: Take 1 tablet (20 mg total) by mouth daily   metFORMIN (GLUCOPHAGE-XR) 500 mg 24 hr tablet  Self No No   Sig: Take 1 tablet by mouth once daily   metoprolol succinate (TOPROL-XL) 50 mg 24 hr tablet  Self No No   Sig: Take 1 tablet (50 mg total) by mouth daily   pantoprazole (PROTONIX) 40 mg tablet  Self No No   Sig: Take 1 tablet (40 mg total) by mouth daily   polyethylene glycol (MIRALAX) 17 g packet  Self No No   Sig: Take 17 g by mouth daily   Patient not taking: Reported on 6/23/2025   sodium chloride, PF, 0.9 %   No No   Sig: 10 mL by Intracatheter route daily for 180 doses      Facility-Administered  Medications: None     Patient's Medications   Discharge Prescriptions    No medications on file     No discharge procedures on file.  ED SEPSIS DOCUMENTATION   Time reflects when diagnosis was documented in both MDM as applicable and the Disposition within this note       Time User Action Codes Description Comment    7/10/2025  9:18 PM Júnior Tolliver [N39.0] UTI (urinary tract infection)     7/10/2025  9:18 PM Júnior Tolliver [R50.9] Fever     7/10/2025  9:18 PM Júnior Tolliver [Z98.890] H/O insertion of nephrostomy tube                    [1]   Past Medical History:  Diagnosis Date    Bladder tumor     Cancer (HCC)     Prostate cancer    Depression 5/14/2025    Diabetes mellitus (HCC)     Diverticulitis of colon     History of common carotid artery stent placement     RIGHT stent - per pts son - RIGHT stent  in  carotid artery    History of heart artery stent     x2 stents in heart per pts son    Hyperlipidemia     Hypertension     Migraines     Myocardial infarction (HCC)     about 30yrs ago    Nephrostomy present (HCC)     2/25/25 Per pts son Ed- pt has two nephrostomy tubes    Prostate cancer (HCC)     Seasonal allergies    [2]   Past Surgical History:  Procedure Laterality Date    CARDIAC CATHETERIZATION N/A 5/12/2025    Procedure: Cardiac Catheterization;  Surgeon: Daniel Mabry DO;  Location: [facility];  Service: Cardiology    CARDIAC SURGERY      CAROTID ENDARTARECTOMY Right     thromboendarterectomy    CAROTID STENT Right     CATARACT EXTRACTION Left     COLONOSCOPY      fiberoptic screening 2/6/08 5 yr / complete 3/15/13 5 yr    CORONARY ANGIOPLASTY WITH STENT PLACEMENT      ELBOW SURGERY      EYE SURGERY      IR BIOPSY LYMPH NODE  12/24/2024    IR NEPHROSTOMY TUBE CHECK/CHANGE/REPOSITION/REINSERTION/UPSIZE  2/25/2025    IR NEPHROSTOMY TUBE CHECK/CHANGE/REPOSITION/REINSERTION/UPSIZE  4/29/2025    IR NEPHROSTOMY TUBE CHECK/CHANGE/REPOSITION/REINSERTION/UPSIZE  6/11/2025    IR  NEPHROSTOMY TUBE PLACEMENT  12/24/2024    KNEE SURGERY      DE CORONARY ARTERY BYP W/VEIN & ARTERY GRAFT 3 VEIN N/A 5/21/2025    Procedure: CORONARY ARTERY BYPASS GRAFT (CABG) X3 VESSELS, LIMA - LAD, LEFT LEG EVH/SVG - PDA AND OM, W/CHAPO;  Surgeon: David Tomlin DO;  Location:  MAIN OR;  Service: Cardiac Surgery    DE CYSTO W/REMOVAL OF LESIONS SMALL N/A 1/21/2025    Procedure: TRANSURETHRAL RESECTION OF BLADDER TUMOR (TURBT);  Surgeon: Jordan Youssef MD;  Location:  MAIN OR;  Service: Urology    DE CYSTO W/REMOVAL OF LESIONS SMALL N/A 3/6/2025    Procedure: TRANSURETHRAL RESECTION OF BLADDER TUMOR (TURBT);  Surgeon: Jordan Youssef MD;  Location:  MAIN OR;  Service: Urology    PROSTATE SURGERY      SUPRAPUBIC PROSTATECTOMY  12/06/1999    UPPER GASTROINTESTINAL ENDOSCOPY     [3]   Family History  Problem Relation Name Age of Onset    Alcohol abuse Mother Ale Rodriguez Voce     No Known Problems Father      No Known Problems Sister      Substance Abuse Son Stewart CORONADO Vocsammy     Heart attack Family          MI    Breast cancer Family      Colon cancer Neg Hx      Colon polyps Neg Hx      Anesthesia problems Neg Hx     [4]   Social History  Tobacco Use    Smoking status: Former     Current packs/day: 0.00     Average packs/day: 3.0 packs/day for 10.0 years (30.0 ttl pk-yrs)     Types: Cigarettes     Start date: 1/1/2000     Quit date: 1/1/2010     Years since quitting: 15.5    Smokeless tobacco: Never    Tobacco comments:     Former smoker - quit 2010   Vaping Use    Vaping status: Never Used   Substance Use Topics    Alcohol use: Yes     Comment: rare glass of wine    Drug use: Never     Comment: Denies any drug use per pt      mouth every 6 (six) hours as needed for mild pain   ascorbic acid (VITAMIN C) 500 mg tablet  Self Yes No   Sig: Take 500 mg by mouth in the morning.   aspirin 325 mg tablet  Self No No   Sig: Take 1 tablet (325 mg total) by mouth daily   atorvastatin (LIPITOR) 80 mg tablet  Self No No   Sig: Take 1 tablet (80 mg total) by mouth daily with dinner   chlorhexidine (PERIDEX) 0.12 % solution  Self No No   Sig: Apply 15 mL to the mouth or throat 2 (two) times a day   docusate sodium (COLACE) 100 mg capsule  Self No No   Sig: Take 1 capsule (100 mg total) by mouth 2 (two) times a day   furosemide (LASIX) 20 mg tablet  Self No No   Sig: Take 1 tablet (20 mg total) by mouth daily   metFORMIN (GLUCOPHAGE-XR) 500 mg 24 hr tablet  Self No No   Sig: Take 1 tablet by mouth once daily   metoprolol succinate (TOPROL-XL) 50 mg 24 hr tablet  Self No No   Sig: Take 1 tablet (50 mg total) by mouth daily   pantoprazole (PROTONIX) 40 mg tablet  Self No No   Sig: Take 1 tablet (40 mg total) by mouth daily   polyethylene glycol (MIRALAX) 17 g packet  Self No No   Sig: Take 17 g by mouth daily   Patient not taking: Reported on 6/23/2025   sodium chloride, PF, 0.9 %   No No   Sig: 10 mL by Intracatheter route daily for 180 doses      Facility-Administered Medications: None     Discharge Medication List as of 7/10/2025 10:12 PM        START taking these medications    Details   cefpodoxime (VANTIN) 100 mg tablet Take 1 tablet (100 mg total) by mouth 2 (two) times a day for 10 days, Starting Thu 7/10/2025, Until Sun 7/20/2025, Normal      oxyCODONE (Roxicodone) 5 immediate release tablet Take 0.5 tablets (2.5 mg total) by mouth every 6 (six) hours as needed for severe pain for up to 10 days Max Daily Amount: 10 mg, Starting Thu 7/10/2025, Until Sun 7/20/2025 at 2359, Normal           CONTINUE these medications which have NOT CHANGED    Details   acetaminophen (TYLENOL) 325 mg tablet Take 2 tablets (650 mg total) by mouth every 6 (six) hours as  needed for mild pain, Starting Wed 5/28/2025, No Print      ascorbic acid (VITAMIN C) 500 mg tablet Take 500 mg by mouth in the morning., Starting Tue 2/22/2011, Historical Med      aspirin 325 mg tablet Take 1 tablet (325 mg total) by mouth daily, Starting Thu 5/29/2025, No Print      atorvastatin (LIPITOR) 80 mg tablet Take 1 tablet (80 mg total) by mouth daily with dinner, Starting Thu 6/5/2025, Normal      B Complex Vitamins (VITAMIN B COMPLEX PO) Take 1 tablet by mouth in the morning., Starting Tue 2/22/2011, Historical Med      BD PosiFlush 0.9 % SOLN FLUSH WITH 10 ML BY INTRACATHETER ONCE DAILY, Historical Med      chlorhexidine (PERIDEX) 0.12 % solution Apply 15 mL to the mouth or throat 2 (two) times a day, Starting Wed 5/28/2025, No Print      Cholecalciferol 1000 units CHEW Chew 1 tablet in the morning., Historical Med      docusate sodium (COLACE) 100 mg capsule Take 1 capsule (100 mg total) by mouth 2 (two) times a day, Starting Wed 5/28/2025, No Print      furosemide (LASIX) 20 mg tablet Take 1 tablet (20 mg total) by mouth daily, Starting Mon 6/9/2025, Normal      metFORMIN (GLUCOPHAGE-XR) 500 mg 24 hr tablet Take 1 tablet by mouth once daily, Normal      metoprolol succinate (TOPROL-XL) 50 mg 24 hr tablet Take 1 tablet (50 mg total) by mouth daily, Starting Mon 6/9/2025, Normal      pantoprazole (PROTONIX) 40 mg tablet Take 1 tablet (40 mg total) by mouth daily, Starting Thu 5/29/2025, No Print      polyethylene glycol (MIRALAX) 17 g packet Take 17 g by mouth daily, Starting Thu 5/29/2025, No Print      sodium chloride, PF, 0.9 % 10 mL by Intracatheter route daily for 180 doses, Starting Thu 7/10/2025, Until Tue 1/6/2026, Normal           No discharge procedures on file.  ED SEPSIS DOCUMENTATION   Time reflects when diagnosis was documented in both MDM as applicable and the Disposition within this note       Time User Action Codes Description Comment    7/10/2025  9:18 PM Júnior Tolliver Add [N39.0]  UTI (urinary tract infection)     7/10/2025  9:18 PM Júnior Tolliver [R50.9] Fever     7/10/2025  9:18 PM Júnior Tolliver [Z98.890] H/O insertion of nephrostomy tube     7/10/2025 10:10 PM Júnior Tolliver [N28.89] Ureteritis                      [1]   Past Medical History:  Diagnosis Date    Bladder tumor     Cancer (HCC)     Prostate cancer    Depression 5/14/2025    Diabetes mellitus (HCC)     Diverticulitis of colon     History of common carotid artery stent placement     RIGHT stent - per pts son - RIGHT stent  in  carotid artery    History of heart artery stent     x2 stents in heart per pts son    Hyperlipidemia     Hypertension     Migraines     Myocardial infarction (HCC)     about 30yrs ago    Nephrostomy present (HCC)     2/25/25 Per pts son Ed- pt has two nephrostomy tubes    Prostate cancer (HCC)     Seasonal allergies    [2]   Past Surgical History:  Procedure Laterality Date    CARDIAC CATHETERIZATION N/A 5/12/2025    Procedure: Cardiac Catheterization;  Surgeon: Daniel Mabry DO;  Location: [facility];  Service: Cardiology    CARDIAC SURGERY      CAROTID ENDARTARECTOMY Right     thromboendarterectomy    CAROTID STENT Right     CATARACT EXTRACTION Left     COLONOSCOPY      fiberoptic screening 2/6/08 5 yr / complete 3/15/13 5 yr    CORONARY ANGIOPLASTY WITH STENT PLACEMENT      ELBOW SURGERY      EYE SURGERY      IR BIOPSY LYMPH NODE  12/24/2024    IR NEPHROSTOMY TUBE CHECK/CHANGE/REPOSITION/REINSERTION/UPSIZE  2/25/2025    IR NEPHROSTOMY TUBE CHECK/CHANGE/REPOSITION/REINSERTION/UPSIZE  4/29/2025    IR NEPHROSTOMY TUBE CHECK/CHANGE/REPOSITION/REINSERTION/UPSIZE  6/11/2025    IR NEPHROSTOMY TUBE PLACEMENT  12/24/2024    KNEE SURGERY      SD CORONARY ARTERY BYP W/VEIN & ARTERY GRAFT 3 VEIN N/A 5/21/2025    Procedure: CORONARY ARTERY BYPASS GRAFT (CABG) X3 VESSELS, LIMA - LAD, LEFT LEG EVH/SVG - PDA AND OM, W/CHAPO;  Surgeon: David Tomlin DO;  Location: [facility];  Service:  Cardiac Surgery    UT CYSTO W/REMOVAL OF LESIONS SMALL N/A 1/21/2025    Procedure: TRANSURETHRAL RESECTION OF BLADDER TUMOR (TURBT);  Surgeon: Jordan Youssef MD;  Location:  MAIN OR;  Service: Urology    UT CYSTO W/REMOVAL OF LESIONS SMALL N/A 3/6/2025    Procedure: TRANSURETHRAL RESECTION OF BLADDER TUMOR (TURBT);  Surgeon: Jordan Youssef MD;  Location:  MAIN OR;  Service: Urology    PROSTATE SURGERY      SUPRAPUBIC PROSTATECTOMY  12/06/1999    UPPER GASTROINTESTINAL ENDOSCOPY     [3]   Family History  Problem Relation Name Age of Onset    Alcohol abuse Mother Ale Rodriguez Voce     No Known Problems Father      No Known Problems Sister      Substance Abuse Son Stewart CORONADO Voce     Heart attack Family          MI    Breast cancer Family      Colon cancer Neg Hx      Colon polyps Neg Hx      Anesthesia problems Neg Hx     [4]   Social History  Tobacco Use    Smoking status: Former     Current packs/day: 0.00     Average packs/day: 3.0 packs/day for 10.0 years (30.0 ttl pk-yrs)     Types: Cigarettes     Start date: 1/1/2000     Quit date: 1/1/2010     Years since quitting: 15.5    Smokeless tobacco: Never    Tobacco comments:     Former smoker - quit 2010   Vaping Use    Vaping status: Never Used   Substance Use Topics    Alcohol use: Yes     Comment: rare glass of wine    Drug use: Never     Comment: Denies any drug use per pt        Júnior Tolliver PA-C  07/14/25 1222     8

## 2025-07-11 NOTE — LETTER
[facility] PRIMARY CARE [address]   [address]  [facility] [address]    Date: 07/17/25    Kilo Mix  [address]  [facility] [address]    Dear Kilo:                                                                                                                                Thank you for choosing [facility] emergency department for care.  Your primary care provider wants to make sure that your ongoing medical care is being addressed. If you require follow up care as a result of your emergency department visit, there are a few things the practice would like you to know.                As part of the network's continuing commitment to caring for our patients, we have added more same day appointments and have extended office hours to meet your medical needs. After hours, on-call physicians are available via your primary care provider's main office line.               We encourage you to contact our office prior to seeking treatment to discuss your symptoms with the medical staff.  Together, we can determine the correct course of action.  A majority of non-emergent conditions such as: common cold, flu-like symptoms, fevers, strains/sprains, dislocations, minor burns, cuts and animal bites can be treated at [facility] facilities. Diagnostic testing is available at some sites.               Of course, if you are experiencing a life threatening medical emergency call 911 or proceed directly to the nearest emergency room.    Your nearest [facility] facility is conveniently located at:    [address] [address]  [phone]  SKIP THE WAIT  Conveniently offered at most [facility] locations  [place] your spot online at [email or web address] or on the [facility] [place]    Sincerely,    [facility] PRIMARY CARE [address]   Dept: [phone]

## 2025-07-11 NOTE — DISCHARGE INSTRUCTIONS
Call the IR team tomorrow for an appointment.  For pain, please use one half a tablet of oxycodone as needed, after you take Tylenol for the pain.  Take cefpodoxime for this urinary tract infection, twice a day.

## 2025-07-11 NOTE — SEDATION DOCUMENTATION
"Arrived to IR suite to have left PCN re-sutured. States it is to \"tight\" and pulling to the point where it hurts. Re-sutured and ambulated home with his daughter.   "

## 2025-07-12 LAB — BACTERIA UR CULT: ABNORMAL

## 2025-07-13 LAB
BACTERIA UR CULT: ABNORMAL
BACTERIA UR CULT: ABNORMAL

## 2025-07-13 RX ORDER — NITROFURANTOIN 25; 75 MG/1; MG/1
100 CAPSULE ORAL 2 TIMES DAILY
Qty: 10 CAPSULE | Refills: 0 | Status: SHIPPED | OUTPATIENT
Start: 2025-07-13 | End: 2025-07-18

## 2025-07-14 ENCOUNTER — CLINICAL SUPPORT (OUTPATIENT)
Dept: CARDIAC REHAB | Facility: HOSPITAL | Age: 85
End: 2025-07-14
Attending: PHYSICIAN ASSISTANT
Payer: MEDICARE

## 2025-07-14 DIAGNOSIS — Z95.1 S/P CABG X 3: Primary | ICD-10-CM

## 2025-07-14 LAB
BACTERIA UR CULT: ABNORMAL

## 2025-07-14 PROCEDURE — 93798 PHYS/QHP OP CAR RHAB W/ECG: CPT

## 2025-07-14 RX ORDER — SULFAMETHOXAZOLE AND TRIMETHOPRIM 800; 160 MG/1; MG/1
1 TABLET ORAL 2 TIMES DAILY
Qty: 14 TABLET | Refills: 0 | Status: SHIPPED | OUTPATIENT
Start: 2025-07-14 | End: 2025-07-21

## 2025-07-17 NOTE — TELEPHONE ENCOUNTER
07/17/25 9:30 AM    Patient contacted post ED visit, outreach attempt made but message could not be left. Additional outreach attempt will be made.     Thank you.  Claudia Hinson MA  [facility]      07/17/25 9:30 AM    Patient contacted post ED visit, phone outreaches were unsuccessful and a MyChart letter has been sent to the patient as follow-up.    Thank you.  Claudia Hinson MA  [facility]

## 2025-07-18 ENCOUNTER — APPOINTMENT (OUTPATIENT)
Dept: CARDIAC REHAB | Facility: HOSPITAL | Age: 85
End: 2025-07-18
Attending: PHYSICIAN ASSISTANT
Payer: MEDICARE

## 2025-07-21 ENCOUNTER — CLINICAL SUPPORT (OUTPATIENT)
Dept: CARDIAC REHAB | Facility: HOSPITAL | Age: 85
End: 2025-07-21
Attending: PHYSICIAN ASSISTANT
Payer: MEDICARE

## 2025-07-21 ENCOUNTER — OFFICE VISIT (OUTPATIENT)
Age: 85
End: 2025-07-21
Payer: MEDICARE

## 2025-07-21 VITALS
OXYGEN SATURATION: 99 % | HEIGHT: 68 IN | DIASTOLIC BLOOD PRESSURE: 61 MMHG | TEMPERATURE: 97.8 F | BODY MASS INDEX: 24.1 KG/M2 | SYSTOLIC BLOOD PRESSURE: 108 MMHG | WEIGHT: 159 LBS | HEART RATE: 84 BPM | RESPIRATION RATE: 16 BRPM

## 2025-07-21 DIAGNOSIS — C67.4 MALIGNANT NEOPLASM OF POSTERIOR WALL OF URINARY BLADDER (HCC): ICD-10-CM

## 2025-07-21 DIAGNOSIS — C61 PROSTATE CANCER (HCC): Primary | ICD-10-CM

## 2025-07-21 DIAGNOSIS — Z95.1 S/P CABG X 3: Primary | ICD-10-CM

## 2025-07-21 DIAGNOSIS — R97.20 ELEVATED PROSTATE SPECIFIC ANTIGEN (PSA): ICD-10-CM

## 2025-07-21 PROCEDURE — 93798 PHYS/QHP OP CAR RHAB W/ECG: CPT

## 2025-07-21 PROCEDURE — G2211 COMPLEX E/M VISIT ADD ON: HCPCS | Performed by: INTERNAL MEDICINE

## 2025-07-21 PROCEDURE — 99215 OFFICE O/P EST HI 40 MIN: CPT | Performed by: INTERNAL MEDICINE

## 2025-07-21 NOTE — PROGRESS NOTES
Name: Kilo Mix      : 1940      MRN: [id number]  Encounter Provider: Harvey Shankar MD  Encounter Date: 2025   Encounter department: [facility]  :  Assessment & Plan  Prostate cancer (HCC)  remote h/o prostate cancer, s/p prostatectomy in  and more recently presenting with PSA of 360+ ng/mL with biopsy confirmed metastatic disease to the lymph nodes. On PSMA PET-CT there is extensive lymphadenopathy including mediastinal, abdomen and pelvis.  Stage IV disease.   Started on lupron (6-mo) on 2025 and  Xtandi in 2025  He was doing well initially but about 2 months into treatment developed dizziness and worsening fatigue.   Excellent response in PSA - down to 0.21 ng/mL as of mid 2025.    Xtandi has been on hold since early 2025. His dizziness has almost resolved but he continues to c/o feeling extremely weak. We continued to hold the Xtandi. In May, dx with MI s/p CABG. His energy level has improved significantly since then.     His PSA is down to 0.02ng/mL on his labs from July. I would like to continue holding the Xtandi for now.  We discussed restarting the secondary hormonal agent when his PSA starts to creep up.  Also, no role for imaging at this time.  He will continue on Lupron-his last dose was on 2025, and he receives this every 6 months.  He is tolerating the Lupron well except for occasional hot flashes.  I will plan to see him back in 4 months with repeat PSA at that time.      Elevated prostate specific antigen (PSA)    Orders:    PSA Total, Diagnostic; Future        History of Present Illness   Chief Complaint   Patient presents with    Follow-up     Oncology History   Prostate cancer (HCC)   2019 Initial Diagnosis    Prostate cancer (HCC)     2024 Biopsy    Final Diagnosis   A. Lymph Node, left para aortic,  core Biopsy:    -Metastatic carcinoma , immunocytochemically consistent with  "metastatic prostatic adenocarcinoma      Note:  Tumor cells are  positive for CKC,  prostatic  markers (NKX3.1  , PSAP,) PAX2 ( patchy) and  negative for CK7,  CK20,  urothelial markers (GATA3, thrombomodulin). Controls reacted appropriately            12/30/2024 -  Cancer Staged    Staging form: Prostate, AJCC 8th Edition  - Clinical stage from 12/30/2024: Stage IVB (rcT4, cN1, cM1, PSA: 360) - Signed by Harvey Shankar MD on 1/28/2025  Histopathologic type: Adenocarcinoma, NOS  Stage prefix: Recurrence  Prostate specific antigen (PSA) range: 20 or greater  Stage used in treatment planning: Yes  National guidelines used in treatment planning: Yes       Malignant neoplasm of posterior wall of urinary bladder (HCC) (Resolved)   1/28/2025 Initial Diagnosis    Malignant neoplasm of posterior wall of urinary bladder (HCC)     1/28/2025 -  Cancer Staged    Staging form: Urinary Bladder, AJCC 8th Edition  - Clinical stage from 1/28/2025: Stage Unknown (cT2, cNX) - Signed by Harvey Shankar MD on 1/28/2025  Histopathologic type: Adenocarcinoma, NOS  Stage prefix: Initial diagnosis  WHO/ISUP grade (low/high): High Grade  Histologic grading system: 2 grade system           Kilo Mix is a 84 y.o. male with a prior history of prostate cancer, s/p radical prostatectomy in 1999 for \"high Rocio score prostate cancer\".  He states he took hormone shots prior to the surgery for a short duration. Other past medical history is also significant for diabetes, CKD stage III, bilateral carotid artery stenosis s/p stents, peripheral vascular disease, hypertension    Patient presented with painless hematuria in November 2024.  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  In the urinary bladder, there is a large hypervascular mass extending into the right perivesicular space with associated bilateral iliac and retroperitoneal lymphadenopathy, " and moderate bilateral hydronephrosis leading to occlusion of the distal ureters.    He underwent a cystoscopy on December 16 which showed a large extensive tumor involving the entire trigone of the bladder, with both ureteral orifices obscured by the tumor.  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    Labs were significant for creatinine of 1.7/GFR 35, normal CBC and a  ng/mL.     On December 24,2024 he underwent a CT-guided biopsy of the left para-aortic lymph node. Pathology revealed metastatic prostate cancer.     He also underwent b/l nephrostomy tube placements.     PSMA PET-CT - significant for mediastinal lymphadenopathy, multiple metastatic lymph nodes in the abdomen and pelvis.  An infiltrative mass in the right posterior bladder, which is less PSMA avid than adenopathy.      Started on lupron (6-mo) and Xtandi on 1/6/2025     Underwent a cystoscopy on 1/21/25 - 5cm tumor invading entire trigone of bladder, and bladder neck and covering both ureteral orifices. Underwent TURBT. 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     Case presented at  tumor board and recommendation was for further debulking, FDG PET-CT and in the absence of distant metastatic ds, to pursue chemorad for the bladder.    FDG PET scan from February 19, 2025 showed previously noted PSMA avid lymphadenopathy in the chest, abdomen and pelvis has decreased in size.    He underwent TURBT on March 6, 2025, and pathology now shows prostatic adenocarcinoma with involvement of the muscularis propria.  On further review of pathology, the biopsy from the bladder from January 21, 2025 has now been amended and reflects extensive prostatic adenocarcinoma involving the muscularis propria, without any urothelial carcinoma.    Seen in March 2025 - c/o dizziness. Advised to try Xtandi at bedtime. Has not found that  helpful. Xtandi has been on hold since early April/end of March 2025 due to ongoing dizziness.     He was then admitted to the hospital in May 2025.  Multi-memory artery disease, left main coronary artery disease, s/p acute NSTEMI and s/p coronary artery bypass grafting x 3.  Course complicated by swallowing difficulty, for which an NG tube was placed for a few days..    His most recent Lupron injection was on June 23, 2025. Most recent PSA from 7/2 is 0.024.   He is here today for a follow up visit         Review of Systems   Constitutional:  Negative for fatigue (resolved).   Respiratory:  Negative for shortness of breath.    Cardiovascular:  Negative for chest pain, palpitations and leg swelling.   Endocrine:        Hot flashes +   Genitourinary:         Recent change in nephrostomy tubes   Neurological:  Negative for dizziness.     Medical History Reviewed by provider this encounter:     .  Current Outpatient Medications on File Prior to Visit   Medication Sig Dispense Refill    acetaminophen (TYLENOL) 325 mg tablet Take 2 tablets (650 mg total) by mouth every 6 (six) hours as needed for mild pain      ascorbic acid (VITAMIN C) 500 mg tablet Take 500 mg by mouth in the morning.      aspirin 325 mg tablet Take 1 tablet (325 mg total) by mouth daily      atorvastatin (LIPITOR) 80 mg tablet Take 1 tablet (80 mg total) by mouth daily with dinner 90 tablet 2    B Complex Vitamins (VITAMIN B COMPLEX PO) Take 1 tablet by mouth in the morning.      BD PosiFlush 0.9 % SOLN FLUSH WITH 10 ML BY INTRACATHETER ONCE DAILY      chlorhexidine (PERIDEX) 0.12 % solution Apply 15 mL to the mouth or throat 2 (two) times a day      Cholecalciferol 1000 units CHEW Chew 1 tablet in the morning.      docusate sodium (COLACE) 100 mg capsule Take 1 capsule (100 mg total) by mouth 2 (two) times a day      furosemide (LASIX) 20 mg tablet Take 1 tablet (20 mg total) by mouth daily 30 tablet 5    metFORMIN (GLUCOPHAGE-XR) 500 mg 24 hr tablet  "Take 1 tablet by mouth once daily 90 tablet 3    metoprolol succinate (TOPROL-XL) 50 mg 24 hr tablet Take 1 tablet (50 mg total) by mouth daily 30 tablet 5    pantoprazole (PROTONIX) 40 mg tablet Take 1 tablet (40 mg total) by mouth daily      sodium chloride, PF, 0.9 % 10 mL by Intracatheter route daily for 180 doses 300 mL 5    sulfamethoxazole-trimethoprim (BACTRIM DS) 800-160 mg per tablet Take 1 tablet by mouth 2 (two) times a day for 7 days smx-tmp DS (BACTRIM) 800-160 mg tabs (1tab q12 D10) 14 tablet 0    [] oxyCODONE (Roxicodone) 5 immediate release tablet Take 0.5 tablets (2.5 mg total) by mouth every 6 (six) hours as needed for severe pain for up to 10 days Max Daily Amount: 10 mg 10 tablet 0    polyethylene glycol (MIRALAX) 17 g packet Take 17 g by mouth daily (Patient not taking: Reported on 2025)       No current facility-administered medications on file prior to visit.      Social History     Tobacco Use    Smoking status: Former     Current packs/day: 0.00     Average packs/day: 3.0 packs/day for 10.0 years (30.0 ttl pk-yrs)     Types: Cigarettes     Start date: 2000     Quit date: 2010     Years since quitting: 15.5    Smokeless tobacco: Never    Tobacco comments:     Former smoker - quit    Vaping Use    Vaping status: Never Used   Substance and Sexual Activity    Alcohol use: Yes     Comment: rare glass of wine    Drug use: Never     Comment: Denies any drug use per pt    Sexual activity: Not Currently     Comment: defer         Objective   /61 (BP Location: Left arm, Patient Position: Sitting, Cuff Size: Standard)   Pulse 84   Temp 97.8 °F (36.6 °C) (Temporal)   Resp 16   Ht 5' 8\" (1.727 m)   Wt 72.1 kg (159 lb)   SpO2 99%   BMI 24.18 kg/m²     ECOG   0  Physical Exam  Vitals reviewed.   Constitutional:       Appearance: Normal appearance. He is normal weight. He is not ill-appearing.     Cardiovascular:      Rate and Rhythm: Normal rate and regular rhythm.     "  Heart sounds: No murmur heard.  Pulmonary:      Effort: Pulmonary effort is normal. No respiratory distress.      Breath sounds: Normal breath sounds.     Musculoskeletal:      Right lower leg: No edema.      Left lower leg: No edema.     Neurological:      Mental Status: He is alert.         Labs: I have reviewed the following labs:  Lab Results   Component Value Date/Time    WBC 12.43 (H) 07/10/2025 08:31 PM    RBC 3.91 07/10/2025 08:31 PM    Hemoglobin 12.0 07/10/2025 08:31 PM    Hematocrit 36.6 07/10/2025 08:31 PM    MCV 94 07/10/2025 08:31 PM    MCH 30.7 07/10/2025 08:31 PM    RDW 13.2 07/10/2025 08:31 PM    Platelets 226 07/10/2025 08:31 PM    Segmented % 88 (H) 07/10/2025 08:31 PM    Lymphocytes % 6 (L) 07/10/2025 08:31 PM    Monocytes % 5 07/10/2025 08:31 PM    Eosinophils Relative 1 07/10/2025 08:31 PM    Basophils Relative 0 07/10/2025 08:31 PM    Immature Grans % 0 07/10/2025 08:31 PM    Absolute Neutrophils 10.85 (H) 07/10/2025 08:31 PM     Lab Results   Component Value Date/Time    Potassium 3.6 07/10/2025 08:31 PM    Chloride 100 07/10/2025 08:31 PM    CO2 28 07/10/2025 08:31 PM    CO2, i-STAT 23 05/21/2025 01:45 PM    BUN 21 07/10/2025 08:31 PM    Creatinine 1.15 07/10/2025 08:31 PM    Glucose, i-STAT 166 (H) 05/21/2025 01:45 PM    Glucose, Fasting 111 (H) 07/02/2025 10:51 AM    Calcium 9.5 07/10/2025 08:31 PM    AST 14 07/10/2025 08:31 PM    ALT 9 07/10/2025 08:31 PM    Alkaline Phosphatase 80 07/10/2025 08:31 PM    Total Protein 6.7 07/10/2025 08:31 PM    Albumin 3.9 07/10/2025 08:31 PM    Total Bilirubin 0.57 07/10/2025 08:31 PM    eGFR 58 07/10/2025 08:31 PM       Administrative Statements {  I have spent a total time of 40  minutes in caring for this patient on the day of the visit/encounter including Diagnostic results, Prognosis, Risks and benefits of tx options, Instructions for management, Patient and family education, Risk factor reductions, Counseling / Coordination of care, Documenting in  the medical record, Reviewing/placing orders in the medical record (including tests, medications, and/or procedures), and Obtaining or reviewing history  .

## 2025-07-21 NOTE — PROGRESS NOTES
CARDIAC REHABILITATION   ASSESSMENT AND INDIVIDUALIZED TREATMENT PLAN  30 DAY REASSESSMENT          Today's date: 2025   # of Exercise Sessions Completed: 8  Patient name: Kilo Mix      : 1940  Age: 84 y.o.       MRN: [id number]  Referring Physician: Dr. Tomlin  Cardiologist: Dr. Tate  Provider: Ruby  Clinician: Tori Rodarte MS, CEP        Treatment is tailored to this patient's individual needs.  The ITP was reviewed with the patient and all questions were answered to their satisfaction.  Additional ITP documentation can be found electronically including daily and monthly exercise summaries, daily session notes with ECG summaries, education notes, daily medication reconciliation, and daily physician supervision.    REASSESSMENT SUMMARY   DATE:  2025    See ITP below for further details including patient goals and plan of care for progression.    Resting BP  108/70 - 128/72,  HR 60 - 73  Exercise /72- 138/72.  HR 86 - 100  Exercise session details:  35-45 minutes,  2.3 - 2.5 METs  Telemetry:  NSR  Symptoms: none  Current functional status:    home exercise: no formal home exercise  ADLs: able to complete light to moderate ADLs within sternal precautions.   Patient's subjective report of progress in the past 30 days:  Kilo notes minimal change over the past 30 days.  Patient has made or is working on dietary modifications:    Yes :  low sodium, low sugar  Clinical Comments: Kilo has been progressing appropriately in rehab reaching up to 45 minutes of aerobic exercise per session and up to 2.5 METs with exercise.       Initial Fitness Assessment:   Submaximal TM ETT:  Resting:  BP: 124/74  HR: 73  Exercise:  BP: 136/74  HR: 85  ECG Summary: NSR w/ rare PVCs  Test terminated at:  RPE 6 and leg fatigue      Dx:   Encounter Diagnosis   Name Primary?    S/P CABG x 3 Yes       Description of Diagnosis: CORONARY ARTERY BYPASS GRAFT (CABG) X3 VESSELS, LIMA - LAD, LEFT LEG EVH/SVG - PDA  "AND OM, W/CHAPO   Date of onset: 5/21/2025      ASSESSMENT    Medical History:   Past Medical History[1]    Family History:  Family History[2]    Allergies:   Losartan and Penicillins    Current Medications:   Current Medications[3]    Medication compliance:  Pt reports to be compliant with medications    Physical Limitations: no reported limitations    Fall Risk: Low   Comments: Ambulates with a steady gait with no assist device      CARDIAC RISK FACTOR MODIFICATION:  Cardiac risk factor modification, including education, counseling, and   behavioral intervention will be provided tailored to the patients' needs.    Cholesterol: Yes  HTN: Yes  DM: Type 2   Obesity: No   Inactivity: Yes      EXERCISE ASSESSMENT:      SMART Exercise Goals:   improvement of 0.5 to 1.0 MET in the fitness assessment  improved DASI score by 10%  increased peak METs tolerated in rehab exercise session  attend rehab regularly  maintain > 150 minutes per week of moderate intensity exercise    Patient Specific EXERCISE GOALS:       Return to previous functional status  Establish regular exercise routine he will be able to maintain following rehab    Functional Capacity Screening Tool:  [facility] Activity Status Index:  6.27 METs    NUTRITION ASSESSMENT:    Initial Weight:  160.4 lbs  Current Weight: 160.4 lbs    Height:   Ht Readings from Last 1 Encounters:   07/10/25 5' 8\" (1.727 m)       Rate Your Plate Score: 49/81    Diabetes: T2D  A1c: 5.6    last measured: 5/13/2025    Lipid management: Last lipid profile 5/13/2025  Chol 154    HDL 56  LDL 77    Current Dietary Habits:  Reports limiting sodium intake but not using salt shaker at table and reducing intake of canned foods.    SMART Nutrition Goals:   eat 6 or more servings of grain products per day, eat 2 or more servings of low fat milk or yogurt a day, eat meatless meals twice a week or more, rarely eat cheese or choose reduced fat or skim, choose light or fat-free salad dressings or " paz, and drink less than 8oz of soda and sweetened drinks per day    Patient Specific NUTRITION GOALS:     1. Reduce sodium intake   2. Receive education on heart healthy eating and label reading    Drug/Alcohol Use:   N/A      PSYCHOSOCIAL ASSESSMENT:    Date of last Assessment:  6/24/2025  Depression screening:  PHQ-9 = 5    Interpretation:  5-9 = Mild Depression  Anxiety screening:  CECI-7 = 2    Interpretation: 0-4  = Not anxious    Pt self-report of depression and anxiety   Patient reports they are coping well with good social support and denies depression or anxiety    Self-reported stress level:  3   Stressors:  current health condition  Stress Management Tools: keep a positive mindset  spend time with family    SMART Psychosocial Goals:     Physical Fitness in [facility] Score < 3  Overall Health in [facility] Score < 3  Change in Health in [facility] Score < 3   improved sleeping habits    Patient Specific PSYCHOCOSOCIAL GOALS:    Return to previous functional status to help improve overall sense of well being    Quality of Life Screen:  (Higher score indicates disease impact on QOL)  [facility] COOP score: 19/45     Social Support:   children  Community/Social Activities: spending time with family     Psychosocial Assessment as it relates to rehabilitation:   Patient denies issues with his/her family or home life that may affect their rehabilitation efforts.       OTHER CORE COMPONENT ASSESSMENT:    Tobacco Use:     N/A: Pt has a remote history of smoking    Anginal Symptoms:  None   NTG use: No prescription    SMART Goals:   consistent, controlled resting BP < 130/80 and medication compliance    Patient Specific CORE COMPONENT GOALS:    Continue to monitor BP at home and report any abnormal changes  Continue to weigh self daily at home and report any abnormal changes      INDIVIDUALIZED TREATMENT PLAN      EXERCISE GOALS and PLAN      Progress toward Exercise goals:   Pt is progressing and showing improvement   toward the following goals:  increase in peak METs in rehab, attend CR regularly, increasing exercise duration weekly, returning to previous functional activities, establishing exercise routine.  , Will continue to educate and progress as tolerated.    Exercise Plan:    patient will attend rehab 2-3 times per week to complete 36 exercise sessions  progress workloads as tolerated to maintain RPE 4-6/10  patient will add home exercise 30-45 mins 1-2 days to supplement cardiac rehab  introduce resistance training in rehab session  patient will add home walking increasing distance as tolerated  class: Risk Factors for Heart Disease  Patient education handout:   Exercise After Cardiac Rehabilitation    The patient was counseled on exercise guidelines to achieve a minimum of 150 mins/wk of moderate intensity (RPE 4-6)   exercise and encouraged to add 1-2 days of exercise on opposite days of cardiac rehab as tolerated.       PHYSICIAN PRESCRIBED EXERCISE:    Current Aerobic Exercise Prescription:      Frequency: 3 days/week   Supplement with home exercise 2+ days/wk as tolerated       Minutes: 20 - 40         METS: 2.3-2.5            HR: RHR +30-40bpm   RPE: 4-6         Modalities: Treadmill, UBE, NuStep, and Recumbent bike     Exercise workloads will be progressed gradually as tolerated, within limits of patient's ability, and according to the patient's   response to the exercise program.      Aerobic Exercise Prescription Plan for Progression   Frequency: 3 days/week of cardiac rehab       Supplement with home exercise 2+ days/wk as tolerated    Minutes: 40       >150 mins/wk of moderate intensity exercise   METS: 2.3-2.8   HR: RHR +30-40bpm     RPE: 4-6   Modalities: Treadmill, UBE, NuStep, and Recumbent bike    Strength trainin-3 days / week  12-15 repetitions  1-2 sets per modality   Will be added following at least 8 weeks post surgery and 8-10 monitored sessions   Modalities: Pull Downs, Lateral Raise, Arm  Extension, Arm Curl, and Sit to Stands    Home Exercise: none    Exercise Education: benefit of exercise for CAD risk factors, home exercise guidelines, AHA guidelines to achieve >150 mins/wk of moderate exercise, and RPE scale     Readiness to change: Preparation:  (Getting ready to change)       NUTRITION GOALS AND PLAN      Nutritional   Reviewed patient's Rate your Plate. Discussed key elements of heart healthy eating. Reviewed patient goals for dietary modifications and their clinical implications.  Reviewed most recent lipid profile.     Patient's progress toward Nutrition goals:    Pt is progressing and showing improvement  toward the following goals:  reducing sodium intake, received education on label reading.  , Will continue to educate and progress as tolerated.      Nutrition Plan:   group class: Reading Food Labels  group Class: Heart Healthy Eating    Measurable goals were based Rate Your Plate Dietary Self-Assessment. These are the areas in which the patient could score higher on the assessment.  Goals include recommendations for a heart healthy diet based on American Heart Association.    Nutrition Education:   low sodium diet  maintaining hydration  healthy choices while dining out  portion control  education:  Label Reading    Readiness to change: Action:  (Changing behavior)      PSYCHOSOCIAL GOALS AND PLAN    Psychosocial Assessment as it relates to rehabilitation:   Patient denies issues with his/her family or home life that may affect their rehabilitation efforts.     Patient's progress toward Psychosocial goals:    Pt is progressing and showing improvement  toward the following goals:  returning to previous functional activities.  , Will continue to educate and progress as tolerated.    Psychosocial Plan:   Class: Stress and Your Health, Class: Relaxation, and enjoy family    Psychosocial Education: signs and symptoms of depression  benefits of a positive support system  stress management  techniques  benefits of enrolling in [employer]  depression and heart disease  education:  Stress and Your Health     Information to utilize [employer] was provided as well as contact information for counseling through  Behavioral [facility] and group psychotherapy groups available.    Readiness to change: Action:  (Changing behavior)      OTHER CORE COMPONENTS GOALS and PLAN      Blood Pressure will be monitored throughout the program and cardiologist will be notified of elevated trends.    Pt will be encouraged to monitor home BP if advised by cardiologist.    Tobacco Plan:   N/A:  Pt is a non-smoker    Progress toward Core Component goals:   Pt is progressing and showing improvement  toward the following goals:  BP WNL, medication compliance.  , Will continue to educate and progress as tolerated.    Other Core Components Plan:   monitor home BP  medication compliance  monitor daily body weight  follow prescribed sodium restriction  check labels for sodium content  eliminate salt shaker at the table  use salt substitutes  avoid processed foods  class: Understanding Heart Disease  class: Common Heart Medications    Group and Individual Education:  definition of  hypertension, components of blood pressure management, maintaining hydration for blood pressure control, low sodium diet and heart failure, reinforce measurement of daily body weight and reporting to the cardiologist an increase of more than 3lbs in a day or 5lbs in a week., and review of recent lipid profile and implications for disease risk     Readiness to change: Action:  (Changing behavior)         [1]   Past Medical History:  Diagnosis Date    Bladder tumor     Cancer (HCC)     Prostate cancer    Depression 5/14/2025    Diabetes mellitus (HCC)     Diverticulitis of colon     History of common carotid artery stent placement     RIGHT stent - per pts son - RIGHT stent  in  carotid artery    History of heart artery stent     x2 stents in heart per pts  son    Hyperlipidemia     Hypertension     Migraines     Myocardial infarction (HCC)     about 30yrs ago    Nephrostomy present (HCC)     2/25/25 Per pts son Ed- pt has two nephrostomy tubes    Prostate cancer (HCC)     Seasonal allergies    [2]   Family History  Problem Relation Name Age of Onset    Alcohol abuse Mother Ale Mix     No Known Problems Father      No Known Problems Sister      Substance Abuse Son Stewart CORONADO Vocsammy     Heart attack Family          MI    Breast cancer Family      Colon cancer Neg Hx      Colon polyps Neg Hx      Anesthesia problems Neg Hx     [3]   Current Outpatient Medications   Medication Sig Dispense Refill    acetaminophen (TYLENOL) 325 mg tablet Take 2 tablets (650 mg total) by mouth every 6 (six) hours as needed for mild pain      ascorbic acid (VITAMIN C) 500 mg tablet Take 500 mg by mouth in the morning.      aspirin 325 mg tablet Take 1 tablet (325 mg total) by mouth daily      atorvastatin (LIPITOR) 80 mg tablet Take 1 tablet (80 mg total) by mouth daily with dinner 90 tablet 2    B Complex Vitamins (VITAMIN B COMPLEX PO) Take 1 tablet by mouth in the morning.      BD PosiFlush 0.9 % SOLN FLUSH WITH 10 ML BY INTRACATHETER ONCE DAILY      chlorhexidine (PERIDEX) 0.12 % solution Apply 15 mL to the mouth or throat 2 (two) times a day      Cholecalciferol 1000 units CHEW Chew 1 tablet in the morning.      docusate sodium (COLACE) 100 mg capsule Take 1 capsule (100 mg total) by mouth 2 (two) times a day      furosemide (LASIX) 20 mg tablet Take 1 tablet (20 mg total) by mouth daily 30 tablet 5    metFORMIN (GLUCOPHAGE-XR) 500 mg 24 hr tablet Take 1 tablet by mouth once daily 90 tablet 3    metoprolol succinate (TOPROL-XL) 50 mg 24 hr tablet Take 1 tablet (50 mg total) by mouth daily 30 tablet 5    pantoprazole (PROTONIX) 40 mg tablet Take 1 tablet (40 mg total) by mouth daily      polyethylene glycol (MIRALAX) 17 g packet Take 17 g by mouth daily (Patient not taking:  Reported on 6/23/2025)      sodium chloride, PF, 0.9 % 10 mL by Intracatheter route daily for 180 doses 300 mL 5    sulfamethoxazole-trimethoprim (BACTRIM DS) 800-160 mg per tablet Take 1 tablet by mouth 2 (two) times a day for 7 days smx-tmp DS (BACTRIM) 800-160 mg tabs (1tab q12 D10) 14 tablet 0     No current facility-administered medications for this visit.

## 2025-07-21 NOTE — ASSESSMENT & PLAN NOTE
remote h/o prostate cancer, s/p prostatectomy in 1999 and more recently presenting with PSA of 360+ ng/mL with biopsy confirmed metastatic disease to the lymph nodes. On [facility] PET-CT there is extensive lymphadenopathy including mediastinal, abdomen and pelvis.  Stage IV disease.   Started on lupron (6-mo) on 1/6/2025 and  Xtandi in Jan 2025  He was doing well initially but about 2 months into treatment developed dizziness and worsening fatigue.   Excellent response in PSA - down to 0.21 ng/mL as of mid April 2025.    Xtandi has been on hold since early April 2025. His dizziness has almost resolved but he continues to c/o feeling extremely weak. We continued to hold the Xtandi. In May, dx with MI s/p CABG. His energy level has improved significantly since then.     His PSA is down to 0.02ng/mL on his labs from July. I would like to continue holding the Xtandi for now.  We discussed restarting the secondary hormonal agent when his PSA starts to creep up.  Also, no role for imaging at this time.  He will continue on Lupron-his last dose was on June 23, 2025, and he receives this every 6 months.  He is tolerating the Lupron well except for occasional hot flashes.  I will plan to see him back in 4 months with repeat PSA at that time.

## 2025-07-23 ENCOUNTER — CLINICAL SUPPORT (OUTPATIENT)
Dept: CARDIAC REHAB | Facility: HOSPITAL | Age: 85
End: 2025-07-23
Attending: PHYSICIAN ASSISTANT
Payer: MEDICARE

## 2025-07-23 DIAGNOSIS — Z95.1 S/P CABG X 3: Primary | ICD-10-CM

## 2025-07-23 PROCEDURE — 93798 PHYS/QHP OP CAR RHAB W/ECG: CPT

## 2025-07-25 ENCOUNTER — CLINICAL SUPPORT (OUTPATIENT)
Dept: CARDIAC REHAB | Facility: HOSPITAL | Age: 85
End: 2025-07-25
Attending: PHYSICIAN ASSISTANT
Payer: MEDICARE

## 2025-07-25 DIAGNOSIS — Z95.1 S/P CABG X 3: Primary | ICD-10-CM

## 2025-07-25 PROCEDURE — 93798 PHYS/QHP OP CAR RHAB W/ECG: CPT

## 2025-07-28 ENCOUNTER — APPOINTMENT (OUTPATIENT)
Dept: CARDIAC REHAB | Facility: HOSPITAL | Age: 85
End: 2025-07-28
Attending: PHYSICIAN ASSISTANT
Payer: MEDICARE

## 2025-07-28 PROBLEM — Z95.1 PRESENCE OF AORTOCORONARY BYPASS GRAFT: Status: ACTIVE | Noted: 2025-01-01

## 2025-07-28 PROBLEM — D35.00 BENIGN NEOPLASM OF ADRENAL GLAND: Status: ACTIVE | Noted: 2025-05-28

## 2025-07-30 ENCOUNTER — CLINICAL SUPPORT (OUTPATIENT)
Dept: CARDIAC REHAB | Facility: HOSPITAL | Age: 85
End: 2025-07-30
Attending: PHYSICIAN ASSISTANT
Payer: MEDICARE

## 2025-07-30 DIAGNOSIS — Z95.1 S/P CABG X 3: Primary | ICD-10-CM

## 2025-07-30 PROCEDURE — 93798 PHYS/QHP OP CAR RHAB W/ECG: CPT

## 2025-08-01 ENCOUNTER — CLINICAL SUPPORT (OUTPATIENT)
Dept: CARDIAC REHAB | Facility: HOSPITAL | Age: 85
End: 2025-08-01
Attending: PHYSICIAN ASSISTANT
Payer: MEDICARE

## 2025-08-01 DIAGNOSIS — Z95.1 S/P CABG X 3: Primary | ICD-10-CM

## 2025-08-01 PROCEDURE — 93798 PHYS/QHP OP CAR RHAB W/ECG: CPT

## 2025-08-06 ENCOUNTER — CLINICAL SUPPORT (OUTPATIENT)
Dept: CARDIAC REHAB | Facility: HOSPITAL | Age: 85
End: 2025-08-06
Attending: PHYSICIAN ASSISTANT
Payer: MEDICARE

## 2025-08-06 DIAGNOSIS — Z95.1 S/P CABG X 3: Primary | ICD-10-CM

## 2025-08-06 PROCEDURE — 93798 PHYS/QHP OP CAR RHAB W/ECG: CPT

## 2025-08-08 ENCOUNTER — CLINICAL SUPPORT (OUTPATIENT)
Dept: CARDIAC REHAB | Facility: HOSPITAL | Age: 85
End: 2025-08-08
Attending: PHYSICIAN ASSISTANT
Payer: MEDICARE

## 2025-08-08 DIAGNOSIS — Z95.1 S/P CABG X 3: Primary | ICD-10-CM

## 2025-08-08 PROCEDURE — 93798 PHYS/QHP OP CAR RHAB W/ECG: CPT

## 2025-08-12 ENCOUNTER — PATIENT OUTREACH (OUTPATIENT)
Dept: HEMATOLOGY ONCOLOGY | Facility: CLINIC | Age: 85
End: 2025-08-12

## 2025-08-14 ENCOUNTER — HOSPITAL ENCOUNTER (OUTPATIENT)
Dept: INTERVENTIONAL RADIOLOGY/VASCULAR | Facility: HOSPITAL | Age: 85
Discharge: HOME | End: 2025-08-14
Attending: RADIOLOGY
Payer: MEDICARE

## 2025-08-15 ENCOUNTER — PREP FOR PROCEDURE (OUTPATIENT)
Dept: INTERVENTIONAL RADIOLOGY/VASCULAR | Facility: CLINIC | Age: 85
End: 2025-08-15

## 2025-08-15 ENCOUNTER — CLINICAL SUPPORT (OUTPATIENT)
Dept: CARDIAC REHAB | Facility: HOSPITAL | Age: 85
End: 2025-08-15
Attending: PHYSICIAN ASSISTANT
Payer: MEDICARE

## 2025-08-18 ENCOUNTER — CLINICAL SUPPORT (OUTPATIENT)
Dept: CARDIAC REHAB | Facility: HOSPITAL | Age: 85
End: 2025-08-18
Attending: PHYSICIAN ASSISTANT
Payer: MEDICARE

## 2025-08-18 DIAGNOSIS — Z95.1 S/P CABG X 3: Primary | ICD-10-CM

## 2025-08-18 PROCEDURE — 93798 PHYS/QHP OP CAR RHAB W/ECG: CPT

## 2025-08-20 ENCOUNTER — CLINICAL SUPPORT (OUTPATIENT)
Dept: CARDIAC REHAB | Facility: HOSPITAL | Age: 85
End: 2025-08-20
Attending: PHYSICIAN ASSISTANT
Payer: MEDICARE

## 2025-08-20 DIAGNOSIS — Z95.1 S/P CABG X 3: Primary | ICD-10-CM

## 2025-08-20 PROCEDURE — 93798 PHYS/QHP OP CAR RHAB W/ECG: CPT

## (undated) DEVICE — TUBING SUCTION 5MM X 12 FT

## (undated) DEVICE — Device

## (undated) DEVICE — POV-IOD SOLUTION 4OZ BT

## (undated) DEVICE — NEEDLE BLUNT 18 G X 1 1/2IN

## (undated) DEVICE — EXOFIN PRECISION PEN HIGH VISCOSITY TOPICAL SKIN ADHESIVE: Brand: EXOFIN PRECISION PEN, 1G

## (undated) DEVICE — BLADE BEAVER MINI SZ 69

## (undated) DEVICE — CATH FOLEY 20FR 5ML 2 WAY UNCOATED SILICONE

## (undated) DEVICE — REM POLYHESIVE ADULT PATIENT RETURN ELECTRODE: Brand: VALLEYLAB

## (undated) DEVICE — PACK CABG PBDS

## (undated) DEVICE — INTENDED FOR TISSUE SEPARATION, AND OTHER PROCEDURES THAT REQUIRE A SHARP SURGICAL BLADE TO PUNCTURE OR CUT.: Brand: BARD-PARKER ® CARBON RIB-BACK BLADES

## (undated) DEVICE — VASOVIEW HEMOPRO 2: Brand: VASOVIEW HEMOPRO 2

## (undated) DEVICE — EVERGRIP INSERT SET 86MM: Brand: FOGARTY EVERGRIP

## (undated) DEVICE — EVACUATOR BLADDER ELLIK DISP STRL

## (undated) DEVICE — UROLOGIC DRAIN BAG: Brand: UNBRANDED

## (undated) DEVICE — NEPTUNE E-SEP SMOKE EVACUATION PENCIL, COATED, 70MM BLADE, PUSH BUTTON SWITCH: Brand: NEPTUNE E-SEP

## (undated) DEVICE — 32 FR STRAIGHT – SOFT PVC CATHETER: Brand: PVC THORACIC CATHETERS

## (undated) DEVICE — LUBRICANT JELLY SURGILUBE TUBE 4OZ FLIP TOP

## (undated) DEVICE — PREMIUM DRY TRAY LF: Brand: MEDLINE INDUSTRIES, INC.

## (undated) DEVICE — Device: Brand: OLYMPUS

## (undated) DEVICE — TUBING INSUFFLATION SET ISO CONNECTOR

## (undated) DEVICE — APPLIER SURGICLIP PREMIUM SMALL 9IN

## (undated) DEVICE — GLIDESHEATH BASIC HYDROPHILIC COATED INTRODUCER SHEATH: Brand: GLIDESHEATH

## (undated) DEVICE — JP CHANNEL DRAIN, 24FR HUBLESS: Brand: CARDINAL HEALTH

## (undated) DEVICE — RECIP.STERNUM SAW BLADE 34/7.5/0.7MM: Brand: AESCULAP

## (undated) DEVICE — 3000CC GUARDIAN II: Brand: GUARDIAN

## (undated) DEVICE — PACK CUSTOM PERFUSION PLEG PK

## (undated) DEVICE — BASIC SINGLE BASIN 2-LF: Brand: MEDLINE INDUSTRIES, INC.

## (undated) DEVICE — TELFA NON-ADHERENT ABSORBENT DRESSING: Brand: TELFA

## (undated) DEVICE — RADIFOCUS OPTITORQUE ANGIOGRAPHIC CATHETER: Brand: OPTITORQUE

## (undated) DEVICE — BONE WAX WHITE: Brand: BONE WAX WHITE

## (undated) DEVICE — GUIDEWIRE .035 260CM 3MM J TIP

## (undated) DEVICE — DISPOSABLE OR TOWEL: Brand: CARDINAL HEALTH

## (undated) DEVICE — OASIS DRAIN, SINGLE, INLINE & ATS COMPATIBLE: Brand: OASIS

## (undated) DEVICE — ELECTRODE BLADE E-Z CLEAN 4IN

## (undated) DEVICE — SAFEAIR ULPA FILTER: Brand: NEPTUNE

## (undated) DEVICE — SCD SEQUENTIAL COMPRESSION COMFORT SLEEVE MEDIUM KNEE LENGTH: Brand: KENDALL SCD

## (undated) DEVICE — SUT PROLENE 7-0 BV175-8/BV175-8 24 IN EPM8747

## (undated) DEVICE — INVIEW CLEAR LEGGINGS: Brand: CONVERTORS

## (undated) DEVICE — PACK TUR

## (undated) DEVICE — PACK CUSTOM PERFUSION AV LOOP

## (undated) DEVICE — X-RAY DETECTABLE SPONGES,16 PLY: Brand: VISTEC

## (undated) DEVICE — EXIDINE 4 PCT

## (undated) DEVICE — DECANTER: Brand: UNBRANDED

## (undated) DEVICE — 32 FR RIGHT ANGLE – SOFT PVC CATHETER: Brand: PVC THORACIC CATHETERS

## (undated) DEVICE — INTENDED FOR TISSUE SEPARATION, AND OTHER PROCEDURES THAT REQUIRE A SHARP SURGICAL BLADE TO PUNCTURE OR CUT.: Brand: BARD-PARKER SAFETY BLADES SIZE 15, STERILE

## (undated) DEVICE — BASIC SINGLE BASIN-LF: Brand: MEDLINE INDUSTRIES, INC.

## (undated) DEVICE — SILVER-COATED ANTIBACTERIAL BARRIER DRESSING: Brand: ACTICOAT SURGIC 10X12CM 5PK US

## (undated) DEVICE — 40601 PROLONGED POSITIONING SYSTEM: Brand: 40601 PROLONGED POSITIONING SYSTEM

## (undated) DEVICE — THERMOFLECT BLANKET, L, 25EA                               TS THERMOFLECT BLANKET, 48" X 84", SILVER, 5/BG, 5 BG/CS NW: Brand: THERMOFLECT

## (undated) DEVICE — RED RUBBER ROBINSON URETHRAL CATHETER, RADIOPAQUE, SMOOTH ROUNDED TIP, 20 FR (6.7 MM): Brand: DOVER

## (undated) DEVICE — SINGLE PORT MANIFOLD: Brand: NEPTUNE 2

## (undated) DEVICE — BAG URINE DRAINAGE 2000ML ANTI RFLX LF

## (undated) DEVICE — ANTIBACTERIAL UNDYED BRAIDED (POLYGLACTIN 910), SYNTHETIC ABSORBABLE SUTURE: Brand: COATED VICRYL

## (undated) DEVICE — SURGICEL NU-KNIT 3 X 4

## (undated) DEVICE — SUT PROLENE 6-0 C-1/C-1 30 IN 8307H

## (undated) DEVICE — STERILE POLYISOPRENE POWDER-FREE SURGICAL GLOVES: Brand: PROTEXIS